# Patient Record
Sex: FEMALE | Race: WHITE
[De-identification: names, ages, dates, MRNs, and addresses within clinical notes are randomized per-mention and may not be internally consistent; named-entity substitution may affect disease eponyms.]

---

## 2018-03-06 ENCOUNTER — HOSPITAL ENCOUNTER (INPATIENT)
Dept: HOSPITAL 46 - ED | Age: 83
LOS: 2 days | Discharge: HOME | DRG: 291 | End: 2018-03-08
Attending: INTERNAL MEDICINE | Admitting: INTERNAL MEDICINE
Payer: MEDICARE

## 2018-03-06 VITALS — BODY MASS INDEX: 25.6 KG/M2 | HEIGHT: 63 IN | WEIGHT: 144.49 LBS

## 2018-03-06 DIAGNOSIS — E78.5: ICD-10-CM

## 2018-03-06 DIAGNOSIS — E03.9: ICD-10-CM

## 2018-03-06 DIAGNOSIS — Z79.84: ICD-10-CM

## 2018-03-06 DIAGNOSIS — J96.01: ICD-10-CM

## 2018-03-06 DIAGNOSIS — Z79.01: ICD-10-CM

## 2018-03-06 DIAGNOSIS — E11.9: ICD-10-CM

## 2018-03-06 DIAGNOSIS — I48.2: ICD-10-CM

## 2018-03-06 DIAGNOSIS — Z86.73: ICD-10-CM

## 2018-03-06 DIAGNOSIS — I11.0: Primary | ICD-10-CM

## 2018-03-06 DIAGNOSIS — Z66: ICD-10-CM

## 2018-03-06 DIAGNOSIS — I50.33: ICD-10-CM

## 2018-03-06 NOTE — NUR
AT 1828 PATIENT HAD WHAT APPEARED TO BE A VASOVAGAL EPISODE GETTING UP FROM
THE COMMODE. HR DROPPED INTO THE HIGH 40'S SHE SAID SHE FELT VERY HOT, BUT WAS
NOT DIAPHORETIC, SHE HAD NO CHEST PAIN, NO JAW OR LEFT ARM PAIN OR NUMBNESS,
BUT DID HAVE SOME NAUSEA. INCREASED HER FROM 4L/NC TO 6L/NC AND CALLED
. AT THE TIME OF PHONE CALL PATIENT WAS BACK TO 4L/NC AND FEELING BACK
TO NORMAL.  ALSO FELT THIS WAS A VASOVAGAL EVENT AND ASKED THAT WE
JUST MONITOR THE PATIENT. PATIENT IS RESTING QUIETLY NOW AT THIS TIME.

## 2018-03-06 NOTE — EKG
[facility]
                                    [address]
                                  [place], [place]  [address]
_________________________________________________________________________________________
                                                                 Signed   
 
 
Atrial fibrillation with premature ventricular or aberrantly conducted complexes
Low voltage QRS
Cannot rule out Anterior infarct , age undetermined
ST \T\ T wave abnormality, consider inferior ischemia Abnormal ECG No previous ECGs
available
Confirmed by NICK WARD MD ([id number]) on 3/6/2018 8:49:25 PM
 
 
 
 
 
 
 
 
 
 
 
 
 
 
 
 
 
 
 
 
 
 
 
 
 
 
 
 
 
 
 
 
 
 
 
 
 
 
 
    Electronically Signed By: NICK WARD MD  03/06/18 2049
_________________________________________________________________________________________
PATIENT NAME:     ALVIN HARP                 
MEDICAL RECORD #: [id number]                     Electrocardiogram             
          ACCT #: [id number]  
DATE OF BIRTH:   [birth year]                                       
PHYSICIAN:   NIKC WARD MD           REPORT #: [id number]
REPORT IS CONFIDENTIAL AND NOT TO BE RELEASED WITHOUT AUTHORIZATION

## 2018-03-06 NOTE — NUR
PT ASSISTED TO USE BEDPAN. PT STATES SHE TOLERATES THE BEDPAN MUCH BETTER THAN
GETTING UP TO THE CAMMODE. BEDDING CHANGED. NO OTHER ISSUES AT THIS TIME.

## 2018-03-06 NOTE — NUR
pt shift report recived from day shift rn. pt resting in bed with fmaily at
bedside. will continue to closely monitor pt. call light in hand.

## 2018-03-06 NOTE — NUR
Patient has had her evening medications and is now eating her dinner with
family at the bedside.  called and ordered another 40mg IV Lasix,
which has been given.

## 2018-03-06 NOTE — NUR
FAMILY HEADING HOME FOR THE NIGHT. PT USING BEDPAN TO DECREASE ACTIVITY D/T
SOB AND SPO2 DESATURATION. PT IS NOT TOLERATING GETTING UP TO THE CAMMODE WELL
WITH WORK OF BREATHING. PT TOLERATED BEDPAN. SPOKE WITH PT LUCA DIAL PT
DOES NOT WISH TO HAVE ONE AT THIS TIME, SHE FEELS THE BEDPAN IS TOLERABLE.
WILL CONTINUE TO CLSOELY MONITOR SOB WITH ACTIVITY AND ENCOURAGE AS MUCH REST
TONIGHT. PT ASSESSMENT COMPLTED. PT HAS SOME CRACKLES BILATERALLY. PT IS ON 4L
NC. VITALS COMPLTED. ACTIVE BT NOTED. PT PREFERES TO SLEEP WITH LIIGHT ON. PT
HAS CALL LIGHT. PT DENTURES CLEANED. WILL CONTINUE TO CLSOELY MONITOR.

## 2018-03-07 NOTE — NUR
PT AWAKE AND UP TO [facility] CHAIR FOR BREAKFAST WITH ONE PERSON ASSIST. PT ATE
APPROX 50% OF BREAKFAST, DAUGHTER IN ROOM. PT USED BSC AND RETURNED TO BED.
LULY IN TO DISCUSS WITH PT AND DAUGHTER ABOUT HER HEART MEDICATIONS AND DIET.
AND ANSWER ANY QUESTIONS THEY MAY HAVE. SHE WILL RETURN TOMORROW TO DISCUSS
INFORMATION AGAIN.

## 2018-03-07 NOTE — NUR
PATIENT ASSISTED TO THE BR. PATIENT VOIDED CLEAR YELLOW URINE. ASSISTED BACK
TO BED. TOLERATED WELL.

## 2018-03-07 NOTE — NUR
REPORT GIVEN FROM FRANK RODRÍGUEZ. BROUGHT PATIENT OVER IN CHAIR.PATIENT TITRATED
TO RA. TOLERATING SATING AT 95 PERCENT. VITALS TAKEN. ASSISTED PATIENT TO BED.
TOLERATING AMBULATING TO BED WITH A STBY ASSIST. PATIENT IS STEADY ON HIS
FEET. CURRENTLY PATIENT IS ORDERING DINNER. WILL BE BACK IN ROOM TO DO
ASSESSMENT.

## 2018-03-07 NOTE — NUR
PT RESTING WELL AT THIS TIME. PT ASKED STAFF NOT TO TURN LIGHTS OUT WHEN SHE
IS SLEEPING. PTPREFFERS LIGHTS TO BE LEFT ON. LIGHTS REMAIN ON. WILL CONTINUE
TO CLSOELY MONITOR.

## 2018-03-07 NOTE — NUR
PATIENT ASSSEMENT COMPLETED. PATIENTS VITALS TAKEN AND RECORDED. PATIENT
RECIEVED NO INSULIN AS BS WAS WNL. PATIENT DENIES ANY PAIN OR SOB. PATIENTS
EVENING MEDICATIONS GIVEN PER ORDER. PATIENT ASSISTED TO THE RESTROOM AS A
SBA. PATIENT IS STEADY ON HER FEET. PATIENT GIVEN SUPPLIES TO COMPLETE NIGHTLY
ORAL CARE. PATIENT IS NOW BACK IN BED RESTING. PATIENTS FAMILY IS AT THE
BEDISDE. NO FURTHER NEEDS NOTED. CALL LIGHT IN REACH.

## 2018-03-07 NOTE — NUR
PT CALLED TO USE BEDPAN. PT TOLERATED EVEN BETTER THAN EARLIER IN THE SHIFT.
PT STATES SHE DOES NOT FEEL SOB WITH MOVING IN BED. PT TURNED TO ALLOW NEW
CHUCKS PAD PLACED. PT ASSESSMENT UNCHANGED FROM PREVIOUS. WILL CONTINUE TO
CLOSELY MONITOR.

## 2018-03-07 NOTE — NUR
PINO WAS A CCU TRANSFER THIS AFTERNOON. STBY ASSIST TO BR. PATIENT IS ADA
DIET. 1500 FLUID RESTRICTION. DAILY WT. LULY FOR CARDIAC REHAB HAS BEEN IN TO
PROVIDE EDUCATION. SL. LASIX AND PO POTASSIUM GIVEN. PATIENT REPORTS THAT SHE
HAD A BM THIS MORNING.

## 2018-03-07 NOTE — NUR
Heart failure education and initial home management assessment.
Lives independently with daughter living in upper floor apartment. Manages own
meds-denies difficulty, uses 4 seperate weekly pill boxes. Owns scales, does
not currently weigh QD but states she can. Transportation-drives self to
coumadin clinic.Daughter, Lauryn,takes her to all other provider appointments.
Diet- Long time member of [employer], has been a  for 14 years, eats a balanced
diet that includes cranberries/oatmeal for breakfast, Salad with whole wheat
bread at [facility] for lunch QD, dinner often includes fruit whole wheat toast and
skim milk. She enjoys 1 caffeine free diet soda Q AM, daughter will check
sodium content.  When eats out does not use full amount of salad dressing.
Rarely eats at [employer] but makes a meal last 2 days.
Given heart failure packet with Living with HF book, zones form, daily
weight/symptom log, blank [employer] home medication schedule. Vonnie and
her daughter were receptive to new information and agrees to follow ups and to
receive more information.
Plan of Care: View HF video, discuss Zones/sx management, review new drugs
PRN, ensure follow up with PCP or cardiology within 7 days of DC.

## 2018-03-07 NOTE — NUR
PT CALLED TO GO TO THE BATHROOM. OTHER RN IN TO HELP PT AND SHE GOT UP TO
CAMMODE PT TOLERATED WELL. PT SPO2 STAYED ABOVE 92% ON 3L NC. WILL CONTINUE O
CLSOELY MONITOR. NO OTHER ISSUES AT THIS TIME. CALL LIGHT IN REACH.

## 2018-03-07 NOTE — NUR
PATIENT BLOOD SUGAR TAKEN. PATIENT DOING WELL. FAMILY AT BEDSIDE. EVENING
MEDICATON GIVEN. REQUESTING THAT LULY COME WITH CHF VIDEO. PATIENT HAS NO
OTHER COMPLAINT AT THIS TIME. PATIENT IS CONTINUE TO TOLEATE RA. LULY CALLED
TO GIVE CHF INFORMATION.

## 2018-03-07 NOTE — NUR
PT CALLED TO USE BEDPAN. PT TOLERATES BED PAN WELL. TURNED PT OXYGEN DOWN TO
3.5L NC WITH SPO2 92-95%. PT ASSESSMENT COMPLTED. PT BREATH SOUNDS ARE CLEAR
BILATERALLY. WILL CONTINUE TO CLOSELY MONITOR.

## 2018-03-07 NOTE — NUR
PATIENT RESTING IN BED EATING DINNER. PATIENTS FAMILY MEMBERS IN ROOM. CALL
LIGHT WITHIN REACH. PATIENT STATES SHE HAS NO PAIN. NO OTHER NEEDS AT THIS
TIME.

## 2018-03-07 NOTE — NUR
UP TO BS WITH ONE PERSON ASSIST. VOIDED 375 MLS YELLOW URINE, RETURNED TO BED
AND RESTING WITH HOB ELEVATED - NO C/O.

## 2018-03-08 NOTE — NUR
PATIENT IS RESTING IN BED WITH EYES CLOSED. PATIENTS BREATHING IS EVEN ADN
UNLABORED, RR 17. CALL LIGHT IN REACH.

## 2018-03-08 NOTE — NUR
PATIENT IS RESTING IN BED WITH EYES CLOSED. PATIENTS BREATHING IS EVEN AND
UNLABORED, RR 17. CALL LIGHT IN REACH.

## 2018-03-08 NOTE — NUR
PATIENT SITTING UP IN BED, DAUGHTER IN ROOM. TEETH BRUSHED, WILL DO AM CARE
AFTER BREAKFAST. WOULD LIKE TO SHOWER TODAY. IS CONCERNED ABOUT KNOWING HOW
TO MEASURE HER FLUIDS WHEN AT HOME. CALL LIGHT IN REACH.

## 2018-03-08 NOTE — NUR
Pt ambulated to the the bathoo and showered independently with SBA. Pt
stated she experienced slight SOB with exetrion but appeared to tolerate the
activity well as noted by an absence of audible wheezes or use of accessory
muscles. Pt continued to persfuse well notable by pinkness in the face and an
absence of cyanosis. Pt is currently resting in bed. Pt states no concerns at
this time. Call light within reach.

## 2018-03-08 NOTE — NUR
NURSING STUDENT ASSISTING PATIENT TO BATHROOM. SHE IS ALSO REMOVING HER IV
SITE. PATIENT TO BE D/C SHORTLY AFTER SATYA RETURNS.

## 2018-03-08 NOTE — NUR
PATIENTS DAILY WEIGHT TAKEN AND RECORDED. PATIENTS VITALS TAKE AND RECORDED BY
CNA. PATIENT ASSISTED TO THE RESTROOM AS A SBA AND IS STEADY ON HER FEET.
PATIENT IS BACK IN BED RESTING. NO FURTHER NEEDS NOTED. CALL LIGHT IN REACH.

## 2018-03-08 NOTE — NUR
RECIEVED REPORT FROM NIGHT SHIFT NURSE. PATIENT RESTING IN BED QUILTING.
DENIES NEEDS. CALL LIGHT IN REACH.

## 2018-03-08 NOTE — NUR
PATIENT ASSISTED TO THE RESTROOM AS SBA. PATIENT WAS ABLE TO VOID. PATIENT IS
NOW BACK IN BED RESTING. PATIENT DENIES ANY FURTHER NEEDS CALL LIGHT IN REACH.

## 2018-03-08 NOTE — NUR
ASSISTED PATIENT TO BATHROOM. VOIDED. BACK TO BED. REVIEWED D/C INSTRUCTIONS
WITH PATIENT AND DAUGHTER SATYA. QUESTIONS ANSWERED. NO FURTHER QUESTIONS.
DENY NEEDS.

## 2018-03-08 NOTE — NUR
ASSISTED PATIENT TO BATHROOM. VOIDED. BACK TO BED. SATYA (PT'S DAUGHTER) IN
ROOM. SATYA WOULD LIKE TO SPEAK WITH DR. MARSHALL BEFORE PATIENT IS D/C. COURSE
SOUNDS HEARD IN LLL, OTHERWISE CLEAR. HR IRREG., NO PEDAL EDEMA. PATIENT
DID BECOME MILDLY SOB WITH AMBULATING TO THE TOILET BUT SHE STATES IT HAS
IMPROVED. DENIES PAIN. CALL LIGHT IN REACH.

## 2018-03-08 NOTE — NUR
PATIENTS MORNING MEDICATIONS GIVEN PER ORDER. PATIENT IN BED RESTING. PATIENT
DENIES ANY FURTHER NEEDS AT THIS TIME. CALL LIGHT IN REACH.

## 2018-03-08 NOTE — NUR
PT RESTING IN BED, ALERT AND ORIENTED. SHE SMILED AND TOLD ME SHE WAS FEELING
SO MUCH BETTER. WELL ENOUGH SHE FELT TO BE DC'D TODAY. HAD A VERY PLEASANT
VISIT WITH PT, PRAYED FOR HER. WILL FOLLOW AS NEEDED

## 2018-03-08 NOTE — NUR
PATIENT RESTED WELL THROUGHOUT THE SHIFT. PATIENT IS ON AN ADA DIET AND
TOLERATING WELL, NO NAUSEA NOTED. PATIENT IS ON A 1500ML FLUID RESTRICTION.
PATIENT IS A SBA AND IS STEADY ON HER FEET. PATIENT IS ON RA, NO SOB NOTED.
PATIENT IS SL. PATIENT IS AAOX3 AND CALLS APPROPRIATELY.

## 2018-03-12 NOTE — NUR
Heart Failure Discharge Follow Up Call #1-
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 Able to states medication changes. Pt DC on 3/8. During f/u
call today patient revealed home wts have went up. Is adherent to new order
for Lasix 40 mg q AM. Denies diet change. Weighs at same time q AM. DC wt 144
lb. Home wt day after #, then 150#, 148#, today 149#.  States "legs are
skinny", SOB when "over does it", pants are not as tight as they were on
admit.
Prefers afternoon visits so her daughter can accompany her.  She could not get
in to see Dr Bermudez PCP until 4/4 for f/u visit. I have left him a high
priority message requesting she sees someone in PCP office as soon as
possible.

## 2018-08-24 ENCOUNTER — HOSPITAL ENCOUNTER (INPATIENT)
Dept: HOSPITAL 46 - ED | Age: 83
LOS: 4 days | Discharge: HOME | DRG: 872 | End: 2018-08-28
Attending: INTERNAL MEDICINE | Admitting: INTERNAL MEDICINE
Payer: MEDICARE

## 2018-08-24 VITALS — WEIGHT: 147.49 LBS | HEIGHT: 63 IN | BODY MASS INDEX: 26.13 KG/M2

## 2018-08-24 DIAGNOSIS — A41.51: Primary | ICD-10-CM

## 2018-08-24 DIAGNOSIS — E11.9: ICD-10-CM

## 2018-08-24 DIAGNOSIS — Z79.4: ICD-10-CM

## 2018-08-24 DIAGNOSIS — N39.0: ICD-10-CM

## 2018-08-24 DIAGNOSIS — I48.2: ICD-10-CM

## 2018-08-24 DIAGNOSIS — E87.1: ICD-10-CM

## 2018-08-24 DIAGNOSIS — I50.32: ICD-10-CM

## 2018-08-24 DIAGNOSIS — E03.9: ICD-10-CM

## 2018-08-24 DIAGNOSIS — I11.0: ICD-10-CM

## 2018-08-24 DIAGNOSIS — E78.5: ICD-10-CM

## 2018-08-24 DIAGNOSIS — I05.2: ICD-10-CM

## 2018-08-24 DIAGNOSIS — Z79.01: ICD-10-CM

## 2018-08-24 NOTE — XMS
Clinical Summary
  Created on: 2018
 
 LindayaminiVonnie
 External Reference #: [id number]
 : 32
 Sex: Female
 
 Demographics
 
 
+-----------------------+----------------------+
| Address               | [address]      |
|                       | [place]  [address] |
+-----------------------+----------------------+
| Home Phone            | [phone]      |
+-----------------------+----------------------+
| Preferred Language    | Unknown              |
+-----------------------+----------------------+
| Marital Status        | Unknown              |
+-----------------------+----------------------+
| Sabianism Affiliation | Unknown              |
+-----------------------+----------------------+
| Race                  | Unknown              |
+-----------------------+----------------------+
| Ethnic Group          | Unknown              |
+-----------------------+----------------------+
 
 
 Author
 
 
+--------------+---------------------+
| Author       | [facility] |
+--------------+---------------------+
| Organization | [facility] |
+--------------+---------------------+
| Address      | Unknown             |
+--------------+---------------------+
| Phone        | Unavailable         |
+--------------+---------------------+
 
 
 
 Support
 
 
+--------------+--------------+---------+-----------------+
| Name         | Relationship | Address | Phone           |
+--------------+--------------+---------+-----------------+
| Lauryn Leroy | ECON         | Unknown | [phone] |
+--------------+--------------+---------+-----------------+
 
 
 
 Care Team Providers
 
 
 
+-----------------------+------+-------------+
| Care Team Member Name | Role | Phone       |
+-----------------------+------+-------------+
 PP   | Unavailable |
+-----------------------+------+-------------+
 
 
 
 Source Comments
 [facility] is fully live on both [facility] and [facility]
 
 Allergies
 Not on File
 
 Current Medications
 Not on file
 
 Active Problems
 Not on file
 
 Encounters
 
 
+--------+-----------+-----------+----------------------+---------------------+
| Date   | Type      | Specialty | Care Team            | Description         |
+--------+-----------+-----------+----------------------+---------------------+
| / | Telephone |           |   Jame Wilkins  | Other ([facility] appt)    |
|    |           |           | MD [facility]                |                     |
+--------+-----------+-----------+----------------------+---------------------+
| 08/15/ | Abstract  |           |   Unknown            | Medical Records     |
|    |           |           |                      | Review ([facility] review) |
+--------+-----------+-----------+----------------------+---------------------+
 from Last 3 Months
 
 Social History
 
 
+----------------+-------+-----------+--------+------+
| Tobacco Use    | Types | Packs/Day | Years  | Date |
|                |       |           | Used   |      |
+----------------+-------+-----------+--------+------+
| Never Assessed |       |           |        |      |
+----------------+-------+-----------+--------+------+
 
 
 
+------------------+---------------+
| Sex Assigned at  | Date Recorded |
| Birth            |               |
+------------------+---------------+
| Not on file      |               |
+------------------+---------------+
 
 
 
 Plan of Treatment
 
 
+--------------------+-----------+-----------+----------+
 
| Health Maintenance | Due Date  | Last Done | Comments |
+--------------------+-----------+-----------+----------+
| INFLUENZA VACCINE  |  |           |          |
| (FLU SHOT)         | 8         |           |          |
+--------------------+-----------+-----------+----------+
 
 
 
 Results
 Not on filefrom Last 3 Months

## 2018-08-24 NOTE — XMS
Encounter Summary
  Created on: 2018
 
 Vonnie Celaya
 External Reference #: [id number]
 : 32
 Sex: Female
 
 Demographics
 
 
+-----------------------+---------------------------+
| Address               | [address]           |
|                       | [place]  [address] |
+-----------------------+---------------------------+
| Home Phone            | [phone]           |
+-----------------------+---------------------------+
| Preferred Language    | Unknown                   |
+-----------------------+---------------------------+
| Marital Status        |                    |
+-----------------------+---------------------------+
| Muslim Affiliation | Unknown                   |
+-----------------------+---------------------------+
| Race                  | Unknown                   |
+-----------------------+---------------------------+
| Ethnic Group          | Unknown                   |
+-----------------------+---------------------------+
 
 
 Author
 
 
+--------------+-----------------------+
| Author       | [facility] |
+--------------+-----------------------+
| Organization | [facility] |
+--------------+-----------------------+
| Address      | Unknown               |
+--------------+-----------------------+
| Phone        | Unavailable           |
+--------------+-----------------------+
 
 
 
 Support
 
 
+--------------+--------------+---------+-----------------+
| Name         | Relationship | Address | Phone           |
+--------------+--------------+---------+-----------------+
| Lauryn Leroy | ECON         | Unknown | [phone] |
+--------------+--------------+---------+-----------------+
 
 
 
 Care Team Providers
 
 
 
+-----------------------+------+-----------------+
| Care Team Member Name | Role | Phone           |
+-----------------------+------+-----------------+
| Jean Bermudez MD  | PCP  | [phone] |
+-----------------------+------+-----------------+
 
 
 
 Reason for Referral
 Consultation (Routine)
 
+------------+--------------+------------+--------------+--------------+---------------+
| Status     | Reason       | Specialty  | Diagnoses /  | Referred By  | Referred To   |
|            |              |            | Procedures   | Contact      | Contact       |
+------------+--------------+------------+--------------+--------------+---------------+
| Authorized |   Specialty  | Cardiology |   Diagnoses  |   Stephen,      |               |
|            | Services     |            |  Acute on    | Varun PARRA,   | [facility] |
|            | Required     |            | chronic      | MD  [address]     | [address]  [address]   |
|            |              |            | diastolic    | [address]  | [address]   |
|            |              |            | congestive   |  [address]       | [address],      |
|            |              |            | heart        | [place], [place] | [place] , [place] |
|            |              |            | failure      |  [address]       |  [address]        |
|            |              |            | ([facility])        | Phone:       | Phone:        |
|            |              |            | Persistent   | [phone] | [phone]  |
|            |              |            | atrial       |   Fax:       |  Fax:         |
|            |              |            | fibrillation | [phone] | [phone]  |
|            |              |            |  ([facility])       |              |               |
|            |              |            | Severe       |              |               |
|            |              |            | mitral       |              |               |
|            |              |            | regurgitatio |              |               |
|            |              |            | n  Severe    |              |               |
|            |              |            | tricuspid    |              |               |
|            |              |            | regurgitatio |              |               |
|            |              |            | n  Pulmonary |              |               |
|            |              |            |              |              |               |
|            |              |            | hypertension |              |               |
|            |              |            | , moderate   |              |               |
|            |              |            | to severe    |              |               |
|            |              |            | ([facility])        |              |               |
+------------+--------------+------------+--------------+--------------+---------------+
 
 
 
 
 Reason for Visit
 
 
+--------------+----------+
| Reason       | Comments |
+--------------+----------+
| Consultation |          |
+--------------+----------+
 Consultation (Routine)
 
+-------------+--------+------------+--------------+--------------+---------------+
| Status      | Reason | Specialty  | Diagnoses /  | Referred By  | Referred To   |
|             |        |            | Procedures   | Contact      | Contact       |
+-------------+--------+------------+--------------+--------------+---------------+
| New Request |        | Cardiology |   Diagnoses  |   Aidan,  |   Imer,   |
|             |        |            |  Heart       | MD Jean  | MD Geovany   |
 
|             |        |            | failure,     |  [address]     | [address] |
|             |        |            | unspecified  | [address]    |  [address]     |
|             |        |            | ([facility])        | [address]          | [place]  |
|             |        |            | Procedures   | [place],   | [address]  Phone: |
|             |        |            | Consult      | [place] [address]     |  [phone] |
|             |        |            |              | Phone:       |   Fax:        |
|             |        |            |              | [phone] | [phone]  |
|             |        |            |              |   Fax:       |               |
|             |        |            |              | [phone] |               |
+-------------+--------+------------+--------------+--------------+---------------+
 
 
 
 
 Encounter Details
 
 
+--------+----------+----------------------+----------------------+----------------------+
| Date   | Type     | Department           | Care Team            | Description          |
+--------+----------+----------------------+----------------------+----------------------+
| / | Initial  |   [facility]          |   Varun Mitchell,   | Persistent atrial    |
| 2018   | consult  | Cardiology [place] | MD [address] | fibrillation (HCC)   |
|        |          |   [address]    |   [place],   | (Primary Dx); Acute  |
|        |          | [address]        | [place] [address]             | on chronic diastolic |
|        |          | [place] [address]  | [phone]         |  congestive heart    |
|        |          |  [phone]        | [phone] (Fax)   | failure (HCC);       |
|        |          |                      |                      | Severe mitral        |
|        |          |                      |                      | regurgitation;       |
|        |          |                      |                      | Severe tricuspid     |
|        |          |                      |                      | regurgitation;       |
|        |          |                      |                      | Pulmonary            |
|        |          |                      |                      | hypertension,        |
|        |          |                      |                      | moderate to severe   |
|        |          |                      |                      | (HCC)                |
+--------+----------+----------------------+----------------------+----------------------+
 
 
 
 Social History
 
 
+--------------+-------+-----------+--------+------+
| Tobacco Use  | Types | Packs/Day | Years  | Date |
|              |       |           | Used   |      |
+--------------+-------+-----------+--------+------+
| Never Smoker |       |           |        |      |
+--------------+-------+-----------+--------+------+
 
 
 
+---------------------+---+---+---+
| Smokeless Tobacco:  |   |   |   |
| Never Used          |   |   |   |
+---------------------+---+---+---+
 
 
 
+-------------+-----------+---------+----------+
| Alcohol Use | Drinks/We | oz/Week | Comments |
|             | ek        |         |          |
 
+-------------+-----------+---------+----------+
| No          |           |         |          |
+-------------+-----------+---------+----------+
 
 
 
+------------------+---------------+
| Sex Assigned at  | Date Recorded |
| Birth            |               |
+------------------+---------------+
| Not on file      |               |
+------------------+---------------+
 as of this encounter
 
 Last Filed Vital Signs
 
 
+-------------------+--------------------+-------------------------+
| Vital Sign        | Reading            | Time Taken              |
+-------------------+--------------------+-------------------------+
| Blood Pressure    | 112/54             | 2018  9:50 AM PDT |
+-------------------+--------------------+-------------------------+
| Pulse             | 95                 | 2018  9:34 AM PDT |
+-------------------+--------------------+-------------------------+
| Temperature       | -                  | -                       |
+-------------------+--------------------+-------------------------+
| Respiratory Rate  | -                  | -                       |
+-------------------+--------------------+-------------------------+
| Oxygen Saturation | 97%                | 2018  9:34 AM PDT |
+-------------------+--------------------+-------------------------+
| Inhaled Oxygen    | -                  | -                       |
| Concentration     |                    |                         |
+-------------------+--------------------+-------------------------+
| Weight            | 66 kg (145 lb 6.4  | 2018  9:34 AM PDT |
|                   | oz)                |                         |
+-------------------+--------------------+-------------------------+
| Height            | 160 cm (5' 3")     | 2018  9:34 AM PDT |
+-------------------+--------------------+-------------------------+
| Body Mass Index   | 25.76              | 2018  9:34 AM PDT |
+-------------------+--------------------+-------------------------+
 in this encounter
 
 Progress Notes
 Varun Mitchell MD - 2018  9:30 AM PDTFormatting of this note may be different from 
the original.
   
 Subjective: 
 
 Patient ID: Vonnie Celaya is a 86 y.o. female.
 
 HPI
 The following portions of the patient's history were reviewed and updated as appropriate an
d is available elsewhere in the record: allergies, current medications, past family history,
 past medical history, past social history, past surgical history and problem list.
 
 Mrs. Celaya, accompanied by her daughter (who lives with her and a separate apartment), otto burden to the office today for a cardiology evaluation.  She was hospitalized at [facility] twice this year, the first time in February for acute diastolic heart failure, althou
gh she believes that her symptoms actually began as long ago as the end of , when she de
veloped dyspnea on exertion with a cold.  She had severe dyspnea, to the point where she cou
 
ld not walk 1/4 block to her mailbox.  She had no dyspnea at rest, denies orthopnea, PND or 
nocturia, but had significant pedal edema.  She was hospitalized again in May, for the same 
problem.  She has hyperdynamic LV systolic function on her echocardiogram, but marked valvul
ar disease, with severe mitral regurgitation, moderately severe tricuspid regurgitation, and
 mild aortic stenosis.  She denies any history of rheumatic fever, but had scarlet fever at 
age 13, which clearly could have been misdiagnosed.  There is no mitral stenosis.  I reviewe
d the images from her echocardiogram, which also showed moderately severe pulmonary hyperten
heaven and a small secundum ASD with minor left-to-right shunting.  She continues to have sign
ificant dyspnea on exertion, walking about one half block, and because of this, now leaves h
er home only about once per week, whereas previously, she was quite active, participating in
 multiple activities such as quilting.  It has clearly affected her quality of life.  She wo
uld be classified as New York Heart Association class III.  At age 86, I do not think she is
 a very good candidate for open heart surgery, as she already has some mild memory deficits 
which would likely become worse.  The MR jet is central, and she may be a candidate for a Mi
traClip procedure.  Experimental he, this is sometimes done on the tricuspid valve is well. 
 After a prolonged discussion, she agreed to have an evaluation for this, and I referred her
 to [facility] for this purpose.  She is on appropriate medications, and I made no changes.  I denita
l see her back in several months for follow-up. 
 
 Review of Systems
 CONSTITUTIONAL:  15 lb weight decrease due to diuretics in the last year, previously lost 6
0-70 lbs since the  with diet, denies recent fever, chills, night sweats, c/o significa
nt fatigue with even minor exertion
 NEUROLOGIC:  No history of CVA, had 2 TIAs (memory deficits 1-2 days in , resolved; the
 other was c., cannot recall the symptoms).  She has a prior h/o migraines, resolved.  S
he denies seizures, has had 2 Syncopal events due to UTI, and another at age 16, probably va
sovagal, getting BP checked in both arms simultaneously ("pumped up all the way").  No dizzi
ness, lightheadedness. No numbness, tingling, paresthesias.  She has mild short term memory 
problems ("repeats stories frequently").
 EYES:  No amaurosis, diplopia, recent visual changes, has early stage Cataracts, no h/o gla
ucoma
 ENT:  She has mild bilateral Hearing loss, denies tinnitus, epistaxis, has occasional dysph
agia with certain foods
 ENDOCRINE:  Type II Diabetes mellitus. Hypothyroidism, no other endocrine problems.  No exc
essive hunger, thirst.
 PULMONARY/SLEEP:  Severe Dyspnea on Exertion but not at rest, denies orthopnea, paroxysmal 
nocturnal dyspnea.  No history of asthma, emphysema. She had pneumonia at age 4. She has mil
d snoring, no significant daytime somnolence.  Sleep is refreshing.
 CARDIOVASCULAR:  Denies chest pain, pressure or discomfort.  No history of CAD.  She has a 
history of acute Diastolic Heart Failure.  She has persistent Atrial Fibrillation, on warfar
in. No recent palpitations.  She has had a heart Murmur since age 13, with marked Valvular H
eart Disease, severe MR, moderately severe TR, mild AS.  She denies a h/o rheumatic fever bu
t had scarlet fever in childhood.  She has moderately severe Pulmonary HTN.  She has Essenti
al Hypertension, Hyperlipidemia.  She had significant Edema prior to diuretic treatment, use
s compression stockings, no claudication symptoms.  No h/o an AAA.
 -- Echo (3/8/18): EF > 70%, normal RV size, function.  Marked bi-AE.  Mild AS (FOREST 1.7 cm
, peak/mean gradient 17/9 mmHg).  Severe MR.  Moderately severe TR.  Moderately severe pulmo
nary hypertension, RVSP 55.8-60.8 mmHg.  Small secundum ASD with mild left-to- right shuntin
g
 GASTROINTESTINAL:  No recent abdominal pain, nausea, vomiting or diarrhea. Denies PUD, kvng
na, hematochezia, hepatitis.
 RENAL/:  No history of kidney disease.  No dysuria, hematuria, urinary urgency, hesitancy
.  No active Ob/Gyn disorders.
 HEMATOLOGY/ONCOLOGY:  No h/o bleeding disorders, DVT, PE.   Denies easy bruisability or ble
eding.  She has a history of Iron-deficiency Anemia, denies prior blood transfusions.  No hi
story of cancer.
 MUSCULOSKELETAL:  Osteoporosis.  No myalgias, has Osteoarthritis with no significant arthra
lgias.  No history of rheumatologic or autoimmune diseases.
 CUTANEOUS:  Eczema. No rashes, pruritus, lesions.
 PSYCHIATRIC:  No history of significant depression, has Anxiety, denies any other psychiatr
 
ic problems.
 
 Past Medical History 
 Diagnosis Date 
   Acute diastolic heart failure (HCC)  
   ASD secundum  
   Atrial fibrillation (HCC)  
  persistent since then, never attempted cardioversion 
   Congestive heart failure (HCC) 2018 
  acute/chronic Diastolic Heart Failure, NYHA class III 
   Diabetes mellitus, type II (HCC)  
  controlled, no complications 
   Edema  
   Essential hypertension  
   Heart murmur  
  had scarlet fever 
   Hyperlipidemia  
   Hypothyroidism  
   Iron deficiency anemia  
   Migraines  
  resolved 
   Mild aortic stenosis  
   Osteoarthritis  
   Osteoporosis  
   Pulmonary hypertension, moderate to severe (HCC)  
   Severe mitral regurgitation  
   Severe tricuspid regurgitation  
  moderately severe 
 
 Past Surgical History 
 Procedure Laterality Date 
   APPENDECTOMY   
   BLADDER SUSPENSION   
   BLEPHAROPLASTY Bilateral  
   BREAST SURGERY Right  
  benign lesion removed 
   BUNIONECTOMY   
   HYSTERECTOMY   
  partial hysterectomy separate 
   salpingo-oophorectomy Bilateral  
   TOE FUSION   
   TONSILLECTOMY   
 
 Family History 
 Problem Relation Age of Onset 
   CVA Mother 71 
      recurrent CVA at 74,  a few days later 
   Heart failure Mother  
   Diabetes Mellitus II Mother  
   CVA Father  
      cerebral hemorrhage 
   Coronary Artery Disease Father 63 
   Leukemia Sister  
      basophilic leukemia 
   CVA Brother  
   Heart  Disease Sister  
      ? thrombus, not clear 
   Deep vein thrombosis Sister  
   CVA Daughter  
   Obesity Daughter  
 
   Arthritis Daughter  
   Thyroid cancer Daughter  
 
 Social History 
 Substance Use Topics 
   Smoking status: Never Smoker 
   Smokeless tobacco: Never Used 
   Alcohol use No 
 
 Allergies 
 Allergen Reactions 
   Morphine Other (See Comments) 
   Pt felt fidgety 
   Percocet [Oxycodone-Acetaminophen] Other (See Comments) 
   Pt felt fidgety 
 
 Current Outpatient Prescriptions: 
    acetaminophen (TYLENOL) 325 MG tablet, Take 650 mg by mouth every 6 (six) hours as nee
ded for Pain., Disp: , Rfl: 
    ascorbic acid (VITAMIN C) 1000 MG tablet, Take 1,000 mg by mouth daily., Disp: , Rfl: 
    Cholecalciferol 2000 units CAPS, Take 2,000 Units by mouth daily., Disp: , Rfl: 
    Cranberry 1000 MG CAPS, Take 1 capsule by mouth daily., Disp: , Rfl: 
    furosemide (LASIX) 40 MG tablet, Take 40 mg by mouth daily., Disp: , Rfl: 
    Gluc-Chonn-MSM-Boswellia-Vit D (GLUCOSAMINE CHOND TRIPLE/VIT D) TABS, Take 1 tablet by
 mouth daily., Disp: , Rfl: 
    levothyroxine (SYNTHROID) 100 MCG tablet, Take 100 mcg by mouth every morning before b
reakfast., Disp: , Rfl: 
    losartan (COZAAR) 100 MG tablet, Take 100 mg by mouth daily., Disp: , Rfl: 
    magnesium chloride (MAG64) 64 mg EC tablet, Take 1 tablet by mouth daily., Disp: , Rfl
: 
    metFORMIN (GLUMETZA) 500 MG (MOD) 24 hr tablet, Take 500 mg by mouth 2 (two) times annalise
ly with meals., Disp: , Rfl: 
    metoprolol (TOPROL-XL) 200 MG 24 hr tablet, Take 100 mg by mouth every morning., Disp:
 , Rfl: 
    potassium chloride SA (K-DUR,KLOR-CON) 20 MEQ tablet, Take 20 mEq by mouth daily., Dis
p: , Rfl: 
    simvastatin (ZOCOR) 20 MG tablet, Take 20 mg by mouth nightly., Disp: , Rfl: 
    warfarin (COUMADIN) 5 MG tablet, Take 5 mg by mouth daily. 5mg daily on Mond, Wed, Fri
d, Sat,  and 2.5mg daily on Tues, Thurs, Disp: , Rfl: 
 
     
 Objective: 
 Physical Exam
 /54 (BP Location: Left upper arm, Patient Position: Sitting)  | Pulse 95  | Ht 1.6 m 
(5' 3")  | Wt 66 kg (145 lb 6.4 oz)  | SpO2 97%  | BMI 25.76 kg/m 
 /48 right arm
 
 GENERAL:  Well developed, well nourished elderly  woman, in no distress.  Appears 
approximately stated age.
 HEENT:  Normocephalic, atraumatic.  
 EYES:     PERRL, sclerae anicteric, no xanthelsasmas
 MOUTH: Oral mucosae moist, dentition adequate, no lesions noted
 NECK:    No JVD, lymphadenopathy, thyromegaly, bruits.  Carotid pulses are 2+ bilaterally
 LUNGS:  Clear bilaterally, with no rales, rhonchi or wheezing noted, respirations unlabored
 HEART:  Nondisplaced PMI, regular rate, irregularly irregular rhythm, S1 varied in intensit
y, S2 normal.  100 2/6 systolic crescendo decrescendo murmur at the base, without radiation.
  1I-6 holosystolic murmur at the apex radiating to the lower left sternal border.  No rubs 
or gallops noted.
 ABDOMEN:  Soft, nontender, no organomegaly, masses or bruits.  Bowel sounds are normal in a
ll 4 quadrants.  The abdominal aortic pulsation is not palpable.
 
 EXTREMITIES:  No edema. Radial pulses 2+ bilaterally.  Femoral pulses are 2+ bilaterally wi
thout bruits.  DP and PT pulses are 1+ bilaterally.
 SKIN:  Warm and dry, capillary refill is normal, no lesions.
 NEUROLOGIC:  Awake, alert and oriented x 3. No focal motor deficits. 
 PSYCHIATRIC:  Appropriate, affect appears normal
 
 EKG: Atrial fibrillation, ventricular rate 68, poor R-wave progression V1-V4, cannot exclud
e an old anteroseptal infarct.  Nonspecific inferior ST-T abnormalities, possibly due to isc
hemia.
 
    
 Assessment and Plan: 
 Vonnie was seen today for consultation.
 
 Persistent atrial fibrillation (HCC)
 -     Electrocardiogram, 12-lead
 -     AMB ref to Interventional Cardiology
 
 Acute on chronic diastolic congestive heart failure (HCC)
 -     AMB ref to Interventional Cardiology
 
 Severe mitral regurgitation
 -     AMB ref to Interventional Cardiology
 
 Severe tricuspid regurgitation
 -     AMB ref to Interventional Cardiology
 
 Pulmonary hypertension, moderate to severe (HCC)
 -     AMB ref to Interventional Cardiology
 
  
 
 in this encounter
 
 Plan of Treatment
 
 
+--------+---------+------------+----------------------+-------------+
| Date   | Type    | Specialty  | Care Team            | Description |
+--------+---------+------------+----------------------+-------------+
| / | Office  | Cardiology |   Varun Mitchell,   |             |
| 2018   | Visit   |            | MD [address] |             |
|        |         |            |   [place],   |             |
|        |         |            | [place] [address]             |             |
|        |         |            | [phone]         |             |
|        |         |            | [phone] (Fax)   |             |
+--------+---------+------------+----------------------+-------------+
 
 
 
+----------------------------+--------+----------------------+---------------------+
| Name                       | Priori | Associated Diagnoses | Order Schedule      |
|                            | ty     |                      |                     |
+----------------------------+--------+----------------------+---------------------+
| AMB ref to Interventional  | Routin |   Acute on chronic   | Ordered: 2018 |
| Cardiology                 | e      | diastolic congestive |                     |
|                            |        |  heart failure (HCC) |                     |
|                            |        |   Persistent atrial  |                     |
|                            |        | fibrillation (HCC)   |                     |
|                            |        | Severe mitral        |                     |
 
|                            |        | regurgitation        |                     |
|                            |        | Severe tricuspid     |                     |
|                            |        | regurgitation        |                     |
|                            |        | Pulmonary            |                     |
|                            |        | hypertension,        |                     |
|                            |        | moderate to severe   |                     |
|                            |        | (HCC)                |                     |
+----------------------------+--------+----------------------+---------------------+
 as of this encounter
 
 Procedures
 
 
+----------------------+--------+-------------+----------------------+----------------------
+
| Procedure Name       | Priori | Date/Time   | Associated Diagnosis | Comments             
|
|                      | ty     |             |                      |                      
|
+----------------------+--------+-------------+----------------------+----------------------
+
| EKG STANDARD 12 LEAD | Routin | 2018  |   Persistent atrial  |   Results for this   
|
|                      | e      |  9:57 AM    | fibrillation (HCC)   | procedure are in the 
|
|                      |        | PDT         |                      |  results section.    
|
+----------------------+--------+-------------+----------------------+----------------------
+
 in this encounter
 
 Results
 EK STANDARD 12 LEAD (2018  9:57 AM)
 
+-------------------+--------------------------+-----------+--------------+
| Component         | Value                    | Ref Range | Performed At |
+-------------------+--------------------------+-----------+--------------+
| Ventricular Rate  | 68                       | BPM       | [facility]     |
+-------------------+--------------------------+-----------+--------------+
| Atrial Rate       | 357                      | BPM       | [facility]     |
+-------------------+--------------------------+-----------+--------------+
| QRS Duration      | 102                      | ms        | [facility]     |
+-------------------+--------------------------+-----------+--------------+
| Q-T Interval      | 396                      | ms        | [facility]     |
+-------------------+--------------------------+-----------+--------------+
| QTC Calculation   | 421                      | ms        | [facility]     |
| (Bezet)           |                          |           |              |
+-------------------+--------------------------+-----------+--------------+
| Calculated R Axis | 80                       | degrees   | [facility]     |
+-------------------+--------------------------+-----------+--------------+
| Calculated T Axis | 4                        | degrees   | [facility]     |
+-------------------+--------------------------+-----------+--------------+
| Diagnosis         | Atrial                   |           | [facility]     |
|                   | fibrillationAnterior     |           |              |
|                   | infarct , age            |           |              |
|                   | undeterminedAbnormal     |           |              |
|                   | ECGNo previous ECGs      |           |              |
|                   | availablePlease refer to |           |              |
|                   |  Providers office visit  |           |              |
|                   | note for Providers       |           |              |
 
|                   | Interpretation.Confirmed |           |              |
|                   |  by [facility] Read Only,  |           |              |
|                   | [facility] ([id number]),      |           |              |
|                   |  Sol[facility] [place]    |           |              |
|                   | (253) on 2018       |           |              |
|                   | 10:00:11 AM              |           |              |
+-------------------+--------------------------+-----------+--------------+
 
 
 
+--------------+-------------------+--------------------+--------------+
| Performing   | Address           | City/State/Zipcode | Phone Number |
| Organization |                   |                    |              |
+--------------+-------------------+--------------------+--------------+
|   [facility]   |   [address] | [place] [address] |              |
+--------------+-------------------+--------------------+--------------+
 in this encounter
 
 Visit Diagnoses
 
 
+-----------------------------------------------------------+
| Diagnosis                                                 |
+-----------------------------------------------------------+
| Persistent atrial fibrillation (HCC) - Primary            |
+-----------------------------------------------------------+
|   Atrial fibrillation                                     |
+-----------------------------------------------------------+
| Acute on chronic diastolic congestive heart failure (HCC) |
+-----------------------------------------------------------+
|   Acute on chronic diastolic heart failure                |
+-----------------------------------------------------------+
| Severe mitral regurgitation                               |
+-----------------------------------------------------------+
|   Mitral valve disorders                                  |
+-----------------------------------------------------------+
| Severe tricuspid regurgitation                            |
+-----------------------------------------------------------+
|   Diseases of tricuspid valve                             |
+-----------------------------------------------------------+
| Pulmonary hypertension, moderate to severe (HCC)          |
+-----------------------------------------------------------+
|   Other chronic pulmonary heart diseases                  |
+-----------------------------------------------------------+

## 2018-08-24 NOTE — XMS
Encounter Summary
  Created on: 2018
 
 Vonnie Celaya
 External Reference #: [id number]
 : 32
 Sex: Female
 
 Demographics
 
 
+-----------------------+----------------------+
| Address               | [address]      |
|                       | [place]  [address] |
+-----------------------+----------------------+
| Home Phone            | [phone]      |
+-----------------------+----------------------+
| Preferred Language    | Unknown              |
+-----------------------+----------------------+
| Marital Status        | Unknown              |
+-----------------------+----------------------+
| Holiness Affiliation | Unknown              |
+-----------------------+----------------------+
| Race                  | Unknown              |
+-----------------------+----------------------+
| Ethnic Group          | Unknown              |
+-----------------------+----------------------+
 
 
 Author
 
 
+--------------+------------------------------+
| Author       | [facility] |
+--------------+------------------------------+
| Organization | [facility] |
+--------------+------------------------------+
| Address      | Unknown                      |
+--------------+------------------------------+
| Phone        | Unavailable                  |
+--------------+------------------------------+
 
 
 
 Support
 
 
+--------------+--------------+---------+-----------------+
| Name         | Relationship | Address | Phone           |
+--------------+--------------+---------+-----------------+
| Lauryn Leroy | ECON         | Unknown | [phone] |
+--------------+--------------+---------+-----------------+
 
 
 
 Care Team Providers
 
 
 
+-----------------------+------+-------------+
| Care Team Member Name | Role | Phone       |
+-----------------------+------+-------------+
 PCP  | Unavailable |
+-----------------------+------+-------------+
 
 
 
 Reason for Visit
 
 
+--------+----------+
| Reason | Comments |
+--------+----------+
| Other  | [facility] appt |
+--------+----------+
 
 
 
 Encounter Details
 
 
+--------+-----------+----------------------+----------------------+------------------+
| Date   | Type      | Department           | Care Team            | Description      |
+--------+-----------+----------------------+----------------------+------------------+
| / | Telephone |   Cardiology at [facility]  |   Jame Wilkins  | Other ([facility] appt) |
|    |           |  [address]  |                  |
|        |           | Mailcode: [address]       | Ave  [place], [place]    |                  |
|        |           | [facility]    | [address]           |                  |
|        |           | and Healing, [address]     | [phone]         |                  |
|        |           | Floor  [place], [place]  | [phone] (Fax)   |                  |
|        |           | [address]           |                      |                  |
|        |           | [phone]         |                      |                  |
+--------+-----------+----------------------+----------------------+------------------+
 
 
 
 Social History
 
 
+----------------+-------+-----------+--------+------+
| Tobacco Use    | Types | Packs/Day | Years  | Date |
|                |       |           | Used   |      |
+----------------+-------+-----------+--------+------+
| Never Assessed |       |           |        |      |
+----------------+-------+-----------+--------+------+
 
 
 
+------------------+---------------+
| Sex Assigned at  | Date Recorded |
| Birth            |               |
+------------------+---------------+
| Not on file      |               |
+------------------+---------------+
 as of this encounter
 
 Plan of Treatment
 Not on fileas of this encounter
 
 
 Visit Diagnoses
 Not on filein this encounter

## 2018-08-24 NOTE — XMS
Encounter Summary
  Created on: 2018
 
 Vonnie Celaya
 External Reference #: [id number]
 : 32
 Sex: Female
 
 Demographics
 
 
+-----------------------+----------------------+
| Address               | [address]      |
|                       | [place]  [address] |
+-----------------------+----------------------+
| Home Phone            | [phone]      |
+-----------------------+----------------------+
| Preferred Language    | Unknown              |
+-----------------------+----------------------+
| Marital Status        | Unknown              |
+-----------------------+----------------------+
| Hoahaoism Affiliation | Unknown              |
+-----------------------+----------------------+
| Race                  | Unknown              |
+-----------------------+----------------------+
| Ethnic Group          | Unknown              |
+-----------------------+----------------------+
 
 
 Author
 
 
+--------------+------------------------------+
| Author       | [facility] |
+--------------+------------------------------+
| Organization | [facility] |
+--------------+------------------------------+
| Address      | Unknown                      |
+--------------+------------------------------+
| Phone        | Unavailable                  |
+--------------+------------------------------+
 
 
 
 Support
 
 
+--------------+--------------+---------+-----------------+
| Name         | Relationship | Address | Phone           |
+--------------+--------------+---------+-----------------+
| Lauryn Leroy | ECON         | Unknown | [phone] |
+--------------+--------------+---------+-----------------+
 
 
 
 Care Team Providers
 
 
 
+-----------------------+------+-------------+
| Care Team Member Name | Role | Phone       |
+-----------------------+------+-------------+
 PCP  | Unavailable |
+-----------------------+------+-------------+
 
 
 
 Reason for Visit
 
 
+--------+----------+
| Reason | Comments |
+--------+----------+
| Other  | [facility] appt |
+--------+----------+
 
 
 
 Encounter Details
 
 
+--------+-----------+----------------------+----------------------+------------------+
| Date   | Type      | Department           | Care Team            | Description      |
+--------+-----------+----------------------+----------------------+------------------+
| / | Telephone |   Cardiology at [facility]  |   Jame Wilkins  | Other ([facility] appt) |
|    |           |  [address]  |                  |
|        |           | Mailcode: [address]       | Ave  [place], [place]    |                  |
|        |           | [facility]    | [address]           |                  |
|        |           | and [address]aling, [address]     | [phone]         |                  |
|        |           | Floor  [place], [place]  | [phone] (Fax)   |                  |
|        |           | [address]           |                      |                  |
|        |           | [phone]         |                      |                  |
+--------+-----------+----------------------+----------------------+------------------+
 
 
 
 Social History
 
 
+----------------+-------+-----------+--------+------+
| Tobacco Use    | Types | Packs/Day | Years  | Date |
|                |       |           | Used   |      |
+----------------+-------+-----------+--------+------+
| Never Assessed |       |           |        |      |
+----------------+-------+-----------+--------+------+
 
 
 
+------------------+---------------+
| Sex Assigned at  | Date Recorded |
| Birth            |               |
+------------------+---------------+
| Not on file      |               |
+------------------+---------------+
 as of this encounter
 
 Plan of Treatment
 Not on fileas of this encounter
 
 
 Visit Diagnoses
 Not on filein this encounter

## 2018-08-24 NOTE — XMS
Clinical Summary
  Created on: 2018
 
 Alvin Harp
 External Reference #: [id number]
 : 32
 Sex: Female
 
 Demographics
 
 
+-----------------------+---------------------------+
| Address               | [address]           |
|                       | [place]  [address] |
+-----------------------+---------------------------+
| Home Phone            | [phone]           |
+-----------------------+---------------------------+
| Preferred Language    | Unknown                   |
+-----------------------+---------------------------+
| Marital Status        |                    |
+-----------------------+---------------------------+
| Rastafarian Affiliation | Unknown                   |
+-----------------------+---------------------------+
| Race                  | Unknown                   |
+-----------------------+---------------------------+
| Ethnic Group          | Unknown                   |
+-----------------------+---------------------------+
 
 
 Author
 
 
+--------------+-----------------------+
| Author       | [facility] |
+--------------+-----------------------+
| Organization | [facility] |
+--------------+-----------------------+
| Address      | Unknown               |
+--------------+-----------------------+
| Phone        | Unavailable           |
+--------------+-----------------------+
 
 
 
 Support
 
 
+--------------+--------------+---------+-----------------+
| Name         | Relationship | Address | Phone           |
+--------------+--------------+---------+-----------------+
| Lauryn Leroy | ECON         | Unknown | [phone] |
+--------------+--------------+---------+-----------------+
 
 
 
 Care Team Providers
 
 
 
+-----------------------+------+-----------------+
| Care Team Member Name | Role | Phone           |
+-----------------------+------+-----------------+
| Jean Bermudez MD  | PP   | [phone] |
+-----------------------+------+-----------------+
 
 
 
 Allergies
 
 
+----------------------+----------------------+----------+----------+-------------------+
| Active Allergy       | Reactions            | Severity | Noted    | Comments          |
|                      |                      |          | Date     |                   |
+----------------------+----------------------+----------+----------+-------------------+
| Morphine             | Other (See Comments) | Medium   | 20 |   Pt felt fidgety |
|                      |                      |          | 18       |                   |
+----------------------+----------------------+----------+----------+-------------------+
| Oxycodone-Acetaminop | Other (See Comments) | Medium   | 20 |   Pt felt fidgety |
| hen                  |                      |          | 18       |                   |
+----------------------+----------------------+----------+----------+-------------------+
 
 
 
 Current Medications
 
 
+----------------------+----------------------+-------+---------+------+------+-------+
| Prescription         | Sig.                 | Disp. | Refills | Star | End  | Statu |
|                      |                      |       |         | t    | Date | s     |
|                      |                      |       |         | Date |      |       |
+----------------------+----------------------+-------+---------+------+------+-------+
|   levothyroxine      | Take 100 mcg by      |       |         |      |      | Activ |
| (SYNTHROID) 100 MCG  | mouth every morning  |       |         |      |      | e     |
| tablet               | before breakfast.    |       |         |      |      |       |
+----------------------+----------------------+-------+---------+------+------+-------+
|   losartan (COZAAR)  | Take 100 mg by mouth |       |         |      |      | Activ |
| 100 MG tablet        |  daily.              |       |         |      |      | e     |
+----------------------+----------------------+-------+---------+------+------+-------+
|   simvastatin        | Take 20 mg by mouth  |       |         |      |      | Activ |
| (ZOCOR) 20 MG tablet | nightly.             |       |         |      |      | e     |
+----------------------+----------------------+-------+---------+------+------+-------+
|   warfarin           | Take 5 mg by mouth   |       |         |      |      | Activ |
| (COUMADIN) 5 MG      | daily. 5mg daily on  |       |         |      |      | e     |
| tablet               | , Wed, ,     |       |         |      |      |       |
|                      | Sat,  and 2.5mg  |       |         |      |      |       |
|                      | daily on Tues, Thurs |       |         |      |      |       |
+----------------------+----------------------+-------+---------+------+------+-------+
|   potassium chloride | Take 20 mEq by mouth |       |         |      |      | Activ |
|  SA (K-DUR,KLOR-CON) |  daily.              |       |         |      |      | e     |
|  20 MEQ tablet       |                      |       |         |      |      |       |
+----------------------+----------------------+-------+---------+------+------+-------+
|   metFORMIN          | Take 500 mg by mouth |       |         |      |      | Activ |
| (GLUMETZA) 500 MG    |  2 (two) times daily |       |         |      |      | e     |
| (MOD) 24 hr tablet   |  with meals.         |       |         |      |      |       |
+----------------------+----------------------+-------+---------+------+------+-------+
|   magnesium chloride | Take 1 tablet by     |       |         |      |      | Activ |
|  (MAG64) 64 mg EC    | mouth daily.         |       |         |      |      | e     |
| tablet               |                      |       |         |      |      |       |
+----------------------+----------------------+-------+---------+------+------+-------+
 
|                      | Take 1 tablet by     |       |         |      |      | Activ |
| Gluc-Chonn-MSM-Boswe | mouth daily.         |       |         |      |      | e     |
| llia-Vit D           |                      |       |         |      |      |       |
| (GLUCOSAMINE CHOND   |                      |       |         |      |      |       |
| TRIPLE/VIT D) TABS   |                      |       |         |      |      |       |
+----------------------+----------------------+-------+---------+------+------+-------+
|   Cranberry 1000 MG  | Take 1 capsule by    |       |         |      |      | Activ |
| CAPS                 | mouth daily.         |       |         |      |      | e     |
+----------------------+----------------------+-------+---------+------+------+-------+
|   ascorbic acid      | Take 1,000 mg by     |       |         |      |      | Activ |
| (VITAMIN C) 1000 MG  | mouth daily.         |       |         |      |      | e     |
| tablet               |                      |       |         |      |      |       |
+----------------------+----------------------+-------+---------+------+------+-------+
|   furosemide (LASIX) | Take 40 mg by mouth  |       |         |      |      | Activ |
|  40 MG tablet        | daily.               |       |         |      |      | e     |
+----------------------+----------------------+-------+---------+------+------+-------+
|   acetaminophen      | Take 650 mg by mouth |       |         |      |      | Activ |
| (TYLENOL) 325 MG     |  every 6 (six) hours |       |         |      |      | e     |
| tablet               |  as needed for Pain. |       |         |      |      |       |
+----------------------+----------------------+-------+---------+------+------+-------+
|   metoprolol         | Take 100 mg by mouth |       |         | 07/3 |      | Activ |
| (TOPROL-XL) 200 MG   |  every morning.      |       |         | 0/20 |      | e     |
| 24 hr tablet         |                      |       |         | 18   |      |       |
+----------------------+----------------------+-------+---------+------+------+-------+
|   Cholecalciferol    | Take 2,000 Units by  |       |         |      |      | Activ |
| 2000 units CAPS      | mouth daily.         |       |         |      |      | e     |
+----------------------+----------------------+-------+---------+------+------+-------+
|   Verapamil HCl CR   | Take 1 capsule by    |       |         |      |  | Disco |
| 300 MG CP24          | mouth daily.         |       |         |      |  | ntinu |
|                      |                      |       |         |      | 18   | ed    |
+----------------------+----------------------+-------+---------+------+------+-------+
 
 
 
 Active Problems
 
 
+--------------------------------+------------+
| Problem                        | Noted Date |
+--------------------------------+------------+
| Congestive heart failure (HCC) | 2018 |
+--------------------------------+------------+
 
 
 
+------------------------------------------------------------------+
|   Overview:   acute/chronic Diastolic Heart Failure, NYHA class  |
| III                                                              |
+------------------------------------------------------------------+
 
 
 
+---------------------------+------------+
| Atrial fibrillation (HCC) | 1996 |
+---------------------------+------------+
 
 
 
+-------------------------------------------------------+
|   Overview:   persistent since then, never attempted  |
 
| cardioversion                                         |
+-------------------------------------------------------+
 
 
 
+--------------------------------------------------+---+
| Pulmonary hypertension, moderate to severe (HCC) |   |
+--------------------------------------------------+---+
| Severe tricuspid regurgitation                   |   |
+--------------------------------------------------+---+
 
 
 
+---------------------------------+
|   Overview:   moderately severe |
+---------------------------------+
 
 
 
+-----------------------------+---+
| Severe mitral regurgitation |   |
+-----------------------------+---+
 
 
 
 Encounters
 
 
+--------+----------+-----------+---------------------+----------------------+
| Date   | Type     | Specialty | Care Team           | Description          |
+--------+----------+-----------+---------------------+----------------------+
| / | Initial  |           |   Varun Mitchell,  | Persistent atrial    |
| 2018   | consult  |           | MD                  | fibrillation (HCC)   |
|        |          |           |                     | (Primary Dx); Acute  |
|        |          |           |                     | on chronic diastolic |
|        |          |           |                     |  congestive heart    |
|        |          |           |                     | failure (HCC);       |
|        |          |           |                     | Severe mitral        |
|        |          |           |                     | regurgitation;       |
|        |          |           |                     | Severe tricuspid     |
|        |          |           |                     | regurgitation;       |
|        |          |           |                     | Pulmonary            |
|        |          |           |                     | hypertension,        |
|        |          |           |                     | moderate to severe   |
|        |          |           |                     | (HCC)                |
+--------+----------+-----------+---------------------+----------------------+
 from Last 3 Months
 
 Family History
 
 
+----------------------+----------+---------+--------------------------------------------+
| Medical History      | Relation | Name    | Comments                                   |
+----------------------+----------+---------+--------------------------------------------+
| CVA                  | Brother Virginie Chau    |                                            |
+----------------------+----------+---------+--------------------------------------------+
| Arthritis            | Daughter |         |                                            |
+----------------------+----------+---------+--------------------------------------------+
| CVA                  | Daughter |         |                                            |
+----------------------+----------+---------+--------------------------------------------+
 
| Obesity              | Daughter |         |                                            |
+----------------------+----------+---------+--------------------------------------------+
| Thyroid cancer       | Daughter | Devorah   |                                            |
+----------------------+----------+---------+--------------------------------------------+
| CVA                  | Father   |         | cerebral hemorrhage                        |
+----------------------+----------+---------+--------------------------------------------+
| Coronary Artery      | Father   |         |                                            |
| Disease              |          |         |                                            |
+----------------------+----------+---------+--------------------------------------------+
| CVA                  | Mother   |         | recurrent CVA at 74,  a few days later |
+----------------------+----------+---------+--------------------------------------------+
| Diabetes Mellitus II | Mother   |         |                                            |
+----------------------+----------+---------+--------------------------------------------+
| Heart failure        | Mother   |         |                                            |
+----------------------+----------+---------+--------------------------------------------+
| Leukemia             | Sister   | Khushbu | basophilic leukemia                        |
+----------------------+----------+---------+--------------------------------------------+
| Heart  Disease       | Sister   | Fabienne | ? thrombus, not clear                      |
+----------------------+----------+---------+--------------------------------------------+
| Deep vein thrombosis | Sister   | Rhea    |                                            |
+----------------------+----------+---------+--------------------------------------------+
 
 
 
+----------+---------+----------+--------------------------------------------+
| Relation | Name    | Status   | Comments                                   |
+----------+---------+----------+--------------------------------------------+
| Brother  | Isac    |  | CVA                                        |
|          |         |   (Age   |                                            |
|          |         | 75)      |                                            |
+----------+---------+----------+--------------------------------------------+
| Daughter |         |  | cerebral thrombosis post umbilical hernia  |
|          |         |   (Age   | repair                                     |
|          |         | 56)      |                                            |
+----------+---------+----------+--------------------------------------------+
| Daughter |         | Alive    |                                            |
+----------+---------+----------+--------------------------------------------+
| Daughter | Devorah   | Alive    |                                            |
+----------+---------+----------+--------------------------------------------+
| Father   |         |  | cerebral hemorrhage                        |
|          |         |   (Age   |                                            |
|          |         | 63)      |                                            |
+----------+---------+----------+--------------------------------------------+
| Mother   |         |  | CHF                                        |
|          |         |   (Age   |                                            |
|          |         | 74)      |                                            |
+----------+---------+----------+--------------------------------------------+
| Sister   | Khushbu |  |                                            |
|          |         |   (Age   |                                            |
|          |         | 56)      |                                            |
+----------+---------+----------+--------------------------------------------+
| Sister   | Fabienne | Alive    |                                            |
+----------+---------+----------+--------------------------------------------+
| Sister   | Rhea    | Alive    |                                            |
+----------+---------+----------+--------------------------------------------+
| Son      |         | Alive    |                                            |
+----------+---------+----------+--------------------------------------------+
 
 
 
 
 Social History
 
 
+--------------+-------+-----------+--------+------+
| Tobacco Use  | Types | Packs/Day | Years  | Date |
|              |       |           | Used   |      |
+--------------+-------+-----------+--------+------+
| Never Smoker |       |           |        |      |
+--------------+-------+-----------+--------+------+
 
 
 
+---------------------+---+---+---+
| Smokeless Tobacco:  |   |   |   |
| Never Used          |   |   |   |
+---------------------+---+---+---+
 
 
 
+-------------+-----------+---------+----------+
| Alcohol Use | Drinks/We | oz/Week | Comments |
|             | ek        |         |          |
+-------------+-----------+---------+----------+
| No          |           |         |          |
+-------------+-----------+---------+----------+
 
 
 
+------------------+---------------+
| Sex Assigned at  | Date Recorded |
| Birth            |               |
+------------------+---------------+
| Not on file      |               |
+------------------+---------------+
 
 
 
 Last Filed Vital Signs
 
 
+-------------------+--------------------+-------------------------+
| Vital Sign        | Reading            | Time Taken              |
+-------------------+--------------------+-------------------------+
| Blood Pressure    | 112/54             | 2018  9:50 AM PDT |
+-------------------+--------------------+-------------------------+
| Pulse             | 95                 | 2018  9:34 AM PDT |
+-------------------+--------------------+-------------------------+
| Temperature       | -                  | -                       |
+-------------------+--------------------+-------------------------+
| Respiratory Rate  | -                  | -                       |
+-------------------+--------------------+-------------------------+
| Oxygen Saturation | 97%                | 2018  9:34 AM PDT |
+-------------------+--------------------+-------------------------+
| Inhaled Oxygen    | -                  | -                       |
| Concentration     |                    |                         |
+-------------------+--------------------+-------------------------+
| Weight            | 66 kg (145 lb 6.4  | 2018  9:34 AM PDT |
|                   | oz)                |                         |
+-------------------+--------------------+-------------------------+
| Height            | 160 cm (5' 3")     | 2018  9:34 AM PDT |
 
+-------------------+--------------------+-------------------------+
| Body Mass Index   | 25.76              | 2018  9:34 AM PDT |
+-------------------+--------------------+-------------------------+
 
 
 
 Plan of Treatment
 
 
+--------+---------+-----------+----------------------+-------------+
| Date   | Type    | Specialty | Care Team            | Description |
+--------+---------+-----------+----------------------+-------------+
| / | Office  |           |   Varun Mitchell,   |             |
| 2018   | Visit   |           | MD [address] |             |
|        |         |           |   [place],   |             |
|        |         |           | [place] [address]             |             |
|        |         |           | [phone]         |             |
|        |         |           | [phone] (Fax)   |             |
+--------+---------+-----------+----------------------+-------------+
 
 
 
+---------------------+-----------+-----------+----------+
| Health Maintenance  | Due Date  | Last Done | Comments |
+---------------------+-----------+-----------+----------+
| Vaccine:            |  |           |          |
| Dtap/Tdap/Td (1 -   | 1         |           |          |
| Tdap)               |           |           |          |
+---------------------+-----------+-----------+----------+
| Vaccine: Zoster (1  |  |           |          |
| of 2)               | 2         |           |          |
+---------------------+-----------+-----------+----------+
| DEXA SCAN SCREENING |  |           |          |
|                     | 7         |           |          |
+---------------------+-----------+-----------+----------+
| Vaccine:            |  |           |          |
| Pneumococcal 65+    | 7         |           |          |
| Low/Medium Risk (1  |           |           |          |
| of 2 - PCV13)       |           |           |          |
+---------------------+-----------+-----------+----------+
| Vaccine: Influenza  |  |           |          |
| (#1)                | 8         |           |          |
+---------------------+-----------+-----------+----------+
 
 
 
 Procedures
 
 
+----------------------+--------+-------------+----------------------+----------------------
+
| Procedure Name       | Priori | Date/Time   | Associated Diagnosis | Comments             
|
|                      | ty     |             |                      |                      
|
+----------------------+--------+-------------+----------------------+----------------------
+
| EKG STANDARD 12 LEAD | Routin | 2018  |   Persistent atrial  |   Results for this   
|
|                      | e      |  9:57 AM    | fibrillation (HCC)   | procedure are in the 
 
|
|                      |        | PDT         |                      |  results section.    
|
+----------------------+--------+-------------+----------------------+----------------------
+
 from Last 3 Months
 
 Results
 EKG STANDARD 12 LEAD (2018  9:57 AM)
 
+-------------------+--------------------------+-----------+--------------+
| Component         | Value                    | Ref Range | Performed At |
+-------------------+--------------------------+-----------+--------------+
| Ventricular Rate  | 68                       | BPM       | [facility]     |
+-------------------+--------------------------+-----------+--------------+
| Atrial Rate       | 357                      | BPM       | [facility]     |
+-------------------+--------------------------+-----------+--------------+
| QRS Duration      | 102                      | ms        | [facility]     |
+-------------------+--------------------------+-----------+--------------+
| Q-T Interval      | 396                      | ms        | [facility]     |
+-------------------+--------------------------+-----------+--------------+
| QTC Calculation   | 421                      | ms        | [facility]     |
| (Bezet)           |                          |           |              |
+-------------------+--------------------------+-----------+--------------+
| Calculated R Axis | 80                       | degrees   | [facility]KG     |
+-------------------+--------------------------+-----------+--------------+
| Calculated T Axis | 4                        | degrees   | [facility]     |
+-------------------+--------------------------+-----------+--------------+
| Diagnosis         | Atrial                   |           | [facility]     |
|                   | fibrillationAnterior     |           |              |
|                   | infarct , age            |           |              |
|                   | undeterminedAbnormal     |           |              |
|                   | ECGNo previous ECGs      |           |              |
|                   | availablePlease refer to |           |              |
|                   |  Providers office visit  |           |              |
|                   | note for Providers       |           |              |
|                   | Interpretation.Confirmed |           |              |
|                   |  by [facility] Read Only,  |           |              |
|                   | [facility] ([id number]),      |           |              |
|                   |  Solomon Arenas    |           |              |
|                   | ([phone]) on 2018       |           |              |
|                   | 10:00:11 AM              |           |              |
+-------------------+--------------------------+-----------+--------------+
 
 
 
+--------------+-------------------+--------------------+--------------+
| Performing   | Address           | City/State/Zipcode | Phone Number |
| Organization |                   |                    |              |
+--------------+-------------------+--------------------+--------------+
|   [facility]   |   [address] | [place] [address] |              |
+--------------+-------------------+--------------------+--------------+
 from Last 3 Months
 
 Insurance
 
 
+------------------+--------+-------------+------+-------+----------------------+
| Payer            | Benefi | Subscriber  | Type | Phone | Address              |
|                  | t Plan | ID          |      |       |                      |
 
|                  |  /     |             |      |       |                      |
|                  | Group  |             |      |       |                      |
+------------------+--------+-------------+------+-------+----------------------+
| MEDICARE         | MEDICA | [id number]  |      |       |   [address]        |
|                  | [place]     |             |      |       | [place] [id number] |
|                  | IP-OP  |             |      |       |                      |
+------------------+--------+-------------+------+-------+----------------------+
| COMMERCIAL OTHER | BANKER | [id number]   |      |       |                      |
|                  | S LIFE |             |      |       |                      |
|                  |  AND   |             |      |       |                      |
|                  | CASUAL |             |      |       |                      |
|                  | [place]     |             |      |       |                      |
+------------------+--------+-------------+------+-------+----------------------+
 
 
 
+--------------------+--------+-------------+--------+-------------+---------------------+
| Guarantor Name     | [employer] | Relation to | Date   | Phone       | Billing Address     |
|                    | t Type |  Patient    | of     |             |                     |
|                    |        |             | Birth  |             |                     |
+--------------------+--------+-------------+--------+-------------+---------------------+
| [facility] [employer] | Person | Self        | / |   Home:     |   [address]   |
|                    | al/Fam |             | 1932   | [phone] | [place]       |
|                    | [place]    |             |        | [id number]        | [id number]          |
+--------------------+--------+-------------+--------+-------------+---------------------+

## 2018-08-24 NOTE — XMS
Clinical Summary
  Created on: 2018
 
 Alvin Harp
 External Reference #: [id number]
 : 32
 Sex: Female
 
 Demographics
 
 
+-----------------------+---------------------------+
| Address               | [address]           |
|                       | [place]  [address] |
+-----------------------+---------------------------+
| Home Phone            | [phone]           |
+-----------------------+---------------------------+
| Preferred Language    | Unknown                   |
+-----------------------+---------------------------+
| Marital Status        |                    |
+-----------------------+---------------------------+
| Restorationism Affiliation | Unknown                   |
+-----------------------+---------------------------+
| Race                  | Unknown                   |
+-----------------------+---------------------------+
| Ethnic Group          | Unknown                   |
+-----------------------+---------------------------+
 
 
 Author
 
 
+--------------+-----------------------+
| Author       | [facility] [email or web address] |
+--------------+-----------------------+
| Organization | [facility] |
+--------------+-----------------------+
| Address      | Unknown               |
+--------------+-----------------------+
| Phone        | Unavailable           |
+--------------+-----------------------+
 
 
 
 Support
 
 
+--------------+--------------+---------+-----------------+
| Name         | Relationship | Address | Phone           |
+--------------+--------------+---------+-----------------+
| Lauryn Leroy | ECON         | Unknown | [phone] |
+--------------+--------------+---------+-----------------+
 
 
 
 Care Team Providers
 
 
 
+-----------------------+------+-----------------+
| Care Team Member Name | Role | Phone           |
+-----------------------+------+-----------------+
| Jean Bermudez MD  | PP   | [phone] |
+-----------------------+------+-----------------+
 
 
 
 Allergies
 
 
+----------------------+----------------------+----------+----------+-------------------+
| Active Allergy       | Reactions            | Severity | Noted    | Comments          |
|                      |                      |          | Date     |                   |
+----------------------+----------------------+----------+----------+-------------------+
| Morphine             | Other (See Comments) | Medium   | 20 |   Pt felt fidgety |
|                      |                      |          | 18       |                   |
+----------------------+----------------------+----------+----------+-------------------+
| Oxycodone-Acetaminop | Other (See Comments) | Medium   | 20 |   Pt felt fidgety |
| hen                  |                      |          | 18       |                   |
+----------------------+----------------------+----------+----------+-------------------+
 
 
 
 Current Medications
 
 
+----------------------+----------------------+-------+---------+------+------+-------+
| Prescription         | Sig.                 | Disp. | Refills | Star | End  | Statu |
|                      |                      |       |         | t    | Date | s     |
|                      |                      |       |         | Date |      |       |
+----------------------+----------------------+-------+---------+------+------+-------+
|   levothyroxine      | Take 100 mcg by      |       |         |      |      | Activ |
| (SYNTHROID) 100 MCG  | mouth every morning  |       |         |      |      | e     |
| tablet               | before breakfast.    |       |         |      |      |       |
+----------------------+----------------------+-------+---------+------+------+-------+
|   losartan (COZAAR)  | Take 100 mg by mouth |       |         |      |      | Activ |
| 100 MG tablet        |  daily.              |       |         |      |      | e     |
+----------------------+----------------------+-------+---------+------+------+-------+
|   simvastatin        | Take 20 mg by mouth  |       |         |      |      | Activ |
| (ZOCOR) 20 MG tablet | nightly.             |       |         |      |      | e     |
+----------------------+----------------------+-------+---------+------+------+-------+
|   warfarin           | Take 5 mg by mouth   |       |         |      |      | Activ |
| (COUMADIN) 5 MG      | daily. 5mg daily on  |       |         |      |      | e     |
| tablet               | , Wed, ,     |       |         |      |      |       |
|                      | Sat,  and 2.5mg  |       |         |      |      |       |
|                      | daily on Tues, Thurs |       |         |      |      |       |
+----------------------+----------------------+-------+---------+------+------+-------+
|   potassium chloride | Take 20 mEq by mouth |       |         |      |      | Activ |
|  SA (K-DUR,KLOR-CON) |  daily.              |       |         |      |      | e     |
|  20 MEQ tablet       |                      |       |         |      |      |       |
+----------------------+----------------------+-------+---------+------+------+-------+
|   metFORMIN          | Take 500 mg by mouth |       |         |      |      | Activ |
| (GLUMETZA) 500 MG    |  2 (two) times daily |       |         |      |      | e     |
| (MOD) 24 hr tablet   |  with meals.         |       |         |      |      |       |
+----------------------+----------------------+-------+---------+------+------+-------+
|   magnesium chloride | Take 1 tablet by     |       |         |      |      | Activ |
|  (MAG64) 64 mg EC    | mouth daily.         |       |         |      |      | e     |
| tablet               |                      |       |         |      |      |       |
+----------------------+----------------------+-------+---------+------+------+-------+
 
|                      | Take 1 tablet by     |       |         |      |      | Activ |
| Gluc-Chonn-MSM-Boswe | mouth daily.         |       |         |      |      | e     |
| llia-Vit D           |                      |       |         |      |      |       |
| (GLUCOSAMINE CHOND   |                      |       |         |      |      |       |
| TRIPLE/VIT D) TABS   |                      |       |         |      |      |       |
+----------------------+----------------------+-------+---------+------+------+-------+
|   Cranberry 1000 MG  | Take 1 capsule by    |       |         |      |      | Activ |
| CAPS                 | mouth daily.         |       |         |      |      | e     |
+----------------------+----------------------+-------+---------+------+------+-------+
|   ascorbic acid      | Take 1,000 mg by     |       |         |      |      | Activ |
| (VITAMIN C) 1000 MG  | mouth daily.         |       |         |      |      | e     |
| tablet               |                      |       |         |      |      |       |
+----------------------+----------------------+-------+---------+------+------+-------+
|   furosemide (LASIX) | Take 40 mg by mouth  |       |         |      |      | Activ |
|  40 MG tablet        | daily.               |       |         |      |      | e     |
+----------------------+----------------------+-------+---------+------+------+-------+
|   acetaminophen      | Take 650 mg by mouth |       |         |      |      | Activ |
| (TYLENOL) 325 MG     |  every 6 (six) hours |       |         |      |      | e     |
| tablet               |  as needed for Pain. |       |         |      |      |       |
+----------------------+----------------------+-------+---------+------+------+-------+
|   metoprolol         | Take 100 mg by mouth |       |         | 07/3 |      | Activ |
| (TOPROL-XL) 200 MG   |  every morning.      |       |         | 0/20 |      | e     |
| 24 hr tablet         |                      |       |         | 18   |      |       |
+----------------------+----------------------+-------+---------+------+------+-------+
|   Cholecalciferol    | Take 2,000 Units by  |       |         |      |      | Activ |
| 2000 units CAPS      | mouth daily.         |       |         |      |      | e     |
+----------------------+----------------------+-------+---------+------+------+-------+
|   Verapamil HCl CR   | Take 1 capsule by    |       |         |      |  | Disco |
| 300 MG CP24          | mouth daily.         |       |         |      |  | ntinu |
|                      |                      |       |         |      | 18   | ed    |
+----------------------+----------------------+-------+---------+------+------+-------+
 
 
 
 Active Problems
 
 
+--------------------------------+------------+
| Problem                        | Noted Date |
+--------------------------------+------------+
| Congestive heart failure (HCC) | 2018 |
+--------------------------------+------------+
 
 
 
+------------------------------------------------------------------+
|   Overview:   acute/chronic Diastolic Heart Failure, NYHA class  |
| III                                                              |
+------------------------------------------------------------------+
 
 
 
+---------------------------+------------+
| Atrial fibrillation (HCC) | 1996 |
+---------------------------+------------+
 
 
 
+-------------------------------------------------------+
|   Overview:   persistent since then, never attempted  |
 
| cardioversion                                         |
+-------------------------------------------------------+
 
 
 
+--------------------------------------------------+---+
| Pulmonary hypertension, moderate to severe (HCC) |   |
+--------------------------------------------------+---+
| Severe tricuspid regurgitation                   |   |
+--------------------------------------------------+---+
 
 
 
+---------------------------------+
|   Overview:   moderately severe |
+---------------------------------+
 
 
 
+-----------------------------+---+
| Severe mitral regurgitation |   |
+-----------------------------+---+
 
 
 
 Encounters
 
 
+--------+----------+-----------+---------------------+----------------------+
| Date   | Type     | Specialty | Care Team           | Description          |
+--------+----------+-----------+---------------------+----------------------+
| / | Initial  |           |   Varun Mitchell,  | Persistent atrial    |
| 2018   | consult  |           | MD                  | fibrillation (HCC)   |
|        |          |           |                     | (Primary Dx); Acute  |
|        |          |           |                     | on chronic diastolic |
|        |          |           |                     |  congestive heart    |
|        |          |           |                     | failure (HCC);       |
|        |          |           |                     | Severe mitral        |
|        |          |           |                     | regurgitation;       |
|        |          |           |                     | Severe tricuspid     |
|        |          |           |                     | regurgitation;       |
|        |          |           |                     | Pulmonary            |
|        |          |           |                     | hypertension,        |
|        |          |           |                     | moderate to severe   |
|        |          |           |                     | (HCC)                |
+--------+----------+-----------+---------------------+----------------------+
 from Last 3 Months
 
 Family History
 
 
+----------------------+----------+---------+--------------------------------------------+
| Medical History      | Relation | Name    | Comments                                   |
+----------------------+----------+---------+--------------------------------------------+
| CVA                  | Brother Virginie Chau    |                                            |
+----------------------+----------+---------+--------------------------------------------+
| Arthritis            | Daughter |         |                                            |
+----------------------+----------+---------+--------------------------------------------+
| CVA                  | Daughter |         |                                            |
+----------------------+----------+---------+--------------------------------------------+
 
| Obesity              | Daughter |         |                                            |
+----------------------+----------+---------+--------------------------------------------+
| Thyroid cancer       | Daughter | Devorah   |                                            |
+----------------------+----------+---------+--------------------------------------------+
| CVA                  | Father   |         | cerebral hemorrhage                        |
+----------------------+----------+---------+--------------------------------------------+
| Coronary Artery      | Father   |         |                                            |
| Disease              |          |         |                                            |
+----------------------+----------+---------+--------------------------------------------+
| CVA                  | Mother   |         | recurrent CVA at 74,  a few days later |
+----------------------+----------+---------+--------------------------------------------+
| Diabetes Mellitus II | Mother   |         |                                            |
+----------------------+----------+---------+--------------------------------------------+
| Heart failure        | Mother   |         |                                            |
+----------------------+----------+---------+--------------------------------------------+
| Leukemia             | Sister   | Khushbu | basophilic leukemia                        |
+----------------------+----------+---------+--------------------------------------------+
| Heart  Disease       | Sister   | Fabienne | ? thrombus, not clear                      |
+----------------------+----------+---------+--------------------------------------------+
| Deep vein thrombosis | Sister   | Rhea    |                                            |
+----------------------+----------+---------+--------------------------------------------+
 
 
 
+----------+---------+----------+--------------------------------------------+
| Relation | Name    | Status   | Comments                                   |
+----------+---------+----------+--------------------------------------------+
| Brother  | Isac    |  | CVA                                        |
|          |         |   (Age   |                                            |
|          |         | 75)      |                                            |
+----------+---------+----------+--------------------------------------------+
| Daughter |         |  | cerebral thrombosis post umbilical hernia  |
|          |         |   (Age   | repair                                     |
|          |         | 56)      |                                            |
+----------+---------+----------+--------------------------------------------+
| Daughter |         | Alive    |                                            |
+----------+---------+----------+--------------------------------------------+
| Daughter | Devorah   | Alive    |                                            |
+----------+---------+----------+--------------------------------------------+
| Father   |         |  | cerebral hemorrhage                        |
|          |         |   (Age   |                                            |
|          |         | 63)      |                                            |
+----------+---------+----------+--------------------------------------------+
| Mother   |         |  | CHF                                        |
|          |         |   (Age   |                                            |
|          |         | 74)      |                                            |
+----------+---------+----------+--------------------------------------------+
| Sister   | Khushbu |  |                                            |
|          |         |   (Age   |                                            |
|          |         | 56)      |                                            |
+----------+---------+----------+--------------------------------------------+
| Sister   | Fabienne | Alive    |                                            |
+----------+---------+----------+--------------------------------------------+
| Sister   | Rhea    | Alive    |                                            |
+----------+---------+----------+--------------------------------------------+
| Son      |         | Alive    |                                            |
+----------+---------+----------+--------------------------------------------+
 
 
 
 
 Social History
 
 
+--------------+-------+-----------+--------+------+
| Tobacco Use  | Types | Packs/Day | Years  | Date |
|              |       |           | Used   |      |
+--------------+-------+-----------+--------+------+
| Never Smoker |       |           |        |      |
+--------------+-------+-----------+--------+------+
 
 
 
+---------------------+---+---+---+
| Smokeless Tobacco:  |   |   |   |
| Never Used          |   |   |   |
+---------------------+---+---+---+
 
 
 
+-------------+-----------+---------+----------+
| Alcohol Use | Drinks/We | oz/Week | Comments |
|             | ek        |         |          |
+-------------+-----------+---------+----------+
| No          |           |         |          |
+-------------+-----------+---------+----------+
 
 
 
+------------------+---------------+
| Sex Assigned at  | Date Recorded |
| Birth            |               |
+------------------+---------------+
| Not on file      |               |
+------------------+---------------+
 
 
 
 Last Filed Vital Signs
 
 
+-------------------+--------------------+-------------------------+
| Vital Sign        | Reading            | Time Taken              |
+-------------------+--------------------+-------------------------+
| Blood Pressure    | 112/54             | 2018  9:50 AM PDT |
+-------------------+--------------------+-------------------------+
| Pulse             | 95                 | 2018  9:34 AM PDT |
+-------------------+--------------------+-------------------------+
| Temperature       | -                  | -                       |
+-------------------+--------------------+-------------------------+
| Respiratory Rate  | -                  | -                       |
+-------------------+--------------------+-------------------------+
| Oxygen Saturation | 97%                | 2018  9:34 AM PDT |
+-------------------+--------------------+-------------------------+
| Inhaled Oxygen    | -                  | -                       |
| Concentration     |                    |                         |
+-------------------+--------------------+-------------------------+
| Weight            | 66 kg (145 lb 6.4  | 2018  9:34 AM PDT |
|                   | oz)                |                         |
+-------------------+--------------------+-------------------------+
| Height            | 160 cm (5' 3")     | 2018  9:34 AM PDT |
 
+-------------------+--------------------+-------------------------+
| Body Mass Index   | 25.76              | 2018  9:34 AM PDT |
+-------------------+--------------------+-------------------------+
 
 
 
 Plan of Treatment
 
 
+--------+---------+-----------+----------------------+-------------+
| Date   | Type    | Specialty | Care Team            | Description |
+--------+---------+-----------+----------------------+-------------+
| / | Office  |           |   Varun Mitchell,   |             |
| 2018   | Visit   |           | MD [address] |             |
|        |         |           |   [place],   |             |
|        |         |           | [place] [address]             |             |
|        |         |           | [phone]         |             |
|        |         |           | [phone] (Fax)   |             |
+--------+---------+-----------+----------------------+-------------+
 
 
 
+---------------------+-----------+-----------+----------+
| Health Maintenance  | Due Date  | Last Done | Comments |
+---------------------+-----------+-----------+----------+
| Vaccine:            |  |           |          |
| Dtap/Tdap/Td (1 -   | 1         |           |          |
| Tdap)               |           |           |          |
+---------------------+-----------+-----------+----------+
| Vaccine: Zoster (1  |  |           |          |
| of 2)               | 2         |           |          |
+---------------------+-----------+-----------+----------+
| DEXA SCAN SCREENING |  |           |          |
|                     | 7         |           |          |
+---------------------+-----------+-----------+----------+
| Vaccine:            |  |           |          |
| Pneumococcal 65+    | 7         |           |          |
| Low/Medium Risk (1  |           |           |          |
| of 2 - PCV13)       |           |           |          |
+---------------------+-----------+-----------+----------+
| Vaccine: Influenza  |  |           |          |
| (#1)                | 8         |           |          |
+---------------------+-----------+-----------+----------+
 
 
 
 Procedures
 
 
+----------------------+--------+-------------+----------------------+----------------------
+
| Procedure Name       | Priori | Date/Time   | Associated Diagnosis | Comments             
|
|                      | ty     |             |                      |                      
|
+----------------------+--------+-------------+----------------------+----------------------
+
| EKG STANDARD 12 LEAD | Routin | 2018  |   Persistent atrial  |   Results for this   
|
|                      | e      |  9:57 AM    | fibrillation (HCC)   | procedure are in the 
 
|
|                      |        | PDT         |                      |  results section.    
|
+----------------------+--------+-------------+----------------------+----------------------
+
 from Last 3 Months
 
 Results
 EKG STANDARD 12 LEAD (2018  9:57 AM)
 
+-------------------+--------------------------+-----------+--------------+
| Component         | Value                    | Ref Range | Performed At |
+-------------------+--------------------------+-----------+--------------+
| Ventricular Rate  | 68                       | BPM       | [facility]     |
+-------------------+--------------------------+-----------+--------------+
| Atrial Rate       | 357                      | BPM       | [facility]     |
+-------------------+--------------------------+-----------+--------------+
| QRS Duration      | 102                      | ms        | [facility]     |
+-------------------+--------------------------+-----------+--------------+
| Q-T Interval      | 396                      | ms        | [facility]     |
+-------------------+--------------------------+-----------+--------------+
| QTC Calculation   | 421                      | ms        | [facility]     |
| (Bezet)           |                          |           |              |
+-------------------+--------------------------+-----------+--------------+
| Calculated R Axis | 80                       | degrees   | [facility]KG     |
+-------------------+--------------------------+-----------+--------------+
| Calculated T Axis | 4                        | degrees   | [facility]     |
+-------------------+--------------------------+-----------+--------------+
| Diagnosis         | Atrial                   |           | [facility]     |
|                   | fibrillationAnterior     |           |              |
|                   | infarct , age            |           |              |
|                   | undeterminedAbnormal     |           |              |
|                   | ECGNo previous ECGs      |           |              |
|                   | availablePlease refer to |           |              |
|                   |  Providers office visit  |           |              |
|                   | note for Providers       |           |              |
|                   | Interpretation.Confirmed |           |              |
|                   |  by [facility] [place] Read Only,  |           |              |
|                   | [facility] ([id number]),      |           |              |
|                   |  Solomon Arenas    |           |              |
|                   | ([id number]) on 2018       |           |              |
|                   | 10:00:11 AM              |           |              |
+-------------------+--------------------------+-----------+--------------+
 
 
 
+--------------+-------------------+--------------------+--------------+
| Performing   | Address           | City/State/Zipcode | Phone Number |
| Organization |                   |                    |              |
+--------------+-------------------+--------------------+--------------+
|   [facility]   |   [address] | [place] [address] |              |
+--------------+-------------------+--------------------+--------------+
 from Last 3 Months
 
 Insurance
 
 
+------------------+--------+-------------+------+-------+----------------------+
| Payer            | Benefi | Subscriber  | Type | Phone | Address              |
|                  | t Plan | ID          |      |       |                      |
 
|                  |  /     |             |      |       |                      |
|                  | Group  |             |      |       |                      |
+------------------+--------+-------------+------+-------+----------------------+
| MEDICARE         | MEDICA | [id number]  |      |       |   [address]        |
|                  | [place]     |             |      |       | [place] [id number] |
|                  | IP-OP  |             |      |       |                      |
+------------------+--------+-------------+------+-------+----------------------+
| COMMERCIAL OTHER | BANKER | [id number]   |      |       |                      |
|                  | S LIFE |             |      |       |                      |
|                  |  AND   |             |      |       |                      |
|                  | CASUAL |             |      |       |                      |
|                  | [place]     |             |      |       |                      |
+------------------+--------+-------------+------+-------+----------------------+
 
 
 
+--------------------+--------+-------------+--------+-------------+---------------------+
| Guarantor Name     | [employer] | Relation to | Date   | Phone       | Billing Address     |
|                    | t Type |  Patient    | of     |             |                     |
|                    |        |             | Birth  |             |                     |
+--------------------+--------+-------------+--------+-------------+---------------------+
| [employer] | Person | Self        | / |   Home:     |   [address]   |
|                    | al/Fam |             | 1932   | [phone] | [employer] [address]       |
|                    | [place]    |             |        | [id number]        | [phone]          |
+--------------------+--------+-------------+--------+-------------+---------------------+

## 2018-08-24 NOTE — XMS
Encounter Summary
  Created on: 2018
 
 Vonnie Celaya
 External Reference #: [id number]
 : 32
 Sex: Female
 
 Demographics
 
 
+-----------------------+---------------------------+
| Address               | [address]           |
|                       | [place]  [address] |
+-----------------------+---------------------------+
| Home Phone            | [phone]           |
+-----------------------+---------------------------+
| Preferred Language    | Unknown                   |
+-----------------------+---------------------------+
| Marital Status        |                    |
+-----------------------+---------------------------+
| Voodoo Affiliation | Unknown                   |
+-----------------------+---------------------------+
| Race                  | Unknown                   |
+-----------------------+---------------------------+
| Ethnic Group          | Unknown                   |
+-----------------------+---------------------------+
 
 
 Author
 
 
+--------------+-----------------------+
| Author       | [facility] |
+--------------+-----------------------+
| Organization | [facility] |
+--------------+-----------------------+
| Address      | Unknown               |
+--------------+-----------------------+
| Phone        | Unavailable           |
+--------------+-----------------------+
 
 
 
 Support
 
 
+--------------+--------------+---------+-----------------+
| Name         | Relationship | Address | Phone           |
+--------------+--------------+---------+-----------------+
| Lauryn Leroy | ECON         | Unknown | [phone] |
+--------------+--------------+---------+-----------------+
 
 
 
 Care Team Providers
 
 
 
+-----------------------+------+-----------------+
| Care Team Member Name | Role | Phone           |
+-----------------------+------+-----------------+
| Jean Bermudez MD  | PCP  | [phone] |
+-----------------------+------+-----------------+
 
 
 
 Reason for Referral
 Consultation (Routine)
 
+------------+--------------+------------+--------------+--------------+---------------+
| Status     | Reason       | Specialty  | Diagnoses /  | Referred By  | Referred To   |
|            |              |            | Procedures   | Contact      | Contact       |
+------------+--------------+------------+--------------+--------------+---------------+
| Authorized |   Specialty  | Cardiology |   Diagnoses  |   Stephen,      |               |
|            | Services     |            |  Acute on    | Varun PARRA,   | [facility] |
|            | Required     |            | chronic      | MD  [address]     | [address]  [address]   |
|            |              |            | diastolic    | [address]  | [address]   |
|            |              |            | congestive   |  [address]       | [address],      |
|            |              |            | heart        | [place], [place] | [place] , [place] |
|            |              |            | failure      |  [address]       |  [address]        |
|            |              |            | ([facility])        | Phone:       | Phone:        |
|            |              |            | Persistent   | [phone] | [phone]  |
|            |              |            | atrial       |   Fax:       |  Fax:         |
|            |              |            | fibrillation | [phone] | [phone]  |
|            |              |            |  ([facility])       |              |               |
|            |              |            | Severe       |              |               |
|            |              |            | mitral       |              |               |
|            |              |            | regurgitatio |              |               |
|            |              |            | n  Severe    |              |               |
|            |              |            | tricuspid    |              |               |
|            |              |            | regurgitatio |              |               |
|            |              |            | n  Pulmonary |              |               |
|            |              |            |              |              |               |
|            |              |            | hypertension |              |               |
|            |              |            | , moderate   |              |               |
|            |              |            | to severe    |              |               |
|            |              |            | ([facility])        |              |               |
+------------+--------------+------------+--------------+--------------+---------------+
 
 
 
 
 Reason for Visit
 
 
+--------------+----------+
| Reason       | Comments |
+--------------+----------+
| Consultation |          |
+--------------+----------+
 Consultation (Routine)
 
+-------------+--------+------------+--------------+--------------+---------------+
| Status      | Reason | Specialty  | Diagnoses /  | Referred By  | Referred To   |
|             |        |            | Procedures   | Contact      | Contact       |
+-------------+--------+------------+--------------+--------------+---------------+
| New Request |        | Cardiology |   Diagnoses  |   Aidan,  |   Imer,   |
|             |        |            |  Heart       | MD Jean  | MD Geovany   |
 
|             |        |            | failure,     |  [address]     | [address] |
|             |        |            | unspecified  | [address]    |  [address]     |
|             |        |            | ([facility])        | [address]          | [place]  |
|             |        |            | Procedures   | [place],   | [address]  Phone: |
|             |        |            | Consult      | [place] [address]     |  [phone] |
|             |        |            |              | Phone:       |   Fax:        |
|             |        |            |              | [phone] | [phone]  |
|             |        |            |              |   Fax:       |               |
|             |        |            |              | [phone] |               |
+-------------+--------+------------+--------------+--------------+---------------+
 
 
 
 
 Encounter Details
 
 
+--------+----------+----------------------+----------------------+----------------------+
| Date   | Type     | Department           | Care Team            | Description          |
+--------+----------+----------------------+----------------------+----------------------+
| / | Initial  |   [facility]          |   Varun Mitchell,   | Persistent atrial    |
| 2018   | consult  | Cardiology [place] | MD [address] | fibrillation (HCC)   |
|        |          |   [address]    |   [place],   | (Primary Dx); Acute  |
|        |          | [address]        | [place] [address]             | on chronic diastolic |
|        |          | [place] [address]  | [phone]         |  congestive heart    |
|        |          |  [phone]        | [phone] (Fax)   | failure (HCC);       |
|        |          |                      |                      | Severe mitral        |
|        |          |                      |                      | regurgitation;       |
|        |          |                      |                      | Severe tricuspid     |
|        |          |                      |                      | regurgitation;       |
|        |          |                      |                      | Pulmonary            |
|        |          |                      |                      | hypertension,        |
|        |          |                      |                      | moderate to severe   |
|        |          |                      |                      | (HCC)                |
+--------+----------+----------------------+----------------------+----------------------+
 
 
 
 Social History
 
 
+--------------+-------+-----------+--------+------+
| Tobacco Use  | Types | Packs/Day | Years  | Date |
|              |       |           | Used   |      |
+--------------+-------+-----------+--------+------+
| Never Smoker |       |           |        |      |
+--------------+-------+-----------+--------+------+
 
 
 
+---------------------+---+---+---+
| Smokeless Tobacco:  |   |   |   |
| Never Used          |   |   |   |
+---------------------+---+---+---+
 
 
 
+-------------+-----------+---------+----------+
| Alcohol Use | Drinks/We | oz/Week | Comments |
|             | ek        |         |          |
 
+-------------+-----------+---------+----------+
| No          |           |         |          |
+-------------+-----------+---------+----------+
 
 
 
+------------------+---------------+
| Sex Assigned at  | Date Recorded |
| Birth            |               |
+------------------+---------------+
| Not on file      |               |
+------------------+---------------+
 as of this encounter
 
 Last Filed Vital Signs
 
 
+-------------------+--------------------+-------------------------+
| Vital Sign        | Reading            | Time Taken              |
+-------------------+--------------------+-------------------------+
| Blood Pressure    | 112/54             | 2018  9:50 AM PDT |
+-------------------+--------------------+-------------------------+
| Pulse             | 95                 | 2018  9:34 AM PDT |
+-------------------+--------------------+-------------------------+
| Temperature       | -                  | -                       |
+-------------------+--------------------+-------------------------+
| Respiratory Rate  | -                  | -                       |
+-------------------+--------------------+-------------------------+
| Oxygen Saturation | 97%                | 2018  9:34 AM PDT |
+-------------------+--------------------+-------------------------+
| Inhaled Oxygen    | -                  | -                       |
| Concentration     |                    |                         |
+-------------------+--------------------+-------------------------+
| Weight            | 66 kg (145 lb 6.4  | 2018  9:34 AM PDT |
|                   | oz)                |                         |
+-------------------+--------------------+-------------------------+
| Height            | 160 cm (5' 3")     | 2018  9:34 AM PDT |
+-------------------+--------------------+-------------------------+
| Body Mass Index   | 25.76              | 2018  9:34 AM PDT |
+-------------------+--------------------+-------------------------+
 in this encounter
 
 Progress Notes
 Varun Mitchell MD - 2018  9:30 AM PDTFormatting of this note may be different from 
the original.
   
 Subjective: 
 
 Patient ID: Vonnie Celaya is a 86 y.o. female.
 
 HPI
 The following portions of the patient's history were reviewed and updated as appropriate an
d is available elsewhere in the record: allergies, current medications, past family history,
 past medical history, past social history, past surgical history and problem list.
 
 Mrs. Celaya, accompanied by her daughter (who lives with her and a separate apartment), otto burden to the office today for a cardiology evaluation.  She was hospitalized at [facility] twice this year, the first time in February for acute diastolic heart failure, althou
gh she believes that her symptoms actually began as long ago as the end of , when she de
veloped dyspnea on exertion with a cold.  She had severe dyspnea, to the point where she cou
 
ld not walk 1/4 block to her mailbox.  She had no dyspnea at rest, denies orthopnea, PND or 
nocturia, but had significant pedal edema.  She was hospitalized again in May, for the same 
problem.  She has hyperdynamic LV systolic function on her echocardiogram, but marked valvul
ar disease, with severe mitral regurgitation, moderately severe tricuspid regurgitation, and
 mild aortic stenosis.  She denies any history of rheumatic fever, but had scarlet fever at 
age 13, which clearly could have been misdiagnosed.  There is no mitral stenosis.  I reviewe
d the images from her echocardiogram, which also showed moderately severe pulmonary hyperten
heaven and a small secundum ASD with minor left-to-right shunting.  She continues to have sign
ificant dyspnea on exertion, walking about one half block, and because of this, now leaves h
er home only about once per week, whereas previously, she was quite active, participating in
 multiple activities such as quilting.  It has clearly affected her quality of life.  She wo
uld be classified as New York Heart Association class III.  At age 86, I do not think she is
 a very good candidate for open heart surgery, as she already has some mild memory deficits 
which would likely become worse.  The MR jet is central, and she may be a candidate for a Mi
traClip procedure.  Experimental he, this is sometimes done on the tricuspid valve is well. 
 After a prolonged discussion, she agreed to have an evaluation for this, and I referred her
 to [facility] for this purpose.  She is on appropriate medications, and I made no changes.  I denita
l see her back in several months for follow-up. 
 
 Review of Systems
 CONSTITUTIONAL:  15 lb weight decrease due to diuretics in the last year, previously lost 6
0-70 lbs since the  with diet, denies recent fever, chills, night sweats, c/o significa
nt fatigue with even minor exertion
 NEUROLOGIC:  No history of CVA, had 2 TIAs (memory deficits 1-2 days in , resolved; the
 other was c., cannot recall the symptoms).  She has a prior h/o migraines, resolved.  S
he denies seizures, has had 2 Syncopal events due to UTI, and another at age 16, probably va
sovagal, getting BP checked in both arms simultaneously ("pumped up all the way").  No dizzi
ness, lightheadedness. No numbness, tingling, paresthesias.  She has mild short term memory 
problems ("repeats stories frequently").
 EYES:  No amaurosis, diplopia, recent visual changes, has early stage Cataracts, no h/o gla
ucoma
 ENT:  She has mild bilateral Hearing loss, denies tinnitus, epistaxis, has occasional dysph
agia with certain foods
 ENDOCRINE:  Type II Diabetes mellitus. Hypothyroidism, no other endocrine problems.  No exc
essive hunger, thirst.
 PULMONARY/SLEEP:  Severe Dyspnea on Exertion but not at rest, denies orthopnea, paroxysmal 
nocturnal dyspnea.  No history of asthma, emphysema. She had pneumonia at age 4. She has mil
d snoring, no significant daytime somnolence.  Sleep is refreshing.
 CARDIOVASCULAR:  Denies chest pain, pressure or discomfort.  No history of CAD.  She has a 
history of acute Diastolic Heart Failure.  She has persistent Atrial Fibrillation, on warfar
in. No recent palpitations.  She has had a heart Murmur since age 13, with marked Valvular H
eart Disease, severe MR, moderately severe TR, mild AS.  She denies a h/o rheumatic fever bu
t had scarlet fever in childhood.  She has moderately severe Pulmonary HTN.  She has Essenti
al Hypertension, Hyperlipidemia.  She had significant Edema prior to diuretic treatment, use
s compression stockings, no claudication symptoms.  No h/o an AAA.
 -- Echo (3/8/18): EF > 70%, normal RV size, function.  Marked bi-AE.  Mild AS (FOREST 1.7 cm
, peak/mean gradient 17/9 mmHg).  Severe MR.  Moderately severe TR.  Moderately severe pulmo
nary hypertension, RVSP 55.8-60.8 mmHg.  Small secundum ASD with mild left-to- right shuntin
g
 GASTROINTESTINAL:  No recent abdominal pain, nausea, vomiting or diarrhea. Denies PUD, kvng
na, hematochezia, hepatitis.
 RENAL/:  No history of kidney disease.  No dysuria, hematuria, urinary urgency, hesitancy
.  No active Ob/Gyn disorders.
 HEMATOLOGY/ONCOLOGY:  No h/o bleeding disorders, DVT, PE.   Denies easy bruisability or ble
eding.  She has a history of Iron-deficiency Anemia, denies prior blood transfusions.  No hi
story of cancer.
 MUSCULOSKELETAL:  Osteoporosis.  No myalgias, has Osteoarthritis with no significant arthra
lgias.  No history of rheumatologic or autoimmune diseases.
 CUTANEOUS:  Eczema. No rashes, pruritus, lesions.
 PSYCHIATRIC:  No history of significant depression, has Anxiety, denies any other psychiatr
 
ic problems.
 
 Past Medical History 
 Diagnosis Date 
   Acute diastolic heart failure (HCC)  
   ASD secundum  
   Atrial fibrillation (HCC)  
  persistent since then, never attempted cardioversion 
   Congestive heart failure (HCC) 2018 
  acute/chronic Diastolic Heart Failure, NYHA class III 
   Diabetes mellitus, type II (HCC)  
  controlled, no complications 
   Edema  
   Essential hypertension  
   Heart murmur  
  had scarlet fever 
   Hyperlipidemia  
   Hypothyroidism  
   Iron deficiency anemia  
   Migraines  
  resolved 
   Mild aortic stenosis  
   Osteoarthritis  
   Osteoporosis  
   Pulmonary hypertension, moderate to severe (HCC)  
   Severe mitral regurgitation  
   Severe tricuspid regurgitation  
  moderately severe 
 
 Past Surgical History 
 Procedure Laterality Date 
   APPENDECTOMY   
   BLADDER SUSPENSION   
   BLEPHAROPLASTY Bilateral  
   BREAST SURGERY Right  
  benign lesion removed 
   BUNIONECTOMY   
   HYSTERECTOMY   
  partial hysterectomy separate 
   salpingo-oophorectomy Bilateral  
   TOE FUSION   
   TONSILLECTOMY   
 
 Family History 
 Problem Relation Age of Onset 
   CVA Mother 71 
      recurrent CVA at 74,  a few days later 
   Heart failure Mother  
   Diabetes Mellitus II Mother  
   CVA Father  
      cerebral hemorrhage 
   Coronary Artery Disease Father 63 
   Leukemia Sister  
      basophilic leukemia 
   CVA Brother  
   Heart  Disease Sister  
      ? thrombus, not clear 
   Deep vein thrombosis Sister  
   CVA Daughter  
   Obesity Daughter  
 
   Arthritis Daughter  
   Thyroid cancer Daughter  
 
 Social History 
 Substance Use Topics 
   Smoking status: Never Smoker 
   Smokeless tobacco: Never Used 
   Alcohol use No 
 
 Allergies 
 Allergen Reactions 
   Morphine Other (See Comments) 
   Pt felt fidgety 
   Percocet [Oxycodone-Acetaminophen] Other (See Comments) 
   Pt felt fidgety 
 
 Current Outpatient Prescriptions: 
    acetaminophen (TYLENOL) 325 MG tablet, Take 650 mg by mouth every 6 (six) hours as nee
ded for Pain., Disp: , Rfl: 
    ascorbic acid (VITAMIN C) 1000 MG tablet, Take 1,000 mg by mouth daily., Disp: , Rfl: 
    Cholecalciferol 2000 units CAPS, Take 2,000 Units by mouth daily., Disp: , Rfl: 
    Cranberry 1000 MG CAPS, Take 1 capsule by mouth daily., Disp: , Rfl: 
    furosemide (LASIX) 40 MG tablet, Take 40 mg by mouth daily., Disp: , Rfl: 
    Gluc-Chonn-MSM-Boswellia-Vit D (GLUCOSAMINE CHOND TRIPLE/VIT D) TABS, Take 1 tablet by
 mouth daily., Disp: , Rfl: 
    levothyroxine (SYNTHROID) 100 MCG tablet, Take 100 mcg by mouth every morning before b
reakfast., Disp: , Rfl: 
    losartan (COZAAR) 100 MG tablet, Take 100 mg by mouth daily., Disp: , Rfl: 
    magnesium chloride (MAG64) 64 mg EC tablet, Take 1 tablet by mouth daily., Disp: , Rfl
: 
    metFORMIN (GLUMETZA) 500 MG (MOD) 24 hr tablet, Take 500 mg by mouth 2 (two) times annalise
ly with meals., Disp: , Rfl: 
    metoprolol (TOPROL-XL) 200 MG 24 hr tablet, Take 100 mg by mouth every morning., Disp:
 , Rfl: 
    potassium chloride SA (K-DUR,KLOR-CON) 20 MEQ tablet, Take 20 mEq by mouth daily., Dis
p: , Rfl: 
    simvastatin (ZOCOR) 20 MG tablet, Take 20 mg by mouth nightly., Disp: , Rfl: 
    warfarin (COUMADIN) 5 MG tablet, Take 5 mg by mouth daily. 5mg daily on Mond, Wed, Fri
d, Sat,  and 2.5mg daily on Tues, Thurs, Disp: , Rfl: 
 
     
 Objective: 
 Physical Exam
 /54 (BP Location: Left upper arm, Patient Position: Sitting)  | Pulse 95  | Ht 1.6 m 
(5' 3")  | Wt 66 kg (145 lb 6.4 oz)  | SpO2 97%  | BMI 25.76 kg/m 
 /48 right arm
 
 GENERAL:  Well developed, well nourished elderly  woman, in no distress.  Appears 
approximately stated age.
 HEENT:  Normocephalic, atraumatic.  
 EYES:     PERRL, sclerae anicteric, no xanthelsasmas
 MOUTH: Oral mucosae moist, dentition adequate, no lesions noted
 NECK:    No JVD, lymphadenopathy, thyromegaly, bruits.  Carotid pulses are 2+ bilaterally
 LUNGS:  Clear bilaterally, with no rales, rhonchi or wheezing noted, respirations unlabored
 HEART:  Nondisplaced PMI, regular rate, irregularly irregular rhythm, S1 varied in intensit
y, S2 normal.  100 2/6 systolic crescendo decrescendo murmur at the base, without radiation.
  1I-6 holosystolic murmur at the apex radiating to the lower left sternal border.  No rubs 
or gallops noted.
 ABDOMEN:  Soft, nontender, no organomegaly, masses or bruits.  Bowel sounds are normal in a
ll 4 quadrants.  The abdominal aortic pulsation is not palpable.
 
 EXTREMITIES:  No edema. Radial pulses 2+ bilaterally.  Femoral pulses are 2+ bilaterally wi
thout bruits.  DP and PT pulses are 1+ bilaterally.
 SKIN:  Warm and dry, capillary refill is normal, no lesions.
 NEUROLOGIC:  Awake, alert and oriented x 3. No focal motor deficits. 
 PSYCHIATRIC:  Appropriate, affect appears normal
 
 EKG: Atrial fibrillation, ventricular rate 68, poor R-wave progression V1-V4, cannot exclud
e an old anteroseptal infarct.  Nonspecific inferior ST-T abnormalities, possibly due to isc
hemia.
 
    
 Assessment and Plan: 
 Vonnie was seen today for consultation.
 
 Persistent atrial fibrillation (HCC)
 -     Electrocardiogram, 12-lead
 -     AMB ref to Interventional Cardiology
 
 Acute on chronic diastolic congestive heart failure (HCC)
 -     AMB ref to Interventional Cardiology
 
 Severe mitral regurgitation
 -     AMB ref to Interventional Cardiology
 
 Severe tricuspid regurgitation
 -     AMB ref to Interventional Cardiology
 
 Pulmonary hypertension, moderate to severe (HCC)
 -     AMB ref to Interventional Cardiology
 
  
 
 in this encounter
 
 Plan of Treatment
 
 
+--------+---------+------------+----------------------+-------------+
| Date   | Type    | Specialty  | Care Team            | Description |
+--------+---------+------------+----------------------+-------------+
| / | Office  | Cardiology |   Varun Mitchell,   |             |
| 2018   | Visit   |            | MD [address] |             |
|        |         |            |   [place],   |             |
|        |         |            | [place] [address]             |             |
|        |         |            | [phone]         |             |
|        |         |            | [phone] (Fax)   |             |
+--------+---------+------------+----------------------+-------------+
 
 
 
+----------------------------+--------+----------------------+---------------------+
| Name                       | Priori | Associated Diagnoses | Order Schedule      |
|                            | ty     |                      |                     |
+----------------------------+--------+----------------------+---------------------+
| AMB ref to Interventional  | Routin |   Acute on chronic   | Ordered: 2018 |
| Cardiology                 | e      | diastolic congestive |                     |
|                            |        |  heart failure (HCC) |                     |
|                            |        |   Persistent atrial  |                     |
|                            |        | fibrillation (HCC)   |                     |
|                            |        | Severe mitral        |                     |
 
|                            |        | regurgitation        |                     |
|                            |        | Severe tricuspid     |                     |
|                            |        | regurgitation        |                     |
|                            |        | Pulmonary            |                     |
|                            |        | hypertension,        |                     |
|                            |        | moderate to severe   |                     |
|                            |        | (HCC)                |                     |
+----------------------------+--------+----------------------+---------------------+
 as of this encounter
 
 Procedures
 
 
+----------------------+--------+-------------+----------------------+----------------------
+
| Procedure Name       | Priori | Date/Time   | Associated Diagnosis | Comments             
|
|                      | ty     |             |                      |                      
|
+----------------------+--------+-------------+----------------------+----------------------
+
| EKG STANDARD 12 LEAD | Routin | 2018  |   Persistent atrial  |   Results for this   
|
|                      | e      |  9:57 AM    | fibrillation (HCC)   | procedure are in the 
|
|                      |        | PDT         |                      |  results section.    
|
+----------------------+--------+-------------+----------------------+----------------------
+
 in this encounter
 
 Results
 EK STANDARD 12 LEAD (2018  9:57 AM)
 
+-------------------+--------------------------+-----------+--------------+
| Component         | Value                    | Ref Range | Performed At |
+-------------------+--------------------------+-----------+--------------+
| Ventricular Rate  | 68                       | BPM       | [facility]     |
+-------------------+--------------------------+-----------+--------------+
| Atrial Rate       | 357                      | BPM       | [facility]     |
+-------------------+--------------------------+-----------+--------------+
| QRS Duration      | 102                      | ms        | [facility]     |
+-------------------+--------------------------+-----------+--------------+
| Q-T Interval      | 396                      | ms        | [facility]     |
+-------------------+--------------------------+-----------+--------------+
| QTC Calculation   | 421                      | ms        | [facility]     |
| (Bezet)           |                          |           |              |
+-------------------+--------------------------+-----------+--------------+
| Calculated R Axis | 80                       | degrees   | [facility]     |
+-------------------+--------------------------+-----------+--------------+
| Calculated T Axis | 4                        | degrees   | [facility]     |
+-------------------+--------------------------+-----------+--------------+
| Diagnosis         | Atrial                   |           | [facility]     |
|                   | fibrillationAnterior     |           |              |
|                   | infarct , age            |           |              |
|                   | undeterminedAbnormal     |           |              |
|                   | ECGNo previous ECGs      |           |              |
|                   | availablePlease refer to |           |              |
|                   |  Providers office visit  |           |              |
|                   | note for Providers       |           |              |
 
|                   | Interpretation.Confirmed |           |              |
|                   |  by [facility],  |           |              |
|                   | [facility] ([id number]),      |           |              |
|                   |  [place]    |           |              |
|                   | ([id number]) on 2018       |           |              |
|                   | 10:00:11 AM              |           |              |
+-------------------+--------------------------+-----------+--------------+
 
 
 
+--------------+-------------------+--------------------+--------------+
| Performing   | Address           | City/State/Zipcode | Phone Number |
| Organization |                   |                    |              |
+--------------+-------------------+--------------------+--------------+
|   [facility]   |   [address] | [place] [id number] |              |
+--------------+-------------------+--------------------+--------------+
 in this encounter
 
 Visit Diagnoses
 
 
+-----------------------------------------------------------+
| Diagnosis                                                 |
+-----------------------------------------------------------+
| Persistent atrial fibrillation (HCC) - Primary            |
+-----------------------------------------------------------+
|   Atrial fibrillation                                     |
+-----------------------------------------------------------+
| Acute on chronic diastolic congestive heart failure (HCC) |
+-----------------------------------------------------------+
|   Acute on chronic diastolic heart failure                |
+-----------------------------------------------------------+
| Severe mitral regurgitation                               |
+-----------------------------------------------------------+
|   Mitral valve disorders                                  |
+-----------------------------------------------------------+
| Severe tricuspid regurgitation                            |
+-----------------------------------------------------------+
|   Diseases of tricuspid valve                             |
+-----------------------------------------------------------+
| Pulmonary hypertension, moderate to severe (HCC)          |
+-----------------------------------------------------------+
|   Other chronic pulmonary heart diseases                  |
+-----------------------------------------------------------+

## 2018-08-24 NOTE — XMS
Clinical Summary
  Created on: 2018
 
 Alvin Harp
 External Reference #: [id number]
 : 32
 Sex: Female
 
 Demographics
 
 
+-----------------------+---------------------------+
| Address               | [address]           |
|                       | [place]  [address] |
+-----------------------+---------------------------+
| Home Phone            | [phone]           |
+-----------------------+---------------------------+
| Preferred Language    | Unknown                   |
+-----------------------+---------------------------+
| Marital Status        |                    |
+-----------------------+---------------------------+
| Restoration Affiliation | Unknown                   |
+-----------------------+---------------------------+
| Race                  | Unknown                   |
+-----------------------+---------------------------+
| Ethnic Group          | Unknown                   |
+-----------------------+---------------------------+
 
 
 Author
 
 
+--------------+-----------------------+
| Author       | [facility] |
+--------------+-----------------------+
| Organization | [facility] |
+--------------+-----------------------+
| Address      | Unknown               |
+--------------+-----------------------+
| Phone        | Unavailable           |
+--------------+-----------------------+
 
 
 
 Support
 
 
+--------------+--------------+---------+-----------------+
| Name         | Relationship | Address | Phone           |
+--------------+--------------+---------+-----------------+
| Lauryn Leroy | ECON         | Unknown | [phone] |
+--------------+--------------+---------+-----------------+
 
 
 
 Care Team Providers
 
 
 
+-----------------------+------+-----------------+
| Care Team Member Name | Role | Phone           |
+-----------------------+------+-----------------+
| Jean Bermudez MD  | PP   | [phone] |
+-----------------------+------+-----------------+
 
 
 
 Allergies
 
 
+----------------------+----------------------+----------+----------+-------------------+
| Active Allergy       | Reactions            | Severity | Noted    | Comments          |
|                      |                      |          | Date     |                   |
+----------------------+----------------------+----------+----------+-------------------+
| Morphine             | Other (See Comments) | Medium   | 20 |   Pt felt fidgety |
|                      |                      |          | 18       |                   |
+----------------------+----------------------+----------+----------+-------------------+
| Oxycodone-Acetaminop | Other (See Comments) | Medium   | 20 |   Pt felt fidgety |
| hen                  |                      |          | 18       |                   |
+----------------------+----------------------+----------+----------+-------------------+
 
 
 
 Current Medications
 
 
+----------------------+----------------------+-------+---------+------+------+-------+
| Prescription         | Sig.                 | Disp. | Refills | Star | End  | Statu |
|                      |                      |       |         | t    | Date | s     |
|                      |                      |       |         | Date |      |       |
+----------------------+----------------------+-------+---------+------+------+-------+
|   levothyroxine      | Take 100 mcg by      |       |         |      |      | Activ |
| (SYNTHROID) 100 MCG  | mouth every morning  |       |         |      |      | e     |
| tablet               | before breakfast.    |       |         |      |      |       |
+----------------------+----------------------+-------+---------+------+------+-------+
|   losartan (COZAAR)  | Take 100 mg by mouth |       |         |      |      | Activ |
| 100 MG tablet        |  daily.              |       |         |      |      | e     |
+----------------------+----------------------+-------+---------+------+------+-------+
|   simvastatin        | Take 20 mg by mouth  |       |         |      |      | Activ |
| (ZOCOR) 20 MG tablet | nightly.             |       |         |      |      | e     |
+----------------------+----------------------+-------+---------+------+------+-------+
|   warfarin           | Take 5 mg by mouth   |       |         |      |      | Activ |
| (COUMADIN) 5 MG      | daily. 5mg daily on  |       |         |      |      | e     |
| tablet               | , Wed, ,     |       |         |      |      |       |
|                      | Sat,  and 2.5mg  |       |         |      |      |       |
|                      | daily on Tues, Thurs |       |         |      |      |       |
+----------------------+----------------------+-------+---------+------+------+-------+
|   potassium chloride | Take 20 mEq by mouth |       |         |      |      | Activ |
|  SA (K-DUR,KLOR-CON) |  daily.              |       |         |      |      | e     |
|  20 MEQ tablet       |                      |       |         |      |      |       |
+----------------------+----------------------+-------+---------+------+------+-------+
|   metFORMIN          | Take 500 mg by mouth |       |         |      |      | Activ |
| (GLUMETZA) 500 MG    |  2 (two) times daily |       |         |      |      | e     |
| (MOD) 24 hr tablet   |  with meals.         |       |         |      |      |       |
+----------------------+----------------------+-------+---------+------+------+-------+
|   magnesium chloride | Take 1 tablet by     |       |         |      |      | Activ |
|  (MAG64) 64 mg EC    | mouth daily.         |       |         |      |      | e     |
| tablet               |                      |       |         |      |      |       |
+----------------------+----------------------+-------+---------+------+------+-------+
 
|                      | Take 1 tablet by     |       |         |      |      | Activ |
| Gluc-Chonn-MSM-Boswe | mouth daily.         |       |         |      |      | e     |
| llia-Vit D           |                      |       |         |      |      |       |
| (GLUCOSAMINE CHOND   |                      |       |         |      |      |       |
| TRIPLE/VIT D) TABS   |                      |       |         |      |      |       |
+----------------------+----------------------+-------+---------+------+------+-------+
|   Cranberry 1000 MG  | Take 1 capsule by    |       |         |      |      | Activ |
| CAPS                 | mouth daily.         |       |         |      |      | e     |
+----------------------+----------------------+-------+---------+------+------+-------+
|   ascorbic acid      | Take 1,000 mg by     |       |         |      |      | Activ |
| (VITAMIN C) 1000 MG  | mouth daily.         |       |         |      |      | e     |
| tablet               |                      |       |         |      |      |       |
+----------------------+----------------------+-------+---------+------+------+-------+
|   furosemide (LASIX) | Take 40 mg by mouth  |       |         |      |      | Activ |
|  40 MG tablet        | daily.               |       |         |      |      | e     |
+----------------------+----------------------+-------+---------+------+------+-------+
|   acetaminophen      | Take 650 mg by mouth |       |         |      |      | Activ |
| (TYLENOL) 325 MG     |  every 6 (six) hours |       |         |      |      | e     |
| tablet               |  as needed for Pain. |       |         |      |      |       |
+----------------------+----------------------+-------+---------+------+------+-------+
|   metoprolol         | Take 100 mg by mouth |       |         | 07/3 |      | Activ |
| (TOPROL-XL) 200 MG   |  every morning.      |       |         | 0/20 |      | e     |
| 24 hr tablet         |                      |       |         | 18   |      |       |
+----------------------+----------------------+-------+---------+------+------+-------+
|   Cholecalciferol    | Take 2,000 Units by  |       |         |      |      | Activ |
| 2000 units CAPS      | mouth daily.         |       |         |      |      | e     |
+----------------------+----------------------+-------+---------+------+------+-------+
|   Verapamil HCl CR   | Take 1 capsule by    |       |         |      |  | Disco |
| 300 MG CP24          | mouth daily.         |       |         |      |  | ntinu |
|                      |                      |       |         |      | 18   | ed    |
+----------------------+----------------------+-------+---------+------+------+-------+
 
 
 
 Active Problems
 
 
+--------------------------------+------------+
| Problem                        | Noted Date |
+--------------------------------+------------+
| Congestive heart failure (HCC) | 2018 |
+--------------------------------+------------+
 
 
 
+------------------------------------------------------------------+
|   Overview:   acute/chronic Diastolic Heart Failure, NYHA class  |
| III                                                              |
+------------------------------------------------------------------+
 
 
 
+---------------------------+------------+
| Atrial fibrillation (HCC) | 1996 |
+---------------------------+------------+
 
 
 
+-------------------------------------------------------+
|   Overview:   persistent since then, never attempted  |
 
| cardioversion                                         |
+-------------------------------------------------------+
 
 
 
+--------------------------------------------------+---+
| Pulmonary hypertension, moderate to severe (HCC) |   |
+--------------------------------------------------+---+
| Severe tricuspid regurgitation                   |   |
+--------------------------------------------------+---+
 
 
 
+---------------------------------+
|   Overview:   moderately severe |
+---------------------------------+
 
 
 
+-----------------------------+---+
| Severe mitral regurgitation |   |
+-----------------------------+---+
 
 
 
 Encounters
 
 
+--------+----------+-----------+---------------------+----------------------+
| Date   | Type     | Specialty | Care Team           | Description          |
+--------+----------+-----------+---------------------+----------------------+
| / | Initial  |           |   Varun Mitchell,  | Persistent atrial    |
| 2018   | consult  |           | MD                  | fibrillation (HCC)   |
|        |          |           |                     | (Primary Dx); Acute  |
|        |          |           |                     | on chronic diastolic |
|        |          |           |                     |  congestive heart    |
|        |          |           |                     | failure (HCC);       |
|        |          |           |                     | Severe mitral        |
|        |          |           |                     | regurgitation;       |
|        |          |           |                     | Severe tricuspid     |
|        |          |           |                     | regurgitation;       |
|        |          |           |                     | Pulmonary            |
|        |          |           |                     | hypertension,        |
|        |          |           |                     | moderate to severe   |
|        |          |           |                     | (HCC)                |
+--------+----------+-----------+---------------------+----------------------+
 from Last 3 Months
 
 Family History
 
 
+----------------------+----------+---------+--------------------------------------------+
| Medical History      | Relation | Name    | Comments                                   |
+----------------------+----------+---------+--------------------------------------------+
| CVA                  | Brother Virginie Chau    |                                            |
+----------------------+----------+---------+--------------------------------------------+
| Arthritis            | Daughter |         |                                            |
+----------------------+----------+---------+--------------------------------------------+
| CVA                  | Daughter |         |                                            |
+----------------------+----------+---------+--------------------------------------------+
 
| Obesity              | Daughter |         |                                            |
+----------------------+----------+---------+--------------------------------------------+
| Thyroid cancer       | Daughter | Devorah   |                                            |
+----------------------+----------+---------+--------------------------------------------+
| CVA                  | Father   |         | cerebral hemorrhage                        |
+----------------------+----------+---------+--------------------------------------------+
| Coronary Artery      | Father   |         |                                            |
| Disease              |          |         |                                            |
+----------------------+----------+---------+--------------------------------------------+
| CVA                  | Mother   |         | recurrent CVA at 74,  a few days later |
+----------------------+----------+---------+--------------------------------------------+
| Diabetes Mellitus II | Mother   |         |                                            |
+----------------------+----------+---------+--------------------------------------------+
| Heart failure        | Mother   |         |                                            |
+----------------------+----------+---------+--------------------------------------------+
| Leukemia             | Sister   | Khushbu | basophilic leukemia                        |
+----------------------+----------+---------+--------------------------------------------+
| Heart  Disease       | Sister   | Fabienne | ? thrombus, not clear                      |
+----------------------+----------+---------+--------------------------------------------+
| Deep vein thrombosis | Sister   | Rhea    |                                            |
+----------------------+----------+---------+--------------------------------------------+
 
 
 
+----------+---------+----------+--------------------------------------------+
| Relation | Name    | Status   | Comments                                   |
+----------+---------+----------+--------------------------------------------+
| Brother  | Isac    |  | CVA                                        |
|          |         |   (Age   |                                            |
|          |         | 75)      |                                            |
+----------+---------+----------+--------------------------------------------+
| Daughter |         |  | cerebral thrombosis post umbilical hernia  |
|          |         |   (Age   | repair                                     |
|          |         | 56)      |                                            |
+----------+---------+----------+--------------------------------------------+
| Daughter |         | Alive    |                                            |
+----------+---------+----------+--------------------------------------------+
| Daughter | Devorah   | Alive    |                                            |
+----------+---------+----------+--------------------------------------------+
| Father   |         |  | cerebral hemorrhage                        |
|          |         |   (Age   |                                            |
|          |         | 63)      |                                            |
+----------+---------+----------+--------------------------------------------+
| Mother   |         |  | CHF                                        |
|          |         |   (Age   |                                            |
|          |         | 74)      |                                            |
+----------+---------+----------+--------------------------------------------+
| Sister   | Khushbu |  |                                            |
|          |         |   (Age   |                                            |
|          |         | 56)      |                                            |
+----------+---------+----------+--------------------------------------------+
| Sister   | Fabienne | Alive    |                                            |
+----------+---------+----------+--------------------------------------------+
| Sister   | Rhea    | Alive    |                                            |
+----------+---------+----------+--------------------------------------------+
| Son      |         | Alive    |                                            |
+----------+---------+----------+--------------------------------------------+
 
 
 
 
 Social History
 
 
+--------------+-------+-----------+--------+------+
| Tobacco Use  | Types | Packs/Day | Years  | Date |
|              |       |           | Used   |      |
+--------------+-------+-----------+--------+------+
| Never Smoker |       |           |        |      |
+--------------+-------+-----------+--------+------+
 
 
 
+---------------------+---+---+---+
| Smokeless Tobacco:  |   |   |   |
| Never Used          |   |   |   |
+---------------------+---+---+---+
 
 
 
+-------------+-----------+---------+----------+
| Alcohol Use | Drinks/We | oz/Week | Comments |
|             | ek        |         |          |
+-------------+-----------+---------+----------+
| No          |           |         |          |
+-------------+-----------+---------+----------+
 
 
 
+------------------+---------------+
| Sex Assigned at  | Date Recorded |
| Birth            |               |
+------------------+---------------+
| Not on file      |               |
+------------------+---------------+
 
 
 
 Last Filed Vital Signs
 
 
+-------------------+--------------------+-------------------------+
| Vital Sign        | Reading            | Time Taken              |
+-------------------+--------------------+-------------------------+
| Blood Pressure    | 112/54             | 2018  9:50 AM PDT |
+-------------------+--------------------+-------------------------+
| Pulse             | 95                 | 2018  9:34 AM PDT |
+-------------------+--------------------+-------------------------+
| Temperature       | -                  | -                       |
+-------------------+--------------------+-------------------------+
| Respiratory Rate  | -                  | -                       |
+-------------------+--------------------+-------------------------+
| Oxygen Saturation | 97%                | 2018  9:34 AM PDT |
+-------------------+--------------------+-------------------------+
| Inhaled Oxygen    | -                  | -                       |
| Concentration     |                    |                         |
+-------------------+--------------------+-------------------------+
| Weight            | 66 kg (145 lb 6.4  | 2018  9:34 AM PDT |
|                   | oz)                |                         |
+-------------------+--------------------+-------------------------+
| Height            | 160 cm (5' 3")     | 2018  9:34 AM PDT |
 
+-------------------+--------------------+-------------------------+
| Body Mass Index   | 25.76              | 2018  9:34 AM PDT |
+-------------------+--------------------+-------------------------+
 
 
 
 Plan of Treatment
 
 
+--------+---------+-----------+----------------------+-------------+
| Date   | Type    | Specialty | Care Team            | Description |
+--------+---------+-----------+----------------------+-------------+
| / | Office  |           |   Varun Mitchell,   |             |
| 2018   | Visit   |           | MD [address] |             |
|        |         |           |   [place],   |             |
|        |         |           | [place] [address]             |             |
|        |         |           | [phone]         |             |
|        |         |           | [phone] (Fax)   |             |
+--------+---------+-----------+----------------------+-------------+
 
 
 
+---------------------+-----------+-----------+----------+
| Health Maintenance  | Due Date  | Last Done | Comments |
+---------------------+-----------+-----------+----------+
| Vaccine:            |  |           |          |
| Dtap/Tdap/Td (1 -   | 1         |           |          |
| Tdap)               |           |           |          |
+---------------------+-----------+-----------+----------+
| Vaccine: Zoster (1  |  |           |          |
| of 2)               | 2         |           |          |
+---------------------+-----------+-----------+----------+
| DEXA SCAN SCREENING |  |           |          |
|                     | 7         |           |          |
+---------------------+-----------+-----------+----------+
| Vaccine:            |  |           |          |
| Pneumococcal 65+    | 7         |           |          |
| Low/Medium Risk (1  |           |           |          |
| of 2 - PCV13)       |           |           |          |
+---------------------+-----------+-----------+----------+
| Vaccine: Influenza  |  |           |          |
| (#1)                | 8         |           |          |
+---------------------+-----------+-----------+----------+
 
 
 
 Procedures
 
 
+----------------------+--------+-------------+----------------------+----------------------
+
| Procedure Name       | Priori | Date/Time   | Associated Diagnosis | Comments             
|
|                      | ty     |             |                      |                      
|
+----------------------+--------+-------------+----------------------+----------------------
+
| EKG STANDARD 12 LEAD | Routin | 2018  |   Persistent atrial  |   Results for this   
|
|                      | e      |  9:57 AM    | fibrillation (HCC)   | procedure are in the 
 
|
|                      |        | PDT         |                      |  results section.    
|
+----------------------+--------+-------------+----------------------+----------------------
+
 from Last 3 Months
 
 Results
 EKG STANDARD 12 LEAD (2018  9:57 AM)
 
+-------------------+--------------------------+-----------+--------------+
| Component         | Value                    | Ref Range | Performed At |
+-------------------+--------------------------+-----------+--------------+
| Ventricular Rate  | 68                       | BPM       | [facility]     |
+-------------------+--------------------------+-----------+--------------+
| Atrial Rate       | 357                      | BPM       | [facility]     |
+-------------------+--------------------------+-----------+--------------+
| QRS Duration      | 102                      | ms        | [facility]     |
+-------------------+--------------------------+-----------+--------------+
| Q-T Interval      | 396                      | ms        | [facility]     |
+-------------------+--------------------------+-----------+--------------+
| QTC Calculation   | 421                      | ms        | [facility]     |
| (Bezet)           |                          |           |              |
+-------------------+--------------------------+-----------+--------------+
| Calculated R Axis | 80                       | degrees   | [facility]     |
+-------------------+--------------------------+-----------+--------------+
| Calculated T Axis | 4                        | degrees   | [facility]     |
+-------------------+--------------------------+-----------+--------------+
| Diagnosis         | Atrial                   |           | [facility]     |
|                   | fibrillationAnterior     |           |              |
|                   | infarct , age            |           |              |
|                   | undeterminedAbnormal     |           |              |
|                   | ECGNo previous ECGs      |           |              |
|                   | availablePlease refer to |           |              |
|                   |  Providers office visit  |           |              |
|                   | note for Providers       |           |              |
|                   | Interpretation.Confirmed |           |              |
|                   |  by [facility] [place] Read Only,  |           |              |
|                   | [facility] ([id number]),      |           |              |
|                   |  Solomon Arenas    |           |              |
|                   | ([id number]) on 2018       |           |              |
|                   | 10:00:11 AM              |           |              |
+-------------------+--------------------------+-----------+--------------+
 
 
 
+--------------+-------------------+--------------------+--------------+
| Performing   | Address           | City/State/Zipcode | Phone Number |
| Organization |                   |                    |              |
+--------------+-------------------+--------------------+--------------+
|   [facility]   |   [address] | [place] [address] |              |
+--------------+-------------------+--------------------+--------------+
 from Last 3 Months
 
 Insurance
 
 
+------------------+--------+-------------+------+-------+----------------------+
| Payer            | Benefi | Subscriber  | Type | Phone | Address              |
|                  | t Plan | ID          |      |       |                      |
 
|                  |  /     |             |      |       |                      |
|                  | Group  |             |      |       |                      |
+------------------+--------+-------------+------+-------+----------------------+
| MEDICARE         | MEDICA | [id number]  |      |       |   [address]        |
|                  | [place]     |             |      |       | [place] [id number] |
|                  | IP-OP  |             |      |       |                      |
+------------------+--------+-------------+------+-------+----------------------+
| COMMERCIAL OTHER | BANKER | [id number]   |      |       |                      |
|                  | S LIFE |             |      |       |                      |
|                  |  AND   |             |      |       |                      |
|                  | CASUAL |             |      |       |                      |
|                  | [place]     |             |      |       |                      |
+------------------+--------+-------------+------+-------+----------------------+
 
 
 
+--------------------+--------+-------------+--------+-------------+---------------------+
| Guarantor Name     | Accoun | Relation to | Date   | Phone       | Billing Address     |
|                    | t Type |  Patient    | of     |             |                     |
|                    |        |             | Birth  |             |                     |
+--------------------+--------+-------------+--------+-------------+---------------------+
| ALVIN HARP | Person | Self        | / |   Home:     |   [address]   |
|                    | al/Fam |             | 1932   | [phone] | [place]       |
|                    | [place]    |             |        | [address]        | [phone]          |
+--------------------+--------+-------------+--------+-------------+---------------------+

## 2018-08-24 NOTE — NUR
ALL LOBES ARE CLEAR, BOWEL TONES ARE ACTIVE. PT HAS MINIMAL PAIN IN LOWER ABD.
NO FLANK PAIN. PT IS VOIDING WELL. NO EDEMA NOTED. NO NEW CONCERNS AT THIS
TIME.

## 2018-08-24 NOTE — NUR
PATIENT ARRIVED VIA STRETCHER AND WAS ABLE TO SCOOT ACROSS FROM THE STRETCHER
TO THE HOSPITAL BED W/NO ASSISTANCE. PATIENTS ADMISSION COMPLETED. PATIENT
GIVEN SOUP AND CRAKERS PER REQUEST. PATIENT PLACED ON TELE #4. PATIENT DENIES
ANY PAIN. PATIENT OREINTED TO FLOOR AND ROOM. NO FURTHER NEEDS NOTED. CALL
LIGHT IN REACH.

## 2018-08-24 NOTE — XMS
Encounter Summary
  Created on: 2018
 
 Vonnie Celaya
 External Reference #: [id number]
 : 32
 Sex: Female
 
 Demographics
 
 
+-----------------------+----------------------+
| Address               | [address]      |
|                       | [place]  [address] |
+-----------------------+----------------------+
| Home Phone            | [phone]      |
+-----------------------+----------------------+
| Preferred Language    | Unknown              |
+-----------------------+----------------------+
| Marital Status        | Unknown              |
+-----------------------+----------------------+
| Anabaptism Affiliation | Unknown              |
+-----------------------+----------------------+
| Race                  | Unknown              |
+-----------------------+----------------------+
| Ethnic Group          | Unknown              |
+-----------------------+----------------------+
 
 
 Author
 
 
+--------------+------------------------------+
| Author       | [facility] |
+--------------+------------------------------+
| Organization | [facility] |
+--------------+------------------------------+
| Address      | Unknown                      |
+--------------+------------------------------+
| Phone        | Unavailable                  |
+--------------+------------------------------+
 
 
 
 Support
 
 
+--------------+--------------+---------+-----------------+
| Name         | Relationship | Address | Phone           |
+--------------+--------------+---------+-----------------+
| Lauryn Leroy | ECON         | Unknown | [phone] |
+--------------+--------------+---------+-----------------+
 
 
 
 Care Team Providers
 
 
 
+-----------------------+------+-------------+
| Care Team Member Name | Role | Phone       |
+-----------------------+------+-------------+
 PCP  | Unavailable |
+-----------------------+------+-------------+
 
 
 
 Reason for Visit
 
 
+------------------+------------+
| Reason           | Comments   |
+------------------+------------+
| Medical Records  | [facility] review |
| Review           |            |
+------------------+------------+
 
 
 
 Encounter Details
 
 
+--------+----------+----------------------+--------------+---------------------+
| Date   | Type     | Department           | Care Team    | Description         |
+--------+----------+----------------------+--------------+---------------------+
| 08/15/ | Abstract |   Cardiology at [facility]  |   Unknown  . | Medical Records     |
|    |          |  [address]   |              | Review ([facility] review) |
|        |          | Mailcode: [id number]       |              |                     |
|        |          | [facility]    |              |                     |
|        |          | and Healing     |              |                     |
|        |          | Floor  [place], [place]  |              |                     |
|        |          | [address]           |              |                     |
|        |          | [phone]         |              |                     |
+--------+----------+----------------------+--------------+---------------------+
 
 
 
 Social History
 
 
+----------------+-------+-----------+--------+------+
| Tobacco Use    | Types | Packs/Day | Years  | Date |
|                |       |           | Used   |      |
+----------------+-------+-----------+--------+------+
| Never Assessed |       |           |        |      |
+----------------+-------+-----------+--------+------+
 
 
 
+------------------+---------------+
| Sex Assigned at  | Date Recorded |
| Birth            |               |
+------------------+---------------+
| Not on file      |               |
+------------------+---------------+
 as of this encounter
 
 Progress Notes
 Krista Haywood - 08/15/2018 10:26 AM PDTFormatting of this note may be different from the 
 
original.
 Heart Valve Tier 1 Review 
 Patient Name: Vonnie Celaya
 MRN: [id number]
 Referring Provider: Varun Mitchell
 Designated Family/Support Person Name:
 Contact Number:
 
 Data Elements / Results Needed Prior Medical Review Required for Medical Review  Required f
or Clinic Received
 (Y / N) Records / Notes
 (Care Everywhere / Abstract Enc) Imaging 
 (IMPAX / Disc / Not Available)  Comments 
 ECHO/TTE
 (if performed within the last two years)  Report Report / Images yes Care Everywhere  IMPAX
 18
 [facility] 
 LABS    CBC, BMP
 (from last 6 months)  Results  Results  no    
 CT Scan    Chest / Abd / Pelvis 
 (if performed within last two years)  Report Report / Images  no    
 Stress Tests (if applicable)  Report  no    
 Provider Notes - Cardiology / Cardiac Surgeon 
 (Last two years)  Notes Notes  yes Abstract Encounter   
 Provider Note    PCP 
 (Last note)  Note Note no    
 PFTs
 (if performed within the last two years)  Report  Report  no    
 CATH 
 (if performed within the last two years)   Report / Images no    
 JENIFFER
 (if performed within the last two years)   no    
 Operative Notes
 (Most recent thoracic, cardiac, vascular surgeries, breast implants)   no    
 
 Additional Comments:
 
 Heart Valve Tier 2 Review 
 
 Patient Name: Vonnie Celaya
 MRN: [id number]
 Referring Provider: 
 Designated Family/Support Person Name:
 Contact Number:
 Refer to Tier 3 No
 If Yes, rationale: 
 
 Tests to Schedule  Yes / No   Comments 
 ECHO - TTE Y Pre HVC 
 ECHO - JENIFFER Y Post HVC 
 LABS    CBC, BMP Y Pre HVC 
 CT Scan    Chest / Abd / Pelvis  Y  Post HVC 
 Dobutamine Stress Echo    
 PFTs   
 CATH  Y Post HVC 
 6 Min Walk Test Y Pre-HVC 
 
 For Tier 2 Review, place orders immediately
 For Tier 3 Review, place orders at time of HVC Attending response
 
 
 This data is from outside sources, and has not been individually verified.
 
  
 Heart Valve Tier 3 Response:
 
 Since coming from [facility], will need to consolidate trips if possible
 
 Would like to see on a Tuesday, have baseline testing, 6 min walk, TTE that AM.  Gated CT o
f chest that afternoon. 
 Would have her stay the night for JENIFFER on Wednesday. 
 
 Would then need to come back for interventional HVC on a Tuesday afternoon but be pre-booke
d for angiogram Tuesday morning prior to Int HVC that afternoon
 Would have to come over Monday night for that appointment likely 
 
 May need surgical MVR based on imaging of severe mitral regurgitation and moderate to sever
e mitral annular calcification vs TMVR if less MAC or Valve in MAC if severe MAC.
 
 Please edouard patient to see if they are up for all that testing.
 
 Will place orders once confirmed. 
 
 Jame Wilkins MD
 
 [facility] [place]
 
 in this encounter
 
 Plan of Treatment
 Not on fileas of this encounter
 
 Visit Diagnoses
 Not on filein this encounter

## 2018-08-24 NOTE — XMS
Clinical Summary
  Created on: 2018
 
 LindayaminiVonnie
 External Reference #: [id number]
 : 32
 Sex: Female
 
 Demographics
 
 
+-----------------------+----------------------+
| Address               | [address]      |
|                       | [place]  [address] |
+-----------------------+----------------------+
| Home Phone            | [phone]      |
+-----------------------+----------------------+
| Preferred Language    | Unknown              |
+-----------------------+----------------------+
| Marital Status        | Unknown              |
+-----------------------+----------------------+
| Amish Affiliation | Unknown              |
+-----------------------+----------------------+
| Race                  | Unknown              |
+-----------------------+----------------------+
| Ethnic Group          | Unknown              |
+-----------------------+----------------------+
 
 
 Author
 
 
+--------------+---------------------+
| Author       | [facility] |
+--------------+---------------------+
| Organization | [facility] |
+--------------+---------------------+
| Address      | Unknown             |
+--------------+---------------------+
| Phone        | Unavailable         |
+--------------+---------------------+
 
 
 
 Support
 
 
+--------------+--------------+---------+-----------------+
| Name         | Relationship | Address | Phone           |
+--------------+--------------+---------+-----------------+
| Lauryn Leroy | ECON         | Unknown | [phone] |
+--------------+--------------+---------+-----------------+
 
 
 
 Care Team Providers
 
 
 
+-----------------------+------+-------------+
| Care Team Member Name | Role | Phone       |
+-----------------------+------+-------------+
 PP   | Unavailable |
+-----------------------+------+-------------+
 
 
 
 Source Comments
 [facility] is fully live on both [facility] and [facility]
 
 Allergies
 Not on File
 
 Current Medications
 Not on file
 
 Active Problems
 Not on file
 
 Encounters
 
 
+--------+-----------+-----------+----------------------+---------------------+
| Date   | Type      | Specialty | Care Team            | Description         |
+--------+-----------+-----------+----------------------+---------------------+
| / | Telephone |           |   Jame Wilkins  | Other ([facility] appt)    |
|    |           |           | MD [facility]                |                     |
+--------+-----------+-----------+----------------------+---------------------+
| 08/15/ | Abstract  |           |   Unknown            | Medical Records     |
|    |           |           |                      | Review ([facility] review) |
+--------+-----------+-----------+----------------------+---------------------+
 from Last 3 Months
 
 Social History
 
 
+----------------+-------+-----------+--------+------+
| Tobacco Use    | Types | Packs/Day | Years  | Date |
|                |       |           | Used   |      |
+----------------+-------+-----------+--------+------+
| Never Assessed |       |           |        |      |
+----------------+-------+-----------+--------+------+
 
 
 
+------------------+---------------+
| Sex Assigned at  | Date Recorded |
| Birth            |               |
+------------------+---------------+
| Not on file      |               |
+------------------+---------------+
 
 
 
 Plan of Treatment
 
 
+--------------------+-----------+-----------+----------+
 
| Health Maintenance | Due Date  | Last Done | Comments |
+--------------------+-----------+-----------+----------+
| INFLUENZA VACCINE  |  |           |          |
| (FLU SHOT)         | 8         |           |          |
+--------------------+-----------+-----------+----------+
 
 
 
 Results
 Not on filefrom Last 3 Months

## 2018-08-24 NOTE — XMS
Clinical Summary
  Created on: 2018
 
 iLndayaminiVonnie
 External Reference #: [id number]
 : 32
 Sex: Female
 
 Demographics
 
 
+-----------------------+----------------------+
| Address               | [address]      |
|                       | [place]  [address] |
+-----------------------+----------------------+
| Home Phone            | [phone]      |
+-----------------------+----------------------+
| Preferred Language    | Unknown              |
+-----------------------+----------------------+
| Marital Status        | Unknown              |
+-----------------------+----------------------+
| Yarsani Affiliation | Unknown              |
+-----------------------+----------------------+
| Race                  | Unknown              |
+-----------------------+----------------------+
| Ethnic Group          | Unknown              |
+-----------------------+----------------------+
 
 
 Author
 
 
+--------------+---------------------+
| Author       | [facility] |
+--------------+---------------------+
| Organization | [facility] |
+--------------+---------------------+
| Address      | Unknown             |
+--------------+---------------------+
| Phone        | Unavailable         |
+--------------+---------------------+
 
 
 
 Support
 
 
+--------------+--------------+---------+-----------------+
| Name         | Relationship | Address | Phone           |
+--------------+--------------+---------+-----------------+
| Lauryn Leroy | ECON         | Unknown | [phone] |
+--------------+--------------+---------+-----------------+
 
 
 
 Care Team Providers
 
 
 
+-----------------------+------+-------------+
| Care Team Member Name | Role | Phone       |
+-----------------------+------+-------------+
 PP   | Unavailable |
+-----------------------+------+-------------+
 
 
 
 Source Comments
 [facility] is fully live on both [facility] and [facility]
 
 Allergies
 Not on File
 
 Current Medications
 Not on file
 
 Active Problems
 Not on file
 
 Encounters
 
 
+--------+-----------+-----------+----------------------+---------------------+
| Date   | Type      | Specialty | Care Team            | Description         |
+--------+-----------+-----------+----------------------+---------------------+
| / | Telephone |           |   Jame Wilkins  | Other ([facility] appt)    |
|    |           |           | MD [facility]                |                     |
+--------+-----------+-----------+----------------------+---------------------+
| 08/15/ | Abstract  |           |   Unknown            | Medical Records     |
|    |           |           |                      | Review ([facility] review) |
+--------+-----------+-----------+----------------------+---------------------+
 from Last 3 Months
 
 Social History
 
 
+----------------+-------+-----------+--------+------+
| Tobacco Use    | Types | Packs/Day | Years  | Date |
|                |       |           | Used   |      |
+----------------+-------+-----------+--------+------+
| Never Assessed |       |           |        |      |
+----------------+-------+-----------+--------+------+
 
 
 
+------------------+---------------+
| Sex Assigned at  | Date Recorded |
| Birth            |               |
+------------------+---------------+
| Not on file      |               |
+------------------+---------------+
 
 
 
 Plan of Treatment
 
 
+--------------------+-----------+-----------+----------+
 
| Health Maintenance | Due Date  | Last Done | Comments |
+--------------------+-----------+-----------+----------+
| INFLUENZA VACCINE  |  |           |          |
| (FLU SHOT)         | 8         |           |          |
+--------------------+-----------+-----------+----------+
 
 
 
 Results
 Not on filefrom Last 3 Months

## 2018-08-24 NOTE — XMS
Encounter Summary
  Created on: 2018
 
 Vonnie Celaya
 External Reference #: [id number]
 : 32
 Sex: Female
 
 Demographics
 
 
+-----------------------+---------------------------+
| Address               | [address]           |
|                       | [place]  [address] |
+-----------------------+---------------------------+
| Home Phone            | [phone]           |
+-----------------------+---------------------------+
| Preferred Language    | Unknown                   |
+-----------------------+---------------------------+
| Marital Status        |                    |
+-----------------------+---------------------------+
| Holiness Affiliation | Unknown                   |
+-----------------------+---------------------------+
| Race                  | Unknown                   |
+-----------------------+---------------------------+
| Ethnic Group          | Unknown                   |
+-----------------------+---------------------------+
 
 
 Author
 
 
+--------------+-----------------------+
| Author       | [facility] |
+--------------+-----------------------+
| Organization | [facility] |
+--------------+-----------------------+
| Address      | Unknown               |
+--------------+-----------------------+
| Phone        | Unavailable           |
+--------------+-----------------------+
 
 
 
 Support
 
 
+--------------+--------------+---------+-----------------+
| Name         | Relationship | Address | Phone           |
+--------------+--------------+---------+-----------------+
| Lauryn Leroy | ECON         | Unknown | [phone] |
+--------------+--------------+---------+-----------------+
 
 
 
 Care Team Providers
 
 
 
+-----------------------+------+-----------------+
| Care Team Member Name | Role | Phone           |
+-----------------------+------+-----------------+
| Jean Bermudez MD  | PCP  | [phone] |
+-----------------------+------+-----------------+
 
 
 
 Reason for Referral
 Consultation (Routine)
 
+------------+--------------+------------+--------------+--------------+---------------+
| Status     | Reason       | Specialty  | Diagnoses /  | Referred By  | Referred To   |
|            |              |            | Procedures   | Contact      | Contact       |
+------------+--------------+------------+--------------+--------------+---------------+
| Authorized |   Specialty  | Cardiology |   Diagnoses  |   Stephen,      |               |
|            | Services     |            |  Acute on    | Varun PARRA,   | [facility] |
|            | Required     |            | chronic      | MD  [address]     | [address]  [address]   |
|            |              |            | diastolic    | [address]  | [address]   |
|            |              |            | congestive   |  [address]       | [address],      |
|            |              |            | heart        | [place], [place] | [place] , [place] |
|            |              |            | failure      |  [address]       |  [address]        |
|            |              |            | (Formerly [facility])        | Phone:       | Phone:        |
|            |              |            | Persistent   | [phone] | [phone]  |
|            |              |            | atrial       |   Fax:       |  Fax:         |
|            |              |            | fibrillation | [phone] | [phone]  |
|            |              |            |  (Formerly [facility])       |              |               |
|            |              |            | Severe       |              |               |
|            |              |            | mitral       |              |               |
|            |              |            | regurgitatio |              |               |
|            |              |            | n  Severe    |              |               |
|            |              |            | tricuspid    |              |               |
|            |              |            | regurgitatio |              |               |
|            |              |            | n  Pulmonary |              |               |
|            |              |            |              |              |               |
|            |              |            | hypertension |              |               |
|            |              |            | , moderate   |              |               |
|            |              |            | to severe    |              |               |
|            |              |            | (Formerly [facility])        |              |               |
+------------+--------------+------------+--------------+--------------+---------------+
 
 
 
 
 Reason for Visit
 
 
+--------------+----------+
| Reason       | Comments |
+--------------+----------+
| Consultation |          |
+--------------+----------+
 Consultation (Routine)
 
+-------------+--------+------------+--------------+--------------+---------------+
| Status      | Reason | Specialty  | Diagnoses /  | Referred By  | Referred To   |
|             |        |            | Procedures   | Contact      | Contact       |
+-------------+--------+------------+--------------+--------------+---------------+
| New Request |        | Cardiology |   Diagnoses  |   Aidan,  |   Imer,   |
|             |        |            |  Heart       | MD Jean  | MD Geovany   |
 
|             |        |            | failure,     |  [address]     | [address] |
|             |        |            | unspecified  | [address]    |  [address]     |
|             |        |            | (Formerly [facility])        | [address]          | [place]  |
|             |        |            | Procedures   | [place],   | [address]  Phone: |
|             |        |            | Consult      | [place] [address]     |  [phone] |
|             |        |            |              | Phone:       |   Fax:        |
|             |        |            |              | [phone] | [phone]  |
|             |        |            |              |   Fax:       |               |
|             |        |            |              | [phone] |               |
+-------------+--------+------------+--------------+--------------+---------------+
 
 
 
 
 Encounter Details
 
 
+--------+----------+----------------------+----------------------+----------------------+
| Date   | Type     | Department           | Care Team            | Description          |
+--------+----------+----------------------+----------------------+----------------------+
| / | Initial  |   [facility]          |   Varun Mitchell,   | Persistent atrial    |
| 2018   | consult  | Cardiology [place] | MD [address] | fibrillation (HCC)   |
|        |          |   [address]    |   [place],   | (Primary Dx); Acute  |
|        |          | [address]        | [place] [address]             | on chronic diastolic |
|        |          | [place] [address]  | [phone]         |  congestive heart    |
|        |          |  [phone]        | [phone] (Fax)   | failure (HCC);       |
|        |          |                      |                      | Severe mitral        |
|        |          |                      |                      | regurgitation;       |
|        |          |                      |                      | Severe tricuspid     |
|        |          |                      |                      | regurgitation;       |
|        |          |                      |                      | Pulmonary            |
|        |          |                      |                      | hypertension,        |
|        |          |                      |                      | moderate to severe   |
|        |          |                      |                      | (HCC)                |
+--------+----------+----------------------+----------------------+----------------------+
 
 
 
 Social History
 
 
+--------------+-------+-----------+--------+------+
| Tobacco Use  | Types | Packs/Day | Years  | Date |
|              |       |           | Used   |      |
+--------------+-------+-----------+--------+------+
| Never Smoker |       |           |        |      |
+--------------+-------+-----------+--------+------+
 
 
 
+---------------------+---+---+---+
| Smokeless Tobacco:  |   |   |   |
| Never Used          |   |   |   |
+---------------------+---+---+---+
 
 
 
+-------------+-----------+---------+----------+
| Alcohol Use | Drinks/We | oz/Week | Comments |
|             | ek        |         |          |
 
+-------------+-----------+---------+----------+
| No          |           |         |          |
+-------------+-----------+---------+----------+
 
 
 
+------------------+---------------+
| Sex Assigned at  | Date Recorded |
| Birth            |               |
+------------------+---------------+
| Not on file      |               |
+------------------+---------------+
 as of this encounter
 
 Last Filed Vital Signs
 
 
+-------------------+--------------------+-------------------------+
| Vital Sign        | Reading            | Time Taken              |
+-------------------+--------------------+-------------------------+
| Blood Pressure    | 112/54             | 2018  9:50 AM PDT |
+-------------------+--------------------+-------------------------+
| Pulse             | 95                 | 2018  9:34 AM PDT |
+-------------------+--------------------+-------------------------+
| Temperature       | -                  | -                       |
+-------------------+--------------------+-------------------------+
| Respiratory Rate  | -                  | -                       |
+-------------------+--------------------+-------------------------+
| Oxygen Saturation | 97%                | 2018  9:34 AM PDT |
+-------------------+--------------------+-------------------------+
| Inhaled Oxygen    | -                  | -                       |
| Concentration     |                    |                         |
+-------------------+--------------------+-------------------------+
| Weight            | 66 kg (145 lb 6.4  | 2018  9:34 AM PDT |
|                   | oz)                |                         |
+-------------------+--------------------+-------------------------+
| Height            | 160 cm (5' 3")     | 2018  9:34 AM PDT |
+-------------------+--------------------+-------------------------+
| Body Mass Index   | 25.76              | 2018  9:34 AM PDT |
+-------------------+--------------------+-------------------------+
 in this encounter
 
 Progress Notes
 Varun Mitchell MD - 2018  9:30 AM PDTFormatting of this note may be different from 
the original.
   
 Subjective: 
 
 Patient ID: Vonnie Celaya is a 86 y.o. female.
 
 HPI
 The following portions of the patient's history were reviewed and updated as appropriate an
d is available elsewhere in the record: allergies, current medications, past family history,
 past medical history, past social history, past surgical history and problem list.
 
 Mrs. Celaya, accompanied by her daughter (who lives with her and a separate apartment), otto burden to the office today for a cardiology evaluation.  She was hospitalized at [facility] twice this year, the first time in February for acute diastolic heart failure, althou
gh she believes that her symptoms actually began as long ago as the end of , when she de
veloped dyspnea on exertion with a cold.  She had severe dyspnea, to the point where she cou
 
ld not walk 1/4 block to her mailbox.  She had no dyspnea at rest, denies orthopnea, PND or 
nocturia, but had significant pedal edema.  She was hospitalized again in May, for the same 
problem.  She has hyperdynamic LV systolic function on her echocardiogram, but marked valvul
ar disease, with severe mitral regurgitation, moderately severe tricuspid regurgitation, and
 mild aortic stenosis.  She denies any history of rheumatic fever, but had scarlet fever at 
age 13, which clearly could have been misdiagnosed.  There is no mitral stenosis.  I reviewe
d the images from her echocardiogram, which also showed moderately severe pulmonary hyperten
heaven and a small secundum ASD with minor left-to-right shunting.  She continues to have sign
ificant dyspnea on exertion, walking about one half block, and because of this, now leaves h
er home only about once per week, whereas previously, she was quite active, participating in
 multiple activities such as quilting.  It has clearly affected her quality of life.  She wo
uld be classified as New York Heart Association class III.  At age 86, I do not think she is
 a very good candidate for open heart surgery, as she already has some mild memory deficits 
which would likely become worse.  The MR jet is central, and she may be a candidate for a Mi
traClip procedure.  Experimental he, this is sometimes done on the tricuspid valve is well. 
 After a prolonged discussion, she agreed to have an evaluation for this, and I referred her
 to [facility] for this purpose.  She is on appropriate medications, and I made no changes.  I denita
l see her back in several months for follow-up. 
 
 Review of Systems
 CONSTITUTIONAL:  15 lb weight decrease due to diuretics in the last year, previously lost 6
0-70 lbs since the  with diet, denies recent fever, chills, night sweats, c/o significa
nt fatigue with even minor exertion
 NEUROLOGIC:  No history of CVA, had 2 TIAs (memory deficits 1-2 days in , resolved; the
 other was c., cannot recall the symptoms).  She has a prior h/o migraines, resolved.  S
he denies seizures, has had 2 Syncopal events due to UTI, and another at age 16, probably va
sovagal, getting BP checked in both arms simultaneously ("pumped up all the way").  No dizzi
ness, lightheadedness. No numbness, tingling, paresthesias.  She has mild short term memory 
problems ("repeats stories frequently").
 EYES:  No amaurosis, diplopia, recent visual changes, has early stage Cataracts, no h/o gla
ucoma
 ENT:  She has mild bilateral Hearing loss, denies tinnitus, epistaxis, has occasional dysph
agia with certain foods
 ENDOCRINE:  Type II Diabetes mellitus. Hypothyroidism, no other endocrine problems.  No exc
essive hunger, thirst.
 PULMONARY/SLEEP:  Severe Dyspnea on Exertion but not at rest, denies orthopnea, paroxysmal 
nocturnal dyspnea.  No history of asthma, emphysema. She had pneumonia at age 4. She has mil
d snoring, no significant daytime somnolence.  Sleep is refreshing.
 CARDIOVASCULAR:  Denies chest pain, pressure or discomfort.  No history of CAD.  She has a 
history of acute Diastolic Heart Failure.  She has persistent Atrial Fibrillation, on warfar
in. No recent palpitations.  She has had a heart Murmur since age 13, with marked Valvular H
eart Disease, severe MR, moderately severe TR, mild AS.  She denies a h/o rheumatic fever bu
t had scarlet fever in childhood.  She has moderately severe Pulmonary HTN.  She has Essenti
al Hypertension, Hyperlipidemia.  She had significant Edema prior to diuretic treatment, use
s compression stockings, no claudication symptoms.  No h/o an AAA.
 -- Echo (3/8/18): EF > 70%, normal RV size, function.  Marked bi-AE.  Mild AS (FOREST 1.7 cm
, peak/mean gradient 17/9 mmHg).  Severe MR.  Moderately severe TR.  Moderately severe pulmo
nary hypertension, RVSP 55.8-60.8 mmHg.  Small secundum ASD with mild left-to- right shuntin
g
 GASTROINTESTINAL:  No recent abdominal pain, nausea, vomiting or diarrhea. Denies PUD, kvng
na, hematochezia, hepatitis.
 RENAL/:  No history of kidney disease.  No dysuria, hematuria, urinary urgency, hesitancy
.  No active Ob/Gyn disorders.
 HEMATOLOGY/ONCOLOGY:  No h/o bleeding disorders, DVT, PE.   Denies easy bruisability or ble
eding.  She has a history of Iron-deficiency Anemia, denies prior blood transfusions.  No hi
story of cancer.
 MUSCULOSKELETAL:  Osteoporosis.  No myalgias, has Osteoarthritis with no significant arthra
lgias.  No history of rheumatologic or autoimmune diseases.
 CUTANEOUS:  Eczema. No rashes, pruritus, lesions.
 PSYCHIATRIC:  No history of significant depression, has Anxiety, denies any other psychiatr
 
ic problems.
 
 Past Medical History 
 Diagnosis Date 
   Acute diastolic heart failure (HCC)  
   ASD secundum  
   Atrial fibrillation (HCC)  
  persistent since then, never attempted cardioversion 
   Congestive heart failure (HCC) 2018 
  acute/chronic Diastolic Heart Failure, NYHA class III 
   Diabetes mellitus, type II (HCC)  
  controlled, no complications 
   Edema  
   Essential hypertension  
   Heart murmur  
  had scarlet fever 
   Hyperlipidemia  
   Hypothyroidism  
   Iron deficiency anemia  
   Migraines  
  resolved 
   Mild aortic stenosis  
   Osteoarthritis  
   Osteoporosis  
   Pulmonary hypertension, moderate to severe (HCC)  
   Severe mitral regurgitation  
   Severe tricuspid regurgitation  
  moderately severe 
 
 Past Surgical History 
 Procedure Laterality Date 
   APPENDECTOMY   
   BLADDER SUSPENSION   
   BLEPHAROPLASTY Bilateral  
   BREAST SURGERY Right  
  benign lesion removed 
   BUNIONECTOMY   
   HYSTERECTOMY   
  partial hysterectomy separate 
   salpingo-oophorectomy Bilateral  
   TOE FUSION   
   TONSILLECTOMY   
 
 Family History 
 Problem Relation Age of Onset 
   CVA Mother 71 
      recurrent CVA at 74,  a few days later 
   Heart failure Mother  
   Diabetes Mellitus II Mother  
   CVA Father  
      cerebral hemorrhage 
   Coronary Artery Disease Father 63 
   Leukemia Sister  
      basophilic leukemia 
   CVA Brother  
   Heart  Disease Sister  
      ? thrombus, not clear 
   Deep vein thrombosis Sister  
   CVA Daughter  
   Obesity Daughter  
 
   Arthritis Daughter  
   Thyroid cancer Daughter  
 
 Social History 
 Substance Use Topics 
   Smoking status: Never Smoker 
   Smokeless tobacco: Never Used 
   Alcohol use No 
 
 Allergies 
 Allergen Reactions 
   Morphine Other (See Comments) 
   Pt felt fidgety 
   Percocet [Oxycodone-Acetaminophen] Other (See Comments) 
   Pt felt fidgety 
 
 Current Outpatient Prescriptions: 
    acetaminophen (TYLENOL) 325 MG tablet, Take 650 mg by mouth every 6 (six) hours as nee
ded for Pain., Disp: , Rfl: 
    ascorbic acid (VITAMIN C) 1000 MG tablet, Take 1,000 mg by mouth daily., Disp: , Rfl: 
    Cholecalciferol 2000 units CAPS, Take 2,000 Units by mouth daily., Disp: , Rfl: 
    Cranberry 1000 MG CAPS, Take 1 capsule by mouth daily., Disp: , Rfl: 
    furosemide (LASIX) 40 MG tablet, Take 40 mg by mouth daily., Disp: , Rfl: 
    Gluc-Chonn-MSM-Boswellia-Vit D (GLUCOSAMINE CHOND TRIPLE/VIT D) TABS, Take 1 tablet by
 mouth daily., Disp: , Rfl: 
    levothyroxine (SYNTHROID) 100 MCG tablet, Take 100 mcg by mouth every morning before b
reakfast., Disp: , Rfl: 
    losartan (COZAAR) 100 MG tablet, Take 100 mg by mouth daily., Disp: , Rfl: 
    magnesium chloride (MAG64) 64 mg EC tablet, Take 1 tablet by mouth daily., Disp: , Rfl
: 
    metFORMIN (GLUMETZA) 500 MG (MOD) 24 hr tablet, Take 500 mg by mouth 2 (two) times annalise
ly with meals., Disp: , Rfl: 
    metoprolol (TOPROL-XL) 200 MG 24 hr tablet, Take 100 mg by mouth every morning., Disp:
 , Rfl: 
    potassium chloride SA (K-DUR,KLOR-CON) 20 MEQ tablet, Take 20 mEq by mouth daily., Dis
p: , Rfl: 
    simvastatin (ZOCOR) 20 MG tablet, Take 20 mg by mouth nightly., Disp: , Rfl: 
    warfarin (COUMADIN) 5 MG tablet, Take 5 mg by mouth daily. 5mg daily on Mond, Wed, Fri
d, Sat,  and 2.5mg daily on Tues, Thurs, Disp: , Rfl: 
 
     
 Objective: 
 Physical Exam
 /54 (BP Location: Left upper arm, Patient Position: Sitting)  | Pulse 95  | Ht 1.6 m 
(5' 3")  | Wt 66 kg (145 lb 6.4 oz)  | SpO2 97%  | BMI 25.76 kg/m 
 /48 right arm
 
 GENERAL:  Well developed, well nourished elderly  woman, in no distress.  Appears 
approximately stated age.
 HEENT:  Normocephalic, atraumatic.  
 EYES:     PERRL, sclerae anicteric, no xanthelsasmas
 MOUTH: Oral mucosae moist, dentition adequate, no lesions noted
 NECK:    No JVD, lymphadenopathy, thyromegaly, bruits.  Carotid pulses are 2+ bilaterally
 LUNGS:  Clear bilaterally, with no rales, rhonchi or wheezing noted, respirations unlabored
 HEART:  Nondisplaced PMI, regular rate, irregularly irregular rhythm, S1 varied in intensit
y, S2 normal.  100 2/6 systolic crescendo decrescendo murmur at the base, without radiation.
  1I-6 holosystolic murmur at the apex radiating to the lower left sternal border.  No rubs 
or gallops noted.
 ABDOMEN:  Soft, nontender, no organomegaly, masses or bruits.  Bowel sounds are normal in a
ll 4 quadrants.  The abdominal aortic pulsation is not palpable.
 
 EXTREMITIES:  No edema. Radial pulses 2+ bilaterally.  Femoral pulses are 2+ bilaterally wi
thout bruits.  DP and PT pulses are 1+ bilaterally.
 SKIN:  Warm and dry, capillary refill is normal, no lesions.
 NEUROLOGIC:  Awake, alert and oriented x 3. No focal motor deficits. 
 PSYCHIATRIC:  Appropriate, affect appears normal
 
 EKG: Atrial fibrillation, ventricular rate 68, poor R-wave progression V1-V4, cannot exclud
e an old anteroseptal infarct.  Nonspecific inferior ST-T abnormalities, possibly due to isc
hemia.
 
    
 Assessment and Plan: 
 Vonnie was seen today for consultation.
 
 Persistent atrial fibrillation (HCC)
 -     Electrocardiogram, 12-lead
 -     AMB ref to Interventional Cardiology
 
 Acute on chronic diastolic congestive heart failure (HCC)
 -     AMB ref to Interventional Cardiology
 
 Severe mitral regurgitation
 -     AMB ref to Interventional Cardiology
 
 Severe tricuspid regurgitation
 -     AMB ref to Interventional Cardiology
 
 Pulmonary hypertension, moderate to severe (HCC)
 -     AMB ref to Interventional Cardiology
 
  
 
 in this encounter
 
 Plan of Treatment
 
 
+--------+---------+------------+----------------------+-------------+
| Date   | Type    | Specialty  | Care Team            | Description |
+--------+---------+------------+----------------------+-------------+
| / | Office  | Cardiology |   Varun Mitchell,   |             |
| 2018   | Visit   |            | MD [address] |             |
|        |         |            |   [place],   |             |
|        |         |            | [place] [address]             |             |
|        |         |            | [phone]         |             |
|        |         |            | [phone] (Fax)   |             |
+--------+---------+------------+----------------------+-------------+
 
 
 
+----------------------------+--------+----------------------+---------------------+
| Name                       | Priori | Associated Diagnoses | Order Schedule      |
|                            | ty     |                      |                     |
+----------------------------+--------+----------------------+---------------------+
| AMB ref to Interventional  | Routin |   Acute on chronic   | Ordered: 2018 |
| Cardiology                 | e      | diastolic congestive |                     |
|                            |        |  heart failure (HCC) |                     |
|                            |        |   Persistent atrial  |                     |
|                            |        | fibrillation (HCC)   |                     |
|                            |        | Severe mitral        |                     |
 
|                            |        | regurgitation        |                     |
|                            |        | Severe tricuspid     |                     |
|                            |        | regurgitation        |                     |
|                            |        | Pulmonary            |                     |
|                            |        | hypertension,        |                     |
|                            |        | moderate to severe   |                     |
|                            |        | (HCC)                |                     |
+----------------------------+--------+----------------------+---------------------+
 as of this encounter
 
 Procedures
 
 
+----------------------+--------+-------------+----------------------+----------------------
+
| Procedure Name       | Priori | Date/Time   | Associated Diagnosis | Comments             
|
|                      | ty     |             |                      |                      
|
+----------------------+--------+-------------+----------------------+----------------------
+
| EKG STANDARD 12 LEAD | Routin | 2018  |   Persistent atrial  |   Results for this   
|
|                      | e      |  9:57 AM    | fibrillation (HCC)   | procedure are in the 
|
|                      |        | PDT         |                      |  results section.    
|
+----------------------+--------+-------------+----------------------+----------------------
+
 in this encounter
 
 Results
 EK STANDARD 12 LEAD (2018  9:57 AM)
 
+-------------------+--------------------------+-----------+--------------+
| Component         | Value                    | Ref Range | Performed At |
+-------------------+--------------------------+-----------+--------------+
| Ventricular Rate  | 68                       | BPM       | [facility]     |
+-------------------+--------------------------+-----------+--------------+
| Atrial Rate       | 357                      | BPM       | [facility]     |
+-------------------+--------------------------+-----------+--------------+
| QRS Duration      | 102                      | ms        | [facility]     |
+-------------------+--------------------------+-----------+--------------+
| Q-T Interval      | 396                      | ms        | [facility]     |
+-------------------+--------------------------+-----------+--------------+
| QTC Calculation   | 421                      | ms        | [facility]     |
| (Bezet)           |                          |           |              |
+-------------------+--------------------------+-----------+--------------+
| Calculated R Axis | 80                       | degrees   | [facility]     |
+-------------------+--------------------------+-----------+--------------+
| Calculated T Axis | 4                        | degrees   | [facility]     |
+-------------------+--------------------------+-----------+--------------+
| Diagnosis         | Atrial                   |           | [facility]     |
|                   | fibrillationAnterior     |           |              |
|                   | infarct , age            |           |              |
|                   | undeterminedAbnormal     |           |              |
|                   | ECGNo previous ECGs      |           |              |
|                   | availablePlease refer to |           |              |
|                   |  Providers office visit  |           |              |
|                   | note for Providers       |           |              |
 
|                   | Interpretation.Confirmed |           |              |
|                   |  by [facility],  |           |              |
|                   | [facility] ([id number]),      |           |              |
|                   |  [facility] [place]    |           |              |
|                   | (253) on 2018       |           |              |
|                   | 10:00:11 AM              |           |              |
+-------------------+--------------------------+-----------+--------------+
 
 
 
+--------------+-------------------+--------------------+--------------+
| Performing   | Address           | City/State/Zipcode | Phone Number |
| Organization |                   |                    |              |
+--------------+-------------------+--------------------+--------------+
|   [facility]   |   [address] | [place] [address] |              |
+--------------+-------------------+--------------------+--------------+
 in this encounter
 
 Visit Diagnoses
 
 
+-----------------------------------------------------------+
| Diagnosis                                                 |
+-----------------------------------------------------------+
| Persistent atrial fibrillation (HCC) - Primary            |
+-----------------------------------------------------------+
|   Atrial fibrillation                                     |
+-----------------------------------------------------------+
| Acute on chronic diastolic congestive heart failure (HCC) |
+-----------------------------------------------------------+
|   Acute on chronic diastolic heart failure                |
+-----------------------------------------------------------+
| Severe mitral regurgitation                               |
+-----------------------------------------------------------+
|   Mitral valve disorders                                  |
+-----------------------------------------------------------+
| Severe tricuspid regurgitation                            |
+-----------------------------------------------------------+
|   Diseases of tricuspid valve                             |
+-----------------------------------------------------------+
| Pulmonary hypertension, moderate to severe (HCC)          |
+-----------------------------------------------------------+
|   Other chronic pulmonary heart diseases                  |
+-----------------------------------------------------------+

## 2018-08-24 NOTE — XMS
Encounter Summary
  Created on: 2018
 
 Vonnie Celaya
 External Reference #: [id number]
 : 32
 Sex: Female
 
 Demographics
 
 
+-----------------------+----------------------+
| Address               | [address]      |
|                       | [place]  [address] |
+-----------------------+----------------------+
| Home Phone            | [phone]      |
+-----------------------+----------------------+
| Preferred Language    | Unknown              |
+-----------------------+----------------------+
| Marital Status        | Unknown              |
+-----------------------+----------------------+
| Congregational Affiliation | Unknown              |
+-----------------------+----------------------+
| Race                  | Unknown              |
+-----------------------+----------------------+
| Ethnic Group          | Unknown              |
+-----------------------+----------------------+
 
 
 Author
 
 
+--------------+------------------------------+
| Author       | [facility] |
+--------------+------------------------------+
| Organization | [facility] |
+--------------+------------------------------+
| Address      | Unknown                      |
+--------------+------------------------------+
| Phone        | Unavailable                  |
+--------------+------------------------------+
 
 
 
 Support
 
 
+--------------+--------------+---------+-----------------+
| Name         | Relationship | Address | Phone           |
+--------------+--------------+---------+-----------------+
| Lauryn Leroy | ECON         | Unknown | [phone] |
+--------------+--------------+---------+-----------------+
 
 
 
 Care Team Providers
 
 
 
+-----------------------+------+-------------+
| Care Team Member Name | Role | Phone       |
+-----------------------+------+-------------+
 PCP  | Unavailable |
+-----------------------+------+-------------+
 
 
 
 Reason for Visit
 
 
+--------+----------+
| Reason | Comments |
+--------+----------+
| Other  | [facility] appt |
+--------+----------+
 
 
 
 Encounter Details
 
 
+--------+-----------+----------------------+----------------------+------------------+
| Date   | Type      | Department           | Care Team            | Description      |
+--------+-----------+----------------------+----------------------+------------------+
| / | Telephone |   Cardiology at [facility]  |   Jame Wilkins  | Other ([facility] appt) |
|    |           |  [address]  |                  |
|        |           | Mailcode: [address]       | Ave  [place], [place]    |                  |
|        |           | [facility]    | [address]           |                  |
|        |           | and Healing, [address]     | [phone]         |                  |
|        |           | Floor  [place], [place]  | [phone] (Fax)   |                  |
|        |           | [address]           |                      |                  |
|        |           | [phone]         |                      |                  |
+--------+-----------+----------------------+----------------------+------------------+
 
 
 
 Social History
 
 
+----------------+-------+-----------+--------+------+
| Tobacco Use    | Types | Packs/Day | Years  | Date |
|                |       |           | Used   |      |
+----------------+-------+-----------+--------+------+
| Never Assessed |       |           |        |      |
+----------------+-------+-----------+--------+------+
 
 
 
+------------------+---------------+
| Sex Assigned at  | Date Recorded |
| Birth            |               |
+------------------+---------------+
| Not on file      |               |
+------------------+---------------+
 as of this encounter
 
 Plan of Treatment
 Not on fileas of this encounter
 
 
 Visit Diagnoses
 Not on filein this encounter

## 2018-08-24 NOTE — XMS
Encounter Summary
  Created on: 2018
 
 Vonnie Celaya
 External Reference #: [id number]
 : 32
 Sex: Female
 
 Demographics
 
 
+-----------------------+----------------------+
| Address               | [address]      |
|                       | [place]  [address] |
+-----------------------+----------------------+
| Home Phone            | [phone]      |
+-----------------------+----------------------+
| Preferred Language    | Unknown              |
+-----------------------+----------------------+
| Marital Status        | Unknown              |
+-----------------------+----------------------+
| Yazdanism Affiliation | Unknown              |
+-----------------------+----------------------+
| Race                  | Unknown              |
+-----------------------+----------------------+
| Ethnic Group          | Unknown              |
+-----------------------+----------------------+
 
 
 Author
 
 
+--------------+------------------------------+
| Author       | [facility] |
+--------------+------------------------------+
| Organization | [facility] |
+--------------+------------------------------+
| Address      | Unknown                      |
+--------------+------------------------------+
| Phone        | Unavailable                  |
+--------------+------------------------------+
 
 
 
 Support
 
 
+--------------+--------------+---------+-----------------+
| Name         | Relationship | Address | Phone           |
+--------------+--------------+---------+-----------------+
| Lauryn Leroy | ECON         | Unknown | [phone] |
+--------------+--------------+---------+-----------------+
 
 
 
 Care Team Providers
 
 
 
+-----------------------+------+-------------+
| Care Team Member Name | Role | Phone       |
+-----------------------+------+-------------+
 PCP  | Unavailable |
+-----------------------+------+-------------+
 
 
 
 Reason for Visit
 
 
+------------------+------------+
| Reason           | Comments   |
+------------------+------------+
| Medical Records  | [facility] review |
| Review           |            |
+------------------+------------+
 
 
 
 Encounter Details
 
 
+--------+----------+----------------------+--------------+---------------------+
| Date   | Type     | Department           | Care Team    | Description         |
+--------+----------+----------------------+--------------+---------------------+
| 08/15/ | Abstract |   Cardiology at [facility]  |   Unknown  . | Medical Records     |
|    |          |  [address]   |              | Review ([facility] review) |
|        |          | Mailcode: [address]       |              |                     |
|        |          | [facility]    |              |                     |
|        |          | and Healing     |              |                     |
|        |          | Floor  [place], [place]  |              |                     |
|        |          | [address]           |              |                     |
|        |          | [phone]         |              |                     |
+--------+----------+----------------------+--------------+---------------------+
 
 
 
 Social History
 
 
+----------------+-------+-----------+--------+------+
| Tobacco Use    | Types | Packs/Day | Years  | Date |
|                |       |           | Used   |      |
+----------------+-------+-----------+--------+------+
| Never Assessed |       |           |        |      |
+----------------+-------+-----------+--------+------+
 
 
 
+------------------+---------------+
| Sex Assigned at  | Date Recorded |
| Birth            |               |
+------------------+---------------+
| Not on file      |               |
+------------------+---------------+
 as of this encounter
 
 Progress Notes
 Krista Haywood - 08/15/2018 10:26 AM PDTFormatting of this note may be different from the 
 
original.
 Heart Valve Tier 1 Review 
 Patient Name: Vonnie Celaya
 MRN: [id number]
 Referring Provider: Varun Mitchell
 Designated Family/Support Person Name:
 Contact Number:
 
 Data Elements / Results Needed Prior Medical Review Required for Medical Review  Required f
or Clinic Received
 (Y / N) Records / Notes
 (Care Everywhere / Abstract Enc) Imaging 
 (IMPAX / Disc / Not Available)  Comments 
 ECHO/TTE
 (if performed within the last two years)  Report Report / Images yes Care Everywhere  IMPAX
 18
 [facility] 
 LABS    CBC, BMP
 (from last 6 months)  Results  Results  no    
 CT Scan    Chest / Abd / Pelvis 
 (if performed within last two years)  Report Report / Images  no    
 Stress Tests (if applicable)  Report  no    
 Provider Notes - Cardiology / Cardiac Surgeon 
 (Last two years)  Notes Notes  yes Abstract Encounter   
 Provider Note    PCP 
 (Last note)  Note Note no    
 PFTs
 (if performed within the last two years)  Report  Report  no    
 CATH 
 (if performed within the last two years)   Report / Images no    
 JENIFFER
 (if performed within the last two years)   no    
 Operative Notes
 (Most recent thoracic, cardiac, vascular surgeries, breast implants)   no    
 
 Additional Comments:
 
 Heart Valve Tier 2 Review 
 
 Patient Name: Vonnie Celaya
 MRN: [id number]
 Referring Provider: 
 Designated Family/Support Person Name:
 Contact Number:
 Refer to Tier 3 No
 If Yes, rationale: 
 
 Tests to Schedule  Yes / No   Comments 
 ECHO - TTE Y Pre HVC 
 ECHO - JENIFFER Y Post HVC 
 LABS    CBC, BMP Y Pre HVC 
 CT Scan    Chest / Abd / Pelvis  Y  Post HVC 
 Dobutamine Stress Echo    
 PFTs   
 CATH  Y Post HVC 
 6 Min Walk Test Y Pre-HVC 
 
 For Tier 2 Review, place orders immediately
 For Tier 3 Review, place orders at time of HVC Attending response
 
 
 This data is from outside sources, and has not been individually verified.
 
  
 Heart Valve Tier 3 Response:
 
 Since coming from [facility], will need to consolidate trips if possible
 
 Would like to see on a Tuesday, have baseline testing, 6 min walk, TTE that AM.  Gated CT o
f chest that afternoon. 
 Would have her stay the night for JENIFFER on Wednesday. 
 
 Would then need to come back for interventional HVC on a Tuesday afternoon but be pre-booke
d for angiogram Tuesday morning prior to Int HVC that afternoon
 Would have to come over Monday night for that appointment likely 
 
 May need surgical MVR based on imaging of severe mitral regurgitation and moderate to sever
e mitral annular calcification vs TMVR if less MAC or Valve in MAC if severe MAC.
 
 Please edouard patient to see if they are up for all that testing.
 
 Will place orders once confirmed. 
 
 Jame Wilkins MD
 
 [facility] [place]
 
 in this encounter
 
 Plan of Treatment
 Not on fileas of this encounter
 
 Visit Diagnoses
 Not on filein this encounter

## 2018-08-24 NOTE — XMS
Encounter Summary
  Created on: 2018
 
 Vonnie Celaya
 External Reference #: [id number]
 : 32
 Sex: Female
 
 Demographics
 
 
+-----------------------+----------------------+
| Address               | [address]      |
|                       | [place]  [address] |
+-----------------------+----------------------+
| Home Phone            | [phone]      |
+-----------------------+----------------------+
| Preferred Language    | Unknown              |
+-----------------------+----------------------+
| Marital Status        | Unknown              |
+-----------------------+----------------------+
| Tenriism Affiliation | Unknown              |
+-----------------------+----------------------+
| Race                  | Unknown              |
+-----------------------+----------------------+
| Ethnic Group          | Unknown              |
+-----------------------+----------------------+
 
 
 Author
 
 
+--------------+------------------------------+
| Author       | [facility] |
+--------------+------------------------------+
| Organization | [facility] |
+--------------+------------------------------+
| Address      | Unknown                      |
+--------------+------------------------------+
| Phone        | Unavailable                  |
+--------------+------------------------------+
 
 
 
 Support
 
 
+--------------+--------------+---------+-----------------+
| Name         | Relationship | Address | Phone           |
+--------------+--------------+---------+-----------------+
| Lauryn Leroy | ECON         | Unknown | [phone] |
+--------------+--------------+---------+-----------------+
 
 
 
 Care Team Providers
 
 
 
+-----------------------+------+-------------+
| Care Team Member Name | Role | Phone       |
+-----------------------+------+-------------+
 PCP  | Unavailable |
+-----------------------+------+-------------+
 
 
 
 Reason for Visit
 
 
+------------------+------------+
| Reason           | Comments   |
+------------------+------------+
| Medical Records  | [facility] review |
| Review           |            |
+------------------+------------+
 
 
 
 Encounter Details
 
 
+--------+----------+----------------------+--------------+---------------------+
| Date   | Type     | Department           | Care Team    | Description         |
+--------+----------+----------------------+--------------+---------------------+
| 08/15/ | Abstract |   Cardiology at [facility]  |   Unknown  . | Medical Records     |
|    |          |  [address]   |              | Review ([facility] review) |
|        |          | Mailcode: CH9A       |              |                     |
|        |          | [facility]    |              |                     |
|        |          | and Healing     |              |                     |
|        |          | Floor  [place], [place]  |              |                     |
|        |          | [address]           |              |                     |
|        |          | [phone]         |              |                     |
+--------+----------+----------------------+--------------+---------------------+
 
 
 
 Social History
 
 
+----------------+-------+-----------+--------+------+
| Tobacco Use    | Types | Packs/Day | Years  | Date |
|                |       |           | Used   |      |
+----------------+-------+-----------+--------+------+
| Never Assessed |       |           |        |      |
+----------------+-------+-----------+--------+------+
 
 
 
+------------------+---------------+
| Sex Assigned at  | Date Recorded |
| Birth            |               |
+------------------+---------------+
| Not on file      |               |
+------------------+---------------+
 as of this encounter
 
 Progress Notes
 Krista Haywood - 08/15/2018 10:26 AM PDTFormatting of this note may be different from the 
 
original.
 Heart Valve Tier 1 Review 
 Patient Name: Vonnie Celaya
 MRN: [id number]
 Referring Provider: Varun Mitchell
 Designated Family/Support Person Name:
 Contact Number:
 
 Data Elements / Results Needed Prior Medical Review Required for Medical Review  Required f
or Clinic Received
 (Y / N) Records / Notes
 (Care Everywhere / Abstract Enc) Imaging 
 (IMPAX / Disc / Not Available)  Comments 
 ECHO/TTE
 (if performed within the last two years)  Report Report / Images yes Care Everywhere  IMPAX
 18
 [facility] 
 LABS    CBC, BMP
 (from last 6 months)  Results  Results  no    
 CT Scan    Chest / Abd / Pelvis 
 (if performed within last two years)  Report Report / Images  no    
 Stress Tests (if applicable)  Report  no    
 Provider Notes - Cardiology / Cardiac Surgeon 
 (Last two years)  Notes Notes  yes Abstract Encounter   
 Provider Note    PCP 
 (Last note)  Note Note no    
 PFTs
 (if performed within the last two years)  Report  Report  no    
 CATH 
 (if performed within the last two years)   Report / Images no    
 JENIFFER
 (if performed within the last two years)   no    
 Operative Notes
 (Most recent thoracic, cardiac, vascular surgeries, breast implants)   no    
 
 Additional Comments:
 
 Heart Valve Tier 2 Review 
 
 Patient Name: Vonnie Celaya
 MRN: [id number]
 Referring Provider: 
 Designated Family/Support Person Name:
 Contact Number:
 Refer to Tier 3 No
 If Yes, rationale: 
 
 Tests to Schedule  Yes / No   Comments 
 ECHO - TTE Y Pre HVC 
 ECHO - JENIFFER Y Post HVC 
 LABS    CBC, BMP Y Pre HVC 
 CT Scan    Chest / Abd / Pelvis  Y  Post HVC 
 Dobutamine Stress Echo    
 PFTs   
 CATH  Y Post HVC 
 6 Min Walk Test Y Pre-HVC 
 
 For Tier 2 Review, place orders immediately
 For Tier 3 Review, place orders at time of HVC Attending response
 
 
 This data is from outside sources, and has not been individually verified.
 
  
 Heart Valve Tier 3 Response:
 
 Since coming from [facility], will need to consolidate trips if possible
 
 Would like to see on a Tuesday, have baseline testing, 6 min walk, TTE that AM.  Gated CT o
f chest that afternoon. 
 Would have her stay the night for JENIFFER on Wednesday. 
 
 Would then need to come back for interventional HVC on a Tuesday afternoon but be pre-booke
d for angiogram Tuesday morning prior to Int HVC that afternoon
 Would have to come over Monday night for that appointment likely 
 
 May need surgical MVR based on imaging of severe mitral regurgitation and moderate to sever
e mitral annular calcification vs TMVR if less MAC or Valve in MAC if severe MAC.
 
 Please edouard patient to see if they are up for all that testing.
 
 Will place orders once confirmed. 
 
 Jame Wilkins MD
 
 [facility] [place]
 
 in this encounter
 
 Plan of Treatment
 Not on fileas of this encounter
 
 Visit Diagnoses
 Not on filein this encounter

## 2018-08-25 NOTE — NUR
VITALS AND I&OS DONE AND CHARTED. FRESH WATER GIVEN. BEDSIDE TABLE AND CALL
LIGHT WITHIN REACH. SHE NEEDS NOTHING AT THIS TIME.

## 2018-08-25 NOTE — NUR
PT IN BED WITH FAMILY AT BEDSIDE. REPORTS NO N/V AND NO ABDOMINAL PAIN. CALL
LIGHT AT REACH/ DENIES FURTHER NEEDS.

## 2018-08-25 NOTE — NUR
pt had othrostatic vitals done, then pt walked to bathroom, voided, and walked
back to bed. pt is now restin in bed safely with call light in reach. pt did
not need anything else at the moment

## 2018-08-25 NOTE — NUR
HELPED PT TO THE BATHROOM AND BACK TO BED. BEDSIDE TABLE AND CALL LIGHT WITHIN
REACH. PT NEEDS NOTHING MORE AT THIS TIME.

## 2018-08-25 NOTE — NUR
pt resting in bed safely. pt family in room with her. pt has no needs at this
time. call light within reach.

## 2018-08-25 NOTE — NUR
VITALS AND I&OS DONE AND CHARTED. FRESH WATER GIVEN. BEDSIDE TABLE AND CALL
LIGHT WITHIN REACH. PT NEEDS NOTHING AT THIS TIME.

## 2018-08-25 NOTE — NUR
V/S AND MEDS WERE GIVEN BY JOHN RODRÍGUEZ. PT HAD A TEMP .5 AND SOME PAIN.
TYLENOL PRN WAS GIVEN. WILL CONTINUE TO MONITOR. PT STATED THAT OVERALL SHE IS
FEELING STRONGER. ALL LOBES ARE CLEAR, ABD SOUNDS ARE PRESENT. NO EDEMA NOTED.

## 2018-08-25 NOTE — NUR
PT ARRIVED ON FLOOR AT 1935. PT SINCE HAD NO NAUSEA, AND PAIN IN LOWER ABD IS
MINIMAL. PT AT AROUND 0130 DID DEVELOPE A TEMP OF 99.4. WILL CONTINUE TO
MONITOR. ABD SOUNDS ARE PRESENT. PT IS VOIDING WELL. NO EDEMA NOTED. PT STATED
THIS MORNING THAT SHE IS FEELING MUCH BETTER. AT THIS TIME I HAVE NO NEW
CONCERNS BESIDES HER TEMP.

## 2018-08-25 NOTE — NUR
PT IS AWAKE IN BED READING A BOOK. PT DENIES PAIN AND NAUSEA. ABD SOUNDS ARE
PRESENT, ALL LOBES ARE CLEAR, NO EDEMA NOTED. NO NEW CONCERNS AT THIS TIME.

## 2018-08-25 NOTE — EKG
[facility]
                                    [address]
                                  [place]  [address]
_________________________________________________________________________________________
                                                                 Signed   
 
 
Atrial fibrillation
Low voltage QRS
Septal infarct (cited on or before 06-MAR-2018)
Abnormal ECG
When compared with ECG of 06-APR-2018 18:32,
Vent. rate has increased BY  50 BPM
QRS axis shifted right
Nonspecific T wave abnormality no longer evident in Anterior leads
Confirmed by NICK WARD MD ([id number]) on 8/25/2018 4:56:56 PM
 
 
 
 
 
 
 
 
 
 
 
 
 
 
 
 
 
 
 
 
 
 
 
 
 
 
 
 
 
 
 
 
 
 
 
 
    Electronically Signed By: NICK WARD MD  08/25/18 1657
_________________________________________________________________________________________
PATIENT NAME:     ALVIN HARP                 
MEDICAL RECORD #: [id number]                     Electrocardiogram             
          ACCT #: [id number]  
DATE OF BIRTH:   [birth year]                                       
PHYSICIAN:   NICK WARD MD           REPORT #: [id number]
REPORT IS CONFIDENTIAL AND NOT TO BE RELEASED WITHOUT AUTHORIZATION

## 2018-08-26 NOTE — NUR
PT RELAXING IN BED WITH C/O PAIN AT THIS TIME. COREY AT BEDSIDE VISITING
WITH PT. PT HAS NO QUESTIONS OR CONCERNS AT THIS TIME. CALL LIGHT WITHIN
REACH. WILL CONTINUE TO MONITOR.

## 2018-08-26 NOTE — NUR
VITALS AND I&OS DONE AND CHARTED. FRESH WATER GIVEN. GARBAGES EMPTIED. BEDSIDE
TABLE AND CALL LIGHT WITHIN REACH.

## 2018-08-26 NOTE — NUR
VITALS AND I&OS DONE AND CHARTED, NOTIFIED RN OF TEMP. FRESH WATER GIVEN.
BEDSIDE TABLE AND CALL LIGHT WITHIN REACH.

## 2018-08-26 NOTE — NUR
pt resting in bed. pt has no needs at this time. pt refused shower said
possibly after lunch. call light within reach.

## 2018-08-26 NOTE — NUR
PT VS AND CONDITION STABLE TODAY ON 8/26/18 DAY SHIFT. PT PLEASANT, CALM AND
COOPERATIVE WITH ONLY ONE C/O PAIN TODAY (HEADACHE) WHICH WENT AWAY AFTER
GETTING PRN TYLENOL. LUNG SOUNDS GREATLY IMPROVED AFTER GETTING X1 DOSE OF PIV
LASIX THIS MORNING. PT STILL HAS NO APPETITE AND TAKING IN LITTLE CALORIES.
GLUCERNAS ADDED TO PT'S DIET WHICH PT HAS DONE WELL WITH DRINKING TODAY.
SALINE LOCKED. ROOM AIR. PT IN BED READING HER BOOK AT THIS TIME WITH NO C/O
OF PAIN, NO REQUESTIONS OR CONCERNS. CALL LIGHT WITHIN REACH, PT CALLS
APPROPRIATELY. WILL CONTINUE TO MONITOR.

## 2018-08-26 NOTE — NUR
PT HAD A TEMP OF 99.4. WILL CONTINUE TO MONITOR. OTHERWISE ALL V/S WERE WDL.
ALL LOBES ARE CLEARN NO CRACKLES NOTED.
PT DENIES PAIN AND NAUSEA. ABD SOUNDS ARE PRESENT, NO
EDEMA NOTED
PT STATED THAT SHE FEELS MUCH BETTER TODAY. PT DID NOT WANT A GLUCERNA AT THIS
TIME.

## 2018-08-26 NOTE — NUR
DR. WARD IN ROOM WITH PT AT THIS TIME; I REASSESSED PT'S HEADACHE PAIN AFTER
GETTING TYLENOL AND SHE STATED THAT HER HEAD IS BETTER AND HAS NO PAIN. CALL
LIGHT WITHIN REACH. WILL CONTINUE TO MONITOR.

## 2018-08-26 NOTE — NUR
PT ASSISTED WITH SHOWER. HAIR WASHED. CLEAN GOWN AND SOCKS GIVEN. LINEN
CHANGED. PT RESTING IN BED AND VISITING WITH FAMILY.

## 2018-08-26 NOTE — NUR
PT AT BEGINNING OF SHIFT HAD A HEADACHE AND A TEMP .5. PRN TYLENOL WAS
GIVEN. BOTH HAVE RESOLVED SINCE. PT OVERALL HAD NO ISSUES TO SPEAK OF
OTHERWISE THIS SHIFT. ALL LOBES ARE CLEAR, ABD SOUNDS ARE PRESENT. NO EDEMA
NOTED. PT HOWEVER WOULD BENEFIT FROM PHYSICAL THERAPY DUE TO ACTIVITY
INTOLERANCE SHE STATED STARTED AFTER HER DX OF CHF. THIS ALSO MAY BE HER NEW
BASELINE...
IV SITE HAD TO BE CHANGED DUE TO LEAKING. NO OTHER CONCERNS AT THIS TIME.

## 2018-08-26 NOTE — NUR
PT EDUCATED ON LASIX AND KCHLOR TABLETS DURING ADMINISTRATION AT THIS TIME. PT
STATES HER UNDERSTANDING OF THESE MEDICATIONS AND HAS NO FURTHER QUESTIONS.
CALL LIGHT WITHIN REACH. WILL CONTINUE TO MONITOR AND ENCOURAGE INCREASED
CALORIC INTAKE. GLUCERNA ADDED TO PT'S DIET ORDER.

## 2018-08-26 NOTE — NUR
PT RESTING IN BED AT THIS TIME. PT C/O HEADACHE OF 3/1O WITH A GOAL OF O TO 2.
PRN TYLENOL GIVEN. NO OTHER REQUESTS AT THIS TIME. CALL LIGHT AND BELONGINGS
WITHIN REACH. WILL CONTINUE TO MONITOR.

## 2018-08-26 NOTE — NUR
PT RESTING IN BED READING HER BOOK AT THIS TIME. PT HAS NO C/O PAIN. SALINE
LOCKED. BM THIS MORNING. A/0 X4 WITH NO QUESTIONS OR CONCERNS. PT REQUESTING
TO TAKE HER METFORMIN AFTER HAVING SOME BITES OF BREAKFAST SO I WILL RETURN IN
A WHILE WITH THAT MEDICATION. ASSESSMENT COMPLETED AT THIS TIME. CALL LIGHT
AND BELONGINGS WITHIN REACH. WILL CONTINUE TO MONITOR.

## 2018-08-27 NOTE — NUR
ASSESSMENT DUE. PATIENT IN BED. TALKED ABOUT AMBULATING PATIENT AGREED TO WALK
BEFORE LUNCH. PATIENT DENIES PAIN. ASSESSMENT DONE. LUNCH DELIVERED. PATIENT
AMBULATED SANDRA MADRID, WITH ANNE CAO. NO FURTHER REQUESTS AT THIS TIME.

## 2018-08-27 NOTE — NUR
PT CALL LIGHT ON. PT ASSISTED BACK TO BED AFTER VOIDING. PT FINISHED WITH
LUNCH. MODERATE INTAKE. PT STATES "i'M JUST GOING TO SIT HERE AND VALLADARES AND
PUFF FOR A WHILE." PT ENCOURAGED TO REST BEFORE PHYSICAL THERAPY BUT REMINDED
TO WALK ANOTHER 3 TIMES TODAY (1 LAP AROUND UNIT DONE BEFORE LUNCH). PT
VERBALIZES UNDERSTANDING. CALL LIGHT WITHIN REACH. BED RAILS UP.

## 2018-08-27 NOTE — NUR
HELPED PT TO THE RESTROOM AND BACK TO BED, SHE DENIES PAIN AND DIZZINES. CALL
LIGHT IS WITHIN REACH.

## 2018-08-27 NOTE — NUR
PT IS AWAKE IN BED READING HER BOOK. ALL LOBES ARE CLEAR. PT HAS MINIMAL PAIN
IN LOWER ABD. NO NEW CONCERN AT THIS TIME. WILL CONTINUE TO MONITOR HER
ELEVATED TEMP.

## 2018-08-27 NOTE — NUR
ASSESSMENT AND MEDICATIONS DUE. PATIENT EATING DINNER. PATIENT DENIED PAIN.
ASSESSMENT DONE. MEDICATIONS GIVEN (SEE MAR). PATIENT VERBALIZED PLAN TO WALK
HALLWAY AFTER DINNER AND A SHOWER. THREE LAPS DONE ALREADY TODAY. IS USE
ENCOURAGED. PATIENT TALKING WITH CASE MANAGEMENT. CALL LIGHT WITHIN REACH.

## 2018-08-27 NOTE — NUR
ASSESSMENT AND MEDICATIONS DUE. PATIENT IN BED. ASSISTED PATIENT UP TO
BATHROOM. ASSESSMENT DONE. PATIENT REPORTS PAIN IN LOWER ABDOMEN "ONCE IN A
WHILE". PATIENT REPORTS FEELING "85% BETTER" SINCE ADMISSION. PATIENT EATING
BREAKFAST. NO FURTHER REQUESTS AT THIS TIME. CALL LIGHT WITHIN REACH.

## 2018-08-27 NOTE — NUR
IN ROOM TO ASSESS PT AND ADMINISTER MEDICATIONS. VS AND I AND O COMPLETE. PT
DENIES PAIN, AND DIZZINES. SHE STATES SHE JUST FEELS TIRE AFTER GETTING UP TO
THE RESTROOM. VS ARE WNL. SHE HAS FRESH WATER AT BEDSIDE AND SHE DENIES
FURTHER NEEDS. CALL LIGHT IS WITHIN REACH.

## 2018-08-27 NOTE — NUR
MEDICATIONS DUE. THIS RN TO BEDSIDE. PT RESTING IN BED. DENIES PAIN AND
NAUSEA. MEDICATIONS GIVEN AS ORDERED. PT READING NEWSPAPER. NO REQUESTS OR
COMPALINTS. BED RAILSUP. CALL LIGHT WITHIN REACH.

## 2018-08-27 NOTE — NUR
PT AT START OF SHIFT HAD A TEMP OF 99.4. AT MIDNIGHT TEMP WAS 99.3. WILL
CONTINUE TO MONITOR. PT DENIES PAIN SO FAR THAT NEEDS INTERVENTION. ALL LOBES
HAVE BEEN CLEAR THIS SHIFT AND PT IS VOIDING WELL. NO EDEMA NOTED ABD SOUNDS
ARE PRESENT. NO OTHER CONCERNS NOTED SO FAR THIS SHIFT.

## 2018-08-27 NOTE — NUR
PATIENT UP TO BATHROOM AND BACK TO BED WITH ONE PERSON ASSIST. PATIENT STATES
THAT SHE ALREADY WASHED HER HANDS AND FACE ALONG WIHT ORAL CARE EARLIER THIS
AM. PATIENT REFUSED SHOWER AT THIS TIME. FRESH WATER GIVEN. CALL BUTTON IN
REACH. NO OTHER NEEDS AT THIS TIME.

## 2018-08-27 NOTE — NUR
VITALS AND I&OS DONE AND CHARTED. FRESH WATER GIVEN. BEDSIDE TABLE AND CALL
LIGHT IN REACH. HELPED PT TO THE BATHROOM AND BACK TO BED.

## 2018-08-27 NOTE — NUR
PATIENT AMBULATES SBA WITH FWW/CANE. PHYSICAL THERAPY WORKED WITH PATIENT.
PATIENT AMBULATED X3. PATIENT CONSUMED 60% OF MEALS ON THIS SHIFT. PATIENT
USES CALL LIGHT APPROPRIATELY. EXPECTED DISCHARGE TOMORROW. SHOWER TODAY. IV
ANTIBIOTICS.

## 2018-08-28 NOTE — NUR
ASSESMENT AND MEDICATIONS DUE. PATIENT IN CHAIR SEWING. PATIENT DENIES PAIN.
PATIENT REPORTS "GREEN RUNNY STOOL". PATIENT EDUCATED TO KEEP RN INFORMED IF
DIARRHEA CONTINUES OR GETS WORSE. PATIENT AMBULATED THIS AM. PATIENT REPORTS
FEELING STRONG ENOUGH TO AMBULATE ALONE. ASSESSMENT DONE. MEDICATIONS GIVEN.
PATIENT RETURNED TO BED AND RESTING. NO FURTHER REQUESTS AT THIS TIME. CALL
LIGHT WITHIN REACH. PATIENT ANTICIPATING DISCHARGE THIS AFTERNOON. STATES
DAUGHTER WILL PROVIDE TRANSPORTATION AND DAUGHTER GETS OFF WORK AROUND 1400.

## 2018-08-28 NOTE — NUR
PT REQUESTED TO AMBULATE IN HALLS. THIS RN STOOD BY AS PATIENT AMBULATED WITH
FWW 1 LAP OF MED SURG UNIT. UP IN RECLINER NOW WITH BELONGINGS IN REACH. CALL
LIGHT IN REACH. NO OTHER NEEDS AT THIS TIME.

## 2018-08-28 NOTE — NUR
PATIENT READY FOR DISCHARGE.
PATIENT DISCHARGE INSTRUCTIONS GIVEN. PATIENT VERBALIZES
UNDERSTANDING OF INSTRUCTIONS. PATIENT STATES ALL OF HER QUESTIONS HAVE BEEN
ANSWERED AND SHE HAS NO FURTHER QUESTIONS. DAUGHTER ACCOMPANIED PATIENT.
PATIENT WHEELED FROM UNIT BY ILYA. DAUGHTER STATES HER QUESTIONS HAVE BEEN
ANSWERED.

## 2018-08-28 NOTE — NUR
CHECKED IN ON PATIENT. PATIENT REPORTED THAT PHARMACY HAD SPOKEN WITH HER
ABOUT DISCHARGE AND PATIENT STATED SHE HAD NO UNANSWERED QUESTIONS. SBA WHILE
PATIENT AMBULATED TO TOILET. PATIENT SETTLED IN CHAIR. CALL LIGHT WITHIN
REACH. DAUGHTER EXPECTED TO ARRIVE AROUND 1445 FOR DISCHARGE.

## 2018-08-28 NOTE — NUR
PATIENT WAS READING A BOOK RIGHT BEFORE LUNCH WAS DELIVERED.  I CHECKED ON HER
TO SEE HOW SHE WAS EATING.  SHE SAID HER APPETITE WASN'T GOOD FOR SEVERAL
DAYS.  SHE ALWAYS FELT NAUSEOUS.  SHE IS FEELING BETTER YESTERDAY AND TODAY.
NOT WANTING TO EAT A LOT AT ONCE.  SHE DID DRINK A FEW ENSURES SINCE SHE HAS
BEEN HERE BECAUSE SHE DIDN'T WANT TO EAT SOLID FOOD. CARDIAC DIET IN PLACE.
PLANS TO GO HOME THIS AFTERNOON.  NO NUTRITION INTERVENTION NEEDED AT THIS
TIME.

## 2018-08-28 NOTE — NUR
ASSESSMENT AND MEDICATIONS DUE. PATIENT IN BED EATING LUNCH. PATIENT AMBULATED
TO TOILET, SBA. PATIENT REPORTED IMPROVEMENT IN APPETITE TODAY. PATIENT IS
WILLING TO GET UP AND AMBULATE AGAIN. ASSESSMENT DONE. MEDICATIONS GIVEN.
DISCHARGE INSTRUCTIONS GIVEN. PATIENT VERBALIZES UNDERSTANDING OF DISCHARGE
INSTRUCTIONS AND STATES ALL QUESTIONS HAVE BEEN ANSWERED.

## 2019-01-23 ENCOUNTER — HOSPITAL ENCOUNTER (INPATIENT)
Dept: HOSPITAL 46 - ED | Age: 84
LOS: 3 days | Discharge: HOME | DRG: 291 | End: 2019-01-26
Attending: INTERNAL MEDICINE | Admitting: INTERNAL MEDICINE
Payer: MEDICARE

## 2019-01-23 VITALS — BODY MASS INDEX: 24.52 KG/M2 | WEIGHT: 138.41 LBS | HEIGHT: 63 IN

## 2019-01-23 DIAGNOSIS — E03.9: ICD-10-CM

## 2019-01-23 DIAGNOSIS — B96.20: ICD-10-CM

## 2019-01-23 DIAGNOSIS — I10: ICD-10-CM

## 2019-01-23 DIAGNOSIS — N39.0: ICD-10-CM

## 2019-01-23 DIAGNOSIS — I05.2: ICD-10-CM

## 2019-01-23 DIAGNOSIS — E11.9: ICD-10-CM

## 2019-01-23 DIAGNOSIS — E78.5: ICD-10-CM

## 2019-01-23 DIAGNOSIS — Z79.01: ICD-10-CM

## 2019-01-23 DIAGNOSIS — Z79.899: ICD-10-CM

## 2019-01-23 DIAGNOSIS — D72.829: ICD-10-CM

## 2019-01-23 DIAGNOSIS — I48.2: ICD-10-CM

## 2019-01-23 DIAGNOSIS — Z88.5: ICD-10-CM

## 2019-01-23 DIAGNOSIS — Z98.61: ICD-10-CM

## 2019-01-23 DIAGNOSIS — Z79.84: ICD-10-CM

## 2019-01-23 DIAGNOSIS — Z79.02: ICD-10-CM

## 2019-01-23 DIAGNOSIS — J96.01: ICD-10-CM

## 2019-01-23 DIAGNOSIS — J22: ICD-10-CM

## 2019-01-23 DIAGNOSIS — Z79.82: ICD-10-CM

## 2019-01-23 DIAGNOSIS — I25.10: ICD-10-CM

## 2019-01-23 DIAGNOSIS — I50.33: Primary | ICD-10-CM

## 2019-01-23 NOTE — NUR
PT HAS ARRIVED TO THE UNIT VIA STRETCHER AT 1805. PT ARRIVES WITH DAUGHTER AND
GLASSES, PARTIAL UPPERS AND LOWERS AND GOLD WATCH ON HER WRIST. PT STATES
SHE'S IN NO PAIN, NO N/V, JUST SOB AND TACHYPNEA. ARRIVES ON 2 L O2/MIN VIA
NC. A/O X4. ARRIVES WITH AZITHRO RUNNING INTO RIGHT AC PIV. AFIB ON TELE.

## 2019-01-23 NOTE — NUR
REPORT GIVEN TO ME BY ANNE COOPER. PATIENT AWAKE QUILTING AND WATCHING GAME
SHOWS. PATIENT IS IN NO DISTRESS. INTRODUCED MY SELF. PATIENT IN NEED OF
NOTHING AT THIS TIME. WILL PERFORM FULL ASSESSMENT AT 12AM. VS STABLE.

## 2019-01-23 NOTE — EKG
[facility]
                                    [address] [place]  [address]
_________________________________________________________________________________________
                                                                 Signed   
 
 
Atrial fibrillation with premature ventricular or aberrantly conducted complexes
Low voltage QRS
Abnormal ECG
When compared with ECG of 24-AUG-2018 16:34,
No significant change was found
Confirmed by NICK WARD MD ([id number]) on 1/23/2019 11:00:55 PM
 
 
 
 
 
 
 
 
 
 
 
 
 
 
 
 
 
 
 
 
 
 
 
 
 
 
 
 
 
 
 
 
 
 
 
 
 
 
 
    Electronically Signed By: NICK WARD MD  01/23/19 2301
_________________________________________________________________________________________
PATIENT NAME:     KATIUSKAALVIN INGRAM                 
MEDICAL RECORD #: [id number]                     Electrocardiogram             
          ACCT #: [id number]  
DATE OF BIRTH:   [birth year]                                       
PHYSICIAN:   NICK WARD MD           REPORT #: [id number]
REPORT IS CONFIDENTIAL AND NOT TO BE RELEASED WITHOUT AUTHORIZATION

## 2019-01-23 NOTE — NUR
UP TO BSC TO VOID, EASIER FOR PT TO USE COMMODE THAN BEDPAN, BETH FAIR, HR DID
GO UP  TRANSIENTLY. BACK TO BED HR .

## 2019-01-23 NOTE — NUR
ON BEDPAN TO VOID. SOMEWHAT SOB WITH MOVING IN BED, MAINTAINED SATS. RECOVERED
QUICKLY AND ABLE TO SPEAK IN LONG SENTENCES. SON AT BEDSIDE.

## 2019-01-24 NOTE — NUR
PATIENT STILL READING AND JUST ASK FOR HER THYROID WHICH WAS GIVEN. PATIENT IN
NO DISTRESS AND CONTINUES TO READ HER BOOK. REALLY HAS NOT SLEPT TONIGHT.

## 2019-01-24 NOTE — NUR
PT UP TO BEDSIDE COMMODE TO VOID, ONE PERSON STAND BY ASSIST. PT IS ALERT AND
ORIENTED X4, COOPERATIVE. PT REMAINS ON ROOM AIR, EVEN WITH ACTIVITY O2 SATS
REMAIN ABOVE 90%.

## 2019-01-24 NOTE — NUR
pt up to bedside commode with standby assist. O2 SATS REMAIN ABOVE 90% WITH
ACTIVITY. PT DENIES PAIN, NAUSEA AND SOB. PT HAS OCCASIONAL PRODUCTIVE COUGH.
PT ALERT AND ORIENTED X4, COOPERATIVE. IV SITE INTACT, NO REDNESS OR SWELLING
NOTED, PT DENIES PAIN AT SITE WITH FLUSH. HR REMAINS 60'S-70'S.

## 2019-01-24 NOTE — NUR
pt sitting up in bed eating dinner and visiting with her daughter. pt denies
nausea, sob, and pain. pt alert and oiented x4. iv site intact, no redness or
swelling noted, flushes easily. pt vitals wnl at this time.

## 2019-01-24 NOTE — NUR
PT TRANSPORTED TO ROOM 109 FROM ROOM 130. ALL PERSONAL BELONGINGS WENT WITH
PT. PT ALERT AND ORIENTED X4. PT DAUGHTER IS AT THE BEDSIDE DURING MOVE.

## 2019-01-24 NOTE — NUR
AWAKE, VISITING WITH FAMILY, NO C/O PAIN OR SOB, TELE IN PLACE, FLUIDS AND
CALL LIGHT AT BEDSIDE, NO REQUESTS

## 2019-01-25 NOTE — NUR
IN ROOM TO ADMIN EVENING MEDS, SHIFT ASSESSMENT COMPLETE. PT TOLERATED PO MEDS
WELL, PT AOX4, LS CLEAR UPPER LOBES, BILATERAL LOWER LOBES HAVE CRACKLES, SOME
SLIGHT EXP. WHEEZES IN RIGHT MIDDLE LOBES. PT DENIES ANY SOB/CP, ON RA, PT
DENIES ANY NAUSEA, DENIES PAIN. CMS INTACT, NO REQUESTS AT THIS TIME, PT
AGREEABLE TO POC, CALL LIGHT WITHIN REACH, FALL PRECAUTIONS IN PLACE.

## 2019-01-25 NOTE — NUR
PATIENT IN BED TAKING DINNER. VITAL SIGNS AND I&O DONE. CALL LIGHT WITHIN
REACH. NO OTHER NEEDS AT THIS TIME

## 2019-01-25 NOTE — NUR
PATIENT UP TO THE BED SIDE COMMODE WITH 1PSBA, VS TAKEN, AND STANDING SCALE
WEIGHT TAKEN. PATIENT HAS HAD SOME BRADYCARDIA INTO THE 40'S BUT HAS NOT BEEN
SYMPTOMATIC.

## 2019-01-25 NOTE — NUR
PATIENT LAYING IN BED READING HER BOOK, HAS NOT BEEN SYMPTOMATIC FROM THE
BRADYCARDIA IN THE 40'S SHE HAS HAD.

## 2019-01-25 NOTE — NUR
PATIENT HAS RESTED WITH EYES CLOSED AND REGULAR EVEN RESPIRATIONS ABOUT HALF
THE SHIFT. THE REST OF THE TIME SHE HAS READ HER BOOK IN BED, AND WAS VISITING
WITH FAMILY WHEN I FIRST ARRIVED. PATIENT SANTO HAD NO C/O PAIN. PATIENT HAS
BEEN IN A FIB ON THE MONITOR AND HAS HAD SOME PAUSES AND HER HEART RATE HAS
DIPPED INTO THE 40'S BUT HAS NOT BEEN SYMPTOMATIC. PATIENT DOES WELL WITH
1PSBA TO THE BEDSIDE COMMODE. PATIENT REMAINS ON TELE AND IS CURRENTLY RESTING
EYES CLOSED, RESPIRATIONS REGULAR AND EVEN, HEART RATE CURRENTLY IN THE 50'S.

## 2019-01-25 NOTE — NUR
PATIENT IN BED. PATIENT WALKS TO BATHROOM TO TAKE A SHOWER. ONE PERSON
ASSISTING. PATIENT TAKES A SHOWER. PATIENT BACKS TO BED. VITAL SIGNS AND I&O
DONE. CALL LIGHT WITHIN REACH. NO OTHER NEEDS AT THIS TIME

## 2019-01-25 NOTE — NUR
PT RESTING IN BED, EYES CLOSED, BREATHS EVEN, UNLABORED, NO REQUESTS AT THIS
TIME, CALL LIGHT WITHIN REACH, FALL PRECAUTIONS IN PLACE. ON TELE #9, HR 60'S.

## 2019-01-25 NOTE — NUR
PT SINCE LASIX ADMIN HAS BEEN DIURIESING WELL. V/S ARE WDL, PT WILL WORK WITH
PT AT 1300 AND ALSO GET A SHOWER BEFOREHAND. NO NEW CONCERNS NOTED SO FAR.

## 2019-01-25 NOTE — NUR
PT DID WELL WITH PHYSICAL THERAPY AND WAS ABLE TO MAINTAIN HER O2 SATS >93% ON
RA. PT IS RESTING AT THIS TIME.

## 2019-01-25 NOTE — NUR
RESPORATORY RATE IS WDL IF IN BED. WHEN PT IS UP HOWEVER, RR IS >20. PT STATED
THOUGH THAT SHE FEELS BETTER WHILE WALKING THAN YESTERDAY. PT HOWEVER STILL
HAS SOB WITH AMBULATING JUST SHORT DISTANCES FROM BED TO BATHROOM.
ALL LOBES WERE CLEAR FOR THE MOST PART WITH MORNING
ASSESSMENTBUT PT HAD
SOME OCCASIONAL WHEEZES PRESENT.HR AT THIS TIME 50'S-LOWER 60'S.
NO PERIPHERAL EDEMA NOTED.

## 2019-01-25 NOTE — NUR
PATIENT STATED SHE NEEDED TO USE THE RESTROOM, I ASSISTED. PATIENT WOULD LIKE
TO LAY BACK DOWN IN BED.

## 2019-01-25 NOTE — NUR
up to bsc, voided dark yellow urine, back to bed, no c/o pain, slight sob
noted with exertion. Continues on fluid restriction. call lit at bedside

## 2019-01-25 NOTE — NUR
CALLED  TO LET HIM KNOW ABOUT THE ASYMPTOMATIC BRADICARDIA EPISODES
INTO THE 40'S THE PATIENT HAD WHILE SHE WAS SLEEPING.  VERBALIZED
UNDERSTANDING AND NO NEW ORDERS WERE GIVEN.

## 2019-01-25 NOTE — NUR
PATIENT CONTINUES RESTING ON HER RIGHT SIDE, EYES CLOSED, RESPIRATIONS EVEN
AND REGULAR. HR 58 AND IN A-FIB ON THE TELE.

## 2019-01-25 NOTE — NUR
PT OVERALL PROGRESSED WELL AS THE DAY WENT ON. PT WAS ABLE TO AMBULATE AND
MAINTAIN HER O2 SATS>90% ON RA. LOBES IN THE MORING HAD SOME OCCASIONAL
WHEEZING PRESENT. THIS AFTERNOON, LLL WAS COARSE AND OTHER LOBES WERE CLEAR.
NO PERIHERAL EDEMA NOTED. URINE OUTPUT IS ADEQUATE. NO NEW ISSUES NOTED THIS
SHIFT..

## 2019-01-25 NOTE — NUR
REPORT RECEIVED, PT RESTING IN BED, FAMILY AT BEDSIDE, PT ON RA, NO C/O SOB/CP
AT THIS TIME, PT AOX4, APPROPRIATE, PT GIVEN 200 MLS OF WATER PER FLUID
RESTRICTION, NO FURTHER REQUESTS AT THIS TIME, CALL  LIGHT WIHTIN REACH, FALL
PRECAUITONS IN PLACE.

## 2019-01-25 NOTE — NUR
ONLY COARSE LOBE IS HER LLL, ALL OTHER LOBES ARE CLEAR AND PT HAS NO SOB AT
THIS TIME. OVERALL THERE ARE NO NEW CONCERNS NOTED SO FAR THIS PT.

## 2019-01-25 NOTE — NUR
PATIENT IN BED. RN IN ROOM. PATIENT WALKS TO USE BATHROOM. TWO PERSON
ASSISTING. PATIENT BACKS TO BED. ONE PERSON ASSISTING. VITAL SIGNS AND I&O
DONE. CALL LIGHT WITHIN REACH. NO OTHER NEEDS AT THIS TIME

## 2019-01-25 NOTE — NUR
PATIENT CALLS TO USE BATHROOM. PATIENT IN BED. PATIENT WALKS TO USE BATHROOM.
ONE PERSON ASSISTING. PATIENT BACKS TO BED. PATIENT REFUSED TO TAKE A SHOWER
NOW BECAUSE SHE FEELS WITHOUT ENERGY. MAYBE SHE IS GOING A TAKE A  IN THE
AFTERNOON.

## 2019-01-26 NOTE — NUR
AOX4. CLAM AND PLEASTNET DEMEANIOR.  FACE SYMMETERICAL GLASSES ON. CRACKELS IN
THE BASES BI-LATERALLY, ENCOURAGED DEEP BREATHING AND MOVEMENT. TELLY #9. ABD
WNL. NORMOACTIVE BOWLE TONES. FLUID RESTIRCTION.

## 2019-01-26 NOTE — NUR
pT IN BED READING BOOK.  REPORTED pt WALK WENT WELL, AND SHE FEELS GOOD, NO
C/O sob OR PAIN OR NAUSEA.  PT HAS 150ML FREE WATER LEFT IN CUP AT THIS TIME.
CALL LIGHT IN REACH

## 2019-01-26 NOTE — NUR
PT AOX4 THIS SHIFT, APPROPRIATE, PT 1 PERSON SBA/FWW, TOLERATING AMBULATING
WELL, ACTIVITY INTOLERANCE HAS DECREASED PER PT. ON RA, NO C/O SOB/CP, NO C/O
PAIN. PT ON 1600 ML FLUID RESTRICTION, TOLERATING WELL, IV SL, SLEPT MOST OF
THE NIGHT, VSS.

## 2019-01-26 NOTE — NUR
PT HELPED TO BATHROOM WITH 1PERSON SBA/FWW, TOLERATED WELL, NO C/O LIGHT
HEADEDNESS OR DIZZINESS, PT ON RA, PT VOIDED AND HAD BM, PT BACK TO BED, LS
REMAIN TO HAVE CRACKLES IN BILATERAL LOWER LOBES, ASSSESSMENT COMPLETE, PT
ENCOURAGED TO USE IS AS WELL AS DEEP BREATH AND COUGH. PT AGREEABLE. NO
FURTHER REQUESTS AT THIS TIME. CALL LIGHT WITHIN REACH.

## 2019-01-26 NOTE — NUR
PT RESTING IN BED, BREATHS EVEN, UNLABORED, ON RA, NO REQUESTS AT THIS TIME,
CALL LIGHT WITHIN REACH. FALL PRECAUTIONS IN PLACE.

## 2019-01-26 NOTE — NUR
PATIENT RESTING IN BED. VITAL SIGNS AND I&O DONE. CALL LIGHT WITHIN REACH. NO
OTHER NEEDS AT THIS TIME

## 2019-01-26 NOTE — NUR
MORNING MEDS ADMINISTERED, PT UP TO BATHROOM, VOIDED 100 MLS OF CLEAR YELLOW
URINE, PT PERFORMED ORAL HYGIENE, AND MORNING CARE, PT BACK TO BED, DENIES ANY
SOB/CP, DENIES DIZZINESS OR LIGHT HEADEDNESS, PT AOX4, DENIES ANY NEEDS AT
THIS TIME, CALL LIGHT WITHIN REACH, FALL  PRECAUTIONS IN PLACE. LAB IN ROOM
NOW.

## 2019-01-26 NOTE — NUR
PT EATIGN LUNCH. TALKING WTIH pHARM ABOUT d/c MED CHANGES. SLIGHT COUGH,
EDUCATED ON DEEP BREATHING AND FORECEFUL COUGH WILL HELP TO CLEAR.  PT IN GOOD
SPIRITS AND WAITING FOR DAUGHTER TO ARRIVE SO SHE CAN GO HOME.
 
COUMADIN ADMINISTERED ONCE. EDUCATED ON NOT TAKING 1600 DOSE AT HOME.

## 2019-01-26 NOTE — NUR
IN TO ANSWER CALL LIGHT. PT STANDING AT BATHROOM DOOR AND AMBULATES WITH
STEADY GAIT TO BED. CALL LIGHT AND H20 IN REACH AND PT DENIES FURTHER NEEDS
AND APPEARS TO BE IN NO ACUTE DISTRESS.

## 2020-01-01 NOTE — NUR
ASSESSMENT COMPLETED. SCHEDULED MEDS PROVIDED. PT UP TO BR AND BACK TO BED. O2
@ 2L NC. NO OTHER NEEDS,CALL LIGHT IN REACH.

## 2020-01-01 NOTE — XMS
Encounter Summary
  Created on: 2020
 
 Vonnie Celaya
 External Reference #: [id number]
 : 32
 Sex: Female
 
 Demographics
 
 
+-----------------------+------------------------+
| Address               | [address]         |
|                       | [place]  [address]   |
+-----------------------+------------------------+
| Home Phone            | [phone]        |
+-----------------------+------------------------+
| Preferred Language    | Unknown                |
+-----------------------+------------------------+
| Marital Status        | Single                 |
+-----------------------+------------------------+
| Restorationist Affiliation | NON                    |
+-----------------------+------------------------+
| Race                  | White                  |
+-----------------------+------------------------+
| Ethnic Group          | Not  or  |
+-----------------------+------------------------+
 
 
 Author
 
 
+--------------+------------------------------+
| Author       | [facility] |
+--------------+------------------------------+
| Organization | [facility] |
+--------------+------------------------------+
| Address      | Unknown                      |
+--------------+------------------------------+
| Phone        | Unavailable                  |
+--------------+------------------------------+
 
 
 
 Support
 
 
+--------------+--------------+---------+-----------------+
| Name         | Relationship | Address | Phone           |
+--------------+--------------+---------+-----------------+
| Lauryn Leroy | ECON         | Unknown | [phone] |
+--------------+--------------+---------+-----------------+
 
 
 
 Care Team Providers
 
 
 
+------------------------+------+-----------------+
| Care Team Member Name  | Role | Phone           |
+------------------------+------+-----------------+
| Jean Bermudez MD | PCP  | [phone] |
+------------------------+------+-----------------+
 
 
 
 Reason for Visit
 
 
+--------------+----------+
| Reason       | Comments |
+--------------+----------+
| New patient  |          |
| consultation |          |
+--------------+----------+
 Consultation (Routine)
 
+--------+--------+-----------+--------------+--------------+---------------+
| Status | Reason | Specialty | Diagnoses /  | Referred By  | Referred To   |
|        |        |           | Procedures   | Contact      | Contact       |
+--------+--------+-----------+--------------+--------------+---------------+
| Closed |        | Cardiac   |   Diagnoses  |   Stephen,      |   Donnell,      |
|        |        | Surgery   |              | Varun PARRA,   | Shiloh SIMON,   |
|        |        |           | Nonrheumatic | MD  [address]     | MD  [address]   |
|        |        |           |  mitral      | [address]  | [address]   |
|        |        |           | (valve)      |  [address]       | [address]       |
|        |        |           | insufficienc | [place], [place] | [place], [place]  |
|        |        |           | y  Rheumatic |  [address]       | [address]    |
|        |        |           |  tricuspid   | Phone:       | Phone:        |
|        |        |           | insufficienc | [phone] | [phone]  |
|        |        |           | y  Pulmonary |   Fax:       |  Fax:         |
|        |        |           |              | [phone] | [phone]  |
|        |        |           | hypertension |              |               |
|        |        |           | ,            |              |               |
|        |        |           | unspecified  |              |               |
|        |        |           |  Procedures  |              |               |
|        |        |           |  VA NEW      |              |               |
|        |        |           | PATIENT      |              |               |
|        |        |           | LEVEL V      |              |               |
+--------+--------+-----------+--------------+--------------+---------------+
 
 
 
 
 Encounter Details
 
 
+--------+---------+----------------------+----------------------+----------------------+
| Date   | Type    | Department           | Care Team            | Description          |
+--------+---------+----------------------+----------------------+----------------------+
| / | Office  |   Cardiothoracic     |   Basil Dotson MD   | Non-rheumatic mitral |
| 2018   | Visit   | Surgery at [facility]  [address]  |  regurgitation       |
|        |         |  [address]    | [address]  [place],   | (Primary Dx)         |
|        |         | Mailcode: [id number]       | [place] [address]        |                      |
|        |         | [facility] | [phone]         |                      |
|        |         |   [place], [place]       | [phone] (Fax)   |                      |
|        |         | [address]           |                      |                      |
|        |         | [phone]         |                      |                      |
 
+--------+---------+----------------------+----------------------+----------------------+
 
 
 
 Social History
 
 
+--------------+-------+-----------+--------+------+
| Tobacco Use  | Types | Packs/Day | Years  | Date |
|              |       |           | Used   |      |
+--------------+-------+-----------+--------+------+
| Never Smoker |       |           |        |      |
+--------------+-------+-----------+--------+------+
 
 
 
+---------------------+---+---+---+
| Smokeless Tobacco:  |   |   |   |
| Never Used          |   |   |   |
+---------------------+---+---+---+
 
 
 
+-------------+-------------+---------+----------+
| Alcohol Use | Drinks/Week | oz/Week | Comments |
+-------------+-------------+---------+----------+
| No          |             |         |          |
+-------------+-------------+---------+----------+
 
 
 
+------------------+---------------+
| Sex Assigned at  | Date Recorded |
| Birth            |               |
+------------------+---------------+
| Not on file      |               |
+------------------+---------------+
 
 
 
+----------------+-------------+-------------+
| Job Start Date | Occupation  | Industry    |
+----------------+-------------+-------------+
| Not on file    | Not on file | Not on file |
+----------------+-------------+-------------+
 
 
 
+----------------+--------------+------------+
| Travel History | Travel Start | Travel End |
+----------------+--------------+------------+
 
 
 
+-------------------------------------+
| No recent travel history available. |
+-------------------------------------+
 documented as of this encounter
 
 Last Filed Vital Signs
 
 
 
+-------------------+--------------------+----------------------+----------+
| Vital Sign        | Reading            | Time Taken           | Comments |
+-------------------+--------------------+----------------------+----------+
| Blood Pressure    | 110/59             | 2018  2:07 PM  |          |
|                   |                    | PDT                  |          |
+-------------------+--------------------+----------------------+----------+
| Pulse             | 78                 | 2018  2:07 PM  |          |
|                   |                    | PDT                  |          |
+-------------------+--------------------+----------------------+----------+
| Temperature       | 36.7   C (98   F)  | 2018  2:07 PM  |          |
|                   |                    | PDT                  |          |
+-------------------+--------------------+----------------------+----------+
| Respiratory Rate  | 20                 | 2018  2:07 PM  |          |
|                   |                    | PDT                  |          |
+-------------------+--------------------+----------------------+----------+
| Oxygen Saturation | 99%                | 2018  2:07 PM  |          |
|                   |                    | PDT                  |          |
+-------------------+--------------------+----------------------+----------+
| Inhaled Oxygen    | -                  | -                    |          |
| Concentration     |                    |                      |          |
+-------------------+--------------------+----------------------+----------+
| Weight            | 66 kg (145 lb 9.6  | 2018  2:07 PM  |          |
|                   | oz)                | PDT                  |          |
+-------------------+--------------------+----------------------+----------+
| Height            | 160 cm (5' 3")     | 2018  2:07 PM  |          |
|                   |                    | PDT                  |          |
+-------------------+--------------------+----------------------+----------+
| Body Mass Index   | 25.79              | 2018  2:07 PM  |          |
|                   |                    | PDT                  |          |
+-------------------+--------------------+----------------------+----------+
 documented in this encounter
 
 Progress Notes
 Basil Dotson MD - [facility]  [facility]
 
 Date of Service: 2018 
 
 Referring Providers:  
 Layla Greer 
 Patient Care Team:
 Jean Bermudez MD as PCP - General (Internal Medicine)
 
 Chief Complaint:  New patient consultation
 
 History:  Ms. 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  She
 has developed severe progressive functional limitation. [facility].
 
 -------------------------------------------------------------------------------------------
---------------------------
 
 Allergies:
 
 The patient is allergic to morphine and percocet [oxycodone-acetaminophen]. 
 
 Medications: 
 
 acetaminophen 325 mg oral tablet, Take by mouth.
 
 ascorbic acid (vitamin C) 500 mg oral tablet, Take 500 mg by mouth once daily.
 cholecalciferol (Vitamin D3) 2,000 unit oral capsule, Take by mouth.
 furosemide 40 mg oral tablet, Take by mouth.
 gluc/chnd/om3/dha/epa/fish/str (GLUCOSAMINE CHONDROITIN PLUS ORAL), Take 2 tablets by mouth
 once daily.
 levothyroxine 100 mcg oral tablet, Take by mouth.
 losartan 100 mg oral tablet, Take 100 mg by mouth once daily.
 MAGNESIUM CHLORIDE ORAL, Take by mouth.
 metFORMIN  mg oral tablet,ER alexis.retention 24 hr, Take by mouth.
 metoprolol succinate 200 mg oral tablet extended release 24 hr, Take 100 mg by mouth once d
aily. 
 potassium chloride SR 20 mEq oral tablet,ER particles/crystals, Take by mouth.
 simvastatin 20 mg oral tablet, Take by mouth once daily in the evening. 
 warfarin 5 mg oral tablet, Take by mouth.
 
 Past Medical History:
  
 No date: Anemia
     Comment: resolved with iron supplements - no large GI 
              bleed, negative EGD.
 No date: Atrial fibrillation (HCC)
 No date: Hyperlipidemia
 No date: Hypertension
 No date: Hypothyroid
 No date: Pulmonary hypertension (HCC)
 No date: Type 2 diabetes mellitus (HCC)
 
 Past Surgical History:
 No past surgical history on file.
 
 Family History:
 Noncontributory
 
 Social History:
 The patient  reports that she has never smoked. She has never used smokeless tobacco. She r
eports that she does not drink alcohol or use drugs.
 
 Review of Systems:
 
 Constitutional:  positive for symptoms of  fatigue.
 Eyes:negative for symptoms
 ENT: negative for symptoms
 Respiratory: negative for symptoms
 Cardiovascular:  GARCIA
 Gastrointestinal: negative
 Genitourinary:  negative.
 Neurological:  negative for symptoms
 Integumentary: negative for symptoms
 Endocrine: negative for symptoms
 Musculoskeletal: Extremities normal, no deformities, edema, limited range of motion, or aayush
creased strength
 
 -------------------------------------------------------------------------------------------
---------------------------
 
 Physical Exam:
 Vital Signs: /59 | Pulse 78 | Temp (Src) 36.7 C (98 F) (Oral) | RR 20 | Ht 1.6 m 
(5' 3") | Wt 66 kg (145 lb 9.6 oz) | SpO2 99% | BMI 25.79 kg/(m^2)
 BMI: Body mass index is 25.79 kg/m.
 Constitutional: alert and cooperative.
 
 HEENT: Normal.
 Respiratory: negative.
 Cardiovascular: RRR, systolic murmur
 Gastrointestinal: no hepatomegaly, nontender to palpation, no masses, aorta not grossly enl
arged.
 Musculoskeletal: Extremities normal, no deformities, edema, limited range of motion, or aayush
creased strength
 Neurologic: negative.
 Skin: No discolorations, rashes, or ulcers.  Warm and dry
 
 Studies:
 I personally reviewed the patient's echo.  It shows severe MAC.  There is mild MS.  There i
s severe MR.  The angiogram shows a severe distal RCA lesion.
 
 Assessment:  
 In summary, Ms. Celaya is a 86 year old female who presents with Mitral Regurgitation and
 stenosis of the distal RCA.  I had a long conversation with Ms. Celaya and her dauthter r
egarding her condition and the natural history of this.  The patient is aware that she is at
 risk for Angina, Myocardial Infarction and progressive CHF in the future.
 
 Her MV is not ideal for intervention because of the severe MAC, mild MS, and potential for 
LVOT obstruction.  
 
 Plan:  
 Stenting of the RCA locally.
 
 Reconsider mitral clipping if she has continued intractable symptoms after stenting.
 
 BASIL DOTSON MD
 CARDIOTHORACIC SURGERY AT [address]
 [address]: [id number]
 [place] [address] [phone]
 
  Electronically signed by Basil Dotson MD at 2018  3:37 PM PDTdocumented in this enco
unter
 
 Plan of Treatment
 Not on filedocumented as of this encounter
 
 Visit Diagnoses
 
 
+------------------------------------------------+
| Diagnosis                                      |
+------------------------------------------------+
|   Non-rheumatic mitral regurgitation - Primary |
+------------------------------------------------+
 documented in this encounter

## 2020-01-01 NOTE — XMS
Encounter Summary
  Created on: 2020
 
 Vonnie Celaya
 External Reference #: [id number]
 : 32
 Sex: Female
 
 Demographics
 
 
+-----------------------+------------------------+
| Address               | [address]         |
|                       | [place]  [address]   |
+-----------------------+------------------------+
| Home Phone            | [phone]        |
+-----------------------+------------------------+
| Preferred Language    | Unknown                |
+-----------------------+------------------------+
| Marital Status        | Single                 |
+-----------------------+------------------------+
| Anglican Affiliation | NON                    |
+-----------------------+------------------------+
| Race                  | White                  |
+-----------------------+------------------------+
| Ethnic Group          | Not  or  |
+-----------------------+------------------------+
 
 
 Author
 
 
+--------------+------------------------------+
| Author       | [facility] |
+--------------+------------------------------+
| Organization | [facility] |
+--------------+------------------------------+
| Address      | Unknown                      |
+--------------+------------------------------+
| Phone        | Unavailable                  |
+--------------+------------------------------+
 
 
 
 Support
 
 
+--------------+--------------+---------+-----------------+
| Name         | Relationship | Address | Phone           |
+--------------+--------------+---------+-----------------+
| Lauryn Leroy | ECON         | Unknown | [phone] |
+--------------+--------------+---------+-----------------+
 
 
 
 Care Team Providers
 
 
 
+------------------------+------+-----------------+
| Care Team Member Name  | Role | Phone           |
+------------------------+------+-----------------+
| Jean Bermudez MD | PCP  | [phone] |
+------------------------+------+-----------------+
 
 
 
 Reason for Referral
 Diagnostic Testing (Routine)
 
+--------+--------+-----------+--------------+--------------+--------------+
| Status | Reason | Specialty | Diagnoses /  | Referred By  | Referred To  |
|        |        |           | Procedures   | Contact      | Contact      |
+--------+--------+-----------+--------------+--------------+--------------+
| Closed |        | Radiology |   Diagnoses  |              |              |
|        |        |           |  Mitral      | Claudette,  |              |
|        |        |           | valve        | Jame PARRA MD  |              |
|        |        |           | insufficienc |  [address]     |              |
|        |        |           | y,           | [address]     |              |
|        |        |           | unspecified  | [place], [place] |              |
|        |        |           | etiology     |  [address]  |              |
|        |        |           | Procedures   |  Phone:      |              |
|        |        |           | CTA CHEST -  | [phone] |              |
|        |        |           | GATED W      |   Fax:       |              |
|        |        |           | CONTRAST     | [phone] |              |
+--------+--------+-----------+--------------+--------------+--------------+
 
 
 
 
 Reason for Visit
 AUTH/CERT
 
+--------+--------+-----------+--------------+--------------+--------------+
| Status | Reason | Specialty | Diagnoses /  | Referred By  | Referred To  |
|        |        |           | Procedures   | Contact      | Contact      |
+--------+--------+-----------+--------------+--------------+--------------+
|        |        |           |              |              |              |
+--------+--------+-----------+--------------+--------------+--------------+
 
 
 
 
 Encounter Details
 
 
+--------+-----------+----------------------+----------------------+-------------+
| Date   | Type      | Department           | Care Team            | Description |
+--------+-----------+----------------------+----------------------+-------------+
| / | Hospital  |   [facility] [address]  |   Jame Wilkins  |             |
| 2018   | Encounter | Aiden PARRA MD  [address]  |             |
|        |           |  [address]  [facility]  | [address]  [place]    |             |
|        |           |  [place], [place]        | [address]           |             |
|        |           | [address]           | [phone]         |             |
|        |           | [phone]         | [phone] (Fax)   |             |
+--------+-----------+----------------------+----------------------+-------------+
 
 
 
 
 Social History
 
 
+----------------+-------+-----------+--------+------+
| Tobacco Use    | Types | Packs/Day | Years  | Date |
|                |       |           | Used   |      |
+----------------+-------+-----------+--------+------+
| Never Assessed |       |           |        |      |
+----------------+-------+-----------+--------+------+
 
 
 
+------------------+---------------+
| Sex Assigned at  | Date Recorded |
| Birth            |               |
+------------------+---------------+
| Not on file      |               |
+------------------+---------------+
 
 
 
+----------------+-------------+-------------+
| Job Start Date | Occupation  | Industry    |
+----------------+-------------+-------------+
| Not on file    | Not on file | Not on file |
+----------------+-------------+-------------+
 
 
 
+----------------+--------------+------------+
| Travel History | Travel Start | Travel End |
+----------------+--------------+------------+
 
 
 
+-------------------------------------+
| No recent travel history available. |
+-------------------------------------+
 documented as of this encounter
 
 Last Filed Vital Signs
 
 
+-------------------+---------+----------------------+----------+
| Vital Sign        | Reading | Time Taken           | Comments |
+-------------------+---------+----------------------+----------+
| Blood Pressure    | 136/98  | 2018 11:50 AM  |          |
|                   |         | PDT                  |          |
+-------------------+---------+----------------------+----------+
| Pulse             | 66      | 2018 11:50 AM  |          |
|                   |         | PDT                  |          |
+-------------------+---------+----------------------+----------+
| Temperature       | -       | -                    |          |
+-------------------+---------+----------------------+----------+
| Respiratory Rate  | 16      | 2018 11:50 AM  |          |
|                   |         | PDT                  |          |
+-------------------+---------+----------------------+----------+
| Oxygen Saturation | 100%    | 2018 11:50 AM  |          |
|                   |         | PDT                  |          |
+-------------------+---------+----------------------+----------+
 
| Inhaled Oxygen    | -       | -                    |          |
| Concentration     |         |                      |          |
+-------------------+---------+----------------------+----------+
| Weight            | -       | -                    |          |
+-------------------+---------+----------------------+----------+
| Height            | -       | -                    |          |
+-------------------+---------+----------------------+----------+
| Body Mass Index   | -       | -                    |          |
+-------------------+---------+----------------------+----------+
 documented in this encounter
 
 Discharge Instructions
 Instructions Chelo Auguste RN - 2018Post-transesophageal Echocardiogram Pat
ient Discharge Instructions
 
 If you have undergone a transesophageal echocardiogram (JENIFFER), please follow these instructi
ons:
 
 Activity Limits
 ? You must have a responsible adult drive you or take you home. Do not drive for the remain
kassandra of the day.
 ? Rest and relax today. Have someone stay with you through the day.
 
  For the next 24 hours:
 ? Do not use any equipment that could injure you or others.
 ? Do not make any legal decisions.
 ? Do not drink any alcohol.
 
 Medicines and Follow Up
 ? Take your medicine as your doctor ordered.
 ? Follow up with the doctor that ordered this test
 
 Call your doctor or go to the emergency room if you have:
 ? Severe abdominal or chest pain
 ? Shortness of breath or heavy breathing
 ? Bleeding that will not stop
 
 How to Reach Us
 Call the Hospital  at [phone] and ask to have your doctor paged.
 
 documented in this encounter
 
 Medications at Time of Discharge
 
 
+----------------------+----------------------+-----------+---------+----------+----------+
| Medication           | Sig                  | Dispensed | Refills | Start    | End Date |
|                      |                      |           |         | Date     |          |
+----------------------+----------------------+-----------+---------+----------+----------+
|   acetaminophen 325  | Take by mouth.       |           | 0       |          |          |
| mg oral tablet       |                      |           |         |          |          |
+----------------------+----------------------+-----------+---------+----------+----------+
|   ascorbic acid      | Take 500 mg by mouth |           | 0       |          |          |
| (vitamin C) 500 mg   |  once daily.         |           |         |          |          |
| oral tablet          |                      |           |         |          |          |
+----------------------+----------------------+-----------+---------+----------+----------+
|   furosemide 40 mg   | Take by mouth.       |           | 0       |          |          |
| oral tablet          |                      |           |         |          |          |
+----------------------+----------------------+-----------+---------+----------+----------+
|                      | Take 2 tablets by    |           | 0       |          |          |
 
| gluc/chnd/om3/dha/ep | mouth once daily.    |           |         |          |          |
| a/fish/str           |                      |           |         |          |          |
| (GLUCOSAMINE         |                      |           |         |          |          |
| CHONDROITIN PLUS     |                      |           |         |          |          |
| ORAL)                |                      |           |         |          |          |
+----------------------+----------------------+-----------+---------+----------+----------+
|   levothyroxine 100  | Take by mouth.       |           | 0       |          |          |
| mcg oral tablet      |                      |           |         |          |          |
+----------------------+----------------------+-----------+---------+----------+----------+
|   losartan 100 mg    | Take 100 mg by mouth |           | 0       |          |          |
| oral tablet          |  once daily.         |           |         |          |          |
+----------------------+----------------------+-----------+---------+----------+----------+
|   MAGNESIUM CHLORIDE | Take by mouth.       |           | 0       |          |          |
|  ORAL                |                      |           |         |          |          |
+----------------------+----------------------+-----------+---------+----------+----------+
|   metFORMIN    | Take by mouth.       |           | 0       |          |          |
| mg oral tablet,ER    |                      |           |         |          |          |
| alexis.retention 24 hr |                      |           |         |          |          |
+----------------------+----------------------+-----------+---------+----------+----------+
|   metoprolol         | Take 100 mg by mouth |           | 0       | 20 |          |
| succinate 200 mg     |  once daily.         |           |         | 18       |          |
| oral tablet extended |                      |           |         |          |          |
|  release 24 hr       |                      |           |         |          |          |
+----------------------+----------------------+-----------+---------+----------+----------+
|   potassium chloride | Take by mouth.       |           | 0       |          |          |
|  SR 20 mEq oral      |                      |           |         |          |          |
| tablet,ER            |                      |           |         |          |          |
| particles/crystals   |                      |           |         |          |          |
+----------------------+----------------------+-----------+---------+----------+----------+
|   simvastatin 20 mg  | Take by mouth once   |           | 0       |          |          |
| oral tablet          | daily in the         |           |         |          |          |
|                      | evening.             |           |         |          |          |
+----------------------+----------------------+-----------+---------+----------+----------+
|   warfarin 5 mg oral | Take by mouth.       |           | 0       |          |          |
|  tablet              |                      |           |         |          |          |
+----------------------+----------------------+-----------+---------+----------+----------+
 documented as of this encounter
 
 Progress Kevin Maguire - 2018 11:13 AM PDTTransesophageal echocardiogram was completed. F
inal report to follow. 
 Electronically signed by Kevin Bermudez at 2018 11:13 AM PDTdocumented in this enc
ounter
 
 Plan of Treatment
 Not on filedocumented as of this encounter
 
 Procedures
 
 
+----------------------+--------+-------------+----------------------+----------------------
+
| Procedure Name       | Priori | Date/Time   | Associated Diagnosis | Comments             
|
|                      | ty     |             |                      |                      
|
+----------------------+--------+-------------+----------------------+----------------------
+
| CTA CHEST - GATED W  | Routin | 2018  |   Mitral valve       |   Results for this   
|
 
| CONTRAST             | e      |  2:35 PM    | insufficiency,       | procedure are in the 
|
|                      |        | PDT         | unspecified etiology |  results section.    
|
+----------------------+--------+-------------+----------------------+----------------------
+
| CREATININE, POC      | Routin | 2018  |   Mitral valve       |   Results for this   
|
|                      | e      |  2:02 PM    | insufficiency,       | procedure are in the 
|
|                      |        | PDT         | unspecified etiology |  results section.    
|
+----------------------+--------+-------------+----------------------+----------------------
+
| TRANSESOPHAGEAL      | Routin | 2018  |                      |   Results for this   
|
| ECHOCARDIOGRAM       | e      | 11:19 AM    |                      | procedure are in the 
|
|                      |        | PDT         |                      |  results section.    
|
+----------------------+--------+-------------+----------------------+----------------------
+
| INR (PT), POC        | Urgent | 2018  |                      |   Results for this   
|
|                      |        | 10:02 AM    |                      | procedure are in the 
|
|                      |        | PDT         |                      |  results section.    
|
+----------------------+--------+-------------+----------------------+----------------------
+
 documented in this encounter
 
 Results
 CTA CHEST - GATED W CONTRAST (2018  2:35 PM PDT)
 
+----------+
| Specimen |
+----------+
|          |
+----------+
 
 
 
+------------------------------------------------------------------------+-----------------+
| Narrative                                                              | Performed At    |
+------------------------------------------------------------------------+-----------------+
|   EXAM: CTA CHEST OTHER     HISTORY: Mitral valve disease,             |   [facility]          |
| anticipation of TMVR.     COMPARISON: None.     TECHNIQUE;  Following  | RADIOLOGY VOICE |
| uneventful administration of intravenous contrast, cardiac gated       |  RECOGNITION 2  |
| helical scanning was obtained of the chest and reviewed in soft tissue |                 |
|  and lung algorithm. 3D reformatted images were generated at an        |                 |
| independent workstation and also reviewed.     CONTRAST: IOHEXOL 300   |                 |
| MG IODINE/ML INTRAVENOUS SOLUTION 90 mL     FINDINGS:     CARDIAC      |                 |
| MORPHOLOGY:  The aortic arch, cardiac apex and gastric lumen are on    |                 |
| the left.        Mitral valve area: 9.3 cm2  Perimeter: 10.7 mm  TT:   |                 |
| 27 mm  SL: 34mm     CHEST:  The heart is enlarged with preferential    |                 |
| enlargement of both atria. There is extensive mitral annular           |                 |
| calcifications. Minimal mitral aortic valvular and coronary arterial   |                 |
| calcifications are also noted. There is no pericardial or pleural      |                 |
| effusion. There is no pneumothorax.     There are no suspicious        |                 |
 
| mediastinal, or axillary lymph nodes.     The lungs are clear without  |                 |
| suspicious pulmonary nodules, masses or consolidative opacities.       |                 |
| Minimal bibasilar linear atelectasis/scarring is noted.     The spleen |                 |
|  is lobulated and diminutive in size. Rim calcified right superior     |                 |
| renal pole 5 cm cyst is not fully evaluated.     Remaining visualized  |                 |
| upper abdominal organs are unremarkable.     There is no suspicious    |                 |
| focal osseous abnormality.     IMPRESSION:  Extensive mitral annular   |                 |
| calcifications.     Mitral valvular measurements as above.        I    |                 |
| have personally reviewed the images and, if necessary, edited the      |                 |
| report. I agree with the report as now presented.      Final           |                 |
| signature: Henrietta Whitehead MD    2018 4:05 PM   Preliminary:       |                 |
| Henrietta Whitehead MD        Dictation initiated: Henrietta Whitehead MD        |                 |
| 2018 3:01 PM                                                      |                 |
+------------------------------------------------------------------------+-----------------+
 
 
 
+-------------------------------------------------------------------------------------------
--------------------------------------------------------------------------------------------
-------------------------------+
| Procedure Note                                                                            
                                                                                            
                               |
+-------------------------------------------------------------------------------------------
--------------------------------------------------------------------------------------------
-------------------------------+
|   Service Account, Radiant Res In Interface - 2018  4:06 PM PDT  EXAM: CTA CHEST    
                                                                                            
                               |
| OTHER HISTORY: Mitral valve disease, anticipation of TMVR. COMPARISON: None.              
                                                                                            
                               |
| TECHNIQUE;Following uneventful administration of intravenous contrast, cardiac gated      
                                                                                            
                               |
| helical scanning was obtained of the chest and reviewed in soft tissue and lung           
                                                                                            
                               |
| algorithm. 3D reformatted images were generated at an independent workstation and also    
                                                                                            
                               |
| reviewed. CONTRAST: IOHEXOL 300 MG IODINE/ML INTRAVENOUS SOLUTION 90 mL FINDINGS:         
                                                                                            
                               |
| CARDIAC MORPHOLOGY:The aortic arch, cardiac apex and gastric lumen are on the left.       
                                                                                            
                               |
| Mitral valve area: 9.3 [id number]: 10.7 mmTT: 27 mmSL: 34mm CHEST:The heart is          
                                                                                            
                               |
| enlarged with preferential enlargement of both atria. There is extensive mitral annular   
                                                                                            
                               |
| calcifications. Minimal mitral aortic valvular and coronary arterial calcifications are   
                                                                                            
                               |
| also noted. There is no pericardial or pleural effusion. There is no pneumothorax. There  
                                                                                            
                               |
|  are no suspicious mediastinal, or axillary lymph nodes. The lungs are clear without      
 
                                                                                            
                               |
| suspicious pulmonary nodules, masses or consolidative opacities. Minimal bibasilar        
                                                                                            
                               |
| linear atelectasis/scarring is noted. The spleen is lobulated and diminutive in size.     
                                                                                            
                               |
| Rim calcified right superior renal pole 5 cm cyst is not fully evaluated. Remaining       
                                                                                            
                               |
| visualized upper abdominal organs are unremarkable. There is no suspicious focal osseous  
                                                                                            
                               |
|  abnormality. IMPRESSION:Extensive mitral annular calcifications. Mitral valvular         
                                                                                            
                               |
| measurements as above.  I have personally reviewed the images and, if necessary, edited   
                                                                                            
                               |
| the report. I agree with the report as now presented.  Final signature: Virginie Ochoa MD   2018 4:05 PM Preliminary: Henrietta Whitehead MD    Dictation initiated: Henrietta Whitehead MD   2018 3:01 PM                                                              
                                                                                            
                               |
|CHEST:                                                                                     
                                                                                            
                              |
|The heart is enlarged with preferential enlargement of both atria. There is extensive rebecca
l annular calcifications. Minimal mitral aortic valvular and coronary arterial calcification
s are also noted. There is no  |
|pericardial or pleural effusion. There is no pneumothorax.                                 
                                                                                            
                               |
|                                                                                           
                                                                                            
                               |
|There are no suspicious mediastinal, or axillary lymph nodes.                              
                                                                                            
                               |
|                                                                                           
                                                                                            
                               |
|The lungs are clear without suspicious pulmonary nodules, masses or consolidative opacities
. Minimal bibasilar linear atelectasis/scarring is noted.                                   
                               |
|                                                                                           
                                                                                            
                               |
|The spleen is lobulated and diminutive in size. Rim calcified right superior renal pole 5 c
m cyst is not fully evaluated.                                                              
                               |
|                                                                                           
                                                                                            
                               |
|Remaining visualized upper abdominal organs are unremarkable.                              
 
                                                                                            
                               |
|                                                                                           
                                                                                            
                               |
|There is no suspicious focal osseous abnormality.                                          
                                                                                            
                               |
|                                                                                           
                                                                                            
                               |
|IMPRESSION:                                                                                
                                                                                            
                              |
|Extensive mitral annular calcifications.                                                   
                                                                                            
                               |
|                                                                                           
                                                                                            
                               |
|Mitral valvular measurements as above.                                                     
                                                                                            
                               |
|                                                                                           
                                                                                            
                               |
|                                                                                           
                                                                                            
                               |
|I have personally reviewed the images and, if necessary, edited the report. I agree with th
e report as now presented.                                                                  
                               |
|                                                                                           
                                                                                            
                               |
|Final signature: Henrietta Whitehead MD   2018 4:05 PM                                     
                                                                                            
                               |
|Preliminary: Henrietta Whitehead MD                                                             
                                                                                            
                               |
|Dictation initiated: Henrietta Whitehead MD   2018 3:01 PM                                 
                                                                                            
                               |
+-------------------------------------------------------------------------------------------
--------------------------------------------------------------------------------------------
-------------------------------+
 
 
 
+---------------------+---------+--------------------+--------------+
| Performing          | Address | City/State/Zipcode | Phone Number |
| Organization        |         |                    |              |
+---------------------+---------+--------------------+--------------+
|   [facility]    |         |                    |              |
| VOICE RECOGNITION 2 |         |                    |              |
+---------------------+---------+--------------------+--------------+
 CREATININE, POC (2018  2:02 PM PDT)
 
+-------------+-------+-----------------+-------------+--------------+
 
| Component   | Value | Ref Range       | Performed   | Pathologist  |
|             |       |                 | At          | Signature    |
+-------------+-------+-----------------+-------------+--------------+
| CREATININE, | 0.7   | 0.6 - 1.1 mg/dL | [facility]      |              |
|  POC        |       |                 | [facility]     |              |
|             |       |                 | [facility] |              |
|             |       |                 |  [facility]    |              |
|             |       |                 | TESTS       |              |
+-------------+-------+-----------------+-------------+--------------+
 
 
 
+----------------+
| Specimen       |
+----------------+
| Blood - Blood  |
| (substance)    |
+----------------+
 
 
 
+----------------------+-------------------------+--------------------+--------------+
| Performing           | Address                 | City/State/Zipcode | Phone Number |
| Organization         |                         |                    |              |
+----------------------+-------------------------+--------------------+--------------+
|   [facility]     |   [address]  | [place], [place]       |              |
| [facility]  | [address]               | [address]         |              |
| TESTS                |                         |                    |              |
+----------------------+-------------------------+--------------------+--------------+
 TRANSESOPHAGEAL ECHOCARDIOGRAM (2018 11:19 AM PDT)
 
+------------------------------------------------------------------------+--------------+
| Narrative                                                              | Performed At |
+------------------------------------------------------------------------+--------------+
|   Negrita Daniels MD,[facility]       2018 11:20 AM  Cardiology          |              |
| Preliminary Procedure Note (Full report to follow)     Primary Care    |              |
| Provider: Jean Bermudez MD  Referring Provider: Jame Wilkins  |              |
| MD     Echo [facility] Staff:   Jame Wilkins MD (attending)  NEGRITA PARRA     |              |
| MD JUSTIN,[facility] (fellow)     Procedure(s):  Transesophageal             |              |
| Echocardiogram     Indications:  mitral regurgitation     Consent:     |              |
| After full PARQ, signed consent was obtained.      Description:  Time  |              |
| out performed at the bedside. Oropharynx examined   (Mallampati II)    |              |
| and anesthetized with 5 ml of 2% viscous   lidocaine and 3 ml of 4%    |              |
| aerosolized lidocaine. Bite block   placed. Sedation per anesthesia    |              |
| with propofol. The probe was   passed without difficulty. No abnormal  |              |
| hemodynamic, arrythmic or   hypoxemic events noted during the          |              |
| procedure. Estimated blood loss   was 0 mL.  ?     Findings: Severe    |              |
| MR, moderate TR. Full report to follow.  ?      Complications: None.   |              |
|     Negrita Daniles MD, MPH  Fellow (PGY-6), Cardiovascular Medicine  |              |
|  Pager #[phone]                                                          |              |
+------------------------------------------------------------------------+--------------+
 INR (PT), POC (2018 10:02 AM PDT)
 
+-------------+---------+---------------+-------------+--------------+
| Component   | Value   | Ref Range     | Performed   | Pathologist  |
|             |         |               | At          | Signature    |
+-------------+---------+---------------+-------------+--------------+
| PROTHROMBIN | 2.6 (H) | 0.9 - 1.2 INR | [facility]      |              |
|  TIME       |         |               | [facility]     |              |
| (INR), POC  |         |               | [facility] |              |
 
|             |         |               |  [facility]    |              |
|             |         |               | TESTS       |              |
+-------------+---------+---------------+-------------+--------------+
 
 
 
+----------------+
| Specimen       |
+----------------+
| Blood - Blood  |
| (substance)    |
+----------------+
 
 
 
+----------------------+-------------------------+--------------------+--------------+
| Performing           | Address                 | City/State/Zipcode | Phone Number |
| Organization         |                         |                    |              |
+----------------------+-------------------------+--------------------+--------------+
|   [facility]     |   [address]  | [place], [place]       |              |
| [facility]  | [address]               | [id number]         |              |
| TESTS                |                         |                    |              |
+----------------------+-------------------------+--------------------+--------------+
 documented in this encounter
 
 Visit Diagnoses
 
 
+----------------------------------------------------+
| Diagnosis                                          |
+----------------------------------------------------+
|   Mitral valve insufficiency, unspecified etiology |
+----------------------------------------------------+
 documented in this encounter
 
 Administered Medications
 
 
+------------------+--------+---------+------+------+------+
| Medication Order | MAR    | Action  | Dose | Rate | Site |
|                  | Action | Date    |      |      |      |
+------------------+--------+---------+------+------+------+
 
 
 
+-----------------------------------+---+
|   fentaNYL (SUBLIMAZE) injection  |   |
| 25 mcg  25 mcg, intravenous,      |   |
| PROCEDURE PRN, 8 doses, Starting  |   |
| u 18 at 0911, Until Thu    |   |
| 18 at 1805, until RASS of -3 |   |
|  achieved                         |   |
+-----------------------------------+---+
|                                   |   |
+-----------------------------------+---+
|   flumazenil (ROMAZICON)          |   |
| injection 0.2 mg  0.2 mg,         |   |
| intravenous, PROCEDURE PRN,       |   |
| Starting Thu 18 at 0911,     |   |
| Until u 18 at 1805,        |   |
 
| reversal of conscious sedation    |   |
| and general anesthesia            |   |
+-----------------------------------+---+
|                                   |   |
+-----------------------------------+---+
 
 
 
+---------------------------------+---------+----------+-------+---+---+
|   iohexol (OMNIPAQUE) 300 mg    | IV Push | 20 | 90 mL |   |   |
| iodine/mL 100 mL  100 mL,       |         | 18  2:45 |       |   |   |
| intravenous, PROCEDURE ONCE, 1  |         |  PM PDT  |       |   |   |
| dose, Thu 18 at 1445       |         |          |       |   |   |
+---------------------------------+---------+----------+-------+---+---+
 
 
 
+-----------------------------------+---+
|                                   |   |
+-----------------------------------+---+
|   midazolam (PF) (VERSED)         |   |
| injection 1 mg  1 mg,             |   |
| intravenous, PROCEDURE PRN, 10    |   |
| doses, Starting Thu 18 at    |   |
| 0911, Until u 18 at 1805,  |   |
| until RASS of -3 achieved.        |   |
+-----------------------------------+---+
|                                   |   |
+-----------------------------------+---+
 documented in this encounter

## 2020-01-01 NOTE — XMS
Clinical Summary
  Created on: 2020
 
 Vonnie Harp
 External Reference #: [id number]
 : 32
 Sex: Female
 
 Demographics
 
 
+-----------------------+---------------------------+
| Address               | [address]           |
|                       | [place]  [address] |
+-----------------------+---------------------------+
| Home Phone            | [phone]           |
+-----------------------+---------------------------+
| Preferred Language    | Unknown                   |
+-----------------------+---------------------------+
| Marital Status        |                    |
+-----------------------+---------------------------+
| Christianity Affiliation | Unknown                   |
+-----------------------+---------------------------+
| Race                  | Unknown                   |
+-----------------------+---------------------------+
| Ethnic Group          | Unknown                   |
+-----------------------+---------------------------+
 
 
 Author
 
 
+--------------+------------------------------------------+
| Author       | [facility]  |
|              | [facility])                                 |
+--------------+------------------------------------------+
| Organization | [facility] (Historical as of  |
|              | 19)                                 |
+--------------+------------------------------------------+
| Address      | Unknown                                  |
+--------------+------------------------------------------+
| Phone        | Unavailable                              |
+--------------+------------------------------------------+
 
 
 
 Support
 
 
+--------------+--------------+---------+-----------------+
| Name         | Relationship | Address | Phone           |
+--------------+--------------+---------+-----------------+
| Lauryn Leroy | ECON         | Unknown | [phone] |
+--------------+--------------+---------+-----------------+
 
 
 
 Care Team Providers
 
 
 
+-----------------------+------+-----------------+
| Care Team Member Name | Role | Phone           |
+-----------------------+------+-----------------+
| Jean Bermudez MD  | PP   | [phone] |
+-----------------------+------+-----------------+
 
 
 
 Allergies
 
 
+----------------------+----------------------+----------+----------+-------------------+
| Active Allergy       | Reactions            | Severity | Noted    | Comments          |
|                      |                      |          | Date     |                   |
+----------------------+----------------------+----------+----------+-------------------+
| Morphine             | Other (See Comments) | Medium   | 20 |   Pt felt fidgety |
|                      |                      |          | 18       |                   |
+----------------------+----------------------+----------+----------+-------------------+
| Oxycodone-Acetaminop | Other (See Comments) | Medium   | 20 |   Pt felt fidgety |
| hen                  |                      |          | 18       |                   |
+----------------------+----------------------+----------+----------+-------------------+
 
 
 
 Current Medications
 
 
+----------------------+----------------------+-------+---------+------+------+-------+
| Prescription         | Sig.                 | Disp. | Refills | Star | End  | Statu |
|                      |                      |       |         | t    | Date | s     |
|                      |                      |       |         | Date |      |       |
+----------------------+----------------------+-------+---------+------+------+-------+
|   levothyroxine      | Take 100 mcg by      |       |         |      |      | Activ |
| (SYNTHROID) 100 MCG  | mouth every morning  |       |         |      |      | e     |
| tablet               | before breakfast.    |       |         |      |      |       |
+----------------------+----------------------+-------+---------+------+------+-------+
|   losartan (COZAAR)  | Take 100 mg by mouth |       |         |      |      | Activ |
| 100 MG tablet        |  daily.              |       |         |      |      | e     |
+----------------------+----------------------+-------+---------+------+------+-------+
|   simvastatin        | Take 10 mg by mouth  |       |         |      |      | Activ |
| (ZOCOR) 20 MG tablet | nightly.             |       |         |      |      | e     |
+----------------------+----------------------+-------+---------+------+------+-------+
|   potassium chloride | Take 20 mEq by mouth |       |         |      |      | Activ |
|  SA (K-DUR,KLOR-CON) |  daily.              |       |         |      |      | e     |
|  20 MEQ tablet       |                      |       |         |      |      |       |
+----------------------+----------------------+-------+---------+------+------+-------+
|   metFORMIN          | Take 500 mg by mouth |       |         |      |      | Activ |
| (GLUMETZA) 500 MG    |  2 (two) times daily |       |         |      |      | e     |
| (MOD) 24 hr tablet   |  with meals.         |       |         |      |      |       |
+----------------------+----------------------+-------+---------+------+------+-------+
|   magnesium chloride | Take 1 tablet by     |       |         |      |      | Activ |
|  (MAG64) 64 mg EC    | mouth daily.         |       |         |      |      | e     |
| tablet               |                      |       |         |      |      |       |
+----------------------+----------------------+-------+---------+------+------+-------+
|                      | Take 1 tablet by     |       |         |      |      | Activ |
| Gluc-Chonn-MSM-Boswe | mouth daily.         |       |         |      |      | e     |
| llia-Vit D           |                      |       |         |      |      |       |
| (GLUCOSAMINE CHOND   |                      |       |         |      |      |       |
 
| TRIPLE/VIT D) TABS   |                      |       |         |      |      |       |
+----------------------+----------------------+-------+---------+------+------+-------+
|   Cranberry 1000 MG  | Take 1 capsule by    |       |         |      |      | Activ |
| CAPS                 | mouth daily.         |       |         |      |      | e     |
+----------------------+----------------------+-------+---------+------+------+-------+
|   ascorbic acid      | Take 1,000 mg by     |       |         |      |      | Activ |
| (VITAMIN C) 1000 MG  | mouth daily.         |       |         |      |      | e     |
| tablet               |                      |       |         |      |      |       |
+----------------------+----------------------+-------+---------+------+------+-------+
|   furosemide (LASIX) | Take 40 mg by mouth  |       |         |      |      | Activ |
|  40 MG tablet        | daily.               |       |         |      |      | e     |
+----------------------+----------------------+-------+---------+------+------+-------+
|   acetaminophen      | Take 650 mg by mouth |       |         |      |      | Activ |
| (TYLENOL) 325 MG     |  every 6 (six) hours |       |         |      |      | e     |
| tablet               |  as needed for Pain. |       |         |      |      |       |
+----------------------+----------------------+-------+---------+------+------+-------+
|   metoprolol         | Take 100 mg by mouth |       |         | 07/3 |      | Activ |
| (TOPROL-XL) 200 MG   |  every morning.      |       |         | 0/20 |      | e     |
| 24 hr tablet         |                      |       |         | 18   |      |       |
+----------------------+----------------------+-------+---------+------+------+-------+
|   warfarin           | Take 5 mg by mouth   |       |         |      |      | Activ |
| (COUMADIN) 5 MG      | daily. 1 pill daily  |       |         |      |      | e     |
| tablet               | Sun-Mon-Wed-Fri-Sat, |       |         |      |      |       |
|                      |  1/2 pill (2.5 mg)   |       |         |      |      |       |
|                      | Raul             |       |         |      |      |       |
+----------------------+----------------------+-------+---------+------+------+-------+
 
 
 
 Active Problems
 
 
+------------------------------------------------------------------+------------+
| Problem                                                          | Noted Date |
+------------------------------------------------------------------+------------+
| SOB (shortness of breath) on exertion                            | 10/24/2018 |
+------------------------------------------------------------------+------------+
| Anginal equivalent (HCC)                                         | 10/24/2018 |
+------------------------------------------------------------------+------------+
| Essential hypertension                                           | 10/24/2018 |
+------------------------------------------------------------------+------------+
| Coronary artery disease of native artery of native heart with    | 10/24/2018 |
| stable angina pectoris (HCC)                                     |            |
+------------------------------------------------------------------+------------+
| S/P drug eluting coronary stent placement                        | 10/24/2018 |
+------------------------------------------------------------------+------------+
| Status post insertion of drug-eluting stent into right coronary  | 10/24/2018 |
| artery for coronary artery disease                               |            |
+------------------------------------------------------------------+------------+
 
 
 
+----------------------------------------------------------------+
|   Overview:   3.0 x 16 mm Synergy MARA in right Posterolateral  |
| artery                                                         |
+----------------------------------------------------------------+
 
 
 
+--------------------------------+------------+
 
| Congestive heart failure (HCC) | 2018 |
+--------------------------------+------------+
 
 
 
+------------------------------------------------------------------+
|   Overview:   acute/chronic Diastolic Heart Failure, NYHA class  |
| III                                                              |
+------------------------------------------------------------------+
 
 
 
+---------------------------+------------+
| Atrial fibrillation (HCC) | 1996 |
+---------------------------+------------+
 
 
 
+-------------------------------------------------------+
|   Overview:   persistent since then, never attempted  |
| cardioversion                                         |
+-------------------------------------------------------+
 
 
 
+--------------------------------------------------+---+
| Pulmonary hypertension, moderate to severe (HCC) |   |
+--------------------------------------------------+---+
| Severe tricuspid regurgitation                   |   |
+--------------------------------------------------+---+
 
 
 
+---------------------------------+
|   Overview:   moderately severe |
+---------------------------------+
 
 
 
+-----------------------------+---+
| Severe mitral regurgitation |   |
+-----------------------------+---+
 
 
 
 Family History
 
 
+----------------------+----------+---------+--------------------------------------------+
| Medical History      | Relation | Name    | Comments                                   |
+----------------------+----------+---------+--------------------------------------------+
| CVA                  | Brother  | Isac    |                                            |
+----------------------+----------+---------+--------------------------------------------+
| Arthritis            | Daughter |         |                                            |
+----------------------+----------+---------+--------------------------------------------+
| CVA                  | Daughter |         |                                            |
+----------------------+----------+---------+--------------------------------------------+
| Obesity              | Daughter |         |                                            |
+----------------------+----------+---------+--------------------------------------------+
| Thyroid cancer       | Daughter | Devorah   |                                            |
 
+----------------------+----------+---------+--------------------------------------------+
| CVA                  | Father   |         | cerebral hemorrhage                        |
+----------------------+----------+---------+--------------------------------------------+
| Coronary Artery      | Father   |         |                                            |
| Disease              |          |         |                                            |
+----------------------+----------+---------+--------------------------------------------+
| CVA                  | Mother   |         | recurrent CVA at 74,  a few days later |
+----------------------+----------+---------+--------------------------------------------+
| Diabetes Mellitus II | Mother   |         |                                            |
+----------------------+----------+---------+--------------------------------------------+
| Heart failure        | Mother   |         |                                            |
+----------------------+----------+---------+--------------------------------------------+
| Leukemia             | Sister   | Khushbu | basophilic leukemia                        |
+----------------------+----------+---------+--------------------------------------------+
| Heart  Disease       | Sister   | Fabienne | ? thrombus, not clear                      |
+----------------------+----------+---------+--------------------------------------------+
| Deep vein thrombosis | Sister   | Rhea    |                                            |
+----------------------+----------+---------+--------------------------------------------+
 
 
 
+----------+---------+----------+--------------------------------------------+
| Relation | Name    | Status   | Comments                                   |
+----------+---------+----------+--------------------------------------------+
| Brother  | Isac    |  | CVA                                        |
|          |         |   (Age   |                                            |
|          |         | 75)      |                                            |
+----------+---------+----------+--------------------------------------------+
| Daughter |         |  | cerebral thrombosis post umbilical hernia  |
|          |         |   (Age   | repair                                     |
|          |         | 56)      |                                            |
+----------+---------+----------+--------------------------------------------+
| Daughter |         | Alive    |                                            |
+----------+---------+----------+--------------------------------------------+
| Daughter | Devorah   | Alive    |                                            |
+----------+---------+----------+--------------------------------------------+
| Father   |         |  | cerebral hemorrhage                        |
|          |         |   (Age   |                                            |
|          |         | 63)      |                                            |
+----------+---------+----------+--------------------------------------------+
| Mother   |         |  | CHF                                        |
|          |         |   (Age   |                                            |
|          |         | 74)      |                                            |
+----------+---------+----------+--------------------------------------------+
| Sister   | Khushbu |  |                                            |
|          |         |   (Age   |                                            |
|          |         | 56)      |                                            |
+----------+---------+----------+--------------------------------------------+
|    | Fabienne | Alive    |                                            |
+----------+---------+----------+--------------------------------------------+
|    | Rhea    | Alive    |                                            |
+----------+---------+----------+--------------------------------------------+
| Artis      |         | Alive    |                                            |
+----------+---------+----------+--------------------------------------------+
 
 
 
 Social History
 
 
 
+--------------+-------+-----------+--------+------+
| Tobacco Use  | Types | Packs/Day | Years  | Date |
|              |       |           | Used   |      |
+--------------+-------+-----------+--------+------+
| Never Smoker |       |           |        |      |
+--------------+-------+-----------+--------+------+
 
 
 
+---------------------+---+---+---+
| Smokeless Tobacco:  |   |   |   |
| Never Used          |   |   |   |
+---------------------+---+---+---+
 
 
 
+-------------+-----------+---------+----------+
| Alcohol Use | Drinks/We | oz/Week | Comments |
|             | ek        |         |          |
+-------------+-----------+---------+----------+
| No          |           |         |          |
+-------------+-----------+---------+----------+
 
 
 
+------------------+---------------+
| Sex Assigned at  | Date Recorded |
| Birth            |               |
+------------------+---------------+
| Not on file      |               |
+------------------+---------------+
 
 
 
 Last Filed Vital Signs
 
 
+-------------------+----------------------+-------------------------+
| Vital Sign        | Reading              | Time Taken              |
+-------------------+----------------------+-------------------------+
| Blood Pressure    | 126/66               | 2019  9:27 AM PDT |
+-------------------+----------------------+-------------------------+
| Pulse             | 65                   | 2019  9:27 AM PDT |
+-------------------+----------------------+-------------------------+
| Temperature       | 36.6   C (97.9   F)  | 10/25/2018 10:54 AM PDT |
+-------------------+----------------------+-------------------------+
| Respiratory Rate  | 16                   | 10/25/2018 10:54 AM PDT |
+-------------------+----------------------+-------------------------+
| Oxygen Saturation | 96%                  | 2019  9:27 AM PDT |
+-------------------+----------------------+-------------------------+
| Inhaled Oxygen    | -                    | -                       |
| Concentration     |                      |                         |
+-------------------+----------------------+-------------------------+
| Weight            | 66.8 kg (147 lb 3.2  | 2019  9:27 AM PDT |
|                   | oz)                  |                         |
+-------------------+----------------------+-------------------------+
| Height            | 160 cm (5' 3")       | 2019  9:27 AM PDT |
+-------------------+----------------------+-------------------------+
| Body Mass Index   | 26.08                | 2019  9:27 AM PDT |
+-------------------+----------------------+-------------------------+
 
 
 
 
 Plan of Treatment
 
 
+---------------------+-----------+-----------+----------+
| Health Maintenance  | Due Date  | Last Done | Comments |
+---------------------+-----------+-----------+----------+
| Vaccine:            |  |           |          |
| Dtap/Tdap/Td (1 -   | 1         |           |          |
| Tdap)               |           |           |          |
+---------------------+-----------+-----------+----------+
| Vaccine: Zoster (1  |  |           |          |
| of 2)               | 2         |           |          |
+---------------------+-----------+-----------+----------+
| DEXA SCAN SCREENING |  |           |          |
|                     | 7         |           |          |
+---------------------+-----------+-----------+----------+
| Vaccine:            |  |           |          |
| Pneumococcal 65+    | 7         |           |          |
| Low/Medium Risk (1  |           |           |          |
| of 2 - PCV13)       |           |           |          |
+---------------------+-----------+-----------+----------+
| Vaccine: Influenza  |  |           |          |
| (#1)                | 9         |           |          |
+---------------------+-----------+-----------+----------+
 
 
 
 Implants
 
 
+-------------------------------+-------+-------+-------------+--------+--------+--------+
| Implanted                     | Type  | Area  | Manufacture | Device | Expira | Model  |
|                               |       |       | r           |        | tion   | /      |
|                               |       |       |             | Identi | Date   | Serial |
|                               |       |       |             | fier   |        |  / Lot |
+-------------------------------+-------+-------+-------------+--------+--------+--------+
| Synergy                       | Stent | Heart | BOSTON      |        | / | [id number] |
| Stent-10/24/2018Implanted:    |       |       | SCIENTIFIC  |        | 2020   | [id number] |
| 10/24/2018 by [employer],         |       |       |             |        |        | 00 /   |
| Daniel PENN MD (Quantity not |       |       |             |        |        | [id number] |
|  on file)                     |       |       |             |        |        | 221    |
+-------------------------------+-------+-------+-------------+--------+--------+--------+
 
 
 
 Results
 Not on filefrom Last 3 Months
 
 Insurance
 
 
+------------------+--------+-------------+------+-------+----------------------+
| Payer            | Benefi | Subscriber  | Type | Phone | Address              |
|                  | t Plan | ID          |      |       |                      |
|                  |  /     |             |      |       |                      |
|                  | Group  |             |      |       |                      |
+------------------+--------+-------------+------+-------+----------------------+
 
| MEDICARE         | MEDICA | [id number] |      |       |   [address]        |
|                  | RE     |             |      |       | [place] [address] |
|                  | IP-OP  |             |      |       |                      |
+------------------+--------+-------------+------+-------+----------------------+
| COMMERCIAL OTHER | BANKER | [id number]   |      |       |                      |
|                  | S LIFE |             |      |       |                      |
|                  |  AND   |             |      |       |                      |
|                  | CASUAL |             |      |       |                      |
|                  | [place]     |             |      |       |                      |
+------------------+--------+-------------+------+-------+----------------------+
 
 
 
+--------------------+--------+-------------+--------+-------------+---------------------+
| Guarantor Name     | [employer] | Relation to | Date   | Phone       | Billing Address     |
|                    | t Type |  Patient    | of     |             |                     |
|                    |        |             | Birth  |             |                     |
+--------------------+--------+-------------+--------+-------------+---------------------+
| VONNIE HARP | Person | Self        | / |   Home:     |   [address]   |
|                    | al/Fam |             | 1932   | [phone] | [place]       |
|                    | [place]    |             |        | [address]        | [phone]          |
+--------------------+--------+-------------+--------+-------------+---------------------+

## 2020-01-01 NOTE — XMS
Encounter Summary
  Created on: 2020
 
 Vonnie Celaya
 External Reference #: [id number]
 : 32
 Sex: Female
 
 Demographics
 
 
+-----------------------+---------------------------+
| Address               | [address]            |
|                       | [place]  [address] |
+-----------------------+---------------------------+
| Home Phone            | [phone]           |
+-----------------------+---------------------------+
| Preferred Language    | Unknown                   |
+-----------------------+---------------------------+
| Marital Status        |                    |
+-----------------------+---------------------------+
| Zoroastrian Affiliation | Unknown                   |
+-----------------------+---------------------------+
| Race                  | Unknown                   |
+-----------------------+---------------------------+
| Ethnic Group          | Unknown                   |
+-----------------------+---------------------------+
 
 
 Author
 
 
+--------------+--------------------------------------------+
| Author       | [facility]  |
|              | [facility] [place]                                |
+--------------+--------------------------------------------+
| Organization | [facility]  |
|              | [facility] [place]                                |
+--------------+--------------------------------------------+
| Address      | Unknown                                    |
+--------------+--------------------------------------------+
| Phone        | Unavailable                                |
+--------------+--------------------------------------------+
 
 
 
 Support
 
 
+--------------+--------------+---------+-----------------+
| Name         | Relationship | Address | Phone           |
+--------------+--------------+---------+-----------------+
| Lauryn Leroy | ECON         | Unknown | [phone] |
+--------------+--------------+---------+-----------------+
 
 
 
 Care Team Providers
 
 
 
+------------------------+------+-----------------+
| Care Team Member Name  | Role | Phone           |
+------------------------+------+-----------------+
| Jean Bermudez MD | PCP  | [phone] |
+------------------------+------+-----------------+
 
 
 
 Encounter Details
 
 
+--------+-------------+----------------------+----------------------+-------------+
| Date   | Type        | Department           | Care Team            | Description |
+--------+-------------+----------------------+----------------------+-------------+
| 10/19/ | Orders Only |   [facility]      |   Varun Mitchell,   |             |
| 2018   |             | CARDIOLOGY TY  | MD [address] |             |
|        |             |  [address]    |   [address]  [place],   |             |
|        |             | [place], [place]         | [place] [address]             |             |
|        |             | [address]           | [phone]         |             |
|        |             | [phone]         | [phone] (Fax)   |             |
+--------+-------------+----------------------+----------------------+-------------+
 
 
 
 Social History
 
 
+----------------+-------+-----------+--------+------+
| Tobacco Use    | Types | Packs/Day | Years  | Date |
|                |       |           | Used   |      |
+----------------+-------+-----------+--------+------+
| Never Assessed |       |           |        |      |
+----------------+-------+-----------+--------+------+
 
 
 
+------------------+---------------+
| Sex Assigned at  | Date Recorded |
| Birth            |               |
+------------------+---------------+
| Not on file      |               |
+------------------+---------------+
 
 
 
+----------------+-------------+-------------+
| Job Start Date | Occupation  | Industry    |
+----------------+-------------+-------------+
| Not on file    | Not on file | Not on file |
+----------------+-------------+-------------+
 
 
 
+----------------+--------------+------------+
| Travel History | Travel Start | Travel End |
+----------------+--------------+------------+
 
 
 
 
+-------------------------------------+
| No recent travel history available. |
+-------------------------------------+
 documented as of this encounter
 
 Plan of Treatment
 
 
+--------+---------+------------+----------------------+-------------+
| Date   | Type    | Specialty  | Care Team            | Description |
+--------+---------+------------+----------------------+-------------+
| / | Office  | Cardiology |   [facility]    |             |
| 2020   | Visit   |            | [address]      |             |
|        |         |            | [address]   |             |
|        |         |            | [place] [address]   |             |
|        |         |            | [phone]         |             |
|        |         |            | [phone] (Fax)   |             |
+--------+---------+------------+----------------------+-------------+
 documented as of this encounter
 
 Visit Diagnoses
 Not on filedocumented in this encounter

## 2020-01-01 NOTE — XMS
Encounter Summary
  Created on: 2020
 
 Vonnie Celaya
 External Reference #: [id number]
 : 32
 Sex: Female
 
 Demographics
 
 
+-----------------------+------------------------+
| Address               | [address]         |
|                       | [place]  [address]   |
+-----------------------+------------------------+
| Home Phone            | [phone]        |
+-----------------------+------------------------+
| Preferred Language    | Unknown                |
+-----------------------+------------------------+
| Marital Status        | Single                 |
+-----------------------+------------------------+
| Restorationist Affiliation | NON                    |
+-----------------------+------------------------+
| Race                  | White                  |
+-----------------------+------------------------+
| Ethnic Group          | Not  or  |
+-----------------------+------------------------+
 
 
 Author
 
 
+--------------+------------------------------+
| Author       | [facility] |
+--------------+------------------------------+
| Organization | [facility] |
+--------------+------------------------------+
| Address      | Unknown                      |
+--------------+------------------------------+
| Phone        | Unavailable                  |
+--------------+------------------------------+
 
 
 
 Support
 
 
+--------------+--------------+---------+-----------------+
| Name         | Relationship | Address | Phone           |
+--------------+--------------+---------+-----------------+
| Lauryn Leroy | ECON         | Unknown | [phone] |
+--------------+--------------+---------+-----------------+
 
 
 
 Care Team Providers
 
 
 
+-----------------------+------+-------------+
| Care Team Member Name | Role | Phone       |
+-----------------------+------+-------------+
| No Pcp Per Patient    | PCP  | Unavailable |
+-----------------------+------+-------------+
 
 
 
 Reason for Referral
 Diagnostic Testing (Routine)
 
+--------+--------+---------------+--------------+--------------+---------------+
| Status | Reason | Specialty     | Diagnoses /  | Referred By  | Referred To   |
|        |        |               | Procedures   | Contact      | Contact       |
+--------+--------+---------------+--------------+--------------+---------------+
| Closed |        | Cardiac       |   Diagnoses  |              |   Car Cardiac |
|        |        | Catheterizati |  Mitral      | [place],  |  Cath Lab     |
|        |        | on            | valve        | Jame PARRA MD  | [address]   |
|        |        |               | stenosis,    |  [address]     | [address]  |
|        |        |               | unspecified  | [address]     | Rd  [facility]      |
|        |        |               | etiology     | [place], [place] | Hospital      |
|        |        |               | Procedures   |  [address]  | [place]  |
|        |        |               | CATH LAB INT |  Phone:      | [phone]    |
|        |        |               |  CORONARY    | [phone] | Phone:        |
|        |        |               | ANGIOGRAM    |   Fax:       | [phone]  |
|        |        |               | [place] [facility]     | [phone] |  Fax:         |
|        |        |               | CORONARY     |              | [phone]  |
|        |        |               | ARTERY ANGIO |              |               |
+--------+--------+---------------+--------------+--------------+---------------+
 
 
 
 
 Reason for Visit
 
 
+-----------+----------+
| Reason    | Comments |
+-----------+----------+
| Procedure |          |
+-----------+----------+
 
 
 
 Encounter Details
 
 
+--------+-----------+----------------------+----------------------+-------------+
| Date   | Type      | Department           | Care Team            | Description |
+--------+-----------+----------------------+----------------------+-------------+
| / | Telephone |   Cardiac Cath Lab   |   Jame Wilkins  | Procedure   |
|    |           | at [facility]  [address]  [address]  |             |
|        |           | [address]      | Ave  [place]    |             |
|        |           | [facility]        | [address]           |             |
|        |           | [place]         | [phone]         |             |
|        |           | [address]           | [phone] (Fax)   |             |
|        |           | [phone]         |                      |             |
+--------+-----------+----------------------+----------------------+-------------+
 
 
 
 
 Social History
 
 
+----------------+-------+-----------+--------+------+
| Tobacco Use    | Types | Packs/Day | Years  | Date |
|                |       |           | Used   |      |
+----------------+-------+-----------+--------+------+
| Never Assessed |       |           |        |      |
+----------------+-------+-----------+--------+------+
 
 
 
+------------------+---------------+
| Sex Assigned at  | Date Recorded |
| Birth            |               |
+------------------+---------------+
| Not on file      |               |
+------------------+---------------+
 
 
 
+----------------+-------------+-------------+
| Job Start Date | Occupation  | Industry    |
+----------------+-------------+-------------+
| Not on file    | Not on file | Not on file |
+----------------+-------------+-------------+
 
 
 
+----------------+--------------+------------+
| Travel History | Travel Start | Travel End |
+----------------+--------------+------------+
 
 
 
+-------------------------------------+
| No recent travel history available. |
+-------------------------------------+
 documented as of this encounter
 
 Plan of Treatment
 Not on filedocumented as of this encounter
 
 Procedures
 
 
+----------------+--------+-------------+----------------------+----------------------+
| Procedure Name | Priori | Date/Time   | Associated Diagnosis | Comments             |
|                | ty     |             |                      |                      |
+----------------+--------+-------------+----------------------+----------------------+
| LAB REPORTS    |        | 2018  |                      |   Results for this   |
|                |        | 12:00 AM    |                      | procedure are in the |
|                |        | PDT         |                      |  results section.    |
+----------------+--------+-------------+----------------------+----------------------+
 documented in this encounter
 
 Results
 CATH LAB INT CORONARY ANGIOGRAM (2018  9:37 AM PDT)
 
 
+----------+
| Specimen |
+----------+
|          |
+----------+
 
 
 
+-----------------------------------------------------------------------+----------------+
| Narrative                                                             | Performed At   |
+-----------------------------------------------------------------------+----------------+
|   Procedure performed in the Cardiac Cath Lab.   See procedure notes  |   [facility] -       |
| for   details.                                                        | [facility]  |
|                                                                       | POINT OF CARE  |
|                                                                       | TESTS          |
+-----------------------------------------------------------------------+----------------+
 
 
 
+----------------------+-------------------------+--------------------+--------------+
| Performing           | Address                 | City/State/Zipcode | Phone Number |
| Organization         |                         |                    |              |
+----------------------+-------------------------+--------------------+--------------+
|   [facility]     |   [address]  | [place], [place]       |              |
| [facility]  | [address]               | [address]         |              |
| TESTS                |                         |                    |              |
+----------------------+-------------------------+--------------------+--------------+
 LAB REPORTS (2018 12:00 AM PDT)
 
+----------------------------------------------------------+--------------+
| Narrative                                                | Performed At |
+----------------------------------------------------------+--------------+
|   This result has an attachment that is not available.   |              |
+----------------------------------------------------------+--------------+
 documented in this encounter
 
 Visit Diagnoses
 
 
+---------------------------------------------------------+
| Diagnosis                                               |
+---------------------------------------------------------+
|   Mitral valve stenosis, unspecified etiology - Primary |
+---------------------------------------------------------+
 documented in this encounter

## 2020-01-01 NOTE — XMS
Clinical Summary
  Created on: 2020
 
 Vonnie Harp
 External Reference #: [id number]
 : 32
 Sex: Female
 
 Demographics
 
 
+-----------------------+---------------------------+
| Address               | [address]           |
|                       | [place]  [address] |
+-----------------------+---------------------------+
| Home Phone            | [phone]           |
+-----------------------+---------------------------+
| Preferred Language    | Unknown                   |
+-----------------------+---------------------------+
| Marital Status        |                    |
+-----------------------+---------------------------+
| Alevism Affiliation | Unknown                   |
+-----------------------+---------------------------+
| Race                  | Unknown                   |
+-----------------------+---------------------------+
| Ethnic Group          | Unknown                   |
+-----------------------+---------------------------+
 
 
 Author
 
 
+--------------+------------------------------------------+
| Author       | [facility]  |
|              | [facility])                                 |
+--------------+------------------------------------------+
| Organization | [facility] (Historical as of  |
|              | 19)                                 |
+--------------+------------------------------------------+
| Address      | Unknown                                  |
+--------------+------------------------------------------+
| Phone        | Unavailable                              |
+--------------+------------------------------------------+
 
 
 
 Support
 
 
+--------------+--------------+---------+-----------------+
| Name         | Relationship | Address | Phone           |
+--------------+--------------+---------+-----------------+
| Lauryn Leroy | ECON         | Unknown | [phone] |
+--------------+--------------+---------+-----------------+
 
 
 
 Care Team Providers
 
 
 
+-----------------------+------+-----------------+
| Care Team Member Name | Role | Phone           |
+-----------------------+------+-----------------+
| Jean Bermudez MD  | PP   | [phone] |
+-----------------------+------+-----------------+
 
 
 
 Allergies
 
 
+----------------------+----------------------+----------+----------+-------------------+
| Active Allergy       | Reactions            | Severity | Noted    | Comments          |
|                      |                      |          | Date     |                   |
+----------------------+----------------------+----------+----------+-------------------+
| Morphine             | Other (See Comments) | Medium   | 20 |   Pt felt fidgety |
|                      |                      |          | 18       |                   |
+----------------------+----------------------+----------+----------+-------------------+
| Oxycodone-Acetaminop | Other (See Comments) | Medium   | 20 |   Pt felt fidgety |
| hen                  |                      |          | 18       |                   |
+----------------------+----------------------+----------+----------+-------------------+
 
 
 
 Current Medications
 
 
+----------------------+----------------------+-------+---------+------+------+-------+
| Prescription         | Sig.                 | Disp. | Refills | Star | End  | Statu |
|                      |                      |       |         | t    | Date | s     |
|                      |                      |       |         | Date |      |       |
+----------------------+----------------------+-------+---------+------+------+-------+
|   levothyroxine      | Take 100 mcg by      |       |         |      |      | Activ |
| (SYNTHROID) 100 MCG  | mouth every morning  |       |         |      |      | e     |
| tablet               | before breakfast.    |       |         |      |      |       |
+----------------------+----------------------+-------+---------+------+------+-------+
|   losartan (COZAAR)  | Take 100 mg by mouth |       |         |      |      | Activ |
| 100 MG tablet        |  daily.              |       |         |      |      | e     |
+----------------------+----------------------+-------+---------+------+------+-------+
|   simvastatin        | Take 10 mg by mouth  |       |         |      |      | Activ |
| (ZOCOR) 20 MG tablet | nightly.             |       |         |      |      | e     |
+----------------------+----------------------+-------+---------+------+------+-------+
|   potassium chloride | Take 20 mEq by mouth |       |         |      |      | Activ |
|  SA (K-DUR,KLOR-CON) |  daily.              |       |         |      |      | e     |
|  20 MEQ tablet       |                      |       |         |      |      |       |
+----------------------+----------------------+-------+---------+------+------+-------+
|   metFORMIN          | Take 500 mg by mouth |       |         |      |      | Activ |
| (GLUMETZA) 500 MG    |  2 (two) times daily |       |         |      |      | e     |
| (MOD) 24 hr tablet   |  with meals.         |       |         |      |      |       |
+----------------------+----------------------+-------+---------+------+------+-------+
|   magnesium chloride | Take 1 tablet by     |       |         |      |      | Activ |
|  (MAG64) 64 mg EC    | mouth daily.         |       |         |      |      | e     |
| tablet               |                      |       |         |      |      |       |
+----------------------+----------------------+-------+---------+------+------+-------+
|                      | Take 1 tablet by     |       |         |      |      | Activ |
| Gluc-Chonn-MSM-Boswe | mouth daily.         |       |         |      |      | e     |
| llia-Vit D           |                      |       |         |      |      |       |
| (GLUCOSAMINE CHOND   |                      |       |         |      |      |       |
 
| TRIPLE/VIT D) TABS   |                      |       |         |      |      |       |
+----------------------+----------------------+-------+---------+------+------+-------+
|   Cranberry 1000 MG  | Take 1 capsule by    |       |         |      |      | Activ |
| CAPS                 | mouth daily.         |       |         |      |      | e     |
+----------------------+----------------------+-------+---------+------+------+-------+
|   ascorbic acid      | Take 1,000 mg by     |       |         |      |      | Activ |
| (VITAMIN C) 1000 MG  | mouth daily.         |       |         |      |      | e     |
| tablet               |                      |       |         |      |      |       |
+----------------------+----------------------+-------+---------+------+------+-------+
|   furosemide (LASIX) | Take 40 mg by mouth  |       |         |      |      | Activ |
|  40 MG tablet        | daily.               |       |         |      |      | e     |
+----------------------+----------------------+-------+---------+------+------+-------+
|   acetaminophen      | Take 650 mg by mouth |       |         |      |      | Activ |
| (TYLENOL) 325 MG     |  every 6 (six) hours |       |         |      |      | e     |
| tablet               |  as needed for Pain. |       |         |      |      |       |
+----------------------+----------------------+-------+---------+------+------+-------+
|   metoprolol         | Take 100 mg by mouth |       |         | 07/3 |      | Activ |
| (TOPROL-XL) 200 MG   |  every morning.      |       |         | 0/20 |      | e     |
| 24 hr tablet         |                      |       |         | 18   |      |       |
+----------------------+----------------------+-------+---------+------+------+-------+
|   warfarin           | Take 5 mg by mouth   |       |         |      |      | Activ |
| (COUMADIN) 5 MG      | daily. 1 pill daily  |       |         |      |      | e     |
| tablet               | Sun-Mon-Wed-Fri-Sat, |       |         |      |      |       |
|                      |  1/2 pill (2.5 mg)   |       |         |      |      |       |
|                      | Raul             |       |         |      |      |       |
+----------------------+----------------------+-------+---------+------+------+-------+
 
 
 
 Active Problems
 
 
+------------------------------------------------------------------+------------+
| Problem                                                          | Noted Date |
+------------------------------------------------------------------+------------+
| SOB (shortness of breath) on exertion                            | 10/24/2018 |
+------------------------------------------------------------------+------------+
| Anginal equivalent (HCC)                                         | 10/24/2018 |
+------------------------------------------------------------------+------------+
| Essential hypertension                                           | 10/24/2018 |
+------------------------------------------------------------------+------------+
| Coronary artery disease of native artery of native heart with    | 10/24/2018 |
| stable angina pectoris (HCC)                                     |            |
+------------------------------------------------------------------+------------+
| S/P drug eluting coronary stent placement                        | 10/24/2018 |
+------------------------------------------------------------------+------------+
| Status post insertion of drug-eluting stent into right coronary  | 10/24/2018 |
| artery for coronary artery disease                               |            |
+------------------------------------------------------------------+------------+
 
 
 
+----------------------------------------------------------------+
|   Overview:   3.0 x 16 mm Synergy MARA in right Posterolateral  |
| artery                                                         |
+----------------------------------------------------------------+
 
 
 
+--------------------------------+------------+
 
| Congestive heart failure (HCC) | 2018 |
+--------------------------------+------------+
 
 
 
+------------------------------------------------------------------+
|   Overview:   acute/chronic Diastolic Heart Failure, NYHA class  |
| III                                                              |
+------------------------------------------------------------------+
 
 
 
+---------------------------+------------+
| Atrial fibrillation (HCC) | 1996 |
+---------------------------+------------+
 
 
 
+-------------------------------------------------------+
|   Overview:   persistent since then, never attempted  |
| cardioversion                                         |
+-------------------------------------------------------+
 
 
 
+--------------------------------------------------+---+
| Pulmonary hypertension, moderate to severe (HCC) |   |
+--------------------------------------------------+---+
| Severe tricuspid regurgitation                   |   |
+--------------------------------------------------+---+
 
 
 
+---------------------------------+
|   Overview:   moderately severe |
+---------------------------------+
 
 
 
+-----------------------------+---+
| Severe mitral regurgitation |   |
+-----------------------------+---+
 
 
 
 Family History
 
 
+----------------------+----------+---------+--------------------------------------------+
| Medical History      | Relation | Name    | Comments                                   |
+----------------------+----------+---------+--------------------------------------------+
| CVA                  | Brother  | Isac    |                                            |
+----------------------+----------+---------+--------------------------------------------+
| Arthritis            | Daughter |         |                                            |
+----------------------+----------+---------+--------------------------------------------+
| CVA                  | Daughter |         |                                            |
+----------------------+----------+---------+--------------------------------------------+
| Obesity              | Daughter |         |                                            |
+----------------------+----------+---------+--------------------------------------------+
| Thyroid cancer       | Daughter | Devorah   |                                            |
 
+----------------------+----------+---------+--------------------------------------------+
| CVA                  | Father   |         | cerebral hemorrhage                        |
+----------------------+----------+---------+--------------------------------------------+
| Coronary Artery      | Father   |         |                                            |
| Disease              |          |         |                                            |
+----------------------+----------+---------+--------------------------------------------+
| CVA                  | Mother   |         | recurrent CVA at 74,  a few days later |
+----------------------+----------+---------+--------------------------------------------+
| Diabetes Mellitus II | Mother   |         |                                            |
+----------------------+----------+---------+--------------------------------------------+
| Heart failure        | Mother   |         |                                            |
+----------------------+----------+---------+--------------------------------------------+
| Leukemia             | Sister   | Khushbu | basophilic leukemia                        |
+----------------------+----------+---------+--------------------------------------------+
| Heart  Disease       | Sister   | Fabienne | ? thrombus, not clear                      |
+----------------------+----------+---------+--------------------------------------------+
| Deep vein thrombosis | Sister   | Rhea    |                                            |
+----------------------+----------+---------+--------------------------------------------+
 
 
 
+----------+---------+----------+--------------------------------------------+
| Relation | Name    | Status   | Comments                                   |
+----------+---------+----------+--------------------------------------------+
| Brother  | Isac    |  | CVA                                        |
|          |         |   (Age   |                                            |
|          |         | 75)      |                                            |
+----------+---------+----------+--------------------------------------------+
| Daughter |         |  | cerebral thrombosis post umbilical hernia  |
|          |         |   (Age   | repair                                     |
|          |         | 56)      |                                            |
+----------+---------+----------+--------------------------------------------+
| Daughter |         | Alive    |                                            |
+----------+---------+----------+--------------------------------------------+
| Daughter | Devorah   | Alive    |                                            |
+----------+---------+----------+--------------------------------------------+
| Father   |         |  | cerebral hemorrhage                        |
|          |         |   (Age   |                                            |
|          |         | 63)      |                                            |
+----------+---------+----------+--------------------------------------------+
| Mother   |         |  | CHF                                        |
|          |         |   (Age   |                                            |
|          |         | 74)      |                                            |
+----------+---------+----------+--------------------------------------------+
| Sister   | Khushbu |  |                                            |
|          |         |   (Age   |                                            |
|          |         | 56)      |                                            |
+----------+---------+----------+--------------------------------------------+
|    | Fabienne | Alive    |                                            |
+----------+---------+----------+--------------------------------------------+
|    | Rhea    | Alive    |                                            |
+----------+---------+----------+--------------------------------------------+
| Artis      |         | Alive    |                                            |
+----------+---------+----------+--------------------------------------------+
 
 
 
 Social History
 
 
 
+--------------+-------+-----------+--------+------+
| Tobacco Use  | Types | Packs/Day | Years  | Date |
|              |       |           | Used   |      |
+--------------+-------+-----------+--------+------+
| Never Smoker |       |           |        |      |
+--------------+-------+-----------+--------+------+
 
 
 
+---------------------+---+---+---+
| Smokeless Tobacco:  |   |   |   |
| Never Used          |   |   |   |
+---------------------+---+---+---+
 
 
 
+-------------+-----------+---------+----------+
| Alcohol Use | Drinks/We | oz/Week | Comments |
|             | ek        |         |          |
+-------------+-----------+---------+----------+
| No          |           |         |          |
+-------------+-----------+---------+----------+
 
 
 
+------------------+---------------+
| Sex Assigned at  | Date Recorded |
| Birth            |               |
+------------------+---------------+
| Not on file      |               |
+------------------+---------------+
 
 
 
 Last Filed Vital Signs
 
 
+-------------------+----------------------+-------------------------+
| Vital Sign        | Reading              | Time Taken              |
+-------------------+----------------------+-------------------------+
| Blood Pressure    | 126/66               | 2019  9:27 AM PDT |
+-------------------+----------------------+-------------------------+
| Pulse             | 65                   | 2019  9:27 AM PDT |
+-------------------+----------------------+-------------------------+
| Temperature       | 36.6   C (97.9   F)  | 10/25/2018 10:54 AM PDT |
+-------------------+----------------------+-------------------------+
| Respiratory Rate  | 16                   | 10/25/2018 10:54 AM PDT |
+-------------------+----------------------+-------------------------+
| Oxygen Saturation | 96%                  | 2019  9:27 AM PDT |
+-------------------+----------------------+-------------------------+
| Inhaled Oxygen    | -                    | -                       |
| Concentration     |                      |                         |
+-------------------+----------------------+-------------------------+
| Weight            | 66.8 kg (147 lb 3.2  | 2019  9:27 AM PDT |
|                   | oz)                  |                         |
+-------------------+----------------------+-------------------------+
| Height            | 160 cm (5' 3")       | 2019  9:27 AM PDT |
+-------------------+----------------------+-------------------------+
| Body Mass Index   | 26.08                | 2019  9:27 AM PDT |
+-------------------+----------------------+-------------------------+
 
 
 
 
 Plan of Treatment
 
 
+---------------------+-----------+-----------+----------+
| Health Maintenance  | Due Date  | Last Done | Comments |
+---------------------+-----------+-----------+----------+
| Vaccine:            |  |           |          |
| Dtap/Tdap/Td (1 -   | 1         |           |          |
| Tdap)               |           |           |          |
+---------------------+-----------+-----------+----------+
| Vaccine: Zoster (1  |  |           |          |
| of 2)               | 2         |           |          |
+---------------------+-----------+-----------+----------+
| DEXA SCAN SCREENING |  |           |          |
|                     | 7         |           |          |
+---------------------+-----------+-----------+----------+
| Vaccine:            |  |           |          |
| Pneumococcal 65+    | 7         |           |          |
| Low/Medium Risk (1  |           |           |          |
| of 2 - PCV13)       |           |           |          |
+---------------------+-----------+-----------+----------+
| Vaccine: Influenza  |  |           |          |
| (#1)                | 9         |           |          |
+---------------------+-----------+-----------+----------+
 
 
 
 Implants
 
 
+-------------------------------+-------+-------+-------------+--------+--------+--------+
| Implanted                     | Type  | Area  | Manufacture | Device | Expira | Model  |
|                               |       |       | r           |        | tion   | /      |
|                               |       |       |             | Identi | Date   | Serial |
|                               |       |       |             | fier   |        |  / Lot |
+-------------------------------+-------+-------+-------------+--------+--------+--------+
| Synergy                       | Stent | Heart | BOSTON      |        | / | [id number] |
| Stent-10/24/2018Implanted:    |       |       | SCIENTIFIC  |        | 2020   | [id number] |
| 10/24/2018 by [employer],         |       |       |             |        |        | 00 /   |
| Daniel PENN MD (Quantity not |       |       |             |        |        | /[id number] |
|  on file)                     |       |       |             |        |        | 221    |
+-------------------------------+-------+-------+-------------+--------+--------+--------+
 
 
 
 Results
 Not on filefrom Last 3 Months
 
 Insurance
 
 
+------------------+--------+-------------+------+-------+----------------------+
| Payer            | Benefi | Subscriber  | Type | Phone | Address              |
|                  | t Plan | ID          |      |       |                      |
|                  |  /     |             |      |       |                      |
|                  | Group  |             |      |       |                      |
+------------------+--------+-------------+------+-------+----------------------+
 
| MEDICARE         | MEDICA | [id number] |      |       |   [address]        |
|                  | RE     |             |      |       | [place] [address] |
|                  | IP-OP  |             |      |       |                      |
+------------------+--------+-------------+------+-------+----------------------+
| COMMERCIAL OTHER | BANKER | [id number]   |      |       |                      |
|                  | S LIFE |             |      |       |                      |
|                  |  AND   |             |      |       |                      |
|                  | CASUAL |             |      |       |                      |
|                  | [place]     |             |      |       |                      |
+------------------+--------+-------------+------+-------+----------------------+
 
 
 
+--------------------+--------+-------------+--------+-------------+---------------------+
| Guarantor Name     | [employer] | Relation to | Date   | Phone       | Billing Address     |
|                    | t Type |  Patient    | of     |             |                     |
|                    |        |             | Birth  |             |                     |
+--------------------+--------+-------------+--------+-------------+---------------------+
| VONNIE HARP | Person | Self        | / |   Home:     |   [address]   |
|                    | al/Fam |             | 1932   | [phone] | [place]       |
|                    | [place]    |             |        | [address]        | [phone]          |
+--------------------+--------+-------------+--------+-------------+---------------------+

## 2020-01-01 NOTE — XMS
Clinical Summary
  Created on: 2020
 
 Vonnie Celaya
 External Reference #: [id number]
 : 32
 Sex: Female
 
 Demographics
 
 
+-----------------------+------------------------+
| Address               | [address]         |
|                       | [place]  [address]   |
+-----------------------+------------------------+
| Home Phone            | [phone]        |
+-----------------------+------------------------+
| Preferred Language    | Unknown                |
+-----------------------+------------------------+
| Marital Status        | Single                 |
+-----------------------+------------------------+
| Mu-ism Affiliation | NON                    |
+-----------------------+------------------------+
| Race                  | White                  |
+-----------------------+------------------------+
| Ethnic Group          | Not  or  |
+-----------------------+------------------------+
 
 
 Author
 
 
+--------------+---------------------+
| Author       | [facility] |
+--------------+---------------------+
| Organization | [facility] |
+--------------+---------------------+
| Address      | Unknown             |
+--------------+---------------------+
| Phone        | Unavailable         |
+--------------+---------------------+
 
 
 
 Support
 
 
+--------------+--------------+---------+-----------------+
| Name         | Relationship | Address | Phone           |
+--------------+--------------+---------+-----------------+
| Lauryn Leroy | ECON         | Unknown | [phone] |
+--------------+--------------+---------+-----------------+
 
 
 
 Care Team Providers
 
 
 
+------------------------+------+-----------------+
| Care Team Member Name  | Role | Phone           |
+------------------------+------+-----------------+
| Jean Bermudez MD | PCP  | [phone] |
+------------------------+------+-----------------+
 
 
 
 Source Comments
 [facility]
 
 Allergies
 
 
+----------------------+-----------+----------+----------+------------------+
| Active Allergy       | Reactions | Severity | Noted    | Comments         |
|                      |           |          | Date     |                  |
+----------------------+-----------+----------+----------+------------------+
| Morphine             | Anxiety   |          | 20 |   "felt fidgety" |
|                      |           |          | 18       |                  |
+----------------------+-----------+----------+----------+------------------+
| Oxycodone-Acetaminop | Anxiety   |          | 20 |   "felt fidgety" |
| hen                  |           |          | 18       |                  |
+----------------------+-----------+----------+----------+------------------+
 
 
 
 Medications
 
 
+----------------------+----------------------+-----------+---------+------+------+-------+
| Medication           | Sig                  | Dispensed | Refills | Star | End  | Statu |
|                      |                      |           |         | t    | Date | s     |
|                      |                      |           |         | Date |      |       |
+----------------------+----------------------+-----------+---------+------+------+-------+
|   losartan 100 mg    | Take 100 mg by mouth |           | 0       |      |      | Activ |
| oral tablet          |  once daily.         |           |         |      |      | e     |
+----------------------+----------------------+-----------+---------+------+------+-------+
|   potassium chloride | Take by mouth.       |           | 0       |      |      | Activ |
|  SR 20 mEq oral      |                      |           |         |      |      | e     |
| tablet,ER            |                      |           |         |      |      |       |
| particles/crystals   |                      |           |         |      |      |       |
+----------------------+----------------------+-----------+---------+------+------+-------+
|   simvastatin 20 mg  | Take by mouth once   |           | 0       |      |      | Activ |
| oral tablet          | daily in the         |           |         |      |      | e     |
|                      | evening.             |           |         |      |      |       |
+----------------------+----------------------+-----------+---------+------+------+-------+
|   MAGNESIUM CHLORIDE | Take by mouth.       |           | 0       |      |      | Activ |
|  ORAL                |                      |           |         |      |      | e     |
+----------------------+----------------------+-----------+---------+------+------+-------+
|   warfarin 5 mg oral | Take by mouth.       |           | 0       |      |      | Activ |
|  tablet              |                      |           |         |      |      | e     |
+----------------------+----------------------+-----------+---------+------+------+-------+
|   levothyroxine 100  | Take by mouth.       |           | 0       |      |      | Activ |
| mcg oral tablet      |                      |           |         |      |      | e     |
+----------------------+----------------------+-----------+---------+------+------+-------+
|   metFORMIN    | Take by mouth.       |           | 0       |      |      | Activ |
| mg oral tablet,ER    |                      |           |         |      |      | e     |
| alexis.retention 24 hr |                      |           |         |      |      |       |
 
+----------------------+----------------------+-----------+---------+------+------+-------+
|   acetaminophen 325  | Take by mouth.       |           | 0       |      |      | Activ |
| mg oral tablet       |                      |           |         |      |      | e     |
+----------------------+----------------------+-----------+---------+------+------+-------+
|   furosemide 40 mg   | Take by mouth.       |           | 0       |      |      | Activ |
| oral tablet          |                      |           |         |      |      | e     |
+----------------------+----------------------+-----------+---------+------+------+-------+
|   metoprolol         | Take 100 mg by mouth |           | 0       | 07/3 |      | Activ |
| succinate 200 mg     |  once daily.         |           |         | 0/20 |      | e     |
| oral tablet extended |                      |           |         | 18   |      |       |
|  release 24 hr       |                      |           |         |      |      |       |
+----------------------+----------------------+-----------+---------+------+------+-------+
|   ascorbic acid      | Take 500 mg by mouth |           | 0       |      |      | Activ |
| (vitamin C) 500 mg   |  once daily.         |           |         |      |      | e     |
| oral tablet          |                      |           |         |      |      |       |
+----------------------+----------------------+-----------+---------+------+------+-------+
|                      | Take 2 tablets by    |           | 0       |      |      | Activ |
| gluc/chnd/om3/dha/ep | mouth once daily.    |           |         |      |      | e     |
| a/fish/str           |                      |           |         |      |      |       |
| (GLUCOSAMINE         |                      |           |         |      |      |       |
| CHONDROITIN PLUS     |                      |           |         |      |      |       |
| ORAL)                |                      |           |         |      |      |       |
+----------------------+----------------------+-----------+---------+------+------+-------+
 
 
 
 Active Problems
 
 
+-------------------------------------------------------------------------------------------
--------------------------------------------------------------------------------------------
------------------+
| Patient Care Coordination Note                                                            
                                                                                            
                  |
+-------------------------------------------------------------------------------------------
--------------------------------------------------------------------------------------------
------------------+
|   Formatting of this note might be different from the original.18: discussed at     
                                                                                            
                  |
| MDR.  Not appropriate for valve in MAC--mitral orifice rather large and anteriorly        
                                                                                            
                  |
| there                                                                                     
                                                                                            
                  |
|                                                                                           
                                                                                            
                  |
| s nothing holding the s3 29 in place.  this patient has pure MR and the anterior leaflet  
                                                                                            
                  |
|  has preserved motion.  Too high a risk of atrial embolization.  LUCAS notified pt that we   
                                                                                            
                  |
| cannot offer an intervention. [id number] Pt discussed in HV Multi-D review. Team       
                                                                                            
                  |
| decided to offer valve-in-MAC, pending 3D reconstruction.  LUCAS and SC did 3D               
 
                                                                                            
                  |
| reconstruction, ok to proceed w/procedure.  Nicko from [facility] will need to be present    
                                                                                            
                  |
| for case.  LUCAS talking w/Femi Arambula at [facility].  [facility] has the CT and is working on   
                                                                                            
                  |
| getting Nicko's availability.Date of Procedure: Valve Size & Type: 29mm [id number]:         
                                                                                            
                  |
| RFVAnticoag PRE: warfarin, hold x5 days before procedure, bridge w/325 mg ASA.  No hold   
                                                                                            
                  |
| ASA prior to procedureAnticoag POST: plavix load, then warfarin and baby ASA Dental       
                                                                                            
                  |
| status: Special Needs: confirm HS note from 18 will work for 1st CTS consult, or if  
                                                                                            
                  |
|  HS can amend, or ?F/U Plan: 2 wk and 30 day w/KR Date Complete: 1st Surgeon  HS      
                                                                                            
                  |
| 2nd Surgeon  DS  Consent  needs Frailty  18; repeat 6" WT on day of PMC PMC     
                                                                                            
                  |
| needs Results for orders placed or performed in visit on 18 12 LEAD ECG Result      
                                                                                            
                  |
| Value Ref Range  VENTRICULAR RATE 80 bpm  ATRIAL RATE 0 ms  P-R INTERVAL  ms  P AXIS      
                                                                                            
                  |
| deg  QRS DURATION 107 ms   ms  QTCB 451 ms  R AXIS -48 deg  T AXIS 6 deg  ECG       
                                                                                            
                  |
| IMPRESSION Atrial fibrillation   ECG IMPRESSION Minimal ST depression, inferior leads     
                                                                                            
                  |
| ECG IMPRESSION     Minimal ST elevation, anterolateral leads- ABNORMAL ECG -  ECG         
                                                                                            
                  |
| IMPRESSION     Electronically signed by: IZABELA MARTI 2018 17:39:25 18:        
                                                                                            
                  |
| discussed at [facility] and seen at [facility].  Severe MR and distal RCA disease.  Plan is to stent   
                                                                                            
                  |
| the RCA (via primary cardiologist), then re-eval in HVC approx 3 mos s/p stent.  MODESTO said  
                                                                                            
                  |
|  to schedule as a 30" visit during her lunch on an [facility] day so that way could be added    
                                                                                            
                  |
| on to [facility] that afternoon if needed.rkt                                                   
                                                                                            
                  |
|Result Value Ref Range                                                                     
                                                                                            
                  |
| VENTRICULAR RATE 80 bpm                                                                   
 
                                                                                            
                  |
| ATRIAL RATE 0 ms                                                                          
                                                                                            
                  |
| P-R INTERVAL  ms                                                                          
                                                                                            
                  |
| P AXIS  deg                                                                               
                                                                                            
                  |
| QRS DURATION 107 ms                                                                       
                                                                                            
                  |
|  ms                                                                                 
                                                                                            
                  |
| QTCB 451 ms                                                                               
                                                                                            
                  |
| R AXIS -48 deg                                                                            
                                                                                            
                  |
| T AXIS 6 deg                                                                              
                                                                                            
                  |
| ECG IMPRESSION Atrial fibrillation                                                        
                                                                                            
                  |
| ECG IMPRESSION Minimal ST depression, inferior leads                                      
                                                                                            
                  |
| ECG IMPRESSION                                                                            
                                                                                            
                  |
|  Minimal ST elevation, anterolateral leads- ABNORMAL ECG -                                
                                                                                            
                  |
| ECG IMPRESSION                                                                            
                                                                                            
                  |
|  Electronically signed by: IZABELA MARTI 2018 17:39:25                               
                                                                                            
                  |
|                                                                                           
                                                                                            
                  |
|18: discussed at [facility] and seen at [facility].  Severe MR and distal RCA disease.  Plan is to 
stent the RCA (via primary cardiologist), then re-eval in HVC approx 3 mos s/p stent.  MODESTO sa
id to schedule as |
| a 30" visit during her lunch on an [facility] day so that way could be added on to [facility] that aft
ernoon if needed.                                                                           
                  |
|rkt                                                                                        
                                                                                            
                  |
+-------------------------------------------------------------------------------------------
--------------------------------------------------------------------------------------------
------------------+
 
 
 
 
+------------------------------+------------+
| Problem                      | Noted Date |
+------------------------------+------------+
| Mitral annular calcification | 2018 |
+------------------------------+------------+
| Mitral regurgitation         | 2018 |
+------------------------------+------------+
| Atrial fibrillation          | 2018 |
+------------------------------+------------+
 
 
 
 Family History
 
 
+-----------------+----------+------+----------------+
| Medical History | Relation | Name | Comments       |
+-----------------+----------+------+----------------+
| Heart Attack    | Father   |      |                |
+-----------------+----------+------+----------------+
| Stroke          | Father   |      |                |
+-----------------+----------+------+----------------+
| Heart Failure   | Mother   |      |                |
+-----------------+----------+------+----------------+
| Sudden Death    | Other    |      | likely from PE |
+-----------------+----------+------+----------------+
 
 
 
+----------+------+--------+----------+
| Relation | Name | Status | Comments |
+----------+------+--------+----------+
| Father   |      |        |          |
+----------+------+--------+----------+
| Mother   |      |        |          |
+----------+------+--------+----------+
| Other    |      |        |          |
+----------+------+--------+----------+
 
 
 
 Social History
 
 
+--------------+-------+-----------+--------+------+
| Tobacco Use  | Types | Packs/Day | Years  | Date |
|              |       |           | Used   |      |
+--------------+-------+-----------+--------+------+
| Never Smoker |       |           |        |      |
+--------------+-------+-----------+--------+------+
 
 
 
+---------------------+---+---+---+
| Smokeless Tobacco:  |   |   |   |
| Never Used          |   |   |   |
+---------------------+---+---+---+
 
 
 
 
+-------------+-------------+---------+----------+
| Alcohol Use | Drinks/Week | oz/Week | Comments |
+-------------+-------------+---------+----------+
| No          |             |         |          |
+-------------+-------------+---------+----------+
 
 
 
+------------------+---------------+
| Sex Assigned at  | Date Recorded |
| Birth            |               |
+------------------+---------------+
| Not on file      |               |
+------------------+---------------+
 
 
 
+----------------+-------------+-------------+
| Job Start Date | Occupation  | Industry    |
+----------------+-------------+-------------+
| Not on file    | Not on file | Not on file |
+----------------+-------------+-------------+
 
 
 
+----------------+--------------+------------+
| Travel History | Travel Start | Travel End |
+----------------+--------------+------------+
 
 
 
+-------------------------------------+
| No recent travel history available. |
+-------------------------------------+
 
 
 
 Last Filed Vital Signs
 
 
+-------------------+---------------------+----------------------+----------+
| Vital Sign        | Reading             | Time Taken           | Comments |
+-------------------+---------------------+----------------------+----------+
| Blood Pressure    | 123/62              | 2018  2:18 PM  |          |
|                   |                     | PST                  |          |
+-------------------+---------------------+----------------------+----------+
| Pulse             | 73                  | 2018  2:18 PM  |          |
|                   |                     | PST                  |          |
+-------------------+---------------------+----------------------+----------+
| Temperature       | 36.6   C (97.9   F) | 2018  2:18 PM  |          |
|                   |                     | PST                  |          |
+-------------------+---------------------+----------------------+----------+
| Respiratory Rate  | 14                  | 2018  2:18 PM  |          |
|                   |                     | PST                  |          |
+-------------------+---------------------+----------------------+----------+
| Oxygen Saturation | 99%                 | 2018  2:18 PM  |          |
|                   |                     | PST                  |          |
+-------------------+---------------------+----------------------+----------+
 
| Inhaled Oxygen    | -                   | -                    |          |
| Concentration     |                     |                      |          |
+-------------------+---------------------+----------------------+----------+
| Weight            | 67 kg (147 lb 11.3  | 2018  2:18 PM  |          |
|                   | oz)                 | PST                  |          |
+-------------------+---------------------+----------------------+----------+
| Height            | 160 cm (5' 3")      | 2018  2:09 PM  |          |
|                   |                     | PDT                  |          |
+-------------------+---------------------+----------------------+----------+
| Body Mass Index   | 26.17               | 2018  2:09 PM  |          |
|                   |                     | PDT                  |          |
+-------------------+---------------------+----------------------+----------+
 
 
 
 Plan of Treatment
 
 
+----------------------+-----------+---------------------------------+----------+
| Health Maintenance   | Due Date  | Last Done                       | Comments |
+----------------------+-----------+---------------------------------+----------+
| Pneumococcal         |  |                                 |          |
| vaccination (1 of 2  | 7         |                                 |          |
| - PCV13)             |           |                                 |          |
+----------------------+-----------+---------------------------------+----------+
| Influenza (Flu)      | 10/01/201 | 10/04/2017, 10/07/2016,         |          |
| vaccination (#1)     | 9         | 10/09/2014, Additional history  |          |
|                      |           | exists                          |          |
+----------------------+-----------+---------------------------------+----------+
 
 
 
 Results
 Not on filefrom Last 3 Months
 
 Insurance
 
 
+-------------+--------+-------------+--------+-------------+------------+--------+
| Payer       | Benefi | Subscriber  | Effect | Phone       | Address    | Type   |
|             | t Plan | ID          | vickey    |             |            |        |
|             |  /     |             | Dates  |             |            |        |
|             | Group  |             |        |             |            |        |
+-------------+--------+-------------+--------+-------------+------------+--------+
| MEDICARE    | MEDICA | xxxxxxxxxxx | 19 | [phone] |   [address]   | [employer] |
|             | RE A & |             | 97-Pre | 1           | [phone]       | re     |
|             |  B     |             | sent   |             | [place]  |        |
|             |        |             |        |             | [id number]      |        |
+-------------+--------+-------------+--------+-------------+------------+--------+
| COMMERCIAL  | INDIVI | xxxxxxxxxxx | 20 |             |            | Indemn |
| INDIVIDUAL  | DUAL   |             | 18-Pre |             |            | ity    |
|             | COMMER |             | sent   |             |            |        |
|             | CIAL   |             |        |             |            |        |
+-------------+--------+-------------+--------+-------------+------------+--------+
 
 
 
+--------------------+--------+-------------+--------+-------------+---------------------+
| Guarantor Name     | Accoun | Relation to | Date   | Phone       | Billing Address     |
|                    | t Type |  Patient    | of     |             |                     |
 
|                    |        |             | Birth  |             |                     |
+--------------------+--------+-------------+--------+-------------+---------------------+
| Vonnie Celaya | Person | Self        | / |             |   [address]   |
|                    | al/Fam |             | 1932   | [phone] | [place] [address] |
|                    | [place]    |             |        | 0 (Home)    |                     |
|                    |        |             |        | [phone] |                     |
|                    |        |             |        | 0 (Work)    |                     |
+--------------------+--------+-------------+--------+-------------+---------------------+
 
 
 
 Advance Directives
 
 
+-------------+-------------+-------------+----------+
| Code Status | Date        | Date        | Comments |
|             | Activated   | Inactivated |          |
+-------------+-------------+-------------+----------+
| Full Code   | 2018   | 2018   |          |
|             | 9:53 AM     | 11:28 PM    |          |
+-------------+-------------+-------------+----------+
 
 
 
+---------+-------------+-------------+---+
|         |             |             |   |
+---------+-------------+-------------+---+
| JOHN/ESTEVAN | 2018   | 2018   |   |
|         | 9:11 AM     | 6:10 PM     |   |
+---------+-------------+-------------+---+

## 2020-01-01 NOTE — XMS
Encounter Summary
  Created on: 2020
 
 Vonnie Celaya
 External Reference #: [id number]
 : 32
 Sex: Female
 
 Demographics
 
 
+-----------------------+---------------------------+
| Address               | [address]            |
|                       | [place]  [address] |
+-----------------------+---------------------------+
| Home Phone            | [phone]           |
+-----------------------+---------------------------+
| Preferred Language    | Unknown                   |
+-----------------------+---------------------------+
| Marital Status        |                    |
+-----------------------+---------------------------+
| Islam Affiliation | Unknown                   |
+-----------------------+---------------------------+
| Race                  | Unknown                   |
+-----------------------+---------------------------+
| Ethnic Group          | Unknown                   |
+-----------------------+---------------------------+
 
 
 Author
 
 
+--------------+--------------------------------------------+
| Author       | [facility]  |
|              | [facility] [place]                                |
+--------------+--------------------------------------------+
| Organization | [facility]  |
|              | [facility] [place]                                |
+--------------+--------------------------------------------+
| Address      | Unknown                                    |
+--------------+--------------------------------------------+
| Phone        | Unavailable                                |
+--------------+--------------------------------------------+
 
 
 
 Support
 
 
+--------------+--------------+---------+-----------------+
| Name         | Relationship | Address | Phone           |
+--------------+--------------+---------+-----------------+
| Lauryn Leroy | ECON         | Unknown | [phone] |
+--------------+--------------+---------+-----------------+
 
 
 
 Care Team Providers
 
 
 
+------------------------+------+-----------------+
| Care Team Member Name  | Role | Phone           |
+------------------------+------+-----------------+
| Jean Bermudez MD | PCP  | [phone] |
+------------------------+------+-----------------+
 
 
 
 Encounter Details
 
 
+--------+-------------+----------------------+----------------------+-------------+
| Date   | Type        | Department           | Care Team            | Description |
+--------+-------------+----------------------+----------------------+-------------+
| 10/19/ | Orders Only |   [facility]      |   Varun Mitchell,   |             |
| 2018   |             | CARDIOLOGY TY  | MD [address] |             |
|        |             |  [address]    |   [address]  [place],   |             |
|        |             | [place], [place]         | [place] [address]             |             |
|        |             | [address]           | [phone]         |             |
|        |             | [phone]         | [phone] (Fax)   |             |
+--------+-------------+----------------------+----------------------+-------------+
 
 
 
 Social History
 
 
+----------------+-------+-----------+--------+------+
| Tobacco Use    | Types | Packs/Day | Years  | Date |
|                |       |           | Used   |      |
+----------------+-------+-----------+--------+------+
| Never Assessed |       |           |        |      |
+----------------+-------+-----------+--------+------+
 
 
 
+------------------+---------------+
| Sex Assigned at  | Date Recorded |
| Birth            |               |
+------------------+---------------+
| Not on file      |               |
+------------------+---------------+
 
 
 
+----------------+-------------+-------------+
| Job Start Date | Occupation  | Industry    |
+----------------+-------------+-------------+
| Not on file    | Not on file | Not on file |
+----------------+-------------+-------------+
 
 
 
+----------------+--------------+------------+
| Travel History | Travel Start | Travel End |
+----------------+--------------+------------+
 
 
 
 
+-------------------------------------+
| No recent travel history available. |
+-------------------------------------+
 documented as of this encounter
 
 Plan of Treatment
 
 
+--------+---------+------------+----------------------+-------------+
| Date   | Type    | Specialty  | Care Team            | Description |
+--------+---------+------------+----------------------+-------------+
| / | Office  | Cardiology |   [facility]    |             |
| 2020   | Visit   |            | [address]      |             |
|        |         |            | [address]   |             |
|        |         |            | [place] [address]   |             |
|        |         |            | [phone]         |             |
|        |         |            | [phone] (Fax)   |             |
+--------+---------+------------+----------------------+-------------+
 documented as of this encounter
 
 Visit Diagnoses
 Not on filedocumented in this encounter

## 2020-01-01 NOTE — XMS
Encounter Summary
  Created on: 2020
 
 Vonnie Celaya
 External Reference #: [id number]
 : 32
 Sex: Female
 
 Demographics
 
 
+-----------------------+------------------------+
| Address               | [address]         |
|                       | [place]  [address]   |
+-----------------------+------------------------+
| Home Phone            | [phone]        |
+-----------------------+------------------------+
| Preferred Language    | Unknown                |
+-----------------------+------------------------+
| Marital Status        | Single                 |
+-----------------------+------------------------+
| Jainism Affiliation | NON                    |
+-----------------------+------------------------+
| Race                  | White                  |
+-----------------------+------------------------+
| Ethnic Group          | Not  or  |
+-----------------------+------------------------+
 
 
 Author
 
 
+--------------+------------------------------+
| Author       | [facility] |
+--------------+------------------------------+
| Organization | [facility] |
+--------------+------------------------------+
| Address      | Unknown                      |
+--------------+------------------------------+
| Phone        | Unavailable                  |
+--------------+------------------------------+
 
 
 
 Support
 
 
+--------------+--------------+---------+-----------------+
| Name         | Relationship | Address | Phone           |
+--------------+--------------+---------+-----------------+
| Lauryn Leroy | ECON         | Unknown | [phone] |
+--------------+--------------+---------+-----------------+
 
 
 
 Care Team Providers
 
 
 
+-----------------------+------+-------------+
| Care Team Member Name | Role | Phone       |
+-----------------------+------+-------------+
| No Pcp Per Patient    | PCP  | Unavailable |
+-----------------------+------+-------------+
 
 
 
 Reason for Referral
 Diagnostic Testing (Routine)
 
+--------+--------+---------------+--------------+--------------+---------------+
| Status | Reason | Specialty     | Diagnoses /  | Referred By  | Referred To   |
|        |        |               | Procedures   | Contact      | Contact       |
+--------+--------+---------------+--------------+--------------+---------------+
| Closed |        | Cardiac       |   Diagnoses  |              |   Car Cardiac |
|        |        | Catheterizati |  Mitral      | [place],  |  Cath Lab     |
|        |        | on            | valve        | Jame PARRA MD  | [address]   |
|        |        |               | stenosis,    |  [address]     | [address]  |
|        |        |               | unspecified  | [address]     | Rd  [facility]      |
|        |        |               | etiology     | [place], [place] | Hospital      |
|        |        |               | Procedures   |  [address]  | [place], [place]  |
|        |        |               | CATH LAB INT |  Phone:      | [phone]    |
|        |        |               |  CORONARY    | [phone] | Phone:        |
|        |        |               | ANGIOGRAM    |   Fax:       | [phone]  |
|        |        |               | SC R HRT     | [phone] |  Fax:         |
|        |        |               | CORONARY     |              | [phone]  |
|        |        |               | ARTERY ANGIO |              |               |
+--------+--------+---------------+--------------+--------------+---------------+
 
 
 
 
 Reason for Visit
 
 
+-----------+----------+
| Reason    | Comments |
+-----------+----------+
| Procedure |          |
+-----------+----------+
 
 
 
 Encounter Details
 
 
+--------+-----------+----------------------+----------------------+-------------+
| Date   | Type      | Department           | Care Team            | Description |
+--------+-----------+----------------------+----------------------+-------------+
| / | Telephone |   Cardiac Cath Lab   |   Jame Wilkins  | Procedure   |
|    |           | at [facility]  [address]  [address]  |             |
|        |           | [address]      | Ave  [place], [place]    |             |
|        |           | [facility]        | [address]           |             |
|        |           | [place]         | [phone]         |             |
|        |           | [address]           | [phone] (Fax)   |             |
|        |           | [phone]         |                      |             |
+--------+-----------+----------------------+----------------------+-------------+
 
 
 
 
 Social History
 
 
+----------------+-------+-----------+--------+------+
| Tobacco Use    | Types | Packs/Day | Years  | Date |
|                |       |           | Used   |      |
+----------------+-------+-----------+--------+------+
| Never Assessed |       |           |        |      |
+----------------+-------+-----------+--------+------+
 
 
 
+------------------+---------------+
| Sex Assigned at  | Date Recorded |
| Birth            |               |
+------------------+---------------+
| Not on file      |               |
+------------------+---------------+
 
 
 
+----------------+-------------+-------------+
| Job Start Date | Occupation  | Industry    |
+----------------+-------------+-------------+
| Not on file    | Not on file | Not on file |
+----------------+-------------+-------------+
 
 
 
+----------------+--------------+------------+
| Travel History | Travel Start | Travel End |
+----------------+--------------+------------+
 
 
 
+-------------------------------------+
| No recent travel history available. |
+-------------------------------------+
 documented as of this encounter
 
 Plan of Treatment
 Not on filedocumented as of this encounter
 
 Procedures
 
 
+----------------+--------+-------------+----------------------+----------------------+
| Procedure Name | Priori | Date/Time   | Associated Diagnosis | Comments             |
|                | ty     |             |                      |                      |
+----------------+--------+-------------+----------------------+----------------------+
| LAB REPORTS    |        | 2018  |                      |   Results for this   |
|                |        | 12:00 AM    |                      | procedure are in the |
|                |        | PDT         |                      |  results section.    |
+----------------+--------+-------------+----------------------+----------------------+
 documented in this encounter
 
 Results
 CATH LAB INT CORONARY ANGIOGRAM (2018  9:37 AM PDT)
 
 
+----------+
| Specimen |
+----------+
|          |
+----------+
 
 
 
+-----------------------------------------------------------------------+----------------+
| Narrative                                                             | Performed At   |
+-----------------------------------------------------------------------+----------------+
|   Procedure performed in the Cardiac Cath Lab.   See procedure notes  |   [facility] -       |
| for   details.                                                        | [facility]  |
|                                                                       | POINT OF CARE  |
|                                                                       | TESTS          |
+-----------------------------------------------------------------------+----------------+
 
 
 
+----------------------+-------------------------+--------------------+--------------+
| Performing           | Address                 | City/State/Zipcode | Phone Number |
| Organization         |                         |                    |              |
+----------------------+-------------------------+--------------------+--------------+
|   [facility]     |   [address]  | [place], [place]       |              |
| [facility]  | [address]               | [address]         |              |
| TESTS                |                         |                    |              |
+----------------------+-------------------------+--------------------+--------------+
 LAB REPORTS (2018 12:00 AM PDT)
 
+----------------------------------------------------------+--------------+
| Narrative                                                | Performed At |
+----------------------------------------------------------+--------------+
|   This result has an attachment that is not available.   |              |
+----------------------------------------------------------+--------------+
 documented in this encounter
 
 Visit Diagnoses
 
 
+---------------------------------------------------------+
| Diagnosis                                               |
+---------------------------------------------------------+
|   Mitral valve stenosis, unspecified etiology - Primary |
+---------------------------------------------------------+
 documented in this encounter

## 2020-01-01 NOTE — XMS
Encounter Summary
  Created on: 2020
 
 Vonnie Celaya
 External Reference #: [id number]
 : 32
 Sex: Female
 
 Demographics
 
 
+-----------------------+------------------------+
| Address               | [address]         |
|                       | [place]  [address]   |
+-----------------------+------------------------+
| Home Phone            | [phone]        |
+-----------------------+------------------------+
| Preferred Language    | Unknown                |
+-----------------------+------------------------+
| Marital Status        | Single                 |
+-----------------------+------------------------+
| Muslim Affiliation | NON                    |
+-----------------------+------------------------+
| Race                  | White                  |
+-----------------------+------------------------+
| Ethnic Group          | Not  or  |
+-----------------------+------------------------+
 
 
 Author
 
 
+--------------+------------------------------+
| Author       | [facility] |
+--------------+------------------------------+
| Organization | [facility] |
+--------------+------------------------------+
| Address      | Unknown                      |
+--------------+------------------------------+
| Phone        | Unavailable                  |
+--------------+------------------------------+
 
 
 
 Support
 
 
+--------------+--------------+---------+-----------------+
| Name         | Relationship | Address | Phone           |
+--------------+--------------+---------+-----------------+
| Lauryn Leroy | ECON         | Unknown | [phone] |
+--------------+--------------+---------+-----------------+
 
 
 
 Care Team Providers
 
 
 
+------------------------+------+-----------------+
| Care Team Member Name  | Role | Phone           |
+------------------------+------+-----------------+
| Jean Bermudez MD | PCP  | [phone] |
+------------------------+------+-----------------+
 
 
 
 Reason for Visit
 
 
+---------------------+----------+
| Reason              | Comments |
+---------------------+----------+
| Education procedure | [facility]      |
+---------------------+----------+
 
 
 
 Encounter Details
 
 
+--------+-----------+----------------------+----------------------+----------------------+
| Date   | Type      | Department           | Care Team            | Description          |
+--------+-----------+----------------------+----------------------+----------------------+
| / | Telephone |   Cardiac Cath Lab   |   Sandra Joseph RN   | Education procedure  |
| 2018   |           | at [facility]  [address]  | [address]  | ([facility])                |
|        |           | [address]      | [place]  [place]   |                      |
|        |           | [facility]        | [place] [address]        |                      |
|        |           | [place], [place]         |                      |                      |
|        |           | [address]           |                      |                      |
|        |           | [phone]         |                      |                      |
+--------+-----------+----------------------+----------------------+----------------------+
 
 
 
 Social History
 
 
+----------------+-------+-----------+--------+------+
| Tobacco Use    | Types | Packs/Day | Years  | Date |
|                |       |           | Used   |      |
+----------------+-------+-----------+--------+------+
| Never Assessed |       |           |        |      |
+----------------+-------+-----------+--------+------+
 
 
 
+------------------+---------------+
| Sex Assigned at  | Date Recorded |
| Birth            |               |
+------------------+---------------+
| Not on file      |               |
+------------------+---------------+
 
 
 
+----------------+-------------+-------------+
| Job Start Date | Occupation  | Industry    |
+----------------+-------------+-------------+
 
| Not on file    | Not on file | Not on file |
+----------------+-------------+-------------+
 
 
 
+----------------+--------------+------------+
| Travel History | Travel Start | Travel End |
+----------------+--------------+------------+
 
 
 
+-------------------------------------+
| No recent travel history available. |
+-------------------------------------+
 documented as of this encounter
 
 Plan of Treatment
 Not on filedocumented as of this encounter
 
 Visit Diagnoses
 Not on filedocumented in this encounter

## 2020-01-01 NOTE — XMS
Encounter Summary
  Created on: 2020
 
 Vonnie Celaya
 External Reference #: [id number]
 : 32
 Sex: Female
 
 Demographics
 
 
+-----------------------+---------------------------+
| Address               | [address]            |
|                       | [place]  [address] |
+-----------------------+---------------------------+
| Home Phone            | [phone]           |
+-----------------------+---------------------------+
| Preferred Language    | Unknown                   |
+-----------------------+---------------------------+
| Marital Status        |                    |
+-----------------------+---------------------------+
| Sikhism Affiliation | Unknown                   |
+-----------------------+---------------------------+
| Race                  | Unknown                   |
+-----------------------+---------------------------+
| Ethnic Group          | Unknown                   |
+-----------------------+---------------------------+
 
 
 Author
 
 
+--------------+--------------------------------------------+
| Author       | [facility]  |
|              | [facility] [place]                                |
+--------------+--------------------------------------------+
| Organization | [facility]  |
|              | [facility] [place]                                |
+--------------+--------------------------------------------+
| Address      | Unknown                                    |
+--------------+--------------------------------------------+
| Phone        | Unavailable                                |
+--------------+--------------------------------------------+
 
 
 
 Support
 
 
+--------------+--------------+---------+-----------------+
| Name         | Relationship | Address | Phone           |
+--------------+--------------+---------+-----------------+
| Laruyn Leroy | ECON         | Unknown | [phone] |
+--------------+--------------+---------+-----------------+
 
 
 
 Care Team Providers
 
 
 
+------------------------+------+-----------------+
| Care Team Member Name  | Role | Phone           |
+------------------------+------+-----------------+
| Jean Bermudez MD | PCP  | [phone] |
+------------------------+------+-----------------+
 
 
 
 Encounter Details
 
 
+--------+-------------+----------------------+--------------------+-------------+
| Date   | Type        | Department           | Care Team          | Description |
+--------+-------------+----------------------+--------------------+-------------+
| 10/30/ | Orders Only |   [facility]     |   Conversion       |             |
| 2018   |             | CONVERSION DEP  [address]  | Transaction,       |             |
|        |             | [address]           | Provider Unknown   |             |
|        |             | [place]         | [phone]       |             |
|        |             | [address]           | [phone] (Fax) |             |
|        |             | [phone]         |                    |             |
+--------+-------------+----------------------+--------------------+-------------+
 
 
 
 Social History
 
 
+----------------+-------+-----------+--------+------+
| Tobacco Use    | Types | Packs/Day | Years  | Date |
|                |       |           | Used   |      |
+----------------+-------+-----------+--------+------+
| Never Assessed |       |           |        |      |
+----------------+-------+-----------+--------+------+
 
 
 
+------------------+---------------+
| Sex Assigned at  | Date Recorded |
| Birth            |               |
+------------------+---------------+
| Not on file      |               |
+------------------+---------------+
 
 
 
+----------------+-------------+-------------+
| Job Start Date | Occupation  | Industry    |
+----------------+-------------+-------------+
| Not on file    | Not on file | Not on file |
+----------------+-------------+-------------+
 
 
 
+----------------+--------------+------------+
| Travel History | Travel Start | Travel End |
+----------------+--------------+------------+
 
 
 
 
+-------------------------------------+
| No recent travel history available. |
+-------------------------------------+
 documented as of this encounter
 
 Plan of Treatment
 
 
+--------+---------+------------+----------------------+-------------+
| Date   | Type    | Specialty  | Care Team            | Description |
+--------+---------+------------+----------------------+-------------+
| / | Office  | Cardiology |   [facility]    |             |
| 2020   | Visit   |            | [address]      |             |
|        |         |            | [address]   |             |
|        |         |            | [place] [id number]   |             |
|        |         |            | [phone]         |             |
|        |         |            | [phone] (Fax)   |             |
+--------+---------+------------+----------------------+-------------+
 documented as of this encounter
 
 Visit Diagnoses
 Not on filedocumented in this encounter

## 2020-01-01 NOTE — XMS
Encounter Summary
  Created on: 2020
 
 Vonnie Celaya
 External Reference #: [id number]
 : 32
 Sex: Female
 
 Demographics
 
 
+-----------------------+------------------------+
| Address               | [address]         |
|                       | [place]  [address]   |
+-----------------------+------------------------+
| Home Phone            | [phone]        |
+-----------------------+------------------------+
| Preferred Language    | Unknown                |
+-----------------------+------------------------+
| Marital Status        | Single                 |
+-----------------------+------------------------+
| Restorationist Affiliation | NON                    |
+-----------------------+------------------------+
| Race                  | White                  |
+-----------------------+------------------------+
| Ethnic Group          | Not  or  |
+-----------------------+------------------------+
 
 
 Author
 
 
+--------------+------------------------------+
| Author       | [facility] |
+--------------+------------------------------+
| Organization | [facility] |
+--------------+------------------------------+
| Address      | Unknown                      |
+--------------+------------------------------+
| Phone        | Unavailable                  |
+--------------+------------------------------+
 
 
 
 Support
 
 
+--------------+--------------+---------+-----------------+
| Name         | Relationship | Address | Phone           |
+--------------+--------------+---------+-----------------+
| Lauryn Leroy | ECON         | Unknown | [phone] |
+--------------+--------------+---------+-----------------+
 
 
 
 Care Team Providers
 
 
 
+------------------------+------+-----------------+
| Care Team Member Name  | Role | Phone           |
+------------------------+------+-----------------+
| Jean Bermudez MD | PCP  | [phone] |
+------------------------+------+-----------------+
 
 
 
 Reason for Visit
 
 
+--------------+----------+
| Reason       | Comments |
+--------------+----------+
| New patient  |          |
| consultation |          |
+--------------+----------+
 Consultation (Routine)
 
+--------+--------+------------+--------------+--------------+---------------+
| Status | Reason | Specialty  | Diagnoses /  | Referred By  | Referred To   |
|        |        |            | Procedures   | Contact      | Contact       |
+--------+--------+------------+--------------+--------------+---------------+
| Closed |        | Cardiology |   Diagnoses  |   Stephen,      |   Markus Smart |
|        |        |            |              | Varun PARRA,   |  MD Craig     |
|        |        |            | Nonrheumatic | MD  [address]     | [address]   |
|        |        |            |  mitral      | [address]  | [address]  |
|        |        |            | (valve)      |  [address]       | [address]  [place], |
|        |        |            | insufficienc | [place], [place] |  [place]           |
|        |        |            | y  Rheumatic |  [address]       | [address]    |
|        |        |            |  tricuspid   | Phone:       | Phone:        |
|        |        |            | insufficienc | [phone] | [phone]  |
|        |        |            | y  Pulmonary |   Fax:       |  Fax:         |
|        |        |            |              | [phone] | [phone]  |
|        |        |            | hypertension |              |               |
|        |        |            | ,            |              |               |
|        |        |            | unspecified  |              |               |
|        |        |            |  Procedures  |              |               |
|        |        |            |  WA NEW      |              |               |
|        |        |            | PATIENT      |              |               |
|        |        |            | LEVEL V      |              |               |
+--------+--------+------------+--------------+--------------+---------------+
 
 
 
 
 Encounter Details
 
 
+--------+---------+----------------------+----------------------+----------------------+
| Date   | Type    | Department           | Care Team            | Description          |
+--------+---------+----------------------+----------------------+----------------------+
| / | Office  |   Cardiology Complex |   Markus Smart,  | Non-rheumatic mitral |
| 2018   | Visit   |  Valve at [facility]  [address]  | MD  [address]      |  regurgitation       |
|        |         | [address]     | [address]      | (Primary Dx); Mitral |
|        |         | Mailcode: [facility]      | [place], [place]         |  annular             |
|        |         | [facility] | [address]           | calcification        |
|        |         |  [address]  [place],  | [phone]         |                      |
|        |         | [place] [address]        | [phone] (Fax)   |                      |
|        |         | [phone]         |                      |                      |
 
+--------+---------+----------------------+----------------------+----------------------+
 
 
 
 Social History
 
 
+--------------+-------+-----------+--------+------+
| Tobacco Use  | Types | Packs/Day | Years  | Date |
|              |       |           | Used   |      |
+--------------+-------+-----------+--------+------+
| Never Smoker |       |           |        |      |
+--------------+-------+-----------+--------+------+
 
 
 
+---------------------+---+---+---+
| Smokeless Tobacco:  |   |   |   |
| Never Used          |   |   |   |
+---------------------+---+---+---+
 
 
 
+-------------+-------------+---------+----------+
| Alcohol Use | Drinks/Week | oz/Week | Comments |
+-------------+-------------+---------+----------+
| No          |             |         |          |
+-------------+-------------+---------+----------+
 
 
 
+------------------+---------------+
| Sex Assigned at  | Date Recorded |
| Birth            |               |
+------------------+---------------+
| Not on file      |               |
+------------------+---------------+
 
 
 
+----------------+-------------+-------------+
| Job Start Date | Occupation  | Industry    |
+----------------+-------------+-------------+
| Not on file    | Not on file | Not on file |
+----------------+-------------+-------------+
 
 
 
+----------------+--------------+------------+
| Travel History | Travel Start | Travel End |
+----------------+--------------+------------+
 
 
 
+-------------------------------------+
| No recent travel history available. |
+-------------------------------------+
 documented as of this encounter
 
 Last Filed Vital Signs
 
 
 
+-------------------+--------------------+----------------------+----------+
| Vital Sign        | Reading            | Time Taken           | Comments |
+-------------------+--------------------+----------------------+----------+
| Blood Pressure    | 110/59             | 2018  2:09 PM  |          |
|                   |                    | PDT                  |          |
+-------------------+--------------------+----------------------+----------+
| Pulse             | 78                 | 2018  2:09 PM  |          |
|                   |                    | PDT                  |          |
+-------------------+--------------------+----------------------+----------+
| Temperature       | 36.7   C (98   F)  | 2018  2:09 PM  |          |
|                   |                    | PDT                  |          |
+-------------------+--------------------+----------------------+----------+
| Respiratory Rate  | 20                 | 2018  2:09 PM  |          |
|                   |                    | PDT                  |          |
+-------------------+--------------------+----------------------+----------+
| Oxygen Saturation | 99%                | 2018  2:09 PM  |          |
|                   |                    | PDT                  |          |
+-------------------+--------------------+----------------------+----------+
| Inhaled Oxygen    | -                  | -                    |          |
| Concentration     |                    |                      |          |
+-------------------+--------------------+----------------------+----------+
| Weight            | 66 kg (145 lb 9.6  | 2018  2:09 PM  |          |
|                   | oz)                | PDT                  |          |
+-------------------+--------------------+----------------------+----------+
| Height            | 160 cm (5' 3")     | 2018  2:09 PM  |          |
|                   |                    | PDT                  |          |
+-------------------+--------------------+----------------------+----------+
| Body Mass Index   | 25.79              | 2018  2:09 PM  |          |
|                   |                    | PDT                  |          |
+-------------------+--------------------+----------------------+----------+
 documented in this encounter
 
 Progress Notes
 Markus Smart MD - 2018  2:00 PM PDTFormatting of this note might be different fr
om the original.
 
 Author:   MARKUS SMART MD
 Referring Provider: Layla Greer 
 
 HPI: Mrs Celaya is a very pleasant 85 y/o female with a hx of recurrent heart failure adm
ission for "diastolic heart failure" and valvular heart disease over the past 6 months who i
s referred for considerations of options to treat severe mitral annular calcification and se
mauricio mitral regurgitation. She has a past med Hx signifncat for persistent Afib, Tyep 2 DM, 
hypertension, hyperlipidemia, hypothyroidism, iron deficiency anemia, pulmonary hypertension
, moderate to severe TR, and severe MR.  
 
 Mrs Celaya, lives in [facility] and is brought in by her daughter today who lives w
ith her.  They report that she has been experiencing progressive GARCIA that has been slow and 
progressive for many years. She was first hospitalized at [facility] in [facility] i
n 2018 with s/s of heart failure. Over the course of the spring she has noticed new
 and progressive GARCIA as well as new LE edema. She was previously active but could no longer 
walk a 1/4 block. She was re-admitted to the hospital in [facility] in May 2018 again with he
art failure.
 Echo in may was notable for normal to hyperdynamic LV systolic function, severe MAC, severe
 MR, moderate to severe TR, and mild AS. Additionally it showed moderate to severe Pulm HTN 
as well as left to right shnt across tunneled PFO. She has followed up with Dr Stephen Mitchell 
at [facility] in the [facility] and referred to [facility] for consideration of MitraClip
.  
 
 
 Since May she has had no further decompensated heart failure episodes 
 
 To expidite her evaluation, she has already been referred and undergone TTE, JENIFFER, CT scan o
f chest a week ago a well as angiogrpahy yesterday on 2018. 
 
 Symptoms & Exercise/Activity Levels:
 Lives in [facility] with her Daughter in a 2 story house. She lives in the ground floor and 
her Daughter lives up stairs. She She has 7 steps to get up into the house and can still matthew
erate gettign in to the house but is markedly winded and would have to rest going up stirs (
19 steps) When walkinf up the stairs he endorses no chest pain but predominatly GARCIA.  Her ac
tivity is limited and does not describe chest discomfort in her brief activities. She is abl
e to make her bed but feels tired afterwards. 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  Any little activty such 
as mopping or vaccuming makes her short of breath and tired.  She is worn out after activiti
es and needs to rest. She walks outside of the home with a large walking stick and no walker
. 
 
 Denies any swellign in her legs since May 2018 but does wear compression stockings.  Has no
w been on Lasix 40 mg daily was previously on 20 daily prior to May and has at stable. She i
s ana tto lay flat in bed at night and at first there is orthopnea but after a couple deep 
breaths she can tolerate it. She has no PND.  
 Currently she is limited by lack of energy.  On some days she can walk up to a mile, but sh
e no longer feels comfortable shopping by herself 
 
 [place] summer Her energy level was great. She previously was working out at [employer]. She would g
o out ot her [employer] groups.  Was completely independent  In all activities and shopping co
oking and cleaning. She typically has lunch outside of the house, cooks her own simple dinne
rs, and has oatmeal for breakfast.  
 
 She has had an appointment for consultation with Dr. Mitchell who will continue to follow her u
p in [facility].  
 
 The patient denies rest or exertional chest pain or shortness of breath, palpitations, pres
yncope, syncope, PND, orthopnea, abdominal swelling, lower extremity edema, or claudication.
 
 
 Review of Systems:
 14 point review of systems as stated in the HPI, otherwise remainder is negative.  
 
    
 Past Medical History: 
 Diagnosis Date 
   Anemia  
  resolved with iron supplements - no large GI bleed, negative EGD. 
   Atrial fibrillation (HCC)  
   Hyperlipidemia  
   Hypertension  
   Hypothyroid  
   Pulmonary hypertension (HCC)  
   Type 2 diabetes mellitus (HCC)  
 
  
 No past surgical history on file.
 
 Current Medications Include:
 
   
 Current Medication List  
 Name Sig 
 
 ACETAMINOPHEN 325 MG TABLET Take by mouth. 
 ASCORBIC ACID (VITAMIN C) 500 MG TABLET Take 500 mg by mouth once daily. 
 CHOLECALCIFEROL (VITAMIN D3) 2,000 UNIT CAPSULE Take by mouth. 
 FUROSEMIDE 40 MG TABLET Take by mouth. 
 GLUCOSAMINE CHONDROITIN PLUS ORAL Take 2 tablets by mouth once daily. 
 LEVOTHYROXINE 100 MCG TABLET Take by mouth. 
 LOSARTAN 100 MG TABLET Take 100 mg by mouth once daily. 
 MAGNESIUM CHLORIDE ORAL Take by mouth. 
 METFORMIN  MG 24 HR TABLET,EXTENDED RELEASE Take by mouth. 
 METOPROLOL SUCCINATE  MG TABLET,EXTENDED RELEASE 24 HR Take 100 mg by mouth once arturo
y.  
 POTASSIUM CHLORIDE ER 20 MEQ TABLET,EXTENDED RELEASE(PART/CRYST) Take by mouth. 
 SIMVASTATIN 20 MG TABLET Take by mouth once daily in the evening.  
 WARFARIN 5 MG TABLET Take by mouth. 
 
 
     
 Allergies 
 Allergen Reactions 
   Morphine Anxiety 
   "felt fidgety" 
   Percocet [Oxycodone-Acetaminophen] Anxiety 
   "felt fidgety" 
 
 
 SocialHistory 
 Social History 
 
     
 Social History 
   Marital status: Single 
   Spouse name: N/A 
   Number of children: N/A 
   Years of education: N/A 
 
   
 Occupational History 
   Not on file. 
 
    
 Social History Main Topics 
   Smoking status: Never Smoker 
   Smokeless tobacco: Never Used 
   Alcohol use No 
   Drug use: No 
   Sexual activity: Not on file 
 
    
 Other Topics Concern 
   Not on file 
 
   
 Social History Narrative 
   No narrative on file 
  
 
 FAmily Hx: non contrinbutory.  
 
 Physical Exam: 
 
 
 Last Vitals: /59 | Pulse 78 | Temp (Src) 36.7 C (98 F) (Oral) | RR 20 | Ht 1.6 m 
(5' 3") | Wt 66 kg (145 lb 9.6 oz) | SpO2 99% | BMI 25.79 kg/(m^2)
 Body mass index is 25.79 kg/m.
 
 General Appearance:  Ederly appearing, well developed well nourished adult female appearing
 stated age in NAD.  
 HEENT:  PERRLA, EOMI, mouth without lesions.
 Neck:  Neck w/o LAD
 Respiratory: CTA bilaterally. No wheezes or rales
 Cardiovascular: JVP at 5 cm.  2+ carotid pulses without bruits.  PMI nondisplaced. RRR. Nor
mal S1 covered & soft S2.  3/6 holosystolic murmur best audible at LLSB radiating to axilla.
  No gallops, or rubs. 2+  distal pulses. 
 Gastrointestinal: +BS, soft, nondistended, nontender, no abdominal bruits
 Musculoskeletal: No gross deformities
 Extremities: No cyanosis, clubbing, or edema.
 Neurologic: Grossly nonfocal.  UE and LE proximal and distal strength 5/5 and equal bilater
ally.  
 Skin: No rash or ulcers.
 
 Data
     
 Lab Results 
 Component Value Date 
  WBC 7.45 2018 
  HB 12.6 2018 
  HCT 37.5 2018 
   2018 
  MCV 87.4 2018 
  RDW 49.3 2018 
 
     
 Lab Results 
 Component Value Date 
   2018 
  K 3.9 2018 
   2018 
  BICARB 26 2018 
  BUN 16 2018 
  CR 0.78 2018 
   2018 
  CA 9.1 2018 
  ANIONGAP 10 2018 
 
 No results found for: NTPROBNP
 No results found for: A1C
     
 Lab Results 
 Component Value Date 
  INRPT 1.27 (H) 2018 
 
 
 FRAILTY ASSESSMENT:
 Frailty Index Date Taken: 2018 
 1. Left  Strength (kg): 12
     Right  Strength (kg): 10 
 2. Five Meter Walk (sec):   NA
 3. Serum albumin:   
 4. Walden ADL (#/6) :5 
 Frailty Outcome: Based on Partner trial definition the patient does not qualify as frail. 
 
 
 Walden ADL Date Taken: 2018 
 
 Bathing: No
 Dressing: No
 Toileting: No
 Transferring: No
 Continence: Yes
 Feeding: No
 
 EFT FRAILTY ASSESSMENT
 
 EFT 1 to possibly 2 points
 1 for low sit to stand
 No known albumin level
 
 EKG:
 Atrial fibrillation 
 Poor anterior R wave progression 
 Inferior mild st depression 0.5 mm 2/3. 
 
 
 TTE 3/6/2018
 1. Left ventricular systolic function is hyperdynamic with an estimated EF of >70%.
 2. The right ventricle is normal in size and function.
 3. The left atrium is markedly enlarged.
 4. The right atrium is markedly enlarged.
 5. Mild aortic stenosis with peak/mean pressure gradient of 16.83mmHg / 9.02mmHg, the aorti
c valve area by continuity equation is 1.7cm.
 6. Severe mitral regurgitation is present.
 7. Moderate mitral stenosis, with peak/mean transvalvular gradients measured at 25.2 / 8.0 
mm Hg, and MVA (by VTI) 1.50 cm . There is mi
 8. Moderate to severe tricuspid regurgitation present.
 9. There is moderate to severe pulmonary hypertension.The peak right ventricular systol
ic pressure (pulmonary artery systolic pressure), as measured by Doppler, is 55.8 - 60.8 mm 
Hg.
 10. There is a small secundum atrial septal defect measuring <10 mm in diameter.There i
s left-to-right shunting noted on color Doppler.
 
 TTE: 2018 
 Final Impressions:                         
                              
                                
                  
 1. The left ventricular cavity size is normal.               
 
 2. The LV function is hyperdynamic.                   
   
 3. Visually estimated left ventricular ejection fraction is 70 - 75%.     
 4. The aortic valve is trileaflet and moderately calcified.          
 5. Severe mitral annular calcification as well as papillary and subchordal  
 bulky calcification.                        
     
 6. While there is calcification of the bases of the mitral valve leaflets   
 due to the severe mitral annular calcificaiton, the mid portions of the    
 leaflets themseleves are only midly calcified and thin, however there is the 
 severe central mitral valve regurgitation.                 
 
 7. The mean transmitral gradient is 5.5 mmHg at a heart rate of 77 bpm.    
  The mitral valve effective regurgitant orifice area is 7 mm2.         
                             
 
 8. Severe biatrial enlargement.                    
    
 9. Right ventricular size, thickness and function are normal. 
 
 
 JENIFFER: 2018
 Final Impressions:                         
                             
              
                              
         
 1. The LV function is hyperdynamic.                   
   
 2. The visually estimated ejection fraction is > 75%.             
 3. Right ventricular size, thickness and function are normal.         
 4. Severe circumferential mitral annular calcification. A large deposit of  
 calcium at the posterolateral atrial aspect of themitral annulus that     
 partially disrupts the normal mitral annular shape and size. Mitral annular  
 area is measured at 8.26 cm2, with an AP diameter of 3.76 cm, and CC     
 diameter of 2.52 cm. However, there is no significant mitral stenosis. Mean  
 mitral gradient is approximately 4 mmHg at a heart rate of 83 bpm.      
 5. Severe mitral valve regurgitation. The EROA is 0.47 cm2 (using a PISA   
 radius of 1.1 cm, an aliasing velocity of 0.32 m/s, and a mean MR       
 regurgitant velocity of 5.1 m/s), the calculated regurgitant volume is 67   
 mL, and the calculated regurgitant fraction is 52%.              
 6. The mitral valve anterior leaflet measures 2.0 cm.             
 7. There is subvalvar and papilary muscle head calcifidation noted as well.  
 8. The mechanism for the severe mitral regurgitation is leaflet and annular  
 calcification and degerneration with poor coaptation across all portions of  
 the valve.                           
       
 9. The aortic valve is moderately sclerotic and restricted. No aortic     
 stenosis.                            
       
 10. Severely enlarged left atrium.                   
   
 11. There is a small fenestrated secundum atrial septal defect, with     
 intermittent left-to-right flow via color-flow Doppler.            
 12. Moderate tricuspid regurgitation.                  
   
 13. Small plaque involving the ascending and transverse aorta.        
 14. The left atrial appendage is well visualized and there is no evidence of 
 thrombus present.     
 
 CARDIAC CATH: 2018
 Preliminary repoot
 Severe MAC
 There is non-obstructive disease in LAD andCirc
 There is 95% stenosis of the distal RCA prior to the PDA and PLV's
 
 
 RHC: Unremarkable filling pressures
 
 Coronary Angiography: Obstructive CAD of RCA
 PFTS:
 None 
 
 STS (Based Off Available Data):
 MV Replacement + CAB 
 Risk of Mortality: 7.374% 
 
 Morbidity or Mortality: 30.831% 
 Long Length of Stay: 19.775% 
 Short Length of Stay: 7.384% 
 Permanent Stroke: 2.652% 
 Prolonged Ventilation: 19.662% 
 DSW Infection: 0.263% 
 Renal Failure: 8.237% 
 Reoperation: 12.335% 
 
 Assessment and Recommendations:
 Ms. Vonnie Celaya is a 86 y.o. female referred [facility] for assessment
 of treatment options for mitral valve disease. 
 
 Ms. Celaya has developed progressive GARCIA and LE edema over the last 6 months and currentl
y reports symptoms that are NYHA functional class 3-4.  A review of the echocardiogram and t
he transesophageal echo show severe mitral annular calcification, severe mitral regurgitaito
n, with largest jet centrally but also extending to the commisures, with a mean trasmitral g
radient of 4-5 mmHg and an annular are of 830-930 mm2.   In addition the echocardiogram is n
otable for moderate to severe pulmonary hypertesnion with RVSP 55-60 mmHg. Additional diagno
stics that have been performed include coronary angiogrpahy that demonstrates severe distal 
RCA stenosis. 
 
  Based on her symptoms, physical exam, and diagnostic studies we agree that she has severe 
symptomatic  Mitral regurgitation. 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
ing, continuing with her quality of life the best she can, and not having a stroke. 
 
 The patient's STS based on available data is 7-8%  . I am concerned about the risk of LVOT 
obstruction with valve and Mac, and I explained to the patient that this procedure is off la
bel use of the Echeverria valve and it carries high risk of valve embolization, perivalvular le
ak, LVOT obstruction. I also explained to her that mitral clip might be challenging given se
mauricio mitral annulus calcification as well as already elevated transmitral gradient.
 The simplest option would be to either continue medical therapy plus minus PCI to the RCA a
nd if she continues to be symptomatic we can consider hybrid procedure with resection of ant
erior leaflet and Echeverria valve deployment in severe dense mitral annulus
 I spent 45 minutes with the patient explaining all treatment options
 MARKUS SMART MD
 
 Electronically signed by Markus Smart MD at 10/02/2018 11:51 AM PDTdocumented in this e
ncounter
 
 Plan of Treatment
 Not on filedocumented as of this encounter
 
 Visit Diagnoses
 
 
+--------------------------------------------------------+
| Diagnosis                                              |
+--------------------------------------------------------+
|   Non-rheumatic mitral regurgitation - Primary         |
+--------------------------------------------------------+
|   Mitral annular calcification  Mitral valve disorders |
+--------------------------------------------------------+
 documented in this encounter

## 2020-01-01 NOTE — XMS
Encounter Summary
  Created on: 2020
 
 Vonnie Celaya
 External Reference #: [id number]
 : 32
 Sex: Female
 
 Demographics
 
 
+-----------------------+---------------------------+
| Address               | [address]            |
|                       | [place]  [address] |
+-----------------------+---------------------------+
| Home Phone            | [phone]           |
+-----------------------+---------------------------+
| Preferred Language    | Unknown                   |
+-----------------------+---------------------------+
| Marital Status        |                    |
+-----------------------+---------------------------+
| Mandaen Affiliation | Unknown                   |
+-----------------------+---------------------------+
| Race                  | Unknown                   |
+-----------------------+---------------------------+
| Ethnic Group          | Unknown                   |
+-----------------------+---------------------------+
 
 
 Author
 
 
+--------------+--------------------------------------------+
| Author       | [facility]  |
|              | and [place]                                |
+--------------+--------------------------------------------+
| Organization | [facility]  |
|              | and [place]                                |
+--------------+--------------------------------------------+
| Address      | Unknown                                    |
+--------------+--------------------------------------------+
| Phone        | Unavailable                                |
+--------------+--------------------------------------------+
 
 
 
 Support
 
 
+--------------+--------------+---------+-----------------+
| Name         | Relationship | Address | Phone           |
+--------------+--------------+---------+-----------------+
| Lauryn Leroy | ECON         | Unknown | [phone] |
+--------------+--------------+---------+-----------------+
 
 
 
 Care Team Providers
 
 
 
+------------------------+------+-----------------+
| Care Team Member Name  | Role | Phone           |
+------------------------+------+-----------------+
| Jean Bermudez MD | PCP  | [phone] |
+------------------------+------+-----------------+
 
 
 
 Encounter Details
 
 
+--------+-------------+------------------+----------------------+-------------+
| Date   | Type        | Department       | Care Team            | Description |
+--------+-------------+------------------+----------------------+-------------+
| / | Orders Only |   [facility]     |   Valery Aranda,   |             |
| 2018   |             | CONVERSION  888  | MD  [address]  |             |
|        |             | [address]       | [address]  [place], [place]   |             |
|        |             | [place], [place]     | [address]  [phone]  |             |
|        |             | [address]       |  [phone] (Fax)  |             |
|        |             | [phone]     |                      |             |
+--------+-------------+------------------+----------------------+-------------+
 
 
 
 Social History
 
 
+----------------+-------+-----------+--------+------+
| Tobacco Use    | Types | Packs/Day | Years  | Date |
|                |       |           | Used   |      |
+----------------+-------+-----------+--------+------+
| Never Assessed |       |           |        |      |
+----------------+-------+-----------+--------+------+
 
 
 
+------------------+---------------+
| Sex Assigned at  | Date Recorded |
| Birth            |               |
+------------------+---------------+
| Not on file      |               |
+------------------+---------------+
 
 
 
+----------------+-------------+-------------+
| Job Start Date | Occupation  | Industry    |
+----------------+-------------+-------------+
| Not on file    | Not on file | Not on file |
+----------------+-------------+-------------+
 
 
 
+----------------+--------------+------------+
| Travel History | Travel Start | Travel End |
+----------------+--------------+------------+
 
 
 
 
+-------------------------------------+
| No recent travel history available. |
+-------------------------------------+
 documented as of this encounter
 
 Plan of Treatment
 
 
+--------+---------+------------+----------------------+-------------+
| Date   | Type    | Specialty  | Care Team            | Description |
+--------+---------+------------+----------------------+-------------+
| / | Office  | Cardiology |   [facility]    |             |
| 2020   | Visit   |            | [address]      |             |
|        |         |            | [address]   |             |
|        |         |            | [place] [address]   |             |
|        |         |            | [phone]         |             |
|        |         |            | [phone] (Fax)   |             |
+--------+---------+------------+----------------------+-------------+
 documented as of this encounter
 
 Procedures
 
 
+----------------------+--------+-------------+----------------------+----------------------
+
| Procedure Name       | Priori | Date/Time   | Associated Diagnosis | Comments             
|
|                      | ty     |             |                      |                      
|
+----------------------+--------+-------------+----------------------+----------------------
+
| ECHO INTERPRETATION  | Routin | 2018  |                      |   Results for this   
|
| OF OUTSIDE FILMS     | e      |  3:06 PM    |                      | procedure are in the 
|
|                      |        | PST         |                      |  results section.    
|
+----------------------+--------+-------------+----------------------+----------------------
+
 documented in this encounter
 
 Results
 ECHO Interpretation of Outside Films (2018  3:06 PM PST)
 
+----------+
| Specimen |
+----------+
|          |
+----------+
 
 
 
+------------------------------------------------------------------------+--------------+
| Impressions                                                            | Performed At |
+------------------------------------------------------------------------+--------------+
|   1. Left ventricular systolic function is hyperdynamic with an        |              |
| estimated EF of >70%.  2. The right ventricle is normal in size and    |              |
| function.  3. The left atrium is markedly enlarged.  4. The right      |              |
| atrium is markedly enlarged.  5. Mild aortic stenosis with peak/mean   |              |
 
| pressure gradient of 16.83mmHg / 9.02mmHg, the aortic valve area by    |              |
| continuity equation is 1.7cm      .  6. Severe mitral regurgitation is |              |
|  present.  7. Moderate mitral stenosis, with peak/mean transvalvular   |              |
| gradients measured at 25.2 / 8.0 mm Hg, and MVA (by VTI) 1.50 cm       |              |
|  . There is mi  8. Moderate to severe tricuspid regurgitation present. |              |
|   9. There is moderate to severe pulmonary hypertension.   The peak    |              |
| right ventricular systolic pressure (pulmonary artery systolic         |              |
| pressure), as measured by Doppler, is 55.8 - 60.8 mm Hg.  10. There is |              |
|  a small secundum atrial septal defect measuring <10 mm in diameter.   |              |
|   There is left-to-right shunting noted on color Doppler.              |              |
+------------------------------------------------------------------------+--------------+
 
 
 
+------------------------------------------------------------------------+--------------+
| Narrative                                                              | Performed At |
+------------------------------------------------------------------------+--------------+
|      Patient Name:   Vonnie Celaya  YOB: 1932    |              |
|   Accession:   [id number]     Performing Physician:   LYNDA MCCULLOUGH MD    |              |
| _______________________________________________________________        |              |
| INDICATIONS  -----------  CHF     CONCLUSIONS  -----------  1. Left    |              |
| ventricular systolic function is hyperdynamic with an estimated EF of  |              |
| >70%.  2. The right ventricle is normal in size and function.  3. The  |              |
| left atrium is markedly enlarged.  4. The right atrium is markedly     |              |
| enlarged.  5. Mild aortic stenosis with peak/mean pressure gradient of |              |
|  16.83mmHg / 9.02mmHg, the aortic valve area by continuity equation is |              |
|  1.7cm      .  6. Severe mitral regurgitation is present.  7. Moderate |              |
|  mitral stenosis, with peak/mean transvalvular gradients measured at   |              |
| 25.2 / 8.0 mm Hg, and MVA (by VTI) 1.50 cm       . There is mi  8.     |              |
| Moderate to severe tricuspid regurgitation present.  9. There is       |              |
| moderate to severe pulmonary hypertension.   The peak right            |              |
| ventricular systolic pressure (pulmonary artery systolic pressure), as |              |
|  measured by Doppler, is 55.8 - 60.8 mm Hg.  10. There is a small      |              |
| secundum atrial septal defect measuring <10 mm in diameter.   There is |              |
|  left-to-right shunting noted on color Doppler.     FINDINGS  -------- |              |
|   ECG rhythm: Atrial fibrillation.  Study: A 2-dimensional             |              |
| transthoracic echocardiogram with m-mode, spectral and color flow      |              |
| Doppler was perfomed.  Study: This was a technically adequate study.   |              |
| Left Ventricle: Left ventricular systolic function is hyperdynamic     |              |
| with an estimated EF of >70%.  Left Ventricle: The left ventricle      |              |
| cavity size is normal.  Left Ventricle: Left ventricular wall          |              |
| thickness is normal.  Left Ventricle: No regional wall motion          |              |
| abnormalities.  Left Ventricle: Atrial fibrillation prevents accurate  |              |
| assessment of diastolic function.  Right Ventricle: The right          |              |
| ventricle is normal in size and function.  Left Atrium: The left       |              |
| atrium is markedly enlarged.  Right Atrium: The right atrium is        |              |
| markedly enlarged.  Aortic Valve: Aortic valve is trileaflet and is    |              |
| mildly thickened.  Aortic Valve: The aortic valve is mildly calcified. |              |
|   Aortic Valve: Trace amount of aortic regurgitation.  Aortic Valve:   |              |
| Mild aortic stenosis with peak/mean pressure gradient of 16.83mmHg /   |              |
| 9.02mmHg, the aortic valve area by continuity equation is 1.7cm      . |              |
|   Mitral Valve: Mitral valve is thickened.  Mitral Valve: Severe       |              |
| mitral regurgitation is present.  Mitral Valve: No evidence of MVP     |              |
| Mitral Valve: Severe mitral annular calcification present.  Mitral     |              |
| Valve: Moderate mitral stenosis, with peak/mean transvalvular          |              |
| gradients measured at 25.2 / 8.0 mm Hg, and MVA (by VTI) 1.50 cm       |              |
|  . There is mi  Mitral Valve: ld thickening of the   mitral valve      |              |
| leaflets.  Tricuspid Valve: The tricuspid valve appears structurally   |              |
| normal.  Tricuspid Valve: Moderate to severe tricuspid regurgitation   |              |
| present.  Tricuspid Valve: There is moderate to severe pulmonary       |              |
 
| hypertension.  Tricuspid Valve: The right ventricular systolic         |              |
| pressure (pulmonary artery systolic pressure), as measured by Doppler, |              |
|  is 55.8 - 60.8 mm Hg.  Pulmonic Valve: The pulmonic valve is normal.  |              |
|  Pulmonic Valve: Trace   pulmonic regurgitation.  Pericardium: There   |              |
| is no pericardial effusion.  IVC/Hepatic Veins: The IVC is normal size |              |
|  (1.5-2.5cm) and collapses <50% with sniff, consistent with central    |              |
| venous pressures of 10-15mmHg.  Aorta: The aortic root, ascending      |              |
| aorta and aortic arch are normal.  Mass: No mass visualized  Thrombus: |              |
|  No clot visualized  Thrombus: No vegetation visualized.  Septum:      |              |
| There is a small secundum atrial septal defect measuring < 10 mm in    |              |
| diameter.   There is left-to-right shunting noted on color Doppler.    |              |
|   MEASUREMENTS  -------------  Ao asc:    3.46 cm  Ao Diam:    2.93 cm |              |
|   Ao sinus:    3.27 cm  Ao st junct:    2.99 cm  IVC:    2.34 cm  LA   |              |
| Diam:    4.73 cm  LA Major:    7.82 cm  EDV(Teich):    81.83 ml  IVSd: |              |
|     0.71 cm  LVIDd:    4.27 cm  LVPWd:    0.96 cm  LVOT Area:    3.30  |              |
| cm2  LVOT Diam:    2.05 cm  %FS:    36.87 %  EF(Teich):    67.07 %     |              |
| ESV(Teich):    26.94 ml  LVIDs:    2.69 cm  SV(Teich):    54.89 ml  RV |              |
|  Major:    7.48 cm  RVIDd:    2.51 cm  LVEF MOD A2C:    73.51 %  SV    |              |
| MOD A2C:    67.80 ml  LVEF MOD A4C:    67.34 %  SV MOD A4C:    46.92   |              |
| ml  EF Biplane:    70.64 %  LVEDV MOD BP:    79.68 ml  LVESV MOD BP:   |              |
|    23.39 ml  LVEDV MOD A2C:    92.23 ml  LVLd A2C:    6.84 cm  LVEDV   |              |
| MOD A4C:    69.67 ml  LVLd A4C:    6.84 cm  LVESV MOD A2C:    24.42 ml |              |
|   LVLs A2C:    5.79 cm  LVESV MOD A4C:    22.75 ml  LVLs A4C:    5.80  |              |
| cm  LAESV(A-L):    218.68 ml  LAESV Index (A-L):    125.68 ml/m2  LAAs |              |
|  A2C:    39.65 cm2  LAESV A-L A2C:    180.64 ml  LALs A2C:    7.38 cm  |              |
|  LAAs A4C:    48.00 cm2  LAESV A-L A4C:    234.29 ml  LALs A4C:        |              |
| 8.34 cm  RAAs:    32.48 cm2  RAESV A-L:    108.99 ml  RAESV MOD:       |              |
| 105.43 ml  RALs:    8.37 cm  TAPSE:    2.67 cm  AR Dec [place]:    1.97  |              |
| m/s2  AR Dec Time:    1769.87 ms  AR maxP.66 mmHg  AR PHT:     |              |
| 513.26 ms  AR Vmax:    3.48 m/s  AV maxP.83 mmHg  AV meanPG:   |              |
|    9.01 mmHg  AV Vmax:    2.05 m/s  AV Vmean:    1.43 m/s  AV VTI:     |              |
| 38.96 cm  FOREST Vmax:    1.63 cm2  FOREST (VTI):    1.73 cm2  AVAI Vmax:    |              |
|  0.00 cm2/m2  AVAI (VTI):    0.00 cm2/m2  LVOT maxP.14 mmHg     |              |
| LVOT meanP.17 mmHg  LVSI Dopp:    38.90 ml/m2  LVSV Dopp:       |              |
| 67.70 ml  LVOT Vmax:    1.01 m/s  LVOT Vmean:    0.69 m/s  LVOT VTI:   |              |
|    20.50 cm  MV A [place]:    0.04 m/s  MV DecT:    225.03 ms  MV E [place]:   |              |
|    1.54 m/s  MV E/A Ratio:    35.2  MV PHT:    65.25 ms  MVA By PHT:   |              |
|    3.37 cm2  MV maxP.70 mmHg  MV meanP.33 mmHg  MV      |              |
| Vmax:    2.53 m/s  MV Vmean:    1.28 m/s  MV VTI:    45.02 cm  MVA     |              |
| (VTI):    1.50 cm2  Septal e':    0.04 m/s  Septal E/e':    34.18      |              |
| Lateral e':    0.04 m/s  Lateral E/e':    37.69  RAP:    10 mmHg       |              |
| RVSP:    57.42 mmHg  TR maxP.42 mmHg  TR Vmax:    3.44 m/s     |              |
|  Sonographer:  Authenticated by:   LYNDA MCCULLOUGH MD  Report Date/Time: |              |
|  3-6-2018 18:58:12                                                     |              |
+------------------------------------------------------------------------+--------------+
 
 
 
+-------------------------------------------------------------------------------------------
--------------------------------------------------------------------------------------------
--+
| Procedure Note                                                                            
                                                                                            
  |
+-------------------------------------------------------------------------------------------
--------------------------------------------------------------------------------------------
--+
|   [facility] Conversion - 2019  5:25 PM PDT   Patient Name:  Yodit Celaya    
                                                                                            
  |
 
| of Birth:  1932 Accession:  [id number] Performing Physician:  LYNDA MCCULLOUGH,            
                                                                                            
  |
| MD_______________________________________________________________INDICATIONS-----------C  
                                                                                            
  |
| HF CONCLUSIONS-----------1. Left ventricular systolic function is hyperdynamic with an    
                                                                                            
  |
| estimated EF of >70%.2. The right ventricle is normal in size and function.3. The left    
                                                                                            
  |
| atrium is markedly enlarged.4. The right atrium is markedly enlarged.5. Mild aortic       
                                                                                            
  |
| stenosis with peak/mean pressure gradient of 16.83mmHg / 9.02mmHg, the aortic valve area  
                                                                                            
  |
|  by continuity equation is 1.7cm      .6. Severe mitral regurgitation is present.7.       
                                                                                            
  |
| Moderate mitral stenosis, with peak/mean transvalvular gradients measured at 25.2 / 8.0   
                                                                                            
  |
| mm Hg, and MVA (by VTI) 1.50 cm       . There is mi8. Moderate to severe tricuspid        
                                                                                            
  |
| regurgitation present.9. There is moderate to severe pulmonary hypertension.  The peak    
                                                                                            
  |
| right ventricular systolic pressure (pulmonary artery systolic pressure), as measured by  
                                                                                            
  |
|  Doppler, is 55.8 - 60.8 mm Hg.10. There is a small secundum atrial septal defect         
                                                                                            
  |
| measuring <10 mm in diameter.  There is left-to-right shunting noted on color Doppler.    
                                                                                            
  |
| FINDINGS--------ECG rhythm: Atrial fibrillation.Study: A 2-dimensional transthoracic      
                                                                                            
  |
| echocardiogram with m-mode, spectral and color flow Doppler was perfomed.Study: This was  
                                                                                            
  |
|  a technically adequate study.Left Ventricle: Left ventricular systolic function is       
                                                                                            
  |
| hyperdynamic with an estimated EF of >70%.Left Ventricle: The left ventricle cavity size  
                                                                                            
  |
|  is normal.Left Ventricle: Left ventricular wall thickness is normal.Left Ventricle: No   
                                                                                            
  |
| regional wall motion abnormalities.Left Ventricle: Atrial fibrillation prevents accurate  
                                                                                            
  |
|  assessment of diastolic function.Right Ventricle: The right ventricle is normal in size  
                                                                                            
  |
 
|  and function.Left Atrium: The left atrium is markedly enlarged.Right Atrium: The right   
                                                                                            
  |
| atrium is markedly enlarged.Aortic Valve: Aortic valve is trileaflet and is mildly        
                                                                                            
  |
| thickened.Aortic Valve: The aortic valve is mildly calcified.Aortic Valve: Trace amount   
                                                                                            
  |
| of aortic regurgitation.Aortic Valve: Mild aortic stenosis with peak/mean pressure        
                                                                                            
  |
| gradient of 16.83mmHg / 9.02mmHg, the aortic valve area by continuity equation is         
                                                                                            
  |
| 1.7cm      .Mitral Valve: Mitral valve is thickened.Mitral Valve: Severe mitral           
                                                                                            
  |
| regurgitation is present.Mitral Valve: No evidence of MVPMitral Valve: Severe mitral      
                                                                                            
  |
| annular calcification present.Mitral Valve: Moderate mitral stenosis, with peak/mean      
                                                                                            
  |
| transvalvular gradients measured at 25.2 / 8.0 mm Hg, and MVA (by VTI) 1.50 cm       .    
                                                                                            
  |
| There is miMitral Valve: ld thickening of the  mitral valve leaflets.Tricuspid Valve:     
                                                                                            
  |
| The tricuspid valve appears structurally normal.Tricuspid Valve: Moderate to severe       
                                                                                            
  |
| tricuspid regurgitation present.Tricuspid Valve: There is moderate to severe pulmonary    
                                                                                            
  |
| hypertension.Tricuspid Valve: The right ventricular systolic pressure (pulmonary artery   
                                                                                            
  |
| systolic pressure), as measured by Doppler, is 55.8 - 60.8 mm Hg.Pulmonic Valve: The      
                                                                                            
  |
| pulmonic valve is normal.Pulmonic Valve: Trace  pulmonic regurgitation.Pericardium:       
                                                                                            
  |
| There is no pericardial effusion.IVC/Hepatic Veins: The IVC is normal size (1.5-2.5cm)    
                                                                                            
  |
| and collapses <50% with sniff, consistent with central venous pressures of                
                                                                                            
  |
| 10-15mmHg.Aorta: The aortic root, ascending aorta and aortic arch are normal.Mass: No     
                                                                                            
  |
| mass visualizedThrombus: No clot visualizedThrombus: No vegetation visualized.Septum:     
                                                                                            
  |
| There is a small secundum atrial septal defect measuring < 10 mm in diameter.  There is   
                                                                                            
  |
 
| left-to-right shunting noted on color Doppler. MEASUREMENTS-------------Ao asc:   3.46    
                                                                                            
  |
| cmAo Diam:   2.93 cmAo sinus:   3.27 cmAo st junct:   2.99 cmIVC:   2.34 cmLA Diam:       
                                                                                            
  |
| 4.73 cmLA Major:   7.82 cmEDV(Teich):   81.83 mlIVSd:   0.71 cmLVIDd:   4.27 cmLVPWd:     
                                                                                            
  |
| 0.96 cmLVOT Area:   3.30 [id number] Diam:   2.05 cm%FS:   36.87 %EF(Teich):   67.07          
                                                                                            
  |
| %ESV(Teich):   26.94 mlLVIDs:   2.69 cmSV(Teich):   54.89 mlRV Major:   7.48 cmRVIDd:     
                                                                                            
  |
| 2.51 cmLVEF MOD A2C:   73.51 %SV MOD A2C:   67.80 mlLVEF MOD A4C:   67.34 %SV MOD A4C:    
                                                                                            
  |
|  46.92 mlEF Biplane:   70.64 %LVEDV MOD BP:   79.68 mlLVESV MOD BP:   23.39 mlLVEDV MOD   
                                                                                            
  |
| A2C:   92.23 mlLVLd A2C:   6.84 cmLVEDV MOD A4C:   69.67 mlLVLd A4C:   6.84 cmLVESV MOD   
                                                                                            
  |
| A2C:   24.42 mlLVLs A2C:   5.79 cmLVESV MOD A4C:   22.75 mlLVLs A4C:   5.80               
                                                                                            
  |
| cmLAESV(A-L):   218.68 mlLAESV Index (A-L):   125.68 ml/m2LAAs A2C:   39.65 [id number] A-L  
                                                                                            
  |
|  A2C:   180.64 mlLALs A2C:   7.38 cmLAAs A4C:   48.00 [id number] A-L A4C:   234.29 mlLALs   
                                                                                            
  |
| A4C:   8.34 cmRAAs:   32.48 [id number] A-L:   108.99 mlRAESV MOD:   105.43 mlRALs:   8.37   
                                                                                            
  |
| cmTAPSE:   2.67 cmAR Dec [place]:   1.97 m/s2AR Dec Time:   1769.87 msAR maxP.66     
                                                                                            
  |
| mmHgAR PHT:   513.26 msAR Vmax:   3.48 m/Juany maxP.83 mmHgAV meanP.01 mmHgAV  
                                                                                            
  |
|  Vmax:   2.05 m/Juany Vmean:   1.43 m/Juany VTI:   38.96 cmAVA Vmax:   1.63 cm2AVA (VTI):     
                                                                                            
  |
| 1.73 [id number] Vmax:   0.00 cm2/m2AVAI (VTI):   0.00 cm2/m2LVOT maxP.14 mmHgLVOT      
                                                                                            
  |
| meanP.17 mmHgLVSI Dopp:   38.90 ml/m2LVSV Dopp:   67.70 mlLVOT Vmax:   1.01         
                                                                                            
  |
| m/sLVOT Vmean:   0.69 m/sLVOT VTI:   20.50 cmMV A [place]:   0.04 m/sMV DecT:   225.03 msMV   
                                                                                            
  |
| E [place]:   1.54 m/sMV E/A Ratio:   35.2MV PHT:   65.25 msMVA By PHT:   3.37 cm2MV maxPG:    
                                                                                            
  |
|  25.70 mmHgMV meanP.33 mmHgMV Vmax:   2.53 m/sMV Vmean:   1.28 m/sMV VTI:   45.02   
                                                                                            
  |
 
| cmMVA (VTI):   1.50 lj0Krsvuw e':   0.04 m/sSeptal E/e':   34.18Lateral e':   0.04        
                                                                                            
  |
| m/sLateral E/e':   37.69RAP:   10 mmHgRVSP:   57.42 mmHgTR maxP.42 mmHgTR Vmax:    
                                                                                            
  |
|  3.44 m/s Sonographer:Authenticated by:  Rajinder RODRÍGUEZ Date/Time: 3-6-2018       
                                                                                            
  |
| 18:58:12 IMPRESSION: 1. Left ventricular systolic function is hyperdynamic with an        
                                                                                            
  |
| estimated EF of >70%.2. The right ventricle is normal in size and function.3. The left    
                                                                                            
  |
| atrium is markedly enlarged.4. The right atrium is markedly enlarged.5. Mild aortic       
                                                                                            
  |
| stenosis with peak/mean pressure gradient of 16.83mmHg / 9.02mmHg, the aortic valve area  
                                                                                            
  |
|  by continuity equation is 1.7cm      .6. Severe mitral regurgitation is present.7.       
                                                                                            
  |
| Moderate mitral stenosis, with peak/mean transvalvular gradients measured at 25.2 / 8.0   
                                                                                            
  |
| mm Hg, and MVA (by VTI) 1.50 cm       . There is mi8. Moderate to severe tricuspid        
                                                                                            
  |
| regurgitation present.9. There is moderate to severe pulmonary hypertension.  The peak    
                                                                                            
  |
| right ventricular systolic pressure (pulmonary artery systolic pressure), as measured by  
                                                                                            
  |
|  Doppler, is 55.8 - 60.8 mm Hg.10. There is a small secundum atrial septal defect         
                                                                                            
  |
| measuring <10 mm in diameter.  There is left-to-right shunting noted on color Doppler.    
                                                                                            
  |
|LVOT Area:   3.30 cm2                                                                      
                                                                                            
  |
|LVOT Diam:   2.05 cm                                                                       
                                                                                            
  |
|%FS:   36.87 %                                                                             
                                                                                            
  |
|EF(Teich):   67.07 %                                                                       
                                                                                            
  |
|ESV(Teich):   26.94 ml                                                                     
                                                                                            
  |
|LVIDs:   2.69 cm                                                                           
                                                                                            
  |
 
|SV(Teich):   54.89 ml                                                                      
                                                                                            
  |
|RV Major:   7.48 cm                                                                        
                                                                                            
  |
|RVIDd:   2.51 cm                                                                           
                                                                                            
  |
|LVEF MOD A2C:   73.51 %                                                                    
                                                                                            
  |
|SV MOD A2C:   67.80 ml                                                                     
                                                                                            
  |
|LVEF MOD A4C:   67.34 %                                                                    
                                                                                            
  |
|SV MOD A4C:   46.92 ml                                                                     
                                                                                            
  |
|EF Biplane:   70.64 %                                                                      
                                                                                            
  |
|LVEDV MOD BP:   79.68 ml                                                                   
                                                                                            
  |
|LVESV MOD BP:   23.39 ml                                                                   
                                                                                            
  |
|LVEDV MOD A2C:   92.23 ml                                                                  
                                                                                            
  |
|LVLd A2C:   6.84 cm                                                                        
                                                                                            
  |
|LVEDV MOD A4C:   69.67 ml                                                                  
                                                                                            
  |
|LVLd A4C:   6.84 cm                                                                        
                                                                                            
  |
|LVESV MOD A2C:   24.42 ml                                                                  
                                                                                            
  |
|LVLs A2C:   5.79 cm                                                                        
                                                                                            
  |
|LVESV MOD A4C:   22.75 ml                                                                  
                                                                                            
  |
|LVLs A4C:   5.80 cm                                                                        
                                                                                            
  |
|LAESV(A-L):   218.68 ml                                                                    
                                                                                            
  |
|LAESV Index (A-L):   125.68 ml/m2                                                          
                                                                                            
  |
 
|LAAs A2C:   39.65 cm2                                                                      
                                                                                            
  |
|LAESV A-L A2C:   180.64 ml                                                                 
                                                                                            
  |
|LALs A2C:   7.38 cm                                                                        
                                                                                            
  |
|LAAs A4C:   48.00 cm2                                                                      
                                                                                            
  |
|LAESV A-L A4C:   234.29 ml                                                                 
                                                                                            
  |
|LALs A4C:   8.34 cm                                                                        
                                                                                            
  |
|RAAs:   32.48 cm2                                                                          
                                                                                            
  |
|RAESV A-L:   108.99 ml                                                                     
                                                                                            
  |
|RAESV MOD:   105.43 ml                                                                     
                                                                                            
  |
|RALs:   8.37 cm                                                                            
                                                                                            
  |
|TAPSE:   2.67 cm                                                                           
                                                                                            
  |
|AR Dec [place]:   1.97 m/s2                                                                  
                                                                                            
  |
|AR Dec Time:   1769.87 ms                                                                  
                                                                                            
  |
|AR maxP.66 mmHg                                                                     
                                                                                            
  |
|AR PHT:   513.26 ms                                                                        
                                                                                            
  |
|AR Vmax:   3.48 m/s                                                                        
                                                                                            
  |
|AV maxP.83 mmHg                                                                     
                                                                                            
  |
|AV meanP.01 mmHg                                                                     
                                                                                            
  |
|AV Vmax:   2.05 m/s                                                                        
                                                                                            
  |
|AV Vmean:   1.43 m/s                                                                       
                                                                                            
  |
 
|AV VTI:   38.96 cm                                                                         
                                                                                            
  |
|FOREST Vmax:   1.63 cm2                                                                       
                                                                                            
  |
|FOREST (VTI):   1.73 cm2                                                                      
                                                                                            
  |
|AVAI Vmax:   0.00 cm2/m2                                                                   
                                                                                            
  |
|AVAI (VTI):   0.00 cm2/m2                                                                  
                                                                                            
  |
|LVOT maxP.14 mmHg                                                                    
                                                                                            
  |
|LVOT meanP.17 mmHg                                                                   
                                                                                            
  |
|LVSI Dopp:   38.90 ml/m2                                                                   
                                                                                            
  |
|LVSV Dopp:   67.70 ml                                                                      
                                                                                            
  |
|LVOT Vmax:   1.01 m/s                                                                      
                                                                                            
  |
|LVOT Vmean:   0.69 m/s                                                                     
                                                                                            
  |
|LVOT VTI:   20.50 cm                                                                       
                                                                                            
  |
|MV A [place]:   0.04 m/s                                                                       
                                                                                            
  |
|MV DecT:   225.03 ms                                                                       
                                                                                            
  |
|MV E [place]:   1.54 m/s                                                                       
                                                                                            
  |
|MV E/A Ratio:   35.2                                                                       
                                                                                            
  |
|MV PHT:   65.25 ms                                                                         
                                                                                            
  |
|MVA By PHT:   3.37 cm2                                                                     
                                                                                            
  |
|MV maxP.70 mmHg                                                                     
                                                                                            
  |
|MV meanP.33 mmHg                                                                     
                                                                                            
  |
 
|MV Vmax:   2.53 m/s                                                                        
                                                                                            
  |
|MV Vmean:   1.28 m/s                                                                       
                                                                                            
  |
|MV VTI:   45.02 cm                                                                         
                                                                                            
  |
|MVA (VTI):   1.50 cm2                                                                      
                                                                                            
  |
|Septal e':   0.04 m/s                                                                      
                                                                                            
  |
|Septal E/e':   34.18                                                                       
                                                                                            
  |
|Lateral e':   0.04 m/s                                                                     
                                                                                            
  |
|Lateral E/e':   37.69                                                                      
                                                                                            
  |
|RAP:   10 mmHg                                                                             
                                                                                            
  |
|RVSP:   57.42 mmHg                                                                         
                                                                                            
  |
|TR maxP.42 mmHg                                                                     
                                                                                            
  |
|TR Vmax:   3.44 m/s                                                                        
                                                                                            
  |
|                                                                                           
                                                                                            
  |
|Sonographer:                                                                               
                                                                                            
 |
|Authenticated by:  LYNDA MCCULLOUGH MD                                                        
                                                                                            
  |
|Report Date/Time: 3-6-2018 18:58:12                                                        
                                                                                            
  |
|                                                                                           
                                                                                            
  |
|IMPRESSION:                                                                                
                                                                                            
  |
|1. Left ventricular systolic function is hyperdynamic with an estimated EF of >70%.        
                                                                                            
  |
|2. The right ventricle is normal in size and function.                                     
                                                                                            
  |
 
|3. The left atrium is markedly enlarged.                                                   
                                                                                            
  |
|4. The right atrium is markedly enlarged.                                                  
                                                                                            
  |
|5. Mild aortic stenosis with peak/mean pressure gradient of 16.83mmHg / 9.02mmHg, the aorti
c valve area by continuity equation is 1.7cm      .                                         
  |
|6. Severe mitral regurgitation is present.                                                 
                                                                                            
  |
|7. Moderate mitral stenosis, with peak/mean transvalvular gradients measured at 25.2 / 8.0 
mm Hg, and MVA (by VTI) 1.50 cm       . There is mi                                         
  |
|8. Moderate to severe tricuspid regurgitation present.                                     
                                                                                            
  |
|9. There is moderate to severe pulmonary hypertension.  The peak right ventricular systolic
 pressure (pulmonary artery systolic pressure), as measured by Doppler, is 55.8 - 60.8 mm Hg
. |
|10. There is a small secundum atrial septal defect measuring <10 mm in diameter.  There is 
left-to-right shunting noted on color Doppler.                                              
  |
+-------------------------------------------------------------------------------------------
--------------------------------------------------------------------------------------------
--+
 documented in this encounter
 
 Visit Diagnoses
 Not on filedocumented in this encounter

## 2020-01-01 NOTE — XMS
Encounter Summary
  Created on: 2020
 
 Vonnie Celaya
 External Reference #: [id number]
 : 32
 Sex: Female
 
 Demographics
 
 
+-----------------------+------------------------+
| Address               | [address]         |
|                       | [place]  [address]   |
+-----------------------+------------------------+
| Home Phone            | [phone]        |
+-----------------------+------------------------+
| Preferred Language    | Unknown                |
+-----------------------+------------------------+
| Marital Status        | Single                 |
+-----------------------+------------------------+
| Alevism Affiliation | NON                    |
+-----------------------+------------------------+
| Race                  | White                  |
+-----------------------+------------------------+
| Ethnic Group          | Not  or  |
+-----------------------+------------------------+
 
 
 Author
 
 
+--------------+------------------------------+
| Author       | [facility] |
+--------------+------------------------------+
| Organization | [facility] |
+--------------+------------------------------+
| Address      | Unknown                      |
+--------------+------------------------------+
| Phone        | Unavailable                  |
+--------------+------------------------------+
 
 
 
 Support
 
 
+--------------+--------------+---------+-----------------+
| Name         | Relationship | Address | Phone           |
+--------------+--------------+---------+-----------------+
| Lauryn Leroy | ECON         | Unknown | [phone] |
+--------------+--------------+---------+-----------------+
 
 
 
 Care Team Providers
 
 
 
+------------------------+------+-----------------+
| Care Team Member Name  | Role | Phone           |
+------------------------+------+-----------------+
| Jean Bermudez MD | PCP  | [phone] |
+------------------------+------+-----------------+
 
 
 
 Encounter Details
 
 
+--------+----------+----------------------+----------------------+-------------+
| Date   | Type     | Department           | Care Team            | Description |
+--------+----------+----------------------+----------------------+-------------+
| / | Abstract |   Cardiology at [facility]  |   Jame Wilkins  |             |
| 2018   |          |  [address]  [address]  |             |
|        |          | Mailcode: [address]       | [address]  [place], [place]    |             |
|        |          | [facility]    | [address]           |             |
|        |          | and [place],         | [phone]         |             |
|        |          |       | [phone] (Fax)   |             |
|        |          | [place], [place]  |                      |             |
|        |          | [address]           |                      |             |
|        |          | [phone]         |                      |             |
+--------+----------+----------------------+----------------------+-------------+
 
 
 
 Social History
 
 
+--------------+-------+-----------+--------+------+
| Tobacco Use  | Types | Packs/Day | Years  | Date |
|              |       |           | Used   |      |
+--------------+-------+-----------+--------+------+
| Never Smoker |       |           |        |      |
+--------------+-------+-----------+--------+------+
 
 
 
+---------------------+---+---+---+
| Smokeless Tobacco:  |   |   |   |
| Never Used          |   |   |   |
+---------------------+---+---+---+
 
 
 
+-------------+-------------+---------+----------+
| Alcohol Use | Drinks/Week | oz/Week | Comments |
+-------------+-------------+---------+----------+
| No          |             |         |          |
+-------------+-------------+---------+----------+
 
 
 
+------------------+---------------+
| Sex Assigned at  | Date Recorded |
| Birth            |               |
+------------------+---------------+
| Not on file      |               |
+------------------+---------------+
 
 
 
 
+----------------+-------------+-------------+
| Job Start Date | Occupation  | Industry    |
+----------------+-------------+-------------+
| Not on file    | Not on file | Not on file |
+----------------+-------------+-------------+
 
 
 
+----------------+--------------+------------+
| Travel History | Travel Start | Travel End |
+----------------+--------------+------------+
 
 
 
+-------------------------------------+
| No recent travel history available. |
+-------------------------------------+
 documented as of this encounter
 
 Plan of Treatment
 Not on filedocumented as of this encounter
 
 Visit Diagnoses
 Not on filedocumented in this encounter

## 2020-01-01 NOTE — XMS
Encounter Summary
  Created on: 2020
 
 Vonnie Celaya
 External Reference #: [id number]
 : 32
 Sex: Female
 
 Demographics
 
 
+-----------------------+---------------------------+
| Address               | [address]            |
|                       | [place]  [address] |
+-----------------------+---------------------------+
| Home Phone            | [phone]           |
+-----------------------+---------------------------+
| Preferred Language    | Unknown                   |
+-----------------------+---------------------------+
| Marital Status        |                    |
+-----------------------+---------------------------+
| Jewish Affiliation | Unknown                   |
+-----------------------+---------------------------+
| Race                  | Unknown                   |
+-----------------------+---------------------------+
| Ethnic Group          | Unknown                   |
+-----------------------+---------------------------+
 
 
 Author
 
 
+--------------+--------------------------------------------+
| Author       | [facility]  |
|              | and [place]                                |
+--------------+--------------------------------------------+
| Organization | [facility]  |
|              | and [place]                                |
+--------------+--------------------------------------------+
| Address      | Unknown                                    |
+--------------+--------------------------------------------+
| Phone        | Unavailable                                |
+--------------+--------------------------------------------+
 
 
 
 Support
 
 
+--------------+--------------+---------+-----------------+
| Name         | Relationship | Address | Phone           |
+--------------+--------------+---------+-----------------+
| Lauryn Leroy | ECON         | Unknown | [phone] |
+--------------+--------------+---------+-----------------+
 
 
 
 Care Team Providers
 
 
 
+-----------------------+------+-------------+
| Care Team Member Name | Role | Phone       |
+-----------------------+------+-------------+
 PCP  | Unavailable |
+-----------------------+------+-------------+
 
 
 
 Encounter Details
 
 
+--------+-----------+----------------------+----------------------+----------------------+
| Date   | Type      | Department           | Care Team            | Description          |
+--------+-----------+----------------------+----------------------+----------------------+
| 10/24/ | Hospital  |   [facility]    |   Conversion         | Coronary artery      |
|  - | Encounter | [facility]ICAL [facility]ENTER       | Transaction,         | disease involving    |
|        |           | CLINICAL DECISION    | Provider Unknown     | native coronary      |
| 10/25/ |           | UNIT  [address] | [phone]         | artery of native     |
| 2018   |           |   [place], [place]       | [phone] (Fax)   | heart, angina        |
|        |           | [address]           | Varun Mitchell MD  | presence             |
|        |           | [phone]         |  [address]    | unspecified; Anginal |
|        |           |                      | [place]  [place]  |  equivalent ([facility]);   |
|        |           |                      | [id number]  [phone]  | Chronic atrial       |
|        |           |                      |  [phone] (Fax)  | fibrillation ([facility]);  |
|        |           |                      |                      | Essential            |
|        |           |                      |                      | hypertension;        |
|        |           |                      |                      | Pulmonary            |
|        |           |                      |                      | hypertension,        |
|        |           |                      |                      | moderate to severe   |
|        |           |                      |                      | (Former[facility]); S/P drug      |
|        |           |                      |                      | eluting coronary     |
|        |           |                      |                      | stent placement;     |
|        |           |                      |                      | Severe mitral        |
|        |           |                      |                      | regurgitation;       |
|        |           |                      |                      | Severe tricuspid     |
|        |           |                      |                      | regurgitation; SOB   |
|        |           |                      |                      | (shortness of        |
|        |           |                      |                      | breath) on exertion  |
+--------+-----------+----------------------+----------------------+----------------------+
 
 
 
 Social History
 
 
+----------------+-------+-----------+--------+------+
| Tobacco Use    | Types | Packs/Day | Years  | Date |
|                |       |           | Used   |      |
+----------------+-------+-----------+--------+------+
| Never Assessed |       |           |        |      |
+----------------+-------+-----------+--------+------+
 
 
 
+------------------+---------------+
| Sex Assigned at  | Date Recorded |
| Birth            |               |
+------------------+---------------+
 
| Not on file      |               |
+------------------+---------------+
 
 
 
+----------------+-------------+-------------+
| Job Start Date | Occupation  | Industry    |
+----------------+-------------+-------------+
| Not on file    | Not on file | Not on file |
+----------------+-------------+-------------+
 
 
 
+----------------+--------------+------------+
| Travel History | Travel Start | Travel End |
+----------------+--------------+------------+
 
 
 
+-------------------------------------+
| No recent travel history available. |
+-------------------------------------+
 documented as of this encounter
 
 Last Filed Vital Signs
 
 
+-------------------+----------------------+----------------------+----------+
| Vital Sign        | Reading              | Time Taken           | Comments |
+-------------------+----------------------+----------------------+----------+
| Blood Pressure    | 123/59               | 10/25/2018  2:40 PM  |          |
|                   |                      | PDT                  |          |
+-------------------+----------------------+----------------------+----------+
| Pulse             | 70                   | 10/25/2018  2:40 PM  |          |
|                   |                      | PDT                  |          |
+-------------------+----------------------+----------------------+----------+
| Temperature       | 36.6   C (97.9   F)  | 10/25/2018  2:40 PM  |          |
|                   |                      | PDT                  |          |
+-------------------+----------------------+----------------------+----------+
| Respiratory Rate  | 16                   | 10/25/2018  2:40 PM  |          |
|                   |                      | PDT                  |          |
+-------------------+----------------------+----------------------+----------+
| Oxygen Saturation | -                    | -                    |          |
+-------------------+----------------------+----------------------+----------+
| Inhaled Oxygen    | -                    | -                    |          |
| Concentration     |                      |                      |          |
+-------------------+----------------------+----------------------+----------+
| Weight            | 66.7 kg (147 lb 0.8  | 10/25/2018  2:40 PM  |          |
|                   | oz)                  | PDT                  |          |
+-------------------+----------------------+----------------------+----------+
| Height            | 160 cm (5' 3")       | 10/25/2018  2:40 PM  |          |
|                   |                      | PDT                  |          |
+-------------------+----------------------+----------------------+----------+
| Body Mass Index   | 26.05                | 10/25/2018  2:40 PM  |          |
|                   |                      | PDT                  |          |
+-------------------+----------------------+----------------------+----------+
 documented in this encounter
 
 Discharge Summaries
 Daniel Funez MD - 10/25/2018 12:42 PM PDTFormatting of this note might be diffe
 
rent from the original.
 Discharge Summaries by Daniel Funez MD at 10/25/18 5032  
  Author:  Daniel Funez MD Service:  Cardiology Author Type:  Physician 
  Filed:  10/28/18 1957 Date of Service:  10/25/18 1242 Status:  Signed 
  :  Daniel Funez MD (Physician)   
  Related Notes:  Original Note by Daniel Funez MD (Physician) filed at 10/26/18 2133
   
   
 [facility]  
 Service:  Cardiology
 Discharge Summary
 
 Date of Admission:  10/24/2018
 Date of Discharge:  10/25/2018
 
 Discharge Provider:  Daniel Funez MD
 Treatment Team: 
 Admitting Provider: Varun Mitchell MD
 
 Discharge Diagnoses:  
 
 Active Problems:
   Pulmonary hypertension, moderate to severe (HCC)
   Atrial fibrillation (HCC)
   Severe tricuspid regurgitation
   Severe mitral regurgitation
   SOB (shortness of breath) on exertion
   Anginal equivalent (HCC)
   Essential hypertension
   Coronary artery disease of native artery of native heart with stable angina pectoris (HCC
)
   S/P drug eluting coronary stent placement
 Resolved Problems:
   * No resolved hospital problems. *
 
 Final Diagnoses:  
 1.  Coronary artery disease with anginal equivalent status post successful PTCA and stentin
g of the right posterolateral artery using 3.0 x 16 mm Synergy drug-eluting stent on 10/21/2
018.
 2.  Severe mitral regurgitation.
 3.  Severe tricuspid regurgitation.
 4.  Coronary atrial fibrillation.
 5.  Moderate to severe primary hypertension.
 6.  Hypertension.
 7.  Hyperlipidemia.
 8.  Atrial septal defect.
 9.  Mild aortic stenosis.
  
 
 Procedures:  
 PCI 10/24/2018
 Results
 Critical 99% calcified rPLA,, 30% prox and mid RCA
 Successful PTCA/stent rPLA using 3.0X16 mm Synergy MARA postdilated 3.5X12 NC balloon
 Probable small branch wire induced perforation with small, limited extravasation of contras
t
 
 Significant Diagnostic Studies:  
 Limited Echo 10/25/2018
 Impression 
 
 1. Overall left ventricular systolic function is normal with, an EF between 65 - 70 %. 
 2. There is no pericardial effusion. 
 
 BRIEF HISTORY OF PRESENTATION AND HOSPITAL COURSE:    
      Vonnie Celaya is a 86 y.o. female With a history of hypertension, hyperlipidemia,
 diabetes mellitus type 2, chronic atrial fibrillation, severe mitral and tricuspid regurgit
ation, who was referred for elective outpatient PCI of right posterolateral artery.  Patient
 was evaluated at [facility] for mitral clip and possible tricuspid clip.  She had a coronary rogelio
ogram done there that showed the critical stenosis of a large right posterolateral artery.  
She was referred by her primary cardiologist, Dr. Mitchell, for PCI.  Patient had a successful P
CI of the right posterolateral artery with deployment of a 3.0 x 16 mm Synergy drug-eluting 
stent.  Patient tolerated the procedure well.  There was a questionable left small extravasa
tion of contrast and we suspected a small branch wire induced perforation.  Patient was kept
 overnight for observation.  She had remained hemodynamically stable without symptoms.  We d
id a limited echocardiogram the next day which showed no evidence of pericardial effusion.  
The patient ambulated well.  She had chronic exertional shortness of breath which she felt m
ight have improved; however, she has not exerted herself to the level that she can do a good
 comparison.  She denies chest pain, orthopnea, paroxysmal nocturnal dyspnea, palpitations, 
lightheadedness, presyncope, or syncope.  She has been taking aspirin and Plavix before the 
procedure, and that will continue.  She was restarted on Coumadin on discharge to be restart
ed the next day.  Overall, the patient tolerated the procedure well and was discharged home 
in stable condition.  Her renal function remained stable.
 
 PLAN:
 1.  Continue aspirin 81 mg for life.
 2.  Continue Plavix 75 mg for at least 6 months.
 3.  Home medications including losartan, Toprol XL, and simvastatin will continue.
 4.  Cardiac rehabilitation as an outpatient.
 5.  Patient will follow with her primary cardiologist, Dr. Mitchell, in 1 week.
  
 Past Medical History   
 Diagnosis  Date 
   Acute diastolic heart failure (HCC)  
   ASD secundum  
   Atrial fibrillation (HCC)  
  persistent since then, never attempted cardioversion  
   Cerebrovascular accident (CVA) (HCC)  
   Congestive heart failure (HCC) 2018 
  acute/chronic Diastolic Heart Failure, NYHA class III  
   Coronary artery disease of native artery of native heart with stable angina pectoris (H
CC) 10/24/2018 
   Diabetes mellitus, type II (HCC)  
  controlled, no complications  
   Edema  
   Essential hypertension  
   Heart murmur  
  had scarlet fever  
   Hyperlipidemia  
   Hypothyroidism  
   Iron deficiency anemia  
   Migraines  
  resolved  
   Mild aortic stenosis  
   Osteoarthritis  
   Osteoporosis  
   Pulmonary hypertension, moderate to severe (HCC)  
   Severe mitral regurgitation  
   Severe tricuspid regurgitation  
  moderately severe  
 
 
 Past Surgical History    
 Procedure  Laterality Date 
   APPENDECTOMY   
   BLADDER SUSPENSION   
   BLEPHAROPLASTY Bilateral  
   BREAST SURGERY Right  
  benign lesion removed   
   BUNIONECTOMY   
   HYSTERECTOMY   
  partial hysterectomy separate   
   salpingo-oophorectomy Bilateral  
   TOE FUSION   
   TONSILLECTOMY   
 
 Allergies    
 Allergen   Reactions 
   Morphine  Other (See Comments) 
   Pt felt fidgety  
   Percocet [Oxycodone-Acetaminophen]  Other (See Comments) 
   Pt felt fidgety  
 
 Prescriptions Prior to Admission      
 Medication  Sig Dispense Refill Last Dose 
   acetaminophen (TYLENOL) 325 MG tablet Take 650 mg by mouth every 6 (six) hours as neede
d for Pain.   Taking at Unknown time 
   ascorbic acid (VITAMIN C) 1000 MG tablet Take 1,000 mg by mouth daily.   10/23/2018 at 
0800 
   aspirin 81 MG tablet Take 1 tablet by mouth daily. Start 10/22/18 30 tablet 11 10/24/20
18 at 0715 
   Cholecalciferol 2000 units CAPS Take 2,000 Units by mouth daily.   10/24/2018 at 0715 
   clopidogrel (PLAVIX) 75 MG tablet Take 1 tablet by mouth daily. Start 10/22/18 (stop wa
rfarin 10/20/18) 30 tablet 11 10/24/2018 at 0715 
   Cranberry 1000 MG CAPS Take 1 capsule by mouth daily.   10/24/2018 at 0715 
   furosemide (LASIX) 40 MG tablet Take 40 mg by mouth daily.   10/24/2018 at 0715 
   Gluc-Chonn-MSM-Boswellia-Vit D (GLUCOSAMINE CHOND TRIPLE/VIT D) TABS Take 1 tablet by m
outh daily.   10/24/2018 at 0715 
   levothyroxine (SYNTHROID) 100 MCG tablet Take 100 mcg by mouth every morning before kavita
akfast.   10/24/2018 at 0715 
   losartan (COZAAR) 100 MG tablet Take 100 mg by mouth daily.   10/23/2018 at 1800 
   magnesium chloride (MAG64) 64 mg EC tablet Take 1 tablet by mouth daily.   10/24/2018 a
t 0715 
   metFORMIN (GLUMETZA) 500 MG (MOD) 24 hr tablet Take 500 mg by mouth 2 (two) times daily
 with meals.   10/22/2018 at Unknown time 
   metoprolol (TOPROL-XL) 200 MG 24 hr tablet Take 100 mg by mouth every morning.   10/24/
2018 at 0715 
   potassium chloride SA (K-DUR,KLOR-CON) 20 MEQ tablet Take 20 mEq by mouth daily.   10/2
2018 at 0715 
   simvastatin (ZOCOR) 20 MG tablet Take 10 mg by mouth nightly.   10/23/2018 at 1800 
 
 DISCHARGE EXAM
 Vital Signs:
 /59 (BP Location: Left upper arm)  | Pulse 70  | Temp 97.9 F (36.6 C) (Oral)  | R
krista 16  | Ht 1.6 m (5' 3")  | Wt 66.7 kg (147 lb 0.8 oz)  | SpO2 98%  | BMI 26.05 kg/m 
 Temp:  [97.5 F (36.4 C)-98.6 F (37 C)] 97.9 F (36.6 C) (10/25 1054)
 BP: ()/() 114/57 (10/25 1054)
 Heart Rate:  [51-78] 67 (10/25 1054)
 Resp:  [11-22] 16 (10/25 1054)
 SpO2:  [84 %-100 %] 98 % (10/25 1054)
 Height:  [160 cm (5' 3")] 160 cm (5' 3") (10/24 135)
 Weight:  [66.7 kg (147 lb 0.8 oz)] 66.7 kg (147 lb 0.8 oz) (10/24 135)
 
 BMI (Calculated):  [26.1] 26.1 (10/24 1352)
 
 Physical Exam 
 Constitutional: She is oriented to person, place, and time. She appears well-developed and 
well-nourished. No distress. 
 HENT: 
 Head: Normocephalic and atraumatic. 
 Eyes: No scleral icterus. 
 Neck: Neck supple. No JVD present. Carotid bruit is not present. 
 Cardiovascular: Normal rate, S1 normal and S2 normal.  An irregularly irregular rhythm pres
ent. Exam reveals no gallop and no friction rub.  
 Murmur (2/6 systolic) heard.
 Pulmonary/Chest: Effort normal. No stridor. No respiratory distress. She has no wheezes. Sh
e has no rales. She exhibits no tenderness. 
 Abdomina/Gl: Soft. Bowel sounds are normal. There is no tenderness. 
 Musculoskeletal: She exhibits no edema or tenderness. 
 Neurological: She is alert and oriented to person, place, and time. No cranial nerve defici
t. 
 Skin: Skin is warm and dry. No rash noted. She is not diaphoretic. No erythema. No pallor. 
 Psychiatric: She has a normal mood and affect. 
 
 DATA
 
 CBC:  
 Lab Results    
 Component  Value Date 
  WBC 9.88 10/25/2018 
  RBC 3.67 (L) 10/25/2018 
  HGB 10.9 (L) 10/25/2018 
  HCT 31.9 (L) 10/25/2018 
  MCV 87.0 10/25/2018 
  MCH 29.6 10/25/2018 
  MCHC 34.0 10/25/2018 
  RDW 46.4 10/25/2018 
   10/25/2018 
  MPV 9.1 10/25/2018 
  DIFFTYPE MANUAL 10/25/2018 
 
 BMP:  
 Lab Results    
 Component  Value Date 
   10/25/2018 
  K 4.6 10/25/2018 
   10/25/2018 
  CO2 19 (L) 10/25/2018 
  ANIONGAP 16 10/25/2018 
  GLUF 147 (H) 10/25/2018 
  BUN 21 10/25/2018 
  CREATININE 0.9 10/25/2018 
  BCR 23 10/25/2018 
  CA 8.6 10/25/2018 
  EGFR 59 (L) 10/25/2018 
 
 Disposition:  Home
 Condition:  Stable
 Code Status:  Full Code
 
 No discharge procedures on file.
 
 Follow up:
 
 Gio Perez MD
 [address], 
 [place] [address] [phone]
 
 Varun Mitchell MD
 [address] [place] [address] [phone]
 
 In 1 week
 
  
 Medication List 
  
 CONTINUE taking these medications  
 acetaminophen 325 MG tablet
 Refills:  0
 Commonly known as:  TYLENOL
  
 ascorbic acid 1000 MG tablet
 Refills:  0
 Commonly known as:  VITAMIN C
  
 aspirin 81 MG tablet
 QTY:  30 tablet
 Refills:  11
 Take 1 tablet by mouth daily. Start 10/22/18
  
 Cholecalciferol 2000 units Caps
 Refills:  0
  
 clopidogrel 75 MG tablet
 QTY:  30 tablet
 Refills:  11
 Commonly known as:  PLAVIX
 Take 1 tablet by mouth daily. Start 10/22/18 (stop warfarin 10/20/18)
  
 Cranberry 1000 MG Caps
 Refills:  0
  
 furosemide 40 MG tablet
 Refills:  0
 Commonly known as:  LASIX
  
 GLUCOSAMINE CHOND TRIPLE/VIT D Tabs
 Refills:  0
  
 levothyroxine 100 MCG tablet
 Refills:  0
 Commonly known as:  SYNTHROID
  
 losartan 100 MG tablet
 Refills:  0
 Commonly known as:  COZAAR
  
 magnesium chloride 64 mg EC tablet
 Refills:  0
 Commonly known as:  MAG64
 
  
 metFORMIN 500 MG (MOD) 24 hr tablet
 Refills:  0
 Commonly known as:  GLUMETZA
  
 metoprolol 200 MG 24 hr tablet
 Refills:  0
 Commonly known as:  TOPROL-XL
  
 potassium chloride SA 20 MEQ tablet
 Refills:  0
 Commonly known as:  K-DUR,KLOR-CON
  
 simvastatin 20 MG tablet
 Refills:  0
 Commonly known as:  ZOCOR
  
  
 You might also be taking other medications not listed above. If you have questions about an
y of your other medications, talk to the person who prescribed them or your Primary Care Pro
vider. 
  
  
  
 
 Discharge took 40 minutes, to include final examination, discussion of admission, and prepa
ration of prescriptions, instructions for on-going care, follow-up and documentation of disc
harge summary.
 
 Daniel Funez MD
 10/25/2018
  
  Electronically signed by Daniel Funez MD at 10/28/2018  7:57 PM PDTdocumented i
n this encounter
 
 Medications at Time of Discharge
 
 
+----------------------+----------------------+-----------+---------+----------+-----------+
| Medication           | Sig                  | Dispensed | Refills | Start    | End Date  |
|                      |                      |           |         | Date     |           |
+----------------------+----------------------+-----------+---------+----------+-----------+
|   acetaminophen      | Take 650 mg by mouth |           | 0       | 20 |           |
| (TYLENOL) 325 mg     |  every 6 (six) hours |           |         | 18       |           |
| tablet               |  as needed for Pain. |           |         |          |           |
+----------------------+----------------------+-----------+---------+----------+-----------+
|   ascorbic acid      | Take 1,000 mg by     |           | 0       | 20 |           |
| (VITAMIN C) 1000 MG  | mouth daily.         |           |         | 18       |           |
| tablet               |                      |           |         |          |           |
+----------------------+----------------------+-----------+---------+----------+-----------+
|   Cranberry Juice    | Take 1 capsule by    |           | 0       | 20 |           |
| Extract 1000 MG CAPS | mouth daily.         |           |         | 18       |           |
+----------------------+----------------------+-----------+---------+----------+-----------+
|   furosemide (LASIX) | Take 40 mg by mouth  |           | 0       | 20 |           |
|  40 mg tablet        | daily.               |           |         | 18       |           |
+----------------------+----------------------+-----------+---------+----------+-----------+
|   levothyroxine      | Take 100 mcg by      |           | 0       | 20 |           |
| (SYNTHROID) 100 mcg  | mouth every morning  |           |         | 18       |           |
| tablet               | before breakfast.    |           |         |          |           |
+----------------------+----------------------+-----------+---------+----------+-----------+
 
|   losartan (COZAAR)  | Take 100 mg by mouth |           | 0       | 20 |           |
| 100 MG tablet        |  daily.              |           |         | 18       |           |
+----------------------+----------------------+-----------+---------+----------+-----------+
|   magnesium chloride | Take 1 tablet by     |           | 0       | 20 |           |
|  (MAG64) 64 mg EC    | mouth daily.         |           |         | 18       |           |
| tablet               |                      |           |         |          |           |
+----------------------+----------------------+-----------+---------+----------+-----------+
|   metFORMIN          | Take 500 mg by mouth |           | 0       | 20 |           |
| (GLUMETZA) 500 MG 24 |  2 (two) times daily |           |         | 18       |           |
|  hr tablet           |  with meals.         |           |         |          |           |
+----------------------+----------------------+-----------+---------+----------+-----------+
|   potassium chloride | Take 20 mEq by mouth |           | 0       | 20 |           |
|  (KLOR-CON M20) 20   |  daily.              |           |         | 18       |           |
| mEq ER tablet        |                      |           |         |          |           |
+----------------------+----------------------+-----------+---------+----------+-----------+
|   simvastatin        | Take 10 mg by mouth  |           | 0       | 20 |           |
| (ZOCOR) 20 mg tablet | nightly.             |           |         | 18       |           |
+----------------------+----------------------+-----------+---------+----------+-----------+
|   aspirin 81 MG      | Take 1 tablet by     |   30      | 11      | 10/19/20 | 10/19/201 |
| tablet               | mouth daily. Start   | tablet    |         | 18       | 9         |
|                      | 10/22/18             |           |         |          |           |
+----------------------+----------------------+-----------+---------+----------+-----------+
|                      | Take 1 tablet by     |           | 0       | 20 |  |
| Gluc-Chonn-MSM-Boswe | mouth daily.         |           |         | 18       | 9         |
| llia-Vit D           |                      |           |         |          |           |
| (GLUCOSAMINE CHOND   |                      |           |         |          |           |
| TRIPLE/VIT D) TABS   |                      |           |         |          |           |
+----------------------+----------------------+-----------+---------+----------+-----------+
|   metoprolol         | Take 100 mg by mouth |           | 0       | 20 |  |
| succinate            |  every morning.      |           |         | 18       | 9         |
| (TOPROL-XL) 200 mg   |                      |           |         |          |           |
| ER tablet            |                      |           |         |          |           |
+----------------------+----------------------+-----------+---------+----------+-----------+
 documented as of this encounter
 
 Progress Notes
 Conversion Transaction, Provider Unknown - 10/25/2018  3:43 PM PDTFormatting of this note m
ight be different from the original.
 Nurse Progress Note by Deya Latham RN at 10/25/18 3743  
  Author:  Deya Latham RN Service:  (none) Author Type:  Registered Nurse 
  Filed:  10/25/18 5319 Date of Service:  10/25/18 1543 Status:  Signed 
  :  Deya Latham RN (Registered Nurse)   
   
 Discharge summary and paperwork reviewed with patient and patient's daughter. IVs removed. 
Belongings sent with patient. Tele notified of patient discharge. All questions adressed and
 there are no further concerns. Patient escorted via wheelchair to private vehicle. 
 
 Deya Latham RN. 10/25/18 3:44 PM 
 
  
  Electronically signed by Conversion Transaction, Provider at 2019 10:04 PM PDTConver
heaven Transaction, Provider Unknown - 10/25/2018  9:56 AM PDTFormatting of this note might be
 different from the original.
 Case Management by PRISCA Najera at 10/25/18 0956  
  Author:  PRISCA Najera Service:  (none) Author Type:   
  Filed:  10/25/18 0957 Date of Service:  10/25/18 0956 Status:  Signed 
  :  PRISCA Najera ()   
   
 CM met with pt for discharge planning. Pt is 86 years old and lives with her daughter and g
randchildren in a 2-level home. Pt has 7 stairs at the main entrance. Pt is independent with
 
 her mobility. Pt denied any difficulty obtaining her medications and had no resource concer
ns at this time. CM will continue to follow as needed.
 
  10/25/18 0954 
 Discharge Planning Evaluation  
 Admitting Diagnosis Coronary Artery Disease 
 Readmission No 
 Living Arrangements Children 
 Support Systems Children 
 Type of Residence Private residence 
 House type House 2 story 
 Steps to enter 7 
 Bathrooms on 1st Floor 1-Full 
 Independent with ADL's Yes 
 Independent with Mobility Yes 
 Home Care Services No 
 Caregiver after Discharge Yes 
 Caregiver Name Lauryn Leroy 
 Relationship to Patient Daughter 
 Phone number [phone] 
 Mental Status Oriented 
 Anticipated Discharge Plan  
 Post Acute Care Needs None at this time 
 Resources  
 Financial concerns No 
 Transportation issues No 
 Patient/Family concerns No 
 Prescription Plan Yes 
 Anticipated Disposition  
 Facility Type Home 
 Medicare Important Message (GELA) Not applicable 
 Met with: Patient
 and discussed discharge planning, Pt is a 86 y.o., female
 Patient's PCP is: GIO PEREZ
 Patient's insurance: Medicare
 Coverage concerns: None
 Medication coverage/concerns: None
 Community resources utilized / needed: None
 Assistance in transportation: Not needed.
 Identification of any specific education / training: None
 Barriers to Discharge / Alternative housing needed: None 
 Anticipated DCP: Home
 
  
  Electronically signed by Conversion Transaction, Provider at 2019 10:05 PM PDTConver
heaven Transaction, Provider Unknown - 10/25/2018  6:35 AM PDTFormatting of this note might be
 different from the original.
 Nurse Progress Note by Mayra Deal RN at 10/25/18 0635  
  Author:  Mayra Deal RN Service:  (none) Author Type:  Registered Nurse 
  Filed:  10/25/18 0636 Date of Service:  10/25/18 0635 Status:  Addendum 
  :  Mayra Deal RN (Registered Nurse)   
  Related Notes:  Original Note by Mayra Deal RN (Registered Nurse) filed at 10/25/18 0
636   
   
 Updated Dr Funez on BP, we will continue to observe and await echo, no need for stat.
  
  Electronically signed by Conversion Transaction, Provider at 2019 10:01 PM PDTConver
heaven Transaction, Provider Unknown - 10/25/2018  6:28 AM PDTFormatting of this note might be
 different from the original.
 Nurse Progress Note by Mayra Deal RN at 10/25/18 0628  
 
  Author:  Mayra Deal RN Service:  (none) Author Type:  Registered Nurse 
  Filed:  10/25/18 0629 Date of Service:  10/25/18 0628 Status:  Signed 
  :  Mayra Deal RN (Registered Nurse)   
   
 Phoned and left  for echo regarding the need for Stat echo according to Dr Funez's note 
to rule out pericardial effusion.
  
  Electronically signed by Conversion Transaction, Provider at 2019 10:02 PM PDTConver
heaven Transaction, Provider Unknown - 10/25/2018  2:52 AM PDTFormatting of this note might be
 different from the original.
 Nurse Progress Note by Mayra Deal RN at 10/25/18 0252  
  Author:  Mayra Deal RN Service:  (none) Author Type:  Registered Nurse 
  Filed:  10/25/18 0253 Date of Service:  10/25/18 0252 Status:  Signed 
  :  Mayra Deal RN (Registered Nurse)   
   
 Chart check complete
  
  Electronically signed by Conversion Transaction, Provider at 2019  9:59 PM PDTConver
heaven Transaction, Provider Unknown - 10/25/2018  1:20 AM PDTFormatting of this note might be
 different from the original.
 Nurse Progress Note by Mayra Deal RN at 10/25/18 0120  
  Author:  Mayra Deal RN Service:  (none) Author Type:  Registered Nurse 
  Filed:  10/25/18 0138 Date of Service:  10/25/18 0120 Status:  Signed 
  :  Mayra Deal RN (Registered Nurse)   
   
 Assumed care of pt from ANNE Burns, pt resting at this time, no c/o.
  
  Electronically signed by Conversion Transaction, Provider at 2019  9:57 PM PDTConver
heaven Transaction, Provider Unknown - 10/24/2018  8:31 PM PDTFormatting of this note might be
 different from the original.
 Pharmacy Note by Mariah Up RPH at 10/24/18 2031  
  Author:  Mraiah Up RPH Service:  Pharmacy Author Type:  Pharmacist 
  Filed:  10/24/18 2031 Date of Service:  10/24/18 2031 Status:  Signed 
  :  Mariah Up RPH (Pharmacist)   
   
 Renal Dosing Monitoring:
 Vonnie Celaya 86 y.o. female
 
 Pharmacy dosing for renal function per Dr. Funez
 
 Plan per protocol: 
 Medication / Dose: crcl ~ 40.7 ml/min bsed on scr of 0.91
 No renal dosage adjustments needed at this time
 
 Pharmacy will continue monitoring patient for appropriate dosing per renal function.
 10/24/2018 8:30 PM
 Pharmacist: MARIAH UP
 
  
  Electronically signed by Conversion Transaction, Provider at 2019 10:11 PM PDTConver
heaven Transaction, Provider Unknown - 10/24/2018  6:41 PM PDTFormatting of this note might be
 different from the original.
 Nurse Progress Note by Luci Coker RN at 10/24/18 1841  
  Author:  Luci Coker RN Service:  Interventional Cardiology Author Type:  Registered Nurs
e 
  Filed:  10/24/18 1842 Date of Service:  10/24/18 1841 Status:  Signed 
  :  Luci Coker RN (Registered Nurse)   
   
 Groin checked at BS with ANNE Pressley. Pt bleeding from groin site. Pressure being held by Ryan santoro RN. Site is soft to palpation per Huan.
 
 
 Luci Coker RN
 
  
  Electronically signed by Conversion Transaction, Provider at 2019 10:09 PM PDTConver
heaven Transaction, Provider Unknown - 10/23/2018  4:57 PM PDTFormatting of this note might be
 different from the original.
 Nurse Progress Note by Nadiya Tena RN at 10/23/18 1657  
  Author:  Nadiya Tena RN Service:  Cardiology Author Type:  Registered Nurse 
  Filed:  10/23/18 1703 Date of Service:  10/23/18 1657 Status:  Signed 
  :  Nadiya Tena RN (Registered Nurse)   
   
 [facility] 
 Pre-Procedure Telephone Call 
                                                       
 Type of Procedure   [facility]
 Spoke with:   Patient
  needed:    No
            Dutch    No
            Georgian No
            Other: 
  arranged: No                             
 Arrival Time: 1330
 Check In Location:  Registration
 Ride and Caregiver:  Lauryn
 Phone Number for Ride/Caregiver: 
 NPO from: 0700
 Diabetic:  Yes
  If Yes are they on Glucophage/Metformin   No 
                  Date of last Dose   {Blank single:11665::"NA"
 Is patient on Anticoagulants   Yes     Coumadin
                 Date of Last Dose  10/19/2018
 Iodine Allergy?  No
                 Action:  NA, Pre-medicated and Physician Notified
 Latex Allergy? No
 
 Patient informed to bring (medication list, etc):  Yes
 
 Any other comments:
 
 Nadiya Tena RN
 
  
  Electronically signed by Conversion Transaction, Provider at 2019 10:13 PM PDTdocume
nted in this encounter
 
 Plan of Treatment
 
 
+--------+---------+------------+----------------------+-------------+
| Date   | Type    | Specialty  | Care Team            | Description |
+--------+---------+------------+----------------------+-------------+
| / | Office  | Cardiology |   [facility]    |             |
|    | Visit   |            | [address]      |             |
|        |         |            | [address]   |             |
|        |         |            | [place] [address]   |             |
|        |         |            | [phone]         |             |
|        |         |            | [phone] (Fax)   |             |
+--------+---------+------------+----------------------+-------------+
 documented as of this encounter
 
 
 Procedures
 
 
+----------------------+--------+-------------+----------------------+----------------------
+
| Procedure Name       | Priori | Date/Time   | Associated Diagnosis | Comments             
|
|                      | ty     |             |                      |                      
|
+----------------------+--------+-------------+----------------------+----------------------
+
| POC GLUCOSE          | Routin | 10/25/2018  |                      |   Results for this   
|
|                      | e      | 11:18 AM    |                      | procedure are in the 
|
|                      |        | PDT         |                      |  results section.    
|
+----------------------+--------+-------------+----------------------+----------------------
+
| ECHO LIMITED         | Routin | 10/25/2018  |                      |   Results for this   
|
|                      | e      |  7:00 AM    |                      | procedure are in the 
|
|                      |        | PDT         |                      |  results section.    
|
+----------------------+--------+-------------+----------------------+----------------------
+
| POC GLUCOSE          | Routin | 10/25/2018  |                      |   Results for this   
|
|                      | e      |  5:32 AM    |                      | procedure are in the 
|
|                      |        | PDT         |                      |  results section.    
|
+----------------------+--------+-------------+----------------------+----------------------
+
| EXTERNAL LAB: CBC    | Routin | 10/25/2018  |                      |   Results for this   
|
|                      | e      |  4:37 AM    |                      | procedure are in the 
|
|                      |        | PDT         |                      |  results section.    
|
+----------------------+--------+-------------+----------------------+----------------------
+
| BASIC METABOLIC      | Routin | 10/25/2018  |                      |   Results for this   
|
| PANEL                | e      |  4:37 AM    |                      | procedure are in the 
|
|                      |        | PDT         |                      |  results section.    
|
+----------------------+--------+-------------+----------------------+----------------------
+
| POC GLUCOSE          | Routin | 10/24/2018  |                      |   Results for this   
|
|                      | e      |  9:37 PM    |                      | procedure are in the 
|
|                      |        | PDT         |                      |  results section.    
|
+----------------------+--------+-------------+----------------------+----------------------
+
 
| CV CARDIAC PROCEDURE | Routin | 10/24/2018  |                      |   Results for this   
|
|                      | e      |  6:26 PM    |                      | procedure are in the 
|
|                      |        | PDT         |                      |  results section.    
|
+----------------------+--------+-------------+----------------------+----------------------
+
| ACTIVATED CLOTTING   | Routin | 10/24/2018  |                      |   Results for this   
|
| TIME                 | e      |  5:47 PM    |                      | procedure are in the 
|
|                      |        | PDT         |                      |  results section.    
|
+----------------------+--------+-------------+----------------------+----------------------
+
| ACTIVATED CLOTTING   | Routin | 10/24/2018  |                      |   Results for this   
|
| TIME                 | e      |  5:07 PM    |                      | procedure are in the 
|
|                      |        | PDT         |                      |  results section.    
|
+----------------------+--------+-------------+----------------------+----------------------
+
| EXTERNAL LAB: CBC    | Routin | 10/24/2018  |                      |   Results for this   
|
|                      | e      |  1:52 PM    |                      | procedure are in the 
|
|                      |        | PDT         |                      |  results section.    
|
+----------------------+--------+-------------+----------------------+----------------------
+
| PROTIME INR          | Routin | 10/24/2018  |                      |   Results for this   
|
|                      | e      |  1:52 PM    |                      | procedure are in the 
|
|                      |        | PDT         |                      |  results section.    
|
+----------------------+--------+-------------+----------------------+----------------------
+
| BASIC METABOLIC      | Routin | 10/24/2018  |                      |   Results for this   
|
| PANEL                | e      |  1:52 PM    |                      | procedure are in the 
|
|                      |        | PDT         |                      |  results section.    
|
+----------------------+--------+-------------+----------------------+----------------------
+
 documented in this encounter
 
 Results
 POC Glucose (10/25/2018 11:18 AM PDT)
 
+-------------+--------------------------+---------------+------------+--------------+
| Component   | Value                    | Ref Range     | Performed  | Pathologist  |
|             |                          |               | At         | Signature    |
+-------------+--------------------------+---------------+------------+--------------+
| Glucose,    | 136 (H)Comment: Testing  | 65 - 99 mg/dL | EXTERNAL   |              |
| Fingerstick | performed at [facility];[address]     |               | LAB        |              |
|             | [address];[place], [place]   |               |            |              |
 
|             | [id number]                    |               |            |              |
+-------------+--------------------------+---------------+------------+--------------+
 
 
 
+----------+
| Specimen |
+----------+
|          |
+----------+
 
 
 
+----------------+---------+--------------------+--------------+
| Performing     | Address | City/State/Zipcode | Phone Number |
| Organization   |         |                    |              |
+----------------+---------+--------------------+--------------+
|   EXTERNAL LAB |         |                    |              |
+----------------+---------+--------------------+--------------+
 Echo Limited (10/25/2018  7:00 AM PDT)
 
+----------+
| Specimen |
+----------+
|          |
+----------+
 
 
 
+------------------------------------------------------------------------+--------------+
| Impressions                                                            | Performed At |
+------------------------------------------------------------------------+--------------+
|   1. Overall left ventricular systolic function is normal with, an EF  |              |
| between 65 - 70 %.  2. There is no pericardial effusion.               |              |
+------------------------------------------------------------------------+--------------+
 
 
 
+------------------------------------------------------------------------+--------------+
| Narrative                                                              | Performed At |
+------------------------------------------------------------------------+--------------+
|      Patient Name:   Vonnie Celaya  YOB: 1932    |              |
|   Accession:   [id number]     Performing Physician:   Daniel Funez MD |              |
|   _______________________________________________________________      |              |
| INDICATIONS  -----------  pericardial effusion     CONCLUSIONS         |              |
| -----------  1. Overall left ventricular systolic function is normal   |              |
| with, an EF between 65 - 70 %.  2. There is no pericardial effusion.   |              |
|    FINDINGS  --------  Study: A limited 2-dimensional transthoracic    |              |
| echocardiogram with limited spectral and color flow Doppler was        |              |
| performed.  Left Ventricle: Overall left ventricular systolic function |              |
|  is normal with, an EF between 65 - 70 %.  Pericardium: There is no    |              |
| pericardial effusion.     MEASUREMENTS  -------------     Sonographer: |              |
|  GD  Authenticated by:   Daniel Funez MD  Report Date/Time:        |              |
| 10- 12:14:37                                                    |              |
+------------------------------------------------------------------------+--------------+
 
 
 
+-------------------------------------------------------------------------------------------
-----------------------------+
 
| Procedure Note                                                                            
                             |
+-------------------------------------------------------------------------------------------
-----------------------------+
|   Casimiro Mar Conversion - 2019  3:45 PM PDT   Patient Name:  Yodit Celaya    
                             |
| of Birth:  1932 Accession:  [id number] Performing Physician:  Daniel Funez         
                             |
| MD_______________________________________________________________INDICATIONS-----------p  
                             |
| ericardial effusion CONCLUSIONS-----------1. Overall left ventricular systolic function   
                             |
| is normal with, an EF between 65 - 70 %.2. There is no pericardial effusion.              
                             |
| FINDINGS--------Study: A limited 2-dimensional transthoracic echocardiogram with limited  
                             |
|  spectral and color flow Doppler was performed.Left Ventricle: Overall left ventricular   
                             |
| systolic function is normal with, an EF between 65 - 70 %.Pericardium: There is no        
                             |
| pericardial effusion. MEASUREMENTS------------- Sonographer: Radhaticated by:          
                             |
| Daniel Funez MDReport Date/Time: 10- 12:14:37 IMPRESSION: 1. Overall left      
                             |
| ventricular systolic function is normal with, an EF between 65 - 70 %.2. There is no      
                             |
| pericardial effusion.                                                                     
                             |
|CONCLUSIONS                                                                                
                            |
|-----------                                                                                
                              |
|1. Overall left ventricular systolic function is normal with, an EF between 65 - 70 %.     
                             |
|2. There is no pericardial effusion.                                                       
                             |
|FINDINGS                                                                                   
                            |
|--------                                                                                   
                              |
|Study: A limited 2-dimensional transthoracic echocardiogram with limited spectral and color
 flow Doppler was performed. |
|Left Ventricle: Overall left ventricular systolic function is normal with, an EF between 65
 - 70 %.                     |
|Pericardium: There is no pericardial effusion.                                             
                             |
|MEASUREMENTS                                                                               
                            |
|-------------                                                                              
                              |
|Sonographer: VINH                                                                            
                             |
|Authenticated by:  Daniel Funez MD                                                     
                             |
 
|Report Date/Time: 10- 12:14:37                                                      
                             |
|IMPRESSION:                                                                                
                             |
|1. Overall left ventricular systolic function is normal with, an EF between 65 - 70 %.     
                             |
|2. There is no pericardial effusion.                                                       
                             |
+-------------------------------------------------------------------------------------------
-----------------------------+
 POC Glucose (10/25/2018  5:32 AM PDT)
 
+-------------+--------------------------+---------------+------------+--------------+
| Component   | Value                    | Ref Range     | Performed  | Pathologist  |
|             |                          |               | At         | Signature    |
+-------------+--------------------------+---------------+------------+--------------+
| Glucose,    | 132 (H)Comment: Testing  | 65 - 99 mg/dL | EXTERNAL   |              |
| Fingerstick | performed at [facility];[address]     |               | LAB        |              |
|             | [address];[place]   |               |            |              |
|             | [address]                    |               |            |              |
+-------------+--------------------------+---------------+------------+--------------+
 
 
 
+----------+
| Specimen |
+----------+
|          |
+----------+
 
 
 
+----------------+---------+--------------------+--------------+
| Performing     | Address | City/State/Zipcode | Phone Number |
| Organization   |         |                    |              |
+----------------+---------+--------------------+--------------+
|   EXTERNAL LAB |         |                    |              |
+----------------+---------+--------------------+--------------+
 External Lab: CBC (10/25/2018  4:37 AM PDT)
 
+-------------+-------------------------+-----------------+------------+--------------+
| Component   | Value                   | Ref Range       | Performed  | Pathologist  |
|             |                         |                 | At         | Signature    |
+-------------+-------------------------+-----------------+------------+--------------+
| WBC         | 9.88                    | 3.80 - 11.00    | EXTERNAL   |              |
|             |                         | K/uL            | LAB        |              |
+-------------+-------------------------+-----------------+------------+--------------+
| RED CELL    | 3.67 (L)                | 3.70 - 5.10     | EXTERNAL   |              |
| COUNT       |                         | M/uL            | LAB        |              |
+-------------+-------------------------+-----------------+------------+--------------+
| Hgb         | 10.9 (L)                | 11.3 - 15.5     | EXTERNAL   |              |
|             |                         | g/dL            | LAB        |              |
+-------------+-------------------------+-----------------+------------+--------------+
| Hematocrit, | 31.9 (L)                | 34.0 - 46.0 %   | EXTERNAL   |              |
|  POC        |                         |                 | LAB        |              |
+-------------+-------------------------+-----------------+------------+--------------+
| MCV         | 87.0                    | 80.0 - 100.0 fl | EXTERNAL   |              |
|             |                         |                 | LAB        |              |
 
+-------------+-------------------------+-----------------+------------+--------------+
| MCH         | 29.6                    | 27.0 - 34.0 pg  | EXTERNAL   |              |
|             |                         |                 | LAB        |              |
+-------------+-------------------------+-----------------+------------+--------------+
| MCHC        | 34.0                    | 32.0 - 35.5     | EXTERNAL   |              |
|             |                         | g/dL            | LAB        |              |
+-------------+-------------------------+-----------------+------------+--------------+
| RDW-CV      | 46.4                    | 37 - 53 fl      | EXTERNAL   |              |
|             |                         |                 | LAB        |              |
+-------------+-------------------------+-----------------+------------+--------------+
| Platelet    | 251                     | 150 - 400 K/uL  | EXTERNAL   |              |
| Count       |                         |                 | LAB        |              |
| Plasma      |                         |                 |            |              |
+-------------+-------------------------+-----------------+------------+--------------+
| MPV         | 9.1                     | fl              | EXTERNAL   |              |
|             |                         |                 | LAB        |              |
+-------------+-------------------------+-----------------+------------+--------------+
| Differentia | MANUAL                  |                 | EXTERNAL   |              |
| l Type      |                         |                 | LAB        |              |
+-------------+-------------------------+-----------------+------------+--------------+
| Segmented   | 84                      | %               | EXTERNAL   |              |
| Neutrophils |                         |                 | LAB        |              |
|  Manual     |                         |                 |            |              |
+-------------+-------------------------+-----------------+------------+--------------+
| Lymphocytes | 13                      | %               | EXTERNAL   |              |
|  Manual     |                         |                 | LAB        |              |
+-------------+-------------------------+-----------------+------------+--------------+
| Monocytes   | 3                       | %               | EXTERNAL   |              |
| Manual      |                         |                 | LAB        |              |
+-------------+-------------------------+-----------------+------------+--------------+
| Absolute    | 8.30 (H)                | 1.90 - 7.40     | EXTERNAL   |              |
| Neutrophils |                         | K/uL            | LAB        |              |
+-------------+-------------------------+-----------------+------------+--------------+
| Absolute    | 1.28                    | 1.00 - 3.90     | EXTERNAL   |              |
| Lymphocytes |                         | K/uL            | LAB        |              |
+-------------+-------------------------+-----------------+------------+--------------+
| Absolute    | 0.30                    | 0.00 - 0.80     | EXTERNAL   |              |
| Monocytes   |                         | K/uL            | LAB        |              |
+-------------+-------------------------+-----------------+------------+--------------+
| RBC         | RBC AND PLT MORPHOLOGY  |                 | EXTERNAL   |              |
| Morphology  | APPEAR NORMALComment:   |                 | LAB        |              |
|             | Testing performed at    |                 |            |              |
|             | [facility], [address] [place] [address]  |                 |            |              |
|             | [place], [place]     |                 |            |              |
|             | [id number]                   |                 |            |              |
+-------------+-------------------------+-----------------+------------+--------------+
 
 
 
+-----------------+
| Specimen        |
+-----------------+
| Blood specimen  |
| (specimen)      |
+-----------------+
 
 
 
+----------------+---------+--------------------+--------------+
| Performing     | Address | City/State/Zipcode | Phone Number |
 
| Organization   |         |                    |              |
+----------------+---------+--------------------+--------------+
|   EXTERNAL LAB |         |                    |              |
+----------------+---------+--------------------+--------------+
 Basic Metabolic Panel (10/25/2018  4:37 AM PDT)
 
+-------------+--------------------------+-----------------+------------+--------------+
| Component   | Value                    | Ref Range       | Performed  | Pathologist  |
|             |                          |                 | At         | Signature    |
+-------------+--------------------------+-----------------+------------+--------------+
| Na          | 136                      | 135 - 145       | EXTERNAL   |              |
|             |                          | mmol/L          | LAB        |              |
+-------------+--------------------------+-----------------+------------+--------------+
| K           | 4.6Comment: SPECIMEN     | 3.5 - 4.9       | EXTERNAL   |              |
|             | SLIGHTLY HEMOLYZED       | mmol/L          | LAB        |              |
+-------------+--------------------------+-----------------+------------+--------------+
| Cl          | 106                      | 99 - 109 mmol/L | EXTERNAL   |              |
|             |                          |                 | LAB        |              |
+-------------+--------------------------+-----------------+------------+--------------+
| CO2         | 19 (L)                   | 23 - 32 mmol/L  | EXTERNAL   |              |
|             |                          |                 | LAB        |              |
+-------------+--------------------------+-----------------+------------+--------------+
| Anion Gap   | 16                       | 5 - 20 mmol/L   | EXTERNAL   |              |
|             |                          |                 | LAB        |              |
+-------------+--------------------------+-----------------+------------+--------------+
| Glucose,    | 147 (H)Comment: SPECIMEN | 65 - 99 mg/dL   | EXTERNAL   |              |
| Fasting     |  SLIGHTLY HEMOLYZED      |                 | LAB        |              |
+-------------+--------------------------+-----------------+------------+--------------+
| BUN         | 21                       | 8 - 25 mg/dL    | EXTERNAL   |              |
|             |                          |                 | LAB        |              |
+-------------+--------------------------+-----------------+------------+--------------+
| Creatinine  | 0.9Comment: SPECIMEN     | 0.50 - 1.00     | EXTERNAL   |              |
|             | SLIGHTLY HEMOLYZED       | mg/dL           | LAB        |              |
+-------------+--------------------------+-----------------+------------+--------------+
| BUN/Creatin | 23                       |                 | EXTERNAL   |              |
| ine Ratio   |                          |                 | LAB        |              |
+-------------+--------------------------+-----------------+------------+--------------+
| Calcium     | 8.6                      | 8.5 - 10.5      | EXTERNAL   |              |
|             |                          | mg/dL           | LAB        |              |
+-------------+--------------------------+-----------------+------------+--------------+
| Estimated   | 59 (L)Comment: GFR <60:  | mL/min/1.73m2   | EXTERNAL   |              |
| GFR         | CHRONIC KIDNEY DISEASE,  |                 | LAB        |              |
|             | IF FOUND OVER A 3 MONTH  |                 |            |              |
|             | PERIOD.GFR <15: KIDNEY   |                 |            |              |
|             | FAILURE.FOR       |                 |            |              |
|             | [employer], MULTIPLY THE  |                 |            |              |
|             | CALCULATED GFR BY        |                 |            |              |
|             | 1.210.This eGFR is       |                 |            |              |
|             | calculated using the     |                 |            |              |
|             | MDRD IDMS traceable      |                 |            |              |
|             | equation.Testing         |                 |            |              |
|             | performed at [facility], [address] |                 |            |              |
|             |  [facility],        |                 |            |              |
|             | [place]   [address]    |                 |            |              |
+-------------+--------------------------+-----------------+------------+--------------+
 
 
 
+-----------------+
| Specimen        |
 
+-----------------+
| Blood specimen  |
| (specimen)      |
+-----------------+
 
 
 
+----------------+---------+--------------------+--------------+
| Performing     | Address | City/State/Zipcode | Phone Number |
| Organization   |         |                    |              |
+----------------+---------+--------------------+--------------+
|   EXTERNAL LAB |         |                    |              |
+----------------+---------+--------------------+--------------+
 POC Glucose (10/24/2018  9:37 PM PDT)
 
+-------------+--------------------------+---------------+------------+--------------+
| Component   | Value                    | Ref Range     | Performed  | Pathologist  |
|             |                          |               | At         | Signature    |
+-------------+--------------------------+---------------+------------+--------------+
| Glucose,    | 151 (H)Comment: Testing  | 65 - 99 mg/dL | EXTERNAL   |              |
| Fingerstick | performed at [facility];[address]     |               | LAB        |              |
|             | [address];[place], [place]   |               |            |              |
|             | [id number]                    |               |            |              |
+-------------+--------------------------+---------------+------------+--------------+
 
 
 
+----------+
| Specimen |
+----------+
|          |
+----------+
 
 
 
+----------------+---------+--------------------+--------------+
| Performing     | Address | City/State/Zipcode | Phone Number |
| Organization   |         |                    |              |
+----------------+---------+--------------------+--------------+
|   EXTERNAL LAB |         |                    |              |
+----------------+---------+--------------------+--------------+
 CV CARDIAC PROCEDURE (10/24/2018  6:26 PM PDT)
 
+----------+
| Specimen |
+----------+
|          |
+----------+
 
 
 
+------------------------------------------------------------------------+--------------+
| Impressions                                                            | Performed At |
+------------------------------------------------------------------------+--------------+
|   1.   Successful PTCA and stenting of the right posterolateral artery |              |
|  using a  3.0 x 16 mm Synergy drug-eluting stent post-dilated using    |              |
| 3.0 noncompliant  balloon.  2.   Probable limited wire-induced         |              |
| perforation of a small branch of right  posterolateral artery.         |              |
| PLAN:   The patient will be observed overnight.   She has been on      |              |
| Plavix 75  mg daily and received an additional 300 mg in the cath lab. |              |
 
|    We will  continue Plavix 75 mg daily for at least 6 months. We will |              |
|  do limited  echocardiogram tomorrow morning to assess for any         |              |
| pericardial effusion.   If  the patient develop any hypertension, then |              |
|  we might consider emergent  echocardiogram to rule out pericardial    |              |
| effusion.     Read by DANIEL FUNEZ MD 10/28/2018 04:56 P        |              |
| Electronically signed by Daniel Funez MD on 10/29/2018 12:40 PM   |              |
+------------------------------------------------------------------------+--------------+
 
 
 
+------------------------------------------------------------------------+--------------+
| Narrative                                                              | Performed At |
+------------------------------------------------------------------------+--------------+
|   Accession:   [id number]                                                 |              |
| ____________________________________________________________________   |              |
|    PROCEDURES PERFORMED:  1.   Selective right coronary artery         |              |
| angiogram.  2.   Successful PTCA and stenting of the right             |              |
| posterolateral artery using a  3.0 x 16 mm Synergy drug-eluting stent  |              |
| post-dilated using a 3.0  noncompliant balloon.  3.   Right common     |              |
| femoral artery angiogram.  4.   Successful Angio-seal closure of the   |              |
| right common femoral artery.     INDICATION:   Shortness of breath,    |              |
| angina equivalent, severe mitral and  tricuspid regurgitation,         |              |
| anticipated MitraClip procedure.     HISTORY OF PRESENT ILLNESS:       |              |
|   This is an 86-year-old female with history of  hypertension,         |              |
| hyperlipidemia, chronic atrial fibrillation, type 2 diabetes  mellitus |              |
|  and severe mitral and tricuspid regurgitation, who was evaluated  at  |              |
| [facility] for possible mitral and tricuspid valve percutaneous              |              |
| interventions.  Coronary angiogram was done there and reported as      |              |
| critical distal right  coronary artery disease.   The patient was      |              |
| referred for intervention in the  right coronary artery.   The patient |              |
|  had been experiencing significant  exertional shortness of breath     |              |
| that has been progressively worse.   The  procedure, risks, benefits,  |              |
| alternatives were discussed with the patient  and she agreed to        |              |
| proceed.     FRAILTY SCORE: 6     TECHNIQUE:   The patient was brought |              |
|  to the cardiac catheterization  laboratory in the fasting state.      |              |
|   After informed consent obtained the  patient was prepped and draped  |              |
| in the usual sterile fashion.   Time-out for  patient safety was       |              |
| performed.    One percent lidocaine was used for local  anesthesia of  |              |
| the right groin.   The right femoral artery was accessed using         |              |
| micropuncture needle.   A 6-French sheath was placed in the right      |              |
| femoral  artery without difficulty.   The patient was given 5000 units |              |
|  of heparin  intravenously.   ACT was monitored.   The patient         |              |
| received additional heparin  boluses to keep ACT more than 250.     A  |              |
| 6-French JR4 guiding catheter was used for selective engagement of the |              |
|   right coronary system and baseline angiogram was obtained.           |              |
|   Engagement was  suboptimal, and there is diffusely calcified and     |              |
| tortuous right coronary  artery and tortuous right posterolateral      |              |
| artery with critical calcified  lesion in the right posterolateral     |              |
| artery.   The patient was also having  some oozing from around the     |              |
| 6-French sheath at this time, and we decided to  proceed with the      |              |
| 7-French system to have more support.   The 6-French sheath  was       |              |
| upgraded to a 7-French for a 5-cm sheath.   A 7-French AL1 guiding     |              |
| catheter was used for selective engagement of the right coronary       |              |
| artery.   A  BMW wire was advanced.   I was not able to cross the      |              |
| heavily calcified  subtotally occluded right posterolateral artery.    |              |
|   A 2.25 x 12 mm balloon  was advanced to help to provide more         |              |
| support, and even with this support  the balloon was not able to       |              |
| cross.   At this time the balloon and the [employer]  wire were removed.      |              |
|   [employer] wire was advanced and 2.25 x 12 mm balloon  was advanced. |              |
|    With the help of the balloon I was able to cross the lesion  and    |              |
 
| advanced into the distal right posterolateral artery.   I was not able |              |
|   to advance the balloon across the lesion.   The balloon was removed. |              |
|    A  5.5-French GuideLiner catheter was advanced and at one point 5 x |              |
|  12 mm  balloon was advanced.   With the help of the GuideLiner I was  |              |
| able to cross  the lesion.   The lesion was predilated up to 10        |              |
| atmospheres.   The 1.5  balloon was removed and 2.25 x 12 mm balloon   |              |
| was advanced.   I was able to  advance it across the lesion only with  |              |
| the help of the GuideLiner, advanced  all the way to the distal right  |              |
| coronary artery.   The balloon was inflated  multiple times up to 10   |              |
| atmospheres.   The balloon was removed and a 3.0 x  16 mm Synergy      |              |
| drug-eluting stent was advanced.   The stent would not go and  the     |              |
| stent was then advanced gradually and the GuideLiner was advanced over |              |
|   the stent.   I was able to advance the stent to the right            |              |
| posterolateral  artery.   Given the combination of calcification and   |              |
| tortuosity in the right  posterolateral artery, it was difficult to    |              |
| advance the stent.   The  GuideLiner was advanced all the way to the   |              |
| right posterolateral artery and  then I was able to deliver the stent. |              |
|    Angiogram for positioning was  obtained after removing the          |              |
| GuideLiner and the stent was deployed at 12  atmospheres.   The stent  |              |
| was then postdilated using a 3.0 x 12 mm  noncompliant Quantum balloon |              |
|  which was again advanced with the help of the  GuideLiner.   The      |              |
| balloon was inflated multiple times up to 18 atmospheres.  Distant     |              |
| balloon and the GuideLiner were removed.   Post-intervention           |              |
| angiogram was obtained, which shows excellent results with reduction   |              |
| of  degree of stenosis from 99 percent down to 0 percent with JESSICA 3   |              |
| flow.   No  evidence of dissection or perforation.   The long 7-French |              |
|  sheath was  removed off of the guidewire and a short 7-French sheath  |              |
| was placed into  the right femoral artery.   Angiogram of the right    |              |
| common femoral artery was  obtained at the beginning by injection of   |              |
| contrast through the  micropuncture sheath which showed the puncture   |              |
| site above the bifurcation  at the end of the procedure.   Angio-seal  |              |
| closure of the right common  femoral artery was done successfully.     |              |
|   We have noted in the  post-intervention angiogram there is some      |              |
| possible dye extravasation in the  pericardium; however, there was no  |              |
| active bleeding or active extravasation  of blood flow from any of the |              |
|  branches; however, I suspected this might be  a wire-induced          |              |
| perforation of a small branch.   The patient had remained              |              |
| hemodynamically stable throughout the procedure and after the          |              |
| procedure.   I  decided to follow that clinically and with followup    |              |
| echocardiogram in the  morning.   Overall, the patient tolerated the   |              |
| procedure well.     TOTAL CONTRAST:   128 mL of Isovue.     TOTAL      |              |
| BLOOD LOSS:   Less than 20 mL.     RESULTS:  1.   Selective right      |              |
| coronary artery angiogram.  2.   Right coronary artery is the large    |              |
| dominant vessel that gives rise to a  large right posterolateral       |              |
| artery and gives rise to right posterior  descending artery.   Right   |              |
| coronary artery is diffusely calcified and  tortuous vessel.   The     |              |
| right posterolateral artery is tortuous proximally  with high-grade    |              |
| heavily calcified lesion in the range of 99 percent in the             |              |
| midportion.   The   proximal right coronary artery had moderate        |              |
| disease in  the range of 30 percent.   Mid right coronary artery had   |              |
| diffuse  mild-to-moderate disease in the range of 30 percent.          |              |
+------------------------------------------------------------------------+--------------+
 
 
 
+-----------------------------------------------------------------------------+
| Procedure Note                                                              |
+-----------------------------------------------------------------------------+
|   Casimiro Mar Conversion - 2019  3:57 PM PDT  Accession:  [id number]        |
| ____________________________________________________________________        |
 
|                                                                             |
| PROCEDURES PERFORMED:                                                       |
| 1.  Selective right coronary artery angiogram.                              |
| 2.  Successful PTCA and stenting of the right posterolateral artery using a |
| 3.0 x 16 mm Synergy drug-eluting stent post-dilated using a 3.0             |
| noncompliant balloon.                                                       |
| 3.  Right common femoral artery angiogram.                                  |
| 4.  Successful Angio-seal closure of the right common femoral artery.       |
|                                                                             |
| INDICATION:  Shortness of breath, angina equivalent, severe mitral and      |
| tricuspid regurgitation, anticipated MitraClip procedure.                   |
|                                                                             |
| HISTORY OF PRESENT ILLNESS:  This is an 86-year-old female with history of  |
| hypertension, hyperlipidemia, chronic atrial fibrillation, type 2 diabetes  |
| mellitus and severe mitral and tricuspid regurgitation, who was evaluated   |
| at [facility] for possible mitral and tricuspid valve percutaneous interventions. |
| Coronary angiogram was done there and reported as critical distal right     |
| coronary artery disease.  The patient was referred for intervention in the  |
| right coronary artery.  The patient had been experiencing significant       |
| exertional shortness of breath that has been progressively worse.  The      |
| procedure, risks, benefits, alternatives were discussed with the patient    |
| and she agreed to proceed.                                                  |
|                                                                             |
| FRAILTY SCORE: 6                                                            |
|                                                                             |
| TECHNIQUE:  The patient was brought to the cardiac catheterization          |
| laboratory in the fasting state.  After informed consent obtained the       |
| patient was prepped and draped in the usual sterile fashion.  Time-out for  |
| patient safety was performed.   One percent lidocaine was used for local    |
| anesthesia of the right groin.  The right femoral artery was accessed using |
| micropuncture needle.  A 6-French sheath was placed in the right femoral    |
| artery without difficulty.  The patient was given 5000 units of heparin     |
| intravenously.  ACT was monitored.  The patient received additional heparin |
| boluses to keep ACT more than 250.                                          |
|                                                                             |
| A 6-French JR4 guiding catheter was used for selective engagement of the    |
| right coronary system and baseline angiogram was obtained.  Engagement was  |
| suboptimal, and there is diffusely calcified and tortuous right coronary    |
| artery and tortuous right posterolateral artery with critical calcified     |
| lesion in the right posterolateral artery.  The patient was also having     |
| some oozing from around the 6-French sheath at this time, and we decided to |
| proceed with the 7-French system to have more support.  The 6-French sheath |
| was upgraded to a 7-French for a 5-cm sheath.  A 7-French AL1 guiding       |
| catheter was used for selective engagement of the right coronary artery.  A |
| BMW wire was advanced.  I was not able to cross the heavily calcified       |
| subtotally occluded right posterolateral artery.  A 2.25 x 12 mm balloon    |
| was advanced to help to provide more support, and even with this support    |
| the balloon was not able to cross.  At this time the balloon and the BMW    |
| wire were removed.  Fielder FC wire was advanced and 2.25 x 12 mm balloon   |
| was advanced.  With the help of the balloon I was able to cross the lesion  |
| and advanced into the distal right posterolateral artery.  I was not able   |
| to advance the balloon across the lesion.  The balloon was removed.  A      |
| 5.5-French GuideLiner catheter was advanced and at one point 5 x 12 mm      |
| balloon was advanced.  With the help of the GuideLiner I was able to cross  |
| the lesion.  The lesion was predilated up to 10 atmospheres.  The 1.5       |
| balloon was removed and 2.25 x 12 mm balloon was advanced.  I was able to   |
| advance it across the lesion only with the help of the GuideLiner, advanced |
| all the way to the distal right coronary artery.  The balloon was inflated  |
| multiple times up to 10 atmospheres.  The balloon was removed and a 3.0 x   |
| 16 mm Synergy drug-eluting stent was advanced.  The stent would not go and  |
 
| the stent was then advanced gradually and the GuideLiner was advanced over  |
| the stent.  I was able to advance the stent to the right posterolateral     |
| artery.  Given the combination of calcification and tortuosity in the right |
| posterolateral artery, it was difficult to advance the stent.  The          |
| GuideLiner was advanced all the way to the right posterolateral artery and  |
| then I was able to deliver the stent.  Angiogram for positioning was        |
| obtained after removing the GuideLiner and the stent was deployed at 12     |
| atmospheres.  The stent was then postdilated using a 3.0 x 12 mm            |
| noncompliant Quantum balloon which was again advanced with the help of the  |
| GuideLiner.  The balloon was inflated multiple times up to 18 atmospheres.  |
| Distant balloon and the GuideLiner were removed.  Post-intervention         |
| angiogram was obtained, which shows excellent results with reduction of     |
| degree of stenosis from 99 percent down to 0 percent with JESSICA 3 flow.  No  |
| evidence of dissection or perforation.  The long 7-French sheath was        |
| removed off of the guidewire and a short 7-French sheath was placed into    |
| the right femoral artery.  Angiogram of the right common femoral artery was |
| obtained at the beginning by injection of contrast through the              |
| micropuncture sheath which showed the puncture site above the bifurcation   |
| at the end of the procedure.  Angio-seal closure of the right common        |
| femoral artery was done successfully.  We have noted in the                 |
| post-intervention angiogram there is some possible dye extravasation in the |
| pericardium; however, there was no active bleeding or active extravasation  |
| of blood flow from any of the branches; however, I suspected this might be  |
| a wire-induced perforation of a small branch.  The patient had remained     |
| hemodynamically stable throughout the procedure and after the procedure.  I |
| decided to follow that clinically and with followup echocardiogram in the   |
| morning.  Overall, the patient tolerated the procedure well.                |
|                                                                             |
| TOTAL CONTRAST:  128 mL of Isovue.                                          |
|                                                                             |
| TOTAL BLOOD LOSS:  Less than 20 mL.                                         |
|                                                                             |
| RESULTS:                                                                    |
| 1.  Selective right coronary artery angiogram.                              |
| 2.  Right coronary artery is the large dominant vessel that gives rise to a |
| large right posterolateral artery and gives rise to right posterior         |
| descending artery.  Right coronary artery is diffusely calcified and        |
| tortuous vessel.  The right posterolateral artery is tortuous proximally    |
| with high-grade heavily calcified lesion in the range of 99 percent in the  |
| midportion.  The  proximal right coronary artery had moderate disease in    |
| the range of 30 percent.  Mid right coronary artery had diffuse             |
| mild-to-moderate disease in the range of 30 percent.                        |
|                                                                             |
| IMPRESSION:                                                                 |
| 1.  Successful PTCA and stenting of the right posterolateral artery using a |
| 3.0 x 16 mm Synergy drug-eluting stent post-dilated using 3.0 noncompliant  |
| balloon.                                                                    |
| 2.  Probable limited wire-induced perforation of a small branch of right    |
| posterolateral artery.                                                      |
|                                                                             |
| PLAN:  The patient will be observed overnight.  She has been on Plavix 75   |
| mg daily and received an additional 300 mg in the cath lab.  We will        |
| continue Plavix 75 mg daily for at least 6 months. We will do limited       |
| echocardiogram tomorrow morning to assess for any pericardial effusion.  If |
| the patient develop any hypertension, then we might consider emergent       |
| echocardiogram to rule out pericardial effusion.                            |
|                                                                             |
| Read by DANIEL FUNEZ MD 10/28/2018 04:56 P                           |
|                                                                             |
| Electronically signed by Daniel Funez MD on 10/29/2018 12:40 PM        |
 
+-----------------------------------------------------------------------------+
 Activated clotting time (10/24/2018  5:47 PM PDT)
 
+------------+--------------------------+-----------+------------+--------------+
| Component  | Value                    | Ref Range | Performed  | Pathologist  |
|            |                          |           | At         | Signature    |
+------------+--------------------------+-----------+------------+--------------+
| Activated  | 235 (H)Comment: Testing  | 74 - 137  | EXTERNAL   |              |
| Clotting   | performed at [facility];[address]     | seconds   | LAB        |              |
| time, POC  | [address];[place], [place]   |           |            |              |
|            | [id number]                    |           |            |              |
+------------+--------------------------+-----------+------------+--------------+
 
 
 
+----------+
| Specimen |
+----------+
|          |
+----------+
 
 
 
+----------------+---------+--------------------+--------------+
| Performing     | Address | City/State/Zipcode | Phone Number |
| Organization   |         |                    |              |
+----------------+---------+--------------------+--------------+
|   EXTERNAL LAB |         |                    |              |
+----------------+---------+--------------------+--------------+
 Activated clotting time (10/24/2018  5:07 PM PDT)
 
+------------+--------------------------+-----------+------------+--------------+
| Component  | Value                    | Ref Range | Performed  | Pathologist  |
|            |                          |           | At         | Signature    |
+------------+--------------------------+-----------+------------+--------------+
| Activated  | 169 (H)Comment: Testing  | 74 - 137  | EXTERNAL   |              |
| Clotting   | performed at [facility];[address]     | seconds   | LAB        |              |
| time, POC  | [address];[place]   |           |            |              |
|            | [id number]                    |           |            |              |
+------------+--------------------------+-----------+------------+--------------+
 
 
 
+----------+
| Specimen |
+----------+
|          |
+----------+
 
 
 
+----------------+---------+--------------------+--------------+
| Performing     | Address | City/State/Zipcode | Phone Number |
| Organization   |         |                    |              |
+----------------+---------+--------------------+--------------+
|   EXTERNAL LAB |         |                    |              |
+----------------+---------+--------------------+--------------+
 Protime INR (10/24/2018  1:52 PM PDT)
 
+-----------+--------------------------+-----------+------------+--------------+
 
| Component | Value                    | Ref Range | Performed  | Pathologist  |
|           |                          |           | At         | Signature    |
+-----------+--------------------------+-----------+------------+--------------+
| INR       | 1.2Comment: REFERENCE    |           | EXTERNAL   |              |
|           | RANGE:0.9 - 1.2          |           | LAB        |              |
|           |   NON-ANTICOAGULATED2.0  |           |            |              |
|           | - 3.0   ALL OTHER        |           |            |              |
|           | THERAPEUTIC              |           |            |              |
|           | INDICATIONS2.5 - 3.5     |           |            |              |
|           | MECHANICAL HEART VALVES, |           |            |              |
|           |  RECURRENT OR SYSTEMIC   |           |            |              |
|           | EMBOLISMTesting          |           |            |              |
|           | performed at [facility];[address]     |           |            |              |
|           | [address];[place], [place]   |           |            |              |
|           | [id number]                    |           |            |              |
+-----------+--------------------------+-----------+------------+--------------+
 
 
 
+-----------------+
| Specimen        |
+-----------------+
| Blood specimen  |
| (specimen)      |
+-----------------+
 
 
 
+----------------+---------+--------------------+--------------+
| Performing     | Address | City/State/Zipcode | Phone Number |
| Organization   |         |                    |              |
+----------------+---------+--------------------+--------------+
|   EXTERNAL LAB |         |                    |              |
+----------------+---------+--------------------+--------------+
 External Lab: [facility] (10/24/2018  1:52 PM PDT)
 
+-------------+-------------------------+-----------------+------------+--------------+
| Component   | Value                   | Ref Range       | Performed  | Pathologist  |
|             |                         |                 | At         | Signature    |
+-------------+-------------------------+-----------------+------------+--------------+
| WBC         | 7.58                    | 3.80 - 11.00    | EXTERNAL   |              |
|             |                         | K/uL            | LAB        |              |
+-------------+-------------------------+-----------------+------------+--------------+
| RED CELL    | 4.06                    | 3.70 - 5.10     | EXTERNAL   |              |
| COUNT       |                         | M/uL            | LAB        |              |
+-------------+-------------------------+-----------------+------------+--------------+
| Hgb         | 11.9                    | 11.3 - 15.5     | EXTERNAL   |              |
|             |                         | g/dL            | LAB        |              |
+-------------+-------------------------+-----------------+------------+--------------+
| Hematocrit, | 35.7                    | 34.0 - 46.0 %   | EXTERNAL   |              |
|  POC        |                         |                 | LAB        |              |
+-------------+-------------------------+-----------------+------------+--------------+
| MCV         | 87.9                    | 80.0 - 100.0 fl | EXTERNAL   |              |
|             |                         |                 | LAB        |              |
+-------------+-------------------------+-----------------+------------+--------------+
| MCH         | 29.3                    | 27.0 - 34.0 pg  | EXTERNAL   |              |
|             |                         |                 | LAB        |              |
+-------------+-------------------------+-----------------+------------+--------------+
| MCHC        | 33.4                    | 32.0 - 35.5     | EXTERNAL   |              |
|             |                         | g/dL            | LAB        |              |
 
+-------------+-------------------------+-----------------+------------+--------------+
| RDW-CV      | 46.4                    | 37 - 53 fl      | EXTERNAL   |              |
|             |                         |                 | LAB        |              |
+-------------+-------------------------+-----------------+------------+--------------+
| Platelet    | 285                     | 150 - 400 K/uL  | EXTERNAL   |              |
| Count       |                         |                 | LAB        |              |
| Plasma      |                         |                 |            |              |
+-------------+-------------------------+-----------------+------------+--------------+
| MPV         | 8.4                     | fl              | EXTERNAL   |              |
|             |                         |                 | LAB        |              |
+-------------+-------------------------+-----------------+------------+--------------+
| Differentia | AUTOMATED               |                 | EXTERNAL   |              |
| l Type      |                         |                 | LAB        |              |
+-------------+-------------------------+-----------------+------------+--------------+
| % Segmented | 61.46                   | %               | EXTERNAL   |              |
|             |                         |                 | LAB        |              |
| Neutrophils |                         |                 |            |              |
+-------------+-------------------------+-----------------+------------+--------------+
| %           | 25.43                   | %               | EXTERNAL   |              |
| Lymphocytes |                         |                 | LAB        |              |
+-------------+-------------------------+-----------------+------------+--------------+
| % Monocytes | 7.59                    | %               | EXTERNAL   |              |
|             |                         |                 | LAB        |              |
+-------------+-------------------------+-----------------+------------+--------------+
| %           | 4.36                    | %               | EXTERNAL   |              |
| Eosinophils |                         |                 | LAB        |              |
+-------------+-------------------------+-----------------+------------+--------------+
| % Basophils | 1.16                    | %               | EXTERNAL   |              |
|             |                         |                 | LAB        |              |
+-------------+-------------------------+-----------------+------------+--------------+
| Absolute    | 4.66                    | 1.90 - 7.40     | EXTERNAL   |              |
| Segmented   |                         | K/uL            | LAB        |              |
| Neutrophils |                         |                 |            |              |
+-------------+-------------------------+-----------------+------------+--------------+
| Absolute    | 1.93                    | 1.00 - 3.90     | EXTERNAL   |              |
| Lymphocytes |                         | K/uL            | LAB        |              |
+-------------+-------------------------+-----------------+------------+--------------+
| Absolute    | 0.58                    | 0.00 - 0.80     | EXTERNAL   |              |
| Monocytes   |                         | K/uL            | LAB        |              |
+-------------+-------------------------+-----------------+------------+--------------+
| Absolute    | 0.33                    | 0.00 - 0.50     | EXTERNAL   |              |
| Eosinophils |                         | K/uL            | LAB        |              |
+-------------+-------------------------+-----------------+------------+--------------+
| Absolute    | 0.09Comment: Testing    | 0.00 - 0.10     | EXTERNAL   |              |
| Basophils   | performed at [facility];[address]    | K/uL            | LAB        |              |
|             | [address];[place]  |                 |            |              |
|             | [id number]                   |                 |            |              |
+-------------+-------------------------+-----------------+------------+--------------+
 
 
 
+-----------------+
| Specimen        |
+-----------------+
| Blood specimen  |
| (specimen)      |
+-----------------+
 
 
 
 
+----------------+---------+--------------------+--------------+
| Performing     | Address | City/State/Zipcode | Phone Number |
| Organization   |         |                    |              |
+----------------+---------+--------------------+--------------+
|   EXTERNAL LAB |         |                    |              |
+----------------+---------+--------------------+--------------+
 Basic Metabolic Panel (10/24/2018  1:52 PM PDT)
 
+-------------+--------------------------+-----------------+------------+--------------+
| Component   | Value                    | Ref Range       | Performed  | Pathologist  |
|             |                          |                 | At         | Signature    |
+-------------+--------------------------+-----------------+------------+--------------+
| Na          | 136                      | 135 - 145       | EXTERNAL   |              |
|             |                          | mmol/L          | LAB        |              |
+-------------+--------------------------+-----------------+------------+--------------+
| K           | 4.2                      | 3.5 - 4.9       | EXTERNAL   |              |
|             |                          | mmol/L          | LAB        |              |
+-------------+--------------------------+-----------------+------------+--------------+
| Cl          | 102                      | 99 - 109 mmol/L | EXTERNAL   |              |
|             |                          |                 | LAB        |              |
+-------------+--------------------------+-----------------+------------+--------------+
| CO2         | 23                       | 23 - 32 mmol/L  | EXTERNAL   |              |
|             |                          |                 | LAB        |              |
+-------------+--------------------------+-----------------+------------+--------------+
| Anion Gap   | 15                       | 5 - 20 mmol/L   | EXTERNAL   |              |
|             |                          |                 | LAB        |              |
+-------------+--------------------------+-----------------+------------+--------------+
| Glucose,    | 119 (H)                  | 65 - 99 mg/dL   | EXTERNAL   |              |
| Fasting     |                          |                 | LAB        |              |
+-------------+--------------------------+-----------------+------------+--------------+
| BUN         | 20                       | 8 - 25 mg/dL    | EXTERNAL   |              |
|             |                          |                 | LAB        |              |
+-------------+--------------------------+-----------------+------------+--------------+
| Creatinine  | 0.91                     | 0.50 - 1.00     | EXTERNAL   |              |
|             |                          | mg/dL           | LAB        |              |
+-------------+--------------------------+-----------------+------------+--------------+
| BUN/Creatin | 22                       |                 | EXTERNAL   |              |
| ine Ratio   |                          |                 | LAB        |              |
+-------------+--------------------------+-----------------+------------+--------------+
| Calcium     | 9.0                      | 8.5 - 10.5      | EXTERNAL   |              |
|             |                          | mg/dL           | LAB        |              |
+-------------+--------------------------+-----------------+------------+--------------+
| Estimated   | 59 (L)Comment: GFR <60:  | mL/min/1.73m2   | EXTERNAL   |              |
| GFR         | CHRONIC KIDNEY DISEASE,  |                 | LAB        |              |
|             | IF FOUND OVER A 3 MONTH  |                 |            |              |
|             | PERIOD.GFR <15: KIDNEY   |                 |            |              |
|             | FAILURE.FOR       |                 |            |              |
|             | AMERICANS, MULTIPLY THE  |                 |            |              |
|             | CALCULATED GFR BY        |                 |            |              |
|             | 1.210.This eGFR is       |                 |            |              |
|             | calculated using the     |                 |            |              |
|             | MDRD IDMS traceable      |                 |            |              |
|             | equation.Testing         |                 |            |              |
|             | performed at [facility] [place];[address]     |                 |            |              |
|             | [facility];[place], [place]   |                 |            |              |
|             | [id number]                    |                 |            |              |
+-------------+--------------------------+-----------------+------------+--------------+
 
 
 
 
+-----------------+
| Specimen        |
+-----------------+
| Blood specimen  |
| (specimen)      |
+-----------------+
 
 
 
+----------------+---------+--------------------+--------------+
| Performing     | Address | City/State/Zipcode | Phone Number |
| Organization   |         |                    |              |
+----------------+---------+--------------------+--------------+
|   EXTERNAL LAB |         |                    |              |
+----------------+---------+--------------------+--------------+
 documented in this encounter
 
 Visit Diagnoses
 
 
+-------------------------------------------------------------------------------------+
| Diagnosis                                                                           |
+-------------------------------------------------------------------------------------+
|   Coronary artery disease involving native coronary artery of native heart, angina  |
| presence unspecified                                                                |
+-------------------------------------------------------------------------------------+
|   Anginal equivalent (HCC)                                                          |
+-------------------------------------------------------------------------------------+
|   Chronic atrial fibrillation  Atrial fibrillation                                  |
+-------------------------------------------------------------------------------------+
|   Essential hypertension  Unspecified essential hypertension                        |
+-------------------------------------------------------------------------------------+
|   Pulmonary hypertension, moderate to severe (HCC)  Other chronic pulmonary heart   |
| diseases                                                                            |
+-------------------------------------------------------------------------------------+
|   S/P drug eluting coronary stent placement                                         |
+-------------------------------------------------------------------------------------+
|   Severe mitral regurgitation  Mitral valve disorders                               |
+-------------------------------------------------------------------------------------+
|   Severe tricuspid regurgitation  Diseases of tricuspid valve                       |
+-------------------------------------------------------------------------------------+
|   SOB (shortness of breath) on exertion  Shortness of breath                        |
+-------------------------------------------------------------------------------------+
 documented in this encounter

## 2020-01-01 NOTE — XMS
Encounter Summary
  Created on: 2020
 
 Vonnie Celaya
 External Reference #: [id number]
 : 32
 Sex: Female
 
 Demographics
 
 
+-----------------------+------------------------+
| Address               | [address]         |
|                       | [place]  [address]   |
+-----------------------+------------------------+
| Home Phone            | [phone]        |
+-----------------------+------------------------+
| Preferred Language    | Unknown                |
+-----------------------+------------------------+
| Marital Status        | Single                 |
+-----------------------+------------------------+
| [employer] Affiliation | NON                    |
+-----------------------+------------------------+
| Race                  | White                  |
+-----------------------+------------------------+
| Ethnic Group          | Not  or  |
+-----------------------+------------------------+
 
 
 Author
 
 
+--------------+------------------------------+
| Author       | [facility] |
+--------------+------------------------------+
| Organization | [facility] |
+--------------+------------------------------+
| Address      | Unknown                      |
+--------------+------------------------------+
| Phone        | Unavailable                  |
+--------------+------------------------------+
 
 
 
 Support
 
 
+--------------+--------------+---------+-----------------+
| Name         | Relationship | Address | Phone           |
+--------------+--------------+---------+-----------------+
| Lauryn Leroy | ECON         | Unknown | [phone] |
+--------------+--------------+---------+-----------------+
 
 
 
 Care Team Providers
 
 
 
+------------------------+------+-----------------+
| Care Team Member Name  | Role | Phone           |
+------------------------+------+-----------------+
| Jean Bermudez MD | PCP  | [phone] |
+------------------------+------+-----------------+
 
 
 
 Reason for Visit
 
 
+--------+----------+
| Reason | Comments |
+--------+----------+
| Other  | surgery  |
+--------+----------+
 
 
 
 Encounter Details
 
 
+--------+-----------+----------------------+----------------------+------------------+
| Date   | Type      | Department           | Care Team            | Description      |
+--------+-----------+----------------------+----------------------+------------------+
| / | Telephone |   Cardiology at [facility]  |   Markus Smart,  | Other (surgery ) |
| 2019   |           |  [address]    | MD  [address] [facility]      |                  |
|        |           | Mailcode: [address]       | [address]      |                  |
|        |           | [facility]    | [place], [place]         |                  |
|        |           | and Healing,         | [address]           |                  |
|        |           | [facility]       | [phone]         |                  |
|        |           | Floor  [place], [place]  | [phone] (Fax)   |                  |
|        |           | [address]           |                      |                  |
|        |           | [phone]         |                      |                  |
+--------+-----------+----------------------+----------------------+------------------+
 
 
 
 Social History
 
 
+--------------+-------+-----------+--------+------+
| Tobacco Use  | Types | Packs/Day | Years  | Date |
|              |       |           | Used   |      |
+--------------+-------+-----------+--------+------+
| Never Smoker |       |           |        |      |
+--------------+-------+-----------+--------+------+
 
 
 
+---------------------+---+---+---+
| Smokeless Tobacco:  |   |   |   |
| Never Used          |   |   |   |
+---------------------+---+---+---+
 
 
 
+-------------+-------------+---------+----------+
| Alcohol Use | Drinks/Week | oz/Week | Comments |
+-------------+-------------+---------+----------+
 
| No          |             |         |          |
+-------------+-------------+---------+----------+
 
 
 
+------------------+---------------+
| Sex Assigned at  | Date Recorded |
| Birth            |               |
+------------------+---------------+
| Not on file      |               |
+------------------+---------------+
 
 
 
+----------------+-------------+-------------+
| Job Start Date | Occupation  | Industry    |
+----------------+-------------+-------------+
| Not on file    | Not on file | Not on file |
+----------------+-------------+-------------+
 
 
 
+----------------+--------------+------------+
| Travel History | Travel Start | Travel End |
+----------------+--------------+------------+
 
 
 
+-------------------------------------+
| No recent travel history available. |
+-------------------------------------+
 documented as of this encounter
 
 Plan of Treatment
 Not on filedocumented as of this encounter
 
 Visit Diagnoses
 Not on filedocumented in this encounter

## 2020-01-01 NOTE — XMS
Encounter Summary
  Created on: 2020
 
 Vonnie Celaya
 External Reference #: [id number]
 : 32
 Sex: Female
 
 Demographics
 
 
+-----------------------+------------------------+
| Address               | [address]         |
|                       | [place]  [address]   |
+-----------------------+------------------------+
| Home Phone            | [phone]        |
+-----------------------+------------------------+
| Preferred Language    | Unknown                |
+-----------------------+------------------------+
| Marital Status        | Single                 |
+-----------------------+------------------------+
| Christian Affiliation | NON                    |
+-----------------------+------------------------+
| Race                  | White                  |
+-----------------------+------------------------+
| Ethnic Group          | Not  or  |
+-----------------------+------------------------+
 
 
 Author
 
 
+--------------+------------------------------+
| Author       | [facility] |
+--------------+------------------------------+
| Organization | [facility] |
+--------------+------------------------------+
| Address      | Unknown                      |
+--------------+------------------------------+
| Phone        | Unavailable                  |
+--------------+------------------------------+
 
 
 
 Support
 
 
+--------------+--------------+---------+-----------------+
| Name         | Relationship | Address | Phone           |
+--------------+--------------+---------+-----------------+
| Lauryn Leroy | ECON         | Unknown | [phone] |
+--------------+--------------+---------+-----------------+
 
 
 
 Care Team Providers
 
 
 
+------------------------+------+-----------------+
| Care Team Member Name  | Role | Phone           |
+------------------------+------+-----------------+
| Jean Bermudez MD | PCP  | [phone] |
+------------------------+------+-----------------+
 
 
 
 Reason for Visit
 
 
+--------------+----------+
| Reason       | Comments |
+--------------+----------+
| New patient  |          |
| consultation |          |
+--------------+----------+
 Consultation (Routine)
 
+--------+--------+------------+--------------+--------------+---------------+
| Status | Reason | Specialty  | Diagnoses /  | Referred By  | Referred To   |
|        |        |            | Procedures   | Contact      | Contact       |
+--------+--------+------------+--------------+--------------+---------------+
| Closed |        | Cardiology |   Diagnoses  |   Stephen,      |   Markus Smart |
|        |        |            |              | Varun PARRA,   |  MD Craig     |
|        |        |            | Nonrheumatic | MD  [address]     | [address]   |
|        |        |            |  mitral      | [address]  | [address]  |
|        |        |            | (valve)      |  [address]       | [address]  [place], |
|        |        |            | insufficienc | [place], [place] |  [place]           |
|        |        |            | y  Rheumatic |  [address]       | [address]    |
|        |        |            |  tricuspid   | Phone:       | Phone:        |
|        |        |            | insufficienc | [phone] | [phone]  |
|        |        |            | y  Pulmonary |   Fax:       |  Fax:         |
|        |        |            |              | [phone] | [phone]  |
|        |        |            | hypertension |              |               |
|        |        |            | ,            |              |               |
|        |        |            | unspecified  |              |               |
|        |        |            |  Procedures  |              |               |
|        |        |            |  NV NEW      |              |               |
|        |        |            | PATIENT      |              |               |
|        |        |            | LEVEL V      |              |               |
+--------+--------+------------+--------------+--------------+---------------+
 
 
 
 
 Encounter Details
 
 
+--------+---------+----------------------+----------------------+----------------------+
| Date   | Type    | Department           | Care Team            | Description          |
+--------+---------+----------------------+----------------------+----------------------+
| / | Office  |   Cardiology Complex |   Markus Smart,  | Non-rheumatic mitral |
| 2018   | Visit   |  Valve at [facility]  [address]  | MD  [address]      |  regurgitation       |
|        |         | [address]     | [address]      | (Primary Dx); Mitral |
|        |         | Mailcode: [address]      | [place], [place]         |  annular             |
|        |         | [facility] | [address]           | calcification        |
|        |         |  [address]  [place],  | [phone]         |                      |
|        |         | [place] [address]        | [phone] (Fax)   |                      |
|        |         | [phone]         |                      |                      |
 
+--------+---------+----------------------+----------------------+----------------------+
 
 
 
 Social History
 
 
+--------------+-------+-----------+--------+------+
| Tobacco Use  | Types | Packs/Day | Years  | Date |
|              |       |           | Used   |      |
+--------------+-------+-----------+--------+------+
| Never Smoker |       |           |        |      |
+--------------+-------+-----------+--------+------+
 
 
 
+---------------------+---+---+---+
| Smokeless Tobacco:  |   |   |   |
| Never Used          |   |   |   |
+---------------------+---+---+---+
 
 
 
+-------------+-------------+---------+----------+
| Alcohol Use | Drinks/Week | oz/Week | Comments |
+-------------+-------------+---------+----------+
| No          |             |         |          |
+-------------+-------------+---------+----------+
 
 
 
+------------------+---------------+
| Sex Assigned at  | Date Recorded |
| Birth            |               |
+------------------+---------------+
| Not on file      |               |
+------------------+---------------+
 
 
 
+----------------+-------------+-------------+
| Job Start Date | Occupation  | Industry    |
+----------------+-------------+-------------+
| Not on file    | Not on file | Not on file |
+----------------+-------------+-------------+
 
 
 
+----------------+--------------+------------+
| Travel History | Travel Start | Travel End |
+----------------+--------------+------------+
 
 
 
+-------------------------------------+
| No recent travel history available. |
+-------------------------------------+
 documented as of this encounter
 
 Last Filed Vital Signs
 
 
 
+-------------------+--------------------+----------------------+----------+
| Vital Sign        | Reading            | Time Taken           | Comments |
+-------------------+--------------------+----------------------+----------+
| Blood Pressure    | 110/59             | 2018  2:09 PM  |          |
|                   |                    | PDT                  |          |
+-------------------+--------------------+----------------------+----------+
| Pulse             | 78                 | 2018  2:09 PM  |          |
|                   |                    | PDT                  |          |
+-------------------+--------------------+----------------------+----------+
| Temperature       | 36.7   C (98   F)  | 2018  2:09 PM  |          |
|                   |                    | PDT                  |          |
+-------------------+--------------------+----------------------+----------+
| Respiratory Rate  | 20                 | 2018  2:09 PM  |          |
|                   |                    | PDT                  |          |
+-------------------+--------------------+----------------------+----------+
| Oxygen Saturation | 99%                | 2018  2:09 PM  |          |
|                   |                    | PDT                  |          |
+-------------------+--------------------+----------------------+----------+
| Inhaled Oxygen    | -                  | -                    |          |
| Concentration     |                    |                      |          |
+-------------------+--------------------+----------------------+----------+
| Weight            | 66 kg (145 lb 9.6  | 2018  2:09 PM  |          |
|                   | oz)                | PDT                  |          |
+-------------------+--------------------+----------------------+----------+
| Height            | 160 cm (5' 3")     | 2018  2:09 PM  |          |
|                   |                    | PDT                  |          |
+-------------------+--------------------+----------------------+----------+
| Body Mass Index   | 25.79              | 2018  2:09 PM  |          |
|                   |                    | PDT                  |          |
+-------------------+--------------------+----------------------+----------+
 documented in this encounter
 
 Progress Notes
 Markus Smart MD - 2018  2:00 PM PDTFormatting of this note might be different fr
om the original.
 
 Author:   MARKUS SMART MD
 Referring Provider: Layla Greer 
 
 HPI: Mrs Celaya is a very pleasant 85 y/o female with a hx of recurrent heart failure adm
ission for "diastolic heart failure" and valvular heart disease over the past 6 months who i
s referred for considerations of options to treat severe mitral annular calcification and se
mauricio mitral regurgitation. She has a past med Hx signifncat for persistent Afib, Tyep 2 DM, 
hypertension, hyperlipidemia, hypothyroidism, iron deficiency anemia, pulmonary hypertension
, moderate to severe TR, and severe MR.  
 
 Mrs Celaya, lives in [facility] and is brought in by her daughter today who lives w
ith her.  They report that she has been experiencing progressive GARCIA that has been slow and 
progressive for many years. She was first hospitalized at [facility] in [facility] i
n 2018 with s/s of heart failure. Over the course of the spring she has noticed new
 and progressive GARCIA as well as new LE edema. She was previously active but could no longer 
walk a 1/4 block. She was re-admitted to the hospital in [facility] in May 2018 again with he
art failure.
 Echo in may was notable for normal to hyperdynamic LV systolic function, severe MAC, severe
 MR, moderate to severe TR, and mild AS. Additionally it showed moderate to severe Pulm HTN 
as well as left to right shnt across tunneled PFO. She has followed up with Dr Stephen Mitchell 
at [facility] in the [facility] and referred to [facility] for consideration of MitraClip
.  
 
 
 Since May she has had no further decompensated heart failure episodes 
 
 To expidite her evaluation, she has already been referred and undergone TTE, JENIFFER, CT scan o
f chest a week ago a well as angiogrpahy yesterday on 2018. 
 
 Symptoms & Exercise/Activity Levels:
 Lives in [facility] with her Daughter in a 2 story house. She lives in the ground floor and 
her Daughter lives up stairs. She She has 7 steps to get up into the house and can still matthew
erate gettign in to the house but is markedly winded and would have to rest going up stirs (
19 steps) When walkinf up the stairs he endorses no chest pain but predominatly GARCIA.  Her ac
tivity is limited and does not describe chest discomfort in her brief activities. She is abl
e to make her bed but feels tired afterwards. 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  Any little activty such 
as mopping or vaccuming makes her short of breath and tired.  She is worn out after activiti
es and needs to rest. She walks outside of the home with a large walking stick and no walker
. 
 
 Denies any swellign in her legs since May 2018 but does wear compression stockings.  Has no
w been on Lasix 40 mg daily was previously on 20 daily prior to May and has at stable. She i
s ana tto lay flat in bed at night and at first there is orthopnea but after a couple deep 
breaths she can tolerate it. She has no PND.  
 Currently she is limited by lack of energy.  On some days she can walk up to a mile, but sh
e no longer feels comfortable shopping by herself 
 
 [place] summer Her energy level was great. She previously was working out at [employer]. She would g
o out ot her [employer] groups.  Was completely independent  In all activities and shopping co
oking and cleaning. She typically has lunch outside of the house, cooks her own simple dinne
rs, and has oatmeal for breakfast.  
 
 She has had an appointment for consultation with Dr. Mitchell who will continue to follow her u
p in [facility].  
 
 The patient denies rest or exertional chest pain or shortness of breath, palpitations, pres
yncope, syncope, PND, orthopnea, abdominal swelling, lower extremity edema, or claudication.
 
 
 Review of Systems:
 14 point review of systems as stated in the HPI, otherwise remainder is negative.  
 
    
 Past Medical History: 
 Diagnosis Date 
   Anemia  
  resolved with iron supplements - no large GI bleed, negative EGD. 
   Atrial fibrillation (HCC)  
   Hyperlipidemia  
   Hypertension  
   Hypothyroid  
   Pulmonary hypertension (HCC)  
   Type 2 diabetes mellitus (HCC)  
 
  
 No past surgical history on file.
 
 Current Medications Include:
 
   
 Current Medication List  
 Name Sig 
 
 ACETAMINOPHEN 325 MG TABLET Take by mouth. 
 ASCORBIC ACID (VITAMIN C) 500 MG TABLET Take 500 mg by mouth once daily. 
 CHOLECALCIFEROL (VITAMIN D3) 2,000 UNIT CAPSULE Take by mouth. 
 FUROSEMIDE 40 MG TABLET Take by mouth. 
 GLUCOSAMINE CHONDROITIN PLUS ORAL Take 2 tablets by mouth once daily. 
 LEVOTHYROXINE 100 MCG TABLET Take by mouth. 
 LOSARTAN 100 MG TABLET Take 100 mg by mouth once daily. 
 MAGNESIUM CHLORIDE ORAL Take by mouth. 
 METFORMIN  MG 24 HR TABLET,EXTENDED RELEASE Take by mouth. 
 METOPROLOL SUCCINATE  MG TABLET,EXTENDED RELEASE 24 HR Take 100 mg by mouth once arturo
y.  
 POTASSIUM CHLORIDE ER 20 MEQ TABLET,EXTENDED RELEASE(PART/CRYST) Take by mouth. 
 SIMVASTATIN 20 MG TABLET Take by mouth once daily in the evening.  
 WARFARIN 5 MG TABLET Take by mouth. 
 
 
     
 Allergies 
 Allergen Reactions 
   Morphine Anxiety 
   "felt fidgety" 
   Percocet [Oxycodone-Acetaminophen] Anxiety 
   "felt fidgety" 
 
 
 SocialHistory 
 Social History 
 
     
 Social History 
   Marital status: Single 
   Spouse name: N/A 
   Number of children: N/A 
   Years of education: N/A 
 
   
 Occupational History 
   Not on file. 
 
    
 Social History Main Topics 
   Smoking status: Never Smoker 
   Smokeless tobacco: Never Used 
   Alcohol use No 
   Drug use: No 
   Sexual activity: Not on file 
 
    
 Other Topics Concern 
   Not on file 
 
   
 Social History Narrative 
   No narrative on file 
  
 
 FAmily Hx: non contrinbutory.  
 
 Physical Exam: 
 
 
 Last Vitals: /59 | Pulse 78 | Temp (Src) 36.7 C (98 F) (Oral) | RR 20 | Ht 1.6 m 
(5' 3") | Wt 66 kg (145 lb 9.6 oz) | SpO2 99% | BMI 25.79 kg/(m^2)
 Body mass index is 25.79 kg/m.
 
 General Appearance:  Ederly appearing, well developed well nourished adult female appearing
 stated age in NAD.  
 HEENT:  PERRLA, EOMI, mouth without lesions.
 Neck:  Neck w/o LAD
 Respiratory: CTA bilaterally. No wheezes or rales
 Cardiovascular: JVP at 5 cm.  2+ carotid pulses without bruits.  PMI nondisplaced. RRR. Nor
mal S1 covered & soft S2.  3/6 holosystolic murmur best audible at LLSB radiating to axilla.
  No gallops, or rubs. 2+  distal pulses. 
 Gastrointestinal: +BS, soft, nondistended, nontender, no abdominal bruits
 Musculoskeletal: No gross deformities
 Extremities: No cyanosis, clubbing, or edema.
 Neurologic: Grossly nonfocal.  UE and LE proximal and distal strength 5/5 and equal bilater
ally.  
 Skin: No rash or ulcers.
 
 Data
     
 Lab Results 
 Component Value Date 
  WBC 7.45 2018 
  HB 12.6 2018 
  HCT 37.5 2018 
   2018 
  MCV 87.4 2018 
  RDW 49.3 2018 
 
     
 Lab Results 
 Component Value Date 
   2018 
  K 3.9 2018 
   2018 
  BICARB 26 2018 
  BUN 16 2018 
  CR 0.78 2018 
   2018 
  CA 9.1 2018 
  ANIONGAP 10 2018 
 
 No results found for: NTPROBNP
 No results found for: A1C
     
 Lab Results 
 Component Value Date 
  INRPT 1.27 (H) 2018 
 
 
 FRAILTY ASSESSMENT:
 Frailty Index Date Taken: 2018 
 1. Left  Strength (kg): 12
     Right  Strength (kg): 10 
 2. Five Meter Walk (sec):   NA
 3. Serum albumin:   
 4. Walden ADL (#/6) :5 
 Frailty Outcome: Based on Partner trial definition the patient does not qualify as frail. 
 
 
 Walden ADL Date Taken: 2018 
 
 Bathing: No
 Dressing: No
 Toileting: No
 Transferring: No
 Continence: Yes
 Feeding: No
 
 EFT FRAILTY ASSESSMENT
 
 EFT 1 to possibly 2 points
 1 for low sit to stand
 No known albumin level
 
 EKG:
 Atrial fibrillation 
 Poor anterior R wave progression 
 Inferior mild st depression 0.5 mm 2/3. 
 
 
 TTE 3/6/2018
 1. Left ventricular systolic function is hyperdynamic with an estimated EF of >70%.
 2. The right ventricle is normal in size and function.
 3. The left atrium is markedly enlarged.
 4. The right atrium is markedly enlarged.
 5. Mild aortic stenosis with peak/mean pressure gradient of 16.83mmHg / 9.02mmHg, the aorti
c valve area by continuity equation is 1.7cm.
 6. Severe mitral regurgitation is present.
 7. Moderate mitral stenosis, with peak/mean transvalvular gradients measured at 25.2 / 8.0 
mm Hg, and MVA (by VTI) 1.50 cm . There is mi
 8. Moderate to severe tricuspid regurgitation present.
 9. There is moderate to severe pulmonary hypertension.The peak right ventricular systol
ic pressure (pulmonary artery systolic pressure), as measured by Doppler, is 55.8 - 60.8 mm 
Hg.
 10. There is a small secundum atrial septal defect measuring <10 mm in diameter.There i
s left-to-right shunting noted on color Doppler.
 
 TTE: 2018 
 Final Impressions:                         
                              
                                
                  
 1. The left ventricular cavity size is normal.               
 
 2. The LV function is hyperdynamic.                   
   
 3. Visually estimated left ventricular ejection fraction is 70 - 75%.     
 4. The aortic valve is trileaflet and moderately calcified.          
 5. Severe mitral annular calcification as well as papillary and subchordal  
 bulky calcification.                        
     
 6. While there is calcification of the bases of the mitral valve leaflets   
 due to the severe mitral annular calcificaiton, the mid portions of the    
 leaflets themseleves are only midly calcified and thin, however there is the 
 severe central mitral valve regurgitation.                 
 
 7. The mean transmitral gradient is 5.5 mmHg at a heart rate of 77 bpm.    
  The mitral valve effective regurgitant orifice area is 7 mm2.         
                             
 
 8. Severe biatrial enlargement.                    
    
 9. Right ventricular size, thickness and function are normal. 
 
 
 JENIFFER: 2018
 Final Impressions:                         
                             
              
                              
         
 1. The LV function is hyperdynamic.                   
   
 2. The visually estimated ejection fraction is > 75%.             
 3. Right ventricular size, thickness and function are normal.         
 4. Severe circumferential mitral annular calcification. A large deposit of  
 calcium at the posterolateral atrial aspect of themitral annulus that     
 partially disrupts the normal mitral annular shape and size. Mitral annular  
 area is measured at 8.26 cm2, with an AP diameter of 3.76 cm, and CC     
 diameter of 2.52 cm. However, there is no significant mitral stenosis. Mean  
 mitral gradient is approximately 4 mmHg at a heart rate of 83 bpm.      
 5. Severe mitral valve regurgitation. The EROA is 0.47 cm2 (using a PISA   
 radius of 1.1 cm, an aliasing velocity of 0.32 m/s, and a mean MR       
 regurgitant velocity of 5.1 m/s), the calculated regurgitant volume is 67   
 mL, and the calculated regurgitant fraction is 52%.              
 6. The mitral valve anterior leaflet measures 2.0 cm.             
 7. There is subvalvar and papilary muscle head calcifidation noted as well.  
 8. The mechanism for the severe mitral regurgitation is leaflet and annular  
 calcification and degerneration with poor coaptation across all portions of  
 the valve.                           
       
 9. The aortic valve is moderately sclerotic and restricted. No aortic     
 stenosis.                            
       
 10. Severely enlarged left atrium.                   
   
 11. There is a small fenestrated secundum atrial septal defect, with     
 intermittent left-to-right flow via color-flow Doppler.            
 12. Moderate tricuspid regurgitation.                  
   
 13. Small plaque involving the ascending and transverse aorta.        
 14. The left atrial appendage is well visualized and there is no evidence of 
 thrombus present.     
 
 CARDIAC CATH: 2018
 Preliminary repoot
 Severe MAC
 There is non-obstructive disease in LAD andCirc
 There is 95% stenosis of the distal RCA prior to the PDA and PLV's
 
 
 RHC: Unremarkable filling pressures
 
 Coronary Angiography: Obstructive CAD of RCA
 PFTS:
 None 
 
 STS (Based Off Available Data):
 MV Replacement + CAB 
 Risk of Mortality: 7.374% 
 
 Morbidity or Mortality: 30.831% 
 Long Length of Stay: 19.775% 
 Short Length of Stay: 7.384% 
 Permanent Stroke: 2.652% 
 Prolonged Ventilation: 19.662% 
 DSW Infection: 0.263% 
 Renal Failure: 8.237% 
 Reoperation: 12.335% 
 
 Assessment and Recommendations:
 Ms. Vonnie Celaya is a 86 y.o. female referred [facility] for assessment
 of treatment options for mitral valve disease. 
 
 Ms. Celaya has developed progressive GARCIA and LE edema over the last 6 months and currentl
y reports symptoms that are NYHA functional class 3-4.  A review of the echocardiogram and t
he transesophageal echo show severe mitral annular calcification, severe mitral regurgitaito
n, with largest jet centrally but also extending to the commisures, with a mean trasmitral g
radient of 4-5 mmHg and an annular are of 830-930 mm2.   In addition the echocardiogram is n
otable for moderate to severe pulmonary hypertesnion with RVSP 55-60 mmHg. Additional diagno
stics that have been performed include coronary angiogrpahy that demonstrates severe distal 
RCA stenosis. 
 
  Based on her symptoms, physical exam, and diagnostic studies we agree that she has severe 
symptomatic  Mitral regurgitation. 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
ing, continuing with her quality of life the best she can, and not having a stroke. 
 
 The patient's STS based on available data is 7-8%  . I am concerned about the risk of LVOT 
obstruction with valve and Mac, and I explained to the patient that this procedure is off la
bel use of the Echeverria valve and it carries high risk of valve embolization, perivalvular le
ak, LVOT obstruction. I also explained to her that mitral clip might be challenging given se
mauricio mitral annulus calcification as well as already elevated transmitral gradient.
 The simplest option would be to either continue medical therapy plus minus PCI to the RCA a
nd if she continues to be symptomatic we can consider hybrid procedure with resection of ant
erior leaflet and Echeverria valve deployment in severe dense mitral annulus
 I spent 45 minutes with the patient explaining all treatment options
 MARKUS SMART MD
 
 Electronically signed by Markus Smart MD at 10/02/2018 11:51 AM PDTdocumented in this e
ncounter
 
 Plan of Treatment
 Not on filedocumented as of this encounter
 
 Visit Diagnoses
 
 
+--------------------------------------------------------+
| Diagnosis                                              |
+--------------------------------------------------------+
|   Non-rheumatic mitral regurgitation - Primary         |
+--------------------------------------------------------+
|   Mitral annular calcification  Mitral valve disorders |
+--------------------------------------------------------+
 documented in this encounter

## 2020-01-01 NOTE — XMS
Encounter Summary
  Created on: 2020
 
 Vonnie Celaya
 External Reference #: [id number]
 : 32
 Sex: Female
 
 Demographics
 
 
+-----------------------+------------------------+
| Address               | [address]         |
|                       | [place]  [address]   |
+-----------------------+------------------------+
| Home Phone            | [phone]        |
+-----------------------+------------------------+
| Preferred Language    | Unknown                |
+-----------------------+------------------------+
| Marital Status        | Single                 |
+-----------------------+------------------------+
| Mor[employer] Affiliation | NON                    |
+-----------------------+------------------------+
| Race                  | White                  |
+-----------------------+------------------------+
| Ethnic Group          | Not  or  |
+-----------------------+------------------------+
 
 
 Author
 
 
+--------------+------------------------------+
| Author       | [facility] |
+--------------+------------------------------+
| Organization | [facility] |
+--------------+------------------------------+
| Address      | Unknown                      |
+--------------+------------------------------+
| Phone        | Unavailable                  |
+--------------+------------------------------+
 
 
 
 Support
 
 
+--------------+--------------+---------+-----------------+
| Name         | Relationship | Address | Phone           |
+--------------+--------------+---------+-----------------+
| Lauryn Leroy | ECON         | Unknown | [phone] |
+--------------+--------------+---------+-----------------+
 
 
 
 Care Team Providers
 
 
 
+------------------------+------+-----------------+
| Care Team Member Name  | Role | Phone           |
+------------------------+------+-----------------+
| Jean Bermudez MD | PCP  | [phone] |
+------------------------+------+-----------------+
 
 
 
 Encounter Details
 
 
+--------+-------------+----------------------+----------------------+----------------------
+
| Date   | Type        | Department           | Care Team            | Description          
|
+--------+-------------+----------------------+----------------------+----------------------
+
| / |  |   Cardiology at [facility]  |   Jame Wilkins  | Mitral valve         
|
| 2018   |             |  [address]    | MD [place]  [address]  | insufficiency,       
|
|        |             | Mailcode: CH[address]A       | Ave  [place], [place]    | unspecified etiology 
|
|        |             | [place] [facility]    | [address]           |  (Primary Dx)        
|
|        |             | and Healing,         | [phone]         |                      
|
|        |             |       | [phone] (Fax)   |                      
|
|        |             | Floor  [place], [place]  |                      |                      
|
|        |             | [address]           |                      |                      
|
|        |             | [phone]         |                      |                      
|
+--------+-------------+----------------------+----------------------+----------------------
+
 
 
 
 Social History
 
 
+----------------+-------+-----------+--------+------+
| Tobacco Use    | Types | Packs/Day | Years  | Date |
|                |       |           | Used   |      |
+----------------+-------+-----------+--------+------+
| Never Assessed |       |           |        |      |
+----------------+-------+-----------+--------+------+
 
 
 
+------------------+---------------+
| Sex Assigned at  | Date Recorded |
| Birth            |               |
+------------------+---------------+
| Not on file      |               |
+------------------+---------------+
 
 
 
 
+----------------+-------------+-------------+
| Job Start Date | Occupation  | Industry    |
+----------------+-------------+-------------+
| Not on file    | Not on file | Not on file |
+----------------+-------------+-------------+
 
 
 
+----------------+--------------+------------+
| Travel History | Travel Start | Travel End |
+----------------+--------------+------------+
 
 
 
+-------------------------------------+
| No recent travel history available. |
+-------------------------------------+
 documented as of this encounter
 
 Plan of Treatment
 
 
+----------------------+------+--------+----------------------+------------+
| Name                 | Type | Priori | Associated Diagnoses | Date/Time  |
|                      |      | ty     |                      |            |
+----------------------+------+--------+----------------------+------------+
| 6-MINUTE WALK TEST - | ECG  | Routin |   Mitral valve       | 2018 |
|  ECG                 |      | e      | insufficiency,       |            |
|                      |      |        | unspecified etiology |            |
+----------------------+------+--------+----------------------+------------+
 documented as of this encounter
 
 Procedures
 
 
+----------------+--------+-------------+----------------------+----------------------+
| Procedure Name | Priori | Date/Time   | Associated Diagnosis | Comments             |
|                | ty     |             |                      |                      |
+----------------+--------+-------------+----------------------+----------------------+
| 12 LEAD ECG    | Routin | 2018  |   Mitral valve       |   Results for this   |
|                | e      | 12:04 PM    | insufficiency,       | procedure are in the |
|                |        | PDT         | unspecified etiology |  results section.    |
+----------------+--------+-------------+----------------------+----------------------+
 documented in this encounter
 
 Results
 12 LEAD ECG (2018 12:04 PM PDT)
 
+-------------+-------------------------+-----------+------------+--------------+
| Component   | Value                   | Ref Range | Performed  | Pathologist  |
|             |                         |           | At         | Signature    |
+-------------+-------------------------+-----------+------------+--------------+
| VENTRICULAR | 80                      | bpm       | [facility]  |              |
|  RATE       |                         |           | OF         |              |
|             |                         |           | CARDIOLOGY |              |
+-------------+-------------------------+-----------+------------+--------------+
| ATRIAL RATE | 0                       | ms        | [facility]  |              |
|             |                         |           | OF         |              |
|             |                         |           | CARDIOLOGY |              |
 
+-------------+-------------------------+-----------+------------+--------------+
| P-R         |                         | ms        | [facility]  |              |
| INTERVAL    |                         |           | OF         |              |
|             |                         |           | CARDIOLOGY |              |
+-------------+-------------------------+-----------+------------+--------------+
| P AXIS      |                         | deg       | [facility]  |              |
|             |                         |           | OF         |              |
|             |                         |           | CARDIOLOGY |              |
+-------------+-------------------------+-----------+------------+--------------+
| QRS         | 107                     | ms        | [facility]  |              |
| DURATION    |                         |           | OF         |              |
|             |                         |           | CARDIOLOGY |              |
+-------------+-------------------------+-----------+------------+--------------+
| QT          | 393                     | ms        | [facility]  |              |
|             |                         |           | OF         |              |
|             |                         |           | CARDIOLOGY |              |
+-------------+-------------------------+-----------+------------+--------------+
| QTC-BAGUNNERTT  | 451                     | ms        | [facility]  |              |
|             |                         |           | OF         |              |
|             |                         |           | CARDIOLOGY |              |
+-------------+-------------------------+-----------+------------+--------------+
| R AXIS      | -48                     | deg       | [facility]  |              |
|             |                         |           | OF         |              |
|             |                         |           | CARDIOLOGY |              |
+-------------+-------------------------+-----------+------------+--------------+
| T AXIS      | 6                       | deg       | [facility]  |              |
|             |                         |           | OF         |              |
|             |                         |           | CARDIOLOGY |              |
+-------------+-------------------------+-----------+------------+--------------+
| ECG         | Atrial fibrillation     |           | [facility]  |              |
| IMPRESSION  |                         |           | OF         |              |
|             |                         |           | CARDIOLOGY |              |
+-------------+-------------------------+-----------+------------+--------------+
| ECG         | Minimal ST depression,  |           | [facility]  |              |
| IMPRESSION  | inferior leads          |           | OF         |              |
|             |                         |           | CARDIOLOGY |              |
+-------------+-------------------------+-----------+------------+--------------+
| ECG         | Minimal ST elevation,   |           | [facility]  |              |
| IMPRESSION  | anterolateral leads-    |           | OF         |              |
|             | ABNORMAL ECG -          |           | CARDIOLOGY |              |
+-------------+-------------------------+-----------+------------+--------------+
| ECG         | Electronically signed   |           | [facility]  |              |
| IMPRESSION  | by: IZABELA MARTI        |           | OF         |              |
|             | 2018 17:39:25     |           | CARDIOLOGY |              |
+-------------+-------------------------+-----------+------------+--------------+
 
 
 
+----------+
| Specimen |
+----------+
|          |
+----------+
 
 
 
+----------------------------------------------------------+--------------+
| Narrative                                                | Performed At |
+----------------------------------------------------------+--------------+
|   This result has an attachment that is not available.   |              |
 
+----------------------------------------------------------+--------------+
 
 
 
+-----------------+------------------------+--------------------+--------------+
| Performing      | Address                | City/State/Zipcode | Phone Number |
| Organization    |                        |                    |              |
+-----------------+------------------------+--------------------+--------------+
|   [facility]  |   [address]  | [place], [place]       |              |
| CARDIOLOGY      | [address]              | [address]         |              |
+-----------------+------------------------+--------------------+--------------+
 documented in this encounter
 
 Visit Diagnoses
 
 
+--------------------------------------------------------------+
| Diagnosis                                                    |
+--------------------------------------------------------------+
|   Mitral valve insufficiency, unspecified etiology - Primary |
+--------------------------------------------------------------+
 documented in this encounter

## 2020-01-01 NOTE — XMS
Clinical Summary
  Created on: 2020
 
 Vonnie Harp
 External Reference #: [id number]
 : 32
 Sex: Female
 
 Demographics
 
 
+-----------------------+---------------------------+
| Address               | [address]           |
|                       | [place]  [address] |
+-----------------------+---------------------------+
| Home Phone            | [phone]           |
+-----------------------+---------------------------+
| Preferred Language    | Unknown                   |
+-----------------------+---------------------------+
| Marital Status        |                    |
+-----------------------+---------------------------+
| Scientologist Affiliation | Unknown                   |
+-----------------------+---------------------------+
| Race                  | Unknown                   |
+-----------------------+---------------------------+
| Ethnic Group          | Unknown                   |
+-----------------------+---------------------------+
 
 
 Author
 
 
+--------------+------------------------------------------+
| Author       | [facility]  |
|              | 19)                                 |
+--------------+------------------------------------------+
| Organization | [facility] (Historical as of  |
|              | 19)                                 |
+--------------+------------------------------------------+
| Address      | Unknown                                  |
+--------------+------------------------------------------+
| Phone        | Unavailable                              |
+--------------+------------------------------------------+
 
 
 
 Support
 
 
+--------------+--------------+---------+-----------------+
| Name         | Relationship | Address | Phone           |
+--------------+--------------+---------+-----------------+
| Lauryn Leroy | ECON         | Unknown | [phone] |
+--------------+--------------+---------+-----------------+
 
 
 
 Care Team Providers
 
 
 
+-----------------------+------+-----------------+
| Care Team Member Name | Role | Phone           |
+-----------------------+------+-----------------+
| Jean Bermudez MD  | PP   | [phone] |
+-----------------------+------+-----------------+
 
 
 
 Allergies
 
 
+----------------------+----------------------+----------+----------+-------------------+
| Active Allergy       | Reactions            | Severity | Noted    | Comments          |
|                      |                      |          | Date     |                   |
+----------------------+----------------------+----------+----------+-------------------+
| Morphine             | Other (See Comments) | Medium   | 20 |   Pt felt fidgety |
|                      |                      |          | 18       |                   |
+----------------------+----------------------+----------+----------+-------------------+
| Oxycodone-Acetaminop | Other (See Comments) | Medium   | 20 |   Pt felt fidgety |
| hen                  |                      |          | 18       |                   |
+----------------------+----------------------+----------+----------+-------------------+
 
 
 
 Current Medications
 
 
+----------------------+----------------------+-------+---------+------+------+-------+
| Prescription         | Sig.                 | Disp. | Refills | Star | End  | Statu |
|                      |                      |       |         | t    | Date | s     |
|                      |                      |       |         | Date |      |       |
+----------------------+----------------------+-------+---------+------+------+-------+
|   levothyroxine      | Take 100 mcg by      |       |         |      |      | Activ |
| (SYNTHROID) 100 MCG  | mouth every morning  |       |         |      |      | e     |
| tablet               | before breakfast.    |       |         |      |      |       |
+----------------------+----------------------+-------+---------+------+------+-------+
|   losartan (COZAAR)  | Take 100 mg by mouth |       |         |      |      | Activ |
| 100 MG tablet        |  daily.              |       |         |      |      | e     |
+----------------------+----------------------+-------+---------+------+------+-------+
|   simvastatin        | Take 10 mg by mouth  |       |         |      |      | Activ |
| (ZOCOR) 20 MG tablet | nightly.             |       |         |      |      | e     |
+----------------------+----------------------+-------+---------+------+------+-------+
|   potassium chloride | Take 20 mEq by mouth |       |         |      |      | Activ |
|  SA (K-DUR,KLOR-CON) |  daily.              |       |         |      |      | e     |
|  20 MEQ tablet       |                      |       |         |      |      |       |
+----------------------+----------------------+-------+---------+------+------+-------+
|   metFORMIN          | Take 500 mg by mouth |       |         |      |      | Activ |
| (GLUMETZA) 500 MG    |  2 (two) times daily |       |         |      |      | e     |
| (MOD) 24 hr tablet   |  with meals.         |       |         |      |      |       |
+----------------------+----------------------+-------+---------+------+------+-------+
|   magnesium chloride | Take 1 tablet by     |       |         |      |      | Activ |
|  (MAG64) 64 mg EC    | mouth daily.         |       |         |      |      | e     |
| tablet               |                      |       |         |      |      |       |
+----------------------+----------------------+-------+---------+------+------+-------+
|                      | Take 1 tablet by     |       |         |      |      | Activ |
| Gluc-Chonn-MSM-Boswe | mouth daily.         |       |         |      |      | e     |
| llia-Vit D           |                      |       |         |      |      |       |
| (GLUCOSAMINE CHOND   |                      |       |         |      |      |       |
 
| TRIPLE/VIT D) TABS   |                      |       |         |      |      |       |
+----------------------+----------------------+-------+---------+------+------+-------+
|   Cranberry 1000 MG  | Take 1 capsule by    |       |         |      |      | Activ |
| CAPS                 | mouth daily.         |       |         |      |      | e     |
+----------------------+----------------------+-------+---------+------+------+-------+
|   ascorbic acid      | Take 1,000 mg by     |       |         |      |      | Activ |
| (VITAMIN C) 1000 MG  | mouth daily.         |       |         |      |      | e     |
| tablet               |                      |       |         |      |      |       |
+----------------------+----------------------+-------+---------+------+------+-------+
|   furosemide (LASIX) | Take 40 mg by mouth  |       |         |      |      | Activ |
|  40 MG tablet        | daily.               |       |         |      |      | e     |
+----------------------+----------------------+-------+---------+------+------+-------+
|   acetaminophen      | Take 650 mg by mouth |       |         |      |      | Activ |
| (TYLENOL) 325 MG     |  every 6 (six) hours |       |         |      |      | e     |
| tablet               |  as needed for Pain. |       |         |      |      |       |
+----------------------+----------------------+-------+---------+------+------+-------+
|   metoprolol         | Take 100 mg by mouth |       |         | 07/3 |      | Activ |
| (TOPROL-XL) 200 MG   |  every morning.      |       |         | 0/20 |      | e     |
| 24 hr tablet         |                      |       |         | 18   |      |       |
+----------------------+----------------------+-------+---------+------+------+-------+
|   warfarin           | Take 5 mg by mouth   |       |         |      |      | Activ |
| (COUMADIN) 5 MG      | daily. 1 pill daily  |       |         |      |      | e     |
| tablet               | Sun-Mon-Wed-Fri-Sat, |       |         |      |      |       |
|                      |  1/2 pill (2.5 mg)   |       |         |      |      |       |
|                      | Raul             |       |         |      |      |       |
+----------------------+----------------------+-------+---------+------+------+-------+
 
 
 
 Active Problems
 
 
+------------------------------------------------------------------+------------+
| Problem                                                          | Noted Date |
+------------------------------------------------------------------+------------+
| SOB (shortness of breath) on exertion                            | 10/24/2018 |
+------------------------------------------------------------------+------------+
| Anginal equivalent (HCC)                                         | 10/24/2018 |
+------------------------------------------------------------------+------------+
| Essential hypertension                                           | 10/24/2018 |
+------------------------------------------------------------------+------------+
| Coronary artery disease of native artery of native heart with    | 10/24/2018 |
| stable angina pectoris (HCC)                                     |            |
+------------------------------------------------------------------+------------+
| S/P drug eluting coronary stent placement                        | 10/24/2018 |
+------------------------------------------------------------------+------------+
| Status post insertion of drug-eluting stent into right coronary  | 10/24/2018 |
| artery for coronary artery disease                               |            |
+------------------------------------------------------------------+------------+
 
 
 
+----------------------------------------------------------------+
|   Overview:   3.0 x 16 mm Synergy MARA in right Posterolateral  |
| artery                                                         |
+----------------------------------------------------------------+
 
 
 
+--------------------------------+------------+
 
| Congestive heart failure (HCC) | 2018 |
+--------------------------------+------------+
 
 
 
+------------------------------------------------------------------+
|   Overview:   acute/chronic Diastolic Heart Failure, NYHA class  |
| III                                                              |
+------------------------------------------------------------------+
 
 
 
+---------------------------+------------+
| Atrial fibrillation (HCC) | 1996 |
+---------------------------+------------+
 
 
 
+-------------------------------------------------------+
|   Overview:   persistent since then, never attempted  |
| cardioversion                                         |
+-------------------------------------------------------+
 
 
 
+--------------------------------------------------+---+
| Pulmonary hypertension, moderate to severe (HCC) |   |
+--------------------------------------------------+---+
| Severe tricuspid regurgitation                   |   |
+--------------------------------------------------+---+
 
 
 
+---------------------------------+
|   Overview:   moderately severe |
+---------------------------------+
 
 
 
+-----------------------------+---+
| Severe mitral regurgitation |   |
+-----------------------------+---+
 
 
 
 Family History
 
 
+----------------------+----------+---------+--------------------------------------------+
| Medical History      | Relation | Name    | Comments                                   |
+----------------------+----------+---------+--------------------------------------------+
| CVA                  | Brother  | Isac    |                                            |
+----------------------+----------+---------+--------------------------------------------+
| Arthritis            | Daughter |         |                                            |
+----------------------+----------+---------+--------------------------------------------+
| CVA                  | Daughter |         |                                            |
+----------------------+----------+---------+--------------------------------------------+
| Obesity              | Daughter |         |                                            |
+----------------------+----------+---------+--------------------------------------------+
| Thyroid cancer       | Daughter | Devorah   |                                            |
 
+----------------------+----------+---------+--------------------------------------------+
| CVA                  | Father   |         | cerebral hemorrhage                        |
+----------------------+----------+---------+--------------------------------------------+
| Coronary Artery      | Father   |         |                                            |
| Disease              |          |         |                                            |
+----------------------+----------+---------+--------------------------------------------+
| CVA                  | Mother   |         | recurrent CVA at 74,  a few days later |
+----------------------+----------+---------+--------------------------------------------+
| Diabetes Mellitus II | Mother   |         |                                            |
+----------------------+----------+---------+--------------------------------------------+
| Heart failure        | Mother   |         |                                            |
+----------------------+----------+---------+--------------------------------------------+
| Leukemia             | Sister   | Khusbhu | basophilic leukemia                        |
+----------------------+----------+---------+--------------------------------------------+
| Heart  Disease       | Sister   | Fabienne | ? thrombus, not clear                      |
+----------------------+----------+---------+--------------------------------------------+
| Deep vein thrombosis | Sister   | Rhea    |                                            |
+----------------------+----------+---------+--------------------------------------------+
 
 
 
+----------+---------+----------+--------------------------------------------+
| Relation | Name    | Status   | Comments                                   |
+----------+---------+----------+--------------------------------------------+
| Brother  | Isac    |  | CVA                                        |
|          |         |   (Age   |                                            |
|          |         | 75)      |                                            |
+----------+---------+----------+--------------------------------------------+
| Daughter |         |  | cerebral thrombosis post umbilical hernia  |
|          |         |   (Age   | repair                                     |
|          |         | 56)      |                                            |
+----------+---------+----------+--------------------------------------------+
| Daughter |         | Alive    |                                            |
+----------+---------+----------+--------------------------------------------+
| Daughter | Devorah   | Alive    |                                            |
+----------+---------+----------+--------------------------------------------+
| Father   |         |  | cerebral hemorrhage                        |
|          |         |   (Age   |                                            |
|          |         | 63)      |                                            |
+----------+---------+----------+--------------------------------------------+
| Mother   |         |  | CHF                                        |
|          |         |   (Age   |                                            |
|          |         | 74)      |                                            |
+----------+---------+----------+--------------------------------------------+
| Sister   | Khushbu |  |                                            |
|          |         |   (Age   |                                            |
|          |         | 56)      |                                            |
+----------+---------+----------+--------------------------------------------+
|    | Fabienne | Alive    |                                            |
+----------+---------+----------+--------------------------------------------+
|    | Rhea    | Alive    |                                            |
+----------+---------+----------+--------------------------------------------+
| Artis      |         | Alive    |                                            |
+----------+---------+----------+--------------------------------------------+
 
 
 
 Social History
 
 
 
+--------------+-------+-----------+--------+------+
| Tobacco Use  | Types | Packs/Day | Years  | Date |
|              |       |           | Used   |      |
+--------------+-------+-----------+--------+------+
| Never Smoker |       |           |        |      |
+--------------+-------+-----------+--------+------+
 
 
 
+---------------------+---+---+---+
| Smokeless Tobacco:  |   |   |   |
| Never Used          |   |   |   |
+---------------------+---+---+---+
 
 
 
+-------------+-----------+---------+----------+
| Alcohol Use | Drinks/We | oz/Week | Comments |
|             | ek        |         |          |
+-------------+-----------+---------+----------+
| No          |           |         |          |
+-------------+-----------+---------+----------+
 
 
 
+------------------+---------------+
| Sex Assigned at  | Date Recorded |
| Birth            |               |
+------------------+---------------+
| Not on file      |               |
+------------------+---------------+
 
 
 
 Last Filed Vital Signs
 
 
+-------------------+----------------------+-------------------------+
| Vital Sign        | Reading              | Time Taken              |
+-------------------+----------------------+-------------------------+
| Blood Pressure    | 126/66               | 2019  9:27 AM PDT |
+-------------------+----------------------+-------------------------+
| Pulse             | 65                   | 2019  9:27 AM PDT |
+-------------------+----------------------+-------------------------+
| Temperature       | 36.6   C (97.9   F)  | 10/25/2018 10:54 AM PDT |
+-------------------+----------------------+-------------------------+
| Respiratory Rate  | 16                   | 10/25/2018 10:54 AM PDT |
+-------------------+----------------------+-------------------------+
| Oxygen Saturation | 96%                  | 2019  9:27 AM PDT |
+-------------------+----------------------+-------------------------+
| Inhaled Oxygen    | -                    | -                       |
| Concentration     |                      |                         |
+-------------------+----------------------+-------------------------+
| Weight            | 66.8 kg (147 lb 3.2  | 2019  9:27 AM PDT |
|                   | oz)                  |                         |
+-------------------+----------------------+-------------------------+
| Height            | 160 cm (5' 3")       | 2019  9:27 AM PDT |
+-------------------+----------------------+-------------------------+
| Body Mass Index   | 26.08                | 2019  9:27 AM PDT |
+-------------------+----------------------+-------------------------+
 
 
 
 
 Plan of Treatment
 
 
+---------------------+-----------+-----------+----------+
| Health Maintenance  | Due Date  | Last Done | Comments |
+---------------------+-----------+-----------+----------+
| Vaccine:            |  |           |          |
| Dtap/Tdap/Td (1 -   | 1         |           |          |
| Tdap)               |           |           |          |
+---------------------+-----------+-----------+----------+
| Vaccine: Zoster (1  |  |           |          |
| of 2)               | 2         |           |          |
+---------------------+-----------+-----------+----------+
| DEXA SCAN SCREENING |  |           |          |
|                     | 7         |           |          |
+---------------------+-----------+-----------+----------+
| Vaccine:            |  |           |          |
| Pneumococcal 65+    | 7         |           |          |
| Low/Medium Risk (1  |           |           |          |
| of 2 - PCV13)       |           |           |          |
+---------------------+-----------+-----------+----------+
| Vaccine: Influenza  |  |           |          |
| (#1)                | 9         |           |          |
+---------------------+-----------+-----------+----------+
 
 
 
 Implants
 
 
+-------------------------------+-------+-------+-------------+--------+--------+--------+
| Implanted                     | Type  | Area  | Manufacture | Device | Expira | Model  |
|                               |       |       | r           |        | tion   | /      |
|                               |       |       |             | Identi | Date   | Serial |
|                               |       |       |             | fier   |        |  / Lot |
+-------------------------------+-------+-------+-------------+--------+--------+--------+
| Synergy                       | Stent | Heart | BOSTON      |        | / | [id number] |
| Stent-10/24/2018Implanted:    |       |       | [employer]ENT[employer]  |        | 2020   | [id number] |
| 10/24/2018 by [employer],         |       |       |             |        |        | 00 /   |
| Daniel PENN MD (Quantity not |       |       |             |        |        | [id number] |
|  on file)                     |       |       |             |        |        | 221    |
+-------------------------------+-------+-------+-------------+--------+--------+--------+
 
 
 
 Results
 Not on filefrom Last 3 Months
 
 Insurance
 
 
+------------------+--------+-------------+------+-------+----------------------+
| Payer            | Benefi | Subscriber  | Type | Phone | Address              |
|                  | t Plan | ID          |      |       |                      |
|                  |  /     |             |      |       |                      |
|                  | Group  |             |      |       |                      |
+------------------+--------+-------------+------+-------+----------------------+
 
| MEDICARE         | MEDICA | [id number] |      |       |   [address]        |
|                  | RE     |             |      |       | [place] [address] |
|                  | IP-OP  |             |      |       |                      |
+------------------+--------+-------------+------+-------+----------------------+
| COMMERCIAL OTHER | BANKER | [id number]   |      |       |                      |
|                  | S LIFE |             |      |       |                      |
|                  |  AND   |             |      |       |                      |
|                  | CASUAL |             |      |       |                      |
|                  | [place]     |             |      |       |                      |
+------------------+--------+-------------+------+-------+----------------------+
 
 
 
+--------------------+--------+-------------+--------+-------------+---------------------+
| Guarantor Name     | [employer] | Relation to | Date   | Phone       | Billing Address     |
|                    | t Type |  Patient    | of     |             |                     |
|                    |        |             | Birth  |             |                     |
+--------------------+--------+-------------+--------+-------------+---------------------+
| VONNIE HARP | Person | Self        | / |   Home:     |   [address]   |
|                    | al/Fam |             | 1932   | [phone] | [place]       |
|                    | [place]    |             |        | [address]        | [phone]          |
+--------------------+--------+-------------+--------+-------------+---------------------+

## 2020-01-01 NOTE — XMS
Encounter Summary
  Created on: 2020
 
 Vonnie Celaya
 External Reference #: [id number]
 : 32
 Sex: Female
 
 Demographics
 
 
+-----------------------+---------------------------+
| Address               | [address]            |
|                       | [place]  [address] |
+-----------------------+---------------------------+
| Home Phone            | [phone]           |
+-----------------------+---------------------------+
| Preferred Language    | Unknown                   |
+-----------------------+---------------------------+
| Marital Status        |                    |
+-----------------------+---------------------------+
| Anabaptism Affiliation | Unknown                   |
+-----------------------+---------------------------+
| Race                  | Unknown                   |
+-----------------------+---------------------------+
| Ethnic Group          | Unknown                   |
+-----------------------+---------------------------+
 
 
 Author
 
 
+--------------+--------------------------------------------+
| Author       | [facility]  |
|              | [facility] [place]                                |
+--------------+--------------------------------------------+
| Organization | [facility]  |
|              | [facility] [place]                                |
+--------------+--------------------------------------------+
| Address      | Unknown                                    |
+--------------+--------------------------------------------+
| Phone        | Unavailable                                |
+--------------+--------------------------------------------+
 
 
 
 Support
 
 
+--------------+--------------+---------+-----------------+
| Name         | Relationship | Address | Phone           |
+--------------+--------------+---------+-----------------+
| Lauryn Leroy | ECON         | Unknown | [phone] |
+--------------+--------------+---------+-----------------+
 
 
 
 Care Team Providers
 
 
 
+------------------------+------+-----------------+
| Care Team Member Name  | Role | Phone           |
+------------------------+------+-----------------+
| Jean Bermudez MD | PCP  | [phone] |
+------------------------+------+-----------------+
 
 
 
 Encounter Details
 
 
+--------+-------------+----------------------+--------------------+-------------+
| Date   | Type        | Department           | Care Team          | Description |
+--------+-------------+----------------------+--------------------+-------------+
| 10/30/ | Orders Only |   [facility]     |   Conversion       |             |
| 2018   |             | CONVERSION DEP  888  | Transaction,       |             |
|        |             | [address]           | Provider Unknown   |             |
|        |             | [place]         | [phone]       |             |
|        |             | [address]           | [phone] (Fax) |             |
|        |             | [phone]         |                    |             |
+--------+-------------+----------------------+--------------------+-------------+
 
 
 
 Social History
 
 
+----------------+-------+-----------+--------+------+
| Tobacco Use    | Types | Packs/Day | Years  | Date |
|                |       |           | Used   |      |
+----------------+-------+-----------+--------+------+
| Never Assessed |       |           |        |      |
+----------------+-------+-----------+--------+------+
 
 
 
+------------------+---------------+
| Sex Assigned at  | Date Recorded |
| Birth            |               |
+------------------+---------------+
| Not on file      |               |
+------------------+---------------+
 
 
 
+----------------+-------------+-------------+
| Job Start Date | Occupation  | Industry    |
+----------------+-------------+-------------+
| Not on file    | Not on file | Not on file |
+----------------+-------------+-------------+
 
 
 
+----------------+--------------+------------+
| Travel History | Travel Start | Travel End |
+----------------+--------------+------------+
 
 
 
 
+-------------------------------------+
| No recent travel history available. |
+-------------------------------------+
 documented as of this encounter
 
 Plan of Treatment
 
 
+--------+---------+------------+----------------------+-------------+
| Date   | Type    | Specialty  | Care Team            | Description |
+--------+---------+------------+----------------------+-------------+
| / | Office  | Cardiology |   [facility]    |             |
| 2020   | Visit   |            | [address]      |             |
|        |         |            | [address]   |             |
|        |         |            | [place] [id number]   |             |
|        |         |            | [phone]         |             |
|        |         |            | [phone] (Fax)   |             |
+--------+---------+------------+----------------------+-------------+
 documented as of this encounter
 
 Visit Diagnoses
 Not on filedocumented in this encounter

## 2020-01-01 NOTE — XMS
Encounter Summary
  Created on: 2020
 
 Vonnie Celaya
 External Reference #: [id number]
 : 32
 Sex: Female
 
 Demographics
 
 
+-----------------------+------------------------+
| Address               | [address]         |
|                       | [place]  [address]   |
+-----------------------+------------------------+
| Home Phone            | [phone]        |
+-----------------------+------------------------+
| Preferred Language    | Unknown                |
+-----------------------+------------------------+
| Marital Status        | Single                 |
+-----------------------+------------------------+
| Religion Affiliation | NON                    |
+-----------------------+------------------------+
| Race                  | White                  |
+-----------------------+------------------------+
| Ethnic Group          | Not  or  |
+-----------------------+------------------------+
 
 
 Author
 
 
+--------------+------------------------------+
| Author       | [facility] |
+--------------+------------------------------+
| Organization | [facility] |
+--------------+------------------------------+
| Address      | Unknown                      |
+--------------+------------------------------+
| Phone        | Unavailable                  |
+--------------+------------------------------+
 
 
 
 Support
 
 
+--------------+--------------+---------+-----------------+
| Name         | Relationship | Address | Phone           |
+--------------+--------------+---------+-----------------+
| Lauryn Leroy | ECON         | Unknown | [phone] |
+--------------+--------------+---------+-----------------+
 
 
 
 Care Team Providers
 
 
 
+------------------------+------+-----------------+
| Care Team Member Name  | Role | Phone           |
+------------------------+------+-----------------+
| Jean Bermudez MD | PCP  | [phone] |
+------------------------+------+-----------------+
 
 
 
 Reason for Visit
 
 
+--------------------+------------------------------+
| Reason             | Comments                     |
+--------------------+------------------------------+
| Surgery Scheduling | cancel MitraClip on 18 |
+--------------------+------------------------------+
 
 
 
 Encounter Details
 
 
+--------+-----------+----------------------+----------------------+----------------------+
| Date   | Type      | Department           | Care Team            | Description          |
+--------+-----------+----------------------+----------------------+----------------------+
| / | Telephone |   Cardiology at [facility]  |   Estefania Arambulabaljeet GTZ,  | Surgery Scheduling   |
| 2018   |           |  [address]    | RN  [address]      | (cancel MitraClip on |
|        |           | Mailcode: [id number]       | [address]      |  18)           |
|        |           | [facility]    | [place]         |                      |
|        |           | and Healing,         | [address]           |                      |
|        |           | [address],       |                      |                      |
|        |           | [place], [place]  |                      |                      |
|        |           | [address]           |                      |                      |
|        |           | [phone]         |                      |                      |
+--------+-----------+----------------------+----------------------+----------------------+
 
 
 
 Social History
 
 
+--------------+-------+-----------+--------+------+
| Tobacco Use  | Types | Packs/Day | Years  | Date |
|              |       |           | Used   |      |
+--------------+-------+-----------+--------+------+
| Never Smoker |       |           |        |      |
+--------------+-------+-----------+--------+------+
 
 
 
+---------------------+---+---+---+
| Smokeless Tobacco:  |   |   |   |
| Never Used          |   |   |   |
+---------------------+---+---+---+
 
 
 
+-------------+-------------+---------+----------+
| Alcohol Use | Drinks/Week | oz/Week | Comments |
+-------------+-------------+---------+----------+
 
| No          |             |         |          |
+-------------+-------------+---------+----------+
 
 
 
+------------------+---------------+
| Sex Assigned at  | Date Recorded |
| Birth            |               |
+------------------+---------------+
| Not on file      |               |
+------------------+---------------+
 
 
 
+----------------+-------------+-------------+
| Job Start Date | Occupation  | Industry    |
+----------------+-------------+-------------+
| Not on file    | Not on file | Not on file |
+----------------+-------------+-------------+
 
 
 
+----------------+--------------+------------+
| Travel History | Travel Start | Travel End |
+----------------+--------------+------------+
 
 
 
+-------------------------------------+
| No recent travel history available. |
+-------------------------------------+
 documented as of this encounter
 
 Plan of Treatment
 Not on filedocumented as of this encounter
 
 Visit Diagnoses
 Not on filedocumented in this encounter

## 2020-01-01 NOTE — XMS
Encounter Summary
  Created on: 2020
 
 Vonnie Celaya
 External Reference #: [id number]
 : 32
 Sex: Female
 
 Demographics
 
 
+-----------------------+------------------------+
| Address               | [address]         |
|                       | [place]  [address]   |
+-----------------------+------------------------+
| Home Phone            | [phone]        |
+-----------------------+------------------------+
| Preferred Language    | Unknown                |
+-----------------------+------------------------+
| Marital Status        | Single                 |
+-----------------------+------------------------+
| Confucianist Affiliation | NON                    |
+-----------------------+------------------------+
| Race                  | White                  |
+-----------------------+------------------------+
| Ethnic Group          | Not  or  |
+-----------------------+------------------------+
 
 
 Author
 
 
+--------------+------------------------------+
| Author       | [facility] |
+--------------+------------------------------+
| Organization | [facility] |
+--------------+------------------------------+
| Address      | Unknown                      |
+--------------+------------------------------+
| Phone        | Unavailable                  |
+--------------+------------------------------+
 
 
 
 Support
 
 
+--------------+--------------+---------+-----------------+
| Name         | Relationship | Address | Phone           |
+--------------+--------------+---------+-----------------+
| Lauryn Leroy | ECON         | Unknown | [phone] |
+--------------+--------------+---------+-----------------+
 
 
 
 Care Team Providers
 
 
 
+------------------------+------+-----------------+
| Care Team Member Name  | Role | Phone           |
+------------------------+------+-----------------+
| Jean Bermudez MD | PCP  | [phone] |
+------------------------+------+-----------------+
 
 
 
 Encounter Details
 
 
+--------+-----------+----------------------+----------------------+-------------+
| Date   | Type      | Department           | Care Team            | Description |
+--------+-----------+----------------------+----------------------+-------------+
| / | Hospital  |   Cardiac            |   Sj, Car Ecg Tech  |             |
| 2018   | Encounter | Non-Invasive Testing |  [address]        |             |
|        |           |  at [facility] | [facility]    |             |
|        |           |   [address]   | [place] [address]   |             |
|        |           | Loop  Mailcode:      |                      |             |
|        |           | [id number]  [address]   |                      |             |
|        |           | [facility] hospital        |                      |             |
|        |           | [place], [place]         |                      |             |
|        |           | [address]           |                      |             |
|        |           | [phone]         |                      |             |
+--------+-----------+----------------------+----------------------+-------------+
 
 
 
 Social History
 
 
+--------------+-------+-----------+--------+------+
| Tobacco Use  | Types | Packs/Day | Years  | Date |
|              |       |           | Used   |      |
+--------------+-------+-----------+--------+------+
| Never Smoker |       |           |        |      |
+--------------+-------+-----------+--------+------+
 
 
 
+---------------------+---+---+---+
| Smokeless Tobacco:  |   |   |   |
| Never Used          |   |   |   |
+---------------------+---+---+---+
 
 
 
+-------------+-------------+---------+----------+
| Alcohol Use | Drinks/Week | oz/Week | Comments |
+-------------+-------------+---------+----------+
| No          |             |         |          |
+-------------+-------------+---------+----------+
 
 
 
+------------------+---------------+
| Sex Assigned at  | Date Recorded |
| Birth            |               |
+------------------+---------------+
| Not on file      |               |
 
+------------------+---------------+
 
 
 
+----------------+-------------+-------------+
| Job Start Date | Occupation  | Industry    |
+----------------+-------------+-------------+
| Not on file    | Not on file | Not on file |
+----------------+-------------+-------------+
 
 
 
+----------------+--------------+------------+
| Travel History | Travel Start | Travel End |
+----------------+--------------+------------+
 
 
 
+-------------------------------------+
| No recent travel history available. |
+-------------------------------------+
 documented as of this encounter
 
 Medications at Time of Discharge
 
 
+----------------------+----------------------+-----------+---------+----------+----------+
| Medication           | Sig                  | Dispensed | Refills | Start    | End Date |
|                      |                      |           |         | Date     |          |
+----------------------+----------------------+-----------+---------+----------+----------+
|   acetaminophen 325  | Take by mouth.       |           | 0       |          |          |
| mg oral tablet       |                      |           |         |          |          |
+----------------------+----------------------+-----------+---------+----------+----------+
|   ascorbic acid      | Take 500 mg by mouth |           | 0       |          |          |
| (vitamin C) 500 mg   |  once daily.         |           |         |          |          |
| oral tablet          |                      |           |         |          |          |
+----------------------+----------------------+-----------+---------+----------+----------+
|   furosemide 40 mg   | Take by mouth.       |           | 0       |          |          |
| oral tablet          |                      |           |         |          |          |
+----------------------+----------------------+-----------+---------+----------+----------+
|                      | Take 2 tablets by    |           | 0       |          |          |
| gluc/chnd/om3/dha/ep | mouth once daily.    |           |         |          |          |
| a/fish/str           |                      |           |         |          |          |
| (GLUCOSAMINE         |                      |           |         |          |          |
| CHONDROITIN PLUS     |                      |           |         |          |          |
| ORAL)                |                      |           |         |          |          |
+----------------------+----------------------+-----------+---------+----------+----------+
|   levothyroxine 100  | Take by mouth.       |           | 0       |          |          |
| mcg oral tablet      |                      |           |         |          |          |
+----------------------+----------------------+-----------+---------+----------+----------+
|   losartan 100 mg    | Take 100 mg by mouth |           | 0       |          |          |
| oral tablet          |  once daily.         |           |         |          |          |
+----------------------+----------------------+-----------+---------+----------+----------+
|   MAGNESIUM CHLORIDE | Take by mouth.       |           | 0       |          |          |
|  ORAL                |                      |           |         |          |          |
+----------------------+----------------------+-----------+---------+----------+----------+
|   metFORMIN    | Take by mouth.       |           | 0       |          |          |
| mg oral tablet,ER    |                      |           |         |          |          |
| alexis.retention 24 hr |                      |           |         |          |          |
+----------------------+----------------------+-----------+---------+----------+----------+
 
|   metoprolol         | Take 100 mg by mouth |           | 0       | / |          |
| succinate 200 mg     |  once daily.         |           |         | 18       |          |
| oral tablet extended |                      |           |         |          |          |
|  release 24 hr       |                      |           |         |          |          |
+----------------------+----------------------+-----------+---------+----------+----------+
|   potassium chloride | Take by mouth.       |           | 0       |          |          |
|  SR 20 mEq oral      |                      |           |         |          |          |
| tablet,ER            |                      |           |         |          |          |
| particles/crystals   |                      |           |         |          |          |
+----------------------+----------------------+-----------+---------+----------+----------+
|   simvastatin 20 mg  | Take by mouth once   |           | 0       |          |          |
| oral tablet          | daily in the         |           |         |          |          |
|                      | evening.             |           |         |          |          |
+----------------------+----------------------+-----------+---------+----------+----------+
|   warfarin 5 mg oral | Take by mouth.       |           | 0       |          |          |
|  tablet              |                      |           |         |          |          |
+----------------------+----------------------+-----------+---------+----------+----------+
 documented as of this encounter
 
 Plan of Treatment
 
 
+----------------------+------+--------+----------------------+------------+
| Name                 | Type | Priori | Associated Diagnoses | Date/Time  |
|                      |      | ty     |                      |            |
+----------------------+------+--------+----------------------+------------+
| 6-MINUTE WALK TEST - | ECG  | Routin |   Mitral valve       | 2018 |
|  ECG                 |      | e      | insufficiency,       |            |
|                      |      |        | unspecified etiology |            |
+----------------------+------+--------+----------------------+------------+
 documented as of this encounter
 
 Visit Diagnoses
 
 
+----------------------------------------------------+
| Diagnosis                                          |
+----------------------------------------------------+
|   Mitral valve insufficiency, unspecified etiology |
+----------------------------------------------------+
 documented in this encounter

## 2020-01-01 NOTE — XMS
Encounter Summary
  Created on: 2020
 
 Vonnie Celaya
 External Reference #: [id number]
 : 32
 Sex: Female
 
 Demographics
 
 
+-----------------------+---------------------------+
| Address               | [address]            |
|                       | [place]  [address] |
+-----------------------+---------------------------+
| Home Phone            | [phone]           |
+-----------------------+---------------------------+
| Preferred Language    | Unknown                   |
+-----------------------+---------------------------+
| Marital Status        |                    |
+-----------------------+---------------------------+
| Mandaeism Affiliation | Unknown                   |
+-----------------------+---------------------------+
| Race                  | Unknown                   |
+-----------------------+---------------------------+
| Ethnic Group          | Unknown                   |
+-----------------------+---------------------------+
 
 
 Author
 
 
+--------------+--------------------------------------------+
| Author       | [facility]  |
|              | and [place]                                |
+--------------+--------------------------------------------+
| Organization | [facility]  |
|              | and [place]                                |
+--------------+--------------------------------------------+
| Address      | Unknown                                    |
+--------------+--------------------------------------------+
| Phone        | Unavailable                                |
+--------------+--------------------------------------------+
 
 
 
 Support
 
 
+--------------+--------------+---------+-----------------+
| Name         | Relationship | Address | Phone           |
+--------------+--------------+---------+-----------------+
| Lauryn Leroy | ECON         | Unknown | [phone] |
+--------------+--------------+---------+-----------------+
 
 
 
 Care Team Providers
 
 
 
+------------------------+------+-----------------+
| Care Team Member Name  | Role | Phone           |
+------------------------+------+-----------------+
| Jean Bermudez MD | PCP  | [phone] |
+------------------------+------+-----------------+
 
 
 
 Encounter Details
 
 
+--------+-------------+----------------------+----------------------+-------------+
| Date   | Type        | Department           | Care Team            | Description |
+--------+-------------+----------------------+----------------------+-------------+
| 10/19/ | Orders Only |   [facility]      |   Varun Mitchell,   |             |
| 2018   |             | CARDIOLOGY TY  | MD [address] |             |
|        |             |  [address]    |   [address],   |             |
|        |             | [place], [place]         | [place] [address]             |             |
|        |             | [address]           | [phone]         |             |
|        |             | [phone]         | [phone] (Fax)   |             |
+--------+-------------+----------------------+----------------------+-------------+
 
 
 
 Social History
 
 
+----------------+-------+-----------+--------+------+
| Tobacco Use    | Types | Packs/Day | Years  | Date |
|                |       |           | Used   |      |
+----------------+-------+-----------+--------+------+
| Never Assessed |       |           |        |      |
+----------------+-------+-----------+--------+------+
 
 
 
+------------------+---------------+
| Sex Assigned at  | Date Recorded |
| Birth            |               |
+------------------+---------------+
| Not on file      |               |
+------------------+---------------+
 
 
 
+----------------+-------------+-------------+
| Job Start Date | Occupation  | Industry    |
+----------------+-------------+-------------+
| Not on file    | Not on file | Not on file |
+----------------+-------------+-------------+
 
 
 
+----------------+--------------+------------+
| Travel History | Travel Start | Travel End |
+----------------+--------------+------------+
 
 
 
 
+-------------------------------------+
| No recent travel history available. |
+-------------------------------------+
 documented as of this encounter
 
 Plan of Treatment
 
 
+--------+---------+------------+----------------------+-------------+
| Date   | Type    | Specialty  | Care Team            | Description |
+--------+---------+------------+----------------------+-------------+
| / | Office  | Cardiology |   [facility]    |             |
| 2020   | Visit   |            | [address]      |             |
|        |         |            | [address]   |             |
|        |         |            | [place] [address]   |             |
|        |         |            | [phone]         |             |
|        |         |            | [phone] (Fax)   |             |
+--------+---------+------------+----------------------+-------------+
 documented as of this encounter
 
 Visit Diagnoses
 Not on filedocumented in this encounter

## 2020-01-01 NOTE — XMS
Encounter Summary
  Created on: 2020
 
 Vonnie Celaya
 External Reference #: [id number]
 : 32
 Sex: Female
 
 Demographics
 
 
+-----------------------+---------------------------+
| Address               | [address]            |
|                       | [place]  [address] |
+-----------------------+---------------------------+
| Home Phone            | [phone]           |
+-----------------------+---------------------------+
| Preferred Language    | Unknown                   |
+-----------------------+---------------------------+
| Marital Status        |                    |
+-----------------------+---------------------------+
| [employer] Affiliation | Unknown                   |
+-----------------------+---------------------------+
| Race                  | Unknown                   |
+-----------------------+---------------------------+
| Ethnic Group          | Unknown                   |
+-----------------------+---------------------------+
 
 
 Author
 
 
+--------------+--------------------------------------------+
| Author       | [facility]  |
|              | and [place]                                |
+--------------+--------------------------------------------+
| Organization | [facility]  |
|              | and [place]                                |
+--------------+--------------------------------------------+
| Address      | Unknown                                    |
+--------------+--------------------------------------------+
| Phone        | Unavailable                                |
+--------------+--------------------------------------------+
 
 
 
 Support
 
 
+--------------+--------------+---------+-----------------+
| Name         | Relationship | Address | Phone           |
+--------------+--------------+---------+-----------------+
| Lauryn Leroy | ECON         | Unknown | [phone] |
+--------------+--------------+---------+-----------------+
 
 
 
 Care Team Providers
 
 
 
+------------------------+------+-----------------+
| Care Team Member Name  | Role | Phone           |
+------------------------+------+-----------------+
| Jean Bermudez MD | PCP  | [phone] |
+------------------------+------+-----------------+
 
 
 
 Encounter Details
 
 
+--------+-------------+----------------------+--------------------+-------------+
| Date   | Type        | Department           | Care Team          | Description |
+--------+-------------+----------------------+--------------------+-------------+
| / | Orders Only |   [facility]     |   Conversion       |             |
| 2018   |             | CONVERSION DEP  [address]  | Transaction,       |             |
|        |             | [address]           | Provider Unknown   |             |
|        |             | [address]         | [phone]       |             |
|        |             | [address]           | [phone] (Fax) |             |
|        |             | [phone]         |                    |             |
+--------+-------------+----------------------+--------------------+-------------+
 
 
 
 Social History
 
 
+----------------+-------+-----------+--------+------+
| Tobacco Use    | Types | Packs/Day | Years  | Date |
|                |       |           | Used   |      |
+----------------+-------+-----------+--------+------+
| Never Assessed |       |           |        |      |
+----------------+-------+-----------+--------+------+
 
 
 
+------------------+---------------+
| Sex Assigned at  | Date Recorded |
| Birth            |               |
+------------------+---------------+
| Not on file      |               |
+------------------+---------------+
 
 
 
+----------------+-------------+-------------+
| Job Start Date | Occupation  | Industry    |
+----------------+-------------+-------------+
| Not on file    | Not on file | Not on file |
+----------------+-------------+-------------+
 
 
 
+----------------+--------------+------------+
| Travel History | Travel Start | Travel End |
+----------------+--------------+------------+
 
 
 
 
+-------------------------------------+
| No recent travel history available. |
+-------------------------------------+
 documented as of this encounter
 
 Plan of Treatment
 
 
+--------+---------+------------+----------------------+-------------+
| Date   | Type    | Specialty  | Care Team            | Description |
+--------+---------+------------+----------------------+-------------+
| / | Office  | Cardiology |   [facility]    |             |
| 2020   | Visit   |            | [address]      |             |
|        |         |            | [address]   |             |
|        |         |            | [place] [address]   |             |
|        |         |            | [phone]         |             |
|        |         |            | [phone] (Fax)   |             |
+--------+---------+------------+----------------------+-------------+
 documented as of this encounter
 
 Visit Diagnoses
 Not on filedocumented in this encounter

## 2020-01-01 NOTE — XMS
Encounter Summary
  Created on: 2020
 
 Vonnie Celaya
 External Reference #: [id number]
 : 32
 Sex: Female
 
 Demographics
 
 
+-----------------------+------------------------+
| Address               | [address]         |
|                       | [place]  [address]   |
+-----------------------+------------------------+
| Home Phone            | [phone]        |
+-----------------------+------------------------+
| Preferred Language    | Unknown                |
+-----------------------+------------------------+
| Marital Status        | Single                 |
+-----------------------+------------------------+
| Mormonism Affiliation | NON                    |
+-----------------------+------------------------+
| Race                  | White                  |
+-----------------------+------------------------+
| Ethnic Group          | Not  or  |
+-----------------------+------------------------+
 
 
 Author
 
 
+--------------+------------------------------+
| Author       | [facility] |
+--------------+------------------------------+
| Organization | [facility] |
+--------------+------------------------------+
| Address      | Unknown                      |
+--------------+------------------------------+
| Phone        | Unavailable                  |
+--------------+------------------------------+
 
 
 
 Support
 
 
+--------------+--------------+---------+-----------------+
| Name         | Relationship | Address | Phone           |
+--------------+--------------+---------+-----------------+
| Lauryn Leroy | ECON         | Unknown | [phone] |
+--------------+--------------+---------+-----------------+
 
 
 
 Care Team Providers
 
 
 
+------------------------+------+-----------------+
| Care Team Member Name  | Role | Phone           |
+------------------------+------+-----------------+
| Jean Bermudez MD | PCP  | [phone] |
+------------------------+------+-----------------+
 
 
 
 Encounter Details
 
 
+--------+-------------+----------------------+----------------------+-------------+
| Date   | Type        | Department           | Care Team            | Description |
+--------+-------------+----------------------+----------------------+-------------+
| / | Documentati |   Cardiology at [facility]  |   Jame Wilkins  |             |
| 2018   | on          |  [address]  [address]  |             |
|        |             | Mailcode: [address]       | Ave  [place], [place]    |             |
|        |             | [facility]    | [address]           |             |
|        |             | and [place],         | [phone]         |             |
|        |             |       | [phone] (Fax)   |             |
|        |             | [place], [place]  |                      |             |
|        |             | [address]           |                      |             |
|        |             | [phone]         |                      |             |
+--------+-------------+----------------------+----------------------+-------------+
 
 
 
 Social History
 
 
+--------------+-------+-----------+--------+------+
| Tobacco Use  | Types | Packs/Day | Years  | Date |
|              |       |           | Used   |      |
+--------------+-------+-----------+--------+------+
| Never Smoker |       |           |        |      |
+--------------+-------+-----------+--------+------+
 
 
 
+---------------------+---+---+---+
| Smokeless Tobacco:  |   |   |   |
| Never Used          |   |   |   |
+---------------------+---+---+---+
 
 
 
+-------------+-------------+---------+----------+
| Alcohol Use | Drinks/Week | oz/Week | Comments |
+-------------+-------------+---------+----------+
| No          |             |         |          |
+-------------+-------------+---------+----------+
 
 
 
+------------------+---------------+
| Sex Assigned at  | Date Recorded |
| Birth            |               |
+------------------+---------------+
| Not on file      |               |
+------------------+---------------+
 
 
 
 
+----------------+-------------+-------------+
| Job Start Date | Occupation  | Industry    |
+----------------+-------------+-------------+
| Not on file    | Not on file | Not on file |
+----------------+-------------+-------------+
 
 
 
+----------------+--------------+------------+
| Travel History | Travel Start | Travel End |
+----------------+--------------+------------+
 
 
 
+-------------------------------------+
| No recent travel history available. |
+-------------------------------------+
 documented as of this encounter
 
 Plan of Treatment
 Not on filedocumented as of this encounter
 
 Visit Diagnoses
 Not on filedocumented in this encounter

## 2020-01-01 NOTE — XMS
Encounter Summary
  Created on: 2020
 
 Vonnie Celaya
 External Reference #: [id number]
 : 32
 Sex: Female
 
 Demographics
 
 
+-----------------------+------------------------+
| Address               | [address]         |
|                       | [place]  [address]   |
+-----------------------+------------------------+
| Home Phone            | [phone]        |
+-----------------------+------------------------+
| Preferred Language    | Unknown                |
+-----------------------+------------------------+
| Marital Status        | Single                 |
+-----------------------+------------------------+
| Lutheran Affiliation | NON                    |
+-----------------------+------------------------+
| Race                  | White                  |
+-----------------------+------------------------+
| Ethnic Group          | Not  or  |
+-----------------------+------------------------+
 
 
 Author
 
 
+--------------+------------------------------+
| Author       | [facility] |
+--------------+------------------------------+
| Organization | [facility] |
+--------------+------------------------------+
| Address      | Unknown                      |
+--------------+------------------------------+
| Phone        | Unavailable                  |
+--------------+------------------------------+
 
 
 
 Support
 
 
+--------------+--------------+---------+-----------------+
| Name         | Relationship | Address | Phone           |
+--------------+--------------+---------+-----------------+
| Lauryn Leroy | ECON         | Unknown | [phone] |
+--------------+--------------+---------+-----------------+
 
 
 
 Care Team Providers
 
 
 
+-----------------------+------+-------------+
| Care Team Member Name | Role | Phone       |
+-----------------------+------+-------------+
| No Pcp Per Patient    | PCP  | Unavailable |
+-----------------------+------+-------------+
 
 
 
 Reason for Visit
 
 
+------------------+------------+
| Reason           | Comments   |
+------------------+------------+
| Medical Records  | [facility] review |
| Review           |            |
+------------------+------------+
 
 
 
 Encounter Details
 
 
+--------+----------+----------------------+--------------+---------------------+
| Date   | Type     | Department           | Care Team    | Description         |
+--------+----------+----------------------+--------------+---------------------+
| 08/15/ | Abstract |   Cardiology at [facility]  |   Unknown  . | Medical Records     |
|    |          |  [address]    |              | Review ([facility] review) |
|        |          | Mailcode: [address]       |              |                     |
|        |          | [facility]    |              |                     |
|        |          | and [facility],         |              |                     |
|        |          | Building       |              |                     |
|        |          | Floor  [place], [place]  |              |                     |
|        |          | [address]           |              |                     |
|        |          | [phone]         |              |                     |
+--------+----------+----------------------+--------------+---------------------+
 
 
 
 Social History
 
 
+----------------+-------+-----------+--------+------+
| Tobacco Use    | Types | Packs/Day | Years  | Date |
|                |       |           | Used   |      |
+----------------+-------+-----------+--------+------+
| Never Assessed |       |           |        |      |
+----------------+-------+-----------+--------+------+
 
 
 
+------------------+---------------+
| Sex Assigned at  | Date Recorded |
| Birth            |               |
+------------------+---------------+
| Not on file      |               |
+------------------+---------------+
 
 
 
 
+----------------+-------------+-------------+
| Job Start Date | Occupation  | Industry    |
+----------------+-------------+-------------+
| Not on file    | Not on file | Not on file |
+----------------+-------------+-------------+
 
 
 
+----------------+--------------+------------+
| Travel History | Travel Start | Travel End |
+----------------+--------------+------------+
 
 
 
+-------------------------------------+
| No recent travel history available. |
+-------------------------------------+
 documented as of this encounter
 
 Progress Krista Clark - 08/15/2018 10:26 AM PDTAppt info sent in mail
 Electronically signed by Krista Haywood at 2018 10:46 AM Krista Henry - 08/15/2
018 10:26 AM PDTFormatting of this note might be different from the original.
 Heart Valve Tier 1 Review 
 Patient Name: Vonnie Celaya
 MRN: [id number]
 Referring Provider: Varun Mitchell
 Designated Family/Support Person Name:
 Contact Number:
 
 Data Elements / Results Needed Prior Medical Review Required for Medical Review  Required f
or Clinic Received
 (Y / N) Records / Notes
 (Care Everywhere / Abstract Enc) Imaging 
 (IMPAX / Disc / Not Available)  Comments 
 ECHO/TTE
 (if performed within the last two years)  Report Report / Images yes Care Everywhere  IMPAX
 18
 [facility] 
 LABS    CBC, BMP
 (from last 6 months)  Results  Results  no    
 CT Scan    Chest / Abd / Pelvis 
 (if performed within last two years)  Report Report / Images  no    
 Stress Tests (if applicable)  Report  no    
 Provider Notes - Cardiology / Cardiac Surgeon 
 (Last two years)  Notes Notes  yes Abstract Encounter   
 Provider Note    PCP 
 (Last note)  Note Note no    
 PFTs
 (if performed within the last two years)  Report  Report  no    
 CATH 
 (if performed within the last two years)   Report / Images no    
 JENIFFER
 (if performed within the last two years)   no    
 Operative Notes
 (Most recent thoracic, cardiac, vascular surgeries, breast implants)   no    
 
 Additional Comments:
 
 Heart Valve Tier 2 Review 
 
 
 Patient Name: Vonnie Celaya
 MRN: [id number]
 Referring Provider: 
 Designated Family/Support Person Name:
 Contact Number:
 Refer to Tier 3 No
 If Yes, rationale: 
 
 Tests to Schedule  Yes / No   Comments 
 ECHO - TTE Y Pre HVC 
 ECHO - JENIFFER Y Post HVC 
 LABS    CBC, BMP Y Pre HVC 
 CT Scan    Chest / Abd / Pelvis  Y  Post HVC 
 Dobutamine Stress Echo    
 PFTs   
 CATH  Y Post HVC 
 6 Min Walk Test Y Pre-HVC 
 
 For Tier 2 Review, place orders immediately
 For Tier 3 Review, place orders at time of HVC Attending response
 
 This data is from outside sources, and has not been individually verified.
 
  
 Heart Valve Tier 3 Response:
 
 Since coming from [facility], will need to consolidate trips if possible
 
 Would like to see on a Tuesday, have baseline testing, 6 min walk, TTE that AM.  Gated CT o
f chest that afternoon. 
 Would have her stay the night for JENIFFER on Wednesday. 
 
 Would then need to come back for interventional HVC on a Tuesday afternoon but be pre-booke
d for angiogram Tuesday morning prior to Int HVC that afternoon
 Would have to come over Monday night for that appointment likely 
 
 May need surgical MVR based on imaging of severe mitral regurgitation and moderate to sever
e mitral annular calcification vs TMVR if less MAC or Valve in MAC if severe MAC.
 
 Please edouard patient to see if they are up for all that testing.
 
 Will place orders once confirmed. 
 
 Jame Wilkins MD
 
 [facility] [place]
 
 Electronically signed by Jame Wilkins MD at 2018 12:38 PM PDTdocumented in thi
s encounter
 
 Plan of Treatment
 Not on filedocumented as of this encounter
 
 Visit Diagnoses
 Not on filedocumented in this encounter

## 2020-01-01 NOTE — XMS
Encounter Summary
  Created on: 2020
 
 Vonnie Celaya
 External Reference #: [id number]
 : 32
 Sex: Female
 
 Demographics
 
 
+-----------------------+---------------------------+
| Address               | [address]            |
|                       | [place]  [address] |
+-----------------------+---------------------------+
| Home Phone            | [phone]           |
+-----------------------+---------------------------+
| Preferred Language    | Unknown                   |
+-----------------------+---------------------------+
| Marital Status        |                    |
+-----------------------+---------------------------+
| Synagogue Affiliation | Unknown                   |
+-----------------------+---------------------------+
| Race                  | Unknown                   |
+-----------------------+---------------------------+
| Ethnic Group          | Unknown                   |
+-----------------------+---------------------------+
 
 
 Author
 
 
+--------------+--------------------------------------------+
| Author       | [facility]  |
|              | [facility] [place]                                |
+--------------+--------------------------------------------+
| Organization | [facility]  |
|              | [facility] [place]                                |
+--------------+--------------------------------------------+
| Address      | Unknown                                    |
+--------------+--------------------------------------------+
| Phone        | Unavailable                                |
+--------------+--------------------------------------------+
 
 
 
 Support
 
 
+--------------+--------------+---------+-----------------+
| Name         | Relationship | Address | Phone           |
+--------------+--------------+---------+-----------------+
| Lauryn Leroy | ECON         | Unknown | [phone] |
+--------------+--------------+---------+-----------------+
 
 
 
 Care Team Providers
 
 
 
+------------------------+------+-----------------+
| Care Team Member Name  | Role | Phone           |
+------------------------+------+-----------------+
| Jean Bermudez MD | PCP  | [phone] |
+------------------------+------+-----------------+
 
 
 
 Encounter Details
 
 
+--------+-------------+----------------------+--------------------+-------------+
| Date   | Type        | Department           | Care Team          | Description |
+--------+-------------+----------------------+--------------------+-------------+
| / | Orders Only |   [facility]     |   Conversion       |             |
| 2018   |             | CONVERSION DEP  888  | Transaction,       |             |
|        |             | [address]           | Provider Unknown   |             |
|        |             | [place]         | [phone]       |             |
|        |             | [address]           | [phone] (Fax) |             |
|        |             | [phone]         |                    |             |
+--------+-------------+----------------------+--------------------+-------------+
 
 
 
 Social History
 
 
+----------------+-------+-----------+--------+------+
| Tobacco Use    | Types | Packs/Day | Years  | Date |
|                |       |           | Used   |      |
+----------------+-------+-----------+--------+------+
| Never Assessed |       |           |        |      |
+----------------+-------+-----------+--------+------+
 
 
 
+------------------+---------------+
| Sex Assigned at  | Date Recorded |
| Birth            |               |
+------------------+---------------+
| Not on file      |               |
+------------------+---------------+
 
 
 
+----------------+-------------+-------------+
| Job Start Date | Occupation  | Industry    |
+----------------+-------------+-------------+
| Not on file    | Not on file | Not on file |
+----------------+-------------+-------------+
 
 
 
+----------------+--------------+------------+
| Travel History | Travel Start | Travel End |
+----------------+--------------+------------+
 
 
 
 
+-------------------------------------+
| No recent travel history available. |
+-------------------------------------+
 documented as of this encounter
 
 Plan of Treatment
 
 
+--------+---------+------------+----------------------+-------------+
| Date   | Type    | Specialty  | Care Team            | Description |
+--------+---------+------------+----------------------+-------------+
| / | Office  | Cardiology |   [facility]    |             |
| 2020   | Visit   |            | [address]      |             |
|        |         |            | [address]   |             |
|        |         |            | [place] [address]   |             |
|        |         |            | [phone]         |             |
|        |         |            | [phone] (Fax)   |             |
+--------+---------+------------+----------------------+-------------+
 documented as of this encounter
 
 Visit Diagnoses
 Not on filedocumented in this encounter

## 2020-01-01 NOTE — XMS
Encounter Summary
  Created on: 2020
 
 Vonnie Celaya
 External Reference #: [id number]
 : 32
 Sex: Female
 
 Demographics
 
 
+-----------------------+------------------------+
| Address               | [address]         |
|                       | [place]  [address]   |
+-----------------------+------------------------+
| Home Phone            | [phone]        |
+-----------------------+------------------------+
| Preferred Language    | Unknown                |
+-----------------------+------------------------+
| Marital Status        | Single                 |
+-----------------------+------------------------+
| Nondenominational Affiliation | NON                    |
+-----------------------+------------------------+
| Race                  | White                  |
+-----------------------+------------------------+
| Ethnic Group          | Not  or  |
+-----------------------+------------------------+
 
 
 Author
 
 
+--------------+------------------------------+
| Author       | [facility] |
+--------------+------------------------------+
| Organization | [facility] |
+--------------+------------------------------+
| Address      | Unknown                      |
+--------------+------------------------------+
| Phone        | Unavailable                  |
+--------------+------------------------------+
 
 
 
 Support
 
 
+--------------+--------------+---------+-----------------+
| Name         | Relationship | Address | Phone           |
+--------------+--------------+---------+-----------------+
| Lauryn Leroy | ECON         | Unknown | [phone] |
+--------------+--------------+---------+-----------------+
 
 
 
 Care Team Providers
 
 
 
+------------------------+------+-----------------+
| Care Team Member Name  | Role | Phone           |
+------------------------+------+-----------------+
| Jean Bermudez MD | PCP  | [phone] |
+------------------------+------+-----------------+
 
 
 
 Encounter Details
 
 
+--------+------------+----------------------+-----------+-------------+
| Date   | Type       | Department           | Care Team | Description |
+--------+------------+----------------------+-----------+-------------+
| / | Procedure  |   Diagnostic Imaging |           |             |
|    | Pass       |  Services at [facility]     |           |             |
|        |            | [address]  |           |             |
|        |            | [place]  Mailcode:   |           |             |
|        |            | [address]  [facility]  |           |             |
|        |            |  [place], [place]        |           |             |
|        |            | [address]           |           |             |
|        |            | [phone]         |           |             |
+--------+------------+----------------------+-----------+-------------+
 
 
 
 Social History
 
 
+----------------+-------+-----------+--------+------+
| Tobacco Use    | Types | Packs/Day | Years  | Date |
|                |       |           | Used   |      |
+----------------+-------+-----------+--------+------+
| Never Assessed |       |           |        |      |
+----------------+-------+-----------+--------+------+
 
 
 
+------------------+---------------+
| Sex Assigned at  | Date Recorded |
| Birth            |               |
+------------------+---------------+
| Not on file      |               |
+------------------+---------------+
 
 
 
+----------------+-------------+-------------+
| Job Start Date | Occupation  | Industry    |
+----------------+-------------+-------------+
| Not on file    | Not on file | Not on file |
+----------------+-------------+-------------+
 
 
 
+----------------+--------------+------------+
| Travel History | Travel Start | Travel End |
+----------------+--------------+------------+
 
 
 
 
+-------------------------------------+
| No recent travel history available. |
+-------------------------------------+
 documented as of this encounter
 
 Plan of Treatment
 Not on filedocumented as of this encounter
 
 Visit Diagnoses
 Not on filedocumented in this encounter

## 2020-01-01 NOTE — XMS
Encounter Summary
  Created on: 2020
 
 Vonnie Celaya
 External Reference #: [id number]
 : 32
 Sex: Female
 
 Demographics
 
 
+-----------------------+------------------------+
| Address               | [address]         |
|                       | [place]  [address]   |
+-----------------------+------------------------+
| Home Phone            | [phone]        |
+-----------------------+------------------------+
| Preferred Language    | Unknown                |
+-----------------------+------------------------+
| Marital Status        | Single                 |
+-----------------------+------------------------+
| Cheondoism Affiliation | NON                    |
+-----------------------+------------------------+
| Race                  | White                  |
+-----------------------+------------------------+
| Ethnic Group          | Not  or  |
+-----------------------+------------------------+
 
 
 Author
 
 
+--------------+------------------------------+
| Author       | [facility] |
+--------------+------------------------------+
| Organization | [facility] |
+--------------+------------------------------+
| Address      | Unknown                      |
+--------------+------------------------------+
| Phone        | Unavailable                  |
+--------------+------------------------------+
 
 
 
 Support
 
 
+--------------+--------------+---------+-----------------+
| Name         | Relationship | Address | Phone           |
+--------------+--------------+---------+-----------------+
| Lauryn Leroy | ECON         | Unknown | [phone] |
+--------------+--------------+---------+-----------------+
 
 
 
 Care Team Providers
 
 
 
+------------------------+------+-----------------+
| Care Team Member Name  | Role | Phone           |
+------------------------+------+-----------------+
| Jean Bermudez MD | PCP  | [phone] |
+------------------------+------+-----------------+
 
 
 
 Encounter Details
 
 
+--------+-------------+----------------------+----------------------+-------------+
| Date   | Type        | Department           | Care Team            | Description |
+--------+-------------+----------------------+----------------------+-------------+
| 10/09/ | Documentati |   Cardiology at [facility]  |   Katelin Riddle |             |
| 2018   | on          |  [address]    |  [place]  [address]    |             |
|        |             | Mailcode: [id number]       | [address]  [place],  |             |
|        |             | [facility]    | [place] [address]        |             |
|        |             | [facility],         | [phone]         |             |
|        |             |       | [phone] (Fax)   |             |
|        |             | [place], [place]  |                      |             |
|        |             | [address]           |                      |             |
|        |             | [phone]         |                      |             |
+--------+-------------+----------------------+----------------------+-------------+
 
 
 
 Social History
 
 
+--------------+-------+-----------+--------+------+
| Tobacco Use  | Types | Packs/Day | Years  | Date |
|              |       |           | Used   |      |
+--------------+-------+-----------+--------+------+
| Never Smoker |       |           |        |      |
+--------------+-------+-----------+--------+------+
 
 
 
+---------------------+---+---+---+
| Smokeless Tobacco:  |   |   |   |
| Never Used          |   |   |   |
+---------------------+---+---+---+
 
 
 
+-------------+-------------+---------+----------+
| Alcohol Use | Drinks/Week | oz/Week | Comments |
+-------------+-------------+---------+----------+
| No          |             |         |          |
+-------------+-------------+---------+----------+
 
 
 
+------------------+---------------+
| Sex Assigned at  | Date Recorded |
| Birth            |               |
+------------------+---------------+
| Not on file      |               |
+------------------+---------------+
 
 
 
 
+----------------+-------------+-------------+
| Job Start Date | Occupation  | Industry    |
+----------------+-------------+-------------+
| Not on file    | Not on file | Not on file |
+----------------+-------------+-------------+
 
 
 
+----------------+--------------+------------+
| Travel History | Travel Start | Travel End |
+----------------+--------------+------------+
 
 
 
+-------------------------------------+
| No recent travel history available. |
+-------------------------------------+
 documented as of this encounter
 
 Plan of Treatment
 Not on filedocumented as of this encounter
 
 Visit Diagnoses
 Not on filedocumented in this encounter

## 2020-01-01 NOTE — XMS
Encounter Summary
  Created on: 2020
 
 Vonnie Celaya
 External Reference #: [id number]
 : 32
 Sex: Female
 
 Demographics
 
 
+-----------------------+------------------------+
| Address               | [address]         |
|                       | [place]  [address]   |
+-----------------------+------------------------+
| Home Phone            | [phone]        |
+-----------------------+------------------------+
| Preferred Language    | Unknown                |
+-----------------------+------------------------+
| Marital Status        | Single                 |
+-----------------------+------------------------+
| [employer] Affiliation | NON                    |
+-----------------------+------------------------+
| Race                  | White                  |
+-----------------------+------------------------+
| Ethnic Group          | Not  or  |
+-----------------------+------------------------+
 
 
 Author
 
 
+--------------+------------------------------+
| Author       | [facility] |
+--------------+------------------------------+
| Organization | [facility] |
+--------------+------------------------------+
| Address      | Unknown                      |
+--------------+------------------------------+
| Phone        | Unavailable                  |
+--------------+------------------------------+
 
 
 
 Support
 
 
+--------------+--------------+---------+-----------------+
| Name         | Relationship | Address | Phone           |
+--------------+--------------+---------+-----------------+
| Lauryn Leroy | ECON         | Unknown | [phone] |
+--------------+--------------+---------+-----------------+
 
 
 
 Care Team Providers
 
 
 
+------------------------+------+-----------------+
| Care Team Member Name  | Role | Phone           |
+------------------------+------+-----------------+
| Jean Bermudez MD | PCP  | [phone] |
+------------------------+------+-----------------+
 
 
 
 Reason for Referral
 Diagnostic Testing (Routine)
 
+-------------+--------+------------+--------------+--------------+--------------+
| Status      | Reason | Specialty  | Diagnoses /  | Referred By  | Referred To  |
|             |        |            | Procedures   | Contact      | Contact      |
+-------------+--------+------------+--------------+--------------+--------------+
| New Request |        | Cardiology |   Diagnoses  |   Car Valve  |              |
|             |        |            |              | [address]   |              |
|             |        |            | Non-rheumati | [address]  |              |
|             |        |            | c mitral     |  Mailcode:   |              |
|             |        |            | regurgitatio | [facility] |              |
|             |        |            | n            |  for Health  |              |
|             |        |            | Procedures   | and Healing, |              |
|             |        |            | TRANSTHORACI |  [address], |              |
|             |        |            | C            |  9th Floor   |              |
|             |        |            | ECHOCARDIOGR | [place], [place] |              |
|             |        |            | AM, ADULT    |  [address]  |              |
|             |        |            |              |  Phone:      |              |
|             |        |            |              | [phone] |              |
|             |        |            |              |   Fax:       |              |
|             |        |            |              | [phone] |              |
+-------------+--------+------------+--------------+--------------+--------------+
 
 
 Diagnostic Testing (Routine)
 
+--------+--------+------------+--------------+--------------+---------------+
| Status | Reason | Specialty  | Diagnoses /  | Referred By  | Referred To   |
|        |        |            | Procedures   | Contact      | Contact       |
+--------+--------+------------+--------------+--------------+---------------+
| Closed |        | Cardiology |   Diagnoses  |   Car Valve  |   Car Echo    |
|        |        |            |              | [address]   | [address]  |
|        |        |            | Non-rheumati | [address]  | [address] |
|        |        |            | c mitral     |  Mailcode:   |   Mailcode:   |
|        |        |            | regurgitatio | [facility] | [id number]  [facility]    |
|        |        |            | n            |  for Health  | [facility]  |
|        |        |            | Procedures   | and Healing, | Building      |
|        |        |            | TRANSESOPHAG |  [address], | [place], [place]  |
|        |        |            | EAL          |  [address]th Floor   | [address]    |
|        |        |            | ECHOCARDIOGR | [place], [place] | Phone:        |
|        |        |            | AM, ADULT    |  [address]  | [phone]  |
|        |        |            | AR ECHO      |  Phone:      |               |
|        |        |            | HEART,TRANSE | [phone] |               |
|        |        |            | [place]OD[place] |   Fax:       |               |
|        |        |            | MPLETE  AR   | [phone] |               |
|        |        |            | DOPPLER ECHO |              |               |
|        |        |            |              |              |               |
|        |        |            | HEART,LIMITE |              |               |
|        |        |            | D,F/U  AR    |              |               |
|        |        |            | DOPPLER      |              |               |
|        |        |            | COLOR FLOW   |              |               |
 
|        |        |            | VELOCITY MAP |              |               |
|        |        |            |   AR         |              |               |
|        |        |            | ECHOCARDIOGR |              |               |
|        |        |            | APHY,        |              |               |
|        |        |            | TRANSESOPHAG |              |               |
|        |        |            | EAL (JENIFFER)    |              |               |
|        |        |            | FOR GUIDANCE |              |               |
+--------+--------+------------+--------------+--------------+---------------+
 
 
 
 
 Reason for Visit
 
 
+--------------------+--------------------+
| Reason             | Comments           |
+--------------------+--------------------+
| Surgery Scheduling | MitraClip 18 |
+--------------------+--------------------+
 
 
 
 Encounter Details
 
 
+--------+-----------+----------------------+----------------------+----------------------+
| Date   | Type      | Department           | Care Team            | Description          |
+--------+-----------+----------------------+----------------------+----------------------+
| / | Telephone |   Cardiology at [facility]  |   Dominga Arambula,  | Surgery Scheduling   |
| 2018   |           |  [address]    | RN  [address]      | ([facility] |
|        |           | Mailcode: [facility]       | [address]      |                      |
|        |           | [facility]    | [place], [place]         |                      |
|        |           | and [place],         | [address]           |                      |
|        |           | [facility],       |                      |                      |
|        |           | [place], [place]  |                      |                      |
|        |           | [address]           |                      |                      |
|        |           | [phone]         |                      |                      |
+--------+-----------+----------------------+----------------------+----------------------+
 
 
 
 Social History
 
 
+--------------+-------+-----------+--------+------+
| Tobacco Use  | Types | Packs/Day | Years  | Date |
|              |       |           | Used   |      |
+--------------+-------+-----------+--------+------+
| Never Smoker |       |           |        |      |
+--------------+-------+-----------+--------+------+
 
 
 
+---------------------+---+---+---+
| Smokeless Tobacco:  |   |   |   |
| Never Used          |   |   |   |
+---------------------+---+---+---+
 
 
 
 
+-------------+-------------+---------+----------+
| Alcohol Use | Drinks/Week | oz/Week | Comments |
+-------------+-------------+---------+----------+
| No          |             |         |          |
+-------------+-------------+---------+----------+
 
 
 
+------------------+---------------+
| Sex Assigned at  | Date Recorded |
| Birth            |               |
+------------------+---------------+
| Not on file      |               |
+------------------+---------------+
 
 
 
+----------------+-------------+-------------+
| Job Start Date | Occupation  | Industry    |
+----------------+-------------+-------------+
| Not on file    | Not on file | Not on file |
+----------------+-------------+-------------+
 
 
 
+----------------+--------------+------------+
| Travel History | Travel Start | Travel End |
+----------------+--------------+------------+
 
 
 
+-------------------------------------+
| No recent travel history available. |
+-------------------------------------+
 documented as of this encounter
 
 Plan of Treatment
 
 
+------------------+------+--------+----------------------+---------------------+
| Name             | Type | Priori | Associated Diagnoses | Order Schedule      |
|                  |      | ty     |                      |                     |
+------------------+------+--------+----------------------+---------------------+
| TRANSESOPHAGEAL  | ECG  | Routin |   Non-rheumatic      | Ordered: 2018 |
| ECHOCARDIOGRAM,  |      | e      | mitral regurgitation |                     |
| ADULT            |      |        |                      |                     |
+------------------+------+--------+----------------------+---------------------+
| TRANSTHORACIC    | ECG  | Routin |   Non-rheumatic      | Ordered: 2018 |
| ECHOCARDIOGRAM,  |      | e      | mitral regurgitation |                     |
| ADULT            |      |        |                      |                     |
+------------------+------+--------+----------------------+---------------------+
 documented as of this encounter
 
 Procedures
 
 
+----------------------+--------+------------+----------------------+----------------------+
| Procedure Name       | Priori | Date/Time  | Associated Diagnosis | Comments             |
|                      | ty     |            |                      |                      |
 
+----------------------+--------+------------+----------------------+----------------------+
| 6-MINUTE WALK TEST - | Routin | 2018 |   Non-rheumatic      |   Results for this   |
|  ECG                 | e      |            | mitral regurgitation | procedure are in the |
|                      |        |            |                      |  results section.    |
+----------------------+--------+------------+----------------------+----------------------+
 documented in this encounter
 
 Results
 6-MINUTE WALK TEST - ECG (2018)
 
+------------------------------------------------------------------------+--------------+
| Narrative                                                              | Performed At |
+------------------------------------------------------------------------+--------------+
|   [facility]      Test Date:   2018      |              |
| Clinic Site: [facility]  Supervising Provider: Katelin Riddle  Technician: |              |
|    VS  Referring Provider: Katelin Riddle  Diagnosis: Non-rheumatic |              |
|  mitral regurgitation  Reason for Test: dyspnea     Patient Sypmtoms : |              |
|   Are you short of breath at rest? No            Six-Minute Walk Test  |              |
| Results:  Gait speed (time in seconds first 15 feet walked): 4         |              |
| Patient walked: 720 feet in 6 minutes  Beginning heart rate: 78        |              |
| Beginning blood pressure: 144/70  Beginning Sp02: 99%  Beginning       |              |
| dyspnea index (Franchesca scale) : 0     Ending heart rate: 90  Ending blood |              |
|  pressure: 157/60  Ending Sp02: 98%  Ending dyspnea index (Franchesca        |              |
| scale): 4  Oxygen level:      Observations: No level of distress       |              |
| noted, patient tolerated test fairly well        Physician             |              |
| interpretation:   High risk 6 minute walk test with moderate reported  |              |
| dyspnea (franchesca scale 4) , no desaturations, and inability to walk > 900 |              |
|  ft.        Jame Wilkins MD    [facility]       |              |
| [facility] [place]                                               |              |
+------------------------------------------------------------------------+--------------+
 documented in this encounter
 
 Visit Diagnoses
 
 
+------------------------------------------------+
| Diagnosis                                      |
+------------------------------------------------+
|   Non-rheumatic mitral regurgitation - Primary |
+------------------------------------------------+
 documented in this encounter

## 2020-01-01 NOTE — XMS
Encounter Summary
  Created on: 2020
 
 Vonnie Celaya
 External Reference #: [id number]
 : 32
 Sex: Female
 
 Demographics
 
 
+-----------------------+------------------------+
| Address               | [address]         |
|                       | [place]  [address]   |
+-----------------------+------------------------+
| Home Phone            | [phone]        |
+-----------------------+------------------------+
| Preferred Language    | Unknown                |
+-----------------------+------------------------+
| Marital Status        | Single                 |
+-----------------------+------------------------+
| Evangelical Affiliation | NON                    |
+-----------------------+------------------------+
| Race                  | White                  |
+-----------------------+------------------------+
| Ethnic Group          | Not  or  |
+-----------------------+------------------------+
 
 
 Author
 
 
+--------------+------------------------------+
| Author       | [facility] |
+--------------+------------------------------+
| Organization | [facility] |
+--------------+------------------------------+
| Address      | Unknown                      |
+--------------+------------------------------+
| Phone        | Unavailable                  |
+--------------+------------------------------+
 
 
 
 Support
 
 
+--------------+--------------+---------+-----------------+
| Name         | Relationship | Address | Phone           |
+--------------+--------------+---------+-----------------+
| Lauryn Leroy | ECON         | Unknown | [phone] |
+--------------+--------------+---------+-----------------+
 
 
 
 Care Team Providers
 
 
 
+------------------------+------+-----------------+
| Care Team Member Name  | Role | Phone           |
+------------------------+------+-----------------+
| Jean Bermudez MD | PCP  | [phone] |
+------------------------+------+-----------------+
 
 
 
 Encounter Details
 
 
+--------+-----------+----------------------+----------------------+-------------+
| Date   | Type      | Department           | Care Team            | Description |
+--------+-----------+----------------------+----------------------+-------------+
| / | Hospital  |   Cardiac            |   Sj, Car Ecg Tech  |             |
| 2018   | Encounter | Non-Invasive Testing |  [address]        |             |
|        |           |  at [facility] | [facility]    |             |
|        |           |   [address]   | [place] [address]   |             |
|        |           | Loop  Mailcode:      |                      |             |
|        |           | [id number]  [address]   |                      |             |
|        |           | [facility]        |                      |             |
|        |           | [place], [place]         |                      |             |
|        |           | [address]           |                      |             |
|        |           | [phone]         |                      |             |
+--------+-----------+----------------------+----------------------+-------------+
 
 
 
 Social History
 
 
+--------------+-------+-----------+--------+------+
| Tobacco Use  | Types | Packs/Day | Years  | Date |
|              |       |           | Used   |      |
+--------------+-------+-----------+--------+------+
| Never Smoker |       |           |        |      |
+--------------+-------+-----------+--------+------+
 
 
 
+---------------------+---+---+---+
| Smokeless Tobacco:  |   |   |   |
| Never Used          |   |   |   |
+---------------------+---+---+---+
 
 
 
+-------------+-------------+---------+----------+
| Alcohol Use | Drinks/Week | oz/Week | Comments |
+-------------+-------------+---------+----------+
| No          |             |         |          |
+-------------+-------------+---------+----------+
 
 
 
+------------------+---------------+
| Sex Assigned at  | Date Recorded |
| Birth            |               |
+------------------+---------------+
| Not on file      |               |
 
+------------------+---------------+
 
 
 
+----------------+-------------+-------------+
| Job Start Date | Occupation  | Industry    |
+----------------+-------------+-------------+
| Not on file    | Not on file | Not on file |
+----------------+-------------+-------------+
 
 
 
+----------------+--------------+------------+
| Travel History | Travel Start | Travel End |
+----------------+--------------+------------+
 
 
 
+-------------------------------------+
| No recent travel history available. |
+-------------------------------------+
 documented as of this encounter
 
 Medications at Time of Discharge
 
 
+----------------------+----------------------+-----------+---------+----------+----------+
| Medication           | Sig                  | Dispensed | Refills | Start    | End Date |
|                      |                      |           |         | Date     |          |
+----------------------+----------------------+-----------+---------+----------+----------+
|   acetaminophen 325  | Take by mouth.       |           | 0       |          |          |
| mg oral tablet       |                      |           |         |          |          |
+----------------------+----------------------+-----------+---------+----------+----------+
|   ascorbic acid      | Take 500 mg by mouth |           | 0       |          |          |
| (vitamin C) 500 mg   |  once daily.         |           |         |          |          |
| oral tablet          |                      |           |         |          |          |
+----------------------+----------------------+-----------+---------+----------+----------+
|   furosemide 40 mg   | Take by mouth.       |           | 0       |          |          |
| oral tablet          |                      |           |         |          |          |
+----------------------+----------------------+-----------+---------+----------+----------+
|                      | Take 2 tablets by    |           | 0       |          |          |
| gluc/chnd/om3/dha/ep | mouth once daily.    |           |         |          |          |
| a/fish/str           |                      |           |         |          |          |
| (GLUCOSAMINE         |                      |           |         |          |          |
| CHONDROITIN PLUS     |                      |           |         |          |          |
| ORAL)                |                      |           |         |          |          |
+----------------------+----------------------+-----------+---------+----------+----------+
|   levothyroxine 100  | Take by mouth.       |           | 0       |          |          |
| mcg oral tablet      |                      |           |         |          |          |
+----------------------+----------------------+-----------+---------+----------+----------+
|   losartan 100 mg    | Take 100 mg by mouth |           | 0       |          |          |
| oral tablet          |  once daily.         |           |         |          |          |
+----------------------+----------------------+-----------+---------+----------+----------+
|   MAGNESIUM CHLORIDE | Take by mouth.       |           | 0       |          |          |
|  ORAL                |                      |           |         |          |          |
+----------------------+----------------------+-----------+---------+----------+----------+
|   metFORMIN    | Take by mouth.       |           | 0       |          |          |
| mg oral tablet,ER    |                      |           |         |          |          |
| alexis.retention 24 hr |                      |           |         |          |          |
+----------------------+----------------------+-----------+---------+----------+----------+
 
|   metoprolol         | Take 100 mg by mouth |           | 0       | / |          |
| succinate 200 mg     |  once daily.         |           |         | 18       |          |
| oral tablet extended |                      |           |         |          |          |
|  release 24 hr       |                      |           |         |          |          |
+----------------------+----------------------+-----------+---------+----------+----------+
|   potassium chloride | Take by mouth.       |           | 0       |          |          |
|  SR 20 mEq oral      |                      |           |         |          |          |
| tablet,ER            |                      |           |         |          |          |
| particles/crystals   |                      |           |         |          |          |
+----------------------+----------------------+-----------+---------+----------+----------+
|   simvastatin 20 mg  | Take by mouth once   |           | 0       |          |          |
| oral tablet          | daily in the         |           |         |          |          |
|                      | evening.             |           |         |          |          |
+----------------------+----------------------+-----------+---------+----------+----------+
|   warfarin 5 mg oral | Take by mouth.       |           | 0       |          |          |
|  tablet              |                      |           |         |          |          |
+----------------------+----------------------+-----------+---------+----------+----------+
 documented as of this encounter
 
 Plan of Treatment
 
 
+----------------------+------+--------+----------------------+---------------------+
| Name                 | Type | Priori | Associated Diagnoses | Order Schedule      |
|                      |      | ty     |                      |                     |
+----------------------+------+--------+----------------------+---------------------+
| 6-MINUTE WALK TEST - | ECG  | Routin |   Mitral valve       | Ordered: 2018 |
|  ECG                 |      | e      | insufficiency,       |                     |
|                      |      |        | unspecified etiology |                     |
+----------------------+------+--------+----------------------+---------------------+
 documented as of this encounter
 
 Visit Diagnoses
 
 
+----------------------------------------------------+
| Diagnosis                                          |
+----------------------------------------------------+
|   Mitral valve insufficiency, unspecified etiology |
+----------------------------------------------------+
 documented in this encounter

## 2020-01-01 NOTE — XMS
Encounter Summary
  Created on: 2020
 
 Vonnie Celaya
 External Reference #: [id number]
 : 32
 Sex: Female
 
 Demographics
 
 
+-----------------------+------------------------+
| Address               | [address]         |
|                       | [place]  [address]   |
+-----------------------+------------------------+
| Home Phone            | [phone]        |
+-----------------------+------------------------+
| Preferred Language    | Unknown                |
+-----------------------+------------------------+
| Marital Status        | Single                 |
+-----------------------+------------------------+
| Orthodox Affiliation | NON                    |
+-----------------------+------------------------+
| Race                  | White                  |
+-----------------------+------------------------+
| Ethnic Group          | Not  or  |
+-----------------------+------------------------+
 
 
 Author
 
 
+--------------+------------------------------+
| Author       | [facility] |
+--------------+------------------------------+
| Organization | [facility] |
+--------------+------------------------------+
| Address      | Unknown                      |
+--------------+------------------------------+
| Phone        | Unavailable                  |
+--------------+------------------------------+
 
 
 
 Support
 
 
+--------------+--------------+---------+-----------------+
| Name         | Relationship | Address | Phone           |
+--------------+--------------+---------+-----------------+
| Lauryn Leroy | ECON         | Unknown | [phone] |
+--------------+--------------+---------+-----------------+
 
 
 
 Care Team Providers
 
 
 
+-----------------------+------+-------------+
| Care Team Member Name | Role | Phone       |
+-----------------------+------+-------------+
| No Pcp Per Patient    | PCP  | Unavailable |
+-----------------------+------+-------------+
 
 
 
 Reason for Visit
 
 
+------------------+------------+
| Reason           | Comments   |
+------------------+------------+
| Medical Records  | [facility] review |
| Review           |            |
+------------------+------------+
 
 
 
 Encounter Details
 
 
+--------+----------+----------------------+--------------+---------------------+
| Date   | Type     | Department           | Care Team    | Description         |
+--------+----------+----------------------+--------------+---------------------+
| 08/15/ | Abstract |   Cardiology at [facility]  |   Unknown  . | Medical Records     |
|    |          |  [address]    |              | Review ([facility] review) |
|        |          | Mailcode: [address]       |              |                     |
|        |          | [facility]    |              |                     |
|        |          | and [facility]aling,         |              |                     |
|        |          | Building       |              |                     |
|        |          | Floor  [place], [place]  |              |                     |
|        |          | [address]           |              |                     |
|        |          | [phone]         |              |                     |
+--------+----------+----------------------+--------------+---------------------+
 
 
 
 Social History
 
 
+----------------+-------+-----------+--------+------+
| Tobacco Use    | Types | Packs/Day | Years  | Date |
|                |       |           | Used   |      |
+----------------+-------+-----------+--------+------+
| Never Assessed |       |           |        |      |
+----------------+-------+-----------+--------+------+
 
 
 
+------------------+---------------+
| Sex Assigned at  | Date Recorded |
| Birth            |               |
+------------------+---------------+
| Not on file      |               |
+------------------+---------------+
 
 
 
 
+----------------+-------------+-------------+
| Job Start Date | Occupation  | Industry    |
+----------------+-------------+-------------+
| Not on file    | Not on file | Not on file |
+----------------+-------------+-------------+
 
 
 
+----------------+--------------+------------+
| Travel History | Travel Start | Travel End |
+----------------+--------------+------------+
 
 
 
+-------------------------------------+
| No recent travel history available. |
+-------------------------------------+
 documented as of this encounter
 
 Progress Krista Clark - 08/15/2018 10:26 AM PDTAppt info sent in mail
 Electronically signed by Krista Haywood at 2018 10:46 AM Krista Henry - 08/15/2
018 10:26 AM PDTFormatting of this note might be different from the original.
 Heart Valve Tier 1 Review 
 Patient Name: Vonnie Celaya
 MRN: [id number]
 Referring Provider: Varun Mitchell
 Designated Family/Support Person Name:
 Contact Number:
 
 Data Elements / Results Needed Prior Medical Review Required for Medical Review  Required f
or Clinic Received
 (Y / N) Records / Notes
 (Care Everywhere / Abstract Enc) Imaging 
 (IMPAX / Disc / Not Available)  Comments 
 ECHO/TTE
 (if performed within the last two years)  Report Report / Images yes Care Everywhere  IMPAX
 18
 [facility] 
 LABS    CBC, BMP
 (from last 6 months)  Results  Results  no    
 CT Scan    Chest / Abd / Pelvis 
 (if performed within last two years)  Report Report / Images  no    
 Stress Tests (if applicable)  Report  no    
 Provider Notes - Cardiology / Cardiac Surgeon 
 (Last two years)  Notes Notes  yes Abstract Encounter   
 Provider Note    PCP 
 (Last note)  Note Note no    
 PFTs
 (if performed within the last two years)  Report  Report  no    
 CATH 
 (if performed within the last two years)   Report / Images no    
 JENIFFER
 (if performed within the last two years)   no    
 Operative Notes
 (Most recent thoracic, cardiac, vascular surgeries, breast implants)   no    
 
 Additional Comments:
 
 Heart Valve Tier 2 Review 
 
 
 Patient Name: Vonnie Celaya
 MRN: [id number]
 Referring Provider: 
 Designated Family/Support Person Name:
 Contact Number:
 Refer to Tier 3 No
 If Yes, rationale: 
 
 Tests to Schedule  Yes / No   Comments 
 ECHO - TTE Y Pre HVC 
 ECHO - JENIFFER Y Post HVC 
 LABS    CBC, BMP Y Pre HVC 
 CT Scan    Chest / Abd / Pelvis  Y  Post HVC 
 Dobutamine Stress Echo    
 PFTs   
 CATH  Y Post HVC 
 6 Min Walk Test Y Pre-HVC 
 
 For Tier 2 Review, place orders immediately
 For Tier 3 Review, place orders at time of HVC Attending response
 
 This data is from outside sources, and has not been individually verified.
 
  
 Heart Valve Tier 3 Response:
 
 Since coming from [facility], will need to consolidate trips if possible
 
 Would like to see on a Tuesday, have baseline testing, 6 min walk, TTE that AM.  Gated CT o
f chest that afternoon. 
 Would have her stay the night for JENIFFER on Wednesday. 
 
 Would then need to come back for interventional HVC on a Tuesday afternoon but be pre-booke
d for angiogram Tuesday morning prior to Int HVC that afternoon
 Would have to come over Monday night for that appointment likely 
 
 May need surgical MVR based on imaging of severe mitral regurgitation and moderate to sever
e mitral annular calcification vs TMVR if less MAC or Valve in MAC if severe MAC.
 
 Please edouard patient to see if they are up for all that testing.
 
 Will place orders once confirmed. 
 
 Jame Wilkins MD
 
 [facility]
 
 Electronically signed by Jame Wilkins MD at 2018 12:38 PM PDTdocumented in thi
s encounter
 
 Plan of Treatment
 Not on filedocumented as of this encounter
 
 Visit Diagnoses
 Not on filedocumented in this encounter

## 2020-01-01 NOTE — XMS
Encounter Summary
  Created on: 2020
 
 Vonnie Celaya
 External Reference #: [id number]
 : 32
 Sex: Female
 
 Demographics
 
 
+-----------------------+------------------------+
| Address               | [address]         |
|                       | [place]  [address]   |
+-----------------------+------------------------+
| Home Phone            | [phone]        |
+-----------------------+------------------------+
| Preferred Language    | Unknown                |
+-----------------------+------------------------+
| Marital Status        | Single                 |
+-----------------------+------------------------+
| Yazidism Affiliation | NON                    |
+-----------------------+------------------------+
| Race                  | White                  |
+-----------------------+------------------------+
| Ethnic Group          | Not  or  |
+-----------------------+------------------------+
 
 
 Author
 
 
+--------------+------------------------------+
| Author       | [facility] |
+--------------+------------------------------+
| Organization | [facility] |
+--------------+------------------------------+
| Address      | Unknown                      |
+--------------+------------------------------+
| Phone        | Unavailable                  |
+--------------+------------------------------+
 
 
 
 Support
 
 
+--------------+--------------+---------+-----------------+
| Name         | Relationship | Address | Phone           |
+--------------+--------------+---------+-----------------+
| Lauryn Leroy | ECON         | Unknown | [phone] |
+--------------+--------------+---------+-----------------+
 
 
 
 Care Team Providers
 
 
 
+------------------------+------+-----------------+
| Care Team Member Name  | Role | Phone           |
+------------------------+------+-----------------+
| Jean Bermudez MD | PCP  | [phone] |
+------------------------+------+-----------------+
 
 
 
 Encounter Details
 
 
+--------+-----------+----------------------+----------------------+----------------------+
| Date   | Type      | Department           | Care Team            | Description          |
+--------+-----------+----------------------+----------------------+----------------------+
| / | Hospital  |   Cardiology -       |   [facility]  | Canceled (Scheduling |
| 2018   | Encounter | Non-Invasive Testing |  [address]   |  error)              |
|        |           |   [address]   | [place] [address]   |                      |
|        |           | Mailcode: [id number]       |                      |                      |
|        |           | [facility]    |                      |                      |
|        |           | and Healing,         |                      |                      |
|        |           | [address]           |                      |                      |
|        |           | [place], [place]         |                      |                      |
|        |           | [address]           |                      |                      |
|        |           | [phone]         |                      |                      |
+--------+-----------+----------------------+----------------------+----------------------+
 
 
 
 Social History
 
 
+--------------+-------+-----------+--------+------+
| Tobacco Use  | Types | Packs/Day | Years  | Date |
|              |       |           | Used   |      |
+--------------+-------+-----------+--------+------+
| Never Smoker |       |           |        |      |
+--------------+-------+-----------+--------+------+
 
 
 
+---------------------+---+---+---+
| Smokeless Tobacco:  |   |   |   |
| Never Used          |   |   |   |
+---------------------+---+---+---+
 
 
 
+-------------+-------------+---------+----------+
| Alcohol Use | Drinks/Week | oz/Week | Comments |
+-------------+-------------+---------+----------+
| No          |             |         |          |
+-------------+-------------+---------+----------+
 
 
 
+------------------+---------------+
| Sex Assigned at  | Date Recorded |
| Birth            |               |
+------------------+---------------+
| Not on file      |               |
 
+------------------+---------------+
 
 
 
+----------------+-------------+-------------+
| Job Start Date | Occupation  | Industry    |
+----------------+-------------+-------------+
| Not on file    | Not on file | Not on file |
+----------------+-------------+-------------+
 
 
 
+----------------+--------------+------------+
| Travel History | Travel Start | Travel End |
+----------------+--------------+------------+
 
 
 
+-------------------------------------+
| No recent travel history available. |
+-------------------------------------+
 documented as of this encounter
 
 Medications at Time of Discharge
 
 
+----------------------+----------------------+-----------+---------+----------+----------+
| Medication           | Sig                  | Dispensed | Refills | Start    | End Date |
|                      |                      |           |         | Date     |          |
+----------------------+----------------------+-----------+---------+----------+----------+
|   acetaminophen 325  | Take by mouth.       |           | 0       |          |          |
| mg oral tablet       |                      |           |         |          |          |
+----------------------+----------------------+-----------+---------+----------+----------+
|   ascorbic acid      | Take 500 mg by mouth |           | 0       |          |          |
| (vitamin C) 500 mg   |  once daily.         |           |         |          |          |
| oral tablet          |                      |           |         |          |          |
+----------------------+----------------------+-----------+---------+----------+----------+
|   furosemide 40 mg   | Take by mouth.       |           | 0       |          |          |
| oral tablet          |                      |           |         |          |          |
+----------------------+----------------------+-----------+---------+----------+----------+
|                      | Take 2 tablets by    |           | 0       |          |          |
| gluc/chnd/om3/dha/ep | mouth once daily.    |           |         |          |          |
| a/fish/str           |                      |           |         |          |          |
| (GLUCOSAMINE         |                      |           |         |          |          |
| CHONDROITIN PLUS     |                      |           |         |          |          |
| ORAL)                |                      |           |         |          |          |
+----------------------+----------------------+-----------+---------+----------+----------+
|   levothyroxine 100  | Take by mouth.       |           | 0       |          |          |
| mcg oral tablet      |                      |           |         |          |          |
+----------------------+----------------------+-----------+---------+----------+----------+
|   losartan 100 mg    | Take 100 mg by mouth |           | 0       |          |          |
| oral tablet          |  once daily.         |           |         |          |          |
+----------------------+----------------------+-----------+---------+----------+----------+
|   MAGNESIUM CHLORIDE | Take by mouth.       |           | 0       |          |          |
|  ORAL                |                      |           |         |          |          |
+----------------------+----------------------+-----------+---------+----------+----------+
|   metFORMIN    | Take by mouth.       |           | 0       |          |          |
| mg oral tablet,ER    |                      |           |         |          |          |
| alexis.retention 24 hr |                      |           |         |          |          |
+----------------------+----------------------+-----------+---------+----------+----------+
 
|   metoprolol         | Take 100 mg by mouth |           | 0       | 07/30/20 |          |
| succinate 200 mg     |  once daily.         |           |         | 18       |          |
| oral tablet extended |                      |           |         |          |          |
|  release 24 hr       |                      |           |         |          |          |
+----------------------+----------------------+-----------+---------+----------+----------+
|   potassium chloride | Take by mouth.       |           | 0       |          |          |
|  SR 20 mEq oral      |                      |           |         |          |          |
| tablet,ER            |                      |           |         |          |          |
| particles/crystals   |                      |           |         |          |          |
+----------------------+----------------------+-----------+---------+----------+----------+
|   simvastatin 20 mg  | Take by mouth once   |           | 0       |          |          |
| oral tablet          | daily in the         |           |         |          |          |
|                      | evening.             |           |         |          |          |
+----------------------+----------------------+-----------+---------+----------+----------+
|   warfarin 5 mg oral | Take by mouth.       |           | 0       |          |          |
|  tablet              |                      |           |         |          |          |
+----------------------+----------------------+-----------+---------+----------+----------+
 documented as of this encounter
 
 Progress Amanda Man - 2018  1:05 PM [facility] Non Invasive Cardiac Services 
 
 Test Date:  2018
 Clinic Site: [facility]
 Supervising Provider: Katelin Riddle
 Technician:  TATIANA
 Referring Provider: Katelin Riddle
 Diagnosis: Non-rheumatic mitral regurgitation
 Reason for Test: dyspnea
 
 Patient Sypmtoms :
 Are you short of breath at rest? No 
 
 Six-Minute Walk Test Results:
 Gait speed (time in seconds first 15 feet walked): 4
 Patient walked: 720 feet in 6 minutes
 Beginning heart rate: 78
 Beginning blood pressure: 144/70
 Beginning Sp02: 99%
 Beginning dyspnea index (Franchesca scale) : 0
 
 Ending heart rate: 90
 Ending blood pressure: 157/60
 Ending Sp02: 98%
 Ending dyspnea index (Franchesca scale): 4
 Oxygen level: 
 
 Observations: No level of distress noted, patient tolerated test fairly well
 
 Physician interpretation:
 
 Electronically signed by Amanda Hendrix at 2018  1:10 PM PSTdocumented in this enco
unter
 
 Plan of Treatment
 Not on filedocumented as of this encounter
 
 Visit Diagnoses
 Not on filedocumented in this encounter

## 2020-01-01 NOTE — XMS
Encounter Summary
  Created on: 2020
 
 Vonnie Celaya
 External Reference #: [id number]
 : 32
 Sex: Female
 
 Demographics
 
 
+-----------------------+------------------------+
| Address               | [address]         |
|                       | [place]  [address]   |
+-----------------------+------------------------+
| Home Phone            | [phone]        |
+-----------------------+------------------------+
| Preferred Language    | Unknown                |
+-----------------------+------------------------+
| Marital Status        | Single                 |
+-----------------------+------------------------+
| Spiritism Affiliation | NON                    |
+-----------------------+------------------------+
| Race                  | White                  |
+-----------------------+------------------------+
| Ethnic Group          | Not  or  |
+-----------------------+------------------------+
 
 
 Author
 
 
+--------------+------------------------------+
| Author       | [facility] |
+--------------+------------------------------+
| Organization | [facility] |
+--------------+------------------------------+
| Address      | Unknown                      |
+--------------+------------------------------+
| Phone        | Unavailable                  |
+--------------+------------------------------+
 
 
 
 Support
 
 
+--------------+--------------+---------+-----------------+
| Name         | Relationship | Address | Phone           |
+--------------+--------------+---------+-----------------+
| Lauryn Leroy | ECON         | Unknown | [phone] |
+--------------+--------------+---------+-----------------+
 
 
 
 Care Team Providers
 
 
 
+------------------------+------+-----------------+
| Care Team Member Name  | Role | Phone           |
+------------------------+------+-----------------+
| Jean Bermudez MD | PCP  | [phone] |
+------------------------+------+-----------------+
 
 
 
 Reason for Visit
 AUTH/CERT
 
+--------+--------+-----------+--------------+--------------+--------------+
| Status | Reason | Specialty | Diagnoses /  | Referred By  | Referred To  |
|        |        |           | Procedures   | Contact      | Contact      |
+--------+--------+-----------+--------------+--------------+--------------+
|        |        |           |              |              |              |
+--------+--------+-----------+--------------+--------------+--------------+
 
 
 
 
 Encounter Details
 
 
+--------+-------------+----------------------+----------------------+-------------+
| Date   | Type        | Department           | Care Team            | Description |
+--------+-------------+----------------------+----------------------+-------------+
| / | Anesthesia  |   Cardiac            |   Radha Whitt MD |             |
| 2018   | Event       | Non-Invasive Testing |   [address] [facility]        |             |
|        |             |  at [facility] | [address]      |             |
|        |             |   [address]   | [place], [place]         |             |
|        |             | [place]  Mailcode:      | [address]           |             |
|        |             | [id number]  Aiden Hernandez   | [phone]         |             |
|        |             | [facility]        | [phone] (Fax)   |             |
|        |             | [place], [place]         | [place],   |             |
|        |             | [address]           | CRNA  [address] [facility]    |             |
|        |             | [phone]         | [address]      |             |
|        |             |                      | [place], [place]         |             |
|        |             |                      | [address]           |             |
|        |             |                      | [phone]         |             |
|        |             |                      | [phone] (Fax)   |             |
+--------+-------------+----------------------+----------------------+-------------+
 
 
 
 Anesthesia Record
 
 
+------------------+------------------+-------------------+----------------------+
| Procedure Name   | Responsible      | Anesthesia Start  | Anesthesia Stop Time |
|                  | Anesthesiologist | Time              |                      |
+------------------+------------------+-------------------+----------------------+
| TRANSESOPHAGEAL  |                  |                   |                      |
| ECHOCARDIOGRAM,  |                  |                   |                      |
| ADULT            |                  |                   |                      |
+------------------+------------------+-------------------+----------------------+
 
 
 
+----+---+-----------+-------------------------------------------------------------------+
 
| Da | T | Event     | Comment                                                           |
| te | i |           |                                                                   |
|    | m |           |                                                                   |
|    | e |           |                                                                   |
+----+---+-----------+-------------------------------------------------------------------+
| 09 | 0 |           |                                                                   |
| /1 | 9 |           |                                                                   |
| 3/ | 4 |           |                                                                   |
| 20 | 4 |           |                                                                   |
| 18 |   |           |                                                                   |
+----+---+-----------+-------------------------------------------------------------------+
|    | 0 | Pt. Check | Prior to anesthesia start, pt. Identified, examined, chart        |
|    | 9 |           | reviewed, PARQ held, anesthetic plan made or approved by          |
|    | 4 |           | attending anesthesiologist.  NPO status confirmed as appropriate  |
|    | 4 |           | for procedure  Preoperative evaluation: unchanged                 |
+----+---+-----------+-------------------------------------------------------------------+
 
 
 
+------+
| Meds |
+------+
 
 
 
+-----------------+----------+
 No medications  | on file. |
+-----------------+----------+
 
 
 
+--------------------+
| No agents on file. |
+--------------------+
 
 
 
+-----------------------------------+
| No blood administrations on file. |
+-----------------------------------+
 
 
 
+------------------+
| No LDAs on file. |
+------------------+
 documented in this encounter
 
 Social History
 
 
+----------------+-------+-----------+--------+------+
| Tobacco Use    | Types | Packs/Day | Years  | Date |
|                |       |           | Used   |      |
+----------------+-------+-----------+--------+------+
| Never Assessed |       |           |        |      |
+----------------+-------+-----------+--------+------+
 
 
 
 
+------------------+---------------+
| Sex Assigned at  | Date Recorded |
| Birth            |               |
+------------------+---------------+
| Not on file      |               |
+------------------+---------------+
 
 
 
+----------------+-------------+-------------+
| Job Start Date | Occupation  | Industry    |
+----------------+-------------+-------------+
| Not on file    | Not on file | Not on file |
+----------------+-------------+-------------+
 
 
 
+----------------+--------------+------------+
| Travel History | Travel Start | Travel End |
+----------------+--------------+------------+
 
 
 
+-------------------------------------+
| No recent travel history available. |
+-------------------------------------+
 documented as of this encounter
 
 Plan of Treatment
 Not on filedocumented as of this encounter
 
 Visit Diagnoses
 Not on filedocumented in this encounter

## 2020-01-01 NOTE — XMS
Encounter Summary
  Created on: 2020
 
 Vonnie Celaya
 External Reference #: [id number]
 : 32
 Sex: Female
 
 Demographics
 
 
+-----------------------+---------------------------+
| Address               | [address]            |
|                       | [place]  [address] |
+-----------------------+---------------------------+
| Home Phone            | [phone]           |
+-----------------------+---------------------------+
| Preferred Language    | Unknown                   |
+-----------------------+---------------------------+
| Marital Status        |                    |
+-----------------------+---------------------------+
| Evangelical Affiliation | Unknown                   |
+-----------------------+---------------------------+
| Race                  | Unknown                   |
+-----------------------+---------------------------+
| Ethnic Group          | Unknown                   |
+-----------------------+---------------------------+
 
 
 Author
 
 
+--------------+--------------------------------------------+
| Author       | [facility]  |
|              | and [place]                                |
+--------------+--------------------------------------------+
| Organization | [facility]  |
|              | and [place]                                |
+--------------+--------------------------------------------+
| Address      | Unknown                                    |
+--------------+--------------------------------------------+
| Phone        | Unavailable                                |
+--------------+--------------------------------------------+
 
 
 
 Support
 
 
+--------------+--------------+---------+-----------------+
| Name         | Relationship | Address | Phone           |
+--------------+--------------+---------+-----------------+
| Lauryn Leroy | ECON         | Unknown | [phone] |
+--------------+--------------+---------+-----------------+
 
 
 
 Care Team Providers
 
 
 
+------------------------+------+-----------------+
| Care Team Member Name  | Role | Phone           |
+------------------------+------+-----------------+
| Jean Bermudez MD | PCP  | [phone] |
+------------------------+------+-----------------+
 
 
 
 Reason for Visit
 
 
+--------------------+----------+
| Reason             | Comments |
+--------------------+----------+
| Follow-up, Office  | 4 month  |
| Visit              |          |
+--------------------+----------+
 
 
 
 Encounter Details
 
 
+--------+---------+----------------------+----------------------+----------------------+
| Date   | Type    | Department           | Care Team            | Description          |
+--------+---------+----------------------+----------------------+----------------------+
| / | Office  |   [facility]      |   Bre Justice    | Paroxysmal atrial    |
| 2019   | Visit   | CARDIOLOGY [facility] | [address]      | fibrillation (HCC)   |
|        |         |   [address]    | [address]   | (Primary Dx);        |
|        |         | [address]          | [place] [address]   | Coronary artery      |
|        |         | [place]        | [phone]         | disease of native    |
|        |         | [address]           | [phone] (Fax)   | artery of native     |
|        |         | [phone]         |                      | heart with stable    |
|        |         |                      |                      | angina pectoris      |
|        |         |                      |                      | (HCC); Status post   |
|        |         |                      |                      | insertion of         |
|        |         |                      |                      | drug-eluting stent   |
|        |         |                      |                      | into right coronary  |
|        |         |                      |                      | artery for coronary  |
|        |         |                      |                      | artery disease;      |
|        |         |                      |                      | Essential            |
|        |         |                      |                      | hypertension; Severe |
|        |         |                      |                      |  mitral              |
|        |         |                      |                      | regurgitation;       |
|        |         |                      |                      | Severe tricuspid     |
|        |         |                      |                      | regurgitation;       |
|        |         |                      |                      | Chronic diastolic    |
|        |         |                      |                      | congestive heart     |
|        |         |                      |                      | failure (HCC);       |
|        |         |                      |                      | Pulmonary            |
|        |         |                      |                      | hypertension,        |
|        |         |                      |                      | moderate to severe   |
|        |         |                      |                      | (HCC); Mixed         |
|        |         |                      |                      | hyperlipidemia;      |
|        |         |                      |                      | Stage 3 chronic      |
|        |         |                      |                      | kidney disease (HCC) |
+--------+---------+----------------------+----------------------+----------------------+
 
 
 
 
 Social History
 
 
+--------------+-------+-----------+--------+------+
| Tobacco Use  | Types | Packs/Day | Years  | Date |
|              |       |           | Used   |      |
+--------------+-------+-----------+--------+------+
| Never Smoker |       |           |        |      |
+--------------+-------+-----------+--------+------+
 
 
 
+---------------------+---+---+---+
| Smokeless Tobacco:  |   |   |   |
| Never Used          |   |   |   |
+---------------------+---+---+---+
 
 
 
+---------------+-------------+---------+----------+
| Alcohol Use   | Drinks/Week | oz/Week | Comments |
+---------------+-------------+---------+----------+
| Not Currently |             |         |          |
+---------------+-------------+---------+----------+
 
 
 
+------------------+---------------+
| Sex Assigned at  | Date Recorded |
| Birth            |               |
+------------------+---------------+
| Not on file      |               |
+------------------+---------------+
 
 
 
+----------------+-------------+-------------+
| Job Start Date | Occupation  | Industry    |
+----------------+-------------+-------------+
| Not on file    | Not on file | Not on file |
+----------------+-------------+-------------+
 
 
 
+----------------+--------------+------------+
| Travel History | Travel Start | Travel End |
+----------------+--------------+------------+
 
 
 
+-------------------------------------+
| No recent travel history available. |
+-------------------------------------+
 documented as of this encounter
 
 Last Filed Vital Signs
 
 
 
+-------------------+----------------------+----------------------+----------+
| Vital Sign        | Reading              | Time Taken           | Comments |
+-------------------+----------------------+----------------------+----------+
| Blood Pressure    | 114/52               | 2019 12:25 PM  |          |
|                   |                      | PDT                  |          |
+-------------------+----------------------+----------------------+----------+
| Pulse             | 75                   | 2019 12:25 PM  |          |
|                   |                      | PDT                  |          |
+-------------------+----------------------+----------------------+----------+
| Temperature       | -                    | -                    |          |
+-------------------+----------------------+----------------------+----------+
| Respiratory Rate  | -                    | -                    |          |
+-------------------+----------------------+----------------------+----------+
| Oxygen Saturation | 94%                  | 2019 12:25 PM  |          |
|                   |                      | PDT                  |          |
+-------------------+----------------------+----------------------+----------+
| Inhaled Oxygen    | -                    | -                    |          |
| Concentration     |                      |                      |          |
+-------------------+----------------------+----------------------+----------+
| Weight            | 65.3 kg (143 lb 14.4 | 2019 12:25 PM  |          |
|                   |  oz)                 | PDT                  |          |
+-------------------+----------------------+----------------------+----------+
| Height            | 160 cm (5' 3")       | 2019 12:25 PM  |          |
|                   |                      | PDT                  |          |
+-------------------+----------------------+----------------------+----------+
| Body Mass Index   | 25.49                | 2019 12:25 PM  |          |
|                   |                      | PDT                  |          |
+-------------------+----------------------+----------------------+----------+
 documented in this encounter
 
 Patient Instructions
 Patient Instructions Bre Justice BethBRIAN - 2019 12:30 PM PDT
 I made no changes to medications today, but if blood pressure gets too low, or if you becom
e dizzy, may need to decrease losartan .
 
 You seem very stable today, and can let me know if you want to do Echo next time 
 
 See me back in 6 months , see Dr. Mitchell in 1 year
 
 Electronically signed by BRIAN Taylor at 2019  1:23 PM PDT
 documented in this encounter
 
 Progress Notes
 AparnatorstenBreCherriBRIAN - 2019 12:30 PM PDTFormatting of this note might be differe
nt from the original.
 Date of visit: 2019
 Primary Care Physician: Jean Bermudez MD
 
 CHIEF COMPLAINT:  
 Chief Complaint 
 Patient presents with 
   Follow-up, Office Visit 
   4 month 
 
 HISTORY OF PRESENT ILLNESS:
    Ms. Vonnie Celaya   is a 87 year old woman who is here today for 4 month follow up. 
 She is accompanied today by her daughter Lauryn, who contributed to history, and who lives n
ext door to her in shared duplex.
  She  is a  patient of    and last seen by him 2019 .
  Today,  I  reviewed  all previous documentation available to me in electronic medical sally
 
rd and from  external sources.
    She has a history of severe valvular heart disease with  severe MR moderate to severe TR
, and mild aortic stenosis, which has contributed to chronic diastolic heart failure, chroni
c atrial fibrillation, coronary artery disease with a stent placed in right PAMELA 10/2018, sev
ere pulmonary hypertension, hypertension, hyperlipidemia, small secundum ASD with minor left
-to-right shunting, type 2 diabetes,and two   TIAs  and 
    Dr. Mitchell had noted at age 86 she was not a good candidate for open heart surgery, as she
 also had some mild memory deficits which were progressing.  He reported she had an extensiv
e evaluation at [facility] in the latter part of , and was ultimately felt not to be a good ca
ndidate for a mitral clip due to severe calcification of the mitral annulus, and not a good 
candidate for an off label TAVR valve in the mitral position.  
   He noted she should be on aspirin for life,  Plavix could be stopped at the end of her cu
rrent prescription and she should be back on warfarin for stroke prevention for atrial fibri
llation.
   He reported she previously been hospitalized from  until 2019 for d
ecompensated diastolic heart failure, that  she had ongoing dyspnea with exertion, occasiona
l PND but no orthopnea and no pedal edema and her weight had been stable and her blood press
ure well controlled and he made no changes to her medication.
  Her current and  previous testing and procedures are  detailed below .
   She reports today that she continues to have dyspnea with more extreme exertion, but over
all feels that it is stable since last seen, she is occasionally lightheaded, but it is very
 transient, her lower extremity swelling has been controlled with compression socks, and she
 has no edema today, and she has some mild ongoing fatigue.
 She denies any chest pain, palpitations, or syncope.  She also denies any signs or symptoms
 of stroke or TIA, or any emergency room admissions or hospitalizations since last seen.
    She recently saw her PCP, Dr. Bermudez , and he has started her on iron for 
anemia, and she had recent labs with him as well.
 She brought her medications to the clinic today, and personally reviewed by me.
 Ports she continues to be monitored by the [facility], and that her INRs
 have been therapeutic, and denies any bleeding.
   She is a lifelong non-smoker, does not drink alcohol and denies any use of recreational o
r illicit drug.
 Reports she is mostly independent in her activities of daily living, but does have some sit
uational depression related to her loss of ability to mobilize without a cane, and needing m
ore help from her daughter.
 
 REVIEW OF SYSTEMS:
 Negative except for pertinent items noted in HPI.
 
 Constitutional: Reports mild ongoing fatigue.  Denies unexplained weight loss.  Appetite is
 good.  Weight is stable.    Denies night sweats fevers or chills
 HENT: Denies nosebleeds.  Bilateral hearing loss.  Denies dysphagia
 Eyes: Early cataracts denies visual disturbance or double vision.  
 Respiratory/Sleep::  GARCIA more extreme exertion, not very active.  Occasional PND. Denies co
ugh. Denies hemoptysis or excessive sputum production.  Reports mild snoring, denies orthopn
ea.
 Cardiovascular: Lower extremity edema controlled with compression sock. Denies  chest pain,
 palpitations. Denies history of rheumatic fever. Denies claudication . 
 Gastrointestinal: Denies GERD. Denies PUD .  Denies  nausea, vomiting, abdominal pain and b
lood in stool. 
 Genitourinary:Chronic  Kidney disease stage III denies  hematuria.  
 Musculoskeletal: Osteoporosis, osteoarthritis.Denies myalgias.  Ambulates  with cane. 
 Skin: Eczema denies  color change.  Denies rash .
 Neurological: Short-term memory deficits.  two  TIA's:  , and  Denies history of st
roke.Denies history of seizures. Denies dizziness, syncope and numbness. 
 Hematological/Oncology .  History of iron deficiency anemia.   Denies previous blood transf
usions. bruises easily. Denies bleeding   Denies history of cancer
 Endocrine: Type II  diabetes.  Hypothyroidism.  Denies excessive thirst or hunger.  
 Psychiatric/Behavioral: Anxiety, denies previous history  of depression or  other psychiatr
ic illness.
 
 Vaccines: Current on 2018 flu vaccine.  Current on post 65  pneumonia vaccine.
 Habits/Social : Denies history of smoking.  Denies EtOH use. Drinks 2 servings of caffeine 
daily .  Denies recreational or  illicit drug use.  Exercises seldom due to joint and back p
aditya . Lives in [place], shares duplex with daughter Lauryn, independent in ADL.  .
 
 Outpatient Medications Prior to Visit 
 Medication Sig Dispense Refill 
   acetaminophen (TYLENOL) 325 mg tablet Take 650 mg by mouth every 6 (six) hours as neede
d for Pain. (Patient taking differently: Take 500 mg by mouth every 6 hours as needed (Pain)
.)   
   ascorbic acid (VITAMIN C) 1000 MG tablet Take 1,000 mg by mouth daily.   
   aspirin 81 MG tablet Take 1 tablet by mouth daily. Start 10/22/18 30 tablet 11 
   Cranberry Juice Extract 1000 MG CAPS Take 1 capsule by mouth daily.   
   furosemide (LASIX) 40 mg tablet Take 40 mg by mouth daily. (Patient taking differently:
 Take 40 mg by mouth 2 times daily.)   
   Gluc-Chonn-MSM-Boswellia-Vit D (GLUCOSAMINE CHOND TRIPLE/VIT D) TABS Take 1 tablet by m
outh daily. (Patient not taking: Reported on 2019)   
   IRON PO Take 50 mg by mouth 2 times daily. Every other day ,   
   levothyroxine (SYNTHROID) 100 mcg tablet Take 100 mcg by mouth every morning before kavita
akfast.   
   losartan (COZAAR) 100 MG tablet Take 100 mg by mouth daily.   
   magnesium chloride (MAG64) 64 mg EC tablet Take 1 tablet by mouth daily.   
   metFORMIN (GLUMETZA) 500 MG 24 hr tablet Take 500 mg by mouth 2 (two) times daily with 
meals.   
   metoprolol succinate (TOPROL-XL) 200 mg ER tablet Take 100 mg by mouth every morning. (
Patient not taking: Reported on 2019)   
   metoprolol succinate (TOPROL-XL) 50 mg 24 hr tablet Take 150 mg by mouth Daily. Take th
ree tablets by mouth daily at bedtime    
   potassium chloride (KLOR-CON M20) 20 mEq ER tablet Take 20 mEq by mouth daily.   
   simvastatin (ZOCOR) 20 mg tablet Take 10 mg by mouth nightly.   
   Turmeric Curcumin 500 MG CAPS Take  by mouth.   
   warfarin (COUMADIN) 5 mg tablet Take 5 mg by mouth daily. 1 pill daily Sun-Mon-Wed-Fri-
Sat, 1/2 pill (2.5 mg) Tue-Thur   
 
 No facility-administered medications prior to visit.  
 
 PHYSICAL EXAM:
 Wt Readings from Last 3 Encounters: 
 19 65.3 kg (143 lb 14.4 oz) 
 10/25/18 66.7 kg (147 lb 0.8 oz) 
 18 66 kg (145 lb 6.4 oz) 
 
 Temp Readings from Last 3 Encounters: 
 10/25/18 36.6 C (97.9 F) 
 
 BP Readings from Last 3 Encounters: 
 19 114/52 
 10/25/18 123/59 
 18 112/54 
 
 Pulse Readings from Last 3 Encounters: 
 19 75 
 10/25/18 70 
 18 95 
 
 Vital signs:2019:    Wt: 147  Lbs.  BP:126/66    . HR. 65
 
 GENERAL:  Well developed, well nourished, in no distress.  Appears approximately stated age
.
 HEENT:  Normocephalic, atraumatic.  
 
 EYES:     PERRL, EOM normal.
 MOUTH: Oral mucosae moist, dentition adequate, no lesions noted
 NECK:    No JVD, lymphadenopathy, thyromegaly, bruits.  Carotid pulses are 2+ bilaterally
 LUNGS/CHEST:  Clear bilaterally, with no rales, rhonchi or wheezing noted, respirations unl
abored
 HEART:  Nondisplaced PMI, irregularly irregular rhythm with controlled rate, S1, S2 normal.
Murmur RUSB 3/6 .2/6 diastolic murmur LLB towards apex.  No  rubs or gallops noted.
 ABDOMEN:  Soft, nontender, no organomegaly, masses or bruits.  Bowel sounds are normal in a
ll 4 quadrants.  The abdominal aortic pulsation is not palpable.
 EXTREMITIES:  No edema. Radial pulses 2+ bilaterally.  Femoral pulses are 2+ bilaterally wi
thout bruits.  DP and PT pulses are 2+ bilaterally.  No clubbing.
 SKIN:  Warm and dry, capillary refill is normal, no lesions.
 NEUROLOGIC:  Awake, alert and oriented x 3. No focal motor or sensory deficits. 
 PSYCHIATRIC:  Appropriate, affect appears normal
 
 DATA:
 Blood tests:
 Lab Results 
 Component Value Date 
  WBC 9.88 10/25/2018 
  RBC 3.67 (L) 10/25/2018 
  HGB 10.9 (L) 10/25/2018 
 
 Lab Results 
 Component Value Date 
   10/25/2018 
  K 4.6 10/25/2018 
   10/25/2018 
  CO2 19 (L) 10/25/2018 
  ANIONGAP 16 10/25/2018 
  GLUF 147 (H) 10/25/2018 
  BUN 21 10/25/2018 
  BCR 23 10/25/2018 
  EGFR 59 (L) 10/25/2018 
 
 Lab Results 
 Component Value Date 
  GLUF 147 (H) 10/25/2018 
 
 No results found for: BNP, TSH, CRP
 No results found for: TOTEPI
 
 CARDIAC PROCEDURES/IMAGING
  PCI (10/24/18): 3.0 x 16 mm Synergy MARA in right Posterolateral artery, complicated by a s
mall branch perforation.
 
  Right/Left Cardiac Cath (18 - [facility]): RA-5, PA-mean 25, 41/13, PCWP-16, v wave-33, CO-
3.4, CI-1.93, PA sat-66%, PVI-2.64 wood units.  LM-distal 20%, LAD-mid 20-30%, LCx-distal ta
ndem 30-40% stenoses, dominant RCA-prox 30-40%, distal calcified 99%
 
 ECHO
 Limited Echo (10/25/18): EF 65-70%, no pericardial effusion
 
  
 Echo: 2018: [facility]: EF 70-75%.  LV normal in size.  Normal wall motion.  Severe biatrial
 enlargement.  RV normal in size thickness and function.  Aortic valve trileaflet, moderatel
y calcified, no AI, left coronary cusp immobile.  Serum MAC.  Mean transmitral gradient 5.5 
mmHg at heart rate of 77 bpm.  Severe MR.  Mitral valve affected regurgitant orifice area is
 7 mm.  Tricuspid valve normal, mild TR.  RVSP upper limits of normal at 39.9 mmHg, mild p
ulmonary hypertension.  Trace PI.  IVC WNL.  No pericardial effusion
 
  
 JENIFFER 2018: [facility]: LV hyperdynamic.  EF greater than 75%.  RV normal in size thickness an
d function.  Severe MAC.  Large positive calcium at the posterolateral atrial aspect of the 
mitral annulus that partially disrupts the normal mitral annular shape and size.  Mitral emely
ular area 8.26 cm with an AP diameter of 3.76 cm, and a cc diameter of 2.52 cm.  No signif
icant mitral stenosis.  Mean mitral gradient is approximately 4 mmHg at a heart rate of 83 b
pm.  Severe MR.  EROA 0.47 cm squares regurgitant volume 67 mm, calculated regurgitant fract
ion is 52%.  Mitral valve anterior leaflet measures 2 cm.  Subvalvular and papillary muscles
 had calcification noted as well.  It is some of severe MR is leaflet and annular calcificat
ion and degeneration with poor coaptation cross all portions of the valve.  Aortic valve cur
rently sclerotic and restricted no stenosis.  Severely enlarged left atrium.  Small fenestra
raymundo secundum ASD with intermittent left-to-right flow.  Moderate TR.  Small plaque involving
 the ascending and transverse aorta.  Left atrial appendage is well visualized and no thromb
us.
 
 6-minute walk test: 2018: [facility]:Gait speed (time in seconds first 15 feet walked): 4 P
atient walked: 720 feet in 6 minutes
 Beginning heart rate: 78  Beginning blood pressure: 144/70 Beginning Sp02: 99% Beginning dy
spnea index (Franchesca scale) : 0
 Ending heart rate: 90 Ending blood pressure: 157/60 Ending Sp02: 98% Ending dyspnea index (
Franchesca scale): 4
 Oxygen level: 
 Observations: No level of distress noted, patient tolerated test fairly well
 Physician interpretation:High risk 6 minute walk test with moderate reported dyspnea (b
org scale 4) , no desaturations, and inability to walk > 900 ft.
 
 EKG/EVENT MONITOR 
 EK2018: Atrial fibrillation, old anterior infarct, low voltage QRS to limb leads, a
nd anterior leads.  Rate 60 bpm,  ms,  ms, tracing personally reviewed by me.
 
 EK2019: Atrial fibrillation, old septal infarct, improved QRS voltage to leads II, 
III and aVF.  Rate 81 bpm,  ms,  ms, tracing personally reviewed by me
 
 LABS
 Labs: 10/24/2018: BMP: Sodium 136, potassium 4.2, chloride 102, glucose 119, BUN 20, creati
nine 0.91, GFR 59.  CBC: WBC 7.58, RBC 4.06, hemoglobin 11.9, hematocrit 35.7, platelets 285
 
 labs: 2019: CBC: WBC 10.6, RBC 4.72, hemoglobin 13, hematocrit 40, platelets 377
 
 Labs: : 5/15/2019: Lipids: ( simvastatin 10 mg) cholesterol 150, triglycerides 135, HDL 55,
 LDL 68 CMP: Sodium 133, potassium 4.1, chloride 100, glucose 129, BUN 32, creatinine 0.96, 
GFR 55, AST 22, ALT 18, alk phos 54, total bili 0.5, albumin 4.3.  Hgb A1c: 6.4 (137).  Thyr
oid: TSH 1.81 CBC: WBC 6.6, RBC 3.99, hemoglobin 11, hematocrit 33.8, platelets 279
 
 ASSESSMENT & PLAN:
   She  was is here today with her daughter Lauryn for 4-month follow-up.
   She  has problems as detailed below.
 Overall she seems quite stable today EKG performed in the clinic showing atrial fibrillatio
n with controlled ventricular response, and she seems stable since last seen by Dr. Mitcehll.
    Labs performed in May by her PCP are detailed above, and show that her lipids are well c
ontrolled on low-dose simvastatin 10 mg, and her CMP is normal with stable GFR in the mid 50
's, and her thyroid function is normal, but some mild anemia and her CBC, and her hemoglobin
 A1c was 6.4
    Her blood pressure is a little lower than when previously seen, and I discussed with her
 and her daughter that if it continued to trend down, or if she became lightheaded or dizzy,
 that her losartan dose may need to be decreased.  Today though she is asymptomatic.
    I made no changes to her cardiac medications today, and she should continue aspirin 81 m
g daily, furosemide 40 mg twice daily, losartan 100 mg daily, metoprolol succinate 150 mg ni
ghtly, simvastatin 10 mg nightly, and warfarin for target INR of 2-3, which is regulated by 
the [facility] .
 
  I discussed with her and her daughter today whether or not she wishes to have any further 
echoes or evaluation, as she was not felt to be a candidate for any intervention for her sev
ere valvular disease.
    They are going to think about it and let me know, and I will follow-up with her 6 months
, but sooner if needed, and she can see Dr. Mitchell in 1 year.
     
 
 1. Paroxysmal atrial fibrillation (HCC)  
 2. 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
C)  
 3. Status post insertion of drug-eluting stent into right coronary artery for coronary amaya
ry disease  
 4. Essential hypertension  
 5. Severe mitral regurgitation  
 6. Severe tricuspid regurgitation  
 7. Chronic diastolic congestive heart failure (HCC)  
 8. Pulmonary hypertension, moderate to severe (HCC)  
 9. Mixed hyperlipidemia  
 10. Stage 3 chronic kidney disease (HCC)  
 
 Orders Placed This Encounter 
 Procedures 
   ECG 12 lead 
 
 The following portions of the patient's history were personally reviewed by me  and updated
 as appropriate:
 EKG tracings, other  specialty provider and PCP notes,any Hospital admission and discharge 
summaries, any ER records , current and previous cardiac testing and procedure reports and d
viet,  medication bottles brought to visit today personally reviewed by me. 
 Allergies, current medications.labs
 Family history, past medical history, past social history, past surgical history.
 Problem list.
 
 Rosio CLARKE    [facility] Cardiology 
 2019Electronically signed by BRIAN Taylor at 2019  5:11 PM Mary Kay mcgee in this encounter
 
 Plan of Treatment
 
 
+--------+---------+------------+----------------------+-------------+
| Date   | Type    | Specialty  | Care Team            | Description |
+--------+---------+------------+----------------------+-------------+
| / | Office  | Cardiology |   [facility]    |             |
| 2020   | Visit   |            | BRIAN Neal  [address]      |             |
|        |         |            | [address]   |             |
|        |         |            | [place] [address]   |             |
|        |         |            | [phone]         |             |
|        |         |            | [phone] (Fax)   |             |
+--------+---------+------------+----------------------+-------------+
 documented as of this encounter
 
 Procedures
 
 
+----------------+--------+-------------+----------------------+----------------------+
| Procedure Name | Priori | Date/Time   | Associated Diagnosis | Comments             |
|                | ty     |             |                      |                      |
+----------------+--------+-------------+----------------------+----------------------+
| ECG 12 LEAD    | Routin | 2019  |   Paroxysmal atrial  |   Results for this   |
 
|                | e      | 12:41 PM    | fibrillation (HCC)   | procedure are in the |
|                |        | PDT         | Coronary artery      |  results section.    |
|                |        |             | disease of native    |                      |
|                |        |             | artery of native     |                      |
|                |        |             | heart with stable    |                      |
|                |        |             | angina pectoris      |                      |
|                |        |             | (HCC)  Status post   |                      |
|                |        |             | insertion of         |                      |
|                |        |             | drug-eluting stent   |                      |
|                |        |             | into right coronary  |                      |
|                |        |             | artery for coronary  |                      |
|                |        |             | artery disease       |                      |
|                |        |             | Essential            |                      |
|                |        |             | hypertension  Severe |                      |
|                |        |             |  mitral              |                      |
|                |        |             | regurgitation        |                      |
|                |        |             | Severe tricuspid     |                      |
|                |        |             | regurgitation        |                      |
|                |        |             | Chronic diastolic    |                      |
|                |        |             | congestive heart     |                      |
|                |        |             | failure (HCC)        |                      |
|                |        |             | Pulmonary            |                      |
|                |        |             | hypertension,        |                      |
|                |        |             | moderate to severe   |                      |
|                |        |             | (HCC)  Mixed         |                      |
|                |        |             | hyperlipidemia       |                      |
+----------------+--------+-------------+----------------------+----------------------+
 documented in this encounter
 
 Results
 ECG 12 lead (2019 12:41 PM PDT)
 
+-------------+--------------------------+-----------+------------+--------------+
| Component   | Value                    | Ref Range | Performed  | Pathologist  |
|             |                          |           | At         | Signature    |
+-------------+--------------------------+-----------+------------+--------------+
| VENTRICULAR | 81                       | BPM       | [facility]  |              |
|  RATE EKG   |                          |           |            |              |
+-------------+--------------------------+-----------+------------+--------------+
| ATRIAL RATE | 85                       | BPM       | [facility]  |              |
+-------------+--------------------------+-----------+------------+--------------+
| QRS         | 100                      | ms        | [facility]  |              |
| DURATION    |                          |           |            |              |
+-------------+--------------------------+-----------+------------+--------------+
| Q-T         | 386                      | ms        | [facility]  |              |
| INTERVAL    |                          |           |            |              |
+-------------+--------------------------+-----------+------------+--------------+
| Q-T         | 448                      | ms        | [facility]  |              |
| INTERVAL    |                          |           |            |              |
| (CORRECTED) |                          |           |            |              |
+-------------+--------------------------+-----------+------------+--------------+
| QRS AXIS    | 83                       | degrees   | [facility]  |              |
+-------------+--------------------------+-----------+------------+--------------+
| T AXIS      | 75                       | degrees   | [facility]  |              |
+-------------+--------------------------+-----------+------------+--------------+
| INTERPRETAT | Please refer to          |           | [facility]  |              |
| ION TEXT    | Providers office visit   |           |            |              |
|             | note for Providers       |           |            |              |
|             | Interpretation.Confirmed |           |            |              |
|             |  by [facility] Read Only,  |           |            |              |
 
|             | [facility] ([id number]),      |           |            |              |
|             |  ALFONZO SAPP |           |            |              |
|             |  ([id number]) on 2019      |           |            |              |
|             | 12:48:59 PM              |           |            |              |
+-------------+--------------------------+-----------+------------+--------------+
 
 
 
+----------+
| Specimen |
+----------+
|          |
+----------+
 
 
 
+----------------------------------------------------------+--------------+
| Narrative                                                | Performed At |
+----------------------------------------------------------+--------------+
|   This result has an attachment that is not available.   |              |
+----------------------------------------------------------+--------------+
 
 
 
+--------------+---------+--------------------+--------------+
| Performing   | Address | City/State/Zipcode | Phone Number |
| Organization |         |                    |              |
+--------------+---------+--------------------+--------------+
|   [facility]  |         |                    |              |
+--------------+---------+--------------------+--------------+
 documented in this encounter
 
 Visit Diagnoses
 
 
+-----------------------------------------------------------------------------------------+
| Diagnosis                                                                               |
+-----------------------------------------------------------------------------------------+
|   Paroxysmal atrial fibrillation (HCC) - Primary  Atrial fibrillation                   |
+-----------------------------------------------------------------------------------------+
|   Coronary artery disease of native artery of native heart with stable angina pectoris  |
| (HCC)                                                                                   |
+-----------------------------------------------------------------------------------------+
|   Status post insertion of drug-eluting stent into right coronary artery for coronary   |
| artery disease                                                                          |
+-----------------------------------------------------------------------------------------+
|   Essential hypertension  Unspecified essential hypertension                            |
+-----------------------------------------------------------------------------------------+
|   Severe mitral regurgitation  Mitral valve disorders                                   |
+-----------------------------------------------------------------------------------------+
|   Severe tricuspid regurgitation  Diseases of tricuspid valve                           |
+-----------------------------------------------------------------------------------------+
|   Chronic diastolic congestive heart failure (HCC)  Chronic diastolic heart failure     |
+-----------------------------------------------------------------------------------------+
|   Pulmonary hypertension, moderate to severe (HCC)  Other chronic pulmonary heart       |
| diseases                                                                                |
+-----------------------------------------------------------------------------------------+
|   Mixed hyperlipidemia                                                                  |
+-----------------------------------------------------------------------------------------+
|   Stage 3 chronic kidney disease (HCC)                                                  |
 
+-----------------------------------------------------------------------------------------+
 documented in this encounter

## 2020-01-01 NOTE — XMS
Encounter Summary
  Created on: 2020
 
 Vonnie Celaya
 External Reference #: [id number]
 : 32
 Sex: Female
 
 Demographics
 
 
+-----------------------+---------------------------+
| Address               | [address]            |
|                       | [place]  [address] |
+-----------------------+---------------------------+
| Home Phone            | [phone]           |
+-----------------------+---------------------------+
| Preferred Language    | Unknown                   |
+-----------------------+---------------------------+
| Marital Status        |                    |
+-----------------------+---------------------------+
| Adventist Affiliation | Unknown                   |
+-----------------------+---------------------------+
| Race                  | Unknown                   |
+-----------------------+---------------------------+
| Ethnic Group          | Unknown                   |
+-----------------------+---------------------------+
 
 
 Author
 
 
+--------------+--------------------------------------------+
| Author       | Located within [facility]  |
|              | and [place]                                |
+--------------+--------------------------------------------+
| Organization | Located within [facility]  |
|              | and [place]                                |
+--------------+--------------------------------------------+
| Address      | Unknown                                    |
+--------------+--------------------------------------------+
| Phone        | Unavailable                                |
+--------------+--------------------------------------------+
 
 
 
 Support
 
 
+--------------+--------------+---------+-----------------+
| Name         | Relationship | Address | Phone           |
+--------------+--------------+---------+-----------------+
| Lauryn Leroy | ECON         | Unknown | [phone] |
+--------------+--------------+---------+-----------------+
 
 
 
 Care Team Providers
 
 
 
+------------------------+------+-----------------+
| Care Team Member Name  | Role | Phone           |
+------------------------+------+-----------------+
| Jean Bermudez MD | PCP  | [phone] |
+------------------------+------+-----------------+
 
 
 
 Reason for Visit
 
 
+--------------------+----------+
| Reason             | Comments |
+--------------------+----------+
| Follow-up, Office  | 4 month  |
| Visit              |          |
+--------------------+----------+
 
 
 
 Encounter Details
 
 
+--------+---------+----------------------+----------------------+----------------------+
| Date   | Type    | Department           | Care Team            | Description          |
+--------+---------+----------------------+----------------------+----------------------+
| / | Office  |   [facility]      |   Bre Justice    | Paroxysmal atrial    |
| 2019   | Visit   | CARDIOLOGY [facility] | [address]      | fibrillation (HCC)   |
|        |         |   [address]    | [address]   | (Primary Dx);        |
|        |         | [address]          | [place] [address]   | Coronary artery      |
|        |         | [place]        | [phone]         | disease of native    |
|        |         | [address]           | [phone] (Fax)   | artery of native     |
|        |         | [phone]         |                      | heart with stable    |
|        |         |                      |                      | angina pectoris      |
|        |         |                      |                      | (HCC); Status post   |
|        |         |                      |                      | insertion of         |
|        |         |                      |                      | drug-eluting stent   |
|        |         |                      |                      | into right coronary  |
|        |         |                      |                      | artery for coronary  |
|        |         |                      |                      | artery disease;      |
|        |         |                      |                      | Essential            |
|        |         |                      |                      | hypertension; Severe |
|        |         |                      |                      |  mitral              |
|        |         |                      |                      | regurgitation;       |
|        |         |                      |                      | Severe tricuspid     |
|        |         |                      |                      | regurgitation;       |
|        |         |                      |                      | Chronic diastolic    |
|        |         |                      |                      | congestive heart     |
|        |         |                      |                      | failure (HCC);       |
|        |         |                      |                      | Pulmonary            |
|        |         |                      |                      | hypertension,        |
|        |         |                      |                      | moderate to severe   |
|        |         |                      |                      | (HCC); Mixed         |
|        |         |                      |                      | hyperlipidemia;      |
|        |         |                      |                      | Stage 3 chronic      |
|        |         |                      |                      | kidney disease (HCC) |
+--------+---------+----------------------+----------------------+----------------------+
 
 
 
 
 Social History
 
 
+--------------+-------+-----------+--------+------+
| Tobacco Use  | Types | Packs/Day | Years  | Date |
|              |       |           | Used   |      |
+--------------+-------+-----------+--------+------+
| Never Smoker |       |           |        |      |
+--------------+-------+-----------+--------+------+
 
 
 
+---------------------+---+---+---+
| Smokeless Tobacco:  |   |   |   |
| Never Used          |   |   |   |
+---------------------+---+---+---+
 
 
 
+---------------+-------------+---------+----------+
| Alcohol Use   | Drinks/Week | oz/Week | Comments |
+---------------+-------------+---------+----------+
| Not Currently |             |         |          |
+---------------+-------------+---------+----------+
 
 
 
+------------------+---------------+
| Sex Assigned at  | Date Recorded |
| Birth            |               |
+------------------+---------------+
| Not on file      |               |
+------------------+---------------+
 
 
 
+----------------+-------------+-------------+
| Job Start Date | Occupation  | Industry    |
+----------------+-------------+-------------+
| Not on file    | Not on file | Not on file |
+----------------+-------------+-------------+
 
 
 
+----------------+--------------+------------+
| Travel History | Travel Start | Travel End |
+----------------+--------------+------------+
 
 
 
+-------------------------------------+
| No recent travel history available. |
+-------------------------------------+
 documented as of this encounter
 
 Last Filed Vital Signs
 
 
 
+-------------------+----------------------+----------------------+----------+
| Vital Sign        | Reading              | Time Taken           | Comments |
+-------------------+----------------------+----------------------+----------+
| Blood Pressure    | 114/52               | 2019 12:25 PM  |          |
|                   |                      | PDT                  |          |
+-------------------+----------------------+----------------------+----------+
| Pulse             | 75                   | 2019 12:25 PM  |          |
|                   |                      | PDT                  |          |
+-------------------+----------------------+----------------------+----------+
| Temperature       | -                    | -                    |          |
+-------------------+----------------------+----------------------+----------+
| Respiratory Rate  | -                    | -                    |          |
+-------------------+----------------------+----------------------+----------+
| Oxygen Saturation | 94%                  | 2019 12:25 PM  |          |
|                   |                      | PDT                  |          |
+-------------------+----------------------+----------------------+----------+
| Inhaled Oxygen    | -                    | -                    |          |
| Concentration     |                      |                      |          |
+-------------------+----------------------+----------------------+----------+
| Weight            | 65.3 kg (143 lb 14.4 | 2019 12:25 PM  |          |
|                   |  oz)                 | PDT                  |          |
+-------------------+----------------------+----------------------+----------+
| Height            | 160 cm (5' 3")       | 2019 12:25 PM  |          |
|                   |                      | PDT                  |          |
+-------------------+----------------------+----------------------+----------+
| Body Mass Index   | 25.49                | 2019 12:25 PM  |          |
|                   |                      | PDT                  |          |
+-------------------+----------------------+----------------------+----------+
 documented in this encounter
 
 Patient Instructions
 Patient Instructions Bre Justice BethBRIAN - 2019 12:30 PM PDT
 I made no changes to medications today, but if blood pressure gets too low, or if you becom
e dizzy, may need to decrease losartan .
 
 You seem very stable today, and can let me know if you want to do Echo next time 
 
 See me back in 6 months , see Dr. Mitchell in 1 year
 
 Electronically signed by BRIAN Taylor at 2019  1:23 PM PDT
 documented in this encounter
 
 Progress Notes
 AparnatorstenBreCherriBRIAN - 2019 12:30 PM PDTFormatting of this note might be differe
nt from the original.
 Date of visit: 2019
 Primary Care Physician: Jean Bermudez MD
 
 CHIEF COMPLAINT:  
 Chief Complaint 
 Patient presents with 
   Follow-up, Office Visit 
   4 month 
 
 HISTORY OF PRESENT ILLNESS:
    Ms. Vonnie Celaya   is a 87 year old woman who is here today for 4 month follow up. 
 She is accompanied today by her daughter Lauryn, who contributed to history, and who lives n
ext door to her in shared duplex.
  She  is a  patient of    and last seen by him 2019 .
  Today,  I  reviewed  all previous documentation available to me in electronic medical sally
 
rd and from  external sources.
    She has a history of severe valvular heart disease with  severe MR moderate to severe TR
, and mild aortic stenosis, which has contributed to chronic diastolic heart failure, chroni
c atrial fibrillation, coronary artery disease with a stent placed in right PAMELA 10/2018, sev
ere pulmonary hypertension, hypertension, hyperlipidemia, small secundum ASD with minor left
-to-right shunting, type 2 diabetes,and two   TIAs  and 
    Dr. Mitchell had noted at age 86 she was not a good candidate for open heart surgery, as she
 also had some mild memory deficits which were progressing.  He reported she had an extensiv
e evaluation at [facility] in the latter part of , and was ultimately felt not to be a good ca
ndidate for a mitral clip due to severe calcification of the mitral annulus, and not a good 
candidate for an off label TAVR valve in the mitral position.  
   He noted she should be on aspirin for life,  Plavix could be stopped at the end of her cu
rrent prescription and she should be back on warfarin for stroke prevention for atrial fibri
llation.
   He reported she previously been hospitalized from  until 2019 for d
ecompensated diastolic heart failure, that  she had ongoing dyspnea with exertion, occasiona
l PND but no orthopnea and no pedal edema and her weight had been stable and her blood press
ure well controlled and he made no changes to her medication.
  Her current and  previous testing and procedures are  detailed below .
   She reports today that she continues to have dyspnea with more extreme exertion, but over
all feels that it is stable since last seen, she is occasionally lightheaded, but it is very
 transient, her lower extremity swelling has been controlled with compression socks, and she
 has no edema today, and she has some mild ongoing fatigue.
 She denies any chest pain, palpitations, or syncope.  She also denies any signs or symptoms
 of stroke or TIA, or any emergency room admissions or hospitalizations since last seen.
    She recently saw her PCP, Dr. Bermudez , and he has started her on iron for 
anemia, and she had recent labs with him as well.
 She brought her medications to the clinic today, and personally reviewed by me.
 Ports she continues to be monitored by the [facility], and that her INRs
 have been therapeutic, and denies any bleeding.
   She is a lifelong non-smoker, does not drink alcohol and denies any use of recreational o
r illicit drug.
 Reports she is mostly independent in her activities of daily living, but does have some sit
uational depression related to her loss of ability to mobilize without a cane, and needing m
ore help from her daughter.
 
 REVIEW OF SYSTEMS:
 Negative except for pertinent items noted in HPI.
 
 Constitutional: Reports mild ongoing fatigue.  Denies unexplained weight loss.  Appetite is
 good.  Weight is stable.    Denies night sweats fevers or chills
 HENT: Denies nosebleeds.  Bilateral hearing loss.  Denies dysphagia
 Eyes: Early cataracts denies visual disturbance or double vision.  
 Respiratory/Sleep::  GARCIA more extreme exertion, not very active.  Occasional PND. Denies co
ugh. Denies hemoptysis or excessive sputum production.  Reports mild snoring, denies orthopn
ea.
 Cardiovascular: Lower extremity edema controlled with compression sock. Denies  chest pain,
 palpitations. Denies history of rheumatic fever. Denies claudication . 
 Gastrointestinal: Denies GERD. Denies PUD .  Denies  nausea, vomiting, abdominal pain and b
lood in stool. 
 Genitourinary:Chronic  Kidney disease stage III denies  hematuria.  
 Musculoskeletal: Osteoporosis, osteoarthritis.Denies myalgias.  Ambulates  with cane. 
 Skin: Eczema denies  color change.  Denies rash .
 Neurological: Short-term memory deficits.  two  TIA's:  , and  Denies history of st
roke.Denies history of seizures. Denies dizziness, syncope and numbness. 
 Hematological/Oncology .  History of iron deficiency anemia.   Denies previous blood transf
usions. bruises easily. Denies bleeding   Denies history of cancer
 Endocrine: Type II  diabetes.  Hypothyroidism.  Denies excessive thirst or hunger.  
 Psychiatric/Behavioral: Anxiety, denies previous history  of depression or  other psychiatr
ic illness.
 
 Vaccines: Current on 2018 flu vaccine.  Current on post 65  pneumonia vaccine.
 Habits/Social : Denies history of smoking.  Denies EtOH use. Drinks 2 servings of caffeine 
daily .  Denies recreational or  illicit drug use.  Exercises seldom due to joint and back p
aditya . Lives in [place], shares duplex with daughter Lauryn, independent in ADL.  .
 
 Outpatient Medications Prior to Visit 
 Medication Sig Dispense Refill 
   acetaminophen (TYLENOL) 325 mg tablet Take 650 mg by mouth every 6 (six) hours as neede
d for Pain. (Patient taking differently: Take 500 mg by mouth every 6 hours as needed (Pain)
.)   
   ascorbic acid (VITAMIN C) 1000 MG tablet Take 1,000 mg by mouth daily.   
   aspirin 81 MG tablet Take 1 tablet by mouth daily. Start 10/22/18 30 tablet 11 
   Cranberry Juice Extract 1000 MG CAPS Take 1 capsule by mouth daily.   
   furosemide (LASIX) 40 mg tablet Take 40 mg by mouth daily. (Patient taking differently:
 Take 40 mg by mouth 2 times daily.)   
   Gluc-Chonn-MSM-Boswellia-Vit D (GLUCOSAMINE CHOND TRIPLE/VIT D) TABS Take 1 tablet by m
outh daily. (Patient not taking: Reported on 2019)   
   IRON PO Take 50 mg by mouth 2 times daily. Every other day ,   
   levothyroxine (SYNTHROID) 100 mcg tablet Take 100 mcg by mouth every morning before kavita
akfast.   
   losartan (COZAAR) 100 MG tablet Take 100 mg by mouth daily.   
   magnesium chloride (MAG64) 64 mg EC tablet Take 1 tablet by mouth daily.   
   metFORMIN (GLUMETZA) 500 MG 24 hr tablet Take 500 mg by mouth 2 (two) times daily with 
meals.   
   metoprolol succinate (TOPROL-XL) 200 mg ER tablet Take 100 mg by mouth every morning. (
Patient not taking: Reported on 2019)   
   metoprolol succinate (TOPROL-XL) 50 mg 24 hr tablet Take 150 mg by mouth Daily. Take th
ree tablets by mouth daily at bedtime    
   potassium chloride (KLOR-CON M20) 20 mEq ER tablet Take 20 mEq by mouth daily.   
   simvastatin (ZOCOR) 20 mg tablet Take 10 mg by mouth nightly.   
   Turmeric Curcumin 500 MG CAPS Take  by mouth.   
   warfarin (COUMADIN) 5 mg tablet Take 5 mg by mouth daily. 1 pill daily Sun-Mon-Wed-Fri-
Sat, 1/2 pill (2.5 mg) Tue-Thur   
 
 No facility-administered medications prior to visit.  
 
 PHYSICAL EXAM:
 Wt Readings from Last 3 Encounters: 
 19 65.3 kg (143 lb 14.4 oz) 
 10/25/18 66.7 kg (147 lb 0.8 oz) 
 18 66 kg (145 lb 6.4 oz) 
 
 Temp Readings from Last 3 Encounters: 
 10/25/18 36.6 C (97.9 F) 
 
 BP Readings from Last 3 Encounters: 
 19 114/52 
 10/25/18 123/59 
 18 112/54 
 
 Pulse Readings from Last 3 Encounters: 
 19 75 
 10/25/18 70 
 18 95 
 
 Vital signs:2019:    Wt: 147  Lbs.  BP:126/66    . HR. 65
 
 GENERAL:  Well developed, well nourished, in no distress.  Appears approximately stated age
.
 HEENT:  Normocephalic, atraumatic.  
 
 EYES:     PERRL, EOM normal.
 MOUTH: Oral mucosae moist, dentition adequate, no lesions noted
 NECK:    No JVD, lymphadenopathy, thyromegaly, bruits.  Carotid pulses are 2+ bilaterally
 LUNGS/CHEST:  Clear bilaterally, with no rales, rhonchi or wheezing noted, respirations unl
abored
 HEART:  Nondisplaced PMI, irregularly irregular rhythm with controlled rate, S1, S2 normal.
Murmur RUSB 3/6 .2/6 diastolic murmur LLB towards apex.  No  rubs or gallops noted.
 ABDOMEN:  Soft, nontender, no organomegaly, masses or bruits.  Bowel sounds are normal in a
ll 4 quadrants.  The abdominal aortic pulsation is not palpable.
 EXTREMITIES:  No edema. Radial pulses 2+ bilaterally.  Femoral pulses are 2+ bilaterally wi
thout bruits.  DP and PT pulses are 2+ bilaterally.  No clubbing.
 SKIN:  Warm and dry, capillary refill is normal, no lesions.
 NEUROLOGIC:  Awake, alert and oriented x 3. No focal motor or sensory deficits. 
 PSYCHIATRIC:  Appropriate, affect appears normal
 
 DATA:
 Blood tests:
 Lab Results 
 Component Value Date 
  WBC 9.88 10/25/2018 
  RBC 3.67 (L) 10/25/2018 
  HGB 10.9 (L) 10/25/2018 
 
 Lab Results 
 Component Value Date 
   10/25/2018 
  K 4.6 10/25/2018 
   10/25/2018 
  CO2 19 (L) 10/25/2018 
  ANIONGAP 16 10/25/2018 
  GLUF 147 (H) 10/25/2018 
  BUN 21 10/25/2018 
  BCR 23 10/25/2018 
  EGFR 59 (L) 10/25/2018 
 
 Lab Results 
 Component Value Date 
  GLUF 147 (H) 10/25/2018 
 
 No results found for: BNP, TSH, CRP
 No results found for: TOTEPI
 
 CARDIAC PROCEDURES/IMAGING
  PCI (10/24/18): 3.0 x 16 mm Synergy MARA in right Posterolateral artery, complicated by a s
mall branch perforation.
 
  Right/Left Cardiac Cath (18 - [facility]): RA-5, PA-mean 25, 41/13, PCWP-16, v wave-33, CO-
3.4, CI-1.93, PA sat-66%, PVI-2.64 wood units.  LM-distal 20%, LAD-mid 20-30%, LCx-distal ta
ndem 30-40% stenoses, dominant RCA-prox 30-40%, distal calcified 99%
 
 ECHO
 Limited Echo (10/25/18): EF 65-70%, no pericardial effusion
 
  
 Echo: 2018: [facility]: EF 70-75%.  LV normal in size.  Normal wall motion.  Severe biatrial
 enlargement.  RV normal in size thickness and function.  Aortic valve trileaflet, moderatel
y calcified, no AI, left coronary cusp immobile.  Serum MAC.  Mean transmitral gradient 5.5 
mmHg at heart rate of 77 bpm.  Severe MR.  Mitral valve affected regurgitant orifice area is
 7 mm.  Tricuspid valve normal, mild TR.  RVSP upper limits of normal at 39.9 mmHg, mild p
ulmonary hypertension.  Trace PI.  IVC WNL.  No pericardial effusion
 
  
 JENIFFER 2018: [facility]: LV hyperdynamic.  EF greater than 75%.  RV normal in size thickness an
d function.  Severe MAC.  Large positive calcium at the posterolateral atrial aspect of the 
mitral annulus that partially disrupts the normal mitral annular shape and size.  Mitral emely
ular area 8.26 cm with an AP diameter of 3.76 cm, and a cc diameter of 2.52 cm.  No signif
icant mitral stenosis.  Mean mitral gradient is approximately 4 mmHg at a heart rate of 83 b
pm.  Severe MR.  EROA 0.47 cm squares regurgitant volume 67 mm, calculated regurgitant fract
ion is 52%.  Mitral valve anterior leaflet measures 2 cm.  Subvalvular and papillary muscles
 had calcification noted as well.  It is some of severe MR is leaflet and annular calcificat
ion and degeneration with poor coaptation cross all portions of the valve.  Aortic valve cur
rently sclerotic and restricted no stenosis.  Severely enlarged left atrium.  Small fenestra
raymundo secundum ASD with intermittent left-to-right flow.  Moderate TR.  Small plaque involving
 the ascending and transverse aorta.  Left atrial appendage is well visualized and no thromb
us.
 
 6-minute walk test: 2018: [facility]:Gait speed (time in seconds first 15 feet walked): 4 P
atient walked: 720 feet in 6 minutes
 Beginning heart rate: 78  Beginning blood pressure: 144/70 Beginning Sp02: 99% Beginning dy
spnea index (Franchesca scale) : 0
 Ending heart rate: 90 Ending blood pressure: 157/60 Ending Sp02: 98% Ending dyspnea index (
Franchesca scale): 4
 Oxygen level: 
 Observations: No level of distress noted, patient tolerated test fairly well
 Physician interpretation:High risk 6 minute walk test with moderate reported dyspnea (b
org scale 4) , no desaturations, and inability to walk > 900 ft.
 
 EKG/EVENT MONITOR 
 EK2018: Atrial fibrillation, old anterior infarct, low voltage QRS to limb leads, a
nd anterior leads.  Rate 60 bpm,  ms,  ms, tracing personally reviewed by me.
 
 EK2019: Atrial fibrillation, old septal infarct, improved QRS voltage to leads II, 
III and aVF.  Rate 81 bpm,  ms,  ms, tracing personally reviewed by me
 
 LABS
 Labs: 10/24/2018: BMP: Sodium 136, potassium 4.2, chloride 102, glucose 119, BUN 20, creati
nine 0.91, GFR 59.  CBC: WBC 7.58, RBC 4.06, hemoglobin 11.9, hematocrit 35.7, platelets 285
 
 labs: 2019: CBC: WBC 10.6, RBC 4.72, hemoglobin 13, hematocrit 40, platelets 377
 
 Labs: : 5/15/2019: Lipids: ( simvastatin 10 mg) cholesterol 150, triglycerides 135, HDL 55,
 LDL 68 CMP: Sodium 133, potassium 4.1, chloride 100, glucose 129, BUN 32, creatinine 0.96, 
GFR 55, AST 22, ALT 18, alk phos 54, total bili 0.5, albumin 4.3.  Hgb A1c: 6.4 (137).  Thyr
oid: TSH 1.81 CBC: WBC 6.6, RBC 3.99, hemoglobin 11, hematocrit 33.8, platelets 279
 
 ASSESSMENT & PLAN:
   She  was is here today with her daughter Lauryn for 4-month follow-up.
   She  has problems as detailed below.
 Overall she seems quite stable today EKG performed in the clinic showing atrial fibrillatio
n with controlled ventricular response, and she seems stable since last seen by Dr. Mitchell.
    Labs performed in May by her PCP are detailed above, and show that her lipids are well c
ontrolled on low-dose simvastatin 10 mg, and her CMP is normal with stable GFR in the mid 50
's, and her thyroid function is normal, but some mild anemia and her CBC, and her hemoglobin
 A1c was 6.4
    Her blood pressure is a little lower than when previously seen, and I discussed with her
 and her daughter that if it continued to trend down, or if she became lightheaded or dizzy,
 that her losartan dose may need to be decreased.  Today though she is asymptomatic.
    I made no changes to her cardiac medications today, and she should continue aspirin 81 m
g daily, furosemide 40 mg twice daily, losartan 100 mg daily, metoprolol succinate 150 mg ni
ghtly, simvastatin 10 mg nightly, and warfarin for target INR of 2-3, which is regulated by 
the [facility] .
 
  I discussed with her and her daughter today whether or not she wishes to have any further 
echoes or evaluation, as she was not felt to be a candidate for any intervention for her sev
ere valvular disease.
    They are going to think about it and let me know, and I will follow-up with her 6 months
, but sooner if needed, and she can see Dr. Mitchell in 1 year.
     
 
 1. Paroxysmal atrial fibrillation (HCC)  
 2. 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
C)  
 3. Status post insertion of drug-eluting stent into right coronary artery for coronary amaya
ry disease  
 4. Essential hypertension  
 5. Severe mitral regurgitation  
 6. Severe tricuspid regurgitation  
 7. Chronic diastolic congestive heart failure (HCC)  
 8. Pulmonary hypertension, moderate to severe (HCC)  
 9. Mixed hyperlipidemia  
 10. Stage 3 chronic kidney disease (HCC)  
 
 Orders Placed This Encounter 
 Procedures 
   ECG 12 lead 
 
 The following portions of the patient's history were personally reviewed by me  and updated
 as appropriate:
 EKG tracings, other  specialty provider and PCP notes,any Hospital admission and discharge 
summaries, any ER records , current and previous cardiac testing and procedure reports and d
viet,  medication bottles brought to visit today personally reviewed by me. 
 Allergies, current medications.labs
 Family history, past medical history, past social history, past surgical history.
 Problem list.
 
 Rosio CLARKE    [facility] 
 2019Electronically signed by BRIAN Taylor at 2019  5:11 PM Mary Kay mcgee in this encounter
 
 Plan of Treatment
 
 
+--------+---------+------------+----------------------+-------------+
| Date   | Type    | Specialty  | Care Team            | Description |
+--------+---------+------------+----------------------+-------------+
| / | Office  | Cardiology |   [facility]    |             |
| 2020   | Visit   |            | BRIAN Neal  [address]      |             |
|        |         |            | [address]   |             |
|        |         |            | [place] [address]   |             |
|        |         |            | [phone]         |             |
|        |         |            | [phone] (Fax)   |             |
+--------+---------+------------+----------------------+-------------+
 documented as of this encounter
 
 Procedures
 
 
+----------------+--------+-------------+----------------------+----------------------+
| Procedure Name | Priori | Date/Time   | Associated Diagnosis | Comments             |
|                | ty     |             |                      |                      |
+----------------+--------+-------------+----------------------+----------------------+
| ECG 12 LEAD    | Routin | 2019  |   Paroxysmal atrial  |   Results for this   |
 
|                | e      | 12:41 PM    | fibrillation (HCC)   | procedure are in the |
|                |        | PDT         | Coronary artery      |  results section.    |
|                |        |             | disease of native    |                      |
|                |        |             | artery of native     |                      |
|                |        |             | heart with stable    |                      |
|                |        |             | angina pectoris      |                      |
|                |        |             | (HCC)  Status post   |                      |
|                |        |             | insertion of         |                      |
|                |        |             | drug-eluting stent   |                      |
|                |        |             | into right coronary  |                      |
|                |        |             | artery for coronary  |                      |
|                |        |             | artery disease       |                      |
|                |        |             | Essential            |                      |
|                |        |             | hypertension  Severe |                      |
|                |        |             |  mitral              |                      |
|                |        |             | regurgitation        |                      |
|                |        |             | Severe tricuspid     |                      |
|                |        |             | regurgitation        |                      |
|                |        |             | Chronic diastolic    |                      |
|                |        |             | congestive heart     |                      |
|                |        |             | failure (HCC)        |                      |
|                |        |             | Pulmonary            |                      |
|                |        |             | hypertension,        |                      |
|                |        |             | moderate to severe   |                      |
|                |        |             | (HCC)  Mixed         |                      |
|                |        |             | hyperlipidemia       |                      |
+----------------+--------+-------------+----------------------+----------------------+
 documented in this encounter
 
 Results
 ECG 12 lead (2019 12:41 PM PDT)
 
+-------------+--------------------------+-----------+------------+--------------+
| Component   | Value                    | Ref Range | Performed  | Pathologist  |
|             |                          |           | At         | Signature    |
+-------------+--------------------------+-----------+------------+--------------+
| VENTRICULAR | 81                       | BPM       | [facility]  |              |
|  RATE EKG   |                          |           |            |              |
+-------------+--------------------------+-----------+------------+--------------+
| ATRIAL RATE | 85                       | BPM       | [facility]  |              |
+-------------+--------------------------+-----------+------------+--------------+
| QRS         | 100                      | ms        | [facility]  |              |
| DURATION    |                          |           |            |              |
+-------------+--------------------------+-----------+------------+--------------+
| Q-T         | 386                      | ms        | [facility]  |              |
| INTERVAL    |                          |           |            |              |
+-------------+--------------------------+-----------+------------+--------------+
| Q-T         | 448                      | ms        | [facility]  |              |
| INTERVAL    |                          |           |            |              |
| (CORRECTED) |                          |           |            |              |
+-------------+--------------------------+-----------+------------+--------------+
| QRS AXIS    | 83                       | degrees   | [facility]  |              |
+-------------+--------------------------+-----------+------------+--------------+
| T AXIS      | 75                       | degrees   | [facility]  |              |
+-------------+--------------------------+-----------+------------+--------------+
| INTERPRETAT | Please refer to          |           | [facility]  |              |
| ION TEXT    | Providers office visit   |           |            |              |
|             | note for Providers       |           |            |              |
|             | Interpretation.Confirmed |           |            |              |
|             |  by [facility] Lena Read Only,  |           |            |              |
 
|             | [facility] Anibal ([id number]),      |           |            |              |
|             |  ALFONZO SAPP |           |            |              |
|             |  ([id number]) on 2019      |           |            |              |
|             | 12:48:59 PM              |           |            |              |
+-------------+--------------------------+-----------+------------+--------------+
 
 
 
+----------+
| Specimen |
+----------+
|          |
+----------+
 
 
 
+----------------------------------------------------------+--------------+
| Narrative                                                | Performed At |
+----------------------------------------------------------+--------------+
|   This result has an attachment that is not available.   |              |
+----------------------------------------------------------+--------------+
 
 
 
+--------------+---------+--------------------+--------------+
| Performing   | Address | City/State/Zipcode | Phone Number |
| Organization |         |                    |              |
+--------------+---------+--------------------+--------------+
|   [facility]  |         |                    |              |
+--------------+---------+--------------------+--------------+
 documented in this encounter
 
 Visit Diagnoses
 
 
+-----------------------------------------------------------------------------------------+
| Diagnosis                                                                               |
+-----------------------------------------------------------------------------------------+
|   Paroxysmal atrial fibrillation (HCC) - Primary  Atrial fibrillation                   |
+-----------------------------------------------------------------------------------------+
|   Coronary artery disease of native artery of native heart with stable angina pectoris  |
| (HCC)                                                                                   |
+-----------------------------------------------------------------------------------------+
|   Status post insertion of drug-eluting stent into right coronary artery for coronary   |
| artery disease                                                                          |
+-----------------------------------------------------------------------------------------+
|   Essential hypertension  Unspecified essential hypertension                            |
+-----------------------------------------------------------------------------------------+
|   Severe mitral regurgitation  Mitral valve disorders                                   |
+-----------------------------------------------------------------------------------------+
|   Severe tricuspid regurgitation  Diseases of tricuspid valve                           |
+-----------------------------------------------------------------------------------------+
|   Chronic diastolic congestive heart failure (HCC)  Chronic diastolic heart failure     |
+-----------------------------------------------------------------------------------------+
|   Pulmonary hypertension, moderate to severe (HCC)  Other chronic pulmonary heart       |
| diseases                                                                                |
+-----------------------------------------------------------------------------------------+
|   Mixed hyperlipidemia                                                                  |
+-----------------------------------------------------------------------------------------+
|   Stage 3 chronic kidney disease (HCC)                                                  |
 
+-----------------------------------------------------------------------------------------+
 documented in this encounter

## 2020-01-01 NOTE — XMS
Encounter Summary
  Created on: 2020
 
 Vonnie Celaya
 External Reference #: [id number]
 : 32
 Sex: Female
 
 Demographics
 
 
+-----------------------+------------------------+
| Address               | [address]         |
|                       | [place]  [address]   |
+-----------------------+------------------------+
| Home Phone            | [phone]        |
+-----------------------+------------------------+
| Preferred Language    | Unknown                |
+-----------------------+------------------------+
| Marital Status        | Single                 |
+-----------------------+------------------------+
| [employer] Affiliation | NON                    |
+-----------------------+------------------------+
| Race                  | White                  |
+-----------------------+------------------------+
| Ethnic Group          | Not  or  |
+-----------------------+------------------------+
 
 
 Author
 
 
+--------------+------------------------------+
| Author       | [facility] |
+--------------+------------------------------+
| Organization | [facility] |
+--------------+------------------------------+
| Address      | Unknown                      |
+--------------+------------------------------+
| Phone        | Unavailable                  |
+--------------+------------------------------+
 
 
 
 Support
 
 
+--------------+--------------+---------+-----------------+
| Name         | Relationship | Address | Phone           |
+--------------+--------------+---------+-----------------+
| Lauryn Leroy | ECON         | Unknown | [phone] |
+--------------+--------------+---------+-----------------+
 
 
 
 Care Team Providers
 
 
 
+------------------------+------+-----------------+
| Care Team Member Name  | Role | Phone           |
+------------------------+------+-----------------+
| Jean Bermudez MD | PCP  | [phone] |
+------------------------+------+-----------------+
 
 
 
 Encounter Details
 
 
+--------+-------------+----------------------+----------------------+----------------------
+
| Date   | Type        | Department           | Care Team            | Description          
|
+--------+-------------+----------------------+----------------------+----------------------
+
| / |  |   Cardiology at [facility]  |   Jame Wilkins  | Mitral valve         
|
| 2018   |             |  [address]    | MD AIDA  [address]  | insufficiency,       
|
|        |             | Mailcode: [address]       | Ave  [place], [place]    | unspecified etiology 
|
|        |             | [place] [facility]    | [address]           |  (Primary Dx)        
|
|        |             | and Healing,         | [phone]         |                      
|
|        |             |       | [phone] (Fax)   |                      
|
|        |             | Floor  [place], [place]  |                      |                      
|
|        |             | [address]           |                      |                      
|
|        |             | [phone]         |                      |                      
|
+--------+-------------+----------------------+----------------------+----------------------
+
 
 
 
 Social History
 
 
+----------------+-------+-----------+--------+------+
| Tobacco Use    | Types | Packs/Day | Years  | Date |
|                |       |           | Used   |      |
+----------------+-------+-----------+--------+------+
| Never Assessed |       |           |        |      |
+----------------+-------+-----------+--------+------+
 
 
 
+------------------+---------------+
| Sex Assigned at  | Date Recorded |
| Birth            |               |
+------------------+---------------+
| Not on file      |               |
+------------------+---------------+
 
 
 
 
+----------------+-------------+-------------+
| Job Start Date | Occupation  | Industry    |
+----------------+-------------+-------------+
| Not on file    | Not on file | Not on file |
+----------------+-------------+-------------+
 
 
 
+----------------+--------------+------------+
| Travel History | Travel Start | Travel End |
+----------------+--------------+------------+
 
 
 
+-------------------------------------+
| No recent travel history available. |
+-------------------------------------+
 documented as of this encounter
 
 Plan of Treatment
 
 
+----------------------+------+--------+----------------------+------------+
| Name                 | Type | Priori | Associated Diagnoses | Date/Time  |
|                      |      | ty     |                      |            |
+----------------------+------+--------+----------------------+------------+
| 6-MINUTE WALK TEST - | ECG  | Routin |   Mitral valve       | 2018 |
|  ECG                 |      | e      | insufficiency,       |            |
|                      |      |        | unspecified etiology |            |
+----------------------+------+--------+----------------------+------------+
 documented as of this encounter
 
 Procedures
 
 
+----------------+--------+-------------+----------------------+----------------------+
| Procedure Name | Priori | Date/Time   | Associated Diagnosis | Comments             |
|                | ty     |             |                      |                      |
+----------------+--------+-------------+----------------------+----------------------+
| 12 LEAD ECG    | Routin | 2018  |   Mitral valve       |   Results for this   |
|                | e      | 12:04 PM    | insufficiency,       | procedure are in the |
|                |        | PDT         | unspecified etiology |  results section.    |
+----------------+--------+-------------+----------------------+----------------------+
 documented in this encounter
 
 Results
 12 LEAD ECG (2018 12:04 PM PDT)
 
+-------------+-------------------------+-----------+------------+--------------+
| Component   | Value                   | Ref Range | Performed  | Pathologist  |
|             |                         |           | At         | Signature    |
+-------------+-------------------------+-----------+------------+--------------+
| VENTRICULAR | 80                      | bpm       | [facility]  |              |
|  RATE       |                         |           | OF         |              |
|             |                         |           | CARDIOLOGY |              |
+-------------+-------------------------+-----------+------------+--------------+
| ATRIAL RATE | 0                       | ms        | [facility]  |              |
|             |                         |           | OF         |              |
|             |                         |           | CARDIOLOGY |              |
 
+-------------+-------------------------+-----------+------------+--------------+
| P-R         |                         | ms        | [facility]  |              |
| INTERVAL    |                         |           | OF         |              |
|             |                         |           | CARDIOLOGY |              |
+-------------+-------------------------+-----------+------------+--------------+
| P AXIS      |                         | deg       | [facility]  |              |
|             |                         |           | OF         |              |
|             |                         |           | CARDIOLOGY |              |
+-------------+-------------------------+-----------+------------+--------------+
| QRS         | 107                     | ms        | [facility]  |              |
| DURATION    |                         |           | OF         |              |
|             |                         |           | CARDIOLOGY |              |
+-------------+-------------------------+-----------+------------+--------------+
| QT          | 393                     | ms        | [facility]  |              |
|             |                         |           | OF         |              |
|             |                         |           | CARDIOLOGY |              |
+-------------+-------------------------+-----------+------------+--------------+
| QTC-BAGUNNERTT  | 451                     | ms        | [facility]  |              |
|             |                         |           | OF         |              |
|             |                         |           | CARDIOLOGY |              |
+-------------+-------------------------+-----------+------------+--------------+
| R AXIS      | -48                     | deg       | [facility]  |              |
|             |                         |           | OF         |              |
|             |                         |           | CARDIOLOGY |              |
+-------------+-------------------------+-----------+------------+--------------+
| T AXIS      | 6                       | deg       | [facility]  |              |
|             |                         |           | OF         |              |
|             |                         |           | CARDIOLOGY |              |
+-------------+-------------------------+-----------+------------+--------------+
| ECG         | Atrial fibrillation     |           | [facility]  |              |
| IMPRESSION  |                         |           | OF         |              |
|             |                         |           | CARDIOLOGY |              |
+-------------+-------------------------+-----------+------------+--------------+
| ECG         | Minimal ST depression,  |           | [facility]  |              |
| IMPRESSION  | inferior leads          |           | OF         |              |
|             |                         |           | CARDIOLOGY |              |
+-------------+-------------------------+-----------+------------+--------------+
| ECG         | Minimal ST elevation,   |           | [facility]  |              |
| IMPRESSION  | anterolateral leads-    |           | OF         |              |
|             | ABNORMAL ECG -          |           | CARDIOLOGY |              |
+-------------+-------------------------+-----------+------------+--------------+
| ECG         | Electronically signed   |           | [facility]  |              |
| IMPRESSION  | by: IZABELA MARTI        |           | OF         |              |
|             | 2018 17:39:25     |           | CARDIOLOGY |              |
+-------------+-------------------------+-----------+------------+--------------+
 
 
 
+----------+
| Specimen |
+----------+
|          |
+----------+
 
 
 
+----------------------------------------------------------+--------------+
| Narrative                                                | Performed At |
+----------------------------------------------------------+--------------+
|   This result has an attachment that is not available.   |              |
 
+----------------------------------------------------------+--------------+
 
 
 
+-----------------+------------------------+--------------------+--------------+
| Performing      | Address                | City/State/Zipcode | Phone Number |
| Organization    |                        |                    |              |
+-----------------+------------------------+--------------------+--------------+
|   [facility]  |   [address]  | [place], [place]       |              |
| CARDIOLOGY      | [address]              | [address]         |              |
+-----------------+------------------------+--------------------+--------------+
 documented in this encounter
 
 Visit Diagnoses
 
 
+--------------------------------------------------------------+
| Diagnosis                                                    |
+--------------------------------------------------------------+
|   Mitral valve insufficiency, unspecified etiology - Primary |
+--------------------------------------------------------------+
 documented in this encounter

## 2020-01-01 NOTE — XMS
Encounter Summary
  Created on: 2020
 
 Vonnie Celaya
 External Reference #: [id number]
 : 32
 Sex: Female
 
 Demographics
 
 
+-----------------------+------------------------+
| Address               | [address]         |
|                       | [place]  [address]   |
+-----------------------+------------------------+
| Home Phone            | [phone]        |
+-----------------------+------------------------+
| Preferred Language    | Unknown                |
+-----------------------+------------------------+
| Marital Status        | Single                 |
+-----------------------+------------------------+
| [employer] Affiliation | NON                    |
+-----------------------+------------------------+
| Race                  | White                  |
+-----------------------+------------------------+
| Ethnic Group          | Not  or  |
+-----------------------+------------------------+
 
 
 Author
 
 
+--------------+------------------------------+
| Author       | [facility] |
+--------------+------------------------------+
| Organization | [facility] |
+--------------+------------------------------+
| Address      | Unknown                      |
+--------------+------------------------------+
| Phone        | Unavailable                  |
+--------------+------------------------------+
 
 
 
 Support
 
 
+--------------+--------------+---------+-----------------+
| Name         | Relationship | Address | Phone           |
+--------------+--------------+---------+-----------------+
| Lauryn Leroy | ECON         | Unknown | [phone] |
+--------------+--------------+---------+-----------------+
 
 
 
 Care Team Providers
 
 
 
+------------------------+------+-----------------+
| Care Team Member Name  | Role | Phone           |
+------------------------+------+-----------------+
| Jean Bermudez MD | PCP  | [phone] |
+------------------------+------+-----------------+
 
 
 
 Reason for Visit
 
 
+---------------------+----------+
| Reason              | Comments |
+---------------------+----------+
| Education procedure | JENIFFER      |
+---------------------+----------+
 
 
 
 Encounter Details
 
 
+--------+-----------+----------------------+----------------------+----------------------+
| Date   | Type      | Department           | Care Team            | Description          |
+--------+-----------+----------------------+----------------------+----------------------+
| / | Telephone |   Cardiac Cath Lab   |   Sandra Joseph RN   | Education procedure  |
| 2018   |           | at [facility]  [address]  | [address]  | (JENIFFER)                |
|        |           | [address]      | [place]  [place],   |                      |
|        |           | [facility]        | [place] [address]        |                      |
|        |           | [place], [place]         |                      |                      |
|        |           | [address]           |                      |                      |
|        |           | [phone]         |                      |                      |
+--------+-----------+----------------------+----------------------+----------------------+
 
 
 
 Social History
 
 
+----------------+-------+-----------+--------+------+
| Tobacco Use    | Types | Packs/Day | Years  | Date |
|                |       |           | Used   |      |
+----------------+-------+-----------+--------+------+
| Never Assessed |       |           |        |      |
+----------------+-------+-----------+--------+------+
 
 
 
+------------------+---------------+
| Sex Assigned at  | Date Recorded |
| Birth            |               |
+------------------+---------------+
| Not on file      |               |
+------------------+---------------+
 
 
 
+----------------+-------------+-------------+
| Job Start Date | Occupation  | Industry    |
+----------------+-------------+-------------+
 
| Not on file    | Not on file | Not on file |
+----------------+-------------+-------------+
 
 
 
+----------------+--------------+------------+
| Travel History | Travel Start | Travel End |
+----------------+--------------+------------+
 
 
 
+-------------------------------------+
| No recent travel history available. |
+-------------------------------------+
 documented as of this encounter
 
 Plan of Treatment
 Not on filedocumented as of this encounter
 
 Visit Diagnoses
 Not on filedocumented in this encounter

## 2020-01-01 NOTE — XMS
Encounter Summary
  Created on: 2020
 
 Vonnie Celaya
 External Reference #: [id number]
 : 32
 Sex: Female
 
 Demographics
 
 
+-----------------------+------------------------+
| Address               | [address]         |
|                       | [place]  [address]   |
+-----------------------+------------------------+
| Home Phone            | [phone]        |
+-----------------------+------------------------+
| Preferred Language    | Unknown                |
+-----------------------+------------------------+
| Marital Status        | Single                 |
+-----------------------+------------------------+
| Quaker Affiliation | NON                    |
+-----------------------+------------------------+
| Race                  | White                  |
+-----------------------+------------------------+
| Ethnic Group          | Not  or  |
+-----------------------+------------------------+
 
 
 Author
 
 
+--------------+------------------------------+
| Author       | [facility] |
+--------------+------------------------------+
| Organization | [facility] |
+--------------+------------------------------+
| Address      | Unknown                      |
+--------------+------------------------------+
| Phone        | Unavailable                  |
+--------------+------------------------------+
 
 
 
 Support
 
 
+--------------+--------------+---------+-----------------+
| Name         | Relationship | Address | Phone           |
+--------------+--------------+---------+-----------------+
| Lauryn Leroy | ECON         | Unknown | [phone] |
+--------------+--------------+---------+-----------------+
 
 
 
 Care Team Providers
 
 
 
+------------------------+------+-----------------+
| Care Team Member Name  | Role | Phone           |
+------------------------+------+-----------------+
| Jean Bermudez MD | PCP  | [phone] |
+------------------------+------+-----------------+
 
 
 
 Encounter Details
 
 
+--------+-----------+----------------------+----------------------+-------------+
| Date   | Type      | Department           | Care Team            | Description |
+--------+-----------+----------------------+----------------------+-------------+
| / | Hospital  |   Cardiac            |   Sj, Car Ecg Tech  |             |
| 2018   | Encounter | Non-Invasive Testing |  [address]        |             |
|        |           |  at [facility] | [facility]    |             |
|        |           |   [address]   | [place] [address]   |             |
|        |           | Loop  Mailcode:      |                      |             |
|        |           | [id number]  [address]   |                      |             |
|        |           | [facility]        |                      |             |
|        |           | [place], [place]         |                      |             |
|        |           | [address]           |                      |             |
|        |           | [phone]         |                      |             |
+--------+-----------+----------------------+----------------------+-------------+
 
 
 
 Social History
 
 
+--------------+-------+-----------+--------+------+
| Tobacco Use  | Types | Packs/Day | Years  | Date |
|              |       |           | Used   |      |
+--------------+-------+-----------+--------+------+
| Never Smoker |       |           |        |      |
+--------------+-------+-----------+--------+------+
 
 
 
+---------------------+---+---+---+
| Smokeless Tobacco:  |   |   |   |
| Never Used          |   |   |   |
+---------------------+---+---+---+
 
 
 
+-------------+-------------+---------+----------+
| Alcohol Use | Drinks/Week | oz/Week | Comments |
+-------------+-------------+---------+----------+
| No          |             |         |          |
+-------------+-------------+---------+----------+
 
 
 
+------------------+---------------+
| Sex Assigned at  | Date Recorded |
| Birth            |               |
+------------------+---------------+
| Not on file      |               |
 
+------------------+---------------+
 
 
 
+----------------+-------------+-------------+
| Job Start Date | Occupation  | Industry    |
+----------------+-------------+-------------+
| Not on file    | Not on file | Not on file |
+----------------+-------------+-------------+
 
 
 
+----------------+--------------+------------+
| Travel History | Travel Start | Travel End |
+----------------+--------------+------------+
 
 
 
+-------------------------------------+
| No recent travel history available. |
+-------------------------------------+
 documented as of this encounter
 
 Medications at Time of Discharge
 
 
+----------------------+----------------------+-----------+---------+----------+----------+
| Medication           | Sig                  | Dispensed | Refills | Start    | End Date |
|                      |                      |           |         | Date     |          |
+----------------------+----------------------+-----------+---------+----------+----------+
|   acetaminophen 325  | Take by mouth.       |           | 0       |          |          |
| mg oral tablet       |                      |           |         |          |          |
+----------------------+----------------------+-----------+---------+----------+----------+
|   ascorbic acid      | Take 500 mg by mouth |           | 0       |          |          |
| (vitamin C) 500 mg   |  once daily.         |           |         |          |          |
| oral tablet          |                      |           |         |          |          |
+----------------------+----------------------+-----------+---------+----------+----------+
|   furosemide 40 mg   | Take by mouth.       |           | 0       |          |          |
| oral tablet          |                      |           |         |          |          |
+----------------------+----------------------+-----------+---------+----------+----------+
|                      | Take 2 tablets by    |           | 0       |          |          |
| gluc/chnd/om3/dha/ep | mouth once daily.    |           |         |          |          |
| a/fish/str           |                      |           |         |          |          |
| (GLUCOSAMINE         |                      |           |         |          |          |
| CHONDROITIN PLUS     |                      |           |         |          |          |
| ORAL)                |                      |           |         |          |          |
+----------------------+----------------------+-----------+---------+----------+----------+
|   levothyroxine 100  | Take by mouth.       |           | 0       |          |          |
| mcg oral tablet      |                      |           |         |          |          |
+----------------------+----------------------+-----------+---------+----------+----------+
|   losartan 100 mg    | Take 100 mg by mouth |           | 0       |          |          |
| oral tablet          |  once daily.         |           |         |          |          |
+----------------------+----------------------+-----------+---------+----------+----------+
|   MAGNESIUM CHLORIDE | Take by mouth.       |           | 0       |          |          |
|  ORAL                |                      |           |         |          |          |
+----------------------+----------------------+-----------+---------+----------+----------+
|   metFORMIN    | Take by mouth.       |           | 0       |          |          |
| mg oral tablet,ER    |                      |           |         |          |          |
| alexis.retention 24 hr |                      |           |         |          |          |
+----------------------+----------------------+-----------+---------+----------+----------+
 
|   metoprolol         | Take 100 mg by mouth |           | 0       | / |          |
| succinate 200 mg     |  once daily.         |           |         | 18       |          |
| oral tablet extended |                      |           |         |          |          |
|  release 24 hr       |                      |           |         |          |          |
+----------------------+----------------------+-----------+---------+----------+----------+
|   potassium chloride | Take by mouth.       |           | 0       |          |          |
|  SR 20 mEq oral      |                      |           |         |          |          |
| tablet,ER            |                      |           |         |          |          |
| particles/crystals   |                      |           |         |          |          |
+----------------------+----------------------+-----------+---------+----------+----------+
|   simvastatin 20 mg  | Take by mouth once   |           | 0       |          |          |
| oral tablet          | daily in the         |           |         |          |          |
|                      | evening.             |           |         |          |          |
+----------------------+----------------------+-----------+---------+----------+----------+
|   warfarin 5 mg oral | Take by mouth.       |           | 0       |          |          |
|  tablet              |                      |           |         |          |          |
+----------------------+----------------------+-----------+---------+----------+----------+
 documented as of this encounter
 
 Plan of Treatment
 
 
+----------------------+------+--------+----------------------+------------+
| Name                 | Type | Priori | Associated Diagnoses | Date/Time  |
|                      |      | ty     |                      |            |
+----------------------+------+--------+----------------------+------------+
| 6-MINUTE WALK TEST - | ECG  | Routin |   Mitral valve       | 2018 |
|  ECG                 |      | e      | insufficiency,       |            |
|                      |      |        | unspecified etiology |            |
+----------------------+------+--------+----------------------+------------+
 documented as of this encounter
 
 Visit Diagnoses
 
 
+----------------------------------------------------+
| Diagnosis                                          |
+----------------------------------------------------+
|   Mitral valve insufficiency, unspecified etiology |
+----------------------------------------------------+
 documented in this encounter

## 2020-01-01 NOTE — XMS
Encounter Summary
  Created on: 2020
 
 Vonnie Celaya
 External Reference #: [id number]
 : 32
 Sex: Female
 
 Demographics
 
 
+-----------------------+------------------------+
| Address               | [address]         |
|                       | [place]  [address]   |
+-----------------------+------------------------+
| Home Phone            | [phone]        |
+-----------------------+------------------------+
| Preferred Language    | Unknown                |
+-----------------------+------------------------+
| Marital Status        | Single                 |
+-----------------------+------------------------+
| Hindu Affiliation | NON                    |
+-----------------------+------------------------+
| Race                  | White                  |
+-----------------------+------------------------+
| Ethnic Group          | Not  or  |
+-----------------------+------------------------+
 
 
 Author
 
 
+--------------+------------------------------+
| Author       | [facility] |
+--------------+------------------------------+
| Organization | [facility] |
+--------------+------------------------------+
| Address      | Unknown                      |
+--------------+------------------------------+
| Phone        | Unavailable                  |
+--------------+------------------------------+
 
 
 
 Support
 
 
+--------------+--------------+---------+-----------------+
| Name         | Relationship | Address | Phone           |
+--------------+--------------+---------+-----------------+
| Lauryn Leroy | ECON         | Unknown | [phone] |
+--------------+--------------+---------+-----------------+
 
 
 
 Care Team Providers
 
 
 
+------------------------+------+-----------------+
| Care Team Member Name  | Role | Phone           |
+------------------------+------+-----------------+
| Jean Bermudez MD | PCP  | [phone] |
+------------------------+------+-----------------+
 
 
 
 Encounter Details
 
 
+--------+-------------+----------------------+----------------------+-------------+
| Date   | Type        | Department           | Care Team            | Description |
+--------+-------------+----------------------+----------------------+-------------+
| 10/09/ | Documentati |   Cardiology at [facility]  |   Katelin Riddle |             |
| 2018   | on          |  [address]    |  [address] [address]  [address]    |             |
|        |             | Mailcode: [id number]       | [address]  [place],  |             |
|        |             | [facility]    | [place] [address]        |             |
|        |             | and [facility],         | [phone]         |             |
|        |             |       | [phone] (Fax)   |             |
|        |             | [place], [place]  |                      |             |
|        |             | [address]           |                      |             |
|        |             | [phone]         |                      |             |
+--------+-------------+----------------------+----------------------+-------------+
 
 
 
 Social History
 
 
+--------------+-------+-----------+--------+------+
| Tobacco Use  | Types | Packs/Day | Years  | Date |
|              |       |           | Used   |      |
+--------------+-------+-----------+--------+------+
| Never Smoker |       |           |        |      |
+--------------+-------+-----------+--------+------+
 
 
 
+---------------------+---+---+---+
| Smokeless Tobacco:  |   |   |   |
| Never Used          |   |   |   |
+---------------------+---+---+---+
 
 
 
+-------------+-------------+---------+----------+
| Alcohol Use | Drinks/Week | oz/Week | Comments |
+-------------+-------------+---------+----------+
| No          |             |         |          |
+-------------+-------------+---------+----------+
 
 
 
+------------------+---------------+
| Sex Assigned at  | Date Recorded |
| Birth            |               |
+------------------+---------------+
| Not on file      |               |
+------------------+---------------+
 
 
 
 
+----------------+-------------+-------------+
| Job Start Date | Occupation  | Industry    |
+----------------+-------------+-------------+
| Not on file    | Not on file | Not on file |
+----------------+-------------+-------------+
 
 
 
+----------------+--------------+------------+
| Travel History | Travel Start | Travel End |
+----------------+--------------+------------+
 
 
 
+-------------------------------------+
| No recent travel history available. |
+-------------------------------------+
 documented as of this encounter
 
 Plan of Treatment
 Not on filedocumented as of this encounter
 
 Visit Diagnoses
 Not on filedocumented in this encounter

## 2020-01-01 NOTE — XMS
Encounter Summary
  Created on: 2020
 
 Vonnie Celaya
 External Reference #: [id number]
 : 32
 Sex: Female
 
 Demographics
 
 
+-----------------------+---------------------------+
| Address               | [address]            |
|                       | [place]  [address] |
+-----------------------+---------------------------+
| Home Phone            | [phone]           |
+-----------------------+---------------------------+
| Preferred Language    | Unknown                   |
+-----------------------+---------------------------+
| Marital Status        |                    |
+-----------------------+---------------------------+
| Mandaen Affiliation | Unknown                   |
+-----------------------+---------------------------+
| Race                  | Unknown                   |
+-----------------------+---------------------------+
| Ethnic Group          | Unknown                   |
+-----------------------+---------------------------+
 
 
 Author
 
 
+--------------+--------------------------------------------+
| Author       | [facility]  |
|              | [facility] [place]                                |
+--------------+--------------------------------------------+
| Organization | [facility]  |
|              | [facility] [place]                                |
+--------------+--------------------------------------------+
| Address      | Unknown                                    |
+--------------+--------------------------------------------+
| Phone        | Unavailable                                |
+--------------+--------------------------------------------+
 
 
 
 Support
 
 
+--------------+--------------+---------+-----------------+
| Name         | Relationship | Address | Phone           |
+--------------+--------------+---------+-----------------+
| Lauryn Leroy | ECON         | Unknown | [phone] |
+--------------+--------------+---------+-----------------+
 
 
 
 Care Team Providers
 
 
 
+------------------------+------+-----------------+
| Care Team Member Name  | Role | Phone           |
+------------------------+------+-----------------+
| Jean Bermudez MD | PCP  | [phone] |
+------------------------+------+-----------------+
 
 
 
 Encounter Details
 
 
+--------+-------------+----------------------+--------------------+-------------+
| Date   | Type        | Department           | Care Team          | Description |
+--------+-------------+----------------------+--------------------+-------------+
| / | Orders Only |   [facility]     |   Conversion       |             |
| 2018   |             | CONVERSION DEP  [address]  | Transaction,       |             |
|        |             | [address]           | Provider Unknown   |             |
|        |             | [place]         | [phone]       |             |
|        |             | [address]           | [phone] (Fax) |             |
|        |             | [phone]         |                    |             |
+--------+-------------+----------------------+--------------------+-------------+
 
 
 
 Social History
 
 
+----------------+-------+-----------+--------+------+
| Tobacco Use    | Types | Packs/Day | Years  | Date |
|                |       |           | Used   |      |
+----------------+-------+-----------+--------+------+
| Never Assessed |       |           |        |      |
+----------------+-------+-----------+--------+------+
 
 
 
+------------------+---------------+
| Sex Assigned at  | Date Recorded |
| Birth            |               |
+------------------+---------------+
| Not on file      |               |
+------------------+---------------+
 
 
 
+----------------+-------------+-------------+
| Job Start Date | Occupation  | Industry    |
+----------------+-------------+-------------+
| Not on file    | Not on file | Not on file |
+----------------+-------------+-------------+
 
 
 
+----------------+--------------+------------+
| Travel History | Travel Start | Travel End |
+----------------+--------------+------------+
 
 
 
 
+-------------------------------------+
| No recent travel history available. |
+-------------------------------------+
 documented as of this encounter
 
 Plan of Treatment
 
 
+--------+---------+------------+----------------------+-------------+
| Date   | Type    | Specialty  | Care Team            | Description |
+--------+---------+------------+----------------------+-------------+
| / | Office  | Cardiology |   [facility]    |             |
| 2020   | Visit   |            | [address]      |             |
|        |         |            | [address]   |             |
|        |         |            | [place] [id number]   |             |
|        |         |            | [phone]         |             |
|        |         |            | [phone] (Fax)   |             |
+--------+---------+------------+----------------------+-------------+
 documented as of this encounter
 
 Visit Diagnoses
 Not on filedocumented in this encounter

## 2020-01-01 NOTE — XMS
Encounter Summary
  Created on: 2020
 
 Vonnie Celaya
 External Reference #: [id number]
 : 32
 Sex: Female
 
 Demographics
 
 
+-----------------------+------------------------+
| Address               | [address]         |
|                       | [place]  [address]   |
+-----------------------+------------------------+
| Home Phone            | [phone]        |
+-----------------------+------------------------+
| Preferred Language    | Unknown                |
+-----------------------+------------------------+
| Marital Status        | Single                 |
+-----------------------+------------------------+
| Baptism Affiliation | NON                    |
+-----------------------+------------------------+
| Race                  | White                  |
+-----------------------+------------------------+
| Ethnic Group          | Not  or  |
+-----------------------+------------------------+
 
 
 Author
 
 
+--------------+------------------------------+
| Author       | [facility] |
+--------------+------------------------------+
| Organization | [facility] |
+--------------+------------------------------+
| Address      | Unknown                      |
+--------------+------------------------------+
| Phone        | Unavailable                  |
+--------------+------------------------------+
 
 
 
 Support
 
 
+--------------+--------------+---------+-----------------+
| Name         | Relationship | Address | Phone           |
+--------------+--------------+---------+-----------------+
| Lauryn Leroy | ECON         | Unknown | [phone] |
+--------------+--------------+---------+-----------------+
 
 
 
 Care Team Providers
 
 
 
+------------------------+------+-----------------+
| Care Team Member Name  | Role | Phone           |
+------------------------+------+-----------------+
| Jean Bermudez MD | PCP  | [phone] |
+------------------------+------+-----------------+
 
 
 
 Reason for Referral
 Diagnostic Testing (Routine)
 
+--------+--------+-----------+--------------+--------------+--------------+
| Status | Reason | Specialty | Diagnoses /  | Referred By  | Referred To  |
|        |        |           | Procedures   | Contact      | Contact      |
+--------+--------+-----------+--------------+--------------+--------------+
| Closed |        | Radiology |   Diagnoses  |              |              |
|        |        |           |  Mitral      | Claudette,  |              |
|        |        |           | valve        | Jame PARRA MD  |              |
|        |        |           | insufficienc |  [address]     |              |
|        |        |           | y,           | [address]     |              |
|        |        |           | unspecified  | [place], [place] |              |
|        |        |           | etiology     |  [address]  |              |
|        |        |           | Procedures   |  Phone:      |              |
|        |        |           | CTA CHEST -  | [phone] |              |
|        |        |           | GATED W      |   Fax:       |              |
|        |        |           | CONTRAST     | [phone] |              |
+--------+--------+-----------+--------------+--------------+--------------+
 
 
 
 
 Reason for Visit
 AUTH/CERT
 
+--------+--------+-----------+--------------+--------------+--------------+
| Status | Reason | Specialty | Diagnoses /  | Referred By  | Referred To  |
|        |        |           | Procedures   | Contact      | Contact      |
+--------+--------+-----------+--------------+--------------+--------------+
|        |        |           |              |              |              |
+--------+--------+-----------+--------------+--------------+--------------+
 
 
 
 
 Encounter Details
 
 
+--------+-----------+----------------------+----------------------+-------------+
| Date   | Type      | Department           | Care Team            | Description |
+--------+-----------+----------------------+----------------------+-------------+
| / | Hospital  |   [facility] [address]  |   Jame Wilkins  |             |
| 2018   | Encounter | Aiden PARRA MD  [address]  |             |
|        |           |  [address]  [facility]  | [address]  [place], [place]    |             |
|        |           |  [place], [place]        | [address]           |             |
|        |           | [address]           | [phone]         |             |
|        |           | [phone]         | [phone] (Fax)   |             |
+--------+-----------+----------------------+----------------------+-------------+
 
 
 
 
 Social History
 
 
+----------------+-------+-----------+--------+------+
| Tobacco Use    | Types | Packs/Day | Years  | Date |
|                |       |           | Used   |      |
+----------------+-------+-----------+--------+------+
| Never Assessed |       |           |        |      |
+----------------+-------+-----------+--------+------+
 
 
 
+------------------+---------------+
| Sex Assigned at  | Date Recorded |
| Birth            |               |
+------------------+---------------+
| Not on file      |               |
+------------------+---------------+
 
 
 
+----------------+-------------+-------------+
| Job Start Date | Occupation  | Industry    |
+----------------+-------------+-------------+
| Not on file    | Not on file | Not on file |
+----------------+-------------+-------------+
 
 
 
+----------------+--------------+------------+
| Travel History | Travel Start | Travel End |
+----------------+--------------+------------+
 
 
 
+-------------------------------------+
| No recent travel history available. |
+-------------------------------------+
 documented as of this encounter
 
 Last Filed Vital Signs
 
 
+-------------------+---------+----------------------+----------+
| Vital Sign        | Reading | Time Taken           | Comments |
+-------------------+---------+----------------------+----------+
| Blood Pressure    | 136/98  | 2018 11:50 AM  |          |
|                   |         | PDT                  |          |
+-------------------+---------+----------------------+----------+
| Pulse             | 66      | 2018 11:50 AM  |          |
|                   |         | PDT                  |          |
+-------------------+---------+----------------------+----------+
| Temperature       | -       | -                    |          |
+-------------------+---------+----------------------+----------+
| Respiratory Rate  | 16      | 2018 11:50 AM  |          |
|                   |         | PDT                  |          |
+-------------------+---------+----------------------+----------+
| Oxygen Saturation | 100%    | 2018 11:50 AM  |          |
|                   |         | PDT                  |          |
+-------------------+---------+----------------------+----------+
 
| Inhaled Oxygen    | -       | -                    |          |
| Concentration     |         |                      |          |
+-------------------+---------+----------------------+----------+
| Weight            | -       | -                    |          |
+-------------------+---------+----------------------+----------+
| Height            | -       | -                    |          |
+-------------------+---------+----------------------+----------+
| Body Mass Index   | -       | -                    |          |
+-------------------+---------+----------------------+----------+
 documented in this encounter
 
 Discharge Instructions
 Instructions Chelo Auguste RN - 2018Post-transesophageal Echocardiogram Pat
ient Discharge Instructions
 
 If you have undergone a transesophageal echocardiogram (JENIFFER), please follow these instructi
ons:
 
 Activity Limits
 ? You must have a responsible adult drive you or take you home. Do not drive for the remain
kassandra of the day.
 ? Rest and relax today. Have someone stay with you through the day.
 
  For the next 24 hours:
 ? Do not use any equipment that could injure you or others.
 ? Do not make any legal decisions.
 ? Do not drink any alcohol.
 
 Medicines and Follow Up
 ? Take your medicine as your doctor ordered.
 ? Follow up with the doctor that ordered this test
 
 Call your doctor or go to the emergency room if you have:
 ? Severe abdominal or chest pain
 ? Shortness of breath or heavy breathing
 ? Bleeding that will not stop
 
 How to Reach Us
 Call the Hospital  at [phone] and ask to have your doctor paged.
 
 documented in this encounter
 
 Medications at Time of Discharge
 
 
+----------------------+----------------------+-----------+---------+----------+----------+
| Medication           | Sig                  | Dispensed | Refills | Start    | End Date |
|                      |                      |           |         | Date     |          |
+----------------------+----------------------+-----------+---------+----------+----------+
|   acetaminophen 325  | Take by mouth.       |           | 0       |          |          |
| mg oral tablet       |                      |           |         |          |          |
+----------------------+----------------------+-----------+---------+----------+----------+
|   ascorbic acid      | Take 500 mg by mouth |           | 0       |          |          |
| (vitamin C) 500 mg   |  once daily.         |           |         |          |          |
| oral tablet          |                      |           |         |          |          |
+----------------------+----------------------+-----------+---------+----------+----------+
|   furosemide 40 mg   | Take by mouth.       |           | 0       |          |          |
| oral tablet          |                      |           |         |          |          |
+----------------------+----------------------+-----------+---------+----------+----------+
|                      | Take 2 tablets by    |           | 0       |          |          |
 
| gluc/chnd/om3/dha/ep | mouth once daily.    |           |         |          |          |
| a/fish/str           |                      |           |         |          |          |
| (GLUCOSAMINE         |                      |           |         |          |          |
| CHONDROITIN PLUS     |                      |           |         |          |          |
| ORAL)                |                      |           |         |          |          |
+----------------------+----------------------+-----------+---------+----------+----------+
|   levothyroxine 100  | Take by mouth.       |           | 0       |          |          |
| mcg oral tablet      |                      |           |         |          |          |
+----------------------+----------------------+-----------+---------+----------+----------+
|   losartan 100 mg    | Take 100 mg by mouth |           | 0       |          |          |
| oral tablet          |  once daily.         |           |         |          |          |
+----------------------+----------------------+-----------+---------+----------+----------+
|   MAGNESIUM CHLORIDE | Take by mouth.       |           | 0       |          |          |
|  ORAL                |                      |           |         |          |          |
+----------------------+----------------------+-----------+---------+----------+----------+
|   metFORMIN    | Take by mouth.       |           | 0       |          |          |
| mg oral tablet,ER    |                      |           |         |          |          |
| alexis.retention 24 hr |                      |           |         |          |          |
+----------------------+----------------------+-----------+---------+----------+----------+
|   metoprolol         | Take 100 mg by mouth |           | 0       | 20 |          |
| succinate 200 mg     |  once daily.         |           |         | 18       |          |
| oral tablet extended |                      |           |         |          |          |
|  release 24 hr       |                      |           |         |          |          |
+----------------------+----------------------+-----------+---------+----------+----------+
|   potassium chloride | Take by mouth.       |           | 0       |          |          |
|  SR 20 mEq oral      |                      |           |         |          |          |
| tablet,ER            |                      |           |         |          |          |
| particles/crystals   |                      |           |         |          |          |
+----------------------+----------------------+-----------+---------+----------+----------+
|   simvastatin 20 mg  | Take by mouth once   |           | 0       |          |          |
| oral tablet          | daily in the         |           |         |          |          |
|                      | evening.             |           |         |          |          |
+----------------------+----------------------+-----------+---------+----------+----------+
|   warfarin 5 mg oral | Take by mouth.       |           | 0       |          |          |
|  tablet              |                      |           |         |          |          |
+----------------------+----------------------+-----------+---------+----------+----------+
 documented as of this encounter
 
 Progress Kevin Maguire - 2018 11:13 AM PDTTransesophageal echocardiogram was completed. F
inal report to follow. 
 Electronically signed by Kevin Bermudez at 2018 11:13 AM PDTdocumented in this enc
ounter
 
 Plan of Treatment
 Not on filedocumented as of this encounter
 
 Procedures
 
 
+----------------------+--------+-------------+----------------------+----------------------
+
| Procedure Name       | Priori | Date/Time   | Associated Diagnosis | Comments             
|
|                      | ty     |             |                      |                      
|
+----------------------+--------+-------------+----------------------+----------------------
+
| CTA CHEST - GATED W  | Routin | 2018  |   Mitral valve       |   Results for this   
|
 
| CONTRAST             | e      |  2:35 PM    | insufficiency,       | procedure are in the 
|
|                      |        | PDT         | unspecified etiology |  results section.    
|
+----------------------+--------+-------------+----------------------+----------------------
+
| CREATININE, POC      | Routin | 2018  |   Mitral valve       |   Results for this   
|
|                      | e      |  2:02 PM    | insufficiency,       | procedure are in the 
|
|                      |        | PDT         | unspecified etiology |  results section.    
|
+----------------------+--------+-------------+----------------------+----------------------
+
| TRANSESOPHAGEAL      | Routin | 2018  |                      |   Results for this   
|
| ECHOCARDIOGRAM       | e      | 11:19 AM    |                      | procedure are in the 
|
|                      |        | PDT         |                      |  results section.    
|
+----------------------+--------+-------------+----------------------+----------------------
+
| INR (PT), POC        | Urgent | 2018  |                      |   Results for this   
|
|                      |        | 10:02 AM    |                      | procedure are in the 
|
|                      |        | PDT         |                      |  results section.    
|
+----------------------+--------+-------------+----------------------+----------------------
+
 documented in this encounter
 
 Results
 CTA CHEST - GATED W CONTRAST (2018  2:35 PM PDT)
 
+----------+
| Specimen |
+----------+
|          |
+----------+
 
 
 
+------------------------------------------------------------------------+-----------------+
| Narrative                                                              | Performed At    |
+------------------------------------------------------------------------+-----------------+
|   EXAM: CTA CHEST OTHER     HISTORY: Mitral valve disease,             |   [facility]          |
| anticipation of TMVR.     COMPARISON: None.     TECHNIQUE;  Following  | RADIOLOGY VOICE |
| uneventful administration of intravenous contrast, cardiac gated       |  RECOGNITION 2  |
| helical scanning was obtained of the chest and reviewed in soft tissue |                 |
|  and lung algorithm. 3D reformatted images were generated at an        |                 |
| independent workstation and also reviewed.     CONTRAST: IOHEXOL 300   |                 |
| MG IODINE/ML INTRAVENOUS SOLUTION 90 mL     FINDINGS:     CARDIAC      |                 |
| MORPHOLOGY:  The aortic arch, cardiac apex and gastric lumen are on    |                 |
| the left.        Mitral valve area: 9.3 cm2  Perimeter: 10.7 mm  TT:   |                 |
| 27 mm  SL: 34mm     CHEST:  The heart is enlarged with preferential    |                 |
| enlargement of both atria. There is extensive mitral annular           |                 |
| calcifications. Minimal mitral aortic valvular and coronary arterial   |                 |
| calcifications are also noted. There is no pericardial or pleural      |                 |
| effusion. There is no pneumothorax.     There are no suspicious        |                 |
 
| mediastinal, or axillary lymph nodes.     The lungs are clear without  |                 |
| suspicious pulmonary nodules, masses or consolidative opacities.       |                 |
| Minimal bibasilar linear atelectasis/scarring is noted.     The spleen |                 |
|  is lobulated and diminutive in size. Rim calcified right superior     |                 |
| renal pole 5 cm cyst is not fully evaluated.     Remaining visualized  |                 |
| upper abdominal organs are unremarkable.     There is no suspicious    |                 |
| focal osseous abnormality.     IMPRESSION:  Extensive mitral annular   |                 |
| calcifications.     Mitral valvular measurements as above.        I    |                 |
| have personally reviewed the images and, if necessary, edited the      |                 |
| report. I agree with the report as now presented.      Final           |                 |
| signature: Henrietta Whitehead MD    2018 4:05 PM   Preliminary:       |                 |
| Henrietta Whitehead MD        Dictation initiated: Henrietta Whitehead MD        |                 |
| 2018 3:01 PM                                                      |                 |
+------------------------------------------------------------------------+-----------------+
 
 
 
+-------------------------------------------------------------------------------------------
--------------------------------------------------------------------------------------------
-------------------------------+
| Procedure Note                                                                            
                                                                                            
                               |
+-------------------------------------------------------------------------------------------
--------------------------------------------------------------------------------------------
-------------------------------+
|   Service Account, Radiant Res In Interface - 2018  4:06 PM PDT  EXAM: CTA CHEST    
                                                                                            
                               |
| OTHER HISTORY: Mitral valve disease, anticipation of TMVR. COMPARISON: None.              
                                                                                            
                               |
| TECHNIQUE;Following uneventful administration of intravenous contrast, cardiac gated      
                                                                                            
                               |
| helical scanning was obtained of the chest and reviewed in soft tissue and lung           
                                                                                            
                               |
| algorithm. 3D reformatted images were generated at an independent workstation and also    
                                                                                            
                               |
| reviewed. CONTRAST: IOHEXOL 300 MG IODINE/ML INTRAVENOUS SOLUTION 90 mL FINDINGS:         
                                                                                            
                               |
| CARDIAC MORPHOLOGY:The aortic arch, cardiac apex and gastric lumen are on the left.       
                                                                                            
                               |
| Mitral valve area: 9.3 zf2Pxohtgzll: 10.7 mmTT: 27 mmSL: 34mm CHEST:The heart is          
                                                                                            
                               |
| enlarged with preferential enlargement of both atria. There is extensive mitral annular   
                                                                                            
                               |
| calcifications. Minimal mitral aortic valvular and coronary arterial calcifications are   
                                                                                            
                               |
| also noted. There is no pericardial or pleural effusion. There is no pneumothorax. There  
                                                                                            
                               |
|  are no suspicious mediastinal, or axillary lymph nodes. The lungs are clear without      
 
                                                                                            
                               |
| suspicious pulmonary nodules, masses or consolidative opacities. Minimal bibasilar        
                                                                                            
                               |
| linear atelectasis/scarring is noted. The spleen is lobulated and diminutive in size.     
                                                                                            
                               |
| Rim calcified right superior renal pole 5 cm cyst is not fully evaluated. Remaining       
                                                                                            
                               |
| visualized upper abdominal organs are unremarkable. There is no suspicious focal osseous  
                                                                                            
                               |
|  abnormality. IMPRESSION:Extensive mitral annular calcifications. Mitral valvular         
                                                                                            
                               |
| measurements as above.  I have personally reviewed the images and, if necessary, edited   
                                                                                            
                               |
| the report. I agree with the report as now presented.  Final signature: Virginie Ochoa MD   2018 4:05 PM Preliminary: Henrietta Whitehead MD    Dictation initiated: Henrietta Whitehead MD   2018 3:01 PM                                                              
                                                                                            
                               |
|CHEST:                                                                                     
                                                                                            
                              |
|The heart is enlarged with preferential enlargement of both atria. There is extensive rebecca
l annular calcifications. Minimal mitral aortic valvular and coronary arterial calcification
s are also noted. There is no  |
|pericardial or pleural effusion. There is no pneumothorax.                                 
                                                                                            
                               |
|                                                                                           
                                                                                            
                               |
|There are no suspicious mediastinal, or axillary lymph nodes.                              
                                                                                            
                               |
|                                                                                           
                                                                                            
                               |
|The lungs are clear without suspicious pulmonary nodules, masses or consolidative opacities
. Minimal bibasilar linear atelectasis/scarring is noted.                                   
                               |
|                                                                                           
                                                                                            
                               |
|The spleen is lobulated and diminutive in size. Rim calcified right superior renal pole 5 c
m cyst is not fully evaluated.                                                              
                               |
|                                                                                           
                                                                                            
                               |
|Remaining visualized upper abdominal organs are unremarkable.                              
 
                                                                                            
                               |
|                                                                                           
                                                                                            
                               |
|There is no suspicious focal osseous abnormality.                                          
                                                                                            
                               |
|                                                                                           
                                                                                            
                               |
|IMPRESSION:                                                                                
                                                                                            
                              |
|Extensive mitral annular calcifications.                                                   
                                                                                            
                               |
|                                                                                           
                                                                                            
                               |
|Mitral valvular measurements as above.                                                     
                                                                                            
                               |
|                                                                                           
                                                                                            
                               |
|                                                                                           
                                                                                            
                               |
|I have personally reviewed the images and, if necessary, edited the report. I agree with th
e report as now presented.                                                                  
                               |
|                                                                                           
                                                                                            
                               |
|Final signature: eHnrietta Whitehead MD   2018 4:05 PM                                     
                                                                                            
                               |
|Preliminary: Henrietta Whitehead MD                                                             
                                                                                            
                               |
|Dictation initiated: Henrietta Whitehead MD   2018 3:01 PM                                 
                                                                                            
                               |
+-------------------------------------------------------------------------------------------
--------------------------------------------------------------------------------------------
-------------------------------+
 
 
 
+---------------------+---------+--------------------+--------------+
| Performing          | Address | City/State/Zipcode | Phone Number |
| Organization        |         |                    |              |
+---------------------+---------+--------------------+--------------+
|   [facility]    |         |                    |              |
| VOICE RECOGNITION 2 |         |                    |              |
+---------------------+---------+--------------------+--------------+
 CREATININE, POC (2018  2:02 PM PDT)
 
+-------------+-------+-----------------+-------------+--------------+
 
| Component   | Value | Ref Range       | Performed   | Pathologist  |
|             |       |                 | At          | Signature    |
+-------------+-------+-----------------+-------------+--------------+
| CREATININE, | 0.7   | 0.6 - 1.1 mg/dL | [facility]      |              |
|  POC        |       |                 | [facility]     |              |
|             |       |                 | [facility] |              |
|             |       |                 |  [facility]    |              |
|             |       |                 | TESTS       |              |
+-------------+-------+-----------------+-------------+--------------+
 
 
 
+----------------+
| Specimen       |
+----------------+
| Blood - Blood  |
| (substance)    |
+----------------+
 
 
 
+----------------------+-------------------------+--------------------+--------------+
| Performing           | Address                 | City/State/Zipcode | Phone Number |
| Organization         |                         |                    |              |
+----------------------+-------------------------+--------------------+--------------+
|   [facility]     |   [address]  | [place], [place]       |              |
| [facility]  | [address]               | [address]         |              |
| TESTS                |                         |                    |              |
+----------------------+-------------------------+--------------------+--------------+
 TRANSESOPHAGEAL ECHOCARDIOGRAM (2018 11:19 AM PDT)
 
+------------------------------------------------------------------------+--------------+
| Narrative                                                              | Performed At |
+------------------------------------------------------------------------+--------------+
|   Negrita Daniels MD,MPH       2018 11:20 AM  Cardiology          |              |
| Preliminary Procedure Note (Full report to follow)     Primary Care    |              |
| Provider: Jean Bermudez MD  Referring Provider: Jame Wilkins  |              |
| MD     Echo Lab Staff:   Jame Wilkins MD (attending)  NEGRITA PARRA     |              |
| MD JUSTIN,MP[facility] (fellow)     Procedure(s):  Transesophageal             |              |
| Echocardiogram     Indications:  mitral regurgitation     Consent:     |              |
| After full PARQ, signed consent was obtained.      Description:  Time  |              |
| out performed at the bedside. Oropharynx examined   (Mallampati II)    |              |
| and anesthetized with 5 ml of 2% viscous   lidocaine and 3 ml of 4%    |              |
| aerosolized lidocaine. Bite block   placed. Sedation per anesthesia    |              |
| with propofol. The probe was   passed without difficulty. No abnormal  |              |
| hemodynamic, arrythmic or   hypoxemic events noted during the          |              |
| procedure. Estimated blood loss   was 0 mL.  ?     Findings: Severe    |              |
| MR, moderate TR. Full report to follow.  ?      Complications: None.   |              |
|     Negrita Daniels MD, MPH  Fellow (PGY-6), Cardiovascular Medicine  |              |
|  Pager #[phone]                                                          |              |
+------------------------------------------------------------------------+--------------+
 INR (PT), POC (2018 10:02 AM PDT)
 
+-------------+---------+---------------+-------------+--------------+
| Component   | Value   | Ref Range     | Performed   | Pathologist  |
|             |         |               | At          | Signature    |
+-------------+---------+---------------+-------------+--------------+
| PROTHROMBIN | 2.6 (H) | 0.9 - 1.2 INR | [facility]      |              |
|  TIME       |         |               | [facility]     |              |
| (INR), POC  |         |               | [facility] |              |
 
|             |         |               |  [facility]    |              |
|             |         |               | TESTS       |              |
+-------------+---------+---------------+-------------+--------------+
 
 
 
+----------------+
| Specimen       |
+----------------+
| Blood - Blood  |
| (substance)    |
+----------------+
 
 
 
+----------------------+-------------------------+--------------------+--------------+
| Performing           | Address                 | City/State/Zipcode | Phone Number |
| Organization         |                         |                    |              |
+----------------------+-------------------------+--------------------+--------------+
|   [facility]     |   [address]  | [place], [place]       |              |
| [facility]  | [address]               | [address]         |              |
| TESTS                |                         |                    |              |
+----------------------+-------------------------+--------------------+--------------+
 documented in this encounter
 
 Visit Diagnoses
 
 
+----------------------------------------------------+
| Diagnosis                                          |
+----------------------------------------------------+
|   Mitral valve insufficiency, unspecified etiology |
+----------------------------------------------------+
 documented in this encounter
 
 Administered Medications
 
 
+------------------+--------+---------+------+------+------+
| Medication Order | MAR    | Action  | Dose | Rate | Site |
|                  | Action | Date    |      |      |      |
+------------------+--------+---------+------+------+------+
 
 
 
+-----------------------------------+---+
|   fentaNYL (SUBLIMAZE) injection  |   |
| 25 mcg  25 mcg, intravenous,      |   |
| PROCEDURE PRN, 8 doses, Starting  |   |
| u 18 at 0911, Until Thu    |   |
| 18 at 1805, until RASS of -3 |   |
|  achieved                         |   |
+-----------------------------------+---+
|                                   |   |
+-----------------------------------+---+
|   flumazenil (ROMAZICON)          |   |
| injection 0.2 mg  0.2 mg,         |   |
| intravenous, PROCEDURE PRN,       |   |
| Starting Thu 18 at 0911,     |   |
| Until u 18 at 1805,        |   |
 
| reversal of conscious sedation    |   |
| and general anesthesia            |   |
+-----------------------------------+---+
|                                   |   |
+-----------------------------------+---+
 
 
 
+---------------------------------+---------+----------+-------+---+---+
|   iohexol (OMNIPAQUE) 300 mg    | IV Push | 20 | 90 mL |   |   |
| iodine/mL 100 mL  100 mL,       |         | 18  2:45 |       |   |   |
| intravenous, PROCEDURE ONCE, 1  |         |  PM PDT  |       |   |   |
| dose, Thu 18 at 1445       |         |          |       |   |   |
+---------------------------------+---------+----------+-------+---+---+
 
 
 
+-----------------------------------+---+
|                                   |   |
+-----------------------------------+---+
|   midazolam (PF) (VERSED)         |   |
| injection 1 mg  1 mg,             |   |
| intravenous, PROCEDURE PRN, 10    |   |
| doses, Starting Thu 18 at    |   |
| 0911, Until u 18 at 1805,  |   |
| until RASS of -3 achieved.        |   |
+-----------------------------------+---+
|                                   |   |
+-----------------------------------+---+
 documented in this encounter

## 2020-01-01 NOTE — XMS
Encounter Summary
  Created on: 2020
 
 Vonnie Celaya
 External Reference #: [id number]
 : 32
 Sex: Female
 
 Demographics
 
 
+-----------------------+---------------------------+
| Address               | [address]            |
|                       | [place]  [address] |
+-----------------------+---------------------------+
| Home Phone            | [phone]           |
+-----------------------+---------------------------+
| Preferred Language    | Unknown                   |
+-----------------------+---------------------------+
| Marital Status        |                    |
+-----------------------+---------------------------+
| Anglican Affiliation | Unknown                   |
+-----------------------+---------------------------+
| Race                  | Unknown                   |
+-----------------------+---------------------------+
| Ethnic Group          | Unknown                   |
+-----------------------+---------------------------+
 
 
 Author
 
 
+--------------+--------------------------------------------+
| Author       | [facility]  |
|              | [facility] [place]                                |
+--------------+--------------------------------------------+
| Organization | [facility]  |
|              | [facility] [place]                                |
+--------------+--------------------------------------------+
| Address      | Unknown                                    |
+--------------+--------------------------------------------+
| Phone        | Unavailable                                |
+--------------+--------------------------------------------+
 
 
 
 Support
 
 
+--------------+--------------+---------+-----------------+
| Name         | Relationship | Address | Phone           |
+--------------+--------------+---------+-----------------+
| Lauryn Leroy | ECON         | Unknown | [phone] |
+--------------+--------------+---------+-----------------+
 
 
 
 Care Team Providers
 
 
 
+------------------------+------+-----------------+
| Care Team Member Name  | Role | Phone           |
+------------------------+------+-----------------+
| Jean Bermudez MD | PCP  | [phone] |
+------------------------+------+-----------------+
 
 
 
 Encounter Details
 
 
+--------+-------------+----------------------+--------------------+-------------+
| Date   | Type        | Department           | Care Team          | Description |
+--------+-------------+----------------------+--------------------+-------------+
| / | Orders Only |   [facility]     |   Conversion       |             |
| 2018   |             | CONVERSION DEP  [address]  | Transaction,       |             |
|        |             | [address]           | Provider Unknown   |             |
|        |             | [place]         | [phone]       |             |
|        |             | [address]           | [phone] (Fax) |             |
|        |             | [phone]         |                    |             |
+--------+-------------+----------------------+--------------------+-------------+
 
 
 
 Social History
 
 
+----------------+-------+-----------+--------+------+
| Tobacco Use    | Types | Packs/Day | Years  | Date |
|                |       |           | Used   |      |
+----------------+-------+-----------+--------+------+
| Never Assessed |       |           |        |      |
+----------------+-------+-----------+--------+------+
 
 
 
+------------------+---------------+
| Sex Assigned at  | Date Recorded |
| Birth            |               |
+------------------+---------------+
| Not on file      |               |
+------------------+---------------+
 
 
 
+----------------+-------------+-------------+
| Job Start Date | Occupation  | Industry    |
+----------------+-------------+-------------+
| Not on file    | Not on file | Not on file |
+----------------+-------------+-------------+
 
 
 
+----------------+--------------+------------+
| Travel History | Travel Start | Travel End |
+----------------+--------------+------------+
 
 
 
 
+-------------------------------------+
| No recent travel history available. |
+-------------------------------------+
 documented as of this encounter
 
 Plan of Treatment
 
 
+--------+---------+------------+----------------------+-------------+
| Date   | Type    | Specialty  | Care Team            | Description |
+--------+---------+------------+----------------------+-------------+
| / | Office  | Cardiology |   [facility]    |             |
| 2020   | Visit   |            | [address]      |             |
|        |         |            | [address]   |             |
|        |         |            | [place] [address]   |             |
|        |         |            | [phone]         |             |
|        |         |            | [phone] (Fax)   |             |
+--------+---------+------------+----------------------+-------------+
 documented as of this encounter
 
 Visit Diagnoses
 Not on filedocumented in this encounter

## 2020-01-01 NOTE — XMS
Encounter Summary
  Created on: 2020
 
 Vonnie Celaya
 External Reference #: [id number]
 : 32
 Sex: Female
 
 Demographics
 
 
+-----------------------+------------------------+
| Address               | [address]         |
|                       | [place]  [address]   |
+-----------------------+------------------------+
| Home Phone            | [phone]        |
+-----------------------+------------------------+
| Preferred Language    | Unknown                |
+-----------------------+------------------------+
| Marital Status        | Single                 |
+-----------------------+------------------------+
| Oriental orthodox Affiliation | NON                    |
+-----------------------+------------------------+
| Race                  | White                  |
+-----------------------+------------------------+
| Ethnic Group          | Not  or  |
+-----------------------+------------------------+
 
 
 Author
 
 
+--------------+------------------------------+
| Author       | [facility] |
+--------------+------------------------------+
| Organization | [facility] |
+--------------+------------------------------+
| Address      | Unknown                      |
+--------------+------------------------------+
| Phone        | Unavailable                  |
+--------------+------------------------------+
 
 
 
 Support
 
 
+--------------+--------------+---------+-----------------+
| Name         | Relationship | Address | Phone           |
+--------------+--------------+---------+-----------------+
| Lauryn Leroy | ECON         | Unknown | [phone] |
+--------------+--------------+---------+-----------------+
 
 
 
 Care Team Providers
 
 
 
+------------------------+------+-----------------+
| Care Team Member Name  | Role | Phone           |
+------------------------+------+-----------------+
| Jean Bermudez MD | PCP  | [phone] |
+------------------------+------+-----------------+
 
 
 
 Reason for Referral
 Diagnostic Testing (Routine)
 
+-------------+--------+------------+--------------+--------------+--------------+
| Status      | Reason | Specialty  | Diagnoses /  | Referred By  | Referred To  |
|             |        |            | Procedures   | Contact      | Contact      |
+-------------+--------+------------+--------------+--------------+--------------+
| New Request |        | Cardiology |   Diagnoses  |   Car Valve  |              |
|             |        |            |              | [address]   |              |
|             |        |            | Non-rheumati | [address]  |              |
|             |        |            | c mitral     |  Mailcode:   |              |
|             |        |            | regurgitatio | [facility] |              |
|             |        |            | n            |  for Health  |              |
|             |        |            | Procedures   | and Healing, |              |
|             |        |            | TRANSTHORACI |  [address], |              |
|             |        |            | C            |  [address]th Floor   |              |
|             |        |            | ECHOCARDIOGR | [place], [place] |              |
|             |        |            | AM, ADULT    |  [address]  |              |
|             |        |            |              |  Phone:      |              |
|             |        |            |              | [phone] |              |
|             |        |            |              |   Fax:       |              |
|             |        |            |              | [phone] |              |
+-------------+--------+------------+--------------+--------------+--------------+
 
 
 Diagnostic Testing (Routine)
 
+--------+--------+------------+--------------+--------------+---------------+
| Status | Reason | Specialty  | Diagnoses /  | Referred By  | Referred To   |
|        |        |            | Procedures   | Contact      | Contact       |
+--------+--------+------------+--------------+--------------+---------------+
| Closed |        | Cardiology |   Diagnoses  |   Car Valve  |   Car Echo    |
|        |        |            |              | [address]   | [address]  |
|        |        |            | Non-rheumati | [address]  | [address] |
|        |        |            | c mitral     |  Mailcode:   |   Mailcode:   |
|        |        |            | regurgitatio | [facility] | [id number]  [facility]    |
|        |        |            | n            |  for Health  | [facility]  |
|        |        |            | Procedures   | and Healing, | Building      |
|        |        |            | TRANSESOPHAG |  [address], | [place], [place]  |
|        |        |            | EAL          |  [address]th Floor   | [address]    |
|        |        |            | ECHOCARDIOGR | [place], [place] | Phone:        |
|        |        |            | AM, ADULT    |  [address]  | [phone]  |
|        |        |            | [place] ECHO      |  Phone:      |               |
|        |        |            | HEART,TRANSE | [phone] |               |
|        |        |            | [place] |   Fax:       |               |
|        |        |            | MPLETE  [place]   | [phone] |               |
|        |        |            | DOPPLER ECHO |              |               |
|        |        |            |              |              |               |
|        |        |            | HEART,LIMITE |              |               |
|        |        |            | D,F/U  [place]    |              |               |
|        |        |            | DOPPLER      |              |               |
|        |        |            | COLOR FLOW   |              |               |
 
|        |        |            | VELOCITY MAP |              |               |
|        |        |            |   [place]         |              |               |
|        |        |            | ECHOCARDIOGR |              |               |
|        |        |            | APHY,        |              |               |
|        |        |            | TRANSESOPHAG |              |               |
|        |        |            | EAL (JENIFFER)    |              |               |
|        |        |            | FOR GUIDANCE |              |               |
+--------+--------+------------+--------------+--------------+---------------+
 
 
 
 
 Reason for Visit
 
 
+--------------------+--------------------+
| Reason             | Comments           |
+--------------------+--------------------+
| Surgery Scheduling | MitraClip 18 |
+--------------------+--------------------+
 
 
 
 Encounter Details
 
 
+--------+-----------+----------------------+----------------------+----------------------+
| Date   | Type      | Department           | Care Team            | Description          |
+--------+-----------+----------------------+----------------------+----------------------+
| / | Telephone |   Cardiology at [facility]  |   Dominga Arambula,  | Surgery Scheduling   |
| 2018   |           |  [address]    | RN  [address]      | ([facility] |
|        |           | Mailcode: [facility]       | [address]      |                      |
|        |           | [facility]    | [place], [place]         |                      |
|        |           | and [place],         | [address]           |                      |
|        |           | [facility],       |                      |                      |
|        |           | [place], [place]  |                      |                      |
|        |           | [address]           |                      |                      |
|        |           | [phone]         |                      |                      |
+--------+-----------+----------------------+----------------------+----------------------+
 
 
 
 Social History
 
 
+--------------+-------+-----------+--------+------+
| Tobacco Use  | Types | Packs/Day | Years  | Date |
|              |       |           | Used   |      |
+--------------+-------+-----------+--------+------+
| Never Smoker |       |           |        |      |
+--------------+-------+-----------+--------+------+
 
 
 
+---------------------+---+---+---+
| Smokeless Tobacco:  |   |   |   |
| Never Used          |   |   |   |
+---------------------+---+---+---+
 
 
 
 
+-------------+-------------+---------+----------+
| Alcohol Use | Drinks/Week | oz/Week | Comments |
+-------------+-------------+---------+----------+
| No          |             |         |          |
+-------------+-------------+---------+----------+
 
 
 
+------------------+---------------+
| Sex Assigned at  | Date Recorded |
| Birth            |               |
+------------------+---------------+
| Not on file      |               |
+------------------+---------------+
 
 
 
+----------------+-------------+-------------+
| Job Start Date | Occupation  | Industry    |
+----------------+-------------+-------------+
| Not on file    | Not on file | Not on file |
+----------------+-------------+-------------+
 
 
 
+----------------+--------------+------------+
| Travel History | Travel Start | Travel End |
+----------------+--------------+------------+
 
 
 
+-------------------------------------+
| No recent travel history available. |
+-------------------------------------+
 documented as of this encounter
 
 Plan of Treatment
 
 
+------------------+------+--------+----------------------+---------------------+
| Name             | Type | Priori | Associated Diagnoses | Order Schedule      |
|                  |      | ty     |                      |                     |
+------------------+------+--------+----------------------+---------------------+
| TRANSESOPHAGEAL  | ECG  | Routin |   Non-rheumatic      | Ordered: 2018 |
| ECHOCARDIOGRAM,  |      | e      | mitral regurgitation |                     |
| ADULT            |      |        |                      |                     |
+------------------+------+--------+----------------------+---------------------+
| TRANSTHORACIC    | ECG  | Routin |   Non-rheumatic      | Ordered: 2018 |
| ECHOCARDIOGRAM,  |      | e      | mitral regurgitation |                     |
| ADULT            |      |        |                      |                     |
+------------------+------+--------+----------------------+---------------------+
 documented as of this encounter
 
 Procedures
 
 
+----------------------+--------+------------+----------------------+----------------------+
| Procedure Name       | Priori | Date/Time  | Associated Diagnosis | Comments             |
|                      | ty     |            |                      |                      |
 
+----------------------+--------+------------+----------------------+----------------------+
| 6-MINUTE WALK TEST - | Routin | 2018 |   Non-rheumatic      |   Results for this   |
|  ECG                 | e      |            | mitral regurgitation | procedure are in the |
|                      |        |            |                      |  results section.    |
+----------------------+--------+------------+----------------------+----------------------+
 documented in this encounter
 
 Results
 6-MINUTE WALK TEST - ECG (2018)
 
+------------------------------------------------------------------------+--------------+
| Narrative                                                              | Performed At |
+------------------------------------------------------------------------+--------------+
|   [facility]      Test Date:   2018      |              |
| Clinic Site: [facility]  Supervising Provider: Katelin Riddle  Technician: |              |
|    VS  Referring Provider: Katelin Riddle  Diagnosis: Non-rheumatic |              |
|  mitral regurgitation  Reason for Test: dyspnea     Patient Sypmtoms : |              |
|   Are you short of breath at rest? No            Six-Minute Walk Test  |              |
| Results:  Gait speed (time in seconds first 15 feet walked): 4         |              |
| Patient walked: 720 feet in 6 minutes  Beginning heart rate: 78        |              |
| Beginning blood pressure: 144/70  Beginning Sp02: 99%  Beginning       |              |
| dyspnea index (Franchesca scale) : 0     Ending heart rate: 90  Ending blood |              |
|  pressure: 157/60  Ending Sp02: 98%  Ending dyspnea index (Franchesca        |              |
| scale): 4  Oxygen level:      Observations: No level of distress       |              |
| noted, patient tolerated test fairly well        Physician             |              |
| interpretation:   High risk 6 minute walk test with moderate reported  |              |
| dyspnea (franchesca scale 4) , no desaturations, and inability to walk > 900 |              |
|  ft.        Jame Wilkins MD    [facility]       |              |
| [facility]                                               |              |
+------------------------------------------------------------------------+--------------+
 documented in this encounter
 
 Visit Diagnoses
 
 
+------------------------------------------------+
| Diagnosis                                      |
+------------------------------------------------+
|   Non-rheumatic mitral regurgitation - Primary |
+------------------------------------------------+
 documented in this encounter

## 2020-01-01 NOTE — XMS
Encounter Summary
  Created on: 2020
 
 Vonnie Celaya
 External Reference #: [id number]
 : 32
 Sex: Female
 
 Demographics
 
 
+-----------------------+------------------------+
| Address               | [address]         |
|                       | [place]  [address]   |
+-----------------------+------------------------+
| Home Phone            | [phone]        |
+-----------------------+------------------------+
| Preferred Language    | Unknown                |
+-----------------------+------------------------+
| Marital Status        | Single                 |
+-----------------------+------------------------+
| Zoroastrianism Affiliation | NON                    |
+-----------------------+------------------------+
| Race                  | White                  |
+-----------------------+------------------------+
| Ethnic Group          | Not  or  |
+-----------------------+------------------------+
 
 
 Author
 
 
+--------------+------------------------------+
| Author       | [facility] |
+--------------+------------------------------+
| Organization | [facility] |
+--------------+------------------------------+
| Address      | Unknown                      |
+--------------+------------------------------+
| Phone        | Unavailable                  |
+--------------+------------------------------+
 
 
 
 Support
 
 
+--------------+--------------+---------+-----------------+
| Name         | Relationship | Address | Phone           |
+--------------+--------------+---------+-----------------+
| Lauryn Leroy | ECON         | Unknown | [phone] |
+--------------+--------------+---------+-----------------+
 
 
 
 Care Team Providers
 
 
 
+------------------------+------+-----------------+
| Care Team Member Name  | Role | Phone           |
+------------------------+------+-----------------+
| Jean Bermudez MD | PCP  | [phone] |
+------------------------+------+-----------------+
 
 
 
 Reason for Referral
 Diagnostic Testing (Routine)
 
+--------+--------+---------------+--------------+--------------+---------------+
| Status | Reason | Specialty     | Diagnoses /  | Referred By  | Referred To   |
|        |        |               | Procedures   | Contact      | Contact       |
+--------+--------+---------------+--------------+--------------+---------------+
| Closed |        | Cardiac       |   Diagnoses  |              |   Car Cardiac |
|        |        | Catheterizati |  Mitral      | Claudette,  |  Cath Lab     |
|        |        | on            | valve        | Jame PARRA MD  | [address]   |
|        |        |               | stenosis,    |  [address]     | [address]  |
|        |        |               | unspecified  | [address]     | Rd  [facility]      |
|        |        |               | etiology     | [place], [place] | [facility]      |
|        |        |               | Procedures   |  [address]  | [place]  |
|        |        |               | CATH LAB INT |  Phone:      | [phone]    |
|        |        |               |  CORONARY    | [phone] | Phone:        |
|        |        |               | ANGIOGRAM    |   Fax:       | [phone]  |
|        |        |               | PA R HRT     | [phone] |  Fax:         |
|        |        |               | CORONARY     |              | [phone]  |
|        |        |               | ARTERY ANGIO |              |               |
+--------+--------+---------------+--------------+--------------+---------------+
 
 
 
 
 Reason for Visit
 AUTH/CERT
 
+--------+--------+-----------+--------------+--------------+--------------+
| Status | Reason | Specialty | Diagnoses /  | Referred By  | Referred To  |
|        |        |           | Procedures   | Contact      | Contact      |
+--------+--------+-----------+--------------+--------------+--------------+
|        |        |           |              |              |              |
+--------+--------+-----------+--------------+--------------+--------------+
 
 
 
 
 Encounter Details
 
 
+--------+-----------+----------------------+----------------------+-------------+
| Date   | Type      | Department           | Care Team            | Description |
+--------+-----------+----------------------+----------------------+-------------+
| / | Hospital  |   [facility] [address]  |   Neal He,  |             |
| 2018   | Encounter | [address]  | MD  [address] |             |
|        |           |  [address]  [facility]  |   [address]  [place], |             |
|        |           |  [place]        |  [place] [address]       |             |
|        |           | [address]           | [phone]         |             |
|        |           | [phone]         | [phone] (Fax)   |             |
+--------+-----------+----------------------+----------------------+-------------+
 
 
 
 
 Social History
 
 
+----------------+-------+-----------+--------+------+
| Tobacco Use    | Types | Packs/Day | Years  | Date |
|                |       |           | Used   |      |
+----------------+-------+-----------+--------+------+
| Never Assessed |       |           |        |      |
+----------------+-------+-----------+--------+------+
 
 
 
+------------------+---------------+
| Sex Assigned at  | Date Recorded |
| Birth            |               |
+------------------+---------------+
| Not on file      |               |
+------------------+---------------+
 
 
 
+----------------+-------------+-------------+
| Job Start Date | Occupation  | Industry    |
+----------------+-------------+-------------+
| Not on file    | Not on file | Not on file |
+----------------+-------------+-------------+
 
 
 
+----------------+--------------+------------+
| Travel History | Travel Start | Travel End |
+----------------+--------------+------------+
 
 
 
+-------------------------------------+
| No recent travel history available. |
+-------------------------------------+
 documented as of this encounter
 
 Last Filed Vital Signs
 
 
+-------------------+---------------------+----------------------+----------+
| Vital Sign        | Reading             | Time Taken           | Comments |
+-------------------+---------------------+----------------------+----------+
| Blood Pressure    | 108/50              | 2018  4:00 PM  |          |
|                   |                     | PDT                  |          |
+-------------------+---------------------+----------------------+----------+
| Pulse             | 56                  | 2018  4:00 PM  |          |
|                   |                     | PDT                  |          |
+-------------------+---------------------+----------------------+----------+
| Temperature       | 36.8   C (98.2   F) | 2018 10:13 AM  |          |
|                   |                     | PDT                  |          |
+-------------------+---------------------+----------------------+----------+
| Respiratory Rate  | 13                  | 2018  4:00 PM  |          |
|                   |                     | PDT                  |          |
+-------------------+---------------------+----------------------+----------+
 
| Oxygen Saturation | 96%                 | 2018  4:00 PM  |          |
|                   |                     | PDT                  |          |
+-------------------+---------------------+----------------------+----------+
| Inhaled Oxygen    | -                   | -                    |          |
| Concentration     |                     |                      |          |
+-------------------+---------------------+----------------------+----------+
| Weight            | 63.9 kg (140 lb 14  | 2018  9:50 AM  |          |
|                   | oz)                 | PDT                  |          |
+-------------------+---------------------+----------------------+----------+
| Height            | 160 cm (5' 3")      | 2018  9:50 AM  |          |
|                   |                     | PDT                  |          |
+-------------------+---------------------+----------------------+----------+
| Body Mass Index   | 24.95               | 2018  9:50 AM  |          |
|                   |                     | PDT                  |          |
+-------------------+---------------------+----------------------+----------+
 documented in this encounter
 
 Discharge Instructions
 Instructions Good Mendoza RN - 2018.Home Care for
 Cardiac Catheterization
 
 Call your doctor if you notice any unusual symptoms. Remember: you are under the influence 
of medicines. You must have someone else take you home, either by car or taxi. Don  t drive
, operate machinery or power tools. Don  t drink any alcoholic beverages. Don  t make any 
important decisions or sign legal papers. 
 
 Wound Care
 ? Change dressing as needed. Dressing can be removed in the morning. 
 ? You may shower, but do not take a bath, hot tub, or swim for 5 days.
 ? If you have any bleeding from the puncture site:
 1. Sit down and apply firm pressure to site with your fingers x 10 minutes.
 2. If the bleeding stops, continue to sit quietly, keeping your wrist straight for 2 hours.
  Notify your physician as soon as possible.
 3. If bleeding does not stop after 10 minutes, or if there is a large amount of bleeding or
 spurting, call bleeding or spurting, call 911 immediately. Do not drive yourself to the [facility].
   
 Diet
 ? Resume your regular diet.
 ? Drink an extra 3 to 4 glasses of fluid tonight. Avoid drinks with caffeine (coffee, tea, 
cola) or alcohol (wine, beer, liquor). 
 
 Rest and Activity
 ? For 24 hours, no blood pressures on affected arm, no excessive wrist movement and do not 
drive a car.
 ? Take it easy for the rest of the day.
 ? Do not lift anything over 1 pound for the next 48 hours.
 ? For 1 week, no activity with excessive pushing or pulling of the affected arm.
 
 Call your doctor if:
 Call your Doctor right away or go to the Emergency Room if your arm looks or feels differen
t. Call if your arm is: Pale, cold, numb, tingling (pins & needles) or turns purple or red.
 
 How to Reach your Doctor
  8:00    4:00 call Cardiac Catheterization Lab at [phone].
 
 For Cardiac Catheterization related emergencies after hours, weekends, and holidays, call [facility]  at [phone] and ask to have the   Cardiology Fellow on-call   p
aged.
 
 
 documented in this encounter
 
 Medications at Time of Discharge
 
 
+----------------------+----------------------+-----------+---------+----------+----------+
| Medication           | Sig                  | Dispensed | Refills | Start    | End Date |
|                      |                      |           |         | Date     |          |
+----------------------+----------------------+-----------+---------+----------+----------+
|   acetaminophen 325  | Take by mouth.       |           | 0       |          |          |
| mg oral tablet       |                      |           |         |          |          |
+----------------------+----------------------+-----------+---------+----------+----------+
|   ascorbic acid      | Take 500 mg by mouth |           | 0       |          |          |
| (vitamin C) 500 mg   |  once daily.         |           |         |          |          |
| oral tablet          |                      |           |         |          |          |
+----------------------+----------------------+-----------+---------+----------+----------+
|   furosemide 40 mg   | Take by mouth.       |           | 0       |          |          |
| oral tablet          |                      |           |         |          |          |
+----------------------+----------------------+-----------+---------+----------+----------+
|                      | Take 2 tablets by    |           | 0       |          |          |
| gluc/chnd/om3/dha/ep | mouth once daily.    |           |         |          |          |
| a/fish/str           |                      |           |         |          |          |
| (GLUCOSAMINE         |                      |           |         |          |          |
| CHONDROITIN PLUS     |                      |           |         |          |          |
| ORAL)                |                      |           |         |          |          |
+----------------------+----------------------+-----------+---------+----------+----------+
|   levothyroxine 100  | Take by mouth.       |           | 0       |          |          |
| mcg oral tablet      |                      |           |         |          |          |
+----------------------+----------------------+-----------+---------+----------+----------+
|   losartan 100 mg    | Take 100 mg by mouth |           | 0       |          |          |
| oral tablet          |  once daily.         |           |         |          |          |
+----------------------+----------------------+-----------+---------+----------+----------+
|   MAGNESIUM CHLORIDE | Take by mouth.       |           | 0       |          |          |
|  ORAL                |                      |           |         |          |          |
+----------------------+----------------------+-----------+---------+----------+----------+
|   metFORMIN    | Take by mouth.       |           | 0       |          |          |
| mg oral tablet,ER    |                      |           |         |          |          |
| alexis.retention 24 hr |                      |           |         |          |          |
+----------------------+----------------------+-----------+---------+----------+----------+
|   metoprolol         | Take 100 mg by mouth |           | 0       | 20 |          |
| succinate 200 mg     |  once daily.         |           |         | 18       |          |
| oral tablet extended |                      |           |         |          |          |
|  release 24 hr       |                      |           |         |          |          |
+----------------------+----------------------+-----------+---------+----------+----------+
|   potassium chloride | Take by mouth.       |           | 0       |          |          |
|  SR 20 mEq oral      |                      |           |         |          |          |
| tablet,ER            |                      |           |         |          |          |
| particles/crystals   |                      |           |         |          |          |
+----------------------+----------------------+-----------+---------+----------+----------+
|   simvastatin 20 mg  | Take by mouth once   |           | 0       |          |          |
| oral tablet          | daily in the         |           |         |          |          |
|                      | evening.             |           |         |          |          |
+----------------------+----------------------+-----------+---------+----------+----------+
|   warfarin 5 mg oral | Take by mouth.       |           | 0       |          |          |
|  tablet              |                      |           |         |          |          |
+----------------------+----------------------+-----------+---------+----------+----------+
 documented as of this encounter
 
 Progress Notes
 Salomón Paniagua MD - 2018  3:20 PM PDTFormatting of this note might be different from
 
 the original.
 Post-Procedure Access Site Check
 
 S: Feels well following the procedure, no complaints. Daughter at bedside, staying in [facility]. 
 
 Last Vitals: /56 | Pulse 62 | Temp 36.8 C (98.2 F) | RR 22 | Ht 1.6 m (5' 3") | W
t 63.9 kg (140 lb 14 oz) | SpO2 96% | BMI 24.95 kg/(m^2)
 24 Hour Vital Min/Max: 
 Systolic (24hrs), Av , Min:117 , Max:158 
 Diastolic (24hrs), Av, Min:56, Max:75
 Pulse  Min: 54  Max: 78
 Temp  Min: 36.8 C (98.2 F)  Max: 36.8 C (98.2 F)
 Resp  Min: 12  Max: 31
 SpO2  Min: 95 %  Max: 98 %
 
 Intake/Output Summary (Last 24 hours) at 18 1520
 Last data filed at 18 1500
  Gross per 24 hour 
 Intake                8 ml 
 Output               13 ml 
 Net               -5 ml 
 
 Gen: comfortable appearing.
 Access Site: No hematoma or oozing around right radial artery or right IJ 
 Barbeau: Type A
 Pulse:right radial: 2+
 Skin: No embolic phenomena in hands/feet.
 Neuro: Gait normal. Reflexes normal and symmetric. Sensation grossly intact
 
 A/P: No evidence of acute complications following procedure. Continue current post-cath car
e. 
 
 Salomón Paniagua MD
 Cardiology Fellow
 
 Electronically signed by Salomón Paniagua MD at 2018  3:21 PM Salomón Marc MD - 0
2018 12:35 PM PDTCardiology Preliminary Procedure Note (Full report to follow)
 
 Primary Care Provider: Jean Bermudez MD
 Referring Provider: Jame Wilkins MD
 
 Cath Lab Staff:
 Neal He MD
 
 Procedure(s):
 Coronary Angiography
 Right Heart Catheterization
 
 Indications:
 Pre-mitral valve intervention workup
 
 Access:
 5F RRA
 7F RIJ
 
 Post Procedure Access:
 No evidence of bleeding or hematoma  right Radial access site bleeding, oozing, hematoma. a
nd No evidence of distal limb ischemia or distal embolization  Or any immediate complication
s noted in right IJ.
 
 
 Estimated Blood Loss:
 35 mL
 
 Medications:
 Heparin 3000 units    Route: IV
 Midazolam 1 mg    Route: IV
 Nitroglycerin 200 mcg    Route:  IA
 Verapamil 2 mg    Route:  IA
 
 Contrast: 55 mL Omnipaque
 
 Findings:
 PRESSURES
 
 RHC: Unremarkable filling pressures
 
 Coronary Angiography: Obstructive CAD of RCA
 
 Intervention:
 None
 
 Complications:
 None
 
 Hemostasis:
 Manual compression in cath lab.    Site: [facility].
 
 Recommendations: 
 Patient Status: Day patient
 
 Usual post cath care.
 
 Electronically signed by Salomón Paniagua MD at 2018 12:40 PM PDTdocumented in this enc
ounter
 
 Plan of Treatment
 Not on filedocumented as of this encounter
 
 Procedures
 
 
+----------------------+--------+-------------+----------------------+----------------------
+
| Procedure Name       | Priori | Date/Time   | Associated Diagnosis | Comments             
|
|                      | ty     |             |                      |                      
|
+----------------------+--------+-------------+----------------------+----------------------
+
| CARDIAC CATH         |        | 2018  |                      |   Results for this   
|
|                      |        |  1:58 PM    |                      | procedure are in the 
|
|                      |        | PDT         |                      |  results section.    
|
+----------------------+--------+-------------+----------------------+----------------------
+
| INR                  | Urgent | 2018  |                      |   Results for this   
 
|
|                      |        | 10:15 AM    |                      | procedure are in the 
|
|                      |        | PDT         |                      |  results section.    
|
+----------------------+--------+-------------+----------------------+----------------------
+
| BASIC METABOLIC SET  | Routin | 2018  |                      |   Results for this   
|
| (NA, K, CL, TCO2,    | e      | 10:15 AM    |                      | procedure are in the 
|
| BUN, CR, GLU, CA)    |        | PDT         |                      |  results section.    
|
+----------------------+--------+-------------+----------------------+----------------------
+
| CBC (HEMOGRAM) ONLY  | Routin | 2018  |                      |   Results for this   
|
|                      | e      | 10:14 AM    |                      | procedure are in the 
|
|                      |        | PDT         |                      |  results section.    
|
+----------------------+--------+-------------+----------------------+----------------------
+
| CBC ONLY             | Routin | 2018  |                      |   Results for this   
|
|                      | e      | 10:14 AM    |                      | procedure are in the 
|
|                      |        | PDT         |                      |  results section.    
|
+----------------------+--------+-------------+----------------------+----------------------
+
| CAPILLARY BLOOD      | Routin | 2018  |   Mitral valve       |   Results for this   
|
| GLUCOSE (NO CHG),    | e      | 10:08 AM    | stenosis,            | procedure are in the 
|
| POC                  |        | PDT         | unspecified etiology |  results section.    
|
+----------------------+--------+-------------+----------------------+----------------------
+
| INTRAPROCEDURE       | Routin | 2018  |                      |   Results for this   
|
| IMAGING              | e      |  9:53 AM    |                      | procedure are in the 
|
|                      |        | PDT         |                      |  results section.    
|
+----------------------+--------+-------------+----------------------+----------------------
+
| CATH LAB INT         | Routin | 2018  |   Mitral valve       |   Results for this   
|
| CORONARY ANGIOGRAM   | e      |  9:37 AM    | stenosis,            | procedure are in the 
|
|                      |        | PDT         | unspecified etiology |  results section.    
|
+----------------------+--------+-------------+----------------------+----------------------
+
| CARDIOLOGY           |        | 2018  |                      |   Results for this   
|
|                      |        | 12:00 AM    |                      | procedure are in the 
|
|                      |        | PDT         |                      |  results section.    
 
|
+----------------------+--------+-------------+----------------------+----------------------
+
| CARDIOLOGY           |        | 2018  |                      |   Results for this   
|
|                      |        | 12:00 AM    |                      | procedure are in the 
|
|                      |        | PDT         |                      |  results section.    
|
+----------------------+--------+-------------+----------------------+----------------------
+
 documented in this encounter
 
 Results
 CARDIAC CATH (2018  1:58 PM PDT)
 
+-------------------------------------------------------------------------------------------
--------------------------------------------------------------------------------------------
-------------------------------------------------+
| Procedure Note                                                                            
                                                                                            
                                                 |
+-------------------------------------------------------------------------------------------
--------------------------------------------------------------------------------------------
-------------------------------------------------+
|   Neal He MD - 2018  1:58 PM PDT  DATE OF PROCEDURE:         
                                                                                            
                                                 |
| PATIENT DATA:Height 160 cmWeight 76.6 kgBSA 1.76 [id number] PHYSICIAN:Neal         
                                                                                            
                                                 |
| Neri TAVAREZ, MedicineDepartment of CardiologyFELLOW:Adalberto Parker,      
                                                                                            
                                                 |
| MDFellowGeneral CardiologyINDICATIONS:Severe MRREFERRING PHYSICIAN:Jame Wilkins,      
                                                                                            
                                                 |
| M.D., CardiologyPROCEDURES PERFORMED:1. Right heart                    
                                                                                            
                                                 |
| catheterization.2. Selective coronary angiography.MEDICATIONS:1. Heparin IV 3000          
                                                                                            
                                                 |
| units.2. Midazolam IV 1 mg.3. Nitroglycerin 200 mcg intra-arterial.4. Verapamil 2 mg      
                                                                                            
                                                 |
| intra-arterial.CONTRAST:Omnipaque 55 mL.FLUOROSCOPY TIME:6.4 minutes.FLUOROSCOPY          
                                                                                            
                                                 |
| DAP:4151.0 [id number] OF PROCEDURE:The procedure, its risks, benefits, and         
                                                                                            
                                                 |
| alternatives, were discussed with the patient and the patient was brought to the cath     
                                                                                            
                                                 |
| lab in the non-sedated state.  A full PARQ was performed.  A team pause was performed     
                                                                                            
                                                 |
| before the start of the procedure.  A 7-French sheath was placed in the right internal    
                                                                                            
 
                                                 |
| jugular vein using ultrasound guidance and micropuncture needle.  Through the 7-French    
                                                                                            
                                                 |
| sheath, we introduced a 7-French balloon wedge catheter all the way up to the pulmonary   
                                                                                            
                                                 |
| capillary wedge position, and pressures were obtained in the various cardiac chambers.    
                                                                                            
                                                 |
| After the right catheterization was completed, we proceeded toward selective coronary     
                                                                                            
                                                 |
| angiography.  Initially, we went with the Kuldeep catheter.  However, it was difficult to   
                                                                                            
                                                 |
| engage the Kuldeep catheter into the left or the right coronary artery, and henceforth we   
                                                                                            
                                                 |
| decided to switch to JL3.5 and JR4 catheters.  We were very gentle with using the Kuldeep   
                                                                                            
                                                 |
| catheter as well because the patient had a lot of aortic calcium.  We were able to        
                                                                                            
                                                 |
| easily engage the left and the right coronary artery using JL3.5 and JR4 catheters.       
                                                                                            
                                                 |
| After multiple cineangiographic views were obtained, after we had sufficient              
                                                                                            
                                                 |
| information, we discussed the case with Dr. Jame Wilkins, the General Cardiology      
                                                                                            
                                                 |
| attending for this patient, and we decided to complete the case.  The TR band was used    
                                                                                            
                                                 |
| for achieving local hemostasis at the right radial artery access site and the right IJ    
                                                                                            
                                                 |
| was removed and manual compression was applied for achievement of local                   
                                                                                            
                                                 |
| hemostasis.FINDINGS:1. Hemodynamic findings right heart catheterization:  a. Right        
                                                                                            
                                                 |
| arterial pressure mean of 5 mmHg.  b. Right ventricular pressure of 36/0 and a right      
                                                                                            
                                                 |
| ventricular end-diastolic pressure of 40 mmHg.c. Pulmonary artery pressure of 41/13 with  
                                                                                            
                                                 |
|  a mean pulmonary artery pressure of 25 mmHg.d. Pulmonary capillary wedge pressure of 16  
                                                                                            
                                                 |
|  mmHg with V wave up to 33 mmHg.e. Heart rate of 60 beats per minute.f. Cardiac output    
                                                                                            
                                                 |
| of 3.4 L/min per Hellen method and cardiac index of 1.93 L/min/sq m. g. Opening aortic      
                                                                                            
 
                                                 |
| pressure of 128/68 mmHg with a mean aortic pressure of 72 mmHg.h. Pulmonary artery        
                                                                                            
                                                 |
| saturation of 66% and aortic saturation of 99%.i. Pulmonary vascular resistance of 2.64   
                                                                                            
                                                 |
| Wood units. j. Systemic vascular resistance of 19.98 Wood units.2. Left heart             
                                                                                            
                                                 |
| catheterization:a. Left main coronary artery.  The left main coronary artery is a         
                                                                                            
                                                 |
| large-sized vessel which has about 20% calcific stenosis in its distal portion at its     
                                                                                            
                                                 |
| bifurcation with the left anterior descending and the left circumflex artery.b. Left      
                                                                                            
                                                 |
| anterior descending artery.  The left anterior descending artery is a moderate-sized      
                                                                                            
                                                 |
| vessel which courses all the way up to the apex. It is tortuous in nature.  It has mild   
                                                                                            
                                                 |
| 20% to 30% stenosis in its midportion at its branch point with the diagonal artery.       
                                                                                            
                                                 |
| Otherwise, the left anterior descending artery has no other major angiographic            
                                                                                            
                                                 |
| stenosis.c. Left circumflex artery.  The left circumflex artery has 2 stenoses in tandem  
                                                                                            
                                                 |
|  which are about 30% to 40% each as it courses around the posterior intraventricular      
                                                                                            
                                                 |
| groove.d. Right coronary artery the right coronary artery is a dominant vessel.  The      
                                                                                            
                                                 |
| proximal portion of the right coronary artery has about 30% to 40% calcific stenosis      
                                                                                            
                                                 |
| which may be an under-estimate given the eccentricity of the plaque.e. The distal RCA     
                                                                                            
                                                 |
| has 99% calcific stenosis prior to its bifurcation with the PDA and PLV branches.  It is  
                                                                                            
                                                 |
|  an extremely tortuous right coronary artery.ASSESSMENT:1. One-vessel coronary artery     
                                                                                            
                                                 |
| disease with significant stenosis of the distal right coronary artery at 99% prior to     
                                                                                            
                                                 |
| the posterior descending artery posterior left ventricular branches.2. Non-obstructive    
                                                                                            
                                                 |
| coronary artery disease in the left anterior descending and left circumflex branches.3.   
                                                                                            
 
                                                 |
| Mildly elevated left heart filling pressures.PLAN:The patient will be seen in the clinic  
                                                                                            
                                                 |
|  tomorrow by Dr. Jame Wilkins, and after the clinic the the PCI option will be      
                                                                                            
                                                 |
| discussed.MACHELLE Simmons/BOBD:  2018 13:06:40"A resident/fellow assisted     
                                                                                            
                                                 |
| with documenting this service.   I saw the patient and reviewed and verified all          
                                                                                            
                                                 |
| information documented by the resident/fellow and made modifications to such              
                                                                                            
                                                 |
| information, when appropriate. The risks and benefits of the procedure were explained to  
                                                                                            
                                                 |
|  the patient in its entirety and all the questions were answered to patient               
                                                                                            
                                                 |
| satisfaction. I was present and supervised all the aspects of this procedure. "Neal      
                                                                                            
                                                 |
| Heather He Interventional/Structural Heart CardiologistK[facility]    
                                                                                            
                                                 |
| [place], [facility]  2018 13:58:20Job #:               
                                                                                            
                                                 |
| [id number]                                                                          
                                                                                            
                                                 |
|h. Pulmonary artery saturation of 66% and aortic saturation of 99%.                        
                                                                                            
                                                |
|i. Pulmonary vascular resistance of 2.64 Wood units.                                       
                                                                                            
                                                |
|j. Systemic vascular resistance of 19.98 Wood units.                                       
                                                                                            
                                                |
|2. Left heart catheterization:                                                             
                                                                                            
                                                 |
|a. Left main coronary artery.  The left main coronary artery is a large-sized vessel which 
has about 20% calcific stenosis in its distal portion at its bifurcation with the left anter
ior descending and the left circumflex artery.  |
|b. Left anterior descending artery.  The left anterior descending artery is a moderate-size
d vessel which courses all the way up to the apex. It is tortuous in nature.  It has mild 20
% to 30% stenosis in its midportion at          |
|its branch point with the diagonal artery.  Otherwise, the left anterior descending artery 
has no other major angiographic stenosis.                                                   
                                                 |
|c. Left circumflex artery.  The left circumflex artery has 2 stenoses in tandem which are a
bout 30% to 40% each as it courses around the posterior intraventricular groove.            
                                                |
|d. Right coronary artery the right coronary artery is a dominant vessel.  The proximal port
ion of the right coronary artery has about 30% to 40% calcific stenosis which may be an unde
 
r-estimate given the eccentricity of the plaque. |
|e. The distal RCA has 99% calcific stenosis prior to its bifurcation with the PDA and PLV b
ranches.  It is an extremely tortuous right coronary artery.                                
                                                |
|                                                                                           
                                                                                            
                                                 |
|ASSESSMENT:                                                                                
                                                                                            
                                                |
|1. One-vessel coronary artery disease with significant stenosis of the distal right coronar
y artery at 99% prior to the posterior descending artery posterior left ventricular branches
.                                                |
|2. Non-obstructive coronary artery disease in the left anterior descending and left circumf
daniel branches.                                                                               
                                                 |
|3. Mildly elevated left heart filling pressures.                                           
                                                                                            
                                                 |
|                                                                                           
                                                                                            
                                                 |
|PLAN:                                                                                      
                                                                                            
                                                |
|The patient will be seen in the clinic tomorrow by Dr. Jame Wilkins, and after the cl
inic the the PCI option will be discussed.                                                  
                                                 |
|                                                                                           
                                                                                            
                                                 |
|Neal He MD                                                                          
                                                                                            
                                                 |
|HG/MODL                                                                                    
                                                                                            
                                                 |
|DD:  2018 13:06:40                                                                   
                                                                                            
                                                 |
|                                                                                           
                                                                                            
                                                 |
|"A resident/fellow assisted with documenting this service.   I saw the patient and reviewed
 and verified all information documented by the resident/fellow and made modifications to hernandez
ch information, when                             |
|appropriate. The risks and benefits of the procedure were explained to the patient in its e
ntirety and all the questions were answered to patient satisfaction. I was present and super
vised all the aspects of this procedure. "      |
|                                                                                           
                                                                                            
                                                 |
|Neal He MD                                                                          
                                                                                            
                                                 |
|Attending Interventional/Structural Heart Cardiologist                                     
                                                                                            
                                                 |
|[facility] [place], [facility] [place]                        
                                                                                            
 
                                                 |
|DT:  2018 13:58:20                                                                   
                                                                                            
                                                 |
|Job #:  [id number]                                                                   
                                                                                            
                                                 |
+-------------------------------------------------------------------------------------------
--------------------------------------------------------------------------------------------
-------------------------------------------------+
 INR (2018 10:15 AM PDT)
 
+-----------+----------+-----------------+-------------+--------------+
| Component | Value    | Ref Range       | Performed   | Pathologist  |
|           |          |                 | At          | Signature    |
+-----------+----------+-----------------+-------------+--------------+
| INR       | 1.27 (H) | 0.90 - 1.20 INR | [facility]        |              |
|           |          |                 | LABORATORY  |              |
|           |          |                 | SERVICES,   |              |
|           |          |                 | [facility]        |              |
+-----------+----------+-----------------+-------------+--------------+
 
 
 
+----------------+
| Specimen       |
+----------------+
| Blood - Blood  |
| (substance)    |
+----------------+
 
 
 
+---------------------------------------------------------------------+----------------+
| Narrative                                                           | Performed At   |
+---------------------------------------------------------------------+----------------+
|         INR Therapeutic ranges for full anticoagulation:   INR for  |   [facility]         |
| Venous Thromboembolism                   (2.0 - 3.0) INR   INR for  | LABORATORY     |
| most patients with mech. valves      (2.5 - 3.5) INR                | SERVICES, [facility] |
+---------------------------------------------------------------------+----------------+
 
 
 
+--------------------+------------------------+--------------------+--------------+
| Performing         | Address                | City/State/Zipcode | Phone Number |
| Organization       |                        |                    |              |
+--------------------+------------------------+--------------------+--------------+
|   [facility]  |   [address]  | [place] [address] |              |
| SERVICES, [facility]     | [address]                |                    |              |
+--------------------+------------------------+--------------------+--------------+
 BASIC METABOLIC SET (NA, K, CL, TCO2, BUN, CR, GLU, CA) (2018 10:15 AM PDT)
 
+-------------+---------+-----------------+-------------+--------------+
| Component   | Value   | Ref Range       | Performed   | Pathologist  |
|             |         |                 | At          | Signature    |
+-------------+---------+-----------------+-------------+--------------+
| GLUCOSE,    | 122 (H) | 70 - 99 mg/dL   | [facility]        |              |
| PLASMA      |         |                 | LABORATORY  |              |
| (LAB)       |         |                 | SERVICES,   |              |
|             |         |                 | CORE        |              |
 
+-------------+---------+-----------------+-------------+--------------+
| BUN, PLASMA | 16      | 6 - 20 mg/dL    | [facility]        |              |
|  (LAB)      |         |                 | LABORATORY  |              |
|             |         |                 | SERVICES,   |              |
|             |         |                 | CORE        |              |
+-------------+---------+-----------------+-------------+--------------+
| CREATININE  | 0.78    | 0.60 - 1.10     | [facility]        |              |
| PLASMA      |         | mg/dL           | LABORATORY  |              |
| (LAB)       |         |                 | SERVICES,   |              |
|             |         |                 | CORE        |              |
+-------------+---------+-----------------+-------------+--------------+
| EGFR        | >60     | >60 mL/min      | [facility]        |              |
| -    |         |                 | LABORATORY  |              |
| [facility]    |         |                 | SERVICES,   |              |
|             |         |                 | CORE        |              |
+-------------+---------+-----------------+-------------+--------------+
| EGFR NON    | >60     | >60 mL/min      | [facility]        |              |
| -[facility] |         |                 | LABORATORY  |              |
| [facility]       |         |                 | SERVICES,   |              |
|             |         |                 | CORE        |              |
+-------------+---------+-----------------+-------------+--------------+
| SODIUM,     | 136     | 136 - 145       | [facility]        |              |
| PLASMA      |         | mmol/L          | LABORATORY  |              |
| (LAB)       |         |                 | SERVICES,   |              |
|             |         |                 | CORE        |              |
+-------------+---------+-----------------+-------------+--------------+
| POTASSIUM,  | 3.9     | 3.4 - 5.0       | [facility]        |              |
| PLASMA      |         | mmol/L          | LABORATORY  |              |
| (LAB)       |         |                 | SERVICES,   |              |
|             |         |                 | CORE        |              |
+-------------+---------+-----------------+-------------+--------------+
| CHLORIDE,   | 100     | 97 - 108 mmol/L | [facility]        |              |
| PLASMA      |         |                 | LABORATORY  |              |
| (LAB)       |         |                 | SERVICES,   |              |
|             |         |                 | CORE        |              |
+-------------+---------+-----------------+-------------+--------------+
| TOTAL CO2,  | 26      | 21 - 32 mmol/L  | [facility]        |              |
| PLASMA      |         |                 | LABORATORY  |              |
| (LAB)       |         |                 | SERVICES,   |              |
|             |         |                 | CORE        |              |
+-------------+---------+-----------------+-------------+--------------+
| CALCIUM,    | 9.1     | 8.6 - 10.2      | [facility]        |              |
| PLASMA      |         | mg/dL           | LABORATORY  |              |
| (LAB)       |         |                 | SERVICES,   |              |
|             |         |                 | CORE        |              |
+-------------+---------+-----------------+-------------+--------------+
| ANION GAP   | 10      | 4 - 11 mmol/L   | [facility]        |              |
|             |         |                 | LABORATORY  |              |
|             |         |                 | SERVICES,   |              |
|             |         |                 | CORE        |              |
+-------------+---------+-----------------+-------------+--------------+
| POTASSIUM   | No Hemo |                 | [facility]        |              |
| CMNT        |         |                 | LABORATORY  |              |
|             |         |                 | SERVICES,   |              |
|             |         |                 | CORE        |              |
+-------------+---------+-----------------+-------------+--------------+
 
 
 
+----------------+
 
| Specimen       |
+----------------+
| Blood - Blood  |
| (substance)    |
+----------------+
 
 
 
+------------------------------------------------------------------------+----------------+
| Narrative                                                              | Performed At   |
+------------------------------------------------------------------------+----------------+
|      GFR is estimated using the MDRD equation recommended by the       |   [facility]         |
| National Kidney Disease Education Program.     Estimated GFR           | LABORATORY     |
| Interpretive Information:  <60 mL/min/1.73 sq m                        | SERVICES, CORE |
| Chronic Kidney Disease  <15 mL/min/1.73 sq m                           |                |
| Kidney Failure  Estimated GFR greater that 60 mL/min/1.73 sq m is of   |                |
| limited clinical value.     The MDRD equation is not valid in the      |                |
| following situations:  - Patients under 18 years of age  - Severe      |                |
| malnutrition or obesity  - Vegetarian diet  - Rapidly changing kidney  |                |
| function  - Amputees, paraplegics, or other muscle-wasting diseses     |                |
+------------------------------------------------------------------------+----------------+
 
 
 
+--------------------+------------------------+--------------------+--------------+
| Performing         | Address                | City/State/Zipcode | Phone Number |
| Organization       |                        |                    |              |
+--------------------+------------------------+--------------------+--------------+
|   [facility]  |   [address]  | [place] [address] |              |
| SERVICES, CORE     | [address]                |                    |              |
+--------------------+------------------------+--------------------+--------------+
 CBC (HEMOGRAM) ONLY (2018 10:14 AM PDT)
 
+-------------+----------+-----------------+-------------+--------------+
| Component   | Value    | Ref Range       | Performed   | Pathologist  |
|             |          |                 | At          | Signature    |
+-------------+----------+-----------------+-------------+--------------+
| WHITE CELL  | 7.45     | 3.50 - 10.80    | [facility]        |              |
| COUNT       |          | K/cu mm         | LABORATORY  |              |
|             |          |                 | SERVICES,   |              |
|             |          |                 | CORE        |              |
+-------------+----------+-----------------+-------------+--------------+
| RED CELL    | 4.29     | 4.00 - 5.20     | [facility]        |              |
| COUNT       |          | M/cu mm         | LABORATORY  |              |
|             |          |                 | SERVICES,   |              |
|             |          |                 | CORE        |              |
+-------------+----------+-----------------+-------------+--------------+
| HEMOGLOBIN  | 12.6     | 12.0 - 16.0     | [facility]        |              |
|             |          | g/dL            | LABORATORY  |              |
|             |          |                 | SERVICES,   |              |
|             |          |                 | CORE        |              |
+-------------+----------+-----------------+-------------+--------------+
| HEMATOCRIT  | 37.5     | 36.0 - 46.0 %   | [facility]        |              |
|             |          |                 | LABORATORY  |              |
|             |          |                 | SERVICES,   |              |
|             |          |                 | CORE        |              |
+-------------+----------+-----------------+-------------+--------------+
| MCV         | 87.4     | 80.0 - 100.0 fL | [facility]        |              |
|             |          |                 | LABORATORY  |              |
|             |          |                 | SERVICES,   |              |
 
|             |          |                 | CORE        |              |
+-------------+----------+-----------------+-------------+--------------+
| MCHC        | 33.6     | 32.0 - 36.0     | [facility]        |              |
|             |          | g/dL            | LABORATORY  |              |
|             |          |                 | SERVICES,   |              |
|             |          |                 | CORE        |              |
+-------------+----------+-----------------+-------------+--------------+
| RDW SD      | 49.3 (H) | 35.1 - 46.3 fL  | [facility]        |              |
|             |          |                 | LABORATORY  |              |
|             |          |                 | SERVICES,   |              |
|             |          |                 | CORE        |              |
+-------------+----------+-----------------+-------------+--------------+
| PLATELET    | 315      | 150 - 400 K/cu  | [facility]        |              |
| COUNT       |          | mm              | LABORATORY  |              |
|             |          |                 | SERVICES,   |              |
|             |          |                 | CORE        |              |
+-------------+----------+-----------------+-------------+--------------+
| MPV         | 10.0     | 9.7 - 12.3 fL   | [facility]        |              |
|             |          |                 | LABORATORY  |              |
|             |          |                 | SERVICES,   |              |
|             |          |                 | CORE        |              |
+-------------+----------+-----------------+-------------+--------------+
| NRBC%       | 0.0      | 0.0 - 0.3 %     | [facility]        |              |
|             |          |                 | LABORATORY  |              |
|             |          |                 | SERVICES,   |              |
|             |          |                 | CORE        |              |
+-------------+----------+-----------------+-------------+--------------+
| NRBC#       | 0.00     | 0.00 - 0.02     | [facility]        |              |
|             |          | K/cu mm         | LABORATORY  |              |
|             |          |                 | SERVICES,   |              |
|             |          |                 | CORE        |              |
+-------------+----------+-----------------+-------------+--------------+
 
 
 
+----------------+
| Specimen       |
+----------------+
| Blood - Blood  |
| (substance)    |
+----------------+
 
 
 
+----------------------------------------------------------------------+----------------+
| Narrative                                                            | Performed At   |
+----------------------------------------------------------------------+----------------+
|      New reference ranges for MCV, MCHC, PLT, IG% and IG# effective  |   [facility]         |
| 5/10/2018                                                            | LABORATORY     |
|                                                                      | SERVICES, CORE |
+----------------------------------------------------------------------+----------------+
 
 
 
+--------------------+------------------------+--------------------+--------------+
| Performing         | Address                | City/State/Zipcode | Phone Number |
| Organization       |                        |                    |              |
+--------------------+------------------------+--------------------+--------------+
|   [facility]  |   [address]  | [place] [address] |              |
| SERVICES, CORE     | [address]                |                    |              |
 
+--------------------+------------------------+--------------------+--------------+
 CAPILLARY BLOOD GLUCOSE (NO CHG), POC (2018 10:08 AM PDT)
 
+-----------+---------+---------------+-------------+--------------+
| Component | Value   | Ref Range     | Performed   | Pathologist  |
|           |         |               | At          | Signature    |
+-----------+---------+---------------+-------------+--------------+
| BLOOD     | 142 (H) | 60 - 99 mg/dL | [facility]      |              |
| GLUCOSE,  |         |               | [facility]     |              |
| POC       |         |               | [facility] |              |
|           |         |               |  OF CARE    |              |
|           |         |               | TESTS       |              |
+-----------+---------+---------------+-------------+--------------+
 
 
 
+----------+
| Specimen |
+----------+
|          |
+----------+
 
 
 
+----------------------+-------------------------+--------------------+--------------+
| Performing           | Address                 | City/State/Zipcode | Phone Number |
| Organization         |                         |                    |              |
+----------------------+-------------------------+--------------------+--------------+
|   [facility]     |   [address]  | [place], [place]       |              |
| [facility]  | [address]               | [phone]         |              |
| TESTS                |                         |                    |              |
+----------------------+-------------------------+--------------------+--------------+
 INTRAPROCEDURE IMAGING (2018  9:53 AM PDT)
 
+----------+
| Specimen |
+----------+
|          |
+----------+
 
 
 
+-----------------------------------------------------------------------+--------------+
| Narrative                                                             | Performed At |
+-----------------------------------------------------------------------+--------------+
|   See admission or procedure notes for details of any intraprocedure  |              |
| images   obtained.                                                    |              |
+-----------------------------------------------------------------------+--------------+
 CATH LAB INT CORONARY ANGIOGRAM (2018  9:37 AM PDT)
 
+----------+
| Specimen |
+----------+
|          |
+----------+
 
 
 
+-----------------------------------------------------------------------+----------------+
| Narrative                                                             | Performed At   |
 
+-----------------------------------------------------------------------+----------------+
|   Procedure performed in the Cardiac Cath Lab.   See procedure notes  |   [facility] -       |
| for   details.                                                        | [facility],  |
|                                                                       | POINT OF CARE  |
|                                                                       | TESTS          |
+-----------------------------------------------------------------------+----------------+
 
 
 
+----------------------+-------------------------+--------------------+--------------+
| Performing           | Address                 | City/State/Zipcode | Phone Number |
| Organization         |                         |                    |              |
+----------------------+-------------------------+--------------------+--------------+
|   [facility]     |   [address]  | [place], [place]       |              |
| [facility]  | [address]               | [id number]         |              |
| TESTS                |                         |                    |              |
+----------------------+-------------------------+--------------------+--------------+
 CARDIOLOGY (2018 12:00 AM PDT)
 
+----------------------------------------------------------+--------------+
| Narrative                                                | Performed At |
+----------------------------------------------------------+--------------+
|   This result has an attachment that is not available.   |              |
+----------------------------------------------------------+--------------+
 CARDIOLOGY (2018 12:00 AM PDT)
 
+----------------------------------------------------------+--------------+
| Narrative                                                | Performed At |
+----------------------------------------------------------+--------------+
|   This result has an attachment that is not available.   |              |
+----------------------------------------------------------+--------------+
 documented in this encounter
 
 Visit Diagnoses
 
 
+-----------------------------------------------+
| Diagnosis                                     |
+-----------------------------------------------+
|   Mitral valve stenosis, unspecified etiology |
+-----------------------------------------------+
 documented in this encounter
 
 Administered Medications
 
 
+-----------------------------------+--------+----------+--------+------+------+
| Medication Order                  | MAR    | Action   | Dose   | Rate | Site |
|                                   | Action | Date     |        |      |      |
+-----------------------------------+--------+----------+--------+------+------+
|   heparin injection  intravenous, | Given  | 20 | 3,000  |      |      |
|  INTRAPROCEDURE PRN, Starting Mon |        | 18 12:01 | Units  |      |      |
|  18 at 1201, Until Mon       |        |  PM PDT  |        |      |      |
| 18 at 1201                   |        |          |        |      |      |
+-----------------------------------+--------+----------+--------+------+------+
 
 
 
+---+---+
|   |   |
 
+---+---+
 
 
 
+-----------------------------------+-------+----------+-------+---+---+
|   iohexol (OMNIPAQUE) 350 mg      | Given | 20 | 55 mL |   |   |
| iodine/mL injection               |       | 18 12:32 |       |   |   |
| INTRAPROCEDURE PRN, Starting Mon  |       |  PM PDT  |       |   |   |
| 18 at 1232, Until Mon        |       |          |       |   |   |
| 18 at 1232                   |       |          |       |   |   |
+-----------------------------------+-------+----------+-------+---+---+
 
 
 
+---+---+
|   |   |
+---+---+
 
 
 
+-----------------------------------+-------+----------+------+---+---+
|   lidocaine (XYLOCAINE) 10 mg/mL  | Given | 20 | 1 mL |   |   |
| (1 %) injection  infiltration,    |       | 18 11:43 |      |   |   |
| INTRAPROCEDURE PRN, Starting Mon  |       |  AM PDT  |      |   |   |
| 18 at 1127, Until Mon        |       |          |      |   |   |
| 18 at 1143                   |       |          |      |   |   |
+-----------------------------------+-------+----------+------+---+---+
 
 
 
+-------+----------+------+---+---+
| Given | 20 | 5 mL |   |   |
|       | 18 11:27 |      |   |   |
|       |  AM PDT  |      |   |   |
+-------+----------+------+---+---+
 
 
 
+---+---+
|   |   |
+---+---+
 
 
 
+---------------------------------+-------+----------+--------+---+---+
|   midazolam (PF) (VERSED)       | Given | 20 | 0.5 mg |   |   |
| injection  INTRAPROCEDURE PRN,  |       | 18 11:56 |        |   |   |
| Starting 18 at 1116,   |       |  AM PDT  |        |   |   |
| Until 18 at 1156       |       |          |        |   |   |
+---------------------------------+-------+----------+--------+---+---+
 
 
 
+-------+----------+--------+---+---+
| Given | 20 | 0.5 mg |   |   |
|       | 18 11:16 |        |   |   |
|       |  AM PDT  |        |   |   |
+-------+----------+--------+---+---+
 
 
 
 
+---+---+
|   |   |
+---+---+
 
 
 
+-----------------------------------+-------+----------+---------+---+---+
|   nitroGLYCERIN 2 mg/10 mL (200   | Given | 20 | 200 mcg |   |   |
| mcg/mL) in D5W IV  INTRAPROCEDURE |       | 18 11:54 |         |   |   |
|  PRN, Starting 18 at     |       |  AM PDT  |         |   |   |
| 1154, Until 18 at 1154   |       |          |         |   |   |
+-----------------------------------+-------+----------+---------+---+---+
 
 
 
+---+---+
|   |   |
+---+---+
 
 
 
+----------------------------------+---------+----------+--------+---+---+
|   sodium chloride 0.9 % (NS) IV  | New Bag | 20 | 150 mL |   |   |
| infusion  INTRAPROCEDURE         |         | 18 12:32 |        |   |   |
| CONTINUOUS PRN, Starting Mon     |         |  PM PDT  |        |   |   |
| 18 at 1232, Until Mon       |         |          |        |   |   |
| 18 at 1232                  |         |          |        |   |   |
+----------------------------------+---------+----------+--------+---+---+
 
 
 
+---+---+
|   |   |
+---+---+
 
 
 
+-----------------------------------+-------+----------+------+---+---+
|   verapamil (ISOPTIN) injection   | Given | 20 | 2 mg |   |   |
| INTRAPROCEDURE PRN, Starting Mon  |       | 18 11:54 |      |   |   |
| 18 at 1154, Until Mon        |       |  AM PDT  |      |   |   |
| 9/24/18 at 1154                   |       |          |      |   |   |
+-----------------------------------+-------+----------+------+---+---+
 
 
 
+---+---+
|   |   |
+---+---+
 documented in this encounter

## 2020-01-01 NOTE — XMS
Encounter Summary
  Created on: 2020
 
 Vonnie Celaya
 External Reference #: [id number]
 : 32
 Sex: Female
 
 Demographics
 
 
+-----------------------+------------------------+
| Address               | [address]         |
|                       | [place]  [address]   |
+-----------------------+------------------------+
| Home Phone            | [phone]        |
+-----------------------+------------------------+
| Preferred Language    | Unknown                |
+-----------------------+------------------------+
| Marital Status        | Single                 |
+-----------------------+------------------------+
| Taoism Affiliation | NON                    |
+-----------------------+------------------------+
| Race                  | White                  |
+-----------------------+------------------------+
| Ethnic Group          | Not  or  |
+-----------------------+------------------------+
 
 
 Author
 
 
+--------------+------------------------------+
| Author       | [facility] |
+--------------+------------------------------+
| Organization | [facility] |
+--------------+------------------------------+
| Address      | Unknown                      |
+--------------+------------------------------+
| Phone        | Unavailable                  |
+--------------+------------------------------+
 
 
 
 Support
 
 
+--------------+--------------+---------+-----------------+
| Name         | Relationship | Address | Phone           |
+--------------+--------------+---------+-----------------+
| Lauryn Leroy | ECON         | Unknown | [phone] |
+--------------+--------------+---------+-----------------+
 
 
 
 Care Team Providers
 
 
 
+------------------------+------+-----------------+
| Care Team Member Name  | Role | Phone           |
+------------------------+------+-----------------+
| Jean Bermudez MD | PCP  | [phone] |
+------------------------+------+-----------------+
 
 
 
 Reason for Visit
 
 
+--------------+----------+
| Reason       | Comments |
+--------------+----------+
| New patient  |          |
| consultation |          |
+--------------+----------+
 Consultation (Routine)
 
+--------+--------+-----------+--------------+--------------+---------------+
| Status | Reason | Specialty | Diagnoses /  | Referred By  | Referred To   |
|        |        |           | Procedures   | Contact      | Contact       |
+--------+--------+-----------+--------------+--------------+---------------+
| Closed |        | Cardiac   |   Diagnoses  |   Stephen,      |   Donnell,      |
|        |        | Surgery   |              | Varun PARRA,   | Shiloh SIMON,   |
|        |        |           | Nonrheumatic | MD  [address]     | MD  [address]   |
|        |        |           |  mitral      | [address]  | [address]   |
|        |        |           | (valve)      |  [address]       | [address]       |
|        |        |           | insufficienc | [place], [place] | [place], [place]  |
|        |        |           | y  Rheumatic |  [address]       | [address]    |
|        |        |           |  tricuspid   | Phone:       | Phone:        |
|        |        |           | insufficienc | [phone] | [phone]  |
|        |        |           | y  Pulmonary |   Fax:       |  Fax:         |
|        |        |           |              | [phone] | [phone]  |
|        |        |           | hypertension |              |               |
|        |        |           | ,            |              |               |
|        |        |           | unspecified  |              |               |
|        |        |           |  Procedures  |              |               |
|        |        |           |  WI NEW      |              |               |
|        |        |           | PATIENT      |              |               |
|        |        |           | LEVEL V      |              |               |
+--------+--------+-----------+--------------+--------------+---------------+
 
 
 
 
 Encounter Details
 
 
+--------+---------+----------------------+----------------------+----------------------+
| Date   | Type    | Department           | Care Team            | Description          |
+--------+---------+----------------------+----------------------+----------------------+
| / | Office  |   Cardiothoracic     |   Basil Dotson MD   | Non-rheumatic mitral |
| 2018   | Visit   | Surgery at [facility]  [address]  |  regurgitation       |
|        |         |  [address]    | [address]  [place],   | (Primary Dx)         |
|        |         | Mailcode: [address]       | [place] [address]        |                      |
|        |         | Physician[facility] | [phone]         |                      |
|        |         |   [place], [place]       | [phone] (Fax)   |                      |
|        |         | [address]           |                      |                      |
|        |         | [phone]         |                      |                      |
 
+--------+---------+----------------------+----------------------+----------------------+
 
 
 
 Social History
 
 
+--------------+-------+-----------+--------+------+
| Tobacco Use  | Types | Packs/Day | Years  | Date |
|              |       |           | Used   |      |
+--------------+-------+-----------+--------+------+
| Never Smoker |       |           |        |      |
+--------------+-------+-----------+--------+------+
 
 
 
+---------------------+---+---+---+
| Smokeless Tobacco:  |   |   |   |
| Never Used          |   |   |   |
+---------------------+---+---+---+
 
 
 
+-------------+-------------+---------+----------+
| Alcohol Use | Drinks/Week | oz/Week | Comments |
+-------------+-------------+---------+----------+
| No          |             |         |          |
+-------------+-------------+---------+----------+
 
 
 
+------------------+---------------+
| Sex Assigned at  | Date Recorded |
| Birth            |               |
+------------------+---------------+
| Not on file      |               |
+------------------+---------------+
 
 
 
+----------------+-------------+-------------+
| Job Start Date | Occupation  | Industry    |
+----------------+-------------+-------------+
| Not on file    | Not on file | Not on file |
+----------------+-------------+-------------+
 
 
 
+----------------+--------------+------------+
| Travel History | Travel Start | Travel End |
+----------------+--------------+------------+
 
 
 
+-------------------------------------+
| No recent travel history available. |
+-------------------------------------+
 documented as of this encounter
 
 Last Filed Vital Signs
 
 
 
+-------------------+--------------------+----------------------+----------+
| Vital Sign        | Reading            | Time Taken           | Comments |
+-------------------+--------------------+----------------------+----------+
| Blood Pressure    | 110/59             | 2018  2:07 PM  |          |
|                   |                    | PDT                  |          |
+-------------------+--------------------+----------------------+----------+
| Pulse             | 78                 | 2018  2:07 PM  |          |
|                   |                    | PDT                  |          |
+-------------------+--------------------+----------------------+----------+
| Temperature       | 36.7   C (98   F)  | 2018  2:07 PM  |          |
|                   |                    | PDT                  |          |
+-------------------+--------------------+----------------------+----------+
| Respiratory Rate  | 20                 | 2018  2:07 PM  |          |
|                   |                    | PDT                  |          |
+-------------------+--------------------+----------------------+----------+
| Oxygen Saturation | 99%                | 2018  2:07 PM  |          |
|                   |                    | PDT                  |          |
+-------------------+--------------------+----------------------+----------+
| Inhaled Oxygen    | -                  | -                    |          |
| Concentration     |                    |                      |          |
+-------------------+--------------------+----------------------+----------+
| Weight            | 66 kg (145 lb 9.6  | 2018  2:07 PM  |          |
|                   | oz)                | PDT                  |          |
+-------------------+--------------------+----------------------+----------+
| Height            | 160 cm (5' 3")     | 2018  2:07 PM  |          |
|                   |                    | PDT                  |          |
+-------------------+--------------------+----------------------+----------+
| Body Mass Index   | 25.79              | 2018  2:07 PM  |          |
|                   |                    | PDT                  |          |
+-------------------+--------------------+----------------------+----------+
 documented in this encounter
 
 Progress Notes
 Basil Dotson MD - [facility]  [facility]
 
 Date of Service: 2018 
 
 Referring Providers:  
 Layla Greer 
 Patient Care Team:
 Jean Bermudez MD as PCP - General (Internal Medicine)
 
 Chief Complaint:  New patient consultation
 
 History:  Ms. 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  She
 has developed severe progressive functional limitation. The [facility] evaluation.
 
 -------------------------------------------------------------------------------------------
---------------------------
 
 Allergies:
 
 The patient is allergic to morphine and percocet [oxycodone-acetaminophen]. 
 
 Medications: 
 
 acetaminophen 325 mg oral tablet, Take by mouth.
 
 ascorbic acid (vitamin C) 500 mg oral tablet, Take 500 mg by mouth once daily.
 cholecalciferol (Vitamin D3) 2,000 unit oral capsule, Take by mouth.
 furosemide 40 mg oral tablet, Take by mouth.
 gluc/chnd/om3/dha/epa/fish/str (GLUCOSAMINE CHONDROITIN PLUS ORAL), Take 2 tablets by mouth
 once daily.
 levothyroxine 100 mcg oral tablet, Take by mouth.
 losartan 100 mg oral tablet, Take 100 mg by mouth once daily.
 MAGNESIUM CHLORIDE ORAL, Take by mouth.
 metFORMIN  mg oral tablet,ER alexis.retention 24 hr, Take by mouth.
 metoprolol succinate 200 mg oral tablet extended release 24 hr, Take 100 mg by mouth once d
aily. 
 potassium chloride SR 20 mEq oral tablet,ER particles/crystals, Take by mouth.
 simvastatin 20 mg oral tablet, Take by mouth once daily in the evening. 
 warfarin 5 mg oral tablet, Take by mouth.
 
 Past Medical History:
  
 No date: Anemia
     Comment: resolved with iron supplements - no large GI 
              bleed, negative EGD.
 No date: Atrial fibrillation (HCC)
 No date: Hyperlipidemia
 No date: Hypertension
 No date: Hypothyroid
 No date: Pulmonary hypertension (HCC)
 No date: Type 2 diabetes mellitus (HCC)
 
 Past Surgical History:
 No past surgical history on file.
 
 Family History:
 Noncontributory
 
 Social History:
 The patient  reports that she has never smoked. She has never used smokeless tobacco. She r
eports that she does not drink alcohol or use drugs.
 
 Review of Systems:
 
 Constitutional:  positive for symptoms of  fatigue.
 Eyes:negative for symptoms
 ENT: negative for symptoms
 Respiratory: negative for symptoms
 Cardiovascular:  GARCIA
 Gastrointestinal: negative
 Genitourinary:  negative.
 Neurological:  negative for symptoms
 Integumentary: negative for symptoms
 Endocrine: negative for symptoms
 Musculoskeletal: Extremities normal, no deformities, edema, limited range of motion, or aayush
creased strength
 
 -------------------------------------------------------------------------------------------
---------------------------
 
 Physical Exam:
 Vital Signs: /59 | Pulse 78 | Temp (Src) 36.7 C (98 F) (Oral) | RR 20 | Ht 1.6 m 
(5' 3") | Wt 66 kg (145 lb 9.6 oz) | SpO2 99% | BMI 25.79 kg/(m^2)
 BMI: Body mass index is 25.79 kg/m.
 Constitutional: alert and cooperative.
 
 HEENT: Normal.
 Respiratory: negative.
 Cardiovascular: RRR, systolic murmur
 Gastrointestinal: no hepatomegaly, nontender to palpation, no masses, aorta not grossly enl
arged.
 Musculoskeletal: Extremities normal, no deformities, edema, limited range of motion, or aayush
creased strength
 Neurologic: negative.
 Skin: No discolorations, rashes, or ulcers.  Warm and dry
 
 Studies:
 I personally reviewed the patient's echo.  It shows severe MAC.  There is mild MS.  There i
s severe MR.  The angiogram shows a severe distal RCA lesion.
 
 Assessment:  
 In summary, Ms. Celaya is a 86 year old female who presents with Mitral Regurgitation and
 stenosis of the distal RCA.  I had a long conversation with Ms. Celaya and her dauthter r
egarding her condition and the natural history of this.  The patient is aware that she is at
 risk for Angina, Myocardial Infarction and progressive CHF in the future.
 
 Her MV is not ideal for intervention because of the severe MAC, mild MS, and potential for 
LVOT obstruction.  
 
 Plan:  
 Stenting of the RCA locally.
 
 Reconsider mitral clipping if she has continued intractable symptoms after stenting.
 
 BASIL DOTSON MD
 CARDIOTHORACIC SURGERY AT [address]
 Mailcode: [address]
 [place] [address] [phone]
 
  Electronically signed by Basil Dotson MD at 2018  3:37 PM PDTdocumented in this enco
unter
 
 Plan of Treatment
 Not on filedocumented as of this encounter
 
 Visit Diagnoses
 
 
+------------------------------------------------+
| Diagnosis                                      |
+------------------------------------------------+
|   Non-rheumatic mitral regurgitation - Primary |
+------------------------------------------------+
 documented in this encounter

## 2020-01-01 NOTE — XMS
Encounter Summary
  Created on: 2020
 
 Vonnie Celaya
 External Reference #: [id number]
 : 32
 Sex: Female
 
 Demographics
 
 
+-----------------------+------------------------+
| Address               | [address]         |
|                       | [place]  [address]   |
+-----------------------+------------------------+
| Home Phone            | [phone]        |
+-----------------------+------------------------+
| Preferred Language    | Unknown                |
+-----------------------+------------------------+
| Marital Status        | Single                 |
+-----------------------+------------------------+
| Sabianism Affiliation | NON                    |
+-----------------------+------------------------+
| Race                  | White                  |
+-----------------------+------------------------+
| Ethnic Group          | Not  or  |
+-----------------------+------------------------+
 
 
 Author
 
 
+--------------+------------------------------+
| Author       | [facility] |
+--------------+------------------------------+
| Organization | [facility] |
+--------------+------------------------------+
| Address      | Unknown                      |
+--------------+------------------------------+
| Phone        | Unavailable                  |
+--------------+------------------------------+
 
 
 
 Support
 
 
+--------------+--------------+---------+-----------------+
| Name         | Relationship | Address | Phone           |
+--------------+--------------+---------+-----------------+
| Lauryn Leroy | ECON         | Unknown | [phone] |
+--------------+--------------+---------+-----------------+
 
 
 
 Care Team Providers
 
 
 
+------------------------+------+-----------------+
| Care Team Member Name  | Role | Phone           |
+------------------------+------+-----------------+
| Jean Bermudez MD | PCP  | [phone] |
+------------------------+------+-----------------+
 
 
 
 Encounter Details
 
 
+--------+-------------+----------------------+----------------------+-------------+
| Date   | Type        | Department           | Care Team            | Description |
+--------+-------------+----------------------+----------------------+-------------+
| / | Documentati |   Cardiology at [facility]  |   Dominga Arambula,  |             |
| 2018   | on          |  [address]    | RN  [address]      |             |
|        |             | Mailcode: [address]       | [address]      |             |
|        |             | [facility]    | [place], [place]         |             |
|        |             | and [place],         | [address]           |             |
|        |             |       |                      |             |
|        |             | [place], [place]  |                      |             |
|        |             | [address]           |                      |             |
|        |             | [phone]         |                      |             |
+--------+-------------+----------------------+----------------------+-------------+
 
 
 
 Social History
 
 
+--------------+-------+-----------+--------+------+
| Tobacco Use  | Types | Packs/Day | Years  | Date |
|              |       |           | Used   |      |
+--------------+-------+-----------+--------+------+
| Never Smoker |       |           |        |      |
+--------------+-------+-----------+--------+------+
 
 
 
+---------------------+---+---+---+
| Smokeless Tobacco:  |   |   |   |
| Never Used          |   |   |   |
+---------------------+---+---+---+
 
 
 
+-------------+-------------+---------+----------+
| Alcohol Use | Drinks/Week | oz/Week | Comments |
+-------------+-------------+---------+----------+
| No          |             |         |          |
+-------------+-------------+---------+----------+
 
 
 
+------------------+---------------+
| Sex Assigned at  | Date Recorded |
| Birth            |               |
+------------------+---------------+
| Not on file      |               |
+------------------+---------------+
 
 
 
 
+----------------+-------------+-------------+
| Job Start Date | Occupation  | Industry    |
+----------------+-------------+-------------+
| Not on file    | Not on file | Not on file |
+----------------+-------------+-------------+
 
 
 
+----------------+--------------+------------+
| Travel History | Travel Start | Travel End |
+----------------+--------------+------------+
 
 
 
+-------------------------------------+
| No recent travel history available. |
+-------------------------------------+
 documented as of this encounter
 
 Plan of Treatment
 Not on filedocumented as of this encounter
 
 Visit Diagnoses
 Not on filedocumented in this encounter

## 2020-01-01 NOTE — XMS
Encounter Summary
  Created on: 2020
 
 Vonnie Celaya
 External Reference #: [id number]
 : 32
 Sex: Female
 
 Demographics
 
 
+-----------------------+------------------------+
| Address               | [address]         |
|                       | [place]  [address]   |
+-----------------------+------------------------+
| Home Phone            | [phone]        |
+-----------------------+------------------------+
| Preferred Language    | Unknown                |
+-----------------------+------------------------+
| Marital Status        | Single                 |
+-----------------------+------------------------+
| Jewish Affiliation | NON                    |
+-----------------------+------------------------+
| Race                  | White                  |
+-----------------------+------------------------+
| Ethnic Group          | Not  or  |
+-----------------------+------------------------+
 
 
 Author
 
 
+--------------+------------------------------+
| Author       | [facility] |
+--------------+------------------------------+
| Organization | [facility] |
+--------------+------------------------------+
| Address      | Unknown                      |
+--------------+------------------------------+
| Phone        | Unavailable                  |
+--------------+------------------------------+
 
 
 
 Support
 
 
+--------------+--------------+---------+-----------------+
| Name         | Relationship | Address | Phone           |
+--------------+--------------+---------+-----------------+
| Lauryn Leroy | ECON         | Unknown | [phone] |
+--------------+--------------+---------+-----------------+
 
 
 
 Care Team Providers
 
 
 
+------------------------+------+-----------------+
| Care Team Member Name  | Role | Phone           |
+------------------------+------+-----------------+
| Jean Bermudez MD | PCP  | [phone] |
+------------------------+------+-----------------+
 
 
 
 Encounter Details
 
 
+--------+-------------+----------------------+----------------------+-------------+
| Date   | Type        | Department           | Care Team            | Description |
+--------+-------------+----------------------+----------------------+-------------+
| / | Documentati |   Cardiology at [facility]  |   Jame Wilkins  |             |
| 2018   | on          |  [address]  [address]  |             |
|        |             | Mailcode: [address]       | Ave  [place], [place]    |             |
|        |             | [facility]    | [address]           |             |
|        |             | and [place],         | [phone]         |             |
|        |             |       | [phone] (Fax)   |             |
|        |             | [place], [place]  |                      |             |
|        |             | [address]           |                      |             |
|        |             | [phone]         |                      |             |
+--------+-------------+----------------------+----------------------+-------------+
 
 
 
 Social History
 
 
+--------------+-------+-----------+--------+------+
| Tobacco Use  | Types | Packs/Day | Years  | Date |
|              |       |           | Used   |      |
+--------------+-------+-----------+--------+------+
| Never Smoker |       |           |        |      |
+--------------+-------+-----------+--------+------+
 
 
 
+---------------------+---+---+---+
| Smokeless Tobacco:  |   |   |   |
| Never Used          |   |   |   |
+---------------------+---+---+---+
 
 
 
+-------------+-------------+---------+----------+
| Alcohol Use | Drinks/Week | oz/Week | Comments |
+-------------+-------------+---------+----------+
| No          |             |         |          |
+-------------+-------------+---------+----------+
 
 
 
+------------------+---------------+
| Sex Assigned at  | Date Recorded |
| Birth            |               |
+------------------+---------------+
| Not on file      |               |
+------------------+---------------+
 
 
 
 
+----------------+-------------+-------------+
| Job Start Date | Occupation  | Industry    |
+----------------+-------------+-------------+
| Not on file    | Not on file | Not on file |
+----------------+-------------+-------------+
 
 
 
+----------------+--------------+------------+
| Travel History | Travel Start | Travel End |
+----------------+--------------+------------+
 
 
 
+-------------------------------------+
| No recent travel history available. |
+-------------------------------------+
 documented as of this encounter
 
 Plan of Treatment
 Not on filedocumented as of this encounter
 
 Visit Diagnoses
 Not on filedocumented in this encounter

## 2020-01-01 NOTE — XMS
Encounter Summary
  Created on: 2020
 
 Vonnie Celaya
 External Reference #: [id number]
 : 32
 Sex: Female
 
 Demographics
 
 
+-----------------------+------------------------+
| Address               | [address]         |
|                       | [place]  [address]   |
+-----------------------+------------------------+
| Home Phone            | [phone]        |
+-----------------------+------------------------+
| Preferred Language    | Unknown                |
+-----------------------+------------------------+
| Marital Status        | Single                 |
+-----------------------+------------------------+
| Islam Affiliation | NON                    |
+-----------------------+------------------------+
| Race                  | White                  |
+-----------------------+------------------------+
| Ethnic Group          | Not  or  |
+-----------------------+------------------------+
 
 
 Author
 
 
+--------------+------------------------------+
| Author       | [facility] |
+--------------+------------------------------+
| Organization | [facility] |
+--------------+------------------------------+
| Address      | Unknown                      |
+--------------+------------------------------+
| Phone        | Unavailable                  |
+--------------+------------------------------+
 
 
 
 Support
 
 
+--------------+--------------+---------+-----------------+
| Name         | Relationship | Address | Phone           |
+--------------+--------------+---------+-----------------+
| Lauryn Leroy | ECON         | Unknown | [phone] |
+--------------+--------------+---------+-----------------+
 
 
 
 Care Team Providers
 
 
 
+------------------------+------+-----------------+
| Care Team Member Name  | Role | Phone           |
+------------------------+------+-----------------+
| Jean Bermudez MD | PCP  | [phone] |
+------------------------+------+-----------------+
 
 
 
 Reason for Visit
 
 
+--------------------+------------------------------+
| Reason             | Comments                     |
+--------------------+------------------------------+
| Surgery Scheduling | cancel MitraClip on 18 |
+--------------------+------------------------------+
 
 
 
 Encounter Details
 
 
+--------+-----------+----------------------+----------------------+----------------------+
| Date   | Type      | Department           | Care Team            | Description          |
+--------+-----------+----------------------+----------------------+----------------------+
| / | Telephone |   Cardiology at [facility]  |   Estefania Arambulabaljeet GTZ,  | Surgery Scheduling   |
| 2018   |           |  [address]    | RN  [address]      | (cancel MitraClip on |
|        |           | Mailcode: [address]       | [address]      |  18)           |
|        |           | [facility]    | [place], [place]         |                      |
|        |           | and [facility],         | [address]           |                      |
|        |           | [address],       |                      |                      |
|        |           | [place], [place]  |                      |                      |
|        |           | [address]           |                      |                      |
|        |           | [phone]         |                      |                      |
+--------+-----------+----------------------+----------------------+----------------------+
 
 
 
 Social History
 
 
+--------------+-------+-----------+--------+------+
| Tobacco Use  | Types | Packs/Day | Years  | Date |
|              |       |           | Used   |      |
+--------------+-------+-----------+--------+------+
| Never Smoker |       |           |        |      |
+--------------+-------+-----------+--------+------+
 
 
 
+---------------------+---+---+---+
| Smokeless Tobacco:  |   |   |   |
| Never Used          |   |   |   |
+---------------------+---+---+---+
 
 
 
+-------------+-------------+---------+----------+
| Alcohol Use | Drinks/Week | oz/Week | Comments |
+-------------+-------------+---------+----------+
 
| No          |             |         |          |
+-------------+-------------+---------+----------+
 
 
 
+------------------+---------------+
| Sex Assigned at  | Date Recorded |
| Birth            |               |
+------------------+---------------+
| Not on file      |               |
+------------------+---------------+
 
 
 
+----------------+-------------+-------------+
| Job Start Date | Occupation  | Industry    |
+----------------+-------------+-------------+
| Not on file    | Not on file | Not on file |
+----------------+-------------+-------------+
 
 
 
+----------------+--------------+------------+
| Travel History | Travel Start | Travel End |
+----------------+--------------+------------+
 
 
 
+-------------------------------------+
| No recent travel history available. |
+-------------------------------------+
 documented as of this encounter
 
 Plan of Treatment
 Not on filedocumented as of this encounter
 
 Visit Diagnoses
 Not on filedocumented in this encounter

## 2020-01-01 NOTE — XMS
Encounter Summary
  Created on: 2020
 
 Vonnie Celaya
 External Reference #: [id number]
 : 32
 Sex: Female
 
 Demographics
 
 
+-----------------------+---------------------------+
| Address               | [address]            |
|                       | [place]  [address] |
+-----------------------+---------------------------+
| Home Phone            | [phone]           |
+-----------------------+---------------------------+
| Preferred Language    | Unknown                   |
+-----------------------+---------------------------+
| Marital Status        |                    |
+-----------------------+---------------------------+
| Latter day Affiliation | Unknown                   |
+-----------------------+---------------------------+
| Race                  | Unknown                   |
+-----------------------+---------------------------+
| Ethnic Group          | Unknown                   |
+-----------------------+---------------------------+
 
 
 Author
 
 
+--------------+--------------------------------------------+
| Author       | [facility]  |
|              | [facility] [place]                                |
+--------------+--------------------------------------------+
| Organization | [facility]  |
|              | [facility] [place]                                |
+--------------+--------------------------------------------+
| Address      | Unknown                                    |
+--------------+--------------------------------------------+
| Phone        | Unavailable                                |
+--------------+--------------------------------------------+
 
 
 
 Support
 
 
+--------------+--------------+---------+-----------------+
| Name         | Relationship | Address | Phone           |
+--------------+--------------+---------+-----------------+
| Lauryn Leroy | ECON         | Unknown | [phone] |
+--------------+--------------+---------+-----------------+
 
 
 
 Care Team Providers
 
 
 
+------------------------+------+-----------------+
| Care Team Member Name  | Role | Phone           |
+------------------------+------+-----------------+
| Jean Bermudez MD | PCP  | [phone] |
+------------------------+------+-----------------+
 
 
 
 Encounter Details
 
 
+--------+-------------+----------------------+--------------------+-------------+
| Date   | Type        | Department           | Care Team          | Description |
+--------+-------------+----------------------+--------------------+-------------+
| / | Orders Only |   [facility]     |   Conversion       |             |
| 2018   |             | CONVERSION DEP  888  | Transaction,       |             |
|        |             | [address]           | Provider Unknown   |             |
|        |             | [place]         | [phone]       |             |
|        |             | [address]           | [phone] (Fax) |             |
|        |             | [phone]         |                    |             |
+--------+-------------+----------------------+--------------------+-------------+
 
 
 
 Social History
 
 
+----------------+-------+-----------+--------+------+
| Tobacco Use    | Types | Packs/Day | Years  | Date |
|                |       |           | Used   |      |
+----------------+-------+-----------+--------+------+
| Never Assessed |       |           |        |      |
+----------------+-------+-----------+--------+------+
 
 
 
+------------------+---------------+
| Sex Assigned at  | Date Recorded |
| Birth            |               |
+------------------+---------------+
| Not on file      |               |
+------------------+---------------+
 
 
 
+----------------+-------------+-------------+
| Job Start Date | Occupation  | Industry    |
+----------------+-------------+-------------+
| Not on file    | Not on file | Not on file |
+----------------+-------------+-------------+
 
 
 
+----------------+--------------+------------+
| Travel History | Travel Start | Travel End |
+----------------+--------------+------------+
 
 
 
 
+-------------------------------------+
| No recent travel history available. |
+-------------------------------------+
 documented as of this encounter
 
 Plan of Treatment
 
 
+--------+---------+------------+----------------------+-------------+
| Date   | Type    | Specialty  | Care Team            | Description |
+--------+---------+------------+----------------------+-------------+
| / | Office  | Cardiology |   [facility]    |             |
| 2020   | Visit   |            | [address]      |             |
|        |         |            | [address]   |             |
|        |         |            | [place] [address]   |             |
|        |         |            | [phone]         |             |
|        |         |            | [phone] (Fax)   |             |
+--------+---------+------------+----------------------+-------------+
 documented as of this encounter
 
 Visit Diagnoses
 Not on filedocumented in this encounter

## 2020-01-01 NOTE — XMS
Encounter Summary
  Created on: 2020
 
 Vonnie Celaya
 External Reference #: [id number]
 : 32
 Sex: Female
 
 Demographics
 
 
+-----------------------+------------------------+
| Address               | [address]         |
|                       | [place]  [address]   |
+-----------------------+------------------------+
| Home Phone            | [phone]        |
+-----------------------+------------------------+
| Preferred Language    | Unknown                |
+-----------------------+------------------------+
| Marital Status        | Single                 |
+-----------------------+------------------------+
| Sabianism Affiliation | NON                    |
+-----------------------+------------------------+
| Race                  | White                  |
+-----------------------+------------------------+
| Ethnic Group          | Not  or  |
+-----------------------+------------------------+
 
 
 Author
 
 
+--------------+------------------------------+
| Author       | [facility] |
+--------------+------------------------------+
| Organization | [facility] |
+--------------+------------------------------+
| Address      | Unknown                      |
+--------------+------------------------------+
| Phone        | Unavailable                  |
+--------------+------------------------------+
 
 
 
 Support
 
 
+--------------+--------------+---------+-----------------+
| Name         | Relationship | Address | Phone           |
+--------------+--------------+---------+-----------------+
| Lauryn Leroy | ECON         | Unknown | [phone] |
+--------------+--------------+---------+-----------------+
 
 
 
 Care Team Providers
 
 
 
+------------------------+------+-----------------+
| Care Team Member Name  | Role | Phone           |
+------------------------+------+-----------------+
| Jean Bermudez MD | PCP  | [phone] |
+------------------------+------+-----------------+
 
 
 
 Encounter Details
 
 
+--------+-------------+----------------------+----------------------+-------------+
| Date   | Type        | Department           | Care Team            | Description |
+--------+-------------+----------------------+----------------------+-------------+
| / | Documentati |   Cardiology at [facility]  |   Jame Wilkins  |             |
| 2018   | on          |  [address]  [address]  |             |
|        |             | Mailcode: [address]       | Ave  [place], [place]    |             |
|        |             | [facility]    | [address]           |             |
|        |             | and [place],         | [phone]         |             |
|        |             |       | [phone] (Fax)   |             |
|        |             | [place], [place]  |                      |             |
|        |             | [address]           |                      |             |
|        |             | [phone]         |                      |             |
+--------+-------------+----------------------+----------------------+-------------+
 
 
 
 Social History
 
 
+--------------+-------+-----------+--------+------+
| Tobacco Use  | Types | Packs/Day | Years  | Date |
|              |       |           | Used   |      |
+--------------+-------+-----------+--------+------+
| Never Smoker |       |           |        |      |
+--------------+-------+-----------+--------+------+
 
 
 
+---------------------+---+---+---+
| Smokeless Tobacco:  |   |   |   |
| Never Used          |   |   |   |
+---------------------+---+---+---+
 
 
 
+-------------+-------------+---------+----------+
| Alcohol Use | Drinks/Week | oz/Week | Comments |
+-------------+-------------+---------+----------+
| No          |             |         |          |
+-------------+-------------+---------+----------+
 
 
 
+------------------+---------------+
| Sex Assigned at  | Date Recorded |
| Birth            |               |
+------------------+---------------+
| Not on file      |               |
+------------------+---------------+
 
 
 
 
+----------------+-------------+-------------+
| Job Start Date | Occupation  | Industry    |
+----------------+-------------+-------------+
| Not on file    | Not on file | Not on file |
+----------------+-------------+-------------+
 
 
 
+----------------+--------------+------------+
| Travel History | Travel Start | Travel End |
+----------------+--------------+------------+
 
 
 
+-------------------------------------+
| No recent travel history available. |
+-------------------------------------+
 documented as of this encounter
 
 Plan of Treatment
 Not on filedocumented as of this encounter
 
 Visit Diagnoses
 Not on filedocumented in this encounter

## 2020-01-01 NOTE — XMS
Encounter Summary
  Created on: 2020
 
 Vonnie Celaya
 External Reference #: [id number]
 : 32
 Sex: Female
 
 Demographics
 
 
+-----------------------+------------------------+
| Address               | [address]         |
|                       | [place]  [address]   |
+-----------------------+------------------------+
| Home Phone            | [phone]        |
+-----------------------+------------------------+
| Preferred Language    | Unknown                |
+-----------------------+------------------------+
| Marital Status        | Single                 |
+-----------------------+------------------------+
| Rastafarian Affiliation | NON                    |
+-----------------------+------------------------+
| Race                  | White                  |
+-----------------------+------------------------+
| Ethnic Group          | Not  or  |
+-----------------------+------------------------+
 
 
 Author
 
 
+--------------+------------------------------+
| Author       | [facility] |
+--------------+------------------------------+
| Organization | [facility] |
+--------------+------------------------------+
| Address      | Unknown                      |
+--------------+------------------------------+
| Phone        | Unavailable                  |
+--------------+------------------------------+
 
 
 
 Support
 
 
+--------------+--------------+---------+-----------------+
| Name         | Relationship | Address | Phone           |
+--------------+--------------+---------+-----------------+
| Lauryn Leroy | ECON         | Unknown | [phone] |
+--------------+--------------+---------+-----------------+
 
 
 
 Care Team Providers
 
 
 
+------------------------+------+-----------------+
| Care Team Member Name  | Role | Phone           |
+------------------------+------+-----------------+
| Jean Bermudez MD | PCP  | [phone] |
+------------------------+------+-----------------+
 
 
 
 Reason for Visit
 
 
+--------------+----------+
| Reason       | Comments |
+--------------+----------+
| New patient  |          |
| consultation |          |
+--------------+----------+
 Consultation (Routine)
 
+--------+--------+------------+--------------+--------------+---------------+
| Status | Reason | Specialty  | Diagnoses /  | Referred By  | Referred To   |
|        |        |            | Procedures   | Contact      | Contact       |
+--------+--------+------------+--------------+--------------+---------------+
| Closed |        | Cardiology |   Diagnoses  |   Stephen,      |   Markus Smart |
|        |        |            |              | Varun PARRA,   |  MD Craig     |
|        |        |            | Nonrheumatic | MD  [address]     | [address]   |
|        |        |            |  mitral      | [address]  | [address]  |
|        |        |            | (valve)      |  [address]       | [address]  [place], |
|        |        |            | insufficienc | [place], [place] |  [place]           |
|        |        |            | y  Rheumatic |  [id number]       | [address]    |
|        |        |            |  tricuspid   | Phone:       | Phone:        |
|        |        |            | insufficienc | [phone] | [phone]  |
|        |        |            | y  Pulmonary |   Fax:       |  Fax:         |
|        |        |            |              | [phone] | [phone]  |
|        |        |            | hypertension |              |               |
|        |        |            | ,            |              |               |
|        |        |            | unspecified  |              |               |
|        |        |            |  Procedures  |              |               |
|        |        |            |  IL NEW      |              |               |
|        |        |            | PATIENT      |              |               |
|        |        |            | LEVEL V      |              |               |
+--------+--------+------------+--------------+--------------+---------------+
 
 
 
 
 Encounter Details
 
 
+--------+---------+----------------------+----------------------+-----------------+
| Date   | Type    | Department           | Care Team            | Description     |
+--------+---------+----------------------+----------------------+-----------------+
| / | Office  |   Cardiology Complex |   Markus Smart,  | Mitral annular  |
| 2018   | Visit   |  Valve at [facility]  [address]  | MD  [address]      | calcification   |
|        |         | [address]     | [address]      | (Primary Dx)    |
|        |         | Mailcode: [address]      | [place], [place]         |                 |
|        |         | Phys[facility] | [address]           |                 |
|        |         |  [address]  [place],  | [phone]         |                 |
|        |         | [place] [address]        | [phone] (Fax)   |                 |
|        |         | [phone]         |                      |                 |
 
+--------+---------+----------------------+----------------------+-----------------+
 
 
 
 Social History
 
 
+--------------+-------+-----------+--------+------+
| Tobacco Use  | Types | Packs/Day | Years  | Date |
|              |       |           | Used   |      |
+--------------+-------+-----------+--------+------+
| Never Smoker |       |           |        |      |
+--------------+-------+-----------+--------+------+
 
 
 
+---------------------+---+---+---+
| Smokeless Tobacco:  |   |   |   |
| Never Used          |   |   |   |
+---------------------+---+---+---+
 
 
 
+-------------+-------------+---------+----------+
| Alcohol Use | Drinks/Week | oz/Week | Comments |
+-------------+-------------+---------+----------+
| No          |             |         |          |
+-------------+-------------+---------+----------+
 
 
 
+------------------+---------------+
| Sex Assigned at  | Date Recorded |
| Birth            |               |
+------------------+---------------+
| Not on file      |               |
+------------------+---------------+
 
 
 
+----------------+-------------+-------------+
| Job Start Date | Occupation  | Industry    |
+----------------+-------------+-------------+
| Not on file    | Not on file | Not on file |
+----------------+-------------+-------------+
 
 
 
+----------------+--------------+------------+
| Travel History | Travel Start | Travel End |
+----------------+--------------+------------+
 
 
 
+-------------------------------------+
| No recent travel history available. |
+-------------------------------------+
 documented as of this encounter
 
 Last Filed Vital Signs
 
 
 
+-------------------+---------------------+----------------------+----------+
| Vital Sign        | Reading             | Time Taken           | Comments |
+-------------------+---------------------+----------------------+----------+
| Blood Pressure    | 123/62              | 2018  2:18 PM  |          |
|                   |                     | PST                  |          |
+-------------------+---------------------+----------------------+----------+
| Pulse             | 73                  | 2018  2:18 PM  |          |
|                   |                     | PST                  |          |
+-------------------+---------------------+----------------------+----------+
| Temperature       | 36.6   C (97.9   F) | 2018  2:18 PM  |          |
|                   |                     | PST                  |          |
+-------------------+---------------------+----------------------+----------+
| Respiratory Rate  | 14                  | 2018  2:18 PM  |          |
|                   |                     | PST                  |          |
+-------------------+---------------------+----------------------+----------+
| Oxygen Saturation | 99%                 | 2018  2:18 PM  |          |
|                   |                     | PST                  |          |
+-------------------+---------------------+----------------------+----------+
| Inhaled Oxygen    | -                   | -                    |          |
| Concentration     |                     |                      |          |
+-------------------+---------------------+----------------------+----------+
| Weight            | 67 kg (147 lb 11.3  | 2018  2:18 PM  |          |
|                   | oz)                 | PST                  |          |
+-------------------+---------------------+----------------------+----------+
| Height            | -                   | -                    |          |
+-------------------+---------------------+----------------------+----------+
| Body Mass Index   | 26.17               | 2018  2:09 PM  |          |
|                   |                     | PDT                  |          |
+-------------------+---------------------+----------------------+----------+
 documented in this encounter
 
 Progress Notes
 Markus Smart MD - 2018  2:00 PM PSTFormatting of this note might be different fr
om the original.
 
 Author:   MARKUS SMART MD
 Referring Provider: Layla Greer 
 
 HPI: Mrs Celaya is a very pleasant 85 y/o female with a hx of recurrent heart failure adm
ission for "diastolic heart failure" and valvular heart disease over the past 6 months who i
s referred for considerations of options to treat severe mitral annular calcification and se
mauricio mitral regurgitation. She has a past med Hx signifncat for persistent Afib, Tyep 2 DM, 
hypertension, hyperlipidemia, hypothyroidism, iron deficiency anemia, pulmonary hypertension
, moderate to severe TR, and severe MR.  
 
 Mrs Celaya, lives in [facility] and is brought in by her daughter today who lives w
ith her.  They report that she has been experiencing progressive GARCIA that has been slow and 
progressive for many years. She was first hospitalized at [facility] in [facility] i
n 2018 with s/s of heart failure. Over the course of the spring she has noticed new
 and progressive GARCIA as well as new LE edema. She was previously active but could no longer 
walk a 1/4 block. She was re-admitted to the hospital in [facility] in May 2018 again with he
art failure.
 Echo in may was notable for normal to hyperdynamic LV systolic function, severe MAC, severe
 MR, moderate to severe TR, and mild AS. Additionally it showed moderate to severe Pulm HTN 
as well as left to right shnt across tunneled PFO. She has followed up with Dr Stephen Mitchell 
at [facility] in the [facility] and referred to [facility] for consideration of MitraClip
.  
 
 
 Since May she has had no further decompensated heart failure episodes 
 
 To expidite her evaluation, she has already been referred and undergone TTE, JENIFFER, CT scan o
f chest a week ago a well as angiogrpahy yesterday on 2018. 
 
 Symptoms & Exercise/Activity Levels:
 Lives in [facility] with her Daughter in a 2 story house. She lives in the ground floor and 
her Daughter lives up stairs. She She has 7 steps to get up into the house and can still matthew
erate gettign in to the house but is markedly winded and would have to rest going up stirs (
19 steps) When walkinf up the stairs he endorses no chest pain but predominatly GARCIA.  Her ac
tivity is limited and does not describe chest discomfort in her brief activities. She is abl
e to make her bed but feels tired afterwards. 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  Any little activty such 
as mopping or vaccuming makes her short of breath and tired.  She is worn out after activiti
es and needs to rest. She walks outside of the home with a large walking stick and no walker
. 
 
 
 Review of Systems:
 14 point review of systems as stated in the HPI, otherwise remainder is negative.  
 
    
 Past Medical History: 
 Diagnosis Date 
   Anemia  
  resolved with iron supplements - no large GI bleed, negative EGD. 
   Atrial fibrillation (HCC)  
   Hyperlipidemia  
   Hypertension  
   Hypothyroid  
   Pulmonary hypertension (HCC)  
   Type 2 diabetes mellitus (HCC)  
 
  
 No past surgical history on file.
 
 Current Medications Include:
 
   
 Current Medication List  
 Name Sig 
 ACETAMINOPHEN 325 MG TABLET Take by mouth. 
 ASCORBIC ACID (VITAMIN C) 500 MG TABLET Take 500 mg by mouth once daily. 
 CHOLECALCIFEROL (VITAMIN D3) 2,000 UNIT CAPSULE Take by mouth. 
 FUROSEMIDE 40 MG TABLET Take by mouth. 
 GLUCOSAMINE CHONDROITIN PLUS ORAL Take 2 tablets by mouth once daily. 
 LEVOTHYROXINE 100 MCG TABLET Take by mouth. 
 LOSARTAN 100 MG TABLET Take 100 mg by mouth once daily. 
 MAGNESIUM CHLORIDE ORAL Take by mouth. 
 METFORMIN  MG 24 HR TABLET,EXTENDED RELEASE Take by mouth. 
 METOPROLOL SUCCINATE  MG TABLET,EXTENDED RELEASE 24 HR Take 100 mg by mouth once arturo
y.  
 POTASSIUM CHLORIDE ER 20 MEQ TABLET,EXTENDED RELEASE(PART/CRYST) Take by mouth. 
 SIMVASTATIN 20 MG TABLET Take by mouth once daily in the evening.  
 WARFARIN 5 MG TABLET Take by mouth. 
 
 
     
 Allergies 
 Allergen Reactions 
 
   Morphine Anxiety 
   "felt fidgety" 
   Percocet [Oxycodone-Acetaminophen] Anxiety 
   "felt fidgety" 
 
 
 SocialHistory 
 Social History 
 
     
 Social History 
   Marital status: Single 
   Spouse name: N/A 
   Number of children: N/A 
   Years of education: N/A 
 
   
 Occupational History 
   Not on file. 
 
    
 Social History Main Topics 
   Smoking status: Never Smoker 
   Smokeless tobacco: Never Used 
   Alcohol use No 
   Drug use: No 
   Sexual activity: Not on file 
 
    
 Other Topics Concern 
   Not on file 
 
   
 Social History Narrative 
   No narrative on file 
  
 
 FAmily Hx: non contrinbutory.  
 
 Physical Exam: 
 
 Last Vitals: /59 | Pulse 78 | Temp (Src) 36.7 C (98 F) (Oral) | RR 20 | Ht 1.6 m 
(5' 3") | Wt 66 kg (145 lb 9.6 oz) | SpO2 99% | BMI 25.79 kg/(m^2)
 Body mass index is 25.79 kg/m.
 
 General Appearance:  Ederly appearing, well developed well nourished adult female appearing
 stated age in NAD.  
 HEENT:  PERRLA, EOMI, mouth without lesions.
 Neck:  Neck w/o LAD
 Respiratory: CTA bilaterally. No wheezes or rales
 Cardiovascular: JVP at 5 cm.  2+ carotid pulses without bruits.  PMI nondisplaced. RRR. Nor
mal S1 covered & soft S2.  3/6 holosystolic murmur best audible at LLSB radiating to axilla.
  No gallops, or rubs. 2+  distal pulses. 
 Gastrointestinal: +BS, soft, nondistended, nontender, no abdominal bruits
 Musculoskeletal: No gross deformities
 Extremities: No cyanosis, clubbing, or edema.
 Neurologic: Grossly nonfocal.  UE and LE proximal and distal strength 5/5 and equal bilater
ally.  
 Skin: No rash or ulcers.
 
 
 Data
     
 Lab Results 
 Component Value Date 
  WBC 7.45 2018 
  HB 12.6 2018 
  HCT 37.5 2018 
   2018 
  MCV 87.4 2018 
  RDW 49.3 2018 
 
     
 Lab Results 
 Component Value Date 
   2018 
  K 3.9 2018 
   2018 
  BICARB 26 2018 
  BUN 16 2018 
  CR 0.78 2018 
   2018 
  CA 9.1 2018 
  ANIONGAP 10 2018 
 
 No results found for: NTPROBNP
 No results found for: A1C
     
 Lab Results 
 Component Value Date 
  INRPT 1.27 (H) 2018 
 
 
 FRAILTY ASSESSMENT:
 Frailty Index Date Taken: 2018 
 1. Left  Strength (kg): 12
     Right  Strength (kg): 10 
 2. Five Meter Walk (sec):   NA
 3. Serum albumin:   
 4. Walden ADL (#/6) :5 
 Frailty Outcome: Based on Partner trial definition the patient does not qualify as frail. 
 
 Walden ADL Date Taken: 2018 
 
 Bathing: No
 Dressing: No
 Toileting: No
 Transferring: No
 Continence: Yes
 Feeding: No
 
 EFT FRAILTY ASSESSMENT
 
 EFT 1 to possibly 2 points
 1 for low sit to stand
 No known albumin level
 
 EKG:
 Atrial fibrillation 
 Poor anterior R wave progression 
 Inferior mild st depression 0.5 mm 2/3. 
 
 
 
 TTE 3/6/2018
 1. Left ventricular systolic function is hyperdynamic with an estimated EF of >70%.
 2. The right ventricle is normal in size and function.
 3. The left atrium is markedly enlarged.
 4. The right atrium is markedly enlarged.
 5. Mild aortic stenosis with peak/mean pressure gradient of 16.83mmHg / 9.02mmHg, the aorti
c valve area by continuity equation is 1.7cm.
 6. Severe mitral regurgitation is present.
 7. Moderate mitral stenosis, with peak/mean transvalvular gradients measured at 25.2 / 8.0 
mm Hg, and MVA (by VTI) 1.50 cm . There is mi
 8. Moderate to severe tricuspid regurgitation present.
 9. There is moderate to severe pulmonary hypertension.The peak right ventricular systol
ic pressure (pulmonary artery systolic pressure), as measured by Doppler, is 55.8 - 60.8 mm 
Hg.
 10. There is a small secundum atrial septal defect measuring <10 mm in diameter.There i
s left-to-right shunting noted on color Doppler.
 
 TTE: 2018 
 Final Impressions:                         
                              
                                
                  
 1. The left ventricular cavity size is normal.               
 
 2. The LV function is hyperdynamic.                   
   
 3. Visually estimated left ventricular ejection fraction is 70 - 75%.     
 4. The aortic valve is trileaflet and moderately calcified.          
 5. Severe mitral annular calcification as well as papillary and subchordal  
 bulky calcification.                        
     
 6. While there is calcification of the bases of the mitral valve leaflets   
 due to the severe mitral annular calcificaiton, the mid portions of the    
 leaflets themseleves are only midly calcified and thin, however there is the 
 severe central mitral valve regurgitation.                 
 
 7. The mean transmitral gradient is 5.5 mmHg at a heart rate of 77 bpm.    
  The mitral valve effective regurgitant orifice area is 7 mm2.         
                             
 8. Severe biatrial enlargement.                    
    
 9. Right ventricular size, thickness and function are normal. 
 
 
 JENIFFER: 2018
 Final Impressions:                         
                             
              
                              
         
 1. The LV function is hyperdynamic.                   
   
 2. The visually estimated ejection fraction is > 75%.             
 3. Right ventricular size, thickness and function are normal.         
 4. Severe circumferential mitral annular calcification. A large deposit of  
 calcium at the posterolateral atrial aspect of themitral annulus that     
 partially disrupts the normal mitral annular shape and size. Mitral annular  
 area is measured at 8.26 cm2, with an AP diameter of 3.76 cm, and CC     
 
 diameter of 2.52 cm. However, there is no significant mitral stenosis. Mean  
 mitral gradient is approximately 4 mmHg at a heart rate of 83 bpm.      
 5. Severe mitral valve regurgitation. The EROA is 0.47 cm2 (using a PISA   
 radius of 1.1 cm, an aliasing velocity of 0.32 m/s, and a mean MR       
 regurgitant velocity of 5.1 m/s), the calculated regurgitant volume is 67   
 mL, and the calculated regurgitant fraction is 52%.              
 6. The mitral valve anterior leaflet measures 2.0 cm.             
 7. There is subvalvar and papilary muscle head calcifidation noted as well.  
 8. The mechanism for the severe mitral regurgitation is leaflet and annular  
 calcification and degerneration with poor coaptation across all portions of  
 the valve.                           
       
 9. The aortic valve is moderately sclerotic and restricted. No aortic     
 stenosis.                            
       
 10. Severely enlarged left atrium.                   
   
 11. There is a small fenestrated secundum atrial septal defect, with     
 intermittent left-to-right flow via color-flow Doppler.            
 12. Moderate tricuspid regurgitation.                  
   
 13. Small plaque involving the ascending and transverse aorta.        
 14. The left atrial appendage is well visualized and there is no evidence of 
 thrombus present.     
 
 CARDIAC CATH: 2018
 Preliminary repoot
 Severe MAC
 There is non-obstructive disease in LAD andCirc
 There is 95% stenosis of the distal RCA prior to the PDA and PLV's
 
 
 RHC: Unremarkable filling pressures
 
 Coronary Angiography: Obstructive CAD of RCA
 PFTS:
 None 
 
 STS (Based Off Available Data):
 MV Replacement + CAB 
 Risk of Mortality: 7.374% 
 Morbidity or Mortality: 30.831% 
 Long Length of Stay: 19.775% 
 Short Length of Stay: 7.384% 
 Permanent Stroke: 2.652% 
 Prolonged Ventilation: 19.662% 
 DSW Infection: 0.263% 
 Renal Failure: 8.237% 
 Reoperation: 12.335% 
 
 Assessment and Recommendations:
 Ms. Vonnie Celaya is a 86 y.o. female referred [facility] for assessment
 of treatment options for mitral valve disease. 
 
 Ms. Celaya has developed progressive GARCIA and LE edema over the last 6 months and currentl
y reports symptoms that are NYHA functional class 3-4.  A review of the echocardiogram and t
he transesophageal echo show severe mitral annular calcification, severe mitral regurgitaito
n, with largest jet centrally but also extending to the commisures, with a mean trasmitral g
radient of 4-5 mmHg and an annular are of 830-930 mm2.   In addition the echocardiogram is n
otable for moderate to severe pulmonary hypertesnion with RVSP 55-60 mmHg. Additional diagno
 
stics that have been performed include coronary angiogrpahy that demonstrates severe distal 
RCA stenosis. 
 
  Based on her symptoms, physical exam, and diagnostic studies we agree that she has severe 
symptomatic  Mitral regurgitation. 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
ing, continuing with her quality of life the best she can, and not having a stroke. 
 
 The patient's STS based on available data is 7-8%  . I am concerned about the risk of LVOT 
obstruction with valve and Mac, and I explained to the patient that this procedure is off la
bel use of the Echeverria valve and it carries high risk of valve embolization, perivalvular le
ak, LVOT obstruction. I also explained to her that mitral clip might be challenging given se
mauricio mitral annulus calcification as well as already elevated transmitral gradient.
 Explained off label use for valve in MAC, limited experience, increase risk of complication
s. Will plan to review her CT in details for LVOT obstruction and valve sizing. I think clip
 too high risk given MAC and elevated gradients and broad jet
 MARKUS SMART MD
 
 Electronically signed by Markus Smart MD at 2018  3:44 PM PSTdocumented in this e
ncounter
 
 Plan of Treatment
 Not on filedocumented as of this encounter
 
 Visit Diagnoses
 
 
+------------------------------------------------------------------+
| Diagnosis                                                        |
+------------------------------------------------------------------+
|   Mitral annular calcification - Primary  Mitral valve disorders |
+------------------------------------------------------------------+
 documented in this encounter

## 2020-01-01 NOTE — XMS
Encounter Summary
  Created on: 2020
 
 Vonnie Celaya
 External Reference #: [id number]
 : 32
 Sex: Female
 
 Demographics
 
 
+-----------------------+------------------------+
| Address               | [address]         |
|                       | [place]  [address]   |
+-----------------------+------------------------+
| Home Phone            | [phone]        |
+-----------------------+------------------------+
| Preferred Language    | Unknown                |
+-----------------------+------------------------+
| Marital Status        | Single                 |
+-----------------------+------------------------+
| Gnosticist Affiliation | NON                    |
+-----------------------+------------------------+
| Race                  | White                  |
+-----------------------+------------------------+
| Ethnic Group          | Not  or  |
+-----------------------+------------------------+
 
 
 Author
 
 
+--------------+------------------------------+
| Author       | [facility] |
+--------------+------------------------------+
| Organization | [facility] |
+--------------+------------------------------+
| Address      | Unknown                      |
+--------------+------------------------------+
| Phone        | Unavailable                  |
+--------------+------------------------------+
 
 
 
 Support
 
 
+--------------+--------------+---------+-----------------+
| Name         | Relationship | Address | Phone           |
+--------------+--------------+---------+-----------------+
| Lauryn Leroy | ECON         | Unknown | [phone] |
+--------------+--------------+---------+-----------------+
 
 
 
 Care Team Providers
 
 
 
+------------------------+------+-----------------+
| Care Team Member Name  | Role | Phone           |
+------------------------+------+-----------------+
| Jean Bermudez MD | PCP  | [phone] |
+------------------------+------+-----------------+
 
 
 
 Reason for Visit
 
 
+--------------+----------+
| Reason       | Comments |
+--------------+----------+
| New patient  |          |
| consultation |          |
+--------------+----------+
 Consultation (Routine)
 
+----------+--------+------------+--------------+--------------+---------------+
| Status   | Reason | Specialty  | Diagnoses /  | Referred By  | Referred To   |
|          |        |            | Procedures   | Contact      | Contact       |
+----------+--------+------------+--------------+--------------+---------------+
| Medical  |        | Cardiology |   Diagnoses  |   Stephen,      |   Claudette, |
| Review   |        |            |              | Varun PARRA,   |  Theodore PARRA MD  |
|          |        |            | Nonrheumatic | MD  [address]     |  [address] |
|          |        |            |  mitral      | [address]  |  Ave          |
|          |        |            | (valve)      |  [address]       | [place], [place]  |
|          |        |            | insufficienc | [place], [place] | [address]    |
|          |        |            | y  Rheumatic |  [address]       | Phone:        |
|          |        |            |  tricuspid   | Phone:       | [phone]  |
|          |        |            | insufficienc | [phone] |  Fax:         |
|          |        |            | y  Pulmonary |   Fax:       | [phone]  |
|          |        |            |              | [phone] |               |
|          |        |            | hypertension |              |               |
|          |        |            | ,            |              |               |
|          |        |            | unspecified  |              |               |
|          |        |            |  Procedures  |              |               |
|          |        |            |  MA NEW      |              |               |
|          |        |            | PATIENT      |              |               |
|          |        |            | LEVEL V      |              |               |
+----------+--------+------------+--------------+--------------+---------------+
 
 
 
 
 Encounter Details
 
 
+--------+---------+----------------------+----------------------+----------------------+
| Date   | Type    | Department           | Care Team            | Description          |
+--------+---------+----------------------+----------------------+----------------------+
| / | Office  |   Cardiology at [facility]  |   Theodore Wilkins  | Mitral valve         |
|    | Visit   |  [address]    | MD [address]IDA  [address]  | insufficiency,       |
|        |         | Mailcode: [address]       | Ave  [place]    | unspecified etiology |
|        |         | [place] [facility]    | [address]           |  (Primary Dx);       |
|        |         | and Healing,         | [phone]         | Mitral annular       |
|        |         | Building       | [phone] (Fax)   | calcification;       |
|        |         | Floor  [place], [place]  |                      | Persistent atrial    |
|        |         | [address]           |                      | fibrillation (HCC)   |
 
|        |         | [phone]         |                      |                      |
+--------+---------+----------------------+----------------------+----------------------+
 
 
 
 Social History
 
 
+--------------+-------+-----------+--------+------+
| Tobacco Use  | Types | Packs/Day | Years  | Date |
|              |       |           | Used   |      |
+--------------+-------+-----------+--------+------+
| Never Smoker |       |           |        |      |
+--------------+-------+-----------+--------+------+
 
 
 
+---------------------+---+---+---+
| Smokeless Tobacco:  |   |   |   |
| Never Used          |   |   |   |
+---------------------+---+---+---+
 
 
 
+-------------+-------------+---------+----------+
| Alcohol Use | Drinks/Week | oz/Week | Comments |
+-------------+-------------+---------+----------+
| No          |             |         |          |
+-------------+-------------+---------+----------+
 
 
 
+------------------+---------------+
| Sex Assigned at  | Date Recorded |
| Birth            |               |
+------------------+---------------+
| Not on file      |               |
+------------------+---------------+
 
 
 
+----------------+-------------+-------------+
| Job Start Date | Occupation  | Industry    |
+----------------+-------------+-------------+
| Not on file    | Not on file | Not on file |
+----------------+-------------+-------------+
 
 
 
+----------------+--------------+------------+
| Travel History | Travel Start | Travel End |
+----------------+--------------+------------+
 
 
 
+-------------------------------------+
| No recent travel history available. |
+-------------------------------------+
 documented as of this encounter
 
 
 Last Filed Vital Signs
 
 
+-------------------+--------------------+----------------------+----------+
| Vital Sign        | Reading            | Time Taken           | Comments |
+-------------------+--------------------+----------------------+----------+
| Blood Pressure    | 110/59             | 2018 12:11 PM  |          |
|                   |                    | PDT                  |          |
+-------------------+--------------------+----------------------+----------+
| Pulse             | 78                 | 2018 12:11 PM  |          |
|                   |                    | PDT                  |          |
+-------------------+--------------------+----------------------+----------+
| Temperature       | 36.7   C (98   F)  | 2018 12:11 PM  |          |
|                   |                    | PDT                  |          |
+-------------------+--------------------+----------------------+----------+
| Respiratory Rate  | 20                 | 2018 12:11 PM  |          |
|                   |                    | PDT                  |          |
+-------------------+--------------------+----------------------+----------+
| Oxygen Saturation | 99%                | 2018 12:11 PM  |          |
|                   |                    | PDT                  |          |
+-------------------+--------------------+----------------------+----------+
| Inhaled Oxygen    | -                  | -                    |          |
| Concentration     |                    |                      |          |
+-------------------+--------------------+----------------------+----------+
| Weight            | 66 kg (145 lb 9.6  | 2018 12:11 PM  |          |
|                   | oz)                | PDT                  |          |
+-------------------+--------------------+----------------------+----------+
| Height            | 160 cm (5' 3")     | 2018 12:11 PM  |          |
|                   |                    | PDT                  |          |
+-------------------+--------------------+----------------------+----------+
| Body Mass Index   | 25.79              | 2018 12:11 PM  |          |
|                   |                    | PDT                  |          |
+-------------------+--------------------+----------------------+----------+
 documented in this encounter
 
 Patient Instructions
 Patient Instructions Theodore Wilkins MD - 2018 12:00 PM PDT1. We would recommend
 that you continue to follow up with Dr. Mitchell in [facility] for your heart care. Discuss the 
options of stenting of the right coronary artery with him in follow up as needed if chest pa
in develops 
 
 2. Go up to the [facility] [place] to meet with Dr. Smart, Dr. Abrams, and Dr. Brennen Larios ed on your preferences options are to continue with medical therapy or to consider a possibl
e clip procedure, though the likelyhood of a successful placement of a clip we think is low 
and risk is higher for possible complication. Given your wishes, we will not pursue a hybrid
 surgical procedure. 
 
 3. We will discuss your the group decision (yourself and our doctors) with Dr. Mitchell to let 
him know the final plans. 
 
 4. IT was a pleasure meeting you today. It was our pleasure being involved in your care.  C
all Dominga Arambula RN or Ciara Riddle PA-C in the valve clinic at [phone] if you have a
ny questions.
   
 
 Electronically signed by Theodore Wilkins MD at 2018  1:16 PM PDT
 documented in this encounter
 
 Progress Notes
 Jordyn Davis MA - 2018 12:00 PM PDTLeopold Cardiomyopathy Questionnaire
 
 
 Over the past two weeks:
 1. How much are you limited by heart failure to do the following: 
 a. Showering/bathing:SLIGHTLY LIMITED 4
 b. Walking 1 block on level ground: MODERATELY LIMITED 3
 c. Hurrying or jogging:MODERATELY LIMITED 3
 2. How many times did you have swelling in your feet, ankles or legs when you woke up in th
 morning: NEVER OVER THE PAST 2 WEEKS 5
 3. How many times has fatigue limited your ability to do what you wanted: SEVERAL TIMES PER
 DAY 2
 4.  How many times has shortness of breath limited your ability to do what you wanted:SEVER
AL TIMES PER DAY 2
 5. How many times did you sleep sitting up in a chair or with at least 3 pillows to prop yo
u up because of shortness of breath: NEVER OVER THE PAST 2 WEEKS 5
 6. How much has your heart failure limited your enjoyment of life:EXTREMELY LIMITED 1
 7. If you had to spend the rest of your life with your heart failure the way it is right no
w, how would you feel about this?: NOT AT ALL SATISFIED 1
 8. How much does your heart failure affect your lifestyle in the following: 
 a. Hobbies, recreational activities: EXTREMELY LIMITED 1
 b. Working/household chores: EXTREMELY LIMITED 1
 c. Visiting outside the home: EXTREMELY LIMITED 1
 
 RESULTS: 29
 
 Electronically signed by Jordyn Davis MA at 2018  2:45 PM Theodore Sargent M
D - 2018 12:00 PM PDTFormatting of this note might be different from the original.
 Heart Valve Clinic -  Initial Consultation
 Date:  2018
 Author:   THEODORE WILKINS MD
 Referring Provider: Layla Greer 
 
 HPI: Mrs Celyaa is a very pleasant 87 y/o female with a hx of recurrent heart failure adm
ission for "diastolic heart failure" and valvular heart disease over the past 6 months who i
s referred for considerations of options to treat severe mitral annular calcification and se
mauricio mitral regurgitation. She has a past med Hx signifncat for persistent Afib, Tyep 2 DM, 
hypertension, hyperlipidemia, hypothyroidism, iron deficiency anemia, pulmonary hypertension
, moderate to severe TR, and severe MR.  
 
 Mrs Celaya, lives in [facility] and is brought in by her daughter today who lives w
ith her.  They report that she has been experiencing progressive GARCIA that has been slow and 
progressive for many years. She was first hospitalized at [facility] in [facility] i
n 2018 with s/s of heart failure. Over the course of the spring she has noticed new
 and progressive GARCIA as well as new LE edema. She was previously active but could no longer 
walk a 1/4 block. She was re-admitted to the hospital in [facility] in May 2018 again with he
art failure.
 Echo in may was notable for normal to hyperdynamic LV systolic function, severe MAC, severe
 MR, moderate to severe TR, and mild AS. Additionally it showed moderate to severe Pulm HTN 
as well as left to right shnt across tunneled PFO. She has followed up with Dr Stephen Mitchell 
at [facility] in the [facility] and referred to [facility] for consideration of MitraClip
.  
 
 Since May she has had no further decompensated heart failure episodes 
 
 To expidite her evaluation, she has already been referred and undergone TTE, JENIFFER, CT scan o
f chest a week ago a well as angiogrpahy yesterday on 2018. 
 
 Symptoms & Exercise/Activity Levels:
 Lives in [facility] with her Daughter in a 2 story house. She lives in the ground floor and 
her Daughter lives up stairs. She She has 7 steps to get up into the house and can still matthew
erate gettign in to the house but is markedly winded and would have to rest going up stirs (
 
19 steps) When walkinf up the stairs he endorses no chest pain but predominatly GARCIA.  Her ac
tivity is limited and does not describe chest discomfort in her brief activities. She is abl
e to make her bed but feels tired afterwards. 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  Any little activty such 
as mopping or vaccuming makes her short of breath and tired.  She is worn out after activiti
es and needs to rest. She walks outside of the home with a large walking stick and no walker
. 
 
 Denies any swellign in her legs since May 2018 but does wear compression stockings.  Has no
w been on Lasix 40 mg daily was previously on 20 daily prior to May and has at stable. She i
s ana tto lay flat in bed at night and at first there is orthopnea but after a couple deep 
breaths she can tolerate it. She has no PND.  
 Currently she is limited by lack of energy.  On some days she can walk up to a mile, but elisabeth kc no longer feels comfortable shopping by herself 
 
 [place] summer Her energy level was great. She previously was working out at [employer]. She would g
o out ot her [employer] groups.  Was completely independent  In all activities and shopping co
oking and cleaning. She typically has lunch outside of the house, cooks her own simple dinne
rs, and has oatmeal for breakfast.  
 
 She has had an appointment for consultation with Dr. Mitchell who will continue to follow her u
p in [facility].  
 
 The patient denies rest or exertional chest pain or shortness of breath, palpitations, pres
yncope, syncope, PND, orthopnea, abdominal swelling, lower extremity edema, or claudication.
 
 
 Review of Systems:
 14 point review of systems as stated in the HPI, otherwise remainder is negative.  
 
 Past Medical History: 
 Diagnosis Date 
   Anemia  
  resolved with iron supplements - no large GI bleed, negative EGD. 
   Atrial fibrillation (HCC)  
   Hyperlipidemia  
   Hypertension  
   Hypothyroid  
   Pulmonary hypertension (HCC)  
   Type 2 diabetes mellitus (HCC)  
 
  
 No past surgical history on file.
 
 Current Medications Include:
 
 Current Medication List  
 Name Sig 
 ACETAMINOPHEN 325 MG TABLET Take by mouth. 
 ASCORBIC ACID (VITAMIN C) 500 MG TABLET Take 500 mg by mouth once daily. 
 CHOLECALCIFEROL (VITAMIN D3) 2,000 UNIT CAPSULE Take by mouth. 
 FUROSEMIDE 40 MG TABLET Take by mouth. 
 GLUCOSAMINE CHONDROITIN PLUS ORAL Take 2 tablets by mouth once daily. 
 LEVOTHYROXINE 100 MCG TABLET Take by mouth. 
 LOSARTAN 100 MG TABLET Take 100 mg by mouth once daily. 
 MAGNESIUM CHLORIDE ORAL Take by mouth. 
 METFORMIN  MG 24 HR TABLET,EXTENDED RELEASE Take by mouth. 
 METOPROLOL SUCCINATE  MG TABLET,EXTENDED RELEASE 24 HR Take 100 mg by mouth once arturo
y.  
 POTASSIUM CHLORIDE ER 20 MEQ TABLET,EXTENDED RELEASE(PART/CRYST) Take by mouth. 
 
 SIMVASTATIN 20 MG TABLET Take by mouth once daily in the evening.  
 WARFARIN 5 MG TABLET Take by mouth. 
 
 Allergies 
 Allergen Reactions 
   Morphine Anxiety 
   "felt fidgety" 
   Percocet [Oxycodone-Acetaminophen] Anxiety 
   "felt fidgety" 
 
 Social History 
 
 Social History 
   Marital status: Single 
   Spouse name: N/A 
   Number of children: N/A 
   Years of education: N/A 
 
 Occupational History 
   Not on file. 
 
 Social History Main Topics 
   Smoking status: Never Smoker 
   Smokeless tobacco: Never Used 
   Alcohol use No 
   Drug use: No 
   Sexual activity: Not on file 
 
 Other Topics Concern 
   Not on file 
 
 Social History Narrative 
   No narrative on file 
 
 FAmily Hx: non contrinbutory.  
 
 Physical Exam: 
 
 Last Vitals: /59 | Pulse 78 | Temp (Src) 36.7 C (98 F) (Oral) | RR 20 | Ht 1.6 m 
(5' 3") | Wt 66 kg (145 lb 9.6 oz) | SpO2 99% | BMI 25.79 kg/(m^2)
 Body mass index is 25.79 kg/m.
 
 General Appearance:  Ederly appearing, well developed well nourished adult female appearing
 stated age in NAD.  
 HEENT:  PERRLA, EOMI, mouth without lesions.
 Neck:  Neck w/o LAD
 Respiratory: CTA bilaterally. No wheezes or rales
 Cardiovascular: JVP at 5 cm.  2+ carotid pulses without bruits.  PMI nondisplaced. RRR. Nor
mal S1 covered & soft S2.  3/6 holosystolic murmur best audible at LLSB radiating to axilla.
  No gallops, or rubs. 2+  distal pulses. 
 Gastrointestinal: +BS, soft, nondistended, nontender, no abdominal bruits
 Musculoskeletal: No gross deformities
 Extremities: No cyanosis, clubbing, or edema.
 Neurologic: Grossly nonfocal.  UE and LE proximal and distal strength 5/5 and equal bilater
ally.  
 Skin: No rash or ulcers.
 
 Data
 Lab Results 
 Component Value Date 
 
  WBC 7.45 2018 
  HB 12.6 2018 
  HCT 37.5 2018 
   2018 
  MCV 87.4 2018 
  RDW 49.3 2018 
 
 Lab Results 
 Component Value Date 
   2018 
  K 3.9 2018 
   2018 
  BICARB 26 2018 
  BUN 16 2018 
  CR 0.78 2018 
   2018 
  CA 9.1 2018 
  ANIONGAP 10 2018 
 
 No results found for: NTPROBNP
 No results found for: A1C
 Lab Results 
 Component Value Date 
  INRPT 1.27 (H) 2018 
 
 FRAILTY ASSESSMENT:
 Frailty Index Date Taken: 2018 
 1. Left  Strength (kg): 12
     Right  Strength (kg): 10 
 2. Five Meter Walk (sec):   NA
 3. Serum albumin:   
 4. Walden ADL (#/6) :5 
 Frailty Outcome: Based on Partner trial definition the patient does not qualify as frail. 
 
 Walden ADL Date Taken: 2018 
 
 Bathing: No
 Dressing: No
 Toileting: No
 Transferring: No
 Continence: Yes
 Feeding: No
 
 EFT FRAILTY ASSESSMENT
 
 EFT 1 to possibly 2 points
 1 for low sit to stand
 No known albumin level
 
 EKG:
 Atrial fibrillation 
 Poor anterior R wave progression 
 Inferior mild st depression 0.5 mm 2/3. 
 
 TTE 3/6/2018
 1. Left ventricular systolic function is hyperdynamic with an estimated EF of >70%.
 2. The right ventricle is normal in size and function.
 3. The left atrium is markedly enlarged.
 4. The right atrium is markedly enlarged.
 5. Mild aortic stenosis with peak/mean pressure gradient of 16.83mmHg / 9.02mmHg, the aorti
 
c valve area by continuity equation is 1.7cm.
 6. Severe mitral regurgitation is present.
 7. Moderate mitral stenosis, with peak/mean transvalvular gradients measured at 25.2 / 8.0 
mm Hg, and MVA (by VTI) 1.50 cm . There is mi
 8. Moderate to severe tricuspid regurgitation present.
 9. There is moderate to severe pulmonary hypertension.The peak right ventricular systol
ic pressure (pulmonary artery systolic pressure), as measured by Doppler, is 55.8 - 60.8 mm 
Hg.
 10. There is a small secundum atrial septal defect measuring <10 mm in diameter.There i
s left-to-right shunting noted on color Doppler.
 
 TTE: 2018 
 Final Impressions:                         
                              
                                
                  
 1. The left ventricular cavity size is normal.               
 
 2. The LV function is hyperdynamic.                   
   
 3. Visually estimated left ventricular ejection fraction is 70 - 75%.     
 4. The aortic valve is trileaflet and moderately calcified.          
 5. Severe mitral annular calcification as well as papillary and subchordal  
 bulky calcification.                        
     
 6. While there is calcification of the bases of the mitral valve leaflets   
 due to the severe mitral annular calcificaiton, the mid portions of the    
 leaflets themseleves are only midly calcified and thin, however there is the 
 severe central mitral valve regurgitation.                 
 
 7. The mean transmitral gradient is 5.5 mmHg at a heart rate of 77 bpm.    
  The mitral valve effective regurgitant orifice area is 7 mm2.         
                             
 8. Severe biatrial enlargement.                    
    
 9. Right ventricular size, thickness and function are normal. 
 
 JENIFFER: 2018
 Final Impressions:                         
                             
              
                              
         
 1. The LV function is hyperdynamic.                   
   
 2. The visually estimated ejection fraction is > 75%.             
 3. Right ventricular size, thickness and function are normal.         
 4. Severe circumferential mitral annular calcification. A large deposit of  
 calcium at the posterolateral atrial aspect of themitral annulus that     
 partially disrupts the normal mitral annular shape and size. Mitral annular  
 area is measured at 8.26 cm2, with an AP diameter of 3.76 cm, and CC     
 diameter of 2.52 cm. However, there is no significant mitral stenosis. Mean  
 mitral gradient is approximately 4 mmHg at a heart rate of 83 bpm.      
 5. Severe mitral valve regurgitation. The EROA is 0.47 cm2 (using a PISA   
 radius of 1.1 cm, an aliasing velocity of 0.32 m/s, and a mean MR       
 regurgitant velocity of 5.1 m/s), the calculated regurgitant volume is 67   
 mL, and the calculated regurgitant fraction is 52%.              
 6. The mitral valve anterior leaflet measures 2.0 cm.             
 7. There is subvalvar and papilary muscle head calcifidation noted as well.  
 8. The mechanism for the severe mitral regurgitation is leaflet and annular  
 
 calcification and degerneration with poor coaptation across all portions of  
 the valve.                           
       
 9. The aortic valve is moderately sclerotic and restricted. No aortic     
 stenosis.                            
       
 10. Severely enlarged left atrium.                   
   
 11. There is a small fenestrated secundum atrial septal defect, with     
 intermittent left-to-right flow via color-flow Doppler.            
 12. Moderate tricuspid regurgitation.                  
   
 13. Small plaque involving the ascending and transverse aorta.        
 14. The left atrial appendage is well visualized and there is no evidence of 
 thrombus present.     
 
 CARDIAC CATH: 2018
 Preliminary repoot
 Severe MAC
 There is non-obstructive disease in LAD andCirc
 There is 95% stenosis of the distal RCA prior to the PDA and PLV's
 
 RHC: Unremarkable filling pressures
 
 Coronary Angiography: Obstructive CAD of RCA
 PFTS:
 None 
 
 STS (Based Off Available Data):
 MV Replacement + CAB 
 Risk of Mortality: 7.374% 
 Morbidity or Mortality: 30.831% 
 Long Length of Stay: 19.775% 
 Short Length of Stay: 7.384% 
 Permanent Stroke: 2.652% 
 Prolonged Ventilation: 19.662% 
 DSW Infection: 0.263% 
 Renal Failure: 8.237% 
 Reoperation: 12.335% 
 
 Assessment and Recommendations:
 Ms. Vonnie Celaya is a 86 y.o. female referred [facility] for assessment
 of treatment options for mitral valve disease. 
 
 Ms. Celaya has developed progressive GARCIA and LE edema over the last 6 months and currentl
y reports symptoms that are NYHA functional class 3-4.  A review of the echocardiogram and t
 transesophageal echo show severe mitral annular calcification, severe mitral regurgitaito
n, with largest jet centrally but also extending to the commisures, with a mean trasmitral g
radient of 4-5 mmHg and an annular are of 830-930 mm2.   In addition the echocardiogram is n
otable for moderate to severe pulmonary hypertesnion with RVSP 55-60 mmHg. Additional diagno
stics that have been performed include coronary angiogrpahy that demonstrates severe distal 
RCA stenosis. 
 
  Based on her symptoms, physical exam, and diagnostic studies we agree that she has severe 
symptomatic  Mitral regurgitation. 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
ing, continuing with her quality of life the best she can, and not having a stroke. 
 
 The patient's STS based on available data is 7-8%   Surgical risk may be modified in follow
 up based on further diagnostic workup; a full surgical risk evaluation will be re-assessed 
by the Multidisciplinary Heart Valve Team at a subsequent visit once workup is complete.  
 
 
 The diagnostic workup plan is as follows:
 1. We would recommend that you continue to follow up with Dr. Mitchell in [facility] for your [facility] care. Discuss the options of stenting of the right coronary artery with him in follow up
 as needed if chest pain develops 
 
 2. Go up to the [facility] [place] to meet with Dr. Smart, Dr. Abrams, and Dr. Brennen Larios ed on your preferences options are to continue with medical therapy or to consider a possibl
e clip procedure, though the likelyhood of a successful placement of a clip we think is low 
and risk is higher for possible complication. Given your wishes, we will not pursue a hybrid
 surgical procedure. 
 
 3. We will discuss your the group decision (yourself and our doctors) with Dr. Mitchell to let 
him know the final plans. 
 
 4. IT was a pleasure meeting you today. It was our pleasure being involved in your care.  C
all Dominga Arambula RN or Ciara Riddle PA-C in the valve clinic at [phone] if you have a
ny questions.
 
 The patient and their family agree with the plan.  We will follow up with the patient to co
ordinate next steps once all relevant data has been completed and reviewed.      
 
 Theodore Wilkins MD
 
 [facility] [place]
 
 Electronically signed by Theodore Wilkins MD at 2018  2:45 PM PDTdocumented in thi
s encounter
 
 Plan of Treatment
 Not on filedocumented as of this encounter
 
 Visit Diagnoses
 
 
+--------------------------------------------------------------+
| Diagnosis                                                    |
+--------------------------------------------------------------+
|   Mitral valve insufficiency, unspecified etiology - Primary |
+--------------------------------------------------------------+
|   Mitral annular calcification  Mitral valve disorders       |
+--------------------------------------------------------------+
|   Persistent atrial fibrillation  Atrial fibrillation        |
+--------------------------------------------------------------+
 documented in this encounter

## 2020-01-01 NOTE — XMS
Encounter Summary
  Created on: 2020
 
 Vonnie Celaya
 External Reference #: [id number]
 : 32
 Sex: Female
 
 Demographics
 
 
+-----------------------+------------------------+
| Address               | [address]         |
|                       | [place]  [address]   |
+-----------------------+------------------------+
| Home Phone            | [phone]        |
+-----------------------+------------------------+
| Preferred Language    | Unknown                |
+-----------------------+------------------------+
| Marital Status        | Single                 |
+-----------------------+------------------------+
| Oriental orthodox Affiliation | NON                    |
+-----------------------+------------------------+
| Race                  | White                  |
+-----------------------+------------------------+
| Ethnic Group          | Not  or  |
+-----------------------+------------------------+
 
 
 Author
 
 
+--------------+------------------------------+
| Author       | [facility] |
+--------------+------------------------------+
| Organization | [facility] |
+--------------+------------------------------+
| Address      | Unknown                      |
+--------------+------------------------------+
| Phone        | Unavailable                  |
+--------------+------------------------------+
 
 
 
 Support
 
 
+--------------+--------------+---------+-----------------+
| Name         | Relationship | Address | Phone           |
+--------------+--------------+---------+-----------------+
| Lauryn Leroy | ECON         | Unknown | [phone] |
+--------------+--------------+---------+-----------------+
 
 
 
 Care Team Providers
 
 
 
+------------------------+------+-----------------+
| Care Team Member Name  | Role | Phone           |
+------------------------+------+-----------------+
| Jean Bermudez MD | PCP  | [phone] |
+------------------------+------+-----------------+
 
 
 
 Reason for Visit
 AUTH/CERT
 
+--------+--------+-----------+--------------+--------------+--------------+
| Status | Reason | Specialty | Diagnoses /  | Referred By  | Referred To  |
|        |        |           | Procedures   | Contact      | Contact      |
+--------+--------+-----------+--------------+--------------+--------------+
|        |        |           |              |              |              |
+--------+--------+-----------+--------------+--------------+--------------+
 
 
 
 
 Encounter Details
 
 
+--------+-------------+----------------------+----------------------+-------------+
| Date   | Type        | Department           | Care Team            | Description |
+--------+-------------+----------------------+----------------------+-------------+
| / | Anesthesia  |   Cardiac            |   Radha Whitt MD |             |
| 2018   | Event       | Non-Invasive Testing |   [address]        |             |
|        |             |  at [facility] | [address]      |             |
|        |             |   [address]   | [place], [place]         |             |
|        |             | [place]  Mailcode:      | [address]           |             |
|        |             | [id number]  [address]   | [phone]         |             |
|        |             | [facility]        | [phone] (Fax)   |             |
|        |             | [place], [place]         | [place],   |             |
|        |             | [address]           | CRNA  [address]    |             |
|        |             | [phone]         | [address]      |             |
|        |             |                      | [place], [place]         |             |
|        |             |                      | [address]           |             |
|        |             |                      | [phone]         |             |
|        |             |                      | [phone] (Fax)   |             |
+--------+-------------+----------------------+----------------------+-------------+
 
 
 
 Anesthesia Record
 
 
+------------------+------------------+-------------------+----------------------+
| Procedure Name   | Responsible      | Anesthesia Start  | Anesthesia Stop Time |
|                  | Anesthesiologist | Time              |                      |
+------------------+------------------+-------------------+----------------------+
| TRANSESOPHAGEAL  |                  |                   |                      |
| ECHOCARDIOGRAM,  |                  |                   |                      |
| ADULT            |                  |                   |                      |
+------------------+------------------+-------------------+----------------------+
 
 
 
+----+---+-----------+-------------------------------------------------------------------+
 
| Da | T | Event     | Comment                                                           |
| te | i |           |                                                                   |
|    | m |           |                                                                   |
|    | e |           |                                                                   |
+----+---+-----------+-------------------------------------------------------------------+
| 09 | 0 |           |                                                                   |
| /1 | 9 |           |                                                                   |
| 3/ | 4 |           |                                                                   |
| 20 | 4 |           |                                                                   |
| 18 |   |           |                                                                   |
+----+---+-----------+-------------------------------------------------------------------+
|    | 0 | Pt. Check | Prior to anesthesia start, pt. Identified, examined, chart        |
|    | 9 |           | reviewed, PARQ held, anesthetic plan made or approved by          |
|    | 4 |           | attending anesthesiologist.  NPO status confirmed as appropriate  |
|    | 4 |           | for procedure  Preoperative evaluation: unchanged                 |
+----+---+-----------+-------------------------------------------------------------------+
 
 
 
+------+
| Meds |
+------+
 
 
 
+-----------------+----------+
 No medications  | on file. |
+-----------------+----------+
 
 
 
+--------------------+
| No agents on file. |
+--------------------+
 
 
 
+-----------------------------------+
| No blood administrations on file. |
+-----------------------------------+
 
 
 
+------------------+
| No LDAs on file. |
+------------------+
 documented in this encounter
 
 Social History
 
 
+----------------+-------+-----------+--------+------+
| Tobacco Use    | Types | Packs/Day | Years  | Date |
|                |       |           | Used   |      |
+----------------+-------+-----------+--------+------+
| Never Assessed |       |           |        |      |
+----------------+-------+-----------+--------+------+
 
 
 
 
+------------------+---------------+
| Sex Assigned at  | Date Recorded |
| Birth            |               |
+------------------+---------------+
| Not on file      |               |
+------------------+---------------+
 
 
 
+----------------+-------------+-------------+
| Job Start Date | Occupation  | Industry    |
+----------------+-------------+-------------+
| Not on file    | Not on file | Not on file |
+----------------+-------------+-------------+
 
 
 
+----------------+--------------+------------+
| Travel History | Travel Start | Travel End |
+----------------+--------------+------------+
 
 
 
+-------------------------------------+
| No recent travel history available. |
+-------------------------------------+
 documented as of this encounter
 
 Plan of Treatment
 Not on filedocumented as of this encounter
 
 Visit Diagnoses
 Not on filedocumented in this encounter

## 2020-01-01 NOTE — XMS
Encounter Summary
  Created on: 2020
 
 Vonnie Celaya
 External Reference #: [id number]
 : 32
 Sex: Female
 
 Demographics
 
 
+-----------------------+------------------------+
| Address               | [address]         |
|                       | [place]  [address]   |
+-----------------------+------------------------+
| Home Phone            | [phone]        |
+-----------------------+------------------------+
| Preferred Language    | Unknown                |
+-----------------------+------------------------+
| Marital Status        | Single                 |
+-----------------------+------------------------+
| Anabaptist Affiliation | NON                    |
+-----------------------+------------------------+
| Race                  | White                  |
+-----------------------+------------------------+
| Ethnic Group          | Not  or  |
+-----------------------+------------------------+
 
 
 Author
 
 
+--------------+------------------------------+
| Author       | [facility] |
+--------------+------------------------------+
| Organization | [facility] |
+--------------+------------------------------+
| Address      | Unknown                      |
+--------------+------------------------------+
| Phone        | Unavailable                  |
+--------------+------------------------------+
 
 
 
 Support
 
 
+--------------+--------------+---------+-----------------+
| Name         | Relationship | Address | Phone           |
+--------------+--------------+---------+-----------------+
| Lauryn Leroy | ECON         | Unknown | [phone] |
+--------------+--------------+---------+-----------------+
 
 
 
 Care Team Providers
 
 
 
+------------------------+------+-----------------+
| Care Team Member Name  | Role | Phone           |
+------------------------+------+-----------------+
| Jean Bermudez MD | PCP  | [phone] |
+------------------------+------+-----------------+
 
 
 
 Reason for Visit
 
 
+--------+----------+
| Reason | Comments |
+--------+----------+
| Other  | surgery  |
+--------+----------+
 
 
 
 Encounter Details
 
 
+--------+-----------+----------------------+----------------------+------------------+
| Date   | Type      | Department           | Care Team            | Description      |
+--------+-----------+----------------------+----------------------+------------------+
| / | Telephone |   Cardiology at [facility]  |   Markus Smart,  | Other (surgery ) |
| 2019   |           |  [address]    | MD  [address] [facility]      |                  |
|        |           | Mailcode: [address]       | [address]      |                  |
|        |           | [facility]    | [place], [place]         |                  |
|        |           | and Healing,         | [address]           |                  |
|        |           | [facility]       | [phone]         |                  |
|        |           | Floor  [place], [place]  | [phone] (Fax)   |                  |
|        |           | [address]           |                      |                  |
|        |           | [phone]         |                      |                  |
+--------+-----------+----------------------+----------------------+------------------+
 
 
 
 Social History
 
 
+--------------+-------+-----------+--------+------+
| Tobacco Use  | Types | Packs/Day | Years  | Date |
|              |       |           | Used   |      |
+--------------+-------+-----------+--------+------+
| Never Smoker |       |           |        |      |
+--------------+-------+-----------+--------+------+
 
 
 
+---------------------+---+---+---+
| Smokeless Tobacco:  |   |   |   |
| Never Used          |   |   |   |
+---------------------+---+---+---+
 
 
 
+-------------+-------------+---------+----------+
| Alcohol Use | Drinks/Week | oz/Week | Comments |
+-------------+-------------+---------+----------+
 
| No          |             |         |          |
+-------------+-------------+---------+----------+
 
 
 
+------------------+---------------+
| Sex Assigned at  | Date Recorded |
| Birth            |               |
+------------------+---------------+
| Not on file      |               |
+------------------+---------------+
 
 
 
+----------------+-------------+-------------+
| Job Start Date | Occupation  | Industry    |
+----------------+-------------+-------------+
| Not on file    | Not on file | Not on file |
+----------------+-------------+-------------+
 
 
 
+----------------+--------------+------------+
| Travel History | Travel Start | Travel End |
+----------------+--------------+------------+
 
 
 
+-------------------------------------+
| No recent travel history available. |
+-------------------------------------+
 documented as of this encounter
 
 Plan of Treatment
 Not on filedocumented as of this encounter
 
 Visit Diagnoses
 Not on filedocumented in this encounter

## 2020-01-01 NOTE — XMS
Encounter Summary
  Created on: 2020
 
 Vonnie Celaya
 External Reference #: [id number]
 : 32
 Sex: Female
 
 Demographics
 
 
+-----------------------+---------------------------+
| Address               | [address]            |
|                       | [place]  [address] |
+-----------------------+---------------------------+
| Home Phone            | [phone]           |
+-----------------------+---------------------------+
| Preferred Language    | Unknown                   |
+-----------------------+---------------------------+
| Marital Status        |                    |
+-----------------------+---------------------------+
| Jainism Affiliation | Unknown                   |
+-----------------------+---------------------------+
| Race                  | Unknown                   |
+-----------------------+---------------------------+
| Ethnic Group          | Unknown                   |
+-----------------------+---------------------------+
 
 
 Author
 
 
+--------------+--------------------------------------------+
| Author       | [facility]  |
|              | [facility] [place]                                |
+--------------+--------------------------------------------+
| Organization | [facility]  |
|              | [facility] [place]                                |
+--------------+--------------------------------------------+
| Address      | Unknown                                    |
+--------------+--------------------------------------------+
| Phone        | Unavailable                                |
+--------------+--------------------------------------------+
 
 
 
 Support
 
 
+--------------+--------------+---------+-----------------+
| Name         | Relationship | Address | Phone           |
+--------------+--------------+---------+-----------------+
| Lauryn Leroy | ECON         | Unknown | [phone] |
+--------------+--------------+---------+-----------------+
 
 
 
 Care Team Providers
 
 
 
+------------------------+------+-----------------+
| Care Team Member Name  | Role | Phone           |
+------------------------+------+-----------------+
| Jean Bermudez MD | PCP  | [phone] |
+------------------------+------+-----------------+
 
 
 
 Encounter Details
 
 
+--------+-------------+----------------------+--------------------+-------------+
| Date   | Type        | Department           | Care Team          | Description |
+--------+-------------+----------------------+--------------------+-------------+
| / | Orders Only |   [facility]     |   Conversion       |             |
| 2018   |             | CONVERSION DEP  888  | Transaction,       |             |
|        |             | [address]           | Provider Unknown   |             |
|        |             | [place]         | [phone]       |             |
|        |             | [address]           | [phone] (Fax) |             |
|        |             | [phone]         |                    |             |
+--------+-------------+----------------------+--------------------+-------------+
 
 
 
 Social History
 
 
+----------------+-------+-----------+--------+------+
| Tobacco Use    | Types | Packs/Day | Years  | Date |
|                |       |           | Used   |      |
+----------------+-------+-----------+--------+------+
| Never Assessed |       |           |        |      |
+----------------+-------+-----------+--------+------+
 
 
 
+------------------+---------------+
| Sex Assigned at  | Date Recorded |
| Birth            |               |
+------------------+---------------+
| Not on file      |               |
+------------------+---------------+
 
 
 
+----------------+-------------+-------------+
| Job Start Date | Occupation  | Industry    |
+----------------+-------------+-------------+
| Not on file    | Not on file | Not on file |
+----------------+-------------+-------------+
 
 
 
+----------------+--------------+------------+
| Travel History | Travel Start | Travel End |
+----------------+--------------+------------+
 
 
 
 
+-------------------------------------+
| No recent travel history available. |
+-------------------------------------+
 documented as of this encounter
 
 Plan of Treatment
 
 
+--------+---------+------------+----------------------+-------------+
| Date   | Type    | Specialty  | Care Team            | Description |
+--------+---------+------------+----------------------+-------------+
| / | Office  | Cardiology |   [facility]    |             |
| 2020   | Visit   |            | [address]      |             |
|        |         |            | [address]   |             |
|        |         |            | [place] [address]   |             |
|        |         |            | [phone]         |             |
|        |         |            | [phone] (Fax)   |             |
+--------+---------+------------+----------------------+-------------+
 documented as of this encounter
 
 Visit Diagnoses
 Not on filedocumented in this encounter

## 2020-01-01 NOTE — XMS
Encounter Summary
  Created on: 2020
 
 Vonnie Celaya
 External Reference #: [id number]
 : 32
 Sex: Female
 
 Demographics
 
 
+-----------------------+------------------------+
| Address               | [address]         |
|                       | [place]  [address]   |
+-----------------------+------------------------+
| Home Phone            | [phone]        |
+-----------------------+------------------------+
| Preferred Language    | Unknown                |
+-----------------------+------------------------+
| Marital Status        | Single                 |
+-----------------------+------------------------+
| Mandaen Affiliation | NON                    |
+-----------------------+------------------------+
| Race                  | White                  |
+-----------------------+------------------------+
| Ethnic Group          | Not  or  |
+-----------------------+------------------------+
 
 
 Author
 
 
+--------------+------------------------------+
| Author       | [facility] |
+--------------+------------------------------+
| Organization | [facility] |
+--------------+------------------------------+
| Address      | Unknown                      |
+--------------+------------------------------+
| Phone        | Unavailable                  |
+--------------+------------------------------+
 
 
 
 Support
 
 
+--------------+--------------+---------+-----------------+
| Name         | Relationship | Address | Phone           |
+--------------+--------------+---------+-----------------+
| Lauryn Leroy | ECON         | Unknown | [phone] |
+--------------+--------------+---------+-----------------+
 
 
 
 Care Team Providers
 
 
 
+------------------------+------+-----------------+
| Care Team Member Name  | Role | Phone           |
+------------------------+------+-----------------+
| Jean Bermudez MD | PCP  | [phone] |
+------------------------+------+-----------------+
 
 
 
 Encounter Details
 
 
+--------+----------+----------------------+----------------------+-------------+
| Date   | Type     | Department           | Care Team            | Description |
+--------+----------+----------------------+----------------------+-------------+
| / | Abstract |   Cardiology at [facility]  |   Jame Wilkins  |             |
| 2018   |          |  [address]  [address]  |             |
|        |          | Mailcode: [address]       | [address]  [place], [place]    |             |
|        |          | [facility]    | [address]           |             |
|        |          | and [place],         | [phone]         |             |
|        |          |       | [phone] (Fax)   |             |
|        |          | [place], [place]  |                      |             |
|        |          | [address]           |                      |             |
|        |          | [phone]         |                      |             |
+--------+----------+----------------------+----------------------+-------------+
 
 
 
 Social History
 
 
+--------------+-------+-----------+--------+------+
| Tobacco Use  | Types | Packs/Day | Years  | Date |
|              |       |           | Used   |      |
+--------------+-------+-----------+--------+------+
| Never Smoker |       |           |        |      |
+--------------+-------+-----------+--------+------+
 
 
 
+---------------------+---+---+---+
| Smokeless Tobacco:  |   |   |   |
| Never Used          |   |   |   |
+---------------------+---+---+---+
 
 
 
+-------------+-------------+---------+----------+
| Alcohol Use | Drinks/Week | oz/Week | Comments |
+-------------+-------------+---------+----------+
| No          |             |         |          |
+-------------+-------------+---------+----------+
 
 
 
+------------------+---------------+
| Sex Assigned at  | Date Recorded |
| Birth            |               |
+------------------+---------------+
| Not on file      |               |
+------------------+---------------+
 
 
 
 
+----------------+-------------+-------------+
| Job Start Date | Occupation  | Industry    |
+----------------+-------------+-------------+
| Not on file    | Not on file | Not on file |
+----------------+-------------+-------------+
 
 
 
+----------------+--------------+------------+
| Travel History | Travel Start | Travel End |
+----------------+--------------+------------+
 
 
 
+-------------------------------------+
| No recent travel history available. |
+-------------------------------------+
 documented as of this encounter
 
 Plan of Treatment
 Not on filedocumented as of this encounter
 
 Visit Diagnoses
 Not on filedocumented in this encounter

## 2020-01-01 NOTE — XMS
Clinical Summary
  Created on: 2020
 
 Vonnie Celaya
 External Reference #: [id number]
 : 32
 Sex: Female
 
 Demographics
 
 
+-----------------------+------------------------+
| Address               | [address]         |
|                       | [place]  [address]   |
+-----------------------+------------------------+
| Home Phone            | [phone]        |
+-----------------------+------------------------+
| Preferred Language    | Unknown                |
+-----------------------+------------------------+
| Marital Status        | Single                 |
+-----------------------+------------------------+
| Voodoo Affiliation | NON                    |
+-----------------------+------------------------+
| Race                  | White                  |
+-----------------------+------------------------+
| Ethnic Group          | Not  or  |
+-----------------------+------------------------+
 
 
 Author
 
 
+--------------+---------------------+
| Author       | [facility] |
+--------------+---------------------+
| Organization | [facility] |
+--------------+---------------------+
| Address      | Unknown             |
+--------------+---------------------+
| Phone        | Unavailable         |
+--------------+---------------------+
 
 
 
 Support
 
 
+--------------+--------------+---------+-----------------+
| Name         | Relationship | Address | Phone           |
+--------------+--------------+---------+-----------------+
| Lauryn Leroy | ECON         | Unknown | [phone] |
+--------------+--------------+---------+-----------------+
 
 
 
 Care Team Providers
 
 
 
+------------------------+------+-----------------+
| Care Team Member Name  | Role | Phone           |
+------------------------+------+-----------------+
| Jean Bermudez MD | PCP  | [phone] |
+------------------------+------+-----------------+
 
 
 
 Source Comments
 [facility] is fully live on both [facility] and [facility]
 
 Allergies
 
 
+----------------------+-----------+----------+----------+------------------+
| Active Allergy       | Reactions | Severity | Noted    | Comments         |
|                      |           |          | Date     |                  |
+----------------------+-----------+----------+----------+------------------+
| Morphine             | Anxiety   |          | 20 |   "felt fidgety" |
|                      |           |          | 18       |                  |
+----------------------+-----------+----------+----------+------------------+
| Oxycodone-Acetaminop | Anxiety   |          | 20 |   "felt fidgety" |
| hen                  |           |          | 18       |                  |
+----------------------+-----------+----------+----------+------------------+
 
 
 
 Medications
 
 
+----------------------+----------------------+-----------+---------+------+------+-------+
| Medication           | Sig                  | Dispensed | Refills | Star | End  | Statu |
|                      |                      |           |         | t    | Date | s     |
|                      |                      |           |         | Date |      |       |
+----------------------+----------------------+-----------+---------+------+------+-------+
|   losartan 100 mg    | Take 100 mg by mouth |           | 0       |      |      | Activ |
| oral tablet          |  once daily.         |           |         |      |      | e     |
+----------------------+----------------------+-----------+---------+------+------+-------+
|   potassium chloride | Take by mouth.       |           | 0       |      |      | Activ |
|  SR 20 mEq oral      |                      |           |         |      |      | e     |
| tablet,ER            |                      |           |         |      |      |       |
| particles/crystals   |                      |           |         |      |      |       |
+----------------------+----------------------+-----------+---------+------+------+-------+
|   simvastatin 20 mg  | Take by mouth once   |           | 0       |      |      | Activ |
| oral tablet          | daily in the         |           |         |      |      | e     |
|                      | evening.             |           |         |      |      |       |
+----------------------+----------------------+-----------+---------+------+------+-------+
|   MAGNESIUM CHLORIDE | Take by mouth.       |           | 0       |      |      | Activ |
|  ORAL                |                      |           |         |      |      | e     |
+----------------------+----------------------+-----------+---------+------+------+-------+
|   warfarin 5 mg oral | Take by mouth.       |           | 0       |      |      | Activ |
|  tablet              |                      |           |         |      |      | e     |
+----------------------+----------------------+-----------+---------+------+------+-------+
|   levothyroxine 100  | Take by mouth.       |           | 0       |      |      | Activ |
| mcg oral tablet      |                      |           |         |      |      | e     |
+----------------------+----------------------+-----------+---------+------+------+-------+
|   metFORMIN    | Take by mouth.       |           | 0       |      |      | Activ |
| mg oral tablet,ER    |                      |           |         |      |      | e     |
| alexis.retention 24 hr |                      |           |         |      |      |       |
 
+----------------------+----------------------+-----------+---------+------+------+-------+
|   acetaminophen 325  | Take by mouth.       |           | 0       |      |      | Activ |
| mg oral tablet       |                      |           |         |      |      | e     |
+----------------------+----------------------+-----------+---------+------+------+-------+
|   furosemide 40 mg   | Take by mouth.       |           | 0       |      |      | Activ |
| oral tablet          |                      |           |         |      |      | e     |
+----------------------+----------------------+-----------+---------+------+------+-------+
|   metoprolol         | Take 100 mg by mouth |           | 0       | 07/3 |      | Activ |
| succinate 200 mg     |  once daily.         |           |         | 0/20 |      | e     |
| oral tablet extended |                      |           |         | 18   |      |       |
|  release 24 hr       |                      |           |         |      |      |       |
+----------------------+----------------------+-----------+---------+------+------+-------+
|   ascorbic acid      | Take 500 mg by mouth |           | 0       |      |      | Activ |
| (vitamin C) 500 mg   |  once daily.         |           |         |      |      | e     |
| oral tablet          |                      |           |         |      |      |       |
+----------------------+----------------------+-----------+---------+------+------+-------+
|                      | Take 2 tablets by    |           | 0       |      |      | Activ |
| gluc/chnd/om3/dha/ep | mouth once daily.    |           |         |      |      | e     |
| a/fish/str           |                      |           |         |      |      |       |
| (GLUCOSAMINE         |                      |           |         |      |      |       |
| CHONDROITIN PLUS     |                      |           |         |      |      |       |
| ORAL)                |                      |           |         |      |      |       |
+----------------------+----------------------+-----------+---------+------+------+-------+
 
 
 
 Active Problems
 
 
+-------------------------------------------------------------------------------------------
--------------------------------------------------------------------------------------------
------------------+
| Patient Care Coordination Note                                                            
                                                                                            
                  |
+-------------------------------------------------------------------------------------------
--------------------------------------------------------------------------------------------
------------------+
|   Formatting of this note might be different from the original.18: discussed at     
                                                                                            
                  |
| MDR.  Not appropriate for valve in MAC--mitral orifice rather large and anteriorly        
                                                                                            
                  |
| there                                                                                     
                                                                                            
                  |
|                                                                                           
                                                                                            
                  |
| s nothing holding the s3 29 in place.  this patient has pure MR and the anterior leaflet  
                                                                                            
                  |
|  has preserved motion.  Too high a risk of atrial embolization.  LUCAS notified pt that we   
                                                                                            
                  |
| cannot offer an intervention. [id number] Pt discussed in HV Multi-D review. Team       
                                                                                            
                  |
| decided to offer valve-in-MAC, pending 3D reconstruction.  LUCAS and SC did 3D               
 
                                                                                            
                  |
| reconstruction, ok to proceed w/procedure.  Nicko from [facility] will need to be present    
                                                                                            
                  |
| for case.  LUCAS talking w/Femi Arambula at [facility].  [facility] has the CT and is working on   
                                                                                            
                  |
| getting Nicko's availability.Date of Procedure: Valve Size & Type: 29mm [id number]:         
                                                                                            
                  |
| RFVAnticoag PRE: warfarin, hold x5 days before procedure, bridge w/325 mg ASA.  No hold   
                                                                                            
                  |
| ASA prior to procedureAnticoag POST: plavix load, then warfarin and baby ASA Dental       
                                                                                            
                  |
| status: Special Needs: confirm HS note from 18 will work for 1st CTS consult, or if  
                                                                                            
                  |
|  HS can amend, or ?F/U Plan: 2 wk and 30 day w/KR Date Complete: 1st Surgeon  HS      
                                                                                            
                  |
| 2nd Surgeon  DS  Consent  needs Frailty  18; repeat 6" WT on day of PMC PMC     
                                                                                            
                  |
| needs Results for orders placed or performed in visit on 18 12 LEAD ECG Result      
                                                                                            
                  |
| Value Ref Range  VENTRICULAR RATE 80 bpm  ATRIAL RATE 0 ms  P-R INTERVAL  ms  P AXIS      
                                                                                            
                  |
| deg  QRS DURATION 107 ms   ms  QTCB 451 ms  R AXIS -48 deg  T AXIS 6 deg  ECG       
                                                                                            
                  |
| IMPRESSION Atrial fibrillation   ECG IMPRESSION Minimal ST depression, inferior leads     
                                                                                            
                  |
| ECG IMPRESSION     Minimal ST elevation, anterolateral leads- ABNORMAL ECG -  ECG         
                                                                                            
                  |
| IMPRESSION     Electronically signed by: IZABELA MARTI 2018 17:39:25 18:        
                                                                                            
                  |
| discussed at [facility] and seen at [facility].  Severe MR and distal RCA disease.  Plan is to stent   
                                                                                            
                  |
| the RCA (via primary cardiologist), then re-eval in HVC approx 3 mos s/p stent.  MODESTO said  
                                                                                            
                  |
|  to schedule as a 30" visit during her lunch on an [facility] day so that way could be added    
                                                                                            
                  |
| on to [facility] that afternoon if needed.rkt                                                   
                                                                                            
                  |
|Result Value Ref Range                                                                     
                                                                                            
                  |
| VENTRICULAR RATE 80 bpm                                                                   
 
                                                                                            
                  |
| ATRIAL RATE 0 ms                                                                          
                                                                                            
                  |
| P-R INTERVAL  ms                                                                          
                                                                                            
                  |
| P AXIS  deg                                                                               
                                                                                            
                  |
| QRS DURATION 107 ms                                                                       
                                                                                            
                  |
|  ms                                                                                 
                                                                                            
                  |
| QTCB 451 ms                                                                               
                                                                                            
                  |
| R AXIS -48 deg                                                                            
                                                                                            
                  |
| T AXIS 6 deg                                                                              
                                                                                            
                  |
| ECG IMPRESSION Atrial fibrillation                                                        
                                                                                            
                  |
| ECG IMPRESSION Minimal ST depression, inferior leads                                      
                                                                                            
                  |
| ECG IMPRESSION                                                                            
                                                                                            
                  |
|  Minimal ST elevation, anterolateral leads- ABNORMAL ECG -                                
                                                                                            
                  |
| ECG IMPRESSION                                                                            
                                                                                            
                  |
|  Electronically signed by: IZABELA MARTI 2018 17:39:25                               
                                                                                            
                  |
|                                                                                           
                                                                                            
                  |
|18: discussed at [facility] and seen at [facility].  Severe MR and distal RCA disease.  Plan is to 
stent the RCA (via primary cardiologist), then re-eval in HVC approx 3 mos s/p stent.  MODESTO sa
id to schedule as |
| a 30" visit during her lunch on an [facility] day so that way could be added on to [facility] that aft
ernoon if needed.                                                                           
                  |
|rkt                                                                                        
                                                                                            
                  |
+-------------------------------------------------------------------------------------------
--------------------------------------------------------------------------------------------
------------------+
 
 
 
 
+------------------------------+------------+
| Problem                      | Noted Date |
+------------------------------+------------+
| Mitral annular calcification | 2018 |
+------------------------------+------------+
| Mitral regurgitation         | 2018 |
+------------------------------+------------+
| Atrial fibrillation          | 2018 |
+------------------------------+------------+
 
 
 
 Family History
 
 
+-----------------+----------+------+----------------+
| Medical History | Relation | Name | Comments       |
+-----------------+----------+------+----------------+
| Heart Attack    | Father   |      |                |
+-----------------+----------+------+----------------+
| Stroke          | Father   |      |                |
+-----------------+----------+------+----------------+
| Heart Failure   | Mother   |      |                |
+-----------------+----------+------+----------------+
| Sudden Death    | Other    |      | likely from PE |
+-----------------+----------+------+----------------+
 
 
 
+----------+------+--------+----------+
| Relation | Name | Status | Comments |
+----------+------+--------+----------+
| Father   |      |        |          |
+----------+------+--------+----------+
| Mother   |      |        |          |
+----------+------+--------+----------+
| Other    |      |        |          |
+----------+------+--------+----------+
 
 
 
 Social History
 
 
+--------------+-------+-----------+--------+------+
| Tobacco Use  | Types | Packs/Day | Years  | Date |
|              |       |           | Used   |      |
+--------------+-------+-----------+--------+------+
| Never Smoker |       |           |        |      |
+--------------+-------+-----------+--------+------+
 
 
 
+---------------------+---+---+---+
| Smokeless Tobacco:  |   |   |   |
| Never Used          |   |   |   |
+---------------------+---+---+---+
 
 
 
 
+-------------+-------------+---------+----------+
| Alcohol Use | Drinks/Week | oz/Week | Comments |
+-------------+-------------+---------+----------+
| No          |             |         |          |
+-------------+-------------+---------+----------+
 
 
 
+------------------+---------------+
| Sex Assigned at  | Date Recorded |
| Birth            |               |
+------------------+---------------+
| Not on file      |               |
+------------------+---------------+
 
 
 
+----------------+-------------+-------------+
| Job Start Date | Occupation  | Industry    |
+----------------+-------------+-------------+
| Not on file    | Not on file | Not on file |
+----------------+-------------+-------------+
 
 
 
+----------------+--------------+------------+
| Travel History | Travel Start | Travel End |
+----------------+--------------+------------+
 
 
 
+-------------------------------------+
| No recent travel history available. |
+-------------------------------------+
 
 
 
 Last Filed Vital Signs
 
 
+-------------------+---------------------+----------------------+----------+
| Vital Sign        | Reading             | Time Taken           | Comments |
+-------------------+---------------------+----------------------+----------+
| Blood Pressure    | 123/62              | 2018  2:18 PM  |          |
|                   |                     | PST                  |          |
+-------------------+---------------------+----------------------+----------+
| Pulse             | 73                  | 2018  2:18 PM  |          |
|                   |                     | PST                  |          |
+-------------------+---------------------+----------------------+----------+
| Temperature       | 36.6   C (97.9   F) | 2018  2:18 PM  |          |
|                   |                     | PST                  |          |
+-------------------+---------------------+----------------------+----------+
| Respiratory Rate  | 14                  | 2018  2:18 PM  |          |
|                   |                     | PST                  |          |
+-------------------+---------------------+----------------------+----------+
| Oxygen Saturation | 99%                 | 2018  2:18 PM  |          |
|                   |                     | PST                  |          |
+-------------------+---------------------+----------------------+----------+
 
| Inhaled Oxygen    | -                   | -                    |          |
| Concentration     |                     |                      |          |
+-------------------+---------------------+----------------------+----------+
| Weight            | 67 kg (147 lb 11.3  | 2018  2:18 PM  |          |
|                   | oz)                 | PST                  |          |
+-------------------+---------------------+----------------------+----------+
| Height            | 160 cm (5' 3")      | 2018  2:09 PM  |          |
|                   |                     | PDT                  |          |
+-------------------+---------------------+----------------------+----------+
| Body Mass Index   | 26.17               | 2018  2:09 PM  |          |
|                   |                     | PDT                  |          |
+-------------------+---------------------+----------------------+----------+
 
 
 
 Plan of Treatment
 
 
+----------------------+-----------+---------------------------------+----------+
| Health Maintenance   | Due Date  | Last Done                       | Comments |
+----------------------+-----------+---------------------------------+----------+
| Pneumococcal         |  |                                 |          |
| vaccination (1 of 2  | 7         |                                 |          |
| - PCV13)             |           |                                 |          |
+----------------------+-----------+---------------------------------+----------+
| Influenza (Flu)      | 10/01/201 | 10/04/2017, 10/07/2016,         |          |
| vaccination (#1)     | 9         | 10/09/2014, Additional history  |          |
|                      |           | exists                          |          |
+----------------------+-----------+---------------------------------+----------+
 
 
 
 Results
 Not on filefrom Last 3 Months
 
 Insurance
 
 
+-------------+--------+-------------+--------+-------------+------------+--------+
| Payer       | Benefi | Subscriber  | Effect | Phone       | Address    | Type   |
|             | t Plan | ID          | vickey    |             |            |        |
|             |  /     |             | Dates  |             |            |        |
|             | Group  |             |        |             |            |        |
+-------------+--------+-------------+--------+-------------+------------+--------+
| MEDICARE    | MEDICA | xxxxxxxxxxx | 19 | [phone] |   [address]   | [employer] |
|             | RE A & |             | 97-Pre | 1           | [phone]       | re     |
|             |  B     |             | sent   |             | [place]  |        |
|             |        |             |        |             | [id number]      |        |
+-------------+--------+-------------+--------+-------------+------------+--------+
| COMMERCIAL  | INDIVI | xxxxxxxxxxx | 20 |             |            | Indemn |
| INDIVIDUAL  | DUAL   |             | 18-Pre |             |            | ity    |
|             | COMMER |             | sent   |             |            |        |
|             | CIAL   |             |        |             |            |        |
+-------------+--------+-------------+--------+-------------+------------+--------+
 
 
 
+--------------------+--------+-------------+--------+-------------+---------------------+
| Guarantor Name     | Accoun | Relation to | Date   | Phone       | Billing Address     |
|                    | t Type |  Patient    | of     |             |                     |
 
|                    |        |             | Birth  |             |                     |
+--------------------+--------+-------------+--------+-------------+---------------------+
| Vonnie Celaya | Person | Self        | / |             |   [address]   |
|                    | al/Fam |             | 1932   | [phone] | [place] [id number] |
|                    | [place]    |             |        | 0 (Home)    |                     |
|                    |        |             |        | [phone] |                     |
|                    |        |             |        | 0 (Work)    |                     |
+--------------------+--------+-------------+--------+-------------+---------------------+
 
 
 
 Advance Directives
 
 
+-------------+-------------+-------------+----------+
| Code Status | Date        | Date        | Comments |
|             | Activated   | Inactivated |          |
+-------------+-------------+-------------+----------+
| Full Code   | 2018   | 2018   |          |
|             | 9:53 AM     | 11:28 PM    |          |
+-------------+-------------+-------------+----------+
 
 
 
+---------+-------------+-------------+---+
|         |             |             |   |
+---------+-------------+-------------+---+
| JOHN/ESTEVAN | 2018   | 2018   |   |
|         | 9:11 AM     | 6:10 PM     |   |
+---------+-------------+-------------+---+

## 2020-01-01 NOTE — XMS
Encounter Summary
  Created on: 2020
 
 Vonnie Celaya
 External Reference #: [id number]
 : 32
 Sex: Female
 
 Demographics
 
 
+-----------------------+------------------------+
| Address               | [address]         |
|                       | [place]  [address]   |
+-----------------------+------------------------+
| Home Phone            | [phone]        |
+-----------------------+------------------------+
| Preferred Language    | Unknown                |
+-----------------------+------------------------+
| Marital Status        | Single                 |
+-----------------------+------------------------+
| Restorationist Affiliation | NON                    |
+-----------------------+------------------------+
| Race                  | White                  |
+-----------------------+------------------------+
| Ethnic Group          | Not  or  |
+-----------------------+------------------------+
 
 
 Author
 
 
+--------------+------------------------------+
| Author       | [facility] |
+--------------+------------------------------+
| Organization | [facility] |
+--------------+------------------------------+
| Address      | Unknown                      |
+--------------+------------------------------+
| Phone        | Unavailable                  |
+--------------+------------------------------+
 
 
 
 Support
 
 
+--------------+--------------+---------+-----------------+
| Name         | Relationship | Address | Phone           |
+--------------+--------------+---------+-----------------+
| Lauryn Leroy | ECON         | Unknown | [phone] |
+--------------+--------------+---------+-----------------+
 
 
 
 Care Team Providers
 
 
 
+------------------------+------+-----------------+
| Care Team Member Name  | Role | Phone           |
+------------------------+------+-----------------+
| Jean Bermudez MD | PCP  | [phone] |
+------------------------+------+-----------------+
 
 
 
 Reason for Referral
 Diagnostic Testing (Routine)
 
+--------+--------+-----------+--------------+--------------+--------------+
| Status | Reason | Specialty | Diagnoses /  | Referred By  | Referred To  |
|        |        |           | Procedures   | Contact      | Contact      |
+--------+--------+-----------+--------------+--------------+--------------+
| Closed |        | Radiology |   Diagnoses  |              |              |
|        |        |           |  Mitral      | Claudette,  |              |
|        |        |           | valve        | Jame PARRA MD  |              |
|        |        |           | insufficienc |  [address]     |              |
|        |        |           | y,           | [address]     |              |
|        |        |           | unspecified  | [place], [place] |              |
|        |        |           | etiology     |  [id number]  |              |
|        |        |           | Procedures   |  Phone:      |              |
|        |        |           | CTA CHEST -  | [phone] |              |
|        |        |           | GATED W      |   Fax:       |              |
|        |        |           | CONTRAST     | [phone] |              |
+--------+--------+-----------+--------------+--------------+--------------+
 
 
 
 
 Reason for Visit
 AUTH/CERT
 
+--------+--------+-----------+--------------+--------------+--------------+
| Status | Reason | Specialty | Diagnoses /  | Referred By  | Referred To  |
|        |        |           | Procedures   | Contact      | Contact      |
+--------+--------+-----------+--------------+--------------+--------------+
|        |        |           |              |              |              |
+--------+--------+-----------+--------------+--------------+--------------+
 
 
 
 
 Encounter Details
 
 
+--------+-----------+----------------------+----------------------+-------------+
| Date   | Type      | Department           | Care Team            | Description |
+--------+-----------+----------------------+----------------------+-------------+
| / | Hospital  |   [facility] [address]  |   Jame Wilkins  |             |
| 2018   | Encounter | Aiden PARRA MD  [address]  |             |
|        |           |  [address]  [facility]  | [address]  [place], [place]    |             |
|        |           |  [place], [place]        | [address]           |             |
|        |           | [address]           | [phone]         |             |
|        |           | [phone]         | [phone] (Fax)   |             |
+--------+-----------+----------------------+----------------------+-------------+
 
 
 
 
 Social History
 
 
+----------------+-------+-----------+--------+------+
| Tobacco Use    | Types | Packs/Day | Years  | Date |
|                |       |           | Used   |      |
+----------------+-------+-----------+--------+------+
| Never Assessed |       |           |        |      |
+----------------+-------+-----------+--------+------+
 
 
 
+------------------+---------------+
| Sex Assigned at  | Date Recorded |
| Birth            |               |
+------------------+---------------+
| Not on file      |               |
+------------------+---------------+
 
 
 
+----------------+-------------+-------------+
| Job Start Date | Occupation  | Industry    |
+----------------+-------------+-------------+
| Not on file    | Not on file | Not on file |
+----------------+-------------+-------------+
 
 
 
+----------------+--------------+------------+
| Travel History | Travel Start | Travel End |
+----------------+--------------+------------+
 
 
 
+-------------------------------------+
| No recent travel history available. |
+-------------------------------------+
 documented as of this encounter
 
 Last Filed Vital Signs
 
 
+-------------------+---------+----------------------+----------+
| Vital Sign        | Reading | Time Taken           | Comments |
+-------------------+---------+----------------------+----------+
| Blood Pressure    | 136/98  | 2018 11:50 AM  |          |
|                   |         | PDT                  |          |
+-------------------+---------+----------------------+----------+
| Pulse             | 66      | 2018 11:50 AM  |          |
|                   |         | PDT                  |          |
+-------------------+---------+----------------------+----------+
| Temperature       | -       | -                    |          |
+-------------------+---------+----------------------+----------+
| Respiratory Rate  | 16      | 2018 11:50 AM  |          |
|                   |         | PDT                  |          |
+-------------------+---------+----------------------+----------+
| Oxygen Saturation | 100%    | 2018 11:50 AM  |          |
|                   |         | PDT                  |          |
+-------------------+---------+----------------------+----------+
 
| Inhaled Oxygen    | -       | -                    |          |
| Concentration     |         |                      |          |
+-------------------+---------+----------------------+----------+
| Weight            | -       | -                    |          |
+-------------------+---------+----------------------+----------+
| Height            | -       | -                    |          |
+-------------------+---------+----------------------+----------+
| Body Mass Index   | -       | -                    |          |
+-------------------+---------+----------------------+----------+
 documented in this encounter
 
 Discharge Instructions
 Instructions Chelo Auguste RN - 2018Post-transesophageal Echocardiogram Pat
ient Discharge Instructions
 
 If you have undergone a transesophageal echocardiogram (JENIFFER), please follow these instructi
ons:
 
 Activity Limits
 ? You must have a responsible adult drive you or take you home. Do not drive for the remain
kassandra of the day.
 ? Rest and relax today. Have someone stay with you through the day.
 
  For the next 24 hours:
 ? Do not use any equipment that could injure you or others.
 ? Do not make any legal decisions.
 ? Do not drink any alcohol.
 
 Medicines and Follow Up
 ? Take your medicine as your doctor ordered.
 ? Follow up with the doctor that ordered this test
 
 Call your doctor or go to the emergency room if you have:
 ? Severe abdominal or chest pain
 ? Shortness of breath or heavy breathing
 ? Bleeding that will not stop
 
 How to Reach Us
 Call the Hospital  at [phone] and ask to have your doctor paged.
 
 documented in this encounter
 
 Medications at Time of Discharge
 
 
+----------------------+----------------------+-----------+---------+----------+----------+
| Medication           | Sig                  | Dispensed | Refills | Start    | End Date |
|                      |                      |           |         | Date     |          |
+----------------------+----------------------+-----------+---------+----------+----------+
|   acetaminophen 325  | Take by mouth.       |           | 0       |          |          |
| mg oral tablet       |                      |           |         |          |          |
+----------------------+----------------------+-----------+---------+----------+----------+
|   ascorbic acid      | Take 500 mg by mouth |           | 0       |          |          |
| (vitamin C) 500 mg   |  once daily.         |           |         |          |          |
| oral tablet          |                      |           |         |          |          |
+----------------------+----------------------+-----------+---------+----------+----------+
|   furosemide 40 mg   | Take by mouth.       |           | 0       |          |          |
| oral tablet          |                      |           |         |          |          |
+----------------------+----------------------+-----------+---------+----------+----------+
|                      | Take 2 tablets by    |           | 0       |          |          |
 
| gluc/chnd/om3/dha/ep | mouth once daily.    |           |         |          |          |
| a/fish/str           |                      |           |         |          |          |
| (GLUCOSAMINE         |                      |           |         |          |          |
| CHONDROITIN PLUS     |                      |           |         |          |          |
| ORAL)                |                      |           |         |          |          |
+----------------------+----------------------+-----------+---------+----------+----------+
|   levothyroxine 100  | Take by mouth.       |           | 0       |          |          |
| mcg oral tablet      |                      |           |         |          |          |
+----------------------+----------------------+-----------+---------+----------+----------+
|   losartan 100 mg    | Take 100 mg by mouth |           | 0       |          |          |
| oral tablet          |  once daily.         |           |         |          |          |
+----------------------+----------------------+-----------+---------+----------+----------+
|   MAGNESIUM CHLORIDE | Take by mouth.       |           | 0       |          |          |
|  ORAL                |                      |           |         |          |          |
+----------------------+----------------------+-----------+---------+----------+----------+
|   metFORMIN    | Take by mouth.       |           | 0       |          |          |
| mg oral tablet,ER    |                      |           |         |          |          |
| alexis.retention 24 hr |                      |           |         |          |          |
+----------------------+----------------------+-----------+---------+----------+----------+
|   metoprolol         | Take 100 mg by mouth |           | 0       | 20 |          |
| succinate 200 mg     |  once daily.         |           |         | 18       |          |
| oral tablet extended |                      |           |         |          |          |
|  release 24 hr       |                      |           |         |          |          |
+----------------------+----------------------+-----------+---------+----------+----------+
|   potassium chloride | Take by mouth.       |           | 0       |          |          |
|  SR 20 mEq oral      |                      |           |         |          |          |
| tablet,ER            |                      |           |         |          |          |
| particles/crystals   |                      |           |         |          |          |
+----------------------+----------------------+-----------+---------+----------+----------+
|   simvastatin 20 mg  | Take by mouth once   |           | 0       |          |          |
| oral tablet          | daily in the         |           |         |          |          |
|                      | evening.             |           |         |          |          |
+----------------------+----------------------+-----------+---------+----------+----------+
|   warfarin 5 mg oral | Take by mouth.       |           | 0       |          |          |
|  tablet              |                      |           |         |          |          |
+----------------------+----------------------+-----------+---------+----------+----------+
 documented as of this encounter
 
 Progress Kevin Maguire - 2018 11:13 AM PDTTransesophageal echocardiogram was completed. F
inal report to follow. 
 Electronically signed by Kevin Bermudez at 2018 11:13 AM PDTdocumented in this enc
ounter
 
 Plan of Treatment
 Not on filedocumented as of this encounter
 
 Procedures
 
 
+----------------------+--------+-------------+----------------------+----------------------
+
| Procedure Name       | Priori | Date/Time   | Associated Diagnosis | Comments             
|
|                      | ty     |             |                      |                      
|
+----------------------+--------+-------------+----------------------+----------------------
+
| CTA CHEST - GATED W  | Routin | 2018  |   Mitral valve       |   Results for this   
|
 
| CONTRAST             | e      |  2:35 PM    | insufficiency,       | procedure are in the 
|
|                      |        | PDT         | unspecified etiology |  results section.    
|
+----------------------+--------+-------------+----------------------+----------------------
+
| CREATININE, POC      | Routin | 2018  |   Mitral valve       |   Results for this   
|
|                      | e      |  2:02 PM    | insufficiency,       | procedure are in the 
|
|                      |        | PDT         | unspecified etiology |  results section.    
|
+----------------------+--------+-------------+----------------------+----------------------
+
| TRANSESOPHAGEAL      | Routin | 2018  |                      |   Results for this   
|
| ECHOCARDIOGRAM       | e      | 11:19 AM    |                      | procedure are in the 
|
|                      |        | PDT         |                      |  results section.    
|
+----------------------+--------+-------------+----------------------+----------------------
+
| INR (PT), POC        | Urgent | 2018  |                      |   Results for this   
|
|                      |        | 10:02 AM    |                      | procedure are in the 
|
|                      |        | PDT         |                      |  results section.    
|
+----------------------+--------+-------------+----------------------+----------------------
+
 documented in this encounter
 
 Results
 CTA CHEST - GATED W CONTRAST (2018  2:35 PM PDT)
 
+----------+
| Specimen |
+----------+
|          |
+----------+
 
 
 
+------------------------------------------------------------------------+-----------------+
| Narrative                                                              | Performed At    |
+------------------------------------------------------------------------+-----------------+
|   EXAM: CTA CHEST OTHER     HISTORY: Mitral valve disease,             |   [facility]          |
| anticipation of TMVR.     COMPARISON: None.     TECHNIQUE;  Following  | RADIOLOGY VOICE |
| uneventful administration of intravenous contrast, cardiac gated       |  RECOGNITION 2  |
| helical scanning was obtained of the chest and reviewed in soft tissue |                 |
|  and lung algorithm. 3D reformatted images were generated at an        |                 |
| independent workstation and also reviewed.     CONTRAST: IOHEXOL 300   |                 |
| MG IODINE/ML INTRAVENOUS SOLUTION 90 mL     FINDINGS:     CARDIAC      |                 |
| MORPHOLOGY:  The aortic arch, cardiac apex and gastric lumen are on    |                 |
| the left.        Mitral valve area: 9.3 cm2  Perimeter: 10.7 mm  TT:   |                 |
| 27 mm  SL: 34mm     CHEST:  The heart is enlarged with preferential    |                 |
| enlargement of both atria. There is extensive mitral annular           |                 |
| calcifications. Minimal mitral aortic valvular and coronary arterial   |                 |
| calcifications are also noted. There is no pericardial or pleural      |                 |
| effusion. There is no pneumothorax.     There are no suspicious        |                 |
 
| mediastinal, or axillary lymph nodes.     The lungs are clear without  |                 |
| suspicious pulmonary nodules, masses or consolidative opacities.       |                 |
| Minimal bibasilar linear atelectasis/scarring is noted.     The spleen |                 |
|  is lobulated and diminutive in size. Rim calcified right superior     |                 |
| renal pole 5 cm cyst is not fully evaluated.     Remaining visualized  |                 |
| upper abdominal organs are unremarkable.     There is no suspicious    |                 |
| focal osseous abnormality.     IMPRESSION:  Extensive mitral annular   |                 |
| calcifications.     Mitral valvular measurements as above.        I    |                 |
| have personally reviewed the images and, if necessary, edited the      |                 |
| report. I agree with the report as now presented.      Final           |                 |
| signature: Henrietta Whitehead MD    2018 4:05 PM   Preliminary:       |                 |
| Henrietta Whitehead MD        Dictation initiated: Henrietta Whitehead MD        |                 |
| 2018 3:01 PM                                                      |                 |
+------------------------------------------------------------------------+-----------------+
 
 
 
+-------------------------------------------------------------------------------------------
--------------------------------------------------------------------------------------------
-------------------------------+
| Procedure Note                                                                            
                                                                                            
                               |
+-------------------------------------------------------------------------------------------
--------------------------------------------------------------------------------------------
-------------------------------+
|   Service Account, Radiant Res In Interface - 2018  4:06 PM PDT  EXAM: CTA CHEST    
                                                                                            
                               |
| OTHER HISTORY: Mitral valve disease, anticipation of TMVR. COMPARISON: None.              
                                                                                            
                               |
| TECHNIQUE;Following uneventful administration of intravenous contrast, cardiac gated      
                                                                                            
                               |
| helical scanning was obtained of the chest and reviewed in soft tissue and lung           
                                                                                            
                               |
| algorithm. 3D reformatted images were generated at an independent workstation and also    
                                                                                            
                               |
| reviewed. CONTRAST: IOHEXOL 300 MG IODINE/ML INTRAVENOUS SOLUTION 90 mL FINDINGS:         
                                                                                            
                               |
| CARDIAC MORPHOLOGY:The aortic arch, cardiac apex and gastric lumen are on the left.       
                                                                                            
                               |
| Mitral valve area: 9.3 ng7Mtimrnqli: 10.7 mmTT: 27 mmSL: 34mm CHEST:The heart is          
                                                                                            
                               |
| enlarged with preferential enlargement of both atria. There is extensive mitral annular   
                                                                                            
                               |
| calcifications. Minimal mitral aortic valvular and coronary arterial calcifications are   
                                                                                            
                               |
| also noted. There is no pericardial or pleural effusion. There is no pneumothorax. There  
                                                                                            
                               |
|  are no suspicious mediastinal, or axillary lymph nodes. The lungs are clear without      
 
                                                                                            
                               |
| suspicious pulmonary nodules, masses or consolidative opacities. Minimal bibasilar        
                                                                                            
                               |
| linear atelectasis/scarring is noted. The spleen is lobulated and diminutive in size.     
                                                                                            
                               |
| Rim calcified right superior renal pole 5 cm cyst is not fully evaluated. Remaining       
                                                                                            
                               |
| visualized upper abdominal organs are unremarkable. There is no suspicious focal osseous  
                                                                                            
                               |
|  abnormality. IMPRESSION:Extensive mitral annular calcifications. Mitral valvular         
                                                                                            
                               |
| measurements as above.  I have personally reviewed the images and, if necessary, edited   
                                                                                            
                               |
| the report. I agree with the report as now presented.  Final signature: Virginie Ochoa MD   2018 4:05 PM Preliminary: Henrietta Whitehead MD    Dictation initiated: Henrietta Whitehead MD   2018 3:01 PM                                                              
                                                                                            
                               |
|CHEST:                                                                                     
                                                                                            
                              |
|The heart is enlarged with preferential enlargement of both atria. There is extensive rebecca
l annular calcifications. Minimal mitral aortic valvular and coronary arterial calcification
s are also noted. There is no  |
|pericardial or pleural effusion. There is no pneumothorax.                                 
                                                                                            
                               |
|                                                                                           
                                                                                            
                               |
|There are no suspicious mediastinal, or axillary lymph nodes.                              
                                                                                            
                               |
|                                                                                           
                                                                                            
                               |
|The lungs are clear without suspicious pulmonary nodules, masses or consolidative opacities
. Minimal bibasilar linear atelectasis/scarring is noted.                                   
                               |
|                                                                                           
                                                                                            
                               |
|The spleen is lobulated and diminutive in size. Rim calcified right superior renal pole 5 c
m cyst is not fully evaluated.                                                              
                               |
|                                                                                           
                                                                                            
                               |
|Remaining visualized upper abdominal organs are unremarkable.                              
 
                                                                                            
                               |
|                                                                                           
                                                                                            
                               |
|There is no suspicious focal osseous abnormality.                                          
                                                                                            
                               |
|                                                                                           
                                                                                            
                               |
|IMPRESSION:                                                                                
                                                                                            
                              |
|Extensive mitral annular calcifications.                                                   
                                                                                            
                               |
|                                                                                           
                                                                                            
                               |
|Mitral valvular measurements as above.                                                     
                                                                                            
                               |
|                                                                                           
                                                                                            
                               |
|                                                                                           
                                                                                            
                               |
|I have personally reviewed the images and, if necessary, edited the report. I agree with th
e report as now presented.                                                                  
                               |
|                                                                                           
                                                                                            
                               |
|Final signature: Henrietta Whitehead MD   2018 4:05 PM                                     
                                                                                            
                               |
|Preliminary: Henrietta Whitehead MD                                                             
                                                                                            
                               |
|Dictation initiated: Henrietta Whitehead MD   2018 3:01 PM                                 
                                                                                            
                               |
+-------------------------------------------------------------------------------------------
--------------------------------------------------------------------------------------------
-------------------------------+
 
 
 
+---------------------+---------+--------------------+--------------+
| Performing          | Address | City/State/Zipcode | Phone Number |
| Organization        |         |                    |              |
+---------------------+---------+--------------------+--------------+
|   [facility]    |         |                    |              |
| VOICE RECOGNITION 2 |         |                    |              |
+---------------------+---------+--------------------+--------------+
 CREATININE, POC (2018  2:02 PM PDT)
 
+-------------+-------+-----------------+-------------+--------------+
 
| Component   | Value | Ref Range       | Performed   | Pathologist  |
|             |       |                 | At          | Signature    |
+-------------+-------+-----------------+-------------+--------------+
| CREATININE, | 0.7   | 0.6 - 1.1 mg/dL | [facility]      |              |
|  POC        |       |                 | [facility]     |              |
|             |       |                 | [facility] |              |
|             |       |                 |  [facility]    |              |
|             |       |                 | TESTS       |              |
+-------------+-------+-----------------+-------------+--------------+
 
 
 
+----------------+
| Specimen       |
+----------------+
| Blood - Blood  |
| (substance)    |
+----------------+
 
 
 
+----------------------+-------------------------+--------------------+--------------+
| Performing           | Address                 | City/State/Zipcode | Phone Number |
| Organization         |                         |                    |              |
+----------------------+-------------------------+--------------------+--------------+
|   [facility]     |   [address]  | [place], [place]       |              |
| [facility]  | [address]               | [phone]         |              |
| TESTS                |                         |                    |              |
+----------------------+-------------------------+--------------------+--------------+
 TRANSESOPHAGEAL ECHOCARDIOGRAM (2018 11:19 AM PDT)
 
+------------------------------------------------------------------------+--------------+
| Narrative                                                              | Performed At |
+------------------------------------------------------------------------+--------------+
|   Negrita Daniels MD,[facility]       2018 11:20 AM  Cardiology          |              |
| Preliminary Procedure Note (Full report to follow)     Primary Care    |              |
| Provider: Jean Bermudez MD  Referring Provider: Jame Wilkins  |              |
| MD     Echo Lab Staff:   Jame Wilkins MD (attending)  NEGRITA PARRA     |              |
| MD JUSTIN,[facility] (fellow)     Procedure(s):  Transesophageal             |              |
| Echocardiogram     Indications:  mitral regurgitation     Consent:     |              |
| After full PARQ, signed consent was obtained.      Description:  Time  |              |
| out performed at the bedside. Oropharynx examined   (Mallampati II)    |              |
| and anesthetized with 5 ml of 2% viscous   lidocaine and 3 ml of 4%    |              |
| aerosolized lidocaine. Bite block   placed. Sedation per anesthesia    |              |
| with propofol. The probe was   passed without difficulty. No abnormal  |              |
| hemodynamic, arrythmic or   hypoxemic events noted during the          |              |
| procedure. Estimated blood loss   was 0 mL.  ?     Findings: Severe    |              |
| MR, moderate TR. Full report to follow.  ?      Complications: None.   |              |
|     Negrita Daniels MD, MPH  Fellow (PGY-6), Cardiovascular Medicine  |              |
|  Pager #[phone]                                                          |              |
+------------------------------------------------------------------------+--------------+
 INR (PT), POC (2018 10:02 AM PDT)
 
+-------------+---------+---------------+-------------+--------------+
| Component   | Value   | Ref Range     | Performed   | Pathologist  |
|             |         |               | At          | Signature    |
+-------------+---------+---------------+-------------+--------------+
| PROTHROMBIN | 2.6 (H) | 0.9 - 1.2 INR | [facility]      |              |
|  TIME       |         |               | [facility]     |              |
| (INR), POC  |         |               | [facility] |              |
 
|             |         |               |  [facility]    |              |
|             |         |               | TESTS       |              |
+-------------+---------+---------------+-------------+--------------+
 
 
 
+----------------+
| Specimen       |
+----------------+
| Blood - Blood  |
| (substance)    |
+----------------+
 
 
 
+----------------------+-------------------------+--------------------+--------------+
| Performing           | Address                 | City/State/Zipcode | Phone Number |
| Organization         |                         |                    |              |
+----------------------+-------------------------+--------------------+--------------+
|   [facility]     |   [address]  | [place], [place]       |              |
| [facility]  | [address]               | [id number]         |              |
| TESTS                |                         |                    |              |
+----------------------+-------------------------+--------------------+--------------+
 documented in this encounter
 
 Visit Diagnoses
 
 
+----------------------------------------------------+
| Diagnosis                                          |
+----------------------------------------------------+
|   Mitral valve insufficiency, unspecified etiology |
+----------------------------------------------------+
 documented in this encounter
 
 Administered Medications
 
 
+------------------+--------+---------+------+------+------+
| Medication Order | MAR    | Action  | Dose | Rate | Site |
|                  | Action | Date    |      |      |      |
+------------------+--------+---------+------+------+------+
 
 
 
+-----------------------------------+---+
|   fentaNYL (SUBLIMAZE) injection  |   |
| 25 mcg  25 mcg, intravenous,      |   |
| PROCEDURE PRN, 8 doses, Starting  |   |
| u 18 at 0911, Until Thu    |   |
| 18 at 1805, until RASS of -3 |   |
|  achieved                         |   |
+-----------------------------------+---+
|                                   |   |
+-----------------------------------+---+
|   flumazenil (ROMAZICON)          |   |
| injection 0.2 mg  0.2 mg,         |   |
| intravenous, PROCEDURE PRN,       |   |
| Starting Thu 18 at 0911,     |   |
| Until u 18 at 1805,        |   |
 
| reversal of conscious sedation    |   |
| and general anesthesia            |   |
+-----------------------------------+---+
|                                   |   |
+-----------------------------------+---+
 
 
 
+---------------------------------+---------+----------+-------+---+---+
|   iohexol (OMNIPAQUE) 300 mg    | IV Push | 20 | 90 mL |   |   |
| iodine/mL 100 mL  100 mL,       |         | 18  2:45 |       |   |   |
| intravenous, PROCEDURE ONCE, 1  |         |  PM PDT  |       |   |   |
| dose, Thu 18 at 1445       |         |          |       |   |   |
+---------------------------------+---------+----------+-------+---+---+
 
 
 
+-----------------------------------+---+
|                                   |   |
+-----------------------------------+---+
|   midazolam (PF) (VERSED)         |   |
| injection 1 mg  1 mg,             |   |
| intravenous, PROCEDURE PRN, 10    |   |
| doses, Starting Thu 18 at    |   |
| 0911, Until u 18 at 1805,  |   |
| until RASS of -3 achieved.        |   |
+-----------------------------------+---+
|                                   |   |
+-----------------------------------+---+
 documented in this encounter

## 2020-01-01 NOTE — NUR
VITALS I&OS AND BLOOD SUGAR DONE AND CHARTED. INFORMED ANNE NEWBY OF BLOOD
SUGAR. FRESH WATER GIVEN. BEDSIDE TABLE AND CALL LIGHT IN REACH.

## 2020-01-01 NOTE — XMS
Encounter Summary
  Created on: 2020
 
 Vonnie Celaya
 External Reference #: [id number]
 : 32
 Sex: Female
 
 Demographics
 
 
+-----------------------+---------------------------+
| Address               | [address]            |
|                       | [place]  [address] |
+-----------------------+---------------------------+
| Home Phone            | [phone]           |
+-----------------------+---------------------------+
| Preferred Language    | Unknown                   |
+-----------------------+---------------------------+
| Marital Status        |                    |
+-----------------------+---------------------------+
| Jew Affiliation | Unknown                   |
+-----------------------+---------------------------+
| Race                  | Unknown                   |
+-----------------------+---------------------------+
| Ethnic Group          | Unknown                   |
+-----------------------+---------------------------+
 
 
 Author
 
 
+--------------+--------------------------------------------+
| Author       | [facility]  |
|              | and [place]                                |
+--------------+--------------------------------------------+
| Organization | [facility]  |
|              | and [place]                                |
+--------------+--------------------------------------------+
| Address      | Unknown                                    |
+--------------+--------------------------------------------+
| Phone        | Unavailable                                |
+--------------+--------------------------------------------+
 
 
 
 Support
 
 
+--------------+--------------+---------+-----------------+
| Name         | Relationship | Address | Phone           |
+--------------+--------------+---------+-----------------+
| Lauryn Leroy | ECON         | Unknown | [phone] |
+--------------+--------------+---------+-----------------+
 
 
 
 Care Team Providers
 
 
 
+------------------------+------+-----------------+
| Care Team Member Name  | Role | Phone           |
+------------------------+------+-----------------+
| Jean Bermudez MD | PCP  | [phone] |
+------------------------+------+-----------------+
 
 
 
 Reason for Visit
 
 
+--------------------+----------+
| Reason             | Comments |
+--------------------+----------+
| Follow-up, Office  | 4 month  |
| Visit              |          |
+--------------------+----------+
 
 
 
 Encounter Details
 
 
+--------+---------+----------------------+----------------------+----------------------+
| Date   | Type    | Department           | Care Team            | Description          |
+--------+---------+----------------------+----------------------+----------------------+
| / | Office  |   [facility]      |   Bre Justice    | Paroxysmal atrial    |
| 2019   | Visit   | CARDIOLOGY [facility] | [address]      | fibrillation (HCC)   |
|        |         |   [address]    | [address]   | (Primary Dx);        |
|        |         | [address]          | [place] [address]   | Coronary artery      |
|        |         | [place]        | [phone]         | disease of native    |
|        |         | [address]           | [phone] (Fax)   | artery of native     |
|        |         | [phone]         |                      | heart with stable    |
|        |         |                      |                      | angina pectoris      |
|        |         |                      |                      | (HCC); Status post   |
|        |         |                      |                      | insertion of         |
|        |         |                      |                      | drug-eluting stent   |
|        |         |                      |                      | into right coronary  |
|        |         |                      |                      | artery for coronary  |
|        |         |                      |                      | artery disease;      |
|        |         |                      |                      | Essential            |
|        |         |                      |                      | hypertension; Severe |
|        |         |                      |                      |  mitral              |
|        |         |                      |                      | regurgitation;       |
|        |         |                      |                      | Severe tricuspid     |
|        |         |                      |                      | regurgitation;       |
|        |         |                      |                      | Chronic diastolic    |
|        |         |                      |                      | congestive heart     |
|        |         |                      |                      | failure (HCC);       |
|        |         |                      |                      | Pulmonary            |
|        |         |                      |                      | hypertension,        |
|        |         |                      |                      | moderate to severe   |
|        |         |                      |                      | (HCC); Mixed         |
|        |         |                      |                      | hyperlipidemia;      |
|        |         |                      |                      | Stage 3 chronic      |
|        |         |                      |                      | kidney disease (HCC) |
+--------+---------+----------------------+----------------------+----------------------+
 
 
 
 
 Social History
 
 
+--------------+-------+-----------+--------+------+
| Tobacco Use  | Types | Packs/Day | Years  | Date |
|              |       |           | Used   |      |
+--------------+-------+-----------+--------+------+
| Never Smoker |       |           |        |      |
+--------------+-------+-----------+--------+------+
 
 
 
+---------------------+---+---+---+
| Smokeless Tobacco:  |   |   |   |
| Never Used          |   |   |   |
+---------------------+---+---+---+
 
 
 
+---------------+-------------+---------+----------+
| Alcohol Use   | Drinks/Week | oz/Week | Comments |
+---------------+-------------+---------+----------+
| Not Currently |             |         |          |
+---------------+-------------+---------+----------+
 
 
 
+------------------+---------------+
| Sex Assigned at  | Date Recorded |
| Birth            |               |
+------------------+---------------+
| Not on file      |               |
+------------------+---------------+
 
 
 
+----------------+-------------+-------------+
| Job Start Date | Occupation  | Industry    |
+----------------+-------------+-------------+
| Not on file    | Not on file | Not on file |
+----------------+-------------+-------------+
 
 
 
+----------------+--------------+------------+
| Travel History | Travel Start | Travel End |
+----------------+--------------+------------+
 
 
 
+-------------------------------------+
| No recent travel history available. |
+-------------------------------------+
 documented as of this encounter
 
 Last Filed Vital Signs
 
 
 
+-------------------+----------------------+----------------------+----------+
| Vital Sign        | Reading              | Time Taken           | Comments |
+-------------------+----------------------+----------------------+----------+
| Blood Pressure    | 114/52               | 2019 12:25 PM  |          |
|                   |                      | PDT                  |          |
+-------------------+----------------------+----------------------+----------+
| Pulse             | 75                   | 2019 12:25 PM  |          |
|                   |                      | PDT                  |          |
+-------------------+----------------------+----------------------+----------+
| Temperature       | -                    | -                    |          |
+-------------------+----------------------+----------------------+----------+
| Respiratory Rate  | -                    | -                    |          |
+-------------------+----------------------+----------------------+----------+
| Oxygen Saturation | 94%                  | 2019 12:25 PM  |          |
|                   |                      | PDT                  |          |
+-------------------+----------------------+----------------------+----------+
| Inhaled Oxygen    | -                    | -                    |          |
| Concentration     |                      |                      |          |
+-------------------+----------------------+----------------------+----------+
| Weight            | 65.3 kg (143 lb 14.4 | 2019 12:25 PM  |          |
|                   |  oz)                 | PDT                  |          |
+-------------------+----------------------+----------------------+----------+
| Height            | 160 cm (5' 3")       | 2019 12:25 PM  |          |
|                   |                      | PDT                  |          |
+-------------------+----------------------+----------------------+----------+
| Body Mass Index   | 25.49                | 2019 12:25 PM  |          |
|                   |                      | PDT                  |          |
+-------------------+----------------------+----------------------+----------+
 documented in this encounter
 
 Patient Instructions
 Patient Instructions Bre Justice BethBRIAN - 2019 12:30 PM PDT
 I made no changes to medications today, but if blood pressure gets too low, or if you becom
e dizzy, may need to decrease losartan .
 
 You seem very stable today, and can let me know if you want to do Echo next time 
 
 See me back in 6 months , see Dr. Mitchell in 1 year
 
 Electronically signed by BRIAN Taylor at 2019  1:23 PM PDT
 documented in this encounter
 
 Progress Notes
 AparnatorstenBreCherriBRIAN - 2019 12:30 PM PDTFormatting of this note might be differe
nt from the original.
 Date of visit: 2019
 Primary Care Physician: Jean Bermudez MD
 
 CHIEF COMPLAINT:  
 Chief Complaint 
 Patient presents with 
   Follow-up, Office Visit 
   4 month 
 
 HISTORY OF PRESENT ILLNESS:
    Ms. Vonnie Celaya   is a 87 year old woman who is here today for 4 month follow up. 
 She is accompanied today by her daughter Lauryn, who contributed to history, and who lives n
ext door to her in shared duplex.
  She  is a  patient of    and last seen by him 2019 .
  Today,  I  reviewed  all previous documentation available to me in electronic medical sally
 
rd and from  external sources.
    She has a history of severe valvular heart disease with  severe MR moderate to severe TR
, and mild aortic stenosis, which has contributed to chronic diastolic heart failure, chroni
c atrial fibrillation, coronary artery disease with a stent placed in right PAMELA 10/2018, sev
ere pulmonary hypertension, hypertension, hyperlipidemia, small secundum ASD with minor left
-to-right shunting, type 2 diabetes,and two   TIAs  and 
    Dr. Mitchell had noted at age 86 she was not a good candidate for open heart surgery, as she
 also had some mild memory deficits which were progressing.  He reported she had an extensiv
e evaluation at [facility] in the latter part of , and was ultimately felt not to be a good ca
ndidate for a mitral clip due to severe calcification of the mitral annulus, and not a good 
candidate for an off label TAVR valve in the mitral position.  
   He noted she should be on aspirin for life,  Plavix could be stopped at the end of her cu
rrent prescription and she should be back on warfarin for stroke prevention for atrial fibri
llation.
   He reported she previously been hospitalized from  until 2019 for d
ecompensated diastolic heart failure, that  she had ongoing dyspnea with exertion, occasiona
l PND but no orthopnea and no pedal edema and her weight had been stable and her blood press
ure well controlled and he made no changes to her medication.
  Her current and  previous testing and procedures are  detailed below .
   She reports today that she continues to have dyspnea with more extreme exertion, but over
all feels that it is stable since last seen, she is occasionally lightheaded, but it is very
 transient, her lower extremity swelling has been controlled with compression socks, and she
 has no edema today, and she has some mild ongoing fatigue.
 She denies any chest pain, palpitations, or syncope.  She also denies any signs or symptoms
 of stroke or TIA, or any emergency room admissions or hospitalizations since last seen.
    She recently saw her PCP, Dr. Bermudez , and he has started her on iron for 
anemia, and she had recent labs with him as well.
 She brought her medications to the clinic today, and personally reviewed by me.
 Ports she continues to be monitored by the [facility], and that her INRs
 have been therapeutic, and denies any bleeding.
   She is a lifelong non-smoker, does not drink alcohol and denies any use of recreational o
r illicit drug.
 Reports she is mostly independent in her activities of daily living, but does have some sit
uational depression related to her loss of ability to mobilize without a cane, and needing m
ore help from her daughter.
 
 REVIEW OF SYSTEMS:
 Negative except for pertinent items noted in HPI.
 
 Constitutional: Reports mild ongoing fatigue.  Denies unexplained weight loss.  Appetite is
 good.  Weight is stable.    Denies night sweats fevers or chills
 HENT: Denies nosebleeds.  Bilateral hearing loss.  Denies dysphagia
 Eyes: Early cataracts denies visual disturbance or double vision.  
 Respiratory/Sleep::  GARCIA more extreme exertion, not very active.  Occasional PND. Denies co
ugh. Denies hemoptysis or excessive sputum production.  Reports mild snoring, denies orthopn
ea.
 Cardiovascular: Lower extremity edema controlled with compression sock. Denies  chest pain,
 palpitations. Denies history of rheumatic fever. Denies claudication . 
 Gastrointestinal: Denies GERD. Denies PUD .  Denies  nausea, vomiting, abdominal pain and b
lood in stool. 
 Genitourinary:Chronic  Kidney disease stage III denies  hematuria.  
 Musculoskeletal: Osteoporosis, osteoarthritis.Denies myalgias.  Ambulates  with cane. 
 Skin: Eczema denies  color change.  Denies rash .
 Neurological: Short-term memory deficits.  two  TIA's:  , and  Denies history of st
roke.Denies history of seizures. Denies dizziness, syncope and numbness. 
 Hematological/Oncology .  History of iron deficiency anemia.   Denies previous blood transf
usions. bruises easily. Denies bleeding   Denies history of cancer
 Endocrine: Type II  diabetes.  Hypothyroidism.  Denies excessive thirst or hunger.  
 Psychiatric/Behavioral: Anxiety, denies previous history  of depression or  other psychiatr
ic illness.
 
 Vaccines: Current on 2018 flu vaccine.  Current on post 65  pneumonia vaccine.
 Habits/Social : Denies history of smoking.  Denies EtOH use. Drinks 2 servings of caffeine 
daily .  Denies recreational or  illicit drug use.  Exercises seldom due to joint and back p
aditya . Lives in [place], shares duplex with daughter Lauryn, independent in ADL.  .
 
 Outpatient Medications Prior to Visit 
 Medication Sig Dispense Refill 
   acetaminophen (TYLENOL) 325 mg tablet Take 650 mg by mouth every 6 (six) hours as neede
d for Pain. (Patient taking differently: Take 500 mg by mouth every 6 hours as needed (Pain)
.)   
   ascorbic acid (VITAMIN C) 1000 MG tablet Take 1,000 mg by mouth daily.   
   aspirin 81 MG tablet Take 1 tablet by mouth daily. Start 10/22/18 30 tablet 11 
   Cranberry Juice Extract 1000 MG CAPS Take 1 capsule by mouth daily.   
   furosemide (LASIX) 40 mg tablet Take 40 mg by mouth daily. (Patient taking differently:
 Take 40 mg by mouth 2 times daily.)   
   Gluc-Chonn-MSM-Boswellia-Vit D (GLUCOSAMINE CHOND TRIPLE/VIT D) TABS Take 1 tablet by m
outh daily. (Patient not taking: Reported on 2019)   
   IRON PO Take 50 mg by mouth 2 times daily. Every other day ,   
   levothyroxine (SYNTHROID) 100 mcg tablet Take 100 mcg by mouth every morning before kavita
akfast.   
   losartan (COZAAR) 100 MG tablet Take 100 mg by mouth daily.   
   magnesium chloride (MAG64) 64 mg EC tablet Take 1 tablet by mouth daily.   
   metFORMIN (GLUMETZA) 500 MG 24 hr tablet Take 500 mg by mouth 2 (two) times daily with 
meals.   
   metoprolol succinate (TOPROL-XL) 200 mg ER tablet Take 100 mg by mouth every morning. (
Patient not taking: Reported on 2019)   
   metoprolol succinate (TOPROL-XL) 50 mg 24 hr tablet Take 150 mg by mouth Daily. Take th
ree tablets by mouth daily at bedtime    
   potassium chloride (KLOR-CON M20) 20 mEq ER tablet Take 20 mEq by mouth daily.   
   simvastatin (ZOCOR) 20 mg tablet Take 10 mg by mouth nightly.   
   Turmeric Curcumin 500 MG CAPS Take  by mouth.   
   warfarin (COUMADIN) 5 mg tablet Take 5 mg by mouth daily. 1 pill daily Sun-Mon-Wed-Fri-
Sat, 1/2 pill (2.5 mg) Tue-Thur   
 
 No facility-administered medications prior to visit.  
 
 PHYSICAL EXAM:
 Wt Readings from Last 3 Encounters: 
 19 65.3 kg (143 lb 14.4 oz) 
 10/25/18 66.7 kg (147 lb 0.8 oz) 
 18 66 kg (145 lb 6.4 oz) 
 
 Temp Readings from Last 3 Encounters: 
 10/25/18 36.6 C (97.9 F) 
 
 BP Readings from Last 3 Encounters: 
 19 114/52 
 10/25/18 123/59 
 18 112/54 
 
 Pulse Readings from Last 3 Encounters: 
 19 75 
 10/25/18 70 
 18 95 
 
 Vital signs:2019:    Wt: 147  Lbs.  BP:126/66    . HR. 65
 
 GENERAL:  Well developed, well nourished, in no distress.  Appears approximately stated age
.
 HEENT:  Normocephalic, atraumatic.  
 
 EYES:     PERRL, EOM normal.
 MOUTH: Oral mucosae moist, dentition adequate, no lesions noted
 NECK:    No JVD, lymphadenopathy, thyromegaly, bruits.  Carotid pulses are 2+ bilaterally
 LUNGS/CHEST:  Clear bilaterally, with no rales, rhonchi or wheezing noted, respirations unl
abored
 HEART:  Nondisplaced PMI, irregularly irregular rhythm with controlled rate, S1, S2 normal.
Murmur RUSB 3/6 .2/6 diastolic murmur LLB towards apex.  No  rubs or gallops noted.
 ABDOMEN:  Soft, nontender, no organomegaly, masses or bruits.  Bowel sounds are normal in a
ll 4 quadrants.  The abdominal aortic pulsation is not palpable.
 EXTREMITIES:  No edema. Radial pulses 2+ bilaterally.  Femoral pulses are 2+ bilaterally wi
thout bruits.  DP and PT pulses are 2+ bilaterally.  No clubbing.
 SKIN:  Warm and dry, capillary refill is normal, no lesions.
 NEUROLOGIC:  Awake, alert and oriented x 3. No focal motor or sensory deficits. 
 PSYCHIATRIC:  Appropriate, affect appears normal
 
 DATA:
 Blood tests:
 Lab Results 
 Component Value Date 
  WBC 9.88 10/25/2018 
  RBC 3.67 (L) 10/25/2018 
  HGB 10.9 (L) 10/25/2018 
 
 Lab Results 
 Component Value Date 
   10/25/2018 
  K 4.6 10/25/2018 
   10/25/2018 
  CO2 19 (L) 10/25/2018 
  ANIONGAP 16 10/25/2018 
  GLUF 147 (H) 10/25/2018 
  BUN 21 10/25/2018 
  BCR 23 10/25/2018 
  EGFR 59 (L) 10/25/2018 
 
 Lab Results 
 Component Value Date 
  GLUF 147 (H) 10/25/2018 
 
 No results found for: BNP, TSH, CRP
 No results found for: TOTEPI
 
 CARDIAC PROCEDURES/IMAGING
  PCI (10/24/18): 3.0 x 16 mm Synergy MARA in right Posterolateral artery, complicated by a s
mall branch perforation.
 
  Right/Left Cardiac Cath (18 - [facility]): RA-5, PA-mean 25, 41/13, PCWP-16, v wave-33, CO-
3.4, CI-1.93, PA sat-66%, PVI-2.64 wood units.  LM-distal 20%, LAD-mid 20-30%, LCx-distal ta
ndem 30-40% stenoses, dominant RCA-prox 30-40%, distal calcified 99%
 
 ECHO
 Limited Echo (10/25/18): EF 65-70%, no pericardial effusion
 
  
 Echo: 2018: [facility]: EF 70-75%.  LV normal in size.  Normal wall motion.  Severe biatrial
 enlargement.  RV normal in size thickness and function.  Aortic valve trileaflet, moderatel
y calcified, no AI, left coronary cusp immobile.  Serum MAC.  Mean transmitral gradient 5.5 
mmHg at heart rate of 77 bpm.  Severe MR.  Mitral valve affected regurgitant orifice area is
 7 mm.  Tricuspid valve normal, mild TR.  RVSP upper limits of normal at 39.9 mmHg, mild p
ulmonary hypertension.  Trace PI.  IVC WNL.  No pericardial effusion
 
  
 JENIFFER 2018: [facility]: LV hyperdynamic.  EF greater than 75%.  RV normal in size thickness an
d function.  Severe MAC.  Large positive calcium at the posterolateral atrial aspect of the 
mitral annulus that partially disrupts the normal mitral annular shape and size.  Mitral emely
ular area 8.26 cm with an AP diameter of 3.76 cm, and a cc diameter of 2.52 cm.  No signif
icant mitral stenosis.  Mean mitral gradient is approximately 4 mmHg at a heart rate of 83 b
pm.  Severe MR.  EROA 0.47 cm squares regurgitant volume 67 mm, calculated regurgitant fract
ion is 52%.  Mitral valve anterior leaflet measures 2 cm.  Subvalvular and papillary muscles
 had calcification noted as well.  It is some of severe MR is leaflet and annular calcificat
ion and degeneration with poor coaptation cross all portions of the valve.  Aortic valve cur
rently sclerotic and restricted no stenosis.  Severely enlarged left atrium.  Small fenestra
raymundo secundum ASD with intermittent left-to-right flow.  Moderate TR.  Small plaque involving
 the ascending and transverse aorta.  Left atrial appendage is well visualized and no thromb
us.
 
 6-minute walk test: 2018: [facility]:Gait speed (time in seconds first 15 feet walked): 4 P
atient walked: 720 feet in 6 minutes
 Beginning heart rate: 78  Beginning blood pressure: 144/70 Beginning Sp02: 99% Beginning dy
spnea index (Franchesca scale) : 0
 Ending heart rate: 90 Ending blood pressure: 157/60 Ending Sp02: 98% Ending dyspnea index (
Franchesca scale): 4
 Oxygen level: 
 Observations: No level of distress noted, patient tolerated test fairly well
 Physician interpretation:High risk 6 minute walk test with moderate reported dyspnea (b
org scale 4) , no desaturations, and inability to walk > 900 ft.
 
 EKG/EVENT MONITOR 
 EK2018: Atrial fibrillation, old anterior infarct, low voltage QRS to limb leads, a
nd anterior leads.  Rate 60 bpm,  ms,  ms, tracing personally reviewed by me.
 
 EK2019: Atrial fibrillation, old septal infarct, improved QRS voltage to leads II, 
III and aVF.  Rate 81 bpm,  ms,  ms, tracing personally reviewed by me
 
 LABS
 Labs: 10/24/2018: BMP: Sodium 136, potassium 4.2, chloride 102, glucose 119, BUN 20, creati
nine 0.91, GFR 59.  CBC: WBC 7.58, RBC 4.06, hemoglobin 11.9, hematocrit 35.7, platelets 285
 
 labs: 2019: CBC: WBC 10.6, RBC 4.72, hemoglobin 13, hematocrit 40, platelets 377
 
 Labs: : 5/15/2019: Lipids: ( simvastatin 10 mg) cholesterol 150, triglycerides 135, HDL 55,
 LDL 68 CMP: Sodium 133, potassium 4.1, chloride 100, glucose 129, BUN 32, creatinine 0.96, 
GFR 55, AST 22, ALT 18, alk phos 54, total bili 0.5, albumin 4.3.  Hgb A1c: 6.4 (137).  Thyr
oid: TSH 1.81 CBC: WBC 6.6, RBC 3.99, hemoglobin 11, hematocrit 33.8, platelets 279
 
 ASSESSMENT & PLAN:
   She  was is here today with her daughter Lauryn for 4-month follow-up.
   She  has problems as detailed below.
 Overall she seems quite stable today EKG performed in the clinic showing atrial fibrillatio
n with controlled ventricular response, and she seems stable since last seen by Dr. Mitchell.
    Labs performed in May by her PCP are detailed above, and show that her lipids are well c
ontrolled on low-dose simvastatin 10 mg, and her CMP is normal with stable GFR in the mid 50
's, and her thyroid function is normal, but some mild anemia and her CBC, and her hemoglobin
 A1c was 6.4
    Her blood pressure is a little lower than when previously seen, and I discussed with her
 and her daughter that if it continued to trend down, or if she became lightheaded or dizzy,
 that her losartan dose may need to be decreased.  Today though she is asymptomatic.
    I made no changes to her cardiac medications today, and she should continue aspirin 81 m
g daily, furosemide 40 mg twice daily, losartan 100 mg daily, metoprolol succinate 150 mg ni
ghtly, simvastatin 10 mg nightly, and warfarin for target INR of 2-3, which is regulated by 
the [facility] .
 
  I discussed with her and her daughter today whether or not she wishes to have any further 
echoes or evaluation, as she was not felt to be a candidate for any intervention for her sev
ere valvular disease.
    They are going to think about it and let me know, and I will follow-up with her 6 months
, but sooner if needed, and she can see Dr. Mitchell in 1 year.
     
 
 1. Paroxysmal atrial fibrillation (HCC)  
 2. 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
C)  
 3. Status post insertion of drug-eluting stent into right coronary artery for coronary amaya
ry disease  
 4. Essential hypertension  
 5. Severe mitral regurgitation  
 6. Severe tricuspid regurgitation  
 7. Chronic diastolic congestive heart failure (HCC)  
 8. Pulmonary hypertension, moderate to severe (HCC)  
 9. Mixed hyperlipidemia  
 10. Stage 3 chronic kidney disease (HCC)  
 
 Orders Placed This Encounter 
 Procedures 
   ECG 12 lead 
 
 The following portions of the patient's history were personally reviewed by me  and updated
 as appropriate:
 EKG tracings, other  specialty provider and PCP notes,any Hospital admission and discharge 
summaries, any ER records , current and previous cardiac testing and procedure reports and d
viet,  medication bottles brought to visit today personally reviewed by me. 
 Allergies, current medications.labs
 Family history, past medical history, past social history, past surgical history.
 Problem list.
 
 Rosio CLARKE    [facility] 
 2019Electronically signed by BRIAN Taylor at 2019  5:11 PM Mary Kay mcgee in this encounter
 
 Plan of Treatment
 
 
+--------+---------+------------+----------------------+-------------+
| Date   | Type    | Specialty  | Care Team            | Description |
+--------+---------+------------+----------------------+-------------+
| / | Office  | Cardiology |   [facility]    |             |
| 2020   | Visit   |            | BRIAN Neal  [address]      |             |
|        |         |            | [address]   |             |
|        |         |            | [place] [address]   |             |
|        |         |            | [phone]         |             |
|        |         |            | [phone] (Fax)   |             |
+--------+---------+------------+----------------------+-------------+
 documented as of this encounter
 
 Procedures
 
 
+----------------+--------+-------------+----------------------+----------------------+
| Procedure Name | Priori | Date/Time   | Associated Diagnosis | Comments             |
|                | ty     |             |                      |                      |
+----------------+--------+-------------+----------------------+----------------------+
| ECG 12 LEAD    | Routin | 2019  |   Paroxysmal atrial  |   Results for this   |
 
|                | e      | 12:41 PM    | fibrillation (HCC)   | procedure are in the |
|                |        | PDT         | Coronary artery      |  results section.    |
|                |        |             | disease of native    |                      |
|                |        |             | artery of native     |                      |
|                |        |             | heart with stable    |                      |
|                |        |             | angina pectoris      |                      |
|                |        |             | (HCC)  Status post   |                      |
|                |        |             | insertion of         |                      |
|                |        |             | drug-eluting stent   |                      |
|                |        |             | into right coronary  |                      |
|                |        |             | artery for coronary  |                      |
|                |        |             | artery disease       |                      |
|                |        |             | Essential            |                      |
|                |        |             | hypertension  Severe |                      |
|                |        |             |  mitral              |                      |
|                |        |             | regurgitation        |                      |
|                |        |             | Severe tricuspid     |                      |
|                |        |             | regurgitation        |                      |
|                |        |             | Chronic diastolic    |                      |
|                |        |             | congestive heart     |                      |
|                |        |             | failure (HCC)        |                      |
|                |        |             | Pulmonary            |                      |
|                |        |             | hypertension,        |                      |
|                |        |             | moderate to severe   |                      |
|                |        |             | (HCC)  Mixed         |                      |
|                |        |             | hyperlipidemia       |                      |
+----------------+--------+-------------+----------------------+----------------------+
 documented in this encounter
 
 Results
 ECG 12 lead (2019 12:41 PM PDT)
 
+-------------+--------------------------+-----------+------------+--------------+
| Component   | Value                    | Ref Range | Performed  | Pathologist  |
|             |                          |           | At         | Signature    |
+-------------+--------------------------+-----------+------------+--------------+
| VENTRICULAR | 81                       | BPM       | [facility]  |              |
|  RATE EKG   |                          |           |            |              |
+-------------+--------------------------+-----------+------------+--------------+
| ATRIAL RATE | 85                       | BPM       | [facility]  |              |
+-------------+--------------------------+-----------+------------+--------------+
| QRS         | 100                      | ms        | [facility]  |              |
| DURATION    |                          |           |            |              |
+-------------+--------------------------+-----------+------------+--------------+
| Q-T         | 386                      | ms        | [facility]  |              |
| INTERVAL    |                          |           |            |              |
+-------------+--------------------------+-----------+------------+--------------+
| Q-T         | 448                      | ms        | [facility]  |              |
| INTERVAL    |                          |           |            |              |
| (CORRECTED) |                          |           |            |              |
+-------------+--------------------------+-----------+------------+--------------+
| QRS AXIS    | 83                       | degrees   | [facility]  |              |
+-------------+--------------------------+-----------+------------+--------------+
| T AXIS      | 75                       | degrees   | [facility]  |              |
+-------------+--------------------------+-----------+------------+--------------+
| INTERPRETAT | Please refer to          |           | [facility]  |              |
| ION TEXT    | Providers office visit   |           |            |              |
|             | note for Providers       |           |            |              |
|             | Interpretation.Confirmed |           |            |              |
|             |  by [facility] Read Only,  |           |            |              |
 
|             | [facility] ([id number]),      |           |            |              |
|             |  ALFONZO SAPP |           |            |              |
|             |  ([id number]) on 2019      |           |            |              |
|             | 12:48:59 PM              |           |            |              |
+-------------+--------------------------+-----------+------------+--------------+
 
 
 
+----------+
| Specimen |
+----------+
|          |
+----------+
 
 
 
+----------------------------------------------------------+--------------+
| Narrative                                                | Performed At |
+----------------------------------------------------------+--------------+
|   This result has an attachment that is not available.   |              |
+----------------------------------------------------------+--------------+
 
 
 
+--------------+---------+--------------------+--------------+
| Performing   | Address | City/State/Zipcode | Phone Number |
| Organization |         |                    |              |
+--------------+---------+--------------------+--------------+
|   [facility]  |         |                    |              |
+--------------+---------+--------------------+--------------+
 documented in this encounter
 
 Visit Diagnoses
 
 
+-----------------------------------------------------------------------------------------+
| Diagnosis                                                                               |
+-----------------------------------------------------------------------------------------+
|   Paroxysmal atrial fibrillation (HCC) - Primary  Atrial fibrillation                   |
+-----------------------------------------------------------------------------------------+
|   Coronary artery disease of native artery of native heart with stable angina pectoris  |
| (HCC)                                                                                   |
+-----------------------------------------------------------------------------------------+
|   Status post insertion of drug-eluting stent into right coronary artery for coronary   |
| artery disease                                                                          |
+-----------------------------------------------------------------------------------------+
|   Essential hypertension  Unspecified essential hypertension                            |
+-----------------------------------------------------------------------------------------+
|   Severe mitral regurgitation  Mitral valve disorders                                   |
+-----------------------------------------------------------------------------------------+
|   Severe tricuspid regurgitation  Diseases of tricuspid valve                           |
+-----------------------------------------------------------------------------------------+
|   Chronic diastolic congestive heart failure (HCC)  Chronic diastolic heart failure     |
+-----------------------------------------------------------------------------------------+
|   Pulmonary hypertension, moderate to severe (HCC)  Other chronic pulmonary heart       |
| diseases                                                                                |
+-----------------------------------------------------------------------------------------+
|   Mixed hyperlipidemia                                                                  |
+-----------------------------------------------------------------------------------------+
|   Stage 3 chronic kidney disease (HCC)                                                  |
 
+-----------------------------------------------------------------------------------------+
 documented in this encounter

## 2020-01-01 NOTE — XMS
Encounter Summary
  Created on: 2020
 
 Vonnie Celaya
 External Reference #: [id number]
 : 32
 Sex: Female
 
 Demographics
 
 
+-----------------------+------------------------+
| Address               | [address]         |
|                       | [place]  [address]   |
+-----------------------+------------------------+
| Home Phone            | [phone]        |
+-----------------------+------------------------+
| Preferred Language    | Unknown                |
+-----------------------+------------------------+
| Marital Status        | Single                 |
+-----------------------+------------------------+
| Scientology Affiliation | NON                    |
+-----------------------+------------------------+
| Race                  | White                  |
+-----------------------+------------------------+
| Ethnic Group          | Not  or  |
+-----------------------+------------------------+
 
 
 Author
 
 
+--------------+------------------------------+
| Author       | [facility] |
+--------------+------------------------------+
| Organization | [facility] |
+--------------+------------------------------+
| Address      | Unknown                      |
+--------------+------------------------------+
| Phone        | Unavailable                  |
+--------------+------------------------------+
 
 
 
 Support
 
 
+--------------+--------------+---------+-----------------+
| Name         | Relationship | Address | Phone           |
+--------------+--------------+---------+-----------------+
| Lauryn Leroy | ECON         | Unknown | [phone] |
+--------------+--------------+---------+-----------------+
 
 
 
 Care Team Providers
 
 
 
+------------------------+------+-----------------+
| Care Team Member Name  | Role | Phone           |
+------------------------+------+-----------------+
| Jean Bermudez MD | PCP  | [phone] |
+------------------------+------+-----------------+
 
 
 
 Reason for Visit
 
 
+--------------+----------+
| Reason       | Comments |
+--------------+----------+
| New patient  |          |
| consultation |          |
+--------------+----------+
 Consultation (Routine)
 
+--------+--------+-----------+--------------+--------------+---------------+
| Status | Reason | Specialty | Diagnoses /  | Referred By  | Referred To   |
|        |        |           | Procedures   | Contact      | Contact       |
+--------+--------+-----------+--------------+--------------+---------------+
| Closed |        | Cardiac   |   Diagnoses  |   Stephen,      |   Donnell,      |
|        |        | Surgery   |              | Varun PARRA,   | Shiloh SIMON,   |
|        |        |           | Nonrheumatic | MD  [address]     | MD  [address]   |
|        |        |           |  mitral      | [address]  | [address]   |
|        |        |           | (valve)      |  [address]       | [address]       |
|        |        |           | insufficienc | [place], [place] | [place], [place]  |
|        |        |           | y  Rheumatic |  [address]       | [address]    |
|        |        |           |  tricuspid   | Phone:       | Phone:        |
|        |        |           | insufficienc | [phone] | [phone]  |
|        |        |           | y  Pulmonary |   Fax:       |  Fax:         |
|        |        |           |              | [phone] | [phone]  |
|        |        |           | hypertension |              |               |
|        |        |           | ,            |              |               |
|        |        |           | unspecified  |              |               |
|        |        |           |  Procedures  |              |               |
|        |        |           |  OR NEW      |              |               |
|        |        |           | PATIENT      |              |               |
|        |        |           | LEVEL V      |              |               |
+--------+--------+-----------+--------------+--------------+---------------+
 
 
 
 
 Encounter Details
 
 
+--------+---------+----------------------+----------------------+----------------------+
| Date   | Type    | Department           | Care Team            | Description          |
+--------+---------+----------------------+----------------------+----------------------+
| / | Office  |   Cardiothoracic     |   Basil Dotson MD   | Non-rheumatic mitral |
| 2018   | Visit   | Surgery at [facility]  [address]  |  regurgitation       |
|        |         |  [address]    | [address]  [place],   | (Primary Dx)         |
|        |         | Mailcode: [id number]       | [place] [address]        |                      |
|        |         | Physician's Leena | [phone]         |                      |
|        |         |   [place], [place]       | [phone] (Fax)   |                      |
|        |         | [address]           |                      |                      |
|        |         | [phone]         |                      |                      |
 
+--------+---------+----------------------+----------------------+----------------------+
 
 
 
 Social History
 
 
+--------------+-------+-----------+--------+------+
| Tobacco Use  | Types | Packs/Day | Years  | Date |
|              |       |           | Used   |      |
+--------------+-------+-----------+--------+------+
| Never Smoker |       |           |        |      |
+--------------+-------+-----------+--------+------+
 
 
 
+---------------------+---+---+---+
| Smokeless Tobacco:  |   |   |   |
| Never Used          |   |   |   |
+---------------------+---+---+---+
 
 
 
+-------------+-------------+---------+----------+
| Alcohol Use | Drinks/Week | oz/Week | Comments |
+-------------+-------------+---------+----------+
| No          |             |         |          |
+-------------+-------------+---------+----------+
 
 
 
+------------------+---------------+
| Sex Assigned at  | Date Recorded |
| Birth            |               |
+------------------+---------------+
| Not on file      |               |
+------------------+---------------+
 
 
 
+----------------+-------------+-------------+
| Job Start Date | Occupation  | Industry    |
+----------------+-------------+-------------+
| Not on file    | Not on file | Not on file |
+----------------+-------------+-------------+
 
 
 
+----------------+--------------+------------+
| Travel History | Travel Start | Travel End |
+----------------+--------------+------------+
 
 
 
+-------------------------------------+
| No recent travel history available. |
+-------------------------------------+
 documented as of this encounter
 
 Last Filed Vital Signs
 
 
 
+-------------------+--------------------+----------------------+----------+
| Vital Sign        | Reading            | Time Taken           | Comments |
+-------------------+--------------------+----------------------+----------+
| Blood Pressure    | 110/59             | 2018  2:07 PM  |          |
|                   |                    | PDT                  |          |
+-------------------+--------------------+----------------------+----------+
| Pulse             | 78                 | 2018  2:07 PM  |          |
|                   |                    | PDT                  |          |
+-------------------+--------------------+----------------------+----------+
| Temperature       | 36.7   C (98   F)  | 2018  2:07 PM  |          |
|                   |                    | PDT                  |          |
+-------------------+--------------------+----------------------+----------+
| Respiratory Rate  | 20                 | 2018  2:07 PM  |          |
|                   |                    | PDT                  |          |
+-------------------+--------------------+----------------------+----------+
| Oxygen Saturation | 99%                | 2018  2:07 PM  |          |
|                   |                    | PDT                  |          |
+-------------------+--------------------+----------------------+----------+
| Inhaled Oxygen    | -                  | -                    |          |
| Concentration     |                    |                      |          |
+-------------------+--------------------+----------------------+----------+
| Weight            | 66 kg (145 lb 9.6  | 2018  2:07 PM  |          |
|                   | oz)                | PDT                  |          |
+-------------------+--------------------+----------------------+----------+
| Height            | 160 cm (5' 3")     | 2018  2:07 PM  |          |
|                   |                    | PDT                  |          |
+-------------------+--------------------+----------------------+----------+
| Body Mass Index   | 25.79              | 2018  2:07 PM  |          |
|                   |                    | PDT                  |          |
+-------------------+--------------------+----------------------+----------+
 documented in this encounter
 
 Progress Notes
 Basil Dotson MD - [facility]  [facility]
 
 Date of Service: 2018 
 
 Referring Providers:  
 Layla Greer 
 Patient Care Team:
 Jean Bermudez MD as PCP - General (Internal Medicine)
 
 Chief Complaint:  New patient consultation
 
 History:  Ms. 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  She
 has developed severe progressive functional limitation. The [facility] for [facility] evaluation.
 
 -------------------------------------------------------------------------------------------
---------------------------
 
 Allergies:
 
 The patient is allergic to morphine and percocet [oxycodone-acetaminophen]. 
 
 Medications: 
 
 acetaminophen 325 mg oral tablet, Take by mouth.
 
 ascorbic acid (vitamin C) 500 mg oral tablet, Take 500 mg by mouth once daily.
 cholecalciferol (Vitamin D3) 2,000 unit oral capsule, Take by mouth.
 furosemide 40 mg oral tablet, Take by mouth.
 gluc/chnd/om3/dha/epa/fish/str (GLUCOSAMINE CHONDROITIN PLUS ORAL), Take 2 tablets by mouth
 once daily.
 levothyroxine 100 mcg oral tablet, Take by mouth.
 losartan 100 mg oral tablet, Take 100 mg by mouth once daily.
 MAGNESIUM CHLORIDE ORAL, Take by mouth.
 metFORMIN  mg oral tablet,ER alexis.retention 24 hr, Take by mouth.
 metoprolol succinate 200 mg oral tablet extended release 24 hr, Take 100 mg by mouth once d
aily. 
 potassium chloride SR 20 mEq oral tablet,ER particles/crystals, Take by mouth.
 simvastatin 20 mg oral tablet, Take by mouth once daily in the evening. 
 warfarin 5 mg oral tablet, Take by mouth.
 
 Past Medical History:
  
 No date: Anemia
     Comment: resolved with iron supplements - no large GI 
              bleed, negative EGD.
 No date: Atrial fibrillation (HCC)
 No date: Hyperlipidemia
 No date: Hypertension
 No date: Hypothyroid
 No date: Pulmonary hypertension (HCC)
 No date: Type 2 diabetes mellitus (HCC)
 
 Past Surgical History:
 No past surgical history on file.
 
 Family History:
 Noncontributory
 
 Social History:
 The patient  reports that she has never smoked. She has never used smokeless tobacco. She r
eports that she does not drink alcohol or use drugs.
 
 Review of Systems:
 
 Constitutional:  positive for symptoms of  fatigue.
 Eyes:negative for symptoms
 ENT: negative for symptoms
 Respiratory: negative for symptoms
 Cardiovascular:  GARCIA
 Gastrointestinal: negative
 Genitourinary:  negative.
 Neurological:  negative for symptoms
 Integumentary: negative for symptoms
 Endocrine: negative for symptoms
 Musculoskeletal: Extremities normal, no deformities, edema, limited range of motion, or aayush
creased strength
 
 -------------------------------------------------------------------------------------------
---------------------------
 
 Physical Exam:
 Vital Signs: /59 | Pulse 78 | Temp (Src) 36.7 C (98 F) (Oral) | RR 20 | Ht 1.6 m 
(5' 3") | Wt 66 kg (145 lb 9.6 oz) | SpO2 99% | BMI 25.79 kg/(m^2)
 BMI: Body mass index is 25.79 kg/m.
 Constitutional: alert and cooperative.
 
 HEENT: Normal.
 Respiratory: negative.
 Cardiovascular: RRR, systolic murmur
 Gastrointestinal: no hepatomegaly, nontender to palpation, no masses, aorta not grossly enl
arged.
 Musculoskeletal: Extremities normal, no deformities, edema, limited range of motion, or aayush
creased strength
 Neurologic: negative.
 Skin: No discolorations, rashes, or ulcers.  Warm and dry
 
 Studies:
 I personally reviewed the patient's echo.  It shows severe MAC.  There is mild MS.  There i
s severe MR.  The angiogram shows a severe distal RCA lesion.
 
 Assessment:  
 In summary, Ms. Celaya is a 86 year old female who presents with Mitral Regurgitation and
 stenosis of the distal RCA.  I had a long conversation with Ms. Celaya and her dauthter r
egarding her condition and the natural history of this.  The patient is aware that she is at
 risk for Angina, Myocardial Infarction and progressive CHF in the future.
 
 Her MV is not ideal for intervention because of the severe MAC, mild MS, and potential for 
LVOT obstruction.  
 
 Plan:  
 Stenting of the RCA locally.
 
 Reconsider mitral clipping if she has continued intractable symptoms after stenting.
 
 BASIL DOTOSN MD
 CARDIOTHORACIC SURGERY AT [address]
 [address]: [address]
 [place] [address] [phone]
 
  Electronically signed by Basil Dotson MD at 2018  3:37 PM PDTdocumented in this enco
unter
 
 Plan of Treatment
 Not on filedocumented as of this encounter
 
 Visit Diagnoses
 
 
+------------------------------------------------+
| Diagnosis                                      |
+------------------------------------------------+
|   Non-rheumatic mitral regurgitation - Primary |
+------------------------------------------------+
 documented in this encounter

## 2020-01-01 NOTE — XMS
Encounter Summary
  Created on: 2020
 
 Vonnie Celaya
 External Reference #: [id number]
 : 32
 Sex: Female
 
 Demographics
 
 
+-----------------------+------------------------+
| Address               | [address]         |
|                       | [place]  [address]   |
+-----------------------+------------------------+
| Home Phone            | [phone]        |
+-----------------------+------------------------+
| Preferred Language    | Unknown                |
+-----------------------+------------------------+
| Marital Status        | Single                 |
+-----------------------+------------------------+
| Anglican Affiliation | NON                    |
+-----------------------+------------------------+
| Race                  | White                  |
+-----------------------+------------------------+
| Ethnic Group          | Not  or  |
+-----------------------+------------------------+
 
 
 Author
 
 
+--------------+------------------------------+
| Author       | [facility] |
+--------------+------------------------------+
| Organization | [facility] |
+--------------+------------------------------+
| Address      | Unknown                      |
+--------------+------------------------------+
| Phone        | Unavailable                  |
+--------------+------------------------------+
 
 
 
 Support
 
 
+--------------+--------------+---------+-----------------+
| Name         | Relationship | Address | Phone           |
+--------------+--------------+---------+-----------------+
| Lauryn Leroy | ECON         | Unknown | [phone] |
+--------------+--------------+---------+-----------------+
 
 
 
 Care Team Providers
 
 
 
+------------------------+------+-----------------+
| Care Team Member Name  | Role | Phone           |
+------------------------+------+-----------------+
| Jean Bermudez MD | PCP  | [phone] |
+------------------------+------+-----------------+
 
 
 
 Reason for Referral
 Diagnostic Testing (Routine)
 
+-------------+--------+------------+--------------+--------------+--------------+
| Status      | Reason | Specialty  | Diagnoses /  | Referred By  | Referred To  |
|             |        |            | Procedures   | Contact      | Contact      |
+-------------+--------+------------+--------------+--------------+--------------+
| New Request |        | Cardiology |   Diagnoses  |   Car Valve  |              |
|             |        |            |              | [address]   |              |
|             |        |            | Non-rheumati | [address]  |              |
|             |        |            | c mitral     |  Mailcode:   |              |
|             |        |            | regurgitatio | [facility] |              |
|             |        |            | n            |  for Health  |              |
|             |        |            | Procedures   | and Healing, |              |
|             |        |            | TRANSTHORACI |  [address], |              |
|             |        |            | C            |  9th Floor   |              |
|             |        |            | ECHOCARDIOGR | [place], [place] |              |
|             |        |            | AM, ADULT    |  [address]  |              |
|             |        |            |              |  Phone:      |              |
|             |        |            |              | [phone] |              |
|             |        |            |              |   Fax:       |              |
|             |        |            |              | [phone] |              |
+-------------+--------+------------+--------------+--------------+--------------+
 
 
 Diagnostic Testing (Routine)
 
+--------+--------+------------+--------------+--------------+---------------+
| Status | Reason | Specialty  | Diagnoses /  | Referred By  | Referred To   |
|        |        |            | Procedures   | Contact      | Contact       |
+--------+--------+------------+--------------+--------------+---------------+
| Closed |        | Cardiology |   Diagnoses  |   Car Valve  |   Car Echo    |
|        |        |            |              | [address]   | [address]  |
|        |        |            | Non-rheumati | [address]  | [address] |
|        |        |            | c mitral     |  Mailcode:   |   Mailcode:   |
|        |        |            | regurgitatio | [facility]  [facility] | [facility]  [facility]    |
|        |        |            | n            |  for Health  | [facility]  |
|        |        |            | Procedures   | and Healing, | Building      |
|        |        |            | TRANSESOPHAG |  [address], | [place], [place]  |
|        |        |            | EAL          |  9th Floor   | [address]    |
|        |        |            | ECHOCARDIOGR | [place], [place] | Phone:        |
|        |        |            | AM, ADULT    |  [address]  | [phone]  |
|        |        |            | MT ECHO      |  Phone:      |               |
|        |        |            | HEART,TRANSE | [phone] |               |
|        |        |            | JODI,CO |   Fax:       |               |
|        |        |            | MPLETE  MT   | [phone] |               |
|        |        |            | DOPPLER ECHO |              |               |
|        |        |            |              |              |               |
|        |        |            | HEART,LIMITE |              |               |
|        |        |            | D,F/U  MT    |              |               |
|        |        |            | DOPPLER      |              |               |
|        |        |            | COLOR FLOW   |              |               |
 
|        |        |            | VELOCITY MAP |              |               |
|        |        |            |   MT         |              |               |
|        |        |            | ECHOCARDIOGR |              |               |
|        |        |            | APHY,        |              |               |
|        |        |            | TRANSESOPHAG |              |               |
|        |        |            | EAL (JENIFFER)    |              |               |
|        |        |            | FOR GUIDANCE |              |               |
+--------+--------+------------+--------------+--------------+---------------+
 
 
 
 
 Reason for Visit
 
 
+--------------------+--------------------+
| Reason             | Comments           |
+--------------------+--------------------+
| Surgery Scheduling | MitraClip 18 |
+--------------------+--------------------+
 
 
 
 Encounter Details
 
 
+--------+-----------+----------------------+----------------------+----------------------+
| Date   | Type      | Department           | Care Team            | Description          |
+--------+-----------+----------------------+----------------------+----------------------+
| / | Telephone |   Cardiology at [facility]  |   Dominga Arambula,  | Surgery Scheduling   |
| 2018   |           |  [address]    | RN  [address]      | [facility] |
|        |           | Mailcode: [facility]       | [address]      |                      |
|        |           | [facility]    | [place], [place]         |                      |
|        |           | and [place],         | [address]           |                      |
|        |           | [facility] [address],       |                      |                      |
|        |           | [place], [place]  |                      |                      |
|        |           | [address]           |                      |                      |
|        |           | [phone]         |                      |                      |
+--------+-----------+----------------------+----------------------+----------------------+
 
 
 
 Social History
 
 
+--------------+-------+-----------+--------+------+
| Tobacco Use  | Types | Packs/Day | Years  | Date |
|              |       |           | Used   |      |
+--------------+-------+-----------+--------+------+
| Never Smoker |       |           |        |      |
+--------------+-------+-----------+--------+------+
 
 
 
+---------------------+---+---+---+
| Smokeless Tobacco:  |   |   |   |
| Never Used          |   |   |   |
+---------------------+---+---+---+
 
 
 
 
+-------------+-------------+---------+----------+
| Alcohol Use | Drinks/Week | oz/Week | Comments |
+-------------+-------------+---------+----------+
| No          |             |         |          |
+-------------+-------------+---------+----------+
 
 
 
+------------------+---------------+
| Sex Assigned at  | Date Recorded |
| Birth            |               |
+------------------+---------------+
| Not on file      |               |
+------------------+---------------+
 
 
 
+----------------+-------------+-------------+
| Job Start Date | Occupation  | Industry    |
+----------------+-------------+-------------+
| Not on file    | Not on file | Not on file |
+----------------+-------------+-------------+
 
 
 
+----------------+--------------+------------+
| Travel History | Travel Start | Travel End |
+----------------+--------------+------------+
 
 
 
+-------------------------------------+
| No recent travel history available. |
+-------------------------------------+
 documented as of this encounter
 
 Plan of Treatment
 
 
+------------------+------+--------+----------------------+---------------------+
| Name             | Type | Priori | Associated Diagnoses | Order Schedule      |
|                  |      | ty     |                      |                     |
+------------------+------+--------+----------------------+---------------------+
| TRANSESOPHAGEAL  | ECG  | Routin |   Non-rheumatic      | Ordered: 2018 |
| ECHOCARDIOGRAM,  |      | e      | mitral regurgitation |                     |
| ADULT            |      |        |                      |                     |
+------------------+------+--------+----------------------+---------------------+
| TRANSTHORACIC    | ECG  | Routin |   Non-rheumatic      | Ordered: 2018 |
| ECHOCARDIOGRAM,  |      | e      | mitral regurgitation |                     |
| ADULT            |      |        |                      |                     |
+------------------+------+--------+----------------------+---------------------+
 documented as of this encounter
 
 Procedures
 
 
+----------------------+--------+------------+----------------------+----------------------+
| Procedure Name       | Priori | Date/Time  | Associated Diagnosis | Comments             |
|                      | ty     |            |                      |                      |
 
+----------------------+--------+------------+----------------------+----------------------+
| 6-MINUTE WALK TEST - | Routin | 2018 |   Non-rheumatic      |   Results for this   |
|  ECG                 | e      |            | mitral regurgitation | procedure are in the |
|                      |        |            |                      |  results section.    |
+----------------------+--------+------------+----------------------+----------------------+
 documented in this encounter
 
 Results
 6-MINUTE WALK TEST - ECG (2018)
 
+------------------------------------------------------------------------+--------------+
| Narrative                                                              | Performed At |
+------------------------------------------------------------------------+--------------+
|   [facility]      Test Date:   2018      |              |
| Clinic Site: [facility]  Supervising Provider: Katelin Riddle  Technician: |              |
|    VS  Referring Provider: Katelin Riddle  Diagnosis: Non-rheumatic |              |
|  mitral regurgitation  Reason for Test: dyspnea     Patient Sypmtoms : |              |
|   Are you short of breath at rest? No            Six-Minute Walk Test  |              |
| Results:  Gait speed (time in seconds first 15 feet walked): 4         |              |
| Patient walked: 720 feet in 6 minutes  Beginning heart rate: 78        |              |
| Beginning blood pressure: 144/70  Beginning Sp02: 99%  Beginning       |              |
| dyspnea index (Franchesca scale) : 0     Ending heart rate: 90  Ending blood |              |
|  pressure: 157/60  Ending Sp02: 98%  Ending dyspnea index (Franchesca        |              |
| scale): 4  Oxygen level:      Observations: No level of distress       |              |
| noted, patient tolerated test fairly well        Physician             |              |
| interpretation:   High risk 6 minute walk test with moderate reported  |              |
| dyspnea (franchesca scale 4) , no desaturations, and inability to walk > 900 |              |
|  ft.        Jame Wilkins MD    [facility]       |              |
| [facility] [place]                                               |              |
+------------------------------------------------------------------------+--------------+
 documented in this encounter
 
 Visit Diagnoses
 
 
+------------------------------------------------+
| Diagnosis                                      |
+------------------------------------------------+
|   Non-rheumatic mitral regurgitation - Primary |
+------------------------------------------------+
 documented in this encounter

## 2020-01-01 NOTE — XMS
Encounter Summary
  Created on: 2020
 
 Vonnie Celaya
 External Reference #: [id number]
 : 32
 Sex: Female
 
 Demographics
 
 
+-----------------------+---------------------------+
| Address               | [address]            |
|                       | [place]  [address] |
+-----------------------+---------------------------+
| Home Phone            | [phone]           |
+-----------------------+---------------------------+
| Preferred Language    | Unknown                   |
+-----------------------+---------------------------+
| Marital Status        |                    |
+-----------------------+---------------------------+
| Sabianism Affiliation | Unknown                   |
+-----------------------+---------------------------+
| Race                  | Unknown                   |
+-----------------------+---------------------------+
| Ethnic Group          | Unknown                   |
+-----------------------+---------------------------+
 
 
 Author
 
 
+--------------+--------------------------------------------+
| Author       | [facility]  |
|              | [facility] [place]                                |
+--------------+--------------------------------------------+
| Organization | [facility]  |
|              | [facility] [place]                                |
+--------------+--------------------------------------------+
| Address      | Unknown                                    |
+--------------+--------------------------------------------+
| Phone        | Unavailable                                |
+--------------+--------------------------------------------+
 
 
 
 Support
 
 
+--------------+--------------+---------+-----------------+
| Name         | Relationship | Address | Phone           |
+--------------+--------------+---------+-----------------+
| Lauryn Leroy | ECON         | Unknown | [phone] |
+--------------+--------------+---------+-----------------+
 
 
 
 Care Team Providers
 
 
 
+------------------------+------+-----------------+
| Care Team Member Name  | Role | Phone           |
+------------------------+------+-----------------+
| Jean Bermudez MD | PCP  | [phone] |
+------------------------+------+-----------------+
 
 
 
 Encounter Details
 
 
+--------+-------------+----------------------+--------------------+-------------+
| Date   | Type        | Department           | Care Team          | Description |
+--------+-------------+----------------------+--------------------+-------------+
| 10/30/ | Orders Only |   [facility]     |   Conversion       |             |
| 2018   |             | CONVERSION DEP  [address]  | Transaction,       |             |
|        |             | [address]           | Provider Unknown   |             |
|        |             | [place]         | [phone]       |             |
|        |             | [address]           | [phone] (Fax) |             |
|        |             | [phone]         |                    |             |
+--------+-------------+----------------------+--------------------+-------------+
 
 
 
 Social History
 
 
+----------------+-------+-----------+--------+------+
| Tobacco Use    | Types | Packs/Day | Years  | Date |
|                |       |           | Used   |      |
+----------------+-------+-----------+--------+------+
| Never Assessed |       |           |        |      |
+----------------+-------+-----------+--------+------+
 
 
 
+------------------+---------------+
| Sex Assigned at  | Date Recorded |
| Birth            |               |
+------------------+---------------+
| Not on file      |               |
+------------------+---------------+
 
 
 
+----------------+-------------+-------------+
| Job Start Date | Occupation  | Industry    |
+----------------+-------------+-------------+
| Not on file    | Not on file | Not on file |
+----------------+-------------+-------------+
 
 
 
+----------------+--------------+------------+
| Travel History | Travel Start | Travel End |
+----------------+--------------+------------+
 
 
 
 
+-------------------------------------+
| No recent travel history available. |
+-------------------------------------+
 documented as of this encounter
 
 Plan of Treatment
 
 
+--------+---------+------------+----------------------+-------------+
| Date   | Type    | Specialty  | Care Team            | Description |
+--------+---------+------------+----------------------+-------------+
| / | Office  | Cardiology |   [facility]    |             |
| 2020   | Visit   |            | [address]  [address]      |             |
|        |         |            | [address]   |             |
|        |         |            | [place] [address]   |             |
|        |         |            | [phone]         |             |
|        |         |            | [phone] (Fax)   |             |
+--------+---------+------------+----------------------+-------------+
 documented as of this encounter
 
 Visit Diagnoses
 Not on filedocumented in this encounter

## 2020-01-01 NOTE — NUR
PT ARRIVED TO THE FLOOR VIA STRETCHER AND WAS ALREADY ASSISTED OVER TO THE BED
BEFORE THIS RN ENTERED THE ROOM. SHE IS ON 2LNC WITH RR EVEN AND NONLABORED.
CALL LIGHT IS CLOSE AND ORIENTED PT TO THE ROOM.

## 2020-01-01 NOTE — XMS
Encounter Summary
  Created on: 2020
 
 Vonnie Celaya
 External Reference #: [id number]
 : 32
 Sex: Female
 
 Demographics
 
 
+-----------------------+---------------------------+
| Address               | [address]            |
|                       | [place]  [address] |
+-----------------------+---------------------------+
| Home Phone            | [phone]           |
+-----------------------+---------------------------+
| Preferred Language    | Unknown                   |
+-----------------------+---------------------------+
| Marital Status        |                    |
+-----------------------+---------------------------+
| Zoroastrian Affiliation | Unknown                   |
+-----------------------+---------------------------+
| Race                  | Unknown                   |
+-----------------------+---------------------------+
| Ethnic Group          | Unknown                   |
+-----------------------+---------------------------+
 
 
 Author
 
 
+--------------+--------------------------------------------+
| Author       | [facility]  |
|              | and [place]                                |
+--------------+--------------------------------------------+
| Organization | [facility]  |
|              | and [place]                                |
+--------------+--------------------------------------------+
| Address      | Unknown                                    |
+--------------+--------------------------------------------+
| Phone        | Unavailable                                |
+--------------+--------------------------------------------+
 
 
 
 Support
 
 
+--------------+--------------+---------+-----------------+
| Name         | Relationship | Address | Phone           |
+--------------+--------------+---------+-----------------+
| Lauryn Leroy | ECON         | Unknown | [phone] |
+--------------+--------------+---------+-----------------+
 
 
 
 Care Team Providers
 
 
 
+-----------------------+------+-------------+
| Care Team Member Name | Role | Phone       |
+-----------------------+------+-------------+
 PCP  | Unavailable |
+-----------------------+------+-------------+
 
 
 
 Encounter Details
 
 
+--------+-----------+----------------------+----------------------+----------------------+
| Date   | Type      | Department           | Care Team            | Description          |
+--------+-----------+----------------------+----------------------+----------------------+
| 10/24/ | Hospital  |   [facility]    |   Conversion         | Coronary artery      |
|  - | Encounter | MEDICAL CENTER       | Transaction,         | disease involving    |
|        |           | CLINICAL DECISION    | Provider Unknown     | native coronary      |
| 10/25/ |           | UNIT  [address] | [phone]         | artery of native     |
| 2018   |           |   [place], [place]       | [phone] (Fax)   | heart, angina        |
|        |           | [address]           | Varun Mitchell MD  | presence             |
|        |           | [phone]         |  [address]    | unspecified; Anginal |
|        |           |                      | [place], [place]  |  equivalent ([facility]);   |
|        |           |                      | [address]  [phone]  | Chronic atrial       |
|        |           |                      |  [phone] (Fax)  | fibrillation ([facility]);  |
|        |           |                      |                      | Essential            |
|        |           |                      |                      | hypertension;        |
|        |           |                      |                      | Pulmonary            |
|        |           |                      |                      | hypertension,        |
|        |           |                      |                      | moderate to severe   |
|        |           |                      |                      | ([facility]); S/P drug      |
|        |           |                      |                      | eluting coronary     |
|        |           |                      |                      | stent placement;     |
|        |           |                      |                      | Severe mitral        |
|        |           |                      |                      | regurgitation;       |
|        |           |                      |                      | Severe tricuspid     |
|        |           |                      |                      | regurgitation; SOB   |
|        |           |                      |                      | (shortness of        |
|        |           |                      |                      | breath) on exertion  |
+--------+-----------+----------------------+----------------------+----------------------+
 
 
 
 Social History
 
 
+----------------+-------+-----------+--------+------+
| Tobacco Use    | Types | Packs/Day | Years  | Date |
|                |       |           | Used   |      |
+----------------+-------+-----------+--------+------+
| Never Assessed |       |           |        |      |
+----------------+-------+-----------+--------+------+
 
 
 
+------------------+---------------+
| Sex Assigned at  | Date Recorded |
| Birth            |               |
+------------------+---------------+
 
| Not on file      |               |
+------------------+---------------+
 
 
 
+----------------+-------------+-------------+
| Job Start Date | Occupation  | Industry    |
+----------------+-------------+-------------+
| Not on file    | Not on file | Not on file |
+----------------+-------------+-------------+
 
 
 
+----------------+--------------+------------+
| Travel History | Travel Start | Travel End |
+----------------+--------------+------------+
 
 
 
+-------------------------------------+
| No recent travel history available. |
+-------------------------------------+
 documented as of this encounter
 
 Last Filed Vital Signs
 
 
+-------------------+----------------------+----------------------+----------+
| Vital Sign        | Reading              | Time Taken           | Comments |
+-------------------+----------------------+----------------------+----------+
| Blood Pressure    | 123/59               | 10/25/2018  2:40 PM  |          |
|                   |                      | PDT                  |          |
+-------------------+----------------------+----------------------+----------+
| Pulse             | 70                   | 10/25/2018  2:40 PM  |          |
|                   |                      | PDT                  |          |
+-------------------+----------------------+----------------------+----------+
| Temperature       | 36.6   C (97.9   F)  | 10/25/2018  2:40 PM  |          |
|                   |                      | PDT                  |          |
+-------------------+----------------------+----------------------+----------+
| Respiratory Rate  | 16                   | 10/25/2018  2:40 PM  |          |
|                   |                      | PDT                  |          |
+-------------------+----------------------+----------------------+----------+
| Oxygen Saturation | -                    | -                    |          |
+-------------------+----------------------+----------------------+----------+
| Inhaled Oxygen    | -                    | -                    |          |
| Concentration     |                      |                      |          |
+-------------------+----------------------+----------------------+----------+
| Weight            | 66.7 kg (147 lb 0.8  | 10/25/2018  2:40 PM  |          |
|                   | oz)                  | PDT                  |          |
+-------------------+----------------------+----------------------+----------+
| Height            | 160 cm (5' 3")       | 10/25/2018  2:40 PM  |          |
|                   |                      | PDT                  |          |
+-------------------+----------------------+----------------------+----------+
| Body Mass Index   | 26.05                | 10/25/2018  2:40 PM  |          |
|                   |                      | PDT                  |          |
+-------------------+----------------------+----------------------+----------+
 documented in this encounter
 
 Discharge Summaries
 Daniel Funez MD - 10/25/2018 12:42 PM PDTFormatting of this note might be diffe
 
rent from the original.
 Discharge Summaries by Daniel Funez MD at 10/25/18 8212  
  Author:  Daniel Funez MD Service:  Cardiology Author Type:  Physician 
  Filed:  10/28/18 1957 Date of Service:  10/25/18 1242 Status:  Signed 
  :  Daniel Funez MD (Physician)   
  Related Notes:  Original Note by Daniel Funez MD (Physician) filed at 10/26/18 2133
   
   
 [facility]  
 Service:  Cardiology
 Discharge Summary
 
 Date of Admission:  10/24/2018
 Date of Discharge:  10/25/2018
 
 Discharge Provider:  Daniel Funez MD
 Treatment Team: 
 Admitting Provider: Varun Mitchell MD
 
 Discharge Diagnoses:  
 
 Active Problems:
   Pulmonary hypertension, moderate to severe (HCC)
   Atrial fibrillation (HCC)
   Severe tricuspid regurgitation
   Severe mitral regurgitation
   SOB (shortness of breath) on exertion
   Anginal equivalent (HCC)
   Essential hypertension
   Coronary artery disease of native artery of native heart with stable angina pectoris (HCC
)
   S/P drug eluting coronary stent placement
 Resolved Problems:
   * No resolved hospital problems. *
 
 Final Diagnoses:  
 1.  Coronary artery disease with anginal equivalent status post successful PTCA and stentin
g of the right posterolateral artery using 3.0 x 16 mm Synergy drug-eluting stent on 10/21/2
018.
 2.  Severe mitral regurgitation.
 3.  Severe tricuspid regurgitation.
 4.  Coronary atrial fibrillation.
 5.  Moderate to severe primary hypertension.
 6.  Hypertension.
 7.  Hyperlipidemia.
 8.  Atrial septal defect.
 9.  Mild aortic stenosis.
  
 
 Procedures:  
 PCI 10/24/2018
 Results
 Critical 99% calcified rPLA,, 30% prox and mid RCA
 Successful PTCA/stent rPLA using 3.0X16 mm Synergy MARA postdilated 3.5X12 NC balloon
 Probable small branch wire induced perforation with small, limited extravasation of contras
t
 
 Significant Diagnostic Studies:  
 Limited Echo 10/25/2018
 Impression 
 
 1. Overall left ventricular systolic function is normal with, an EF between 65 - 70 %. 
 2. There is no pericardial effusion. 
 
 BRIEF HISTORY OF PRESENTATION AND HOSPITAL COURSE:    
      Vonnie Celaya is a 86 y.o. female With a history of hypertension, hyperlipidemia,
 diabetes mellitus type 2, chronic atrial fibrillation, severe mitral and tricuspid regurgit
ation, who was referred for elective outpatient PCI of right posterolateral artery.  Patient
 was evaluated at [facility] for mitral clip and possible tricuspid clip.  She had a coronary rogelio
ogram done there that showed the critical stenosis of a large right posterolateral artery.  
She was referred by her primary cardiologist, Dr. Mitchell, for PCI.  Patient had a successful P
CI of the right posterolateral artery with deployment of a 3.0 x 16 mm Synergy drug-eluting 
stent.  Patient tolerated the procedure well.  There was a questionable left small extravasa
tion of contrast and we suspected a small branch wire induced perforation.  Patient was kept
 overnight for observation.  She had remained hemodynamically stable without symptoms.  We d
id a limited echocardiogram the next day which showed no evidence of pericardial effusion.  
The patient ambulated well.  She had chronic exertional shortness of breath which she felt m
ight have improved; however, she has not exerted herself to the level that she can do a good
 comparison.  She denies chest pain, orthopnea, paroxysmal nocturnal dyspnea, palpitations, 
lightheadedness, presyncope, or syncope.  She has been taking aspirin and Plavix before the 
procedure, and that will continue.  She was restarted on Coumadin on discharge to be restart
ed the next day.  Overall, the patient tolerated the procedure well and was discharged home 
in stable condition.  Her renal function remained stable.
 
 PLAN:
 1.  Continue aspirin 81 mg for life.
 2.  Continue Plavix 75 mg for at least 6 months.
 3.  Home medications including losartan, Toprol XL, and simvastatin will continue.
 4.  Cardiac rehabilitation as an outpatient.
 5.  Patient will follow with her primary cardiologist, Dr. Mitchell, in 1 week.
  
 Past Medical History   
 Diagnosis  Date 
   Acute diastolic heart failure (HCC)  
   ASD secundum  
   Atrial fibrillation (HCC)  
  persistent since then, never attempted cardioversion  
   Cerebrovascular accident (CVA) (HCC)  
   Congestive heart failure (HCC) 2018 
  acute/chronic Diastolic Heart Failure, NYHA class III  
   Coronary artery disease of native artery of native heart with stable angina pectoris (H
CC) 10/24/2018 
   Diabetes mellitus, type II (HCC)  
  controlled, no complications  
   Edema  
   Essential hypertension  
   Heart murmur  
  had scarlet fever  
   Hyperlipidemia  
   Hypothyroidism  
   Iron deficiency anemia  
   Migraines  
  resolved  
   Mild aortic stenosis  
   Osteoarthritis  
   Osteoporosis  
   Pulmonary hypertension, moderate to severe (HCC)  
   Severe mitral regurgitation  
   Severe tricuspid regurgitation  
  moderately severe  
 
 
 Past Surgical History    
 Procedure  Laterality Date 
   APPENDECTOMY   
   BLADDER SUSPENSION   
   BLEPHAROPLASTY Bilateral  
   BREAST SURGERY Right  
  benign lesion removed   
   BUNIONECTOMY   
   HYSTERECTOMY   
  partial hysterectomy separate   
   salpingo-oophorectomy Bilateral  
   TOE FUSION   
   TONSILLECTOMY   
 
 Allergies    
 Allergen   Reactions 
   Morphine  Other (See Comments) 
   Pt felt fidgety  
   Percocet [Oxycodone-Acetaminophen]  Other (See Comments) 
   Pt felt fidgety  
 
 Prescriptions Prior to Admission      
 Medication  Sig Dispense Refill Last Dose 
   acetaminophen (TYLENOL) 325 MG tablet Take 650 mg by mouth every 6 (six) hours as neede
d for Pain.   Taking at Unknown time 
   ascorbic acid (VITAMIN C) 1000 MG tablet Take 1,000 mg by mouth daily.   10/23/2018 at 
0800 
   aspirin 81 MG tablet Take 1 tablet by mouth daily. Start 10/22/18 30 tablet 11 10/24/20
18 at 0715 
   Cholecalciferol 2000 units CAPS Take 2,000 Units by mouth daily.   10/24/2018 at 0715 
   clopidogrel (PLAVIX) 75 MG tablet Take 1 tablet by mouth daily. Start 10/22/18 (stop wa
rfarin 10/20/18) 30 tablet 11 10/24/2018 at 0715 
   Cranberry 1000 MG CAPS Take 1 capsule by mouth daily.   10/24/2018 at 0715 
   furosemide (LASIX) 40 MG tablet Take 40 mg by mouth daily.   10/24/2018 at 0715 
   Gluc-Chonn-MSM-Boswellia-Vit D (GLUCOSAMINE CHOND TRIPLE/VIT D) TABS Take 1 tablet by m
outh daily.   10/24/2018 at 0715 
   levothyroxine (SYNTHROID) 100 MCG tablet Take 100 mcg by mouth every morning before kavita
akfast.   10/24/2018 at 0715 
   losartan (COZAAR) 100 MG tablet Take 100 mg by mouth daily.   10/23/2018 at 1800 
   magnesium chloride (MAG64) 64 mg EC tablet Take 1 tablet by mouth daily.   10/24/2018 a
t 0715 
   metFORMIN (GLUMETZA) 500 MG (MOD) 24 hr tablet Take 500 mg by mouth 2 (two) times daily
 with meals.   10/22/2018 at Unknown time 
   metoprolol (TOPROL-XL) 200 MG 24 hr tablet Take 100 mg by mouth every morning.   10/24/
2018 at 0715 
   potassium chloride SA (K-DUR,KLOR-CON) 20 MEQ tablet Take 20 mEq by mouth daily.   10/2
2018 at 0715 
   simvastatin (ZOCOR) 20 MG tablet Take 10 mg by mouth nightly.   10/23/2018 at 1800 
 
 DISCHARGE EXAM
 Vital Signs:
 /59 (BP Location: Left upper arm)  | Pulse 70  | Temp 97.9 F (36.6 C) (Oral)  | R
krista 16  | Ht 1.6 m (5' 3")  | Wt 66.7 kg (147 lb 0.8 oz)  | SpO2 98%  | BMI 26.05 kg/m 
 Temp:  [97.5 F (36.4 C)-98.6 F (37 C)] 97.9 F (36.6 C) (10/25 1054)
 BP: ()/() 114/57 (10/25 1054)
 Heart Rate:  [51-78] 67 (10/25 1054)
 Resp:  [11-22] 16 (10/25 1054)
 SpO2:  [84 %-100 %] 98 % (10/25 1054)
 Height:  [160 cm (5' 3")] 160 cm (5' 3") (10/24 135)
 Weight:  [66.7 kg (147 lb 0.8 oz)] 66.7 kg (147 lb 0.8 oz) (10/24 135)
 
 BMI (Calculated):  [26.1] 26.1 (10/24 1352)
 
 Physical Exam 
 Constitutional: She is oriented to person, place, and time. She appears well-developed and 
well-nourished. No distress. 
 HENT: 
 Head: Normocephalic and atraumatic. 
 Eyes: No scleral icterus. 
 Neck: Neck supple. No JVD present. Carotid bruit is not present. 
 Cardiovascular: Normal rate, S1 normal and S2 normal.  An irregularly irregular rhythm pres
ent. Exam reveals no gallop and no friction rub.  
 Murmur (2/6 systolic) heard.
 Pulmonary/Chest: Effort normal. No stridor. No respiratory distress. She has no wheezes. Sh
e has no rales. She exhibits no tenderness. 
 Abdomina/Gl: Soft. Bowel sounds are normal. There is no tenderness. 
 Musculoskeletal: She exhibits no edema or tenderness. 
 Neurological: She is alert and oriented to person, place, and time. No cranial nerve defici
t. 
 Skin: Skin is warm and dry. No rash noted. She is not diaphoretic. No erythema. No pallor. 
 Psychiatric: She has a normal mood and affect. 
 
 DATA
 
 CBC:  
 Lab Results    
 Component  Value Date 
  WBC 9.88 10/25/2018 
  RBC 3.67 (L) 10/25/2018 
  HGB 10.9 (L) 10/25/2018 
  HCT 31.9 (L) 10/25/2018 
  MCV 87.0 10/25/2018 
  MCH 29.6 10/25/2018 
  MCHC 34.0 10/25/2018 
  RDW 46.4 10/25/2018 
   10/25/2018 
  MPV 9.1 10/25/2018 
  DIFFTYPE MANUAL 10/25/2018 
 
 BMP:  
 Lab Results    
 Component  Value Date 
   10/25/2018 
  K 4.6 10/25/2018 
   10/25/2018 
  CO2 19 (L) 10/25/2018 
  ANIONGAP 16 10/25/2018 
  GLUF 147 (H) 10/25/2018 
  BUN 21 10/25/2018 
  CREATININE 0.9 10/25/2018 
  BCR 23 10/25/2018 
  CA 8.6 10/25/2018 
  EGFR 59 (L) 10/25/2018 
 
 Disposition:  Home
 Condition:  Stable
 Code Status:  Full Code
 
 No discharge procedures on file.
 
 Follow up:
 
 Gio Perez MD
 [address], 
 [place] [address] [phone]
 
 Varun Mitchell MD
 [address] [place] [address] [phone]
 
 In 1 week
 
  
 Medication List 
  
 CONTINUE taking these medications  
 acetaminophen 325 MG tablet
 Refills:  0
 Commonly known as:  TYLENOL
  
 ascorbic acid 1000 MG tablet
 Refills:  0
 Commonly known as:  VITAMIN C
  
 aspirin 81 MG tablet
 QTY:  30 tablet
 Refills:  11
 Take 1 tablet by mouth daily. Start 10/22/18
  
 Cholecalciferol 2000 units Caps
 Refills:  0
  
 clopidogrel 75 MG tablet
 QTY:  30 tablet
 Refills:  11
 Commonly known as:  PLAVIX
 Take 1 tablet by mouth daily. Start 10/22/18 (stop warfarin 10/20/18)
  
 Cranberry 1000 MG Caps
 Refills:  0
  
 furosemide 40 MG tablet
 Refills:  0
 Commonly known as:  LASIX
  
 GLUCOSAMINE CHOND TRIPLE/VIT D Tabs
 Refills:  0
  
 levothyroxine 100 MCG tablet
 Refills:  0
 Commonly known as:  SYNTHROID
  
 losartan 100 MG tablet
 Refills:  0
 Commonly known as:  COZAAR
  
 magnesium chloride 64 mg EC tablet
 Refills:  0
 Commonly known as:  MAG64
 
  
 metFORMIN 500 MG (MOD) 24 hr tablet
 Refills:  0
 Commonly known as:  GLUMETZA
  
 metoprolol 200 MG 24 hr tablet
 Refills:  0
 Commonly known as:  TOPROL-XL
  
 potassium chloride SA 20 MEQ tablet
 Refills:  0
 Commonly known as:  K-DUR,KLOR-CON
  
 simvastatin 20 MG tablet
 Refills:  0
 Commonly known as:  ZOCOR
  
  
 You might also be taking other medications not listed above. If you have questions about an
y of your other medications, talk to the person who prescribed them or your Primary Care Pro
vider. 
  
  
  
 
 Discharge took 40 minutes, to include final examination, discussion of admission, and prepa
ration of prescriptions, instructions for on-going care, follow-up and documentation of disc
harge summary.
 
 Daniel Funez MD
 10/25/2018
  
  Electronically signed by Daniel Funez MD at 10/28/2018  7:57 PM PDTdocumented i
n this encounter
 
 Medications at Time of Discharge
 
 
+----------------------+----------------------+-----------+---------+----------+-----------+
| Medication           | Sig                  | Dispensed | Refills | Start    | End Date  |
|                      |                      |           |         | Date     |           |
+----------------------+----------------------+-----------+---------+----------+-----------+
|   acetaminophen      | Take 650 mg by mouth |           | 0       | 20 |           |
| (TYLENOL) 325 mg     |  every 6 (six) hours |           |         | 18       |           |
| tablet               |  as needed for Pain. |           |         |          |           |
+----------------------+----------------------+-----------+---------+----------+-----------+
|   ascorbic acid      | Take 1,000 mg by     |           | 0       | 20 |           |
| (VITAMIN C) 1000 MG  | mouth daily.         |           |         | 18       |           |
| tablet               |                      |           |         |          |           |
+----------------------+----------------------+-----------+---------+----------+-----------+
|   Cranberry Juice    | Take 1 capsule by    |           | 0       | 20 |           |
| Extract 1000 MG CAPS | mouth daily.         |           |         | 18       |           |
+----------------------+----------------------+-----------+---------+----------+-----------+
|   furosemide (LASIX) | Take 40 mg by mouth  |           | 0       | 20 |           |
|  40 mg tablet        | daily.               |           |         | 18       |           |
+----------------------+----------------------+-----------+---------+----------+-----------+
|   levothyroxine      | Take 100 mcg by      |           | 0       | 20 |           |
| (SYNTHROID) 100 mcg  | mouth every morning  |           |         | 18       |           |
| tablet               | before breakfast.    |           |         |          |           |
+----------------------+----------------------+-----------+---------+----------+-----------+
 
|   losartan (COZAAR)  | Take 100 mg by mouth |           | 0       | 20 |           |
| 100 MG tablet        |  daily.              |           |         | 18       |           |
+----------------------+----------------------+-----------+---------+----------+-----------+
|   magnesium chloride | Take 1 tablet by     |           | 0       | 20 |           |
|  (MAG64) 64 mg EC    | mouth daily.         |           |         | 18       |           |
| tablet               |                      |           |         |          |           |
+----------------------+----------------------+-----------+---------+----------+-----------+
|   metFORMIN          | Take 500 mg by mouth |           | 0       | 20 |           |
| (GLUMETZA) 500 MG 24 |  2 (two) times daily |           |         | 18       |           |
|  hr tablet           |  with meals.         |           |         |          |           |
+----------------------+----------------------+-----------+---------+----------+-----------+
|   potassium chloride | Take 20 mEq by mouth |           | 0       | 20 |           |
|  (KLOR-CON M20) 20   |  daily.              |           |         | 18       |           |
| mEq ER tablet        |                      |           |         |          |           |
+----------------------+----------------------+-----------+---------+----------+-----------+
|   simvastatin        | Take 10 mg by mouth  |           | 0       | 20 |           |
| (ZOCOR) 20 mg tablet | nightly.             |           |         | 18       |           |
+----------------------+----------------------+-----------+---------+----------+-----------+
|   aspirin 81 MG      | Take 1 tablet by     |   30      | 11      | 10/19/20 | 10/19/201 |
| tablet               | mouth daily. Start   | tablet    |         | 18       | 9         |
|                      | 10/22/18             |           |         |          |           |
+----------------------+----------------------+-----------+---------+----------+-----------+
|                      | Take 1 tablet by     |           | 0       | 20 |  |
| Gluc-Chonn-MSM-Boswe | mouth daily.         |           |         | 18       | 9         |
| llia-Vit D           |                      |           |         |          |           |
| (GLUCOSAMINE CHOND   |                      |           |         |          |           |
| TRIPLE/VIT D) TABS   |                      |           |         |          |           |
+----------------------+----------------------+-----------+---------+----------+-----------+
|   metoprolol         | Take 100 mg by mouth |           | 0       | 20 |  |
| succinate            |  every morning.      |           |         | 18       | 9         |
| (TOPROL-XL) 200 mg   |                      |           |         |          |           |
| ER tablet            |                      |           |         |          |           |
+----------------------+----------------------+-----------+---------+----------+-----------+
 documented as of this encounter
 
 Progress Notes
 Conversion Transaction, Provider Unknown - 10/25/2018  3:43 PM PDTFormatting of this note m
ight be different from the original.
 Nurse Progress Note by Deya Latham RN at 10/25/18 3923  
  Author:  Deya Latham RN Service:  (none) Author Type:  Registered Nurse 
  Filed:  10/25/18 0824 Date of Service:  10/25/18 1543 Status:  Signed 
  :  Deya Latham RN (Registered Nurse)   
   
 Discharge summary and paperwork reviewed with patient and patient's daughter. IVs removed. 
Belongings sent with patient. Tele notified of patient discharge. All questions adressed and
 there are no further concerns. Patient escorted via wheelchair to private vehicle. 
 
 Deya Latham RN. 10/25/18 3:44 PM 
 
  
  Electronically signed by Conversion Transaction, Provider at 2019 10:04 PM PDTConver
heaven Transaction, Provider Unknown - 10/25/2018  9:56 AM PDTFormatting of this note might be
 different from the original.
 Case Management by PRISCA Najera at 10/25/18 0956  
  Author:  PRISCA Najera Service:  (none) Author Type:   
  Filed:  10/25/18 0957 Date of Service:  10/25/18 0956 Status:  Signed 
  :  PRISCA Najera ()   
   
 CM met with pt for discharge planning. Pt is 86 years old and lives with her daughter and g
randchildren in a 2-level home. Pt has 7 stairs at the main entrance. Pt is independent with
 
 her mobility. Pt denied any difficulty obtaining her medications and had no resource concer
ns at this time. CM will continue to follow as needed.
 
  10/25/18 0954 
 Discharge Planning Evaluation  
 Admitting Diagnosis Coronary Artery Disease 
 Readmission No 
 Living Arrangements Children 
 Support Systems Children 
 Type of Residence Private residence 
 House type House 2 story 
 Steps to enter 7 
 Bathrooms on 1st Floor 1-Full 
 Independent with ADL's Yes 
 Independent with Mobility Yes 
 Home Care Services No 
 Caregiver after Discharge Yes 
 Caregiver Name Lauryn Leroy 
 Relationship to Patient Daughter 
 Phone number [phone] 
 Mental Status Oriented 
 Anticipated Discharge Plan  
 Post Acute Care Needs None at this time 
 Resources  
 Financial concerns No 
 Transportation issues No 
 Patient/Family concerns No 
 Prescription Plan Yes 
 Anticipated Disposition  
 Facility Type Home 
 Medicare Important Message (GELA) Not applicable 
 Met with: Patient
 and discussed discharge planning, Pt is a 86 y.o., female
 Patient's PCP is: GIO PEREZ
 Patient's insurance: Medicare
 Coverage concerns: None
 Medication coverage/concerns: None
 Community resources utilized / needed: None
 Assistance in transportation: Not needed.
 Identification of any specific education / training: None
 Barriers to Discharge / Alternative housing needed: None 
 Anticipated DCP: Home
 
  
  Electronically signed by Conversion Transaction, Provider at 2019 10:05 PM PDTConver
heaven Transaction, Provider Unknown - 10/25/2018  6:35 AM PDTFormatting of this note might be
 different from the original.
 Nurse Progress Note by Mayra Deal RN at 10/25/18 0635  
  Author:  Mayra Deal RN Service:  (none) Author Type:  Registered Nurse 
  Filed:  10/25/18 0636 Date of Service:  10/25/18 0635 Status:  Addendum 
  :  Mayra Deal RN (Registered Nurse)   
  Related Notes:  Original Note by Mayra Deal RN (Registered Nurse) filed at 10/25/18 0
636   
   
 Updated Dr Funez on BP, we will continue to observe and await echo, no need for stat.
  
  Electronically signed by Conversion Transaction, Provider at 2019 10:01 PM PDTConver
heaven Transaction, Provider Unknown - 10/25/2018  6:28 AM PDTFormatting of this note might be
 different from the original.
 Nurse Progress Note by Mayra Deal RN at 10/25/18 0628  
 
  Author:  Mayra Deal RN Service:  (none) Author Type:  Registered Nurse 
  Filed:  10/25/18 0629 Date of Service:  10/25/18 0628 Status:  Signed 
  :  Mayra Deal RN (Registered Nurse)   
   
 Phoned and left  for echo regarding the need for Stat echo according to Dr Funez's note 
to rule out pericardial effusion.
  
  Electronically signed by Conversion Transaction, Provider at 2019 10:02 PM PDTConver
heaven Transaction, Provider Unknown - 10/25/2018  2:52 AM PDTFormatting of this note might be
 different from the original.
 Nurse Progress Note by Mayra Deal RN at 10/25/18 0252  
  Author:  Mayra Deal RN Service:  (none) Author Type:  Registered Nurse 
  Filed:  10/25/18 0253 Date of Service:  10/25/18 0252 Status:  Signed 
  :  Mayra Deal RN (Registered Nurse)   
   
 Chart check complete
  
  Electronically signed by Conversion Transaction, Provider at 2019  9:59 PM PDTConver
heaven Transaction, Provider Unknown - 10/25/2018  1:20 AM PDTFormatting of this note might be
 different from the original.
 Nurse Progress Note by Mayra Deal RN at 10/25/18 0120  
  Author:  Mayra Deal RN Service:  (none) Author Type:  Registered Nurse 
  Filed:  10/25/18 0138 Date of Service:  10/25/18 0120 Status:  Signed 
  :  Mayra Deal RN (Registered Nurse)   
   
 Assumed care of pt from ANNE Burns, pt resting at this time, no c/o.
  
  Electronically signed by Conversion Transaction, Provider at 2019  9:57 PM PDTConver
heaven Transaction, Provider Unknown - 10/24/2018  8:31 PM PDTFormatting of this note might be
 different from the original.
 Pharmacy Note by Mariah Up RPH at 10/24/18 2031  
  Author:  Mariah Up RPH Service:  Pharmacy Author Type:  Pharmacist 
  Filed:  10/24/18 2031 Date of Service:  10/24/18 2031 Status:  Signed 
  :  Mariah Up RPH (Pharmacist)   
   
 Renal Dosing Monitoring:
 Vonnie Celaya 86 y.o. female
 
 Pharmacy dosing for renal function per Dr. Funez
 
 Plan per protocol: 
 Medication / Dose: crcl ~ 40.7 ml/min bsed on scr of 0.91
 No renal dosage adjustments needed at this time
 
 Pharmacy will continue monitoring patient for appropriate dosing per renal function.
 10/24/2018 8:30 PM
 Pharmacist: MARIAH UP
 
  
  Electronically signed by Conversion Transaction, Provider at 2019 10:11 PM PDTConver
heaven Transaction, Provider Unknown - 10/24/2018  6:41 PM PDTFormatting of this note might be
 different from the original.
 Nurse Progress Note by Luci Coker RN at 10/24/18 1841  
  Author:  Luci Coker RN Service:  Interventional Cardiology Author Type:  Registered Nurs
e 
  Filed:  10/24/18 1842 Date of Service:  10/24/18 1841 Status:  Signed 
  :  Luci Coker RN (Registered Nurse)   
   
 Groin checked at BS with ANNE Pressley. Pt bleeding from groin site. Pressure being held by Ryan santoro RN. Site is soft to palpation per Huan.
 
 
 Luci Coker RN
 
  
  Electronically signed by Conversion Transaction, Provider at 2019 10:09 PM PDTConver
heaven Transaction, Provider Unknown - 10/23/2018  4:57 PM PDTFormatting of this note might be
 different from the original.
 Nurse Progress Note by Nadiya Tena RN at 10/23/18 1657  
  Author:  Nadiya Tena RN Service:  Cardiology Author Type:  Registered Nurse 
  Filed:  10/23/18 1703 Date of Service:  10/23/18 1657 Status:  Signed 
  :  Nadiya Tena RN (Registered Nurse)   
   
 [facility] 
 Pre-Procedure Telephone Call 
                                                       
 Type of Procedure   [facility]
 Spoke with:   Patient
  needed:    No
            Serbian    No
            Citizen of [place] No
            Other: 
  arranged: No                             
 Arrival Time: 1330
 Check In Location:  Registration
 Ride and Caregiver:  Lauryn
 Phone Number for Ride/Caregiver: 
 NPO from: 0700
 Diabetic:  Yes
  If Yes are they on Glucophage/Metformin   No 
                  Date of last Dose   {Blank single:99692::"NA"
 Is patient on Anticoagulants   Yes     Coumadin
                 Date of Last Dose  10/19/2018
 Iodine Allergy?  No
                 Action:  NA, Pre-medicated and Physician Notified
 Latex Allergy? No
 
 Patient informed to bring (medication list, etc):  Yes
 
 Any other comments:
 
 Nadiya Tena RN
 
  
  Electronically signed by Conversion Transaction, Provider at 2019 10:13 PM PDTdocume
nted in this encounter
 
 Plan of Treatment
 
 
+--------+---------+------------+----------------------+-------------+
| Date   | Type    | Specialty  | Care Team            | Description |
+--------+---------+------------+----------------------+-------------+
| / | Office  | Cardiology |   [facility]    |             |
|    | Visit   |            | [address]  [address]      |             |
|        |         |            | [address]   |             |
|        |         |            | [place] [address]   |             |
|        |         |            | [phone]         |             |
|        |         |            | [phone] (Fax)   |             |
+--------+---------+------------+----------------------+-------------+
 documented as of this encounter
 
 
 Procedures
 
 
+----------------------+--------+-------------+----------------------+----------------------
+
| Procedure Name       | Priori | Date/Time   | Associated Diagnosis | Comments             
|
|                      | ty     |             |                      |                      
|
+----------------------+--------+-------------+----------------------+----------------------
+
| POC GLUCOSE          | Routin | 10/25/2018  |                      |   Results for this   
|
|                      | e      | 11:18 AM    |                      | procedure are in the 
|
|                      |        | PDT         |                      |  results section.    
|
+----------------------+--------+-------------+----------------------+----------------------
+
| ECHO LIMITED         | Routin | 10/25/2018  |                      |   Results for this   
|
|                      | e      |  7:00 AM    |                      | procedure are in the 
|
|                      |        | PDT         |                      |  results section.    
|
+----------------------+--------+-------------+----------------------+----------------------
+
| POC GLUCOSE          | Routin | 10/25/2018  |                      |   Results for this   
|
|                      | e      |  5:32 AM    |                      | procedure are in the 
|
|                      |        | PDT         |                      |  results section.    
|
+----------------------+--------+-------------+----------------------+----------------------
+
| EXTERNAL LAB: CBC    | Routin | 10/25/2018  |                      |   Results for this   
|
|                      | e      |  4:37 AM    |                      | procedure are in the 
|
|                      |        | PDT         |                      |  results section.    
|
+----------------------+--------+-------------+----------------------+----------------------
+
| BASIC METABOLIC      | Routin | 10/25/2018  |                      |   Results for this   
|
| PANEL                | e      |  4:37 AM    |                      | procedure are in the 
|
|                      |        | PDT         |                      |  results section.    
|
+----------------------+--------+-------------+----------------------+----------------------
+
| POC GLUCOSE          | Routin | 10/24/2018  |                      |   Results for this   
|
|                      | e      |  9:37 PM    |                      | procedure are in the 
|
|                      |        | PDT         |                      |  results section.    
|
+----------------------+--------+-------------+----------------------+----------------------
+
 
| CV CARDIAC PROCEDURE | Routin | 10/24/2018  |                      |   Results for this   
|
|                      | e      |  6:26 PM    |                      | procedure are in the 
|
|                      |        | PDT         |                      |  results section.    
|
+----------------------+--------+-------------+----------------------+----------------------
+
| ACTIVATED CLOTTING   | Routin | 10/24/2018  |                      |   Results for this   
|
| TIME                 | e      |  5:47 PM    |                      | procedure are in the 
|
|                      |        | PDT         |                      |  results section.    
|
+----------------------+--------+-------------+----------------------+----------------------
+
| ACTIVATED CLOTTING   | Routin | 10/24/2018  |                      |   Results for this   
|
| TIME                 | e      |  5:07 PM    |                      | procedure are in the 
|
|                      |        | PDT         |                      |  results section.    
|
+----------------------+--------+-------------+----------------------+----------------------
+
| EXTERNAL LAB: CBC    | Routin | 10/24/2018  |                      |   Results for this   
|
|                      | e      |  1:52 PM    |                      | procedure are in the 
|
|                      |        | PDT         |                      |  results section.    
|
+----------------------+--------+-------------+----------------------+----------------------
+
| PROTIME INR          | Routin | 10/24/2018  |                      |   Results for this   
|
|                      | e      |  1:52 PM    |                      | procedure are in the 
|
|                      |        | PDT         |                      |  results section.    
|
+----------------------+--------+-------------+----------------------+----------------------
+
| BASIC METABOLIC      | Routin | 10/24/2018  |                      |   Results for this   
|
| PANEL                | e      |  1:52 PM    |                      | procedure are in the 
|
|                      |        | PDT         |                      |  results section.    
|
+----------------------+--------+-------------+----------------------+----------------------
+
 documented in this encounter
 
 Results
 POC Glucose (10/25/2018 11:18 AM PDT)
 
+-------------+--------------------------+---------------+------------+--------------+
| Component   | Value                    | Ref Range     | Performed  | Pathologist  |
|             |                          |               | At         | Signature    |
+-------------+--------------------------+---------------+------------+--------------+
| Glucose,    | 136 (H)Comment: Testing  | 65 - 99 mg/dL | EXTERNAL   |              |
| Fingerstick | performed at [facility];[address]     |               | LAB        |              |
|             | [address];[place], [place]   |               |            |              |
 
|             | [id number]                    |               |            |              |
+-------------+--------------------------+---------------+------------+--------------+
 
 
 
+----------+
| Specimen |
+----------+
|          |
+----------+
 
 
 
+----------------+---------+--------------------+--------------+
| Performing     | Address | City/State/Zipcode | Phone Number |
| Organization   |         |                    |              |
+----------------+---------+--------------------+--------------+
|   EXTERNAL LAB |         |                    |              |
+----------------+---------+--------------------+--------------+
 Echo Limited (10/25/2018  7:00 AM PDT)
 
+----------+
| Specimen |
+----------+
|          |
+----------+
 
 
 
+------------------------------------------------------------------------+--------------+
| Impressions                                                            | Performed At |
+------------------------------------------------------------------------+--------------+
|   1. Overall left ventricular systolic function is normal with, an EF  |              |
| between 65 - 70 %.  2. There is no pericardial effusion.               |              |
+------------------------------------------------------------------------+--------------+
 
 
 
+------------------------------------------------------------------------+--------------+
| Narrative                                                              | Performed At |
+------------------------------------------------------------------------+--------------+
|      Patient Name:   Vonnie Celaya  YOB: 1932    |              |
|   Accession:   [id number]     Performing Physician:   Daniel Funez MD |              |
|   _______________________________________________________________      |              |
| INDICATIONS  -----------  pericardial effusion     CONCLUSIONS         |              |
| -----------  1. Overall left ventricular systolic function is normal   |              |
| with, an EF between 65 - 70 %.  2. There is no pericardial effusion.   |              |
|    FINDINGS  --------  Study: A limited 2-dimensional transthoracic    |              |
| echocardiogram with limited spectral and color flow Doppler was        |              |
| performed.  Left Ventricle: Overall left ventricular systolic function |              |
|  is normal with, an EF between 65 - 70 %.  Pericardium: There is no    |              |
| pericardial effusion.     MEASUREMENTS  -------------     Sonographer: |              |
|  GD  Authenticated by:   Daniel Funez MD  Report Date/Time:        |              |
| 10- 12:14:37                                                    |              |
+------------------------------------------------------------------------+--------------+
 
 
 
+-------------------------------------------------------------------------------------------
-----------------------------+
 
| Procedure Note                                                                            
                             |
+-------------------------------------------------------------------------------------------
-----------------------------+
|   Casimiro Mar Conversion - 2019  3:45 PM PDT   Patient Name:  Yodit Celaya    
                             |
| of Birth:  1932 Accession:  [id number] Performing Physician:  Daniel Funez         
                             |
| MD_______________________________________________________________INDICATIONS-----------p  
                             |
| ericardial effusion CONCLUSIONS-----------1. Overall left ventricular systolic function   
                             |
| is normal with, an EF between 65 - 70 %.2. There is no pericardial effusion.              
                             |
| FINDINGS--------Study: A limited 2-dimensional transthoracic echocardiogram with limited  
                             |
|  spectral and color flow Doppler was performed.Left Ventricle: Overall left ventricular   
                             |
| systolic function is normal with, an EF between 65 - 70 %.Pericardium: There is no        
                             |
| pericardial effusion. MEASUREMENTS------------- Sonographer: Radhaticated by:          
                             |
| Daniel Funez MDReport Date/Time: 10- 12:14:37 IMPRESSION: 1. Overall left      
                             |
| ventricular systolic function is normal with, an EF between 65 - 70 %.2. There is no      
                             |
| pericardial effusion.                                                                     
                             |
|CONCLUSIONS                                                                                
                            |
|-----------                                                                                
                              |
|1. Overall left ventricular systolic function is normal with, an EF between 65 - 70 %.     
                             |
|2. There is no pericardial effusion.                                                       
                             |
|FINDINGS                                                                                   
                            |
|--------                                                                                   
                              |
|Study: A limited 2-dimensional transthoracic echocardiogram with limited spectral and color
 flow Doppler was performed. |
|Left Ventricle: Overall left ventricular systolic function is normal with, an EF between 65
 - 70 %.                     |
|Pericardium: There is no pericardial effusion.                                             
                             |
|MEASUREMENTS                                                                               
                            |
|-------------                                                                              
                              |
|Sonographer: VINH                                                                            
                             |
|Authenticated by:  Daniel Funez MD                                                     
                             |
 
|Report Date/Time: 10- 12:14:37                                                      
                             |
|IMPRESSION:                                                                                
                             |
|1. Overall left ventricular systolic function is normal with, an EF between 65 - 70 %.     
                             |
|2. There is no pericardial effusion.                                                       
                             |
+-------------------------------------------------------------------------------------------
-----------------------------+
 POC Glucose (10/25/2018  5:32 AM PDT)
 
+-------------+--------------------------+---------------+------------+--------------+
| Component   | Value                    | Ref Range     | Performed  | Pathologist  |
|             |                          |               | At         | Signature    |
+-------------+--------------------------+---------------+------------+--------------+
| Glucose,    | 132 (H)Comment: Testing  | 65 - 99 mg/dL | EXTERNAL   |              |
| Fingerstick | performed at [facility];[address]     |               | LAB        |              |
|             | [address];[place]   |               |            |              |
|             | [id number]                    |               |            |              |
+-------------+--------------------------+---------------+------------+--------------+
 
 
 
+----------+
| Specimen |
+----------+
|          |
+----------+
 
 
 
+----------------+---------+--------------------+--------------+
| Performing     | Address | City/State/Zipcode | Phone Number |
| Organization   |         |                    |              |
+----------------+---------+--------------------+--------------+
|   EXTERNAL LAB |         |                    |              |
+----------------+---------+--------------------+--------------+
 External Lab: CBC (10/25/2018  4:37 AM PDT)
 
+-------------+-------------------------+-----------------+------------+--------------+
| Component   | Value                   | Ref Range       | Performed  | Pathologist  |
|             |                         |                 | At         | Signature    |
+-------------+-------------------------+-----------------+------------+--------------+
| WBC         | 9.88                    | 3.80 - 11.00    | EXTERNAL   |              |
|             |                         | K/uL            | LAB        |              |
+-------------+-------------------------+-----------------+------------+--------------+
| RED CELL    | 3.67 (L)                | 3.70 - 5.10     | EXTERNAL   |              |
| COUNT       |                         | M/uL            | LAB        |              |
+-------------+-------------------------+-----------------+------------+--------------+
| Hgb         | 10.9 (L)                | 11.3 - 15.5     | EXTERNAL   |              |
|             |                         | g/dL            | LAB        |              |
+-------------+-------------------------+-----------------+------------+--------------+
| Hematocrit, | 31.9 (L)                | 34.0 - 46.0 %   | EXTERNAL   |              |
|  POC        |                         |                 | LAB        |              |
+-------------+-------------------------+-----------------+------------+--------------+
| MCV         | 87.0                    | 80.0 - 100.0 fl | EXTERNAL   |              |
|             |                         |                 | LAB        |              |
 
+-------------+-------------------------+-----------------+------------+--------------+
| MCH         | 29.6                    | 27.0 - 34.0 pg  | EXTERNAL   |              |
|             |                         |                 | LAB        |              |
+-------------+-------------------------+-----------------+------------+--------------+
| MCHC        | 34.0                    | 32.0 - 35.5     | EXTERNAL   |              |
|             |                         | g/dL            | LAB        |              |
+-------------+-------------------------+-----------------+------------+--------------+
| RDW-CV      | 46.4                    | 37 - 53 fl      | EXTERNAL   |              |
|             |                         |                 | LAB        |              |
+-------------+-------------------------+-----------------+------------+--------------+
| Platelet    | 251                     | 150 - 400 K/uL  | EXTERNAL   |              |
| Count       |                         |                 | LAB        |              |
| Plasma      |                         |                 |            |              |
+-------------+-------------------------+-----------------+------------+--------------+
| MPV         | 9.1                     | fl              | EXTERNAL   |              |
|             |                         |                 | LAB        |              |
+-------------+-------------------------+-----------------+------------+--------------+
| Differentia | MANUAL                  |                 | EXTERNAL   |              |
| l Type      |                         |                 | LAB        |              |
+-------------+-------------------------+-----------------+------------+--------------+
| Segmented   | 84                      | %               | EXTERNAL   |              |
| Neutrophils |                         |                 | LAB        |              |
|  Manual     |                         |                 |            |              |
+-------------+-------------------------+-----------------+------------+--------------+
| Lymphocytes | 13                      | %               | EXTERNAL   |              |
|  Manual     |                         |                 | LAB        |              |
+-------------+-------------------------+-----------------+------------+--------------+
| Monocytes   | 3                       | %               | EXTERNAL   |              |
| Manual      |                         |                 | LAB        |              |
+-------------+-------------------------+-----------------+------------+--------------+
| Absolute    | 8.30 (H)                | 1.90 - 7.40     | EXTERNAL   |              |
| Neutrophils |                         | K/uL            | LAB        |              |
+-------------+-------------------------+-----------------+------------+--------------+
| Absolute    | 1.28                    | 1.00 - 3.90     | EXTERNAL   |              |
| Lymphocytes |                         | K/uL            | LAB        |              |
+-------------+-------------------------+-----------------+------------+--------------+
| Absolute    | 0.30                    | 0.00 - 0.80     | EXTERNAL   |              |
| Monocytes   |                         | K/uL            | LAB        |              |
+-------------+-------------------------+-----------------+------------+--------------+
| RBC         | RBC AND PLT MORPHOLOGY  |                 | EXTERNAL   |              |
| Morphology  | APPEAR NORMALComment:   |                 | LAB        |              |
|             | Testing performed at    |                 |            |              |
|             | [facility], [address] [place] [address]  |                 |            |              |
|             | [place], [place]     |                 |            |              |
|             | [id number]                   |                 |            |              |
+-------------+-------------------------+-----------------+------------+--------------+
 
 
 
+-----------------+
| Specimen        |
+-----------------+
| Blood specimen  |
| (specimen)      |
+-----------------+
 
 
 
+----------------+---------+--------------------+--------------+
| Performing     | Address | City/State/Zipcode | Phone Number |
 
| Organization   |         |                    |              |
+----------------+---------+--------------------+--------------+
|   EXTERNAL LAB |         |                    |              |
+----------------+---------+--------------------+--------------+
 Basic Metabolic Panel (10/25/2018  4:37 AM PDT)
 
+-------------+--------------------------+-----------------+------------+--------------+
| Component   | Value                    | Ref Range       | Performed  | Pathologist  |
|             |                          |                 | At         | Signature    |
+-------------+--------------------------+-----------------+------------+--------------+
| Na          | 136                      | 135 - 145       | EXTERNAL   |              |
|             |                          | mmol/L          | LAB        |              |
+-------------+--------------------------+-----------------+------------+--------------+
| K           | 4.6Comment: SPECIMEN     | 3.5 - 4.9       | EXTERNAL   |              |
|             | SLIGHTLY HEMOLYZED       | mmol/L          | LAB        |              |
+-------------+--------------------------+-----------------+------------+--------------+
| Cl          | 106                      | 99 - 109 mmol/L | EXTERNAL   |              |
|             |                          |                 | LAB        |              |
+-------------+--------------------------+-----------------+------------+--------------+
| CO2         | 19 (L)                   | 23 - 32 mmol/L  | EXTERNAL   |              |
|             |                          |                 | LAB        |              |
+-------------+--------------------------+-----------------+------------+--------------+
| Anion Gap   | 16                       | 5 - 20 mmol/L   | EXTERNAL   |              |
|             |                          |                 | LAB        |              |
+-------------+--------------------------+-----------------+------------+--------------+
| Glucose,    | 147 (H)Comment: SPECIMEN | 65 - 99 mg/dL   | EXTERNAL   |              |
| Fasting     |  SLIGHTLY HEMOLYZED      |                 | LAB        |              |
+-------------+--------------------------+-----------------+------------+--------------+
| BUN         | 21                       | 8 - 25 mg/dL    | EXTERNAL   |              |
|             |                          |                 | LAB        |              |
+-------------+--------------------------+-----------------+------------+--------------+
| Creatinine  | 0.9Comment: SPECIMEN     | 0.50 - 1.00     | EXTERNAL   |              |
|             | SLIGHTLY HEMOLYZED       | mg/dL           | LAB        |              |
+-------------+--------------------------+-----------------+------------+--------------+
| BUN/Creatin | 23                       |                 | EXTERNAL   |              |
| ine Ratio   |                          |                 | LAB        |              |
+-------------+--------------------------+-----------------+------------+--------------+
| Calcium     | 8.6                      | 8.5 - 10.5      | EXTERNAL   |              |
|             |                          | mg/dL           | LAB        |              |
+-------------+--------------------------+-----------------+------------+--------------+
| Estimated   | 59 (L)Comment: GFR <60:  | mL/min/1.73m2   | EXTERNAL   |              |
| GFR         | CHRONIC KIDNEY DISEASE,  |                 | LAB        |              |
|             | IF FOUND OVER A 3 MONTH  |                 |            |              |
|             | PERIOD.GFR <15: KIDNEY   |                 |            |              |
|             | FAILURE.FOR       |                 |            |              |
|             | [employer], MULTIPLY THE  |                 |            |              |
|             | CALCULATED GFR BY        |                 |            |              |
|             | 1.210.This eGFR is       |                 |            |              |
|             | calculated using the     |                 |            |              |
|             | MDRD IDMS traceable      |                 |            |              |
|             | equation.Testing         |                 |            |              |
|             | performed at [facility], [address] |                 |            |              |
|             |  [facility], [place],        |                 |            |              |
|             | [place]   [address]    |                 |            |              |
+-------------+--------------------------+-----------------+------------+--------------+
 
 
 
+-----------------+
| Specimen        |
 
+-----------------+
| Blood specimen  |
| (specimen)      |
+-----------------+
 
 
 
+----------------+---------+--------------------+--------------+
| Performing     | Address | City/State/Zipcode | Phone Number |
| Organization   |         |                    |              |
+----------------+---------+--------------------+--------------+
|   EXTERNAL LAB |         |                    |              |
+----------------+---------+--------------------+--------------+
 POC Glucose (10/24/2018  9:37 PM PDT)
 
+-------------+--------------------------+---------------+------------+--------------+
| Component   | Value                    | Ref Range     | Performed  | Pathologist  |
|             |                          |               | At         | Signature    |
+-------------+--------------------------+---------------+------------+--------------+
| Glucose,    | 151 (H)Comment: Testing  | 65 - 99 mg/dL | EXTERNAL   |              |
| Fingerstick | performed at [facility];[address]     |               | LAB        |              |
|             | [address];[place], [place]   |               |            |              |
|             | [address]                    |               |            |              |
+-------------+--------------------------+---------------+------------+--------------+
 
 
 
+----------+
| Specimen |
+----------+
|          |
+----------+
 
 
 
+----------------+---------+--------------------+--------------+
| Performing     | Address | City/State/Zipcode | Phone Number |
| Organization   |         |                    |              |
+----------------+---------+--------------------+--------------+
|   EXTERNAL LAB |         |                    |              |
+----------------+---------+--------------------+--------------+
 CV CARDIAC PROCEDURE (10/24/2018  6:26 PM PDT)
 
+----------+
| Specimen |
+----------+
|          |
+----------+
 
 
 
+------------------------------------------------------------------------+--------------+
| Impressions                                                            | Performed At |
+------------------------------------------------------------------------+--------------+
|   1.   Successful PTCA and stenting of the right posterolateral artery |              |
|  using a  3.0 x 16 mm Synergy drug-eluting stent post-dilated using    |              |
| 3.0 noncompliant  balloon.  2.   Probable limited wire-induced         |              |
| perforation of a small branch of right  posterolateral artery.         |              |
| PLAN:   The patient will be observed overnight.   She has been on      |              |
| Plavix 75  mg daily and received an additional 300 mg in the cath lab. |              |
 
|    We will  continue Plavix 75 mg daily for at least 6 months. We will |              |
|  do limited  echocardiogram tomorrow morning to assess for any         |              |
| pericardial effusion.   If  the patient develop any hypertension, then |              |
|  we might consider emergent  echocardiogram to rule out pericardial    |              |
| effusion.     Read by DANIEL FUNEZ MD 10/28/2018 04:56 P        |              |
| Electronically signed by Daniel Funez MD on 10/29/2018 12:40 PM   |              |
+------------------------------------------------------------------------+--------------+
 
 
 
+------------------------------------------------------------------------+--------------+
| Narrative                                                              | Performed At |
+------------------------------------------------------------------------+--------------+
|   Accession:   [id number]                                                 |              |
| ____________________________________________________________________   |              |
|    PROCEDURES PERFORMED:  1.   Selective right coronary artery         |              |
| angiogram.  2.   Successful PTCA and stenting of the right             |              |
| posterolateral artery using a  3.0 x 16 mm Synergy drug-eluting stent  |              |
| post-dilated using a 3.0  noncompliant balloon.  3.   Right common     |              |
| femoral artery angiogram.  4.   Successful Angio-seal closure of the   |              |
| right common femoral artery.     INDICATION:   Shortness of breath,    |              |
| angina equivalent, severe mitral and  tricuspid regurgitation,         |              |
| anticipated MitraClip procedure.     HISTORY OF PRESENT ILLNESS:       |              |
|   This is an 86-year-old female with history of  hypertension,         |              |
| hyperlipidemia, chronic atrial fibrillation, type 2 diabetes  mellitus |              |
|  and severe mitral and tricuspid regurgitation, who was evaluated  at  |              |
| [facility] for possible mitral and tricuspid valve percutaneous              |              |
| interventions.  Coronary angiogram was done there and reported as      |              |
| critical distal right  coronary artery disease.   The patient was      |              |
| referred for intervention in the  right coronary artery.   The patient |              |
|  had been experiencing significant  exertional shortness of breath     |              |
| that has been progressively worse.   The  procedure, risks, benefits,  |              |
| alternatives were discussed with the patient  and she agreed to        |              |
| proceed.     FRAILTY SCORE: 6     TECHNIQUE:   The patient was brought |              |
|  to the cardiac catheterization  laboratory in the fasting state.      |              |
|   After informed consent obtained the  patient was prepped and draped  |              |
| in the usual sterile fashion.   Time-out for  patient safety was       |              |
| performed.    One percent lidocaine was used for local  anesthesia of  |              |
| the right groin.   The right femoral artery was accessed using         |              |
| micropuncture needle.   A 6-French sheath was placed in the right      |              |
| femoral  artery without difficulty.   The patient was given 5000 units |              |
|  of heparin  intravenously.   ACT was monitored.   The patient         |              |
| received additional heparin  boluses to keep ACT more than 250.     A  |              |
| 6-French JR4 guiding catheter was used for selective engagement of the |              |
|   right coronary system and baseline angiogram was obtained.           |              |
|   Engagement was  suboptimal, and there is diffusely calcified and     |              |
| tortuous right coronary  artery and tortuous right posterolateral      |              |
| artery with critical calcified  lesion in the right posterolateral     |              |
| artery.   The patient was also having  some oozing from around the     |              |
| 6-French sheath at this time, and we decided to  proceed with the      |              |
| 7-French system to have more support.   The 6-French sheath  was       |              |
| upgraded to a 7-French for a 5-cm sheath.   A 7-French AL1 guiding     |              |
| catheter was used for selective engagement of the right coronary       |              |
| artery.   A  BMW wire was advanced.   I was not able to cross the      |              |
| heavily calcified  subtotally occluded right posterolateral artery.    |              |
|   A 2.25 x 12 mm balloon  was advanced to help to provide more         |              |
| support, and even with this support  the balloon was not able to       |              |
| cross.   At this time the balloon and the [employer]  wire were removed.      |              |
|   [employer] wire was advanced and 2.25 x 12 mm balloon  was advanced. |              |
|    With the help of the balloon I was able to cross the lesion  and    |              |
 
| advanced into the distal right posterolateral artery.   I was not able |              |
|   to advance the balloon across the lesion.   The balloon was removed. |              |
|    A  5.5-French GuideLiner catheter was advanced and at one point 5 x |              |
|  12 mm  balloon was advanced.   With the help of the GuideLiner I was  |              |
| able to cross  the lesion.   The lesion was predilated up to 10        |              |
| atmospheres.   The 1.5  balloon was removed and 2.25 x 12 mm balloon   |              |
| was advanced.   I was able to  advance it across the lesion only with  |              |
| the help of the GuideLiner, advanced  all the way to the distal right  |              |
| coronary artery.   The balloon was inflated  multiple times up to 10   |              |
| atmospheres.   The balloon was removed and a 3.0 x  16 mm Synergy      |              |
| drug-eluting stent was advanced.   The stent would not go and  the     |              |
| stent was then advanced gradually and the GuideLiner was advanced over |              |
|   the stent.   I was able to advance the stent to the right            |              |
| posterolateral  artery.   Given the combination of calcification and   |              |
| tortuosity in the right  posterolateral artery, it was difficult to    |              |
| advance the stent.   The  GuideLiner was advanced all the way to the   |              |
| right posterolateral artery and  then I was able to deliver the stent. |              |
|    Angiogram for positioning was  obtained after removing the          |              |
| GuideLiner and the stent was deployed at 12  atmospheres.   The stent  |              |
| was then postdilated using a 3.0 x 12 mm  noncompliant Quantum balloon |              |
|  which was again advanced with the help of the  GuideLiner.   The      |              |
| balloon was inflated multiple times up to 18 atmospheres.  Distant     |              |
| balloon and the GuideLiner were removed.   Post-intervention           |              |
| angiogram was obtained, which shows excellent results with reduction   |              |
| of  degree of stenosis from 99 percent down to 0 percent with JESSICA 3   |              |
| flow.   No  evidence of dissection or perforation.   The long 7-French |              |
|  sheath was  removed off of the guidewire and a short 7-French sheath  |              |
| was placed into  the right femoral artery.   Angiogram of the right    |              |
| common femoral artery was  obtained at the beginning by injection of   |              |
| contrast through the  micropuncture sheath which showed the puncture   |              |
| site above the bifurcation  at the end of the procedure.   Angio-seal  |              |
| closure of the right common  femoral artery was done successfully.     |              |
|   We have noted in the  post-intervention angiogram there is some      |              |
| possible dye extravasation in the  pericardium; however, there was no  |              |
| active bleeding or active extravasation  of blood flow from any of the |              |
|  branches; however, I suspected this might be  a wire-induced          |              |
| perforation of a small branch.   The patient had remained              |              |
| hemodynamically stable throughout the procedure and after the          |              |
| procedure.   I  decided to follow that clinically and with followup    |              |
| echocardiogram in the  morning.   Overall, the patient tolerated the   |              |
| procedure well.     TOTAL CONTRAST:   128 mL of Isovue.     TOTAL      |              |
| BLOOD LOSS:   Less than 20 mL.     RESULTS:  1.   Selective right      |              |
| coronary artery angiogram.  2.   Right coronary artery is the large    |              |
| dominant vessel that gives rise to a  large right posterolateral       |              |
| artery and gives rise to right posterior  descending artery.   Right   |              |
| coronary artery is diffusely calcified and  tortuous vessel.   The     |              |
| right posterolateral artery is tortuous proximally  with high-grade    |              |
| heavily calcified lesion in the range of 99 percent in the             |              |
| midportion.   The   proximal right coronary artery had moderate        |              |
| disease in  the range of 30 percent.   Mid right coronary artery had   |              |
| diffuse  mild-to-moderate disease in the range of 30 percent.          |              |
+------------------------------------------------------------------------+--------------+
 
 
 
+-----------------------------------------------------------------------------+
| Procedure Note                                                              |
+-----------------------------------------------------------------------------+
|   Casimiro Mar Conversion - 2019  3:57 PM PDT  Accession:  [id number]        |
| ____________________________________________________________________        |
 
|                                                                             |
| PROCEDURES PERFORMED:                                                       |
| 1.  Selective right coronary artery angiogram.                              |
| 2.  Successful PTCA and stenting of the right posterolateral artery using a |
| 3.0 x 16 mm Synergy drug-eluting stent post-dilated using a 3.0             |
| noncompliant balloon.                                                       |
| 3.  Right common femoral artery angiogram.                                  |
| 4.  Successful Angio-seal closure of the right common femoral artery.       |
|                                                                             |
| INDICATION:  Shortness of breath, angina equivalent, severe mitral and      |
| tricuspid regurgitation, anticipated MitraClip procedure.                   |
|                                                                             |
| HISTORY OF PRESENT ILLNESS:  This is an 86-year-old female with history of  |
| hypertension, hyperlipidemia, chronic atrial fibrillation, type 2 diabetes  |
| mellitus and severe mitral and tricuspid regurgitation, who was evaluated   |
| at [facility] for possible mitral and tricuspid valve percutaneous interventions. |
| Coronary angiogram was done there and reported as critical distal right     |
| coronary artery disease.  The patient was referred for intervention in the  |
| right coronary artery.  The patient had been experiencing significant       |
| exertional shortness of breath that has been progressively worse.  The      |
| procedure, risks, benefits, alternatives were discussed with the patient    |
| and she agreed to proceed.                                                  |
|                                                                             |
| FRAILTY SCORE: 6                                                            |
|                                                                             |
| TECHNIQUE:  The patient was brought to the cardiac catheterization          |
| laboratory in the fasting state.  After informed consent obtained the       |
| patient was prepped and draped in the usual sterile fashion.  Time-out for  |
| patient safety was performed.   One percent lidocaine was used for local    |
| anesthesia of the right groin.  The right femoral artery was accessed using |
| micropuncture needle.  A 6-French sheath was placed in the right femoral    |
| artery without difficulty.  The patient was given 5000 units of heparin     |
| intravenously.  ACT was monitored.  The patient received additional heparin |
| boluses to keep ACT more than 250.                                          |
|                                                                             |
| A 6-French JR4 guiding catheter was used for selective engagement of the    |
| right coronary system and baseline angiogram was obtained.  Engagement was  |
| suboptimal, and there is diffusely calcified and tortuous right coronary    |
| artery and tortuous right posterolateral artery with critical calcified     |
| lesion in the right posterolateral artery.  The patient was also having     |
| some oozing from around the 6-French sheath at this time, and we decided to |
| proceed with the 7-French system to have more support.  The 6-French sheath |
| was upgraded to a 7-French for a 5-cm sheath.  A 7-French AL1 guiding       |
| catheter was used for selective engagement of the right coronary artery.  A |
| BMW wire was advanced.  I was not able to cross the heavily calcified       |
| subtotally occluded right posterolateral artery.  A 2.25 x 12 mm balloon    |
| was advanced to help to provide more support, and even with this support    |
| the balloon was not able to cross.  At this time the balloon and the BMW    |
| wire were removed.  Fielder FC wire was advanced and 2.25 x 12 mm balloon   |
| was advanced.  With the help of the balloon I was able to cross the lesion  |
| and advanced into the distal right posterolateral artery.  I was not able   |
| to advance the balloon across the lesion.  The balloon was removed.  A      |
| 5.5-French GuideLiner catheter was advanced and at one point 5 x 12 mm      |
| balloon was advanced.  With the help of the GuideLiner I was able to cross  |
| the lesion.  The lesion was predilated up to 10 atmospheres.  The 1.5       |
| balloon was removed and 2.25 x 12 mm balloon was advanced.  I was able to   |
| advance it across the lesion only with the help of the GuideLiner, advanced |
| all the way to the distal right coronary artery.  The balloon was inflated  |
| multiple times up to 10 atmospheres.  The balloon was removed and a 3.0 x   |
| 16 mm Synergy drug-eluting stent was advanced.  The stent would not go and  |
 
| the stent was then advanced gradually and the GuideLiner was advanced over  |
| the stent.  I was able to advance the stent to the right posterolateral     |
| artery.  Given the combination of calcification and tortuosity in the right |
| posterolateral artery, it was difficult to advance the stent.  The          |
| GuideLiner was advanced all the way to the right posterolateral artery and  |
| then I was able to deliver the stent.  Angiogram for positioning was        |
| obtained after removing the GuideLiner and the stent was deployed at 12     |
| atmospheres.  The stent was then postdilated using a 3.0 x 12 mm            |
| noncompliant Quantum balloon which was again advanced with the help of the  |
| GuideLiner.  The balloon was inflated multiple times up to 18 atmospheres.  |
| Distant balloon and the GuideLiner were removed.  Post-intervention         |
| angiogram was obtained, which shows excellent results with reduction of     |
| degree of stenosis from 99 percent down to 0 percent with JESSICA 3 flow.  No  |
| evidence of dissection or perforation.  The long 7-French sheath was        |
| removed off of the guidewire and a short 7-French sheath was placed into    |
| the right femoral artery.  Angiogram of the right common femoral artery was |
| obtained at the beginning by injection of contrast through the              |
| micropuncture sheath which showed the puncture site above the bifurcation   |
| at the end of the procedure.  Angio-seal closure of the right common        |
| femoral artery was done successfully.  We have noted in the                 |
| post-intervention angiogram there is some possible dye extravasation in the |
| pericardium; however, there was no active bleeding or active extravasation  |
| of blood flow from any of the branches; however, I suspected this might be  |
| a wire-induced perforation of a small branch.  The patient had remained     |
| hemodynamically stable throughout the procedure and after the procedure.  I |
| decided to follow that clinically and with followup echocardiogram in the   |
| morning.  Overall, the patient tolerated the procedure well.                |
|                                                                             |
| TOTAL CONTRAST:  128 mL of Isovue.                                          |
|                                                                             |
| TOTAL BLOOD LOSS:  Less than 20 mL.                                         |
|                                                                             |
| RESULTS:                                                                    |
| 1.  Selective right coronary artery angiogram.                              |
| 2.  Right coronary artery is the large dominant vessel that gives rise to a |
| large right posterolateral artery and gives rise to right posterior         |
| descending artery.  Right coronary artery is diffusely calcified and        |
| tortuous vessel.  The right posterolateral artery is tortuous proximally    |
| with high-grade heavily calcified lesion in the range of 99 percent in the  |
| midportion.  The  proximal right coronary artery had moderate disease in    |
| the range of 30 percent.  Mid right coronary artery had diffuse             |
| mild-to-moderate disease in the range of 30 percent.                        |
|                                                                             |
| IMPRESSION:                                                                 |
| 1.  Successful PTCA and stenting of the right posterolateral artery using a |
| 3.0 x 16 mm Synergy drug-eluting stent post-dilated using 3.0 noncompliant  |
| balloon.                                                                    |
| 2.  Probable limited wire-induced perforation of a small branch of right    |
| posterolateral artery.                                                      |
|                                                                             |
| PLAN:  The patient will be observed overnight.  She has been on Plavix 75   |
| mg daily and received an additional 300 mg in the cath lab.  We will        |
| continue Plavix 75 mg daily for at least 6 months. We will do limited       |
| echocardiogram tomorrow morning to assess for any pericardial effusion.  If |
| the patient develop any hypertension, then we might consider emergent       |
| echocardiogram to rule out pericardial effusion.                            |
|                                                                             |
| Read by DANIEL FUNEZ MD 10/28/2018 04:56 P                           |
|                                                                             |
| Electronically signed by Daniel Funez MD on 10/29/2018 12:40 PM        |
 
+-----------------------------------------------------------------------------+
 Activated clotting time (10/24/2018  5:47 PM PDT)
 
+------------+--------------------------+-----------+------------+--------------+
| Component  | Value                    | Ref Range | Performed  | Pathologist  |
|            |                          |           | At         | Signature    |
+------------+--------------------------+-----------+------------+--------------+
| Activated  | 235 (H)Comment: Testing  | 74 - 137  | EXTERNAL   |              |
| Clotting   | performed at [facility];[address]     | seconds   | LAB        |              |
| time, POC  | [address];[place], [place]   |           |            |              |
|            | [id number]                    |           |            |              |
+------------+--------------------------+-----------+------------+--------------+
 
 
 
+----------+
| Specimen |
+----------+
|          |
+----------+
 
 
 
+----------------+---------+--------------------+--------------+
| Performing     | Address | City/State/Zipcode | Phone Number |
| Organization   |         |                    |              |
+----------------+---------+--------------------+--------------+
|   EXTERNAL LAB |         |                    |              |
+----------------+---------+--------------------+--------------+
 Activated clotting time (10/24/2018  5:07 PM PDT)
 
+------------+--------------------------+-----------+------------+--------------+
| Component  | Value                    | Ref Range | Performed  | Pathologist  |
|            |                          |           | At         | Signature    |
+------------+--------------------------+-----------+------------+--------------+
| Activated  | 169 (H)Comment: Testing  | 74 - 137  | EXTERNAL   |              |
| Clotting   | performed at [facility];[address]     | seconds   | LAB        |              |
| time, POC  | [address];[place]   |           |            |              |
|            | [id number]                    |           |            |              |
+------------+--------------------------+-----------+------------+--------------+
 
 
 
+----------+
| Specimen |
+----------+
|          |
+----------+
 
 
 
+----------------+---------+--------------------+--------------+
| Performing     | Address | City/State/Zipcode | Phone Number |
| Organization   |         |                    |              |
+----------------+---------+--------------------+--------------+
|   EXTERNAL LAB |         |                    |              |
+----------------+---------+--------------------+--------------+
 Protime INR (10/24/2018  1:52 PM PDT)
 
+-----------+--------------------------+-----------+------------+--------------+
 
| Component | Value                    | Ref Range | Performed  | Pathologist  |
|           |                          |           | At         | Signature    |
+-----------+--------------------------+-----------+------------+--------------+
| INR       | 1.2Comment: REFERENCE    |           | EXTERNAL   |              |
|           | RANGE:0.9 - 1.2          |           | LAB        |              |
|           |   NON-ANTICOAGULATED2.0  |           |            |              |
|           | - 3.0   ALL OTHER        |           |            |              |
|           | THERAPEUTIC              |           |            |              |
|           | INDICATIONS2.5 - 3.5     |           |            |              |
|           | MECHANICAL HEART VALVES, |           |            |              |
|           |  RECURRENT OR SYSTEMIC   |           |            |              |
|           | EMBOLISMTesting          |           |            |              |
|           | performed at [facility];[address]     |           |            |              |
|           | [address];[place], [place]   |           |            |              |
|           | [id number]                    |           |            |              |
+-----------+--------------------------+-----------+------------+--------------+
 
 
 
+-----------------+
| Specimen        |
+-----------------+
| Blood specimen  |
| (specimen)      |
+-----------------+
 
 
 
+----------------+---------+--------------------+--------------+
| Performing     | Address | City/State/Zipcode | Phone Number |
| Organization   |         |                    |              |
+----------------+---------+--------------------+--------------+
|   EXTERNAL LAB |         |                    |              |
+----------------+---------+--------------------+--------------+
 External Lab: [facility] (10/24/2018  1:52 PM PDT)
 
+-------------+-------------------------+-----------------+------------+--------------+
| Component   | Value                   | Ref Range       | Performed  | Pathologist  |
|             |                         |                 | At         | Signature    |
+-------------+-------------------------+-----------------+------------+--------------+
| WBC         | 7.58                    | 3.80 - 11.00    | EXTERNAL   |              |
|             |                         | K/uL            | LAB        |              |
+-------------+-------------------------+-----------------+------------+--------------+
| RED CELL    | 4.06                    | 3.70 - 5.10     | EXTERNAL   |              |
| COUNT       |                         | M/uL            | LAB        |              |
+-------------+-------------------------+-----------------+------------+--------------+
| Hgb         | 11.9                    | 11.3 - 15.5     | EXTERNAL   |              |
|             |                         | g/dL            | LAB        |              |
+-------------+-------------------------+-----------------+------------+--------------+
| Hematocrit, | 35.7                    | 34.0 - 46.0 %   | EXTERNAL   |              |
|  POC        |                         |                 | LAB        |              |
+-------------+-------------------------+-----------------+------------+--------------+
| MCV         | 87.9                    | 80.0 - 100.0 fl | EXTERNAL   |              |
|             |                         |                 | LAB        |              |
+-------------+-------------------------+-----------------+------------+--------------+
| MCH         | 29.3                    | 27.0 - 34.0 pg  | EXTERNAL   |              |
|             |                         |                 | LAB        |              |
+-------------+-------------------------+-----------------+------------+--------------+
| MCHC        | 33.4                    | 32.0 - 35.5     | EXTERNAL   |              |
|             |                         | g/dL            | LAB        |              |
 
+-------------+-------------------------+-----------------+------------+--------------+
| RDW-CV      | 46.4                    | 37 - 53 fl      | EXTERNAL   |              |
|             |                         |                 | LAB        |              |
+-------------+-------------------------+-----------------+------------+--------------+
| Platelet    | 285                     | 150 - 400 K/uL  | EXTERNAL   |              |
| Count       |                         |                 | LAB        |              |
| Plasma      |                         |                 |            |              |
+-------------+-------------------------+-----------------+------------+--------------+
| MPV         | 8.4                     | fl              | EXTERNAL   |              |
|             |                         |                 | LAB        |              |
+-------------+-------------------------+-----------------+------------+--------------+
| Differentia | AUTOMATED               |                 | EXTERNAL   |              |
| l Type      |                         |                 | LAB        |              |
+-------------+-------------------------+-----------------+------------+--------------+
| % Segmented | 61.46                   | %               | EXTERNAL   |              |
|             |                         |                 | LAB        |              |
| Neutrophils |                         |                 |            |              |
+-------------+-------------------------+-----------------+------------+--------------+
| %           | 25.43                   | %               | EXTERNAL   |              |
| Lymphocytes |                         |                 | LAB        |              |
+-------------+-------------------------+-----------------+------------+--------------+
| % Monocytes | 7.59                    | %               | EXTERNAL   |              |
|             |                         |                 | LAB        |              |
+-------------+-------------------------+-----------------+------------+--------------+
| %           | 4.36                    | %               | EXTERNAL   |              |
| Eosinophils |                         |                 | LAB        |              |
+-------------+-------------------------+-----------------+------------+--------------+
| % Basophils | 1.16                    | %               | EXTERNAL   |              |
|             |                         |                 | LAB        |              |
+-------------+-------------------------+-----------------+------------+--------------+
| Absolute    | 4.66                    | 1.90 - 7.40     | EXTERNAL   |              |
| Segmented   |                         | K/uL            | LAB        |              |
| Neutrophils |                         |                 |            |              |
+-------------+-------------------------+-----------------+------------+--------------+
| Absolute    | 1.93                    | 1.00 - 3.90     | EXTERNAL   |              |
| Lymphocytes |                         | K/uL            | LAB        |              |
+-------------+-------------------------+-----------------+------------+--------------+
| Absolute    | 0.58                    | 0.00 - 0.80     | EXTERNAL   |              |
| Monocytes   |                         | K/uL            | LAB        |              |
+-------------+-------------------------+-----------------+------------+--------------+
| Absolute    | 0.33                    | 0.00 - 0.50     | EXTERNAL   |              |
| Eosinophils |                         | K/uL            | LAB        |              |
+-------------+-------------------------+-----------------+------------+--------------+
| Absolute    | 0.09Comment: Testing    | 0.00 - 0.10     | EXTERNAL   |              |
| Basophils   | performed at [facility];[address]    | K/uL            | LAB        |              |
|             | [address];[place]  |                 |            |              |
|             | [id number]                   |                 |            |              |
+-------------+-------------------------+-----------------+------------+--------------+
 
 
 
+-----------------+
| Specimen        |
+-----------------+
| Blood specimen  |
| (specimen)      |
+-----------------+
 
 
 
 
+----------------+---------+--------------------+--------------+
| Performing     | Address | City/State/Zipcode | Phone Number |
| Organization   |         |                    |              |
+----------------+---------+--------------------+--------------+
|   EXTERNAL LAB |         |                    |              |
+----------------+---------+--------------------+--------------+
 Basic Metabolic Panel (10/24/2018  1:52 PM PDT)
 
+-------------+--------------------------+-----------------+------------+--------------+
| Component   | Value                    | Ref Range       | Performed  | Pathologist  |
|             |                          |                 | At         | Signature    |
+-------------+--------------------------+-----------------+------------+--------------+
| Na          | 136                      | 135 - 145       | EXTERNAL   |              |
|             |                          | mmol/L          | LAB        |              |
+-------------+--------------------------+-----------------+------------+--------------+
| K           | 4.2                      | 3.5 - 4.9       | EXTERNAL   |              |
|             |                          | mmol/L          | LAB        |              |
+-------------+--------------------------+-----------------+------------+--------------+
| Cl          | 102                      | 99 - 109 mmol/L | EXTERNAL   |              |
|             |                          |                 | LAB        |              |
+-------------+--------------------------+-----------------+------------+--------------+
| CO2         | 23                       | 23 - 32 mmol/L  | EXTERNAL   |              |
|             |                          |                 | LAB        |              |
+-------------+--------------------------+-----------------+------------+--------------+
| Anion Gap   | 15                       | 5 - 20 mmol/L   | EXTERNAL   |              |
|             |                          |                 | LAB        |              |
+-------------+--------------------------+-----------------+------------+--------------+
| Glucose,    | 119 (H)                  | 65 - 99 mg/dL   | EXTERNAL   |              |
| Fasting     |                          |                 | LAB        |              |
+-------------+--------------------------+-----------------+------------+--------------+
| BUN         | 20                       | 8 - 25 mg/dL    | EXTERNAL   |              |
|             |                          |                 | LAB        |              |
+-------------+--------------------------+-----------------+------------+--------------+
| Creatinine  | 0.91                     | 0.50 - 1.00     | EXTERNAL   |              |
|             |                          | mg/dL           | LAB        |              |
+-------------+--------------------------+-----------------+------------+--------------+
| BUN/Creatin | 22                       |                 | EXTERNAL   |              |
| ine Ratio   |                          |                 | LAB        |              |
+-------------+--------------------------+-----------------+------------+--------------+
| Calcium     | 9.0                      | 8.5 - 10.5      | EXTERNAL   |              |
|             |                          | mg/dL           | LAB        |              |
+-------------+--------------------------+-----------------+------------+--------------+
| Estimated   | 59 (L)Comment: GFR <60:  | mL/min/1.73m2   | EXTERNAL   |              |
| GFR         | CHRONIC KIDNEY DISEASE,  |                 | LAB        |              |
|             | IF FOUND OVER A 3 MONTH  |                 |            |              |
|             | PERIOD.GFR <15: KIDNEY   |                 |            |              |
|             | FAILURE.FOR       |                 |            |              |
|             | AMERICANS, MULTIPLY THE  |                 |            |              |
|             | CALCULATED GFR BY        |                 |            |              |
|             | 1.210.This eGFR is       |                 |            |              |
|             | calculated using the     |                 |            |              |
|             | MDRD IDMS traceable      |                 |            |              |
|             | equation.Testing         |                 |            |              |
|             | performed at [facility];[address]     |                 |            |              |
|             | [address] [facility];[place], [place]   |                 |            |              |
|             | [address]                    |                 |            |              |
+-------------+--------------------------+-----------------+------------+--------------+
 
 
 
 
+-----------------+
| Specimen        |
+-----------------+
| Blood specimen  |
| (specimen)      |
+-----------------+
 
 
 
+----------------+---------+--------------------+--------------+
| Performing     | Address | City/State/Zipcode | Phone Number |
| Organization   |         |                    |              |
+----------------+---------+--------------------+--------------+
|   EXTERNAL LAB |         |                    |              |
+----------------+---------+--------------------+--------------+
 documented in this encounter
 
 Visit Diagnoses
 
 
+-------------------------------------------------------------------------------------+
| Diagnosis                                                                           |
+-------------------------------------------------------------------------------------+
|   Coronary artery disease involving native coronary artery of native heart, angina  |
| presence unspecified                                                                |
+-------------------------------------------------------------------------------------+
|   Anginal equivalent (HCC)                                                          |
+-------------------------------------------------------------------------------------+
|   Chronic atrial fibrillation  Atrial fibrillation                                  |
+-------------------------------------------------------------------------------------+
|   Essential hypertension  Unspecified essential hypertension                        |
+-------------------------------------------------------------------------------------+
|   Pulmonary hypertension, moderate to severe (HCC)  Other chronic pulmonary heart   |
| diseases                                                                            |
+-------------------------------------------------------------------------------------+
|   S/P drug eluting coronary stent placement                                         |
+-------------------------------------------------------------------------------------+
|   Severe mitral regurgitation  Mitral valve disorders                               |
+-------------------------------------------------------------------------------------+
|   Severe tricuspid regurgitation  Diseases of tricuspid valve                       |
+-------------------------------------------------------------------------------------+
|   SOB (shortness of breath) on exertion  Shortness of breath                        |
+-------------------------------------------------------------------------------------+
 documented in this encounter

## 2020-01-01 NOTE — XMS
Encounter Summary
  Created on: 2020
 
 Vonnie Celaya
 External Reference #: [id number]
 : 32
 Sex: Female
 
 Demographics
 
 
+-----------------------+------------------------+
| Address               | [address]         |
|                       | [place]  [address]   |
+-----------------------+------------------------+
| Home Phone            | [phone]        |
+-----------------------+------------------------+
| Preferred Language    | Unknown                |
+-----------------------+------------------------+
| Marital Status        | Single                 |
+-----------------------+------------------------+
| Temple Affiliation | NON                    |
+-----------------------+------------------------+
| Race                  | White                  |
+-----------------------+------------------------+
| Ethnic Group          | Not  or  |
+-----------------------+------------------------+
 
 
 Author
 
 
+--------------+------------------------------+
| Author       | [facility] |
+--------------+------------------------------+
| Organization | [facility] |
+--------------+------------------------------+
| Address      | Unknown                      |
+--------------+------------------------------+
| Phone        | Unavailable                  |
+--------------+------------------------------+
 
 
 
 Support
 
 
+--------------+--------------+---------+-----------------+
| Name         | Relationship | Address | Phone           |
+--------------+--------------+---------+-----------------+
| Lauryn Leroy | ECON         | Unknown | [phone] |
+--------------+--------------+---------+-----------------+
 
 
 
 Care Team Providers
 
 
 
+------------------------+------+-----------------+
| Care Team Member Name  | Role | Phone           |
+------------------------+------+-----------------+
| Jean Bermudez MD | PCP  | [phone] |
+------------------------+------+-----------------+
 
 
 
 Encounter Details
 
 
+--------+-----------+----------------------+----------------------+----------------------+
| Date   | Type      | Department           | Care Team            | Description          |
+--------+-----------+----------------------+----------------------+----------------------+
| / | Hospital  |   Cardiology -       |   [facility]  | Canceled (Scheduling |
| 2018   | Encounter | Non-Invasive Testing |  [address]   |  error)              |
|        |           |   [address]   | [place] [address]   |                      |
|        |           | Mailcode: [id number]       |                      |                      |
|        |           | [facility]    |                      |                      |
|        |           | and [facility]aling,         |                      |                      |
|        |           | [address]           |                      |                      |
|        |           | [place], [place]         |                      |                      |
|        |           | [address]           |                      |                      |
|        |           | [phone]         |                      |                      |
+--------+-----------+----------------------+----------------------+----------------------+
 
 
 
 Social History
 
 
+--------------+-------+-----------+--------+------+
| Tobacco Use  | Types | Packs/Day | Years  | Date |
|              |       |           | Used   |      |
+--------------+-------+-----------+--------+------+
| Never Smoker |       |           |        |      |
+--------------+-------+-----------+--------+------+
 
 
 
+---------------------+---+---+---+
| Smokeless Tobacco:  |   |   |   |
| Never Used          |   |   |   |
+---------------------+---+---+---+
 
 
 
+-------------+-------------+---------+----------+
| Alcohol Use | Drinks/Week | oz/Week | Comments |
+-------------+-------------+---------+----------+
| No          |             |         |          |
+-------------+-------------+---------+----------+
 
 
 
+------------------+---------------+
| Sex Assigned at  | Date Recorded |
| Birth            |               |
+------------------+---------------+
| Not on file      |               |
 
+------------------+---------------+
 
 
 
+----------------+-------------+-------------+
| Job Start Date | Occupation  | Industry    |
+----------------+-------------+-------------+
| Not on file    | Not on file | Not on file |
+----------------+-------------+-------------+
 
 
 
+----------------+--------------+------------+
| Travel History | Travel Start | Travel End |
+----------------+--------------+------------+
 
 
 
+-------------------------------------+
| No recent travel history available. |
+-------------------------------------+
 documented as of this encounter
 
 Medications at Time of Discharge
 
 
+----------------------+----------------------+-----------+---------+----------+----------+
| Medication           | Sig                  | Dispensed | Refills | Start    | End Date |
|                      |                      |           |         | Date     |          |
+----------------------+----------------------+-----------+---------+----------+----------+
|   acetaminophen 325  | Take by mouth.       |           | 0       |          |          |
| mg oral tablet       |                      |           |         |          |          |
+----------------------+----------------------+-----------+---------+----------+----------+
|   ascorbic acid      | Take 500 mg by mouth |           | 0       |          |          |
| (vitamin C) 500 mg   |  once daily.         |           |         |          |          |
| oral tablet          |                      |           |         |          |          |
+----------------------+----------------------+-----------+---------+----------+----------+
|   furosemide 40 mg   | Take by mouth.       |           | 0       |          |          |
| oral tablet          |                      |           |         |          |          |
+----------------------+----------------------+-----------+---------+----------+----------+
|                      | Take 2 tablets by    |           | 0       |          |          |
| gluc/chnd/om3/dha/ep | mouth once daily.    |           |         |          |          |
| a/fish/str           |                      |           |         |          |          |
| (GLUCOSAMINE         |                      |           |         |          |          |
| CHONDROITIN PLUS     |                      |           |         |          |          |
| ORAL)                |                      |           |         |          |          |
+----------------------+----------------------+-----------+---------+----------+----------+
|   levothyroxine 100  | Take by mouth.       |           | 0       |          |          |
| mcg oral tablet      |                      |           |         |          |          |
+----------------------+----------------------+-----------+---------+----------+----------+
|   losartan 100 mg    | Take 100 mg by mouth |           | 0       |          |          |
| oral tablet          |  once daily.         |           |         |          |          |
+----------------------+----------------------+-----------+---------+----------+----------+
|   MAGNESIUM CHLORIDE | Take by mouth.       |           | 0       |          |          |
|  ORAL                |                      |           |         |          |          |
+----------------------+----------------------+-----------+---------+----------+----------+
|   metFORMIN    | Take by mouth.       |           | 0       |          |          |
| mg oral tablet,ER    |                      |           |         |          |          |
| alexis.retention 24 hr |                      |           |         |          |          |
+----------------------+----------------------+-----------+---------+----------+----------+
 
|   metoprolol         | Take 100 mg by mouth |           | 0       | 07/30/20 |          |
| succinate 200 mg     |  once daily.         |           |         | 18       |          |
| oral tablet extended |                      |           |         |          |          |
|  release 24 hr       |                      |           |         |          |          |
+----------------------+----------------------+-----------+---------+----------+----------+
|   potassium chloride | Take by mouth.       |           | 0       |          |          |
|  SR 20 mEq oral      |                      |           |         |          |          |
| tablet,ER            |                      |           |         |          |          |
| particles/crystals   |                      |           |         |          |          |
+----------------------+----------------------+-----------+---------+----------+----------+
|   simvastatin 20 mg  | Take by mouth once   |           | 0       |          |          |
| oral tablet          | daily in the         |           |         |          |          |
|                      | evening.             |           |         |          |          |
+----------------------+----------------------+-----------+---------+----------+----------+
|   warfarin 5 mg oral | Take by mouth.       |           | 0       |          |          |
|  tablet              |                      |           |         |          |          |
+----------------------+----------------------+-----------+---------+----------+----------+
 documented as of this encounter
 
 Progress Amanda Man - 2018  1:05 PM [facility] Non Invasive Cardiac Services 
 
 Test Date:  2018
 Clinic Site: [facility]
 Supervising Provider: Katelin Riddle
 Technician:  TATIANA
 Referring Provider: Katelin Riddle
 Diagnosis: Non-rheumatic mitral regurgitation
 Reason for Test: dyspnea
 
 Patient Sypmtoms :
 Are you short of breath at rest? No 
 
 Six-Minute Walk Test Results:
 Gait speed (time in seconds first 15 feet walked): 4
 Patient walked: 720 feet in 6 minutes
 Beginning heart rate: 78
 Beginning blood pressure: 144/70
 Beginning Sp02: 99%
 Beginning dyspnea index (Franchesca scale) : 0
 
 Ending heart rate: 90
 Ending blood pressure: 157/60
 Ending Sp02: 98%
 Ending dyspnea index (Franchesca scale): 4
 Oxygen level: 
 
 Observations: No level of distress noted, patient tolerated test fairly well
 
 Physician interpretation:
 
 Electronically signed by Amanda Hendrix at 2018  1:10 PM PSTdocumented in this enco
unter
 
 Plan of Treatment
 Not on filedocumented as of this encounter
 
 Visit Diagnoses
 Not on filedocumented in this encounter

## 2020-01-01 NOTE — XMS
Encounter Summary
  Created on: 2020
 
 Vonnie Celaya
 External Reference #: [id number]
 : 32
 Sex: Female
 
 Demographics
 
 
+-----------------------+------------------------+
| Address               | [address]         |
|                       | [place]  [address]   |
+-----------------------+------------------------+
| Home Phone            | [phone]        |
+-----------------------+------------------------+
| Preferred Language    | Unknown                |
+-----------------------+------------------------+
| Marital Status        | Single                 |
+-----------------------+------------------------+
| Latter-day Affiliation | NON                    |
+-----------------------+------------------------+
| Race                  | White                  |
+-----------------------+------------------------+
| Ethnic Group          | Not  or  |
+-----------------------+------------------------+
 
 
 Author
 
 
+--------------+------------------------------+
| Author       | [facility] |
+--------------+------------------------------+
| Organization | [facility] |
+--------------+------------------------------+
| Address      | Unknown                      |
+--------------+------------------------------+
| Phone        | Unavailable                  |
+--------------+------------------------------+
 
 
 
 Support
 
 
+--------------+--------------+---------+-----------------+
| Name         | Relationship | Address | Phone           |
+--------------+--------------+---------+-----------------+
| Lauryn Leroy | ECON         | Unknown | [phone] |
+--------------+--------------+---------+-----------------+
 
 
 
 Care Team Providers
 
 
 
+------------------------+------+-----------------+
| Care Team Member Name  | Role | Phone           |
+------------------------+------+-----------------+
| Jean Bermudez MD | PCP  | [phone] |
+------------------------+------+-----------------+
 
 
 
 Reason for Visit
 
 
+--------------+----------+
| Reason       | Comments |
+--------------+----------+
| New patient  |          |
| consultation |          |
+--------------+----------+
 Consultation (Routine)
 
+----------+--------+------------+--------------+--------------+---------------+
| Status   | Reason | Specialty  | Diagnoses /  | Referred By  | Referred To   |
|          |        |            | Procedures   | Contact      | Contact       |
+----------+--------+------------+--------------+--------------+---------------+
| Medical  |        | Cardiology |   Diagnoses  |   Stephen,      |   Claudette, |
| Review   |        |            |              | Varun PARRA,   |  Theodore PARRA MD  |
|          |        |            | Nonrheumatic | MD  [address]     |  [address] |
|          |        |            |  mitral      | [address]  |  Ave          |
|          |        |            | (valve)      |  [address]       | [place]  |
|          |        |            | insufficienc | [place], [place] | [address]    |
|          |        |            | y  Rheumatic |  [address]       | Phone:        |
|          |        |            |  tricuspid   | Phone:       | [phone]  |
|          |        |            | insufficienc | [phone] |  Fax:         |
|          |        |            | y  Pulmonary |   Fax:       | [phone]  |
|          |        |            |              | [phone] |               |
|          |        |            | hypertension |              |               |
|          |        |            | ,            |              |               |
|          |        |            | unspecified  |              |               |
|          |        |            |  Procedures  |              |               |
|          |        |            |  [place] NEW      |              |               |
|          |        |            | PATIENT      |              |               |
|          |        |            | LEVEL V      |              |               |
+----------+--------+------------+--------------+--------------+---------------+
 
 
 
 
 Encounter Details
 
 
+--------+---------+----------------------+----------------------+----------------------+
| Date   | Type    | Department           | Care Team            | Description          |
+--------+---------+----------------------+----------------------+----------------------+
| / | Office  |   Cardiology at [facility]  |   Theodore Wilkins  | Mitral valve         |
|    | Visit   |  [address]    | MD AIDA  [address]  | insufficiency,       |
|        |         | Mailcode: [address]       | Ave  [place], [place]    | unspecified etiology |
|        |         | [place] for [facility]    | [address]           |  (Primary Dx);       |
|        |         | and Healing,         | [phone]         | Mitral annular       |
|        |         | Building       | [phone] (Fax)   | calcification;       |
|        |         | Floor  [place], [place]  |                      | Persistent atrial    |
|        |         | [address]           |                      | fibrillation (HCC)   |
 
|        |         | [phone]         |                      |                      |
+--------+---------+----------------------+----------------------+----------------------+
 
 
 
 Social History
 
 
+--------------+-------+-----------+--------+------+
| Tobacco Use  | Types | Packs/Day | Years  | Date |
|              |       |           | Used   |      |
+--------------+-------+-----------+--------+------+
| Never Smoker |       |           |        |      |
+--------------+-------+-----------+--------+------+
 
 
 
+---------------------+---+---+---+
| Smokeless Tobacco:  |   |   |   |
| Never Used          |   |   |   |
+---------------------+---+---+---+
 
 
 
+-------------+-------------+---------+----------+
| Alcohol Use | Drinks/Week | oz/Week | Comments |
+-------------+-------------+---------+----------+
| No          |             |         |          |
+-------------+-------------+---------+----------+
 
 
 
+------------------+---------------+
| Sex Assigned at  | Date Recorded |
| Birth            |               |
+------------------+---------------+
| Not on file      |               |
+------------------+---------------+
 
 
 
+----------------+-------------+-------------+
| Job Start Date | Occupation  | Industry    |
+----------------+-------------+-------------+
| Not on file    | Not on file | Not on file |
+----------------+-------------+-------------+
 
 
 
+----------------+--------------+------------+
| Travel History | Travel Start | Travel End |
+----------------+--------------+------------+
 
 
 
+-------------------------------------+
| No recent travel history available. |
+-------------------------------------+
 documented as of this encounter
 
 
 Last Filed Vital Signs
 
 
+-------------------+--------------------+----------------------+----------+
| Vital Sign        | Reading            | Time Taken           | Comments |
+-------------------+--------------------+----------------------+----------+
| Blood Pressure    | 110/59             | 2018 12:11 PM  |          |
|                   |                    | PDT                  |          |
+-------------------+--------------------+----------------------+----------+
| Pulse             | 78                 | 2018 12:11 PM  |          |
|                   |                    | PDT                  |          |
+-------------------+--------------------+----------------------+----------+
| Temperature       | 36.7   C (98   F)  | 2018 12:11 PM  |          |
|                   |                    | PDT                  |          |
+-------------------+--------------------+----------------------+----------+
| Respiratory Rate  | 20                 | 2018 12:11 PM  |          |
|                   |                    | PDT                  |          |
+-------------------+--------------------+----------------------+----------+
| Oxygen Saturation | 99%                | 2018 12:11 PM  |          |
|                   |                    | PDT                  |          |
+-------------------+--------------------+----------------------+----------+
| Inhaled Oxygen    | -                  | -                    |          |
| Concentration     |                    |                      |          |
+-------------------+--------------------+----------------------+----------+
| Weight            | 66 kg (145 lb 9.6  | 2018 12:11 PM  |          |
|                   | oz)                | PDT                  |          |
+-------------------+--------------------+----------------------+----------+
| Height            | 160 cm (5' 3")     | 2018 12:11 PM  |          |
|                   |                    | PDT                  |          |
+-------------------+--------------------+----------------------+----------+
| Body Mass Index   | 25.79              | 2018 12:11 PM  |          |
|                   |                    | PDT                  |          |
+-------------------+--------------------+----------------------+----------+
 documented in this encounter
 
 Patient Instructions
 Patient Instructions Theodore Wilkins MD - 2018 12:00 PM PDT1. We would recommend
 that you continue to follow up with Dr. Mitchell in [facility] for your heart care. Discuss the 
options of stenting of the right coronary artery with him in follow up as needed if chest pa
in develops 
 
 2. Go up to the [facility] [place] to meet with Dr. Smart, Dr. Abrams, and Dr. Brennen Larios ed on your preferences options are to continue with medical therapy or to consider a possibl
e clip procedure, though the likelyhood of a successful placement of a clip we think is low 
and risk is higher for possible complication. Given your wishes, we will not pursue a hybrid
 surgical procedure. 
 
 3. We will discuss your the group decision (yourself and our doctors) with Dr. Mitchell to let 
him know the final plans. 
 
 4. IT was a pleasure meeting you today. It was our pleasure being involved in your care.  C
all Dominga Arambula RN or Ciara Riddle PA-C in the valve clinic at [phone] if you have a
ny questions.
   
 
 Electronically signed by Theodore Wilkins MD at 2018  1:16 PM PDT
 documented in this encounter
 
 Progress Notes
 Jordyn Davis MA - 2018 12:00 PM PDTW[place]ial[place] Cardiomyopathy Questionnaire
 
 
 Over the past two weeks:
 1. How much are you limited by heart failure to do the following: 
 a. Showering/bathing:SLIGHTLY LIMITED 4
 b. Walking 1 block on level ground: MODERATELY LIMITED 3
 c. Hurrying or jogging:MODERATELY LIMITED 3
 2. How many times did you have swelling in your feet, ankles or legs when you woke up in th
 morning: NEVER OVER THE PAST 2 WEEKS 5
 3. How many times has fatigue limited your ability to do what you wanted: SEVERAL TIMES PER
 DAY 2
 4.  How many times has shortness of breath limited your ability to do what you wanted:SEVER
AL TIMES PER DAY 2
 5. How many times did you sleep sitting up in a chair or with at least 3 pillows to prop yo
u up because of shortness of breath: NEVER OVER THE PAST 2 WEEKS 5
 6. How much has your heart failure limited your enjoyment of life:EXTREMELY LIMITED 1
 7. If you had to spend the rest of your life with your heart failure the way it is right no
w, how would you feel about this?: NOT AT ALL SATISFIED 1
 8. How much does your heart failure affect your lifestyle in the following: 
 a. Hobbies, recreational activities: EXTREMELY LIMITED 1
 b. Working/household chores: EXTREMELY LIMITED 1
 c. Visiting outside the home: EXTREMELY LIMITED 1
 
 RESULTS: 29
 
 Electronically signed by Jordyn Davis MA at 2018  2:45 PM Theodore Sargent M
D - 2018 12:00 PM PDTFormatting of this note might be different from the original.
 Heart Valve Clinic -  Initial Consultation
 Date:  2018
 Author:   THEODORE WILKINS MD
 Referring Provider: Layla Greer 
 
 HPI: Mrs Celaya is a very pleasant 87 y/o female with a hx of recurrent heart failure adm
ission for "diastolic heart failure" and valvular heart disease over the past 6 months who i
s referred for considerations of options to treat severe mitral annular calcification and se
mauricio mitral regurgitation. She has a past med Hx signifncat for persistent Afib, Tyep 2 DM, 
hypertension, hyperlipidemia, hypothyroidism, iron deficiency anemia, pulmonary hypertension
, moderate to severe TR, and severe MR.  
 
 Mrs Celaya, lives in [facility] and is brought in by her daughter today who lives w
ith her.  They report that she has been experiencing progressive GARCIA that has been slow and 
progressive for many years. She was first hospitalized at [facility] in [facility] i
n 2018 with s/s of heart failure. Over the course of the spring she has noticed new
 and progressive GARCIA as well as new LE edema. She was previously active but could no longer 
walk a 1/4 block. She was re-admitted to the hospital in [facility] in May 2018 again with he
art failure.
 Echo in may was notable for normal to hyperdynamic LV systolic function, severe MAC, severe
 MR, moderate to severe TR, and mild AS. Additionally it showed moderate to severe Pulm HTN 
as well as left to right shnt across tunneled PFO. She has followed up with Dr Stephen Mitchell 
at [facility] in the [facility] and referred to [facility] for consideration of MitraClip
.  
 
 Since May she has had no further decompensated heart failure episodes 
 
 To expidite her evaluation, she has already been referred and undergone TTE, JENIFFER, CT scan o
f chest a week ago a well as angiogrpahy yesterday on 2018. 
 
 Symptoms & Exercise/Activity Levels:
 Lives in [facility] with her Daughter in a 2 story house. She lives in the ground floor and 
her Daughter lives up stairs. She She has 7 steps to get up into the house and can still matthew
erate gettign in to the house but is markedly winded and would have to rest going up stirs (
 
19 steps) When walkinf up the stairs he endorses no chest pain but predominatly GARCIA.  Her ac
tivity is limited and does not describe chest discomfort in her brief activities. She is abl
e to make her bed but feels tired afterwards. Her activities are typically quilting for truedash and the enVerid and sedentary without dyspnea.  Any little activty such 
as mopping or vaccuming makes her short of breath and tired.  She is worn out after activiti
es and needs to rest. She walks outside of the home with a large walking stick and no walker
. 
 
 Denies any swellign in her legs since May 2018 but does wear compression stockings.  Has no
w been on Lasix 40 mg daily was previously on 20 daily prior to May and has at stable. She i
s ana tto lay flat in bed at night and at first there is orthopnea but after a couple deep 
breaths she can tolerate it. She has no PND.  
 Currently she is limited by lack of energy.  On some days she can walk up to a mile, but elisabeth kc no longer feels comfortable shopping by herself 
 
 [place] summer Her energy level was great. She previously was working out at [employer]. She would g
o out ot her "[employer] groups.  Was completely independent  In all activities and shopping co
oking and cleaning. She typically has lunch outside of the house, cooks her own simple dinne
rs, and has oatmeal for breakfast.  
 
 She has had an appointment for consultation with Dr. Mitchell who will continue to follow her u
p in [facility].  
 
 The patient denies rest or exertional chest pain or shortness of breath, palpitations, pres
yncope, syncope, PND, orthopnea, abdominal swelling, lower extremity edema, or claudication.
 
 
 Review of Systems:
 14 point review of systems as stated in the HPI, otherwise remainder is negative.  
 
 Past Medical History: 
 Diagnosis Date 
   Anemia  
  resolved with iron supplements - no large GI bleed, negative EGD. 
   Atrial fibrillation (HCC)  
   Hyperlipidemia  
   Hypertension  
   Hypothyroid  
   Pulmonary hypertension (HCC)  
   Type 2 diabetes mellitus (HCC)  
 
  
 No past surgical history on file.
 
 Current Medications Include:
 
 Current Medication List  
 Name Sig 
 ACETAMINOPHEN 325 MG TABLET Take by mouth. 
 ASCORBIC ACID (VITAMIN C) 500 MG TABLET Take 500 mg by mouth once daily. 
 CHOLECALCIFEROL (VITAMIN D3) 2,000 UNIT CAPSULE Take by mouth. 
 FUROSEMIDE 40 MG TABLET Take by mouth. 
 GLUCOSAMINE CHONDROITIN PLUS ORAL Take 2 tablets by mouth once daily. 
 LEVOTHYROXINE 100 MCG TABLET Take by mouth. 
 LOSARTAN 100 MG TABLET Take 100 mg by mouth once daily. 
 MAGNESIUM CHLORIDE ORAL Take by mouth. 
 METFORMIN  MG 24 HR TABLET,EXTENDED RELEASE Take by mouth. 
 METOPROLOL SUCCINATE  MG TABLET,EXTENDED RELEASE 24 HR Take 100 mg by mouth once arturo
y.  
 POTASSIUM CHLORIDE ER 20 MEQ TABLET,EXTENDED RELEASE(PART/CRYST) Take by mouth. 
 
 SIMVASTATIN 20 MG TABLET Take by mouth once daily in the evening.  
 WARFARIN 5 MG TABLET Take by mouth. 
 
 Allergies 
 Allergen Reactions 
   Morphine Anxiety 
   "felt fidgety" 
   Percocet [Oxycodone-Acetaminophen] Anxiety 
   "felt fidgety" 
 
 Social History 
 
 Social History 
   Marital status: Single 
   Spouse name: N/A 
   Number of children: N/A 
   Years of education: N/A 
 
 Occupational History 
   Not on file. 
 
 Social History Main Topics 
   Smoking status: Never Smoker 
   Smokeless tobacco: Never Used 
   Alcohol use No 
   Drug use: No 
   Sexual activity: Not on file 
 
 Other Topics Concern 
   Not on file 
 
 Social History Narrative 
   No narrative on file 
 
 FAmily Hx: non contrinbutory.  
 
 Physical Exam: 
 
 Last Vitals: /59 | Pulse 78 | Temp (Src) 36.7 C (98 F) (Oral) | RR 20 | Ht 1.6 m 
(5' 3") | Wt 66 kg (145 lb 9.6 oz) | SpO2 99% | BMI 25.79 kg/(m^2)
 Body mass index is 25.79 kg/m.
 
 General Appearance:  Ederly appearing, well developed well nourished adult female appearing
 stated age in NAD.  
 HEENT:  PERRLA, EOMI, mouth without lesions.
 Neck:  Neck w/o LAD
 Respiratory: CTA bilaterally. No wheezes or rales
 Cardiovascular: JVP at 5 cm.  2+ carotid pulses without bruits.  PMI nondisplaced. RRR. Nor
mal S1 covered & soft S2.  3/6 holosystolic murmur best audible at LLSB radiating to axilla.
  No gallops, or rubs. 2+  distal pulses. 
 Gastrointestinal: +BS, soft, nondistended, nontender, no abdominal bruits
 Musculoskeletal: No gross deformities
 Extremities: No cyanosis, clubbing, or edema.
 Neurologic: Grossly nonfocal.  UE and LE proximal and distal strength 5/5 and equal bilater
ally.  
 Skin: No rash or ulcers.
 
 Data
 Lab Results 
 Component Value Date 
 
  WBC 7.45 2018 
  HB 12.6 2018 
  HCT 37.5 2018 
   2018 
  MCV 87.4 2018 
  RDW 49.3 2018 
 
 Lab Results 
 Component Value Date 
   2018 
  K 3.9 2018 
   2018 
  BICARB 26 2018 
  BUN 16 2018 
  CR 0.78 2018 
   2018 
  CA 9.1 2018 
  ANIONGAP 10 2018 
 
 No results found for: NTPROBNP
 No results found for: A1C
 Lab Results 
 Component Value Date 
  INRPT 1.27 (H) 2018 
 
 FRAILTY ASSESSMENT:
 Frailty Index Date Taken: 2018 
 1. Left  Strength (kg): 12
     Right  Strength (kg): 10 
 2. Five Meter Walk (sec):   NA
 3. Serum albumin:   
 4. Walden ADL (#/6) :5 
 Frailty Outcome: Based on Partner trial definition the patient does not qualify as frail. 
 
 Walden ADL Date Taken: 2018 
 
 Bathing: No
 Dressing: No
 Toileting: No
 Transferring: No
 Continence: Yes
 Feeding: No
 
 EFT FRAILTY ASSESSMENT
 
 EFT 1 to possibly 2 points
 1 for low sit to stand
 No known albumin level
 
 EKG:
 Atrial fibrillation 
 Poor anterior R wave progression 
 Inferior mild st depression 0.5 mm 2/3. 
 
 TTE 3/6/2018
 1. Left ventricular systolic function is hyperdynamic with an estimated EF of >70%.
 2. The right ventricle is normal in size and function.
 3. The left atrium is markedly enlarged.
 4. The right atrium is markedly enlarged.
 5. Mild aortic stenosis with peak/mean pressure gradient of 16.83mmHg / 9.02mmHg, the aorti
 
c valve area by continuity equation is 1.7cm.
 6. Severe mitral regurgitation is present.
 7. Moderate mitral stenosis, with peak/mean transvalvular gradients measured at 25.2 / 8.0 
mm Hg, and MVA (by VTI) 1.50 cm . There is mi
 8. Moderate to severe tricuspid regurgitation present.
 9. There is moderate to severe pulmonary hypertension.The peak right ventricular systol
ic pressure (pulmonary artery systolic pressure), as measured by Doppler, is 55.8 - 60.8 mm 
Hg.
 10. There is a small secundum atrial septal defect measuring <10 mm in diameter.There i
s left-to-right shunting noted on color Doppler.
 
 TTE: 2018 
 Final Impressions:                         
                              
                                
                  
 1. The left ventricular cavity size is normal.               
 
 2. The LV function is hyperdynamic.                   
   
 3. Visually estimated left ventricular ejection fraction is 70 - 75%.     
 4. The aortic valve is trileaflet and moderately calcified.          
 5. Severe mitral annular calcification as well as papillary and subchordal  
 bulky calcification.                        
     
 6. While there is calcification of the bases of the mitral valve leaflets   
 due to the severe mitral annular calcificaiton, the mid portions of the    
 leaflets themseleves are only midly calcified and thin, however there is the 
 severe central mitral valve regurgitation.                 
 
 7. The mean transmitral gradient is 5.5 mmHg at a heart rate of 77 bpm.    
  The mitral valve effective regurgitant orifice area is 7 mm2.         
                             
 8. Severe biatrial enlargement.                    
    
 9. Right ventricular size, thickness and function are normal. 
 
 JENIFFER: 2018
 Final Impressions:                         
                             
              
                              
         
 1. The LV function is hyperdynamic.                   
   
 2. The visually estimated ejection fraction is > 75%.             
 3. Right ventricular size, thickness and function are normal.         
 4. Severe circumferential mitral annular calcification. A large deposit of  
 calcium at the posterolateral atrial aspect of themitral annulus that     
 partially disrupts the normal mitral annular shape and size. Mitral annular  
 area is measured at 8.26 cm2, with an AP diameter of 3.76 cm, and CC     
 diameter of 2.52 cm. However, there is no significant mitral stenosis. Mean  
 mitral gradient is approximately 4 mmHg at a heart rate of 83 bpm.      
 5. Severe mitral valve regurgitation. The EROA is 0.47 cm2 (using a PISA   
 radius of 1.1 cm, an aliasing velocity of 0.32 m/s, and a mean MR       
 regurgitant velocity of 5.1 m/s), the calculated regurgitant volume is 67   
 mL, and the calculated regurgitant fraction is 52%.              
 6. The mitral valve anterior leaflet measures 2.0 cm.             
 7. There is subvalvar and papilary muscle head calcifidation noted as well.  
 8. The mechanism for the severe mitral regurgitation is leaflet and annular  
 
 calcification and degerneration with poor coaptation across all portions of  
 the valve.                           
       
 9. The aortic valve is moderately sclerotic and restricted. No aortic     
 stenosis.                            
       
 10. Severely enlarged left atrium.                   
   
 11. There is a small fenestrated secundum atrial septal defect, with     
 intermittent left-to-right flow via color-flow Doppler.            
 12. Moderate tricuspid regurgitation.                  
   
 13. Small plaque involving the ascending and transverse aorta.        
 14. The left atrial appendage is well visualized and there is no evidence of 
 thrombus present.     
 
 CARDIAC CATH: 2018
 Preliminary repoot
 Severe MAC
 There is non-obstructive disease in LAD andCirc
 There is 95% stenosis of the distal RCA prior to the PDA and PLV's
 
 RHC: Unremarkable filling pressures
 
 Coronary Angiography: Obstructive CAD of RCA
 PFTS:
 None 
 
 STS (Based Off Available Data):
 MV Replacement + CAB 
 Risk of Mortality: 7.374% 
 Morbidity or Mortality: 30.831% 
 Long Length of Stay: 19.775% 
 Short Length of Stay: 7.384% 
 Permanent Stroke: 2.652% 
 Prolonged Ventilation: 19.662% 
 DSW Infection: 0.263% 
 Renal Failure: 8.237% 
 Reoperation: 12.335% 
 
 Assessment and Recommendations:
 Ms. Vonnie Celaya is a 86 y.o. female referred [facility] for assessment
 of treatment options for mitral valve disease. 
 
 Ms. Celaya has developed progressive GARCIA and LE edema over the last 6 months and currentl
y reports symptoms that are NYHA functional class 3-4.  A review of the echocardiogram and t
 transesophageal echo show severe mitral annular calcification, severe mitral regurgitaito
n, with largest jet centrally but also extending to the commisures, with a mean trasmitral g
radient of 4-5 mmHg and an annular are of 830-930 mm2.   In addition the echocardiogram is n
otable for moderate to severe pulmonary hypertesnion with RVSP 55-60 mmHg. Additional diagno
stics that have been performed include coronary angiogrpahy that demonstrates severe distal 
RCA stenosis. 
 
  Based on her symptoms, physical exam, and diagnostic studies we agree that she has severe 
symptomatic  Mitral regurgitation. 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
ing, continuing with her quality of life the best she can, and not having a stroke. 
 
 The patient's STS based on available data is 7-8%   Surgical risk may be modified in follow
 up based on further diagnostic workup; a full surgical risk evaluation will be re-assessed 
by the Multidisciplinary Heart Valve Team at a subsequent visit once workup is complete.  
 
 
 The diagnostic workup plan is as follows:
 1. We would recommend that you continue to follow up with Dr. Mitchell in [facility] for your [facility] care. Discuss the options of stenting of the right coronary artery with him in follow up
 as needed if chest pain develops 
 
 2. Go up to the [facility] [place] to meet with Dr. Smart, Dr. Abrams, and Dr. Brennen Larios ed on your preferences options are to continue with medical therapy or to consider a possibl
e clip procedure, though the likelyhood of a successful placement of a clip we think is low 
and risk is higher for possible complication. Given your wishes, we will not pursue a hybrid
 surgical procedure. 
 
 3. We will discuss your the group decision (yourself and our doctors) with Dr. Mitchell to let 
him know the final plans. 
 
 4. IT was a pleasure meeting you today. It was our pleasure being involved in your care.  C
all Dominga Arambula RN or Ciara Riddle PA-C in the valve clinic at [phone] if you have a
ny questions.
 
 The patient and their family agree with the plan.  We will follow up with the patient to co
ordinate next steps once all relevant data has been completed and reviewed.      
 
 Theodore Wilkins MD
 
 [facility] [place]
 
 Electronically signed by Theodore Wilkins MD at 2018  2:45 PM PDTdocumented in thi
s encounter
 
 Plan of Treatment
 Not on filedocumented as of this encounter
 
 Visit Diagnoses
 
 
+--------------------------------------------------------------+
| Diagnosis                                                    |
+--------------------------------------------------------------+
|   Mitral valve insufficiency, unspecified etiology - Primary |
+--------------------------------------------------------------+
|   Mitral annular calcification  Mitral valve disorders       |
+--------------------------------------------------------------+
|   Persistent atrial fibrillation  Atrial fibrillation        |
+--------------------------------------------------------------+
 documented in this encounter

## 2020-01-01 NOTE — XMS
Encounter Summary
  Created on: 2020
 
 Vonnie Celaya
 External Reference #: [id number]
 : 32
 Sex: Female
 
 Demographics
 
 
+-----------------------+------------------------+
| Address               | [address]         |
|                       | [place]  [address]   |
+-----------------------+------------------------+
| Home Phone            | [phone]        |
+-----------------------+------------------------+
| Preferred Language    | Unknown                |
+-----------------------+------------------------+
| Marital Status        | Single                 |
+-----------------------+------------------------+
| Confucianism Affiliation | NON                    |
+-----------------------+------------------------+
| Race                  | White                  |
+-----------------------+------------------------+
| Ethnic Group          | Not  or  |
+-----------------------+------------------------+
 
 
 Author
 
 
+--------------+------------------------------+
| Author       | [facility] |
+--------------+------------------------------+
| Organization | [facility] |
+--------------+------------------------------+
| Address      | Unknown                      |
+--------------+------------------------------+
| Phone        | Unavailable                  |
+--------------+------------------------------+
 
 
 
 Support
 
 
+--------------+--------------+---------+-----------------+
| Name         | Relationship | Address | Phone           |
+--------------+--------------+---------+-----------------+
| Lauryn Leroy | ECON         | Unknown | [phone] |
+--------------+--------------+---------+-----------------+
 
 
 
 Care Team Providers
 
 
 
+------------------------+------+-----------------+
| Care Team Member Name  | Role | Phone           |
+------------------------+------+-----------------+
| Jean Bermudez MD | PCP  | [phone] |
+------------------------+------+-----------------+
 
 
 
 Encounter Details
 
 
+--------+-------------+----------------------+----------------------+-------------+
| Date   | Type        | Department           | Care Team            | Description |
+--------+-------------+----------------------+----------------------+-------------+
| 10/31/ | Documentati |   Cardiology General |   Jame Wilkins  |             |
| 2018   | on          |  at [facility]  [address]     Virginie PARRA MD  [address]  |             |
|        |             | [address]  Mailcode:  | Ave  [place], [place]    |             |
|        |             | [address]     | [address]           |             |
|        |             | [facility],  | [phone]         |             |
|        |             |       | [phone] (Fax)   |             |
|        |             | [place], [place]  |                      |             |
|        |             | [address]           |                      |             |
|        |             | [phone]         |                      |             |
+--------+-------------+----------------------+----------------------+-------------+
 
 
 
 Social History
 
 
+--------------+-------+-----------+--------+------+
| Tobacco Use  | Types | Packs/Day | Years  | Date |
|              |       |           | Used   |      |
+--------------+-------+-----------+--------+------+
| Never Smoker |       |           |        |      |
+--------------+-------+-----------+--------+------+
 
 
 
+---------------------+---+---+---+
| Smokeless Tobacco:  |   |   |   |
| Never Used          |   |   |   |
+---------------------+---+---+---+
 
 
 
+-------------+-------------+---------+----------+
| Alcohol Use | Drinks/Week | oz/Week | Comments |
+-------------+-------------+---------+----------+
| No          |             |         |          |
+-------------+-------------+---------+----------+
 
 
 
+------------------+---------------+
| Sex Assigned at  | Date Recorded |
| Birth            |               |
+------------------+---------------+
| Not on file      |               |
+------------------+---------------+
 
 
 
 
+----------------+-------------+-------------+
| Job Start Date | Occupation  | Industry    |
+----------------+-------------+-------------+
| Not on file    | Not on file | Not on file |
+----------------+-------------+-------------+
 
 
 
+----------------+--------------+------------+
| Travel History | Travel Start | Travel End |
+----------------+--------------+------------+
 
 
 
+-------------------------------------+
| No recent travel history available. |
+-------------------------------------+
 documented as of this encounter
 
 Plan of Treatment
 Not on filedocumented as of this encounter
 
 Visit Diagnoses
 Not on filedocumented in this encounter

## 2020-01-01 NOTE — NUR
ASSISTED PT TO THE RESTROOM AND BACK TO BED. TOOK A STANDING WEIGHT. PT DENIES
FURTHER NEEDS AT THIS TIME. CALL LIGHT IS CLOSE.

## 2020-01-01 NOTE — XMS
Clinical Summary
  Created on: 2020
 
 FabiolalonnyyaminiVonnie
 External Reference #: [id number]
 : 32
 Sex: Female
 
 Demographics
 
 
+-----------------------+---------------------------+
| Address               | [address]           |
|                       | [place]  [address] |
+-----------------------+---------------------------+
| Home Phone            | [phone]           |
+-----------------------+---------------------------+
| Preferred Language    | Unknown                   |
+-----------------------+---------------------------+
| Marital Status        |                    |
+-----------------------+---------------------------+
| Jew Affiliation | Unknown                   |
+-----------------------+---------------------------+
| Race                  | Unknown                   |
+-----------------------+---------------------------+
| Ethnic Group          | Unknown                   |
+-----------------------+---------------------------+
 
 
 Author
 
 
+--------------+--------------------------------------------+
| Author       | [facility]  |
|              | and [place]                                |
+--------------+--------------------------------------------+
| Organization | [facility]  |
|              | and [place]                                |
+--------------+--------------------------------------------+
| Address      | Unknown                                    |
+--------------+--------------------------------------------+
| Phone        | Unavailable                                |
+--------------+--------------------------------------------+
 
 
 
 Support
 
 
+--------------+--------------+---------+-----------------+
| Name         | Relationship | Address | Phone           |
+--------------+--------------+---------+-----------------+
| Lauryn Leroy | ECON         | Unknown | [phone] |
+--------------+--------------+---------+-----------------+
 
 
 
 Care Team Providers
 
 
 
+------------------------+------+-----------------+
| Care Team Member Name  | Role | Phone           |
+------------------------+------+-----------------+
| Jean Bermudez MD | PCP  | [phone] |
+------------------------+------+-----------------+
 
 
 
 Allergies
 
 
+----------------------+----------------------+----------+----------+-------------------+
| Active Allergy       | Reactions            | Severity | Noted    | Comments          |
|                      |                      |          | Date     |                   |
+----------------------+----------------------+----------+----------+-------------------+
| Morphine             | Other (See Comments) | Medium   | 20 |   Pt felt fidgety |
|                      |                      |          | 18       |                   |
+----------------------+----------------------+----------+----------+-------------------+
| Oxycodone-Acetaminop | Other (See Comments) | Medium   | 20 |   Pt felt fidgety |
| hen                  |                      |          | 18       |                   |
+----------------------+----------------------+----------+----------+-------------------+
 
 
 
 Medications
 
 
+----------------------+----------------------+-----------+---------+------+------+-------+
| Medication           | Sig                  | Dispensed | Refills | Star | End  | Statu |
|                      |                      |           |         | t    | Date | s     |
|                      |                      |           |         | Date |      |       |
+----------------------+----------------------+-----------+---------+------+------+-------+
|   levothyroxine      | Take 100 mcg by      |           | 0       |  |      | Activ |
| (SYNTHROID) 100 mcg  | mouth every morning  |           |         | 3/20 |      | e     |
| tablet               | before breakfast.    |           |         | 18   |      |       |
+----------------------+----------------------+-----------+---------+------+------+-------+
|   losartan (COZAAR)  | Take 100 mg by mouth |           | 0       | 08/1 |      | Activ |
| 100 MG tablet        |  daily.              |           |         | 3/20 |      | e     |
|                      |                      |           |         | 18   |      |       |
+----------------------+----------------------+-----------+---------+------+------+-------+
|   simvastatin        | Take 10 mg by mouth  |           | 0       | 08/1 |      | Activ |
| (ZOCOR) 20 mg tablet | nightly.             |           |         | 3/20 |      | e     |
|                      |                      |           |         | 18   |      |       |
+----------------------+----------------------+-----------+---------+------+------+-------+
|   potassium chloride | Take 20 mEq by mouth |           | 0       | 08/1 |      | Activ |
|  (KLOR-CON M20) 20   |  daily.              |           |         | 3/20 |      | e     |
| mEq ER tablet        |                      |           |         | 18   |      |       |
+----------------------+----------------------+-----------+---------+------+------+-------+
|   metFORMIN          | Take 500 mg by mouth |           | 0       | 08/1 |      | Activ |
| (GLUMETZA) 500 MG 24 |  2 (two) times daily |           |         | 3/20 |      | e     |
|  hr tablet           |  with meals.         |           |         | 18   |      |       |
+----------------------+----------------------+-----------+---------+------+------+-------+
|   magnesium chloride | Take 1 tablet by     |           | 0       | 08/1 |      | Activ |
|  (MAG64) 64 mg EC    | mouth daily.         |           |         | 3/20 |      | e     |
| tablet               |                      |           |         | 18   |      |       |
+----------------------+----------------------+-----------+---------+------+------+-------+
|   Cranberry Juice    | Take 1 capsule by    |           | 0       | 08/1 |      | Activ |
| Extract 1000 MG CAPS | mouth daily.         |           |         | 3/20 |      | e     |
 
|                      |                      |           |         | 18   |      |       |
+----------------------+----------------------+-----------+---------+------+------+-------+
|   ascorbic acid      | Take 1,000 mg by     |           | 0       | 08/1 |      | Activ |
| (VITAMIN C) 1000 MG  | mouth daily.         |           |         | 3/20 |      | e     |
| tablet               |                      |           |         | 18   |      |       |
+----------------------+----------------------+-----------+---------+------+------+-------+
|   furosemide (LASIX) | Take 40 mg by mouth  |           | 0       | 08/1 |      | Activ |
|  40 mg tablet        | daily.               |           |         | 3/20 |      | e     |
|                      |                      |           |         | 18   |      |       |
+----------------------+----------------------+-----------+---------+------+------+-------+
|   acetaminophen      | Take 650 mg by mouth |           | 0       | 08/1 |      | Activ |
| (TYLENOL) 325 mg     |  every 6 (six) hours |           |         | 3/20 |      | e     |
| tablet               |  as needed for Pain. |           |         | 18   |      |       |
+----------------------+----------------------+-----------+---------+------+------+-------+
|   warfarin           | Take 5 mg by mouth   |           | 0       | 10/3 |      | Activ |
| (COUMADIN) 5 mg      | daily. 1 pill daily  |           |         | 0/20 |      | e     |
| tablet               | Sun-Mon-Wed-Fri-Sat, |           |         | 18   |      |       |
|                      |  1/2 pill (2.5 mg)   |           |         |      |      |       |
|                      | Tue-Thur             |           |         |      |      |       |
+----------------------+----------------------+-----------+---------+------+------+-------+
|   Turmeric Curcumin  | Take  by mouth.      |           | 0       |      |      | Activ |
| 500 MG CAPS          |                      |           |         |      |      | e     |
+----------------------+----------------------+-----------+---------+------+------+-------+
|   IRON PO            | Take 50 mg by mouth  |           | 0       |      |      | Activ |
|                      | 2 times daily. Every |           |         |      |      | e     |
|                      |  other day ,         |           |         |      |      |       |
+----------------------+----------------------+-----------+---------+------+------+-------+
|   metoprolol         | Take 150 mg by mouth |           | 0       |      |      | Activ |
| succinate            |  Daily. Take three   |           |         |      |      | e     |
| (TOPROL-XL) 50 mg 24 | tablets by mouth     |           |         |      |      |       |
|  hr tablet           | daily at bedtime     |           |         |      |      |       |
+----------------------+----------------------+-----------+---------+------+------+-------+
 
 
 
 Active Problems
 
 
+------------------------------------------------------------------+------------+
| Problem                                                          | Noted Date |
+------------------------------------------------------------------+------------+
| Mixed hyperlipidemia                                             | 2019 |
+------------------------------------------------------------------+------------+
| Stage 3 chronic kidney disease                                   | 2019 |
+------------------------------------------------------------------+------------+
| SOB (shortness of breath) on exertion                            | 10/24/2018 |
+------------------------------------------------------------------+------------+
| Anginal equivalent                                               | 10/24/2018 |
+------------------------------------------------------------------+------------+
| Essential hypertension                                           | 10/24/2018 |
+------------------------------------------------------------------+------------+
| Coronary artery disease of native artery of native heart with    | 10/24/2018 |
| stable angina pectoris                                           |            |
+------------------------------------------------------------------+------------+
| S/P drug eluting coronary stent placement                        | 10/24/2018 |
+------------------------------------------------------------------+------------+
| Status post insertion of drug-eluting stent into right coronary  | 10/24/2018 |
| artery for coronary artery disease                               |            |
+------------------------------------------------------------------+------------+
 
 
 
 
+----------------------------------------------------------------+
|   Overview:   3.0 x 16 mm Synergy MARA in right Posterolateral  |
| artery                                                         |
+----------------------------------------------------------------+
 
 
 
+--------------------------+------------+
| Congestive heart failure | 2018 |
+--------------------------+------------+
 
 
 
+------------------------------------------------------------------+
|   Overview:   acute/chronic Diastolic Heart Failure, NYHA class  |
| III                                                              |
+------------------------------------------------------------------+
 
 
 
+---------------------+------------+
| Atrial fibrillation | 1996 |
+---------------------+------------+
 
 
 
+-------------------------------------------------------+
|   Overview:   persistent since then, never attempted  |
| cardioversion                                         |
+-------------------------------------------------------+
 
 
 
+--------------------------------------------+---+
| Pulmonary hypertension, moderate to severe |   |
+--------------------------------------------+---+
| Severe tricuspid regurgitation             |   |
+--------------------------------------------+---+
 
 
 
+---------------------------------+
|   Overview:   moderately severe |
+---------------------------------+
 
 
 
+-----------------------------+---+
| Severe mitral regurgitation |   |
+-----------------------------+---+
 
 
 
 Family History
 
 
+----------------------+----------+---------+---------------------------------------------+
| Medical History      | Relation | Name    | Comments                                    |
 
+----------------------+----------+---------+---------------------------------------------+
| Other (see comment)  | Brother Virginie Chau    | CVA                                         |
+----------------------+----------+---------+---------------------------------------------+
| Thyroid cancer       | Daughter | Devorah   |                                             |
+----------------------+----------+---------+---------------------------------------------+
| Other (see comment)  | Daughter |         | CVA                                         |
+----------------------+----------+---------+---------------------------------------------+
| Obesity              | Daughter |         |                                             |
+----------------------+----------+---------+---------------------------------------------+
| Arthritis            | Daughter |         |                                             |
+----------------------+----------+---------+---------------------------------------------+
| Coronary artery      | Father   |         |                                             |
| disease              |          |         |                                             |
+----------------------+----------+---------+---------------------------------------------+
| Other (see comment)  | Father   |         | CVA - cerebral hemorrhage                   |
+----------------------+----------+---------+---------------------------------------------+
| Heart failure        | Mother   |         |                                             |
+----------------------+----------+---------+---------------------------------------------+
| Other (see comment)  | Mother   |         | CVA - recurrent CVA at 74,  a few days  |
|                      |          |         | later/Diabetes Mellitus II                  |
+----------------------+----------+---------+---------------------------------------------+
| Other (see comment)  | Sister   | Khushbu | Leukemia - basophilic leukemia              |
+----------------------+----------+---------+---------------------------------------------+
| Heart disease        | Sister   | Fabienne | ? thrombus, not clear                       |
+----------------------+----------+---------+---------------------------------------------+
| Deep vein thrombosis | Sister   | Rhea    |                                             |
+----------------------+----------+---------+---------------------------------------------+
 
 
 
+----------+---------+----------+--------------------------------------------+
| Relation | Name    | Status   | Comments                                   |
+----------+---------+----------+--------------------------------------------+
| Brother  | Isac    |  | CVA                                        |
|          |         |   (Age   |                                            |
|          |         | 75)      |                                            |
+----------+---------+----------+--------------------------------------------+
| Daughter |         |  | cerebral thrombosis post umbilical hernia  |
|          |         |   (Age   | repair                                     |
|          |         | 56)      |                                            |
+----------+---------+----------+--------------------------------------------+
| Daughter |         | Alive    |                                            |
+----------+---------+----------+--------------------------------------------+
| Daughter | Devorah   | Alive    |                                            |
+----------+---------+----------+--------------------------------------------+
| Daughter |         |          |                                            |
+----------+---------+----------+--------------------------------------------+
| Daughter |         |          |                                            |
+----------+---------+----------+--------------------------------------------+
| Daughter |         |          |                                            |
+----------+---------+----------+--------------------------------------------+
| Father   |         |  | cerebral hemorrhage                        |
|          |         |   (Age   |                                            |
|          |         | 63)      |                                            |
+----------+---------+----------+--------------------------------------------+
| Mother   |         |  | CHF                                        |
|          |         |   (Age   |                                            |
|          |         | 74)      |                                            |
+----------+---------+----------+--------------------------------------------+
| Sister   | Khushbu |  |                                            |
 
|          |         |   (Age   |                                            |
|          |         | 56)      |                                            |
+----------+---------+----------+--------------------------------------------+
|    | Fabienne | Alive    |                                            |
+----------+---------+----------+--------------------------------------------+
|    | Rhea    | Alive    |                                            |
+----------+---------+----------+--------------------------------------------+
| Son      |         | Alive    |                                            |
+----------+---------+----------+--------------------------------------------+
 
 
 
 Social History
 
 
+--------------+-------+-----------+--------+------+
| Tobacco Use  | Types | Packs/Day | Years  | Date |
|              |       |           | Used   |      |
+--------------+-------+-----------+--------+------+
| Never Smoker |       |           |        |      |
+--------------+-------+-----------+--------+------+
 
 
 
+---------------------+---+---+---+
| Smokeless Tobacco:  |   |   |   |
| Never Used          |   |   |   |
+---------------------+---+---+---+
 
 
 
+---------------+-------------+---------+----------+
| Alcohol Use   | Drinks/Week | oz/Week | Comments |
+---------------+-------------+---------+----------+
| Not Currently |             |         |          |
+---------------+-------------+---------+----------+
 
 
 
+------------------+---------------+
| Sex Assigned at  | Date Recorded |
| Birth            |               |
+------------------+---------------+
| Not on file      |               |
+------------------+---------------+
 
 
 
+----------------+-------------+-------------+
| Job Start Date | Occupation  | Industry    |
+----------------+-------------+-------------+
| Not on file    | Not on file | Not on file |
+----------------+-------------+-------------+
 
 
 
+----------------+--------------+------------+
| Travel History | Travel Start | Travel End |
+----------------+--------------+------------+
 
 
 
 
+-------------------------------------+
| No recent travel history available. |
+-------------------------------------+
 
 
 
 Last Filed Vital Signs
 
 
+-------------------+----------------------+----------------------+----------+
| Vital Sign        | Reading              | Time Taken           | Comments |
+-------------------+----------------------+----------------------+----------+
| Blood Pressure    | 114/52               | 2019 12:25 PM  |          |
|                   |                      | PDT                  |          |
+-------------------+----------------------+----------------------+----------+
| Pulse             | 75                   | 2019 12:25 PM  |          |
|                   |                      | PDT                  |          |
+-------------------+----------------------+----------------------+----------+
| Temperature       | 36.6   C (97.9   F)  | 10/25/2018  2:40 PM  |          |
|                   |                      | PDT                  |          |
+-------------------+----------------------+----------------------+----------+
| Respiratory Rate  | 16                   | 10/25/2018  2:40 PM  |          |
|                   |                      | PDT                  |          |
+-------------------+----------------------+----------------------+----------+
| Oxygen Saturation | 94%                  | 2019 12:25 PM  |          |
|                   |                      | PDT                  |          |
+-------------------+----------------------+----------------------+----------+
| Inhaled Oxygen    | -                    | -                    |          |
| Concentration     |                      |                      |          |
+-------------------+----------------------+----------------------+----------+
| Weight            | 65.3 kg (143 lb 14.4 | 2019 12:25 PM  |          |
|                   |  oz)                 | PDT                  |          |
+-------------------+----------------------+----------------------+----------+
| Height            | 160 cm (5' 3")       | 2019 12:25 PM  |          |
|                   |                      | PDT                  |          |
+-------------------+----------------------+----------------------+----------+
| Body Mass Index   | 25.49                | 2019 12:25 PM  |          |
|                   |                      | PDT                  |          |
+-------------------+----------------------+----------------------+----------+
 
 
 
 Plan of Treatment
 
 
+--------+---------+------------+----------------------+-------------+
| Date   | Type    | Specialty  | Care Team            | Description |
+--------+---------+------------+----------------------+-------------+
| / | Office  | Cardiology |   [facility]    |             |
| 2020   | Visit   |            | [address]      |             |
|        |         |            | [address]   |             |
|        |         |            | [place] [address]   |             |
|        |         |            | [phone]         |             |
|        |         |            | [phone] (Fax)   |             |
+--------+---------+------------+----------------------+-------------+
 
 
 
 
+----------------------+-----------+---------------------------------+----------+
| Health Maintenance   | Due Date  | Last Done                       | Comments |
+----------------------+-----------+---------------------------------+----------+
| Vaccine:             |  |                                 |          |
| Dtap/Tdap/Td (1 -    | 3         |                                 |          |
| Tdap)                |           |                                 |          |
+----------------------+-----------+---------------------------------+----------+
| Vaccine:             |  |                                 |          |
| Pneumococcal 65+ (1  | 7         |                                 |          |
| of 2 - PCV13)        |           |                                 |          |
+----------------------+-----------+---------------------------------+----------+
| Vaccine: Zoster (2   |  | 2016                      |          |
| of 3)                | 7         |                                 |          |
+----------------------+-----------+---------------------------------+----------+
| Adult Annual         |  |                                 |          |
| Wellness Visit       | 9         |                                 |          |
+----------------------+-----------+---------------------------------+----------+
| Vaccine: Influenza   | Completed | 2019, 10/15/2018,         |          |
|                      |           | 10/04/2017, Additional history  |          |
|                      |           | exists                          |          |
+----------------------+-----------+---------------------------------+----------+
 
 
 
 Results
 Not on filefrom Last 3 Months
 
 Insurance
 
 
+------------------+--------+-------------+--------+-------------+---------+--------+
| Payer            | Benefi | Subscriber  | Effect | Phone       | Address | Type   |
|                  | t Plan | ID          | vickey    |             |         |        |
|                  |  /     |             | Dates  |             |         |        |
|                  | Group  |             |        |             |         |        |
+------------------+--------+-------------+--------+-------------+---------+--------+
| MEDICARE         | MEDICA | [id number] | 19 | [phone] |         | [employer] |
|                  | RE     |             | 97-Pre | 5           |         | re     |
|                  | PART A |             | sent   |             |         |        |
|                  |  AND B |             |        |             |         |        |
+------------------+--------+-------------+--------+-------------+---------+--------+
| [employer] | [employer] | [id number]   | 20 | [phone] |         | Indemn |
|                  | S LIFE |             | 07-Pre | 4           |         | ity    |
|                  |  INS   |             | sent   |             |         |        |
+------------------+--------+-------------+--------+-------------+---------+--------+
 
 
 
+--------------------+--------+-------------+--------+-------------+---------------------+
| Guarantor Name     | Accoun | Relation to | Date   | Phone       | Billing Address     |
|                    | t Type |  Patient    | of     |             |                     |
|                    |        |             | Birth  |             |                     |
+--------------------+--------+-------------+--------+-------------+---------------------+
| Vonnie Celaya | Person | Self        | / |             |   [address]   |
|                    | al/Fam |             | 1932   | [phone] | [place], [place]       |
|                    | [place]    |             |        | 0 (Home)    | [id number]          |
+--------------------+--------+-------------+--------+-------------+---------------------+
 
 
 
 
 [employer] Directives
 
 
+-----------+---------------+----------------+-------------+
| Type      | Date Recorded | Patient        | Explanation |
|           |               | Representative |             |
+-----------+---------------+----------------+-------------+
| Power of  |               |                |             |
|   |               |                |             |
+-----------+---------------+----------------+-------------+
| [employer]   |               |                |             |
| Directive |               |                |             |
+-----------+---------------+----------------+-------------+

## 2020-01-01 NOTE — XMS
Encounter Summary
  Created on: 2020
 
 Alvin Harp
 External Reference #: [id number]
 : 32
 Sex: Female
 
 Demographics
 
 
+-----------------------+------------------------+
| Address               | [address]         |
|                       | [place]  [address]   |
+-----------------------+------------------------+
| Home Phone            | [phone]        |
+-----------------------+------------------------+
| Preferred Language    | Unknown                |
+-----------------------+------------------------+
| Marital Status        | Single                 |
+-----------------------+------------------------+
| Bahai Affiliation | NON                    |
+-----------------------+------------------------+
| Race                  | White                  |
+-----------------------+------------------------+
| Ethnic Group          | Not  or  |
+-----------------------+------------------------+
 
 
 Author
 
 
+--------------+------------------------------+
| Author       | [facility] |
+--------------+------------------------------+
| Organization | [facility] |
+--------------+------------------------------+
| Address      | Unknown                      |
+--------------+------------------------------+
| Phone        | Unavailable                  |
+--------------+------------------------------+
 
 
 
 Support
 
 
+--------------+--------------+---------+-----------------+
| Name         | Relationship | Address | Phone           |
+--------------+--------------+---------+-----------------+
| Lauryn Leroy | ECON         | Unknown | [phone] |
+--------------+--------------+---------+-----------------+
 
 
 
 Care Team Providers
 
 
 
+-----------------------+------+-------------+
| Care Team Member Name | Role | Phone       |
+-----------------------+------+-------------+
 PCP  | Unavailable |
+-----------------------+------+-------------+
 
 
 
 Reason for Referral
 Diagnostic Testing (Routine)
 
+--------+--------+------------+--------------+--------------+--------------+
| Status | Reason | Specialty  | Diagnoses /  | Referred By  | Referred To  |
|        |        |            | Procedures   | Contact      | Contact      |
+--------+--------+------------+--------------+--------------+--------------+
| Closed |        | Cardiology |   Diagnoses  |              |              |
|        |        |            |  Mitral      | Claudette,  |              |
|        |        |            | valve        | Jame PARRA MD  |              |
|        |        |            | insufficienc |  [address]     |              |
|        |        |            | y,           | [address]     |              |
|        |        |            | unspecified  | [place], [place] |              |
|        |        |            | etiology     |  [address]  |              |
|        |        |            | Procedures   |  Phone:      |              |
|        |        |            | TRANSTHORACI | [phone] |              |
|        |        |            | C            |   Fax:       |              |
|        |        |            | ECHOCARDIOGR | [phone] |              |
|        |        |            | AM, ADULT    |              |              |
+--------+--------+------------+--------------+--------------+--------------+
 
 
 Diagnostic Testing (Routine)
 
+--------+--------+------------+--------------+--------------+---------------+
| Status | Reason | Specialty  | Diagnoses /  | Referred By  | Referred To   |
|        |        |            | Procedures   | Contact      | Contact       |
+--------+--------+------------+--------------+--------------+---------------+
| Closed |        | Cardiology |   Diagnoses  |              |   Car Echo    |
|        |        |            |  Mitral      | Claudette,  | [facility]  [address]  |
|        |        |            | valve        | Jame PARRA MD  | [address] |
|        |        |            | insufficienc |  [address]     |   Mailcode:   |
|        |        |            | y,           | [address]     | [id number]  [place]    |
|        |        |            | unspecified  | [place], [place] | [facility]  |
|        |        |            | etiology     |  [address]  | Building      |
|        |        |            | Procedures   |  Phone:      | [place], [place]  |
|        |        |            | TRANSESOPHAG | [phone] | [phone]    |
|        |        |            | EAL          |   Fax:       | Phone:        |
|        |        |            | ECHOCARDIOGR | [phone] | [phone]  |
|        |        |            | AM, ADULT    |              |               |
|        |        |            | DC ECHO      |              |               |
|        |        |            | HEART,TRANSE |              |               |
|        |        |            | SOPHAGEAL,CO |              |               |
|        |        |            | MPLETE  DC   |              |               |
|        |        |            | DOPPLER ECHO |              |               |
|        |        |            |              |              |               |
|        |        |            | HEART,LIMITE |              |               |
|        |        |            | D,F/U  DC    |              |               |
|        |        |            | DOPPLER      |              |               |
|        |        |            | COLOR FLOW   |              |               |
|        |        |            | VELOCITY MAP |              |               |
+--------+--------+------------+--------------+--------------+---------------+
 
 
 
 Diagnostic Testing (Routine)
 
+--------+--------+-----------+--------------+--------------+--------------+
| Status | Reason | Specialty | Diagnoses /  | Referred By  | Referred To  |
|        |        |           | Procedures   | Contact      | Contact      |
+--------+--------+-----------+--------------+--------------+--------------+
| Closed |        | Radiology |   Diagnoses  |              |              |
|        |        |           |  Mitral      | Claudette,  |              |
|        |        |           | valve        | Jame PARRA MD  |              |
|        |        |           | insufficienc |  [address]     |              |
|        |        |           | y,           | [address]     |              |
|        |        |           | unspecified  | [place], [place] |              |
|        |        |           | etiology     |  [address]  |              |
|        |        |           | Procedures   |  Phone:      |              |
|        |        |           | CTA CHEST -  | [phone] |              |
|        |        |           | GATED W      |   Fax:       |              |
|        |        |           | CONTRAST     | [phone] |              |
+--------+--------+-----------+--------------+--------------+--------------+
 
 
 
 
 Reason for Visit
 
 
+--------+----------+
| Reason | Comments |
+--------+----------+
| Other  | [facility] appt |
+--------+----------+
 
 
 
 Encounter Details
 
 
+--------+-----------+----------------------+----------------------+------------------+
| Date   | Type      | Department           | Care Team            | Description      |
+--------+-----------+----------------------+----------------------+------------------+
| / | Telephone |   Cardiology at [facility]  |   Jame Wilkins  | Other ([facility] appt) |
|    |           |  [address]   [address]  |                  |
|        |           | Mailcode: [address]       | [address]  [place], [place]    |                  |
|        |           | [facility]    | [address]           |                  |
|        |           | and [place],         | [phone]         |                  |
|        |           |       | [phone] (Fax)   |                  |
|        |           | [place], [place]  |                      |                  |
|        |           | [address]           |                      |                  |
|        |           | [phone]         |                      |                  |
+--------+-----------+----------------------+----------------------+------------------+
 
 
 
 Social History
 
 
+----------------+-------+-----------+--------+------+
| Tobacco Use    | Types | Packs/Day | Years  | Date |
|                |       |           | Used   |      |
 
+----------------+-------+-----------+--------+------+
| Never Assessed |       |           |        |      |
+----------------+-------+-----------+--------+------+
 
 
 
+------------------+---------------+
| Sex Assigned at  | Date Recorded |
| Birth            |               |
+------------------+---------------+
| Not on file      |               |
+------------------+---------------+
 
 
 
+----------------+-------------+-------------+
| Job Start Date | Occupation  | Industry    |
+----------------+-------------+-------------+
| Not on file    | Not on file | Not on file |
+----------------+-------------+-------------+
 
 
 
+----------------+--------------+------------+
| Travel History | Travel Start | Travel End |
+----------------+--------------+------------+
 
 
 
+-------------------------------------+
| No recent travel history available. |
+-------------------------------------+
 documented as of this encounter
 
 Plan of Treatment
 
 
+----------------------+------+--------+----------------------+---------------------+
| Name                 | Type | Priori | Associated Diagnoses | Order Schedule      |
|                      |      | ty     |                      |                     |
+----------------------+------+--------+----------------------+---------------------+
| 12 LEAD ECG          | ECG  | Routin |   Mitral valve       | Ordered: 2018 |
|                      |      | e      | insufficiency,       |                     |
|                      |      |        | unspecified etiology |                     |
+----------------------+------+--------+----------------------+---------------------+
| 6-MINUTE WALK TEST - | ECG  | Routin |   Mitral valve       | Ordered: 2018 |
|  ECG                 |      | e      | insufficiency,       |                     |
|                      |      |        | unspecified etiology |                     |
+----------------------+------+--------+----------------------+---------------------+
 documented as of this encounter
 
 Procedures
 
 
+------------------+--------+-------------+----------------------+----------------------+
| Procedure Name   | Priori | Date/Time   | Associated Diagnosis | Comments             |
|                  | ty     |             |                      |                      |
+------------------+--------+-------------+----------------------+----------------------+
| TRANSESOPHAGEAL  | Routin | 2018  |   Mitral valve       |   Results for this   |
| ECHOCARDIOGRAM,  | e      |  9:51 AM    | insufficiency,       | procedure are in the |
 
| ADULT            |        | PDT         | unspecified etiology |  results section.    |
+------------------+--------+-------------+----------------------+----------------------+
| TRANSTHORACIC    | Routin | 2018  |   Mitral valve       |   Results for this   |
| ECHOCARDIOGRAM,  | e      |  7:31 AM    | insufficiency,       | procedure are in the |
| ADULT            |        | PDT         | unspecified etiology |  results section.    |
+------------------+--------+-------------+----------------------+----------------------+
 documented in this encounter
 
 Results
 CTA CHEST - GATED W CONTRAST (2018  2:35 PM PDT)
 
+----------+
| Specimen |
+----------+
|          |
+----------+
 
 
 
+------------------------------------------------------------------------+-----------------+
| Narrative                                                              | Performed At    |
+------------------------------------------------------------------------+-----------------+
|   EXAM: CTA CHEST OTHER     HISTORY: Mitral valve disease,             |   [facility]          |
| anticipation of TMVR.     COMPARISON: None.     TECHNIQUE;  Following  | RADIOLOGY VOICE |
| uneventful administration of intravenous contrast, cardiac gated       |  RECOGNITION 2  |
| helical scanning was obtained of the chest and reviewed in soft tissue |                 |
|  and lung algorithm. 3D reformatted images were generated at an        |                 |
| independent workstation and also reviewed.     CONTRAST: IOHEXOL 300   |                 |
| MG IODINE/ML INTRAVENOUS SOLUTION 90 mL     FINDINGS:     CARDIAC      |                 |
| MORPHOLOGY:  The aortic arch, cardiac apex and gastric lumen are on    |                 |
| the left.        Mitral valve area: 9.3 cm2  Perimeter: 10.7 mm  TT:   |                 |
| 27 mm  SL: 34mm     CHEST:  The heart is enlarged with preferential    |                 |
| enlargement of both atria. There is extensive mitral annular           |                 |
| calcifications. Minimal mitral aortic valvular and coronary arterial   |                 |
| calcifications are also noted. There is no pericardial or pleural      |                 |
| effusion. There is no pneumothorax.     There are no suspicious        |                 |
| mediastinal, or axillary lymph nodes.     The lungs are clear without  |                 |
| suspicious pulmonary nodules, masses or consolidative opacities.       |                 |
| Minimal bibasilar linear atelectasis/scarring is noted.     The spleen |                 |
|  is lobulated and diminutive in size. Rim calcified right superior     |                 |
| renal pole 5 cm cyst is not fully evaluated.     Remaining visualized  |                 |
| upper abdominal organs are unremarkable.     There is no suspicious    |                 |
| focal osseous abnormality.     IMPRESSION:  Extensive mitral annular   |                 |
| calcifications.     Mitral valvular measurements as above.        I    |                 |
| have personally reviewed the images and, if necessary, edited the      |                 |
| report. I agree with the report as now presented.      Final           |                 |
| signature: Henrietta Whitehead MD    2018 4:05 PM   Preliminary:       |                 |
| Henrietta Whitehead MD        Dictation initiated: Henrietta Whitehead MD        |                 |
| 2018 3:01 PM                                                      |                 |
+------------------------------------------------------------------------+-----------------+
 
 
 
+-------------------------------------------------------------------------------------------
--------------------------------------------------------------------------------------------
-------------------------------+
| Procedure Note                                                                            
                                                                                            
                               |
+-------------------------------------------------------------------------------------------
 
--------------------------------------------------------------------------------------------
-------------------------------+
|   Service Account, Radiant Res In Interface - 2018  4:06 PM PDT  EXAM: CTA CHEST    
                                                                                            
                               |
| OTHER HISTORY: Mitral valve disease, anticipation of TMVR. COMPARISON: None.              
                                                                                            
                               |
| TECHNIQUE;Following uneventful administration of intravenous contrast, cardiac gated      
                                                                                            
                               |
| helical scanning was obtained of the chest and reviewed in soft tissue and lung           
                                                                                            
                               |
| algorithm. 3D reformatted images were generated at an independent workstation and also    
                                                                                            
                               |
| reviewed. CONTRAST: IOHEXOL 300 MG IODINE/ML INTRAVENOUS SOLUTION 90 mL FINDINGS:         
                                                                                            
                               |
| CARDIAC MORPHOLOGY:The aortic arch, cardiac apex and gastric lumen are on the left.       
                                                                                            
                               |
| Mitral valve area: 9.3 [id number]: 10.7 mmTT: 27 mmSL: 34mm CHEST:The heart is          
                                                                                            
                               |
| enlarged with preferential enlargement of both atria. There is extensive mitral annular   
                                                                                            
                               |
| calcifications. Minimal mitral aortic valvular and coronary arterial calcifications are   
                                                                                            
                               |
| also noted. There is no pericardial or pleural effusion. There is no pneumothorax. There  
                                                                                            
                               |
|  are no suspicious mediastinal, or axillary lymph nodes. The lungs are clear without      
                                                                                            
                               |
| suspicious pulmonary nodules, masses or consolidative opacities. Minimal bibasilar        
                                                                                            
                               |
| linear atelectasis/scarring is noted. The spleen is lobulated and diminutive in size.     
                                                                                            
                               |
| Rim calcified right superior renal pole 5 cm cyst is not fully evaluated. Remaining       
                                                                                            
                               |
| visualized upper abdominal organs are unremarkable. There is no suspicious focal osseous  
                                                                                            
                               |
|  abnormality. IMPRESSION:Extensive mitral annular calcifications. Mitral valvular         
                                                                                            
                               |
| measurements as above.  I have personally reviewed the images and, if necessary, edited   
                                                                                            
                               |
| the report. I agree with the report as now presented.  Final signature: Virginie Ochoa
| MD   2018 4:05 PM Preliminary: Henrietta Whitehead MD    Dictation initiated: Henrietta Whitehead MD   2018 3:01 PM                                                              
                                                                                            
                               |
|CHEST:                                                                                     
                                                                                            
                              |
|The heart is enlarged with preferential enlargement of both atria. There is extensive rebecca
l annular calcifications. Minimal mitral aortic valvular and coronary arterial calcification
s are also noted. There is no  |
|pericardial or pleural effusion. There is no pneumothorax.                                 
                                                                                            
                               |
|                                                                                           
                                                                                            
                               |
|There are no suspicious mediastinal, or axillary lymph nodes.                              
                                                                                            
                               |
|                                                                                           
                                                                                            
                               |
|The lungs are clear without suspicious pulmonary nodules, masses or consolidative opacities
. Minimal bibasilar linear atelectasis/scarring is noted.                                   
                               |
|                                                                                           
                                                                                            
                               |
|The spleen is lobulated and diminutive in size. Rim calcified right superior renal pole 5 c
m cyst is not fully evaluated.                                                              
                               |
|                                                                                           
                                                                                            
                               |
|Remaining visualized upper abdominal organs are unremarkable.                              
                                                                                            
                               |
|                                                                                           
                                                                                            
                               |
|There is no suspicious focal osseous abnormality.                                          
                                                                                            
                               |
|                                                                                           
                                                                                            
                               |
|IMPRESSION:                                                                                
                                                                                            
                              |
|Extensive mitral annular calcifications.                                                   
                                                                                            
                               |
|                                                                                           
                                                                                            
                               |
|Mitral valvular measurements as above.                                                     
                                                                                            
                               |
|                                                                                           
 
                                                                                            
                               |
|                                                                                           
                                                                                            
                               |
|I have personally reviewed the images and, if necessary, edited the report. I agree with th
e report as now presented.                                                                  
                               |
|                                                                                           
                                                                                            
                               |
|Final signature: Henrietta Whitehead MD   2018 4:05 PM                                     
                                                                                            
                               |
|Preliminary: Henrietta Whitehead MD                                                             
                                                                                            
                               |
|Dictation initiated: Henrietta Whitehead MD   2018 3:01 PM                                 
                                                                                            
                               |
+-------------------------------------------------------------------------------------------
--------------------------------------------------------------------------------------------
-------------------------------+
 
 
 
+---------------------+---------+--------------------+--------------+
| Performing          | Address | City/State/Zipcode | Phone Number |
| Organization        |         |                    |              |
+---------------------+---------+--------------------+--------------+
|   [facility]    |         |                    |              |
| VOICE RECOGNITION 2 |         |                    |              |
+---------------------+---------+--------------------+--------------+
 TRANSESOPHAGEAL ECHOCARDIOGRAM, ADULT (2018  9:51 AM PDT)
 
+-------------+-----------------+-----------+------------+--------------+
| Component   | Value           | Ref Range | Performed  | Pathologist  |
|             |                 |           | At         | Signature    |
+-------------+-----------------+-----------+------------+--------------+
| EJECTION    | greater than 75 |           | [facility]  |              |
| FRACTION    |                 |           | OF         |              |
|             |                 |           | CARDIOLOGY |              |
+-------------+-----------------+-----------+------------+--------------+
| EJECTION    | 75              | %         | [facility]  |              |
| FRACTION    |                 |           | OF         |              |
| RANGE MEAN  |                 |           | CARDIOLOGY |              |
| VALUE       |                 |           |            |              |
+-------------+-----------------+-----------+------------+--------------+
 
 
 
+----------+
| Specimen |
+----------+
|          |
+----------+
 
 
 
+--------------------------------------------------------------------------------------+----
 
-------------+
| Narrative                                                                            | Per
formed At    |
+--------------------------------------------------------------------------------------+----
-------------+
|   This result has an attachment that is not available.  [facility]                |   O
[facility] DEPT OF  |
| [facility]    Adult Echocardiography Laboratory      [id number]      |
|  [address] [place], [place] [address] Ph:                  |    
             |
| [phone] Fax: [phone]  Pt Name: ALVIN HARP                      |    
             |
|   Study Date/Time       2018 / 9:51:19 AMMRN:       [id number]                     |    
             |
|              Most recent prior:   18Acc #:    [id number]                         |    
             |
|       No. previous echos: 1DOB:       1932 86 years   Heart Rate:               |    
             |
|              83 bpmHeight:   63.0 in                   Blood Pressure:               |    
             |
|        136/75 mm/HgWeight:   145.0 lb                  Gender:                       |    
             |
|             FBSA:       1.69 m2                   Order ID:                          |    
             |
|       [id number] Sonographer: go  Referring Provider: Jame PARRA                         |    
             |
| [place] [facility] Performed: Transesophageal                   |    
             |
| echocardiogram and Due to incomplete visualization of the mitral valve               |    
             |
|  structure, online 3D reconstruction was clinically indicated and                    |    
             |
| performed under the concurrent supervision of the interpreting                       |    
             |
| cardiologist.Study Quality: Good.Exam Indication: Mitral                             |    
             |
| regurgitationHistory: 87 yO f with severe MR, mod-severe TR, mild AS,                |    
             |
| and small secundum ASD referred for percuteanous MV repair /                         |    
             |
| replacement +/- tricuspid valve intervention . This history was                      |    
             |
| obtained from the patient's EHR.Transesophageal Echocardiographic                    |    
             |
| ReportConsent and Probe Insertion:The risks and benefits of JENIFFER were                 |    
             |
| explained to the patient and informed written consent was obtained.                  |    
             |
| The oropharynx was anesthetized with topical lidocaine. Sedation                     |    
             |
| administered by Anesthesiology. The JENIFFER probe was placed into the                    |    
             |
| esophagus, without complications. The patient was monitored throughout               |    
             |
|  the exam with a single lead electrocardiogram and blood pressure                    |    
             |
| cuff. Oxygen was administered via nasal cannula. Direct visualization                |    
             |
| of the oropharynx has shown a Mallampati score of Class II.Conscious                 |    
 
             |
| sedation duration:                                                                   |    
             |
| +---------------------------------------------------------------------               |    
             |
| ---------+Final Impressions:                                                         |    
             |
|                                                                                      |    
             |
|                                                                                      |    
             |
|                                                                                      |    
             |
|                                                                                      |    
             |
|      1. The LV function is hyperdynamic.                                             |    
             |
|                                   2. The visually estimated ejection                 |    
             |
| fraction is > 75%.                                     3. Right                      |    
             |
| ventricular size, thickness and function are normal.                                 |    
             |
|        4. Severe circumferential mitral annular calcification. A large               |    
             |
|  deposit of      calcium at the posterolateral atrial aspect of                      |    
             |
| themitral annulus that             partially disrupts the normal                     |    
             |
| mitral annular shape and size. Mitral annular    area is measured at                 |    
             |
| 8.26 cm2, with an AP diameter of 3.76 cm, and CC                                     |    
             |
|   diameter of 2.52 cm. However, there is no significant mitral                       |    
             |
| stenosis. Mean    mitral gradient is approximately 4 mmHg at a heart                 |    
             |
| rate of 83 bpm.                  5. Severe mitral valve regurgitation.               |    
             |
|  The EROA is 0.47 cm2 (using a PISA         radius of 1.1 cm, an                     |    
             |
| aliasing velocity of 0.32 m/s, and a mean MR                                         |    
             |
| regurgitant velocity of 5.1 m/s), the calculated regurgitant volume is               |    
             |
|  67       mL, and the calculated regurgitant fraction is 52%.                        |    
             |
|                                6. The mitral valve anterior leaflet                  |    
             |
| measures 2.0 cm.                                     7. There is                     |    
             |
| subvalvar and papilary muscle head calcifidation noted as well.    8.                |    
             |
| The mechanism for the severe mitral regurgitation is leaflet and                     |    
             |
| annular    calcification and degerneration with poor coaptation across               |    
             |
|  all portions of    the valve.                                                       |    
             |
|                                                               9. The                 |    
 
             |
| aortic valve is moderately sclerotic and restricted. No aortic                       |    
             |
|       stenosis.                                                                      |    
             |
|                                                 10. Severely enlarged                |    
             |
| left atrium.                                                                         |    
             |
|         11. There is a small fenestrated secundum atrial septal                      |    
             |
| defect, with               intermittent left-to-right flow via                       |    
             |
| color-flow Doppler.                                  12. Moderate                    |    
             |
| tricuspid regurgitation.                                                             |    
             |
|                13. Small plaque involving the ascending and transverse               |    
             |
|  aorta.                        14. The left atrial appendage is well                 |    
             |
| visualized and there is no evidence of   thrombus present.                           |    
             |
|                                                                                      |    
             |
|                                                                                      |    
             |
|                                                                                      |    
             |
|   +-------------------------------------------------------------------               |    
             |
| -----------+  Findings:Cardiac Rhythm: atrial fibrillation Left                      |    
             |
| Ventricle: The LV function is hyperdynamic. Left ventricular systolic                |    
             |
| thickening is normal in all segments. The visually estimated ejection                |    
             |
| fraction is > 75%. Left Atrium: Left atrial size is severely enlarged.               |    
             |
|  Left atrial appendage: The left atrial appendage is well visualized                 |    
             |
| and there is no evidence of thrombus present. Right Ventricle: Right                 |    
             |
| ventricular size, thickness and function are normal. Aortic Valve: The               |    
             |
|  aortic valve is moderately sclerotic and restricted. No aortic                      |    
             |
| stenosis. No indication of aortic valve regurgitation. On this study,                |    
             |
| transgastric Doppler evaluation reveals an aortic valve peak velocity                |    
             |
| of 1.8 m/s, peak gradient of 13 mmHg, mean gradient of 5.6 mmHg, and                 |    
             |
| DVI of 0.75 (with an AV VTI of 0.299 and LVOT VTI of 0.226). Mitral                  |    
             |
| Valve: Mitral leaflet mobility is normal. Severe mitral annular                      |    
             |
| calcification. Severe mitral valve regurgitation. The jet is                         |    
             |
| centrally-directed. The EROA is 0.47 cm2, using a PISA radius of 1.1                 |    
 
             |
| cm, an aliasing velocity of 0.32 m/s, and a mean MR regurgitant                      |    
             |
| velocity of 5.1 m/s. There is no mitral stenosis. Mean mitral gradient               |    
             |
|  is approximately 4 mmHg at a heart rate of 83 bpm. Tricuspid Valve:                 |    
             |
| The tricuspid valve is structurally normal. Moderate tricuspid                       |    
             |
| regurgitation. Pulmonic Valve: The pulmonic valve is structurally                    |    
             |
| normal. Aorta: There is a small, layered, immobile plaque, involving                 |    
             |
| the ascending and transverse aorta. Venous: The inferior vena cava was               |    
             |
|  not well visualized. Septa: There is a small fenestrated secundum                   |    
             |
| atrial septal defect, with intermittent left-to-right flow via                       |    
             |
| color-flow Doppler.Siemens JENIFFER probe SN# [id number].Transesophageal echo               |    
             |
|  probe was passed by Cardiologist: Yes. Fellow participating in exam:                |    
             |
| Lincoln KIRANeport electronically signed by: [id number] Jame                     |    
             |
| Claudette TAVAREZ (2018 6:28:58 PM)   *** Final ***                               |    
             |
|Tricuspid Valve: The tricuspid valve is structurally normal. Moderate tricuspid       |    
             |
|regurgitation.                                                                       |     
            |
|                                                                                      |    
             |
|Pulmonic Valve: The pulmonic valve is structurally normal.                            |    
             |
|                                                                                      |    
             |
|Aorta: There is a small, layered, immobile plaque, involving the ascending and        |    
             |
|transverse aorta.                                                                     |    
             |
|                                                                                      |    
             |
|Venous: The inferior vena cava was not well visualized.                               |    
             |
|                                                                                      |    
             |
|Septa: There is a small fenestrated secundum atrial septal defect, with intermittent  |    
             |
|left-to-right flow via color-flow Doppler.                                            |    
             |
|Siemens JENIFFER probe SN# [id number].                                                       |    
             |
|Transesophageal echo probe was passed by Cardiologist: Yes.                           |    
             |
|                                                                                      |    
             |
|Fellow participating in exam: Lincoln Daniels MD                                         |    
             |
|Report electronically signed by: [id number] Jame Wilkins M.D. (2018 6:28:58  |    
 
             |
|PM)                                                                                   |    
             |
|                                                                                      |    
             |
|                                                                                      |    
             |
|                                                                                      |    
             |
|*** Final ***                                                                         |    
             |
+--------------------------------------------------------------------------------------+----
-------------+
 
 
 
+------------------------------------------------------------------------------------------+
| Procedure Note                                                                           |
+------------------------------------------------------------------------------------------+
|   Interface, Cardiology Results - 2018  6:29 PM [facility]    |
| [facility] Echocardiography Laboratory    [address] [place] [address]        |
| [place], [place] [address] Ph: [phone] Fax: [phone] Pt Name: ALVIN BLAKE    |
| TESHAASMITA  Study Date/Time     2018 / 9:51:19 AMMRN:     [id number]             Most    |
| recent prior:  18Acc #:   [id number]           No. previous echos: 1DOB:             |
| 1932 86 years  Heart Rate:         83 bpmHeight:  63.0 in             Blood         |
| Pressure:     136/75 mm/HgWeight:  145.0 lb            Gender:             FBSA:         |
| 1.69 m2             Order ID:           [id number]: go Referring Provider:     |
| Jame Sullivan Location: [facility] Performed: Transesophageal               |
| echocardiogram and Due to incomplete visualization of the mitral valve structure, online |
|  3D reconstruction was clinically indicated and performed under the concurrent           |
| supervision of the interpreting cardiologist.Study Quality: Good.Exam Indication: Mitral |
|  regurgitationHistory: 87 yO f with severe MR, mod-severe TR, mild AS, and small         |
| secundum ASD referred for percuteanous MV repair / replacement +/- tricuspid valve       |
| intervention . This history was obtained from the patient's EHR.Transesophageal          |
| Echocardiographic ReportConsent and Probe Insertion:The risks and benefits of JENIFFER were   |
| explained to the patient and informed written consent was obtained. The oropharynx was   |
| anesthetized with topical lidocaine. Sedation administered by Anesthesiology. The JENIFFER    |
| probe was placed into the esophagus, without complications. The patient was monitored    |
| throughout the exam with a single lead electrocardiogram and blood pressure cuff. Oxygen |
|  was administered via nasal cannula. Direct visualization of the oropharynx has shown a  |
| Mallampati score of Class II.Conscious sedation                                          |
| duration:+------------------------------------------------------------------------------ |
| +Final Impressions:                                                                      |
|                                                                                          |
|                                                            1. The LV function is         |
| hyperdynamic.                                           2. The visually estimated        |
| ejection fraction is > 75%.                         3. Right ventricular size, thickness |
|  and function are normal.                 4. Severe circumferential mitral annular       |
| calcification. A large deposit of    calcium at the posterolateral atrial aspect of      |
| themitral annulus that         partially disrupts the normal mitral annular shape and    |
| size. Mitral annular   area is measured at 8.26 cm2, with an AP diameter of 3.76 cm, and |
|  CC          diameter of 2.52 cm. However, there is no significant mitral stenosis. Mean |
|    mitral gradient is approximately 4 mmHg at a heart rate of 83 bpm.            5.      |
| Severe mitral valve regurgitation. The EROA is 0.47 cm2 (using a PISA      radius of 1.1 |
|  cm, an aliasing velocity of 0.32 m/s, and a mean MR             regurgitant velocity of |
|  5.1 m/s), the calculated regurgitant volume is 67     mL, and the calculated            |
| regurgitant fraction is 52%.                           6. The mitral valve anterior      |
| leaflet measures 2.0 cm.                         7. There is subvalvar and papilary      |
| muscle head calcifidation noted as well.   8. The mechanism for the severe mitral        |
| regurgitation is leaflet and annular   calcification and degerneration with poor         |
 
| coaptation across all portions of   the valve.                                           |
|                           9. The aortic valve is moderately sclerotic and restricted. No |
|  aortic         stenosis.                                                                |
|       10. Severely enlarged left atrium.                                            11.  |
| There is a small fenestrated secundum atrial septal defect, with          intermittent   |
| left-to-right flow via color-flow Doppler.                       12. Moderate tricuspid  |
| regurgitation.                                         13. Small plaque involving the    |
| ascending and transverse aorta.                14. The left atrial appendage is well     |
| visualized and there is no evidence of  thrombus present.                                |
|                                                                                          |
|                                                                                          |
| +------------------------------------------------------------------------------+         |
| Findings:Cardiac Rhythm: atrial fibrillation Left Ventricle: The LV function is          |
| hyperdynamic. Left ventricular systolic thickening is normal in all segments. The        |
| visually estimated ejection fraction is > 75%. Left Atrium: Left atrial size is severely |
|  enlarged. Left atrial appendage: The left atrial appendage is well visualized and there |
|  is no evidence of thrombus present. Right Ventricle: Right ventricular size, thickness  |
| and function are normal. Aortic Valve: The aortic valve is moderately sclerotic and      |
| restricted. No aortic stenosis. No indication of aortic valve regurgitation. On this     |
| study, transgastric Doppler evaluation reveals an aortic valve peak velocity of 1.8 m/s, |
|  peak gradient of 13 mmHg, mean gradient of 5.6 mmHg, and DVI of 0.75 (with an AV VTI of |
|  0.299 and LVOT VTI of 0.226). Mitral Valve: Mitral leaflet mobility is normal. Severe   |
| mitral annular calcification. Severe mitral valve regurgitation. The jet is              |
| centrally-directed. The EROA is 0.47 cm2, using a PISA radius of 1.1 cm, an aliasing     |
| velocity of 0.32 m/s, and a mean MR regurgitant velocity of 5.1 m/s. There is no mitral  |
| stenosis. Mean mitral gradient is approximately 4 mmHg at a heart rate of 83 bpm.        |
| Tricuspid Valve: The tricuspid valve is structurally normal. Moderate tricuspid          |
| regurgitation. Pulmonic Valve: The pulmonic valve is structurally normal. Aorta: There   |
| is a small, layered, immobile plaque, involving the ascending and transverse aorta.      |
| Venous: The inferior vena cava was not well visualized. Septa: There is a small          |
| fenestrated secundum atrial septal defect, with intermittent left-to-right flow via      |
| color-flow Doppler.Siemens JENIFFER probe SN# [id number].Transesophageal echo probe was passed  |
| by Cardiologist: Yes.Fellow participating in exam: Lincoln Daniels [facility]  |
| signed by: [id number] Jame Wilkins M.D. (2018 6:28:58 PM) *** Final ***         |
|                                                                                          |
|Findings:                                                                                |
|Cardiac Rhythm: atrial fibrillation                                                       |
|                                                                                          |
|Left Ventricle: The LV function is hyperdynamic. Left ventricular systolic thickening     |
| is normal in all segments. The visually estimated ejection fraction is > 75%.            |
|                                                                                          |
|Left Atrium: Left atrial size is severely enlarged.                                       |
|                                                                                          |
|Left atrial appendage: The left atrial appendage is well visualized and there is no       |
|evidence of thrombus present.                                                             |
|                                                                                          |
|Right Ventricle: Right ventricular size, thickness and function are normal.               |
|                                                                                          |
|Aortic Valve: The aortic valve is moderately sclerotic and restricted. No aortic          |
|stenosis. No indication of aortic valve regurgitation. On this study, transgastric       |
|Doppler evaluation reveals an aortic valve peak velocity of 1.8 m/s, peak gradient of     |
| 13 mmHg, mean gradient of 5.6 mmHg, and DVI of 0.75 (with an AV VTI of 0.299 and         |
|LVOT VTI of 0.226).                                                                       |
|                                                                                          |
|Mitral Valve: Mitral leaflet mobility is normal. Severe mitral annular calcification.     |
| Severe mitral valve regurgitation. The jet is centrally-directed. The EROA is 0.47       |
|cm2, using a PISA radius of 1.1 cm, an aliasing velocity of 0.32 m/s, and a mean MR       |
|regurgitant velocity of 5.1 m/s. There is no mitral stenosis. Mean mitral gradient is     |
| approximately 4 mmHg at a heart rate of 83 bpm.                                          |
|                                                                                          |
 
|Tricuspid Valve: The tricuspid valve is structurally normal. Moderate tricuspid           |
|regurgitation.                                                                           |
|                                                                                          |
|Pulmonic Valve: The pulmonic valve is structurally normal.                                |
|                                                                                          |
|Aorta: There is a small, layered, immobile plaque, involving the ascending and            |
|transverse aorta.                                                                         |
|                                                                                          |
|Venous: The inferior vena cava was not well visualized.                                   |
|                                                                                          |
|Septa: There is a small fenestrated secundum atrial septal defect, with intermittent      |
|left-to-right flow via color-flow Doppler.                                                |
|Siemens JENIFFER probe SN# [id number].                                                           |
|Transesophageal echo probe was passed by Cardiologist: Yes.                               |
|                                                                                          |
|Fellow participating in exam: Lincoln Daniels MD                                             |
|Report electronically signed by: [id number] Jame Wilkins M.D. (2018 6:28:58      |
|PM)                                                                                       |
|                                                                                          |
|*** Final ***                                                                             |
+------------------------------------------------------------------------------------------+
 
 
 
+-----------------+------------------------+--------------------+--------------+
| Performing      | Address                | City/State/Zipcode | Phone Number |
| Organization    |                        |                    |              |
+-----------------+------------------------+--------------------+--------------+
|   [facility]  |   [id number]  | [place], [place]       |              |
| CARDIOLOGY      | [address]              | [address]         |              |
+-----------------+------------------------+--------------------+--------------+
 TRANSTHORACIC ECHOCARDIOGRAM, ADULT (2018  7:31 AM PDT)
 
+-------------+----------+-----------+------------+--------------+
| Component   | Value    | Ref Range | Performed  | Pathologist  |
|             |          |           | At         | Signature    |
+-------------+----------+-----------+------------+--------------+
| EJECTION    | 70 to 75 |           | [facility]  |              |
| FRACTION    |          |           | OF         |              |
|             |          |           | CARDIOLOGY |              |
+-------------+----------+-----------+------------+--------------+
| LA          | 4.4      |           | [facility]  |              |
| DIMENSION   |          |           | OF         |              |
|             |          |           | CARDIOLOGY |              |
+-------------+----------+-----------+------------+--------------+
| LVIDD       | 4.4      |           | [facility]  |              |
|             |          |           | OF         |              |
|             |          |           | CARDIOLOGY |              |
+-------------+----------+-----------+------------+--------------+
| MV E?       | 0.0      |           | [facility]  |              |
|             |          |           | OF         |              |
|             |          |           | CARDIOLOGY |              |
+-------------+----------+-----------+------------+--------------+
| MV E VMAX   | 1.7      |           | [facility]  |              |
|             |          |           | OF         |              |
|             |          |           | CARDIOLOGY |              |
+-------------+----------+-----------+------------+--------------+
| RVSP        | 40       |           | [facility]  |              |
 
|             |          |           | OF         |              |
|             |          |           | CARDIOLOGY |              |
+-------------+----------+-----------+------------+--------------+
| RV TAPSE    | 1.8      |           | [facility]  |              |
|             |          |           | OF         |              |
|             |          |           | CARDIOLOGY |              |
+-------------+----------+-----------+------------+--------------+
| RV TDI S?   | 12.0     |           | [facility]  |              |
|             |          |           | OF         |              |
|             |          |           | CARDIOLOGY |              |
+-------------+----------+-----------+------------+--------------+
| EJECTION    | 72.5     | %         | [facility]  |              |
| FRACTION    |          |           | OF         |              |
| RANGE MEAN  |          |           | CARDIOLOGY |              |
| VALUE       |          |           |            |              |
+-------------+----------+-----------+------------+--------------+
 
 
 
+----------+
| Specimen |
+----------+
|          |
+----------+
 
 
 
+-----------------------------------------------------------------------------------------+-
----------------+
| Narrative                                                                               | 
Performed At    |
+-----------------------------------------------------------------------------------------+-
----------------+
|   This result has an attachment that is not available.  [facility]                   | 
  [facility]PT OF  |
| [facility]    Adult Echocardiography Laboratory      [id number]                  | 
[address]      |
|  [address] [place], [place] [address] Ph:                     | 
                |
| [phone] Fax: [phone]  Pt Name: ALVIN HARP                         | 
                |
|   Study Date/Time       2018 [facility]31[facility]24 [facility]       [id number]                        | 
                |
|              Most recent prior:   -[facility] #:    [id number]                                  | 
                |
| No. previous echos: 0DOB:       1932 86 years   Heart Rate:                        | 
                |
|        77 bpmHeight:   63.0 in                   Blood Pressure:                        | 
                |
|  124/61 mm/HgWeight:   142.0 lb                  Gender:                                | 
                |
|       FBSA:       1.67 m2                   Order ID:                                   | 
                |
| [id number]  Sonographer: Belkis Beach RDCSSonographer 2:Referring                         | 
                |
| Provider: Jame Sullivan [facility] Performed:                     | 
                |
| 2D, Color flow, Spectral Doppler.Study Quality: Good.Exam Indication:                   | 
                |
| DyspneaHistory: Acute diastolic heart failure. She has hyperdynamic LV                  | 
 
                |
|  systolic function on her echocardiogram, but marked valvular disease,                  | 
                |
|  with severe mitral regurgitation, moderately severe tricuspid                          | 
                |
| regurgitation, and mild aortic stenosis. She denies any history of                      | 
                |
| rheumatic fever, but had scarlet fever at age 13, which clearly could                   | 
                |
| have been misdiagnosed. There is no mitral stenosis. Moderately severe                  | 
                |
|  pulmonary hypertension and a small secundum ASD with minor                             | 
                |
| left-to-right shunting. Patient history has been obtained from the EHR                  | 
                |
|  Transthoracic Echocardiographic Report                                                 | 
                |
| +---------------------------------------------------------------------                  | 
                |
| ---------+Final Impressions:                                                            | 
                |
|                                                                                         | 
                |
|                                                                                         | 
                |
|                                                                                         | 
                |
|                                                                                         | 
                |
|      1. The left ventricular cavity size is normal.                                     | 
                |
|                              2. The LV function is hyperdynamic.                        | 
                |
|                                                           3. Visually                   | 
                |
| estimated left ventricular ejection fraction is 70 - 75%.                               | 
                |
| 4. The aortic valve is trileaflet and moderately calcified.                             | 
                |
|                   5. Severe mitral annular calcification as well as                     | 
                |
| papillary and subchordal      bulky calcification.                                      | 
                |
|                                                                     6.                  | 
                |
|  While there is calcification of the bases of the mitral valve                          | 
                |
| leaflets       due to the severe mitral annular calcificaiton, the mid                  | 
                |
|  portions of the          leaflets themseleves are only midly                           | 
                |
| calcified and thin, however there is the   severe central mitral valve                  | 
                |
|  regurgitation.                                                                         | 
                |
|   7. The mean transmitral gradient is 5.5 mmHg at a heart rate of 77                    | 
                |
| bpm.                                                                                    | 
                |
|                                                               8.                        | 
 
                |
| Severe biatrial enlargement.                                                            | 
                |
|                             9. Right ventricular size, thickness and                    | 
                |
| function are normal.                                                                    | 
                |
|                                                                                         | 
                |
|                                                                                         | 
                |
|   +-------------------------------------------------------------------                  | 
                |
| -----------+  Description of Findings: Cardiac Rhythm: Normal sinus                     | 
                |
| rhythm.Left Ventricle: The left ventricular cavity size is normal.                      | 
                |
| Visually estimated left ventricular ejection fraction is 70 - 75%. The                  | 
                |
|  LV function is hyperdynamic.Left Ventricular Wall Motion: Left                         | 
                |
| ventricular systolic thickening is normal in all segments.Atria:                        | 
                |
| Severe biatrial enlargement.Right Ventricle: Right ventricular size,                    | 
                |
| thickness and function are normal. TAPSE measures 1.8cm. The RV TDI s'                  | 
                |
|  velocity is 12.0cm/sec.Aortic Valve: The aortic valve is trileaflet                    | 
                |
| and moderately calcified. No indication of aortic valve regurgitation.                  | 
                |
|  The left coronary cusp is immobile.Mitral Valve: Severe mitral                         | 
                |
| annular calcification. The mean transmitral gradient is 5.5 mmHg at a                   | 
                |
| heart rate of 77 bpm. Severe mitral valve regurgitation. The mitral                     | 
                |
| valve effective regurgitant orifice area is 7 mm2.Tricuspid Valve: The                  | 
                |
|  tricuspid valve is structurally normal. Mild tricuspid regurgitation.                  | 
                |
|  The tricuspid regurgitant velocity is 2.95 m/s, and with an assumed                    | 
                |
| right atrial pressure of 5 mmHg, the estimated right ventricular                        | 
                |
| systolic pressure is upper limits of normal at 39.9 mmHg.Pulmonic                       | 
                |
| Valve: The pulmonic valve is structurally normal. Trace pulmonary                       | 
                |
| valve regurgitation.Venous: Inferior vena cava is normal with normal                    | 
                |
| inspiratory collapse.Pericardium: No pericardial effusion is seen.2D                    | 
                |
| Measurements                      Doppler Measurements                                  | 
                |
|  2D       NL Values    Aortic             MitralLVID(d)      4.43                       | 
                |
| (3.5-5.7cm) Max Breezy    1.54 Peak E         1.68                cm                       | 
                |
|                                     m/s                     m/sLVID(s)                  | 
 
                |
|       3.45                      Mean grad 4.6   Peak A                                  | 
                |
| cm                                        mmHgIVS(d)       0.83                         | 
                |
| (0.6-1.1cm) LVOT Breezy   0.96 E/A Ratio                cm                                 | 
                |
|                          m/sLVPW(d)      1.75    (0.6-1.1cm)                            | 
                |
|               TDI (E/e')   42.1                cm                                       | 
                |
|     LVOT Diam 1.95 MV mn gd      6 mmHgLA A/Ps 2D 4.41    (2.7-3.9cm)                   | 
                |
|                cm                cm                         AI P1/2                     | 
                |
|          MR ERO         7 mm2LA vol A/L 187.9   (40-73ml)    timeBP                     | 
                |
|           ml                                                MR Volume                   | 
                |
|    11 mlLA vol A/L 112.4   (16-34)       Tricuspid                                      | 
                |
|   Pulmonicindex         ml/m2                     TR Vmax    2.95 PV                    | 
                |
| Vmax       0.8                                                                          | 
                |
|      m/s                     m/s                                                        | 
                |
|         RA Press   5      RVOT VTI      10.1                                            | 
                |
|                                    mmHg                   cm                            | 
                |
|                                    RVSP         40    PV mn gd                          | 
                |
|                                                      mmHg                               | 
                |
|                                  Aorta:                                                 | 
                |
| Index:                                             Ao Sinus   2.87                      | 
                |
| (2.1-3.5cm)                                                                             | 
                |
|   cm                                             Asc Ao      2.73                       | 
                |
|                                          (prox)      cmEvaluation of                    | 
                |
| chamber size and geometry is accomplished through the incorporation of                  | 
                |
|  linear, volumetric, and indexed values Report electronically signed                    | 
                |
| by: [id number] Jame Wilkins M.D. (2018, 12:57:18 PM)   ***                     | 
                |
| Final ***                                                                               | 
                |
|                                                                                         | 
                |
|                                             Aorta:                               Index: | 
                |
|                                             Ao Sinus   2.87 (2.1-3.5cm)                 | 
                |
|                                                            cm                           | 
 
                |
|                                             Asc Ao      2.73                            | 
                |
|                                             (prox)      cm                              | 
                |
|Evaluation of chamber size and geometry is accomplished through the incorporation of     | 
                |
|linear, volumetric, and indexed values                                                   | 
                |
|                                                                                         | 
                |
|Report electronically signed by: [id number] Jame Wilkins M.D. (2018,            | 
                |
|12:57:18 PM)                                                                             | 
                |
|                                                                                         | 
                |
|                                                                                         | 
                |
|                                                                                         | 
                |
|*** Final ***                                                                            | 
                |
+-----------------------------------------------------------------------------------------+-
----------------+
 
 
 
+------------------------------------------------------------------------------------------+
| Procedure Note                                                                           |
+------------------------------------------------------------------------------------------+
|   Interface, Cardiology Results - 2018 12:57 PM [facility]    |
| [facility] Echocardiography Laboratory    [address] [place] [address]        |
| [place], [place] [address] Ph: [phone] Fax: [phone] Pt Name: ALVIN HARP  Study Date/Time     2018 [facility] 7:31:24 [facility]     [id number]             Most    |
| recent prior:  -[facility] #:   [id number]           No. previous echos: 0DOB:     1932 86  |
| years  Heart Rate:         77 bpmHeight:  63.0 in             Blood Pressure:     124/61 |
|  mm/HgWeight:  142.0 lb            Gender:             FBSA:     1.67 m2                 |
| Order ID:           [id number] Sonographer: Belkis Beach RDCSSonographer 2:Referring       |
| Provider: Jame Sullivan [facility] Performed: 2D, Color flow,      |
| Spectral Doppler.Study Quality: Good.Exam Indication: DyspneaHistory: Acute diastolic    |
| heart failure. She has hyperdynamic LV systolic function on her echocardiogram, but      |
| marked valvular disease, with severe mitral regurgitation, moderately severe tricuspid   |
| regurgitation, and mild aortic stenosis. She denies any history of rheumatic fever, but  |
| had scarlet fever at age 13, which clearly could have been misdiagnosed. There is no     |
| mitral stenosis. Moderately severe pulmonary hypertension and a small secundum ASD with  |
| minor left-to-right shunting. Patient history has been obtained from the EHR             |
| Transthoracic Echocardiographic                                                          |
| Report+------------------------------------------------------------------------------+Fi |
| nal Impressions:                                                                         |
|                                                                                          |
|                                                         1. The left ventricular cavity   |
| size is normal.                                2. The LV function is hyperdynamic.       |
|                                      3. Visually estimated left ventricular ejection     |
| fraction is 70 - 75%.         4. The aortic valve is trileaflet and moderately           |
| calcified.                   5. Severe mitral annular calcification as well as papillary |
|  and subchordal    bulky calcification.                                                  |
|          6. While there is calcification of the bases of the mitral valve leaflets       |
| due to the severe mitral annular calcificaiton, the mid portions of the       leaflets   |
| themseleves are only midly calcified and thin, however there is the  severe central      |
 
| mitral valve regurgitation.                                    7. The mean transmitral   |
| gradient is 5.5 mmHg at a heart rate of 77 bpm.                                          |
|                                             8. Severe biatrial enlargement.              |
|                                   9. Right ventricular size, thickness and function are  |
| normal.                                                                                  |
|                                                                                          |
| +------------------------------------------------------------------------------+         |
| Description of Findings: Cardiac Rhythm: Normal sinus rhythm.Left Ventricle: The left    |
| ventricular cavity size is normal. Visually estimated left ventricular ejection fraction |
|  is 70 - 75%. The LV function is hyperdynamic.Left Ventricular Wall Motion: Left         |
| ventricular systolic thickening is normal in all segments.Atria: Severe biatrial         |
| enlargement.Right Ventricle: Right ventricular size, thickness and function are normal.  |
| TAPSE measures 1.8cm. The RV TDI s' velocity is 12.0cm/sec.Aortic Valve: The aortic      |
| valve is trileaflet and moderately calcified. No indication of aortic valve              |
| regurgitation. The left coronary cusp is immobile.Mitral Valve: Severe mitral annular    |
| calcification. The mean transmitral gradient is 5.5 mmHg at a heart rate of 77 bpm.      |
| Severe mitral valve regurgitation. The mitral valve effective regurgitant orifice area   |
| is 7 mm2.Tricuspid Valve: The tricuspid valve is structurally normal. Mild tricuspid     |
| regurgitation. The tricuspid regurgitant velocity is 2.95 m/s, and with an assumed right |
|  atrial pressure of 5 mmHg, the estimated right ventricular systolic pressure is upper   |
| limits of normal at 39.9 mmHg.Pulmonic Valve: The pulmonic valve is structurally normal. |
|  Trace pulmonary valve regurgitation.Venous: Inferior vena cava is normal with normal    |
| inspiratory collapse.Pericardium: No pericardial effusion is seen.2D Measurements        |
|         Doppler Measurements            2D     NL Values   Aortic         MitralLVID(d)  |
|    4.43   (3.5-5.7cm) Max Breezy   1.54 Peak E      1.68           cm                       |
|      m/s              m/sLVID(s)    3.45               Mean grad 4.6  Peak A             |
| cm                           mmHgIVS(d)     0.83   (0.6-1.1cm) LVOT Breezy  0.96 E/A Ratio  |
|           cm                           m/sLVPW(d)    1.75   (0.6-1.1cm)                  |
| TDI (E/e')  42.1           cm                 LVOT Diam 1.95 MV mn gd    6 mmHgLA A/Ps   |
| 2D 4.41   (2.7-3.9cm)           cm           cm                 AI P1/2        MR ERO    |
|    7 mm2LA vol A/L 187.9  (40-73ml)   timeBP         ml                                  |
| MR Volume   11 mlLA vol A/L 112.4  (16-34)     Tricuspid      Pulmonicindex      ml/m2   |
|             TR Vmax   2.95 PV Vmax     0.8                                        m/s    |
|            m/s                              RA Press  5    RVOT VTI    10.1              |
|                            mmHg             cm                              RVSP      40 |
|    PV mn gd                                        mmHg                                  |
| Aorta:                     Index:                              Ao Sinus  2.87            |
| (2.1-3.5cm)                                        cm                              Asc   |
| Ao    2.73                              (prox)    cmEvaluation of chamber size and       |
| geometry is accomplished through the incorporation of linear, volumetric, and indexed    |
| values Report electronically signed by: [id number] Jame Wilkins M.D. (2018,     |
| 12:57:18 PM) *** Final ***                                                               |
|regurgitation. The tricuspid regurgitant velocity is 2.95 m/s, and with an assumed       |
|right atrial pressure of 5 mmHg, the estimated right ventricular systolic pressure is     |
| upper limits of normal at 39.9 mmHg.                                                     |
|Pulmonic Valve: The pulmonic valve is structurally normal. Trace pulmonary valve          |
|regurgitation.                                                                           |
|Venous: Inferior vena cava is normal with normal inspiratory collapse.                    |
|Pericardium: No pericardial effusion is seen.                                             |
|2D Measurements               Doppler Measurements                                        |
|                                                                                          |
|           2D     NL Values   Aortic         Mitral                                       |
|LVID(d)    4.43   (3.5-5.7cm) Max Breezy   1.54 Peak E      1.68                             |
|           cm                           m/s              m/s                              |
|LVID(s)    3.45               Mean grad 4.6  Peak A                                       |
|           cm                           mmHg                                              |
|IVS(d)     0.83   (0.6-1.1cm) LVOT Breezy  0.96 E/A Ratio                                    |
|           cm                           m/s                                               |
|LVPW(d)    1.75   (0.6-1.1cm)                TDI (E/e')  42.1                             |
|           cm                 LVOT Diam 1.95 MV mn gd    6 mmHg                           |
 
|LA A/Ps 2D 4.41   (2.7-3.9cm)           cm                                                |
|           cm                 AI P1/2        MR ERO      7 mm2                            |
|LA vol A/L 187.9  (40-73ml)   time                                                        |
|BP         ml                                MR Volume   11 ml                            |
|LA vol A/L 112.4  (16-34)     Tricuspid      Pulmonic                                     |
|index      ml/m2              TR Vmax   2.95 PV Vmax     0.8                              |
|                                        m/s              m/s                              |
|                              RA Press  5    RVOT VTI    10.1                             |
|                                        mmHg             cm                               |
|                              RVSP      40   PV mn gd                                     |
|                                        mmHg                                              |
|                                                                                          |
|                              Aorta:                     Index:                           |
|                              Ao Sinus  2.87 (2.1-3.5cm)                                  |
|                                        cm                                                |
|                              Asc Ao    2.73                                              |
|                              (prox)    cm                                                |
|Evaluation of chamber size and geometry is accomplished through the incorporation of      |
|linear, volumetric, and indexed values                                                    |
|                                                                                          |
|Report electronically signed by: [id number] Jame Wilkins M.D. (2018,             |
|12:57:18 PM)                                                                              |
|                                                                                          |
|*** Final ***                                                                             |
+------------------------------------------------------------------------------------------+
 
 
 
+-----------------+------------------------+--------------------+--------------+
| Performing      | Address                | City/State/Zipcode | Phone Number |
| Organization    |                        |                    |              |
+-----------------+------------------------+--------------------+--------------+
|   [facility]  |   [address]  | [place], [place]       |              |
| CARDIOLOGY      | [address]              | [address]         |              |
+-----------------+------------------------+--------------------+--------------+
 documented in this encounter
 
 Visit Diagnoses
 
 
+--------------------------------------------------------------+
| Diagnosis                                                    |
+--------------------------------------------------------------+
|   Mitral valve insufficiency, unspecified etiology - Primary |
+--------------------------------------------------------------+
 documented in this encounter

## 2020-01-01 NOTE — XMS
Encounter Summary
  Created on: 2020
 
 Vonnie Celaya
 External Reference #: [id number]
 : 32
 Sex: Female
 
 Demographics
 
 
+-----------------------+------------------------+
| Address               | [address]         |
|                       | [place]  [address]   |
+-----------------------+------------------------+
| Home Phone            | [phone]        |
+-----------------------+------------------------+
| Preferred Language    | Unknown                |
+-----------------------+------------------------+
| Marital Status        | Single                 |
+-----------------------+------------------------+
| Restoration Affiliation | NON                    |
+-----------------------+------------------------+
| Race                  | White                  |
+-----------------------+------------------------+
| Ethnic Group          | Not  or  |
+-----------------------+------------------------+
 
 
 Author
 
 
+--------------+------------------------------+
| Author       | [facility] |
+--------------+------------------------------+
| Organization | [facility] |
+--------------+------------------------------+
| Address      | Unknown                      |
+--------------+------------------------------+
| Phone        | Unavailable                  |
+--------------+------------------------------+
 
 
 
 Support
 
 
+--------------+--------------+---------+-----------------+
| Name         | Relationship | Address | Phone           |
+--------------+--------------+---------+-----------------+
| Lauryn Leroy | ECON         | Unknown | [phone] |
+--------------+--------------+---------+-----------------+
 
 
 
 Care Team Providers
 
 
 
+------------------------+------+-----------------+
| Care Team Member Name  | Role | Phone           |
+------------------------+------+-----------------+
| Jean Bermudez MD | PCP  | [phone] |
+------------------------+------+-----------------+
 
 
 
 Reason for Visit
 
 
+--------+----------------+
| Reason | Comments       |
+--------+----------------+
| Other  | appts on  |
+--------+----------------+
 
 
 
 Encounter Details
 
 
+--------+-----------+----------------------+----------------------+------------------+
| Date   | Type      | Department           | Care Team            | Description      |
+--------+-----------+----------------------+----------------------+------------------+
| / | Telephone |   Cardiology Complex |   Markus Smart,  | Other (appts on  |
| 2019   |           |  Valve at [facility]  [address]  | MD  [address]      | 02/19)           |
|        |           | [address]     | [place]       |                  |
|        |           | Mailcode: [address]      | [place], [place]         |                  |
|        |           | [facility] | [address]           |                  |
|        |           |  [address]  [place],  | [phone]         |                  |
|        |           | [place] [address]        | [phone] (Fax)   |                  |
|        |           | [phone]         |                      |                  |
+--------+-----------+----------------------+----------------------+------------------+
 
 
 
 Social History
 
 
+--------------+-------+-----------+--------+------+
| Tobacco Use  | Types | Packs/Day | Years  | Date |
|              |       |           | Used   |      |
+--------------+-------+-----------+--------+------+
| Never Smoker |       |           |        |      |
+--------------+-------+-----------+--------+------+
 
 
 
+---------------------+---+---+---+
| Smokeless Tobacco:  |   |   |   |
| Never Used          |   |   |   |
+---------------------+---+---+---+
 
 
 
+-------------+-------------+---------+----------+
| Alcohol Use | Drinks/Week | oz/Week | Comments |
+-------------+-------------+---------+----------+
| No          |             |         |          |
 
+-------------+-------------+---------+----------+
 
 
 
+------------------+---------------+
| Sex Assigned at  | Date Recorded |
| Birth            |               |
+------------------+---------------+
| Not on file      |               |
+------------------+---------------+
 
 
 
+----------------+-------------+-------------+
| Job Start Date | Occupation  | Industry    |
+----------------+-------------+-------------+
| Not on file    | Not on file | Not on file |
+----------------+-------------+-------------+
 
 
 
+----------------+--------------+------------+
| Travel History | Travel Start | Travel End |
+----------------+--------------+------------+
 
 
 
+-------------------------------------+
| No recent travel history available. |
+-------------------------------------+
 documented as of this encounter
 
 Plan of Treatment
 Not on filedocumented as of this encounter
 
 Visit Diagnoses
 Not on filedocumented in this encounter

## 2020-01-01 NOTE — XMS
Encounter Summary
  Created on: 2020
 
 Vonnie Celaya
 External Reference #: [id number]
 : 32
 Sex: Female
 
 Demographics
 
 
+-----------------------+------------------------+
| Address               | [address]         |
|                       | [place]  [address]   |
+-----------------------+------------------------+
| Home Phone            | [phone]        |
+-----------------------+------------------------+
| Preferred Language    | Unknown                |
+-----------------------+------------------------+
| Marital Status        | Single                 |
+-----------------------+------------------------+
| Jainism Affiliation | NON                    |
+-----------------------+------------------------+
| Race                  | White                  |
+-----------------------+------------------------+
| Ethnic Group          | Not  or  |
+-----------------------+------------------------+
 
 
 Author
 
 
+--------------+------------------------------+
| Author       | [facility] |
+--------------+------------------------------+
| Organization | [facility] |
+--------------+------------------------------+
| Address      | Unknown                      |
+--------------+------------------------------+
| Phone        | Unavailable                  |
+--------------+------------------------------+
 
 
 
 Support
 
 
+--------------+--------------+---------+-----------------+
| Name         | Relationship | Address | Phone           |
+--------------+--------------+---------+-----------------+
| Lauryn Leroy | ECON         | Unknown | [phone] |
+--------------+--------------+---------+-----------------+
 
 
 
 Care Team Providers
 
 
 
+------------------------+------+-----------------+
| Care Team Member Name  | Role | Phone           |
+------------------------+------+-----------------+
| Jean Bermudez MD | PCP  | [phone] |
+------------------------+------+-----------------+
 
 
 
 Encounter Details
 
 
+--------+-----------+----------------------+----------------------+-------------+
| Date   | Type      | Department           | Care Team            | Description |
+--------+-----------+----------------------+----------------------+-------------+
| / | Hospital  |   Cardiac            |   Sj, Car [facility]g Tech  |             |
| 2018   | Encounter | Non-Invasive Testing |  [address]        |             |
|        |           |  at [facility] | [facility]    |             |
|        |           |   [address]   | [place] [address]   |             |
|        |           | Loop  Mailcode:      |                      |             |
|        |           | [id number]  [address]   |                      |             |
|        |           | [facility]        |                      |             |
|        |           | [place], [place]         |                      |             |
|        |           | [id number]           |                      |             |
|        |           | [phone]         |                      |             |
+--------+-----------+----------------------+----------------------+-------------+
 
 
 
 Social History
 
 
+--------------+-------+-----------+--------+------+
| Tobacco Use  | Types | Packs/Day | Years  | Date |
|              |       |           | Used   |      |
+--------------+-------+-----------+--------+------+
| Never Smoker |       |           |        |      |
+--------------+-------+-----------+--------+------+
 
 
 
+---------------------+---+---+---+
| Smokeless Tobacco:  |   |   |   |
| Never Used          |   |   |   |
+---------------------+---+---+---+
 
 
 
+-------------+-------------+---------+----------+
| Alcohol Use | Drinks/Week | oz/Week | Comments |
+-------------+-------------+---------+----------+
| No          |             |         |          |
+-------------+-------------+---------+----------+
 
 
 
+------------------+---------------+
| Sex Assigned at  | Date Recorded |
| Birth            |               |
+------------------+---------------+
| Not on file      |               |
 
+------------------+---------------+
 
 
 
+----------------+-------------+-------------+
| Job Start Date | Occupation  | Industry    |
+----------------+-------------+-------------+
| Not on file    | Not on file | Not on file |
+----------------+-------------+-------------+
 
 
 
+----------------+--------------+------------+
| Travel History | Travel Start | Travel End |
+----------------+--------------+------------+
 
 
 
+-------------------------------------+
| No recent travel history available. |
+-------------------------------------+
 documented as of this encounter
 
 Medications at Time of Discharge
 
 
+----------------------+----------------------+-----------+---------+----------+----------+
| Medication           | Sig                  | Dispensed | Refills | Start    | End Date |
|                      |                      |           |         | Date     |          |
+----------------------+----------------------+-----------+---------+----------+----------+
|   acetaminophen 325  | Take by mouth.       |           | 0       |          |          |
| mg oral tablet       |                      |           |         |          |          |
+----------------------+----------------------+-----------+---------+----------+----------+
|   ascorbic acid      | Take 500 mg by mouth |           | 0       |          |          |
| (vitamin C) 500 mg   |  once daily.         |           |         |          |          |
| oral tablet          |                      |           |         |          |          |
+----------------------+----------------------+-----------+---------+----------+----------+
|   furosemide 40 mg   | Take by mouth.       |           | 0       |          |          |
| oral tablet          |                      |           |         |          |          |
+----------------------+----------------------+-----------+---------+----------+----------+
|                      | Take 2 tablets by    |           | 0       |          |          |
| gluc/chnd/om3/dha/ep | mouth once daily.    |           |         |          |          |
| a/fish/str           |                      |           |         |          |          |
| (GLUCOSAMINE         |                      |           |         |          |          |
| CHONDROITIN PLUS     |                      |           |         |          |          |
| ORAL)                |                      |           |         |          |          |
+----------------------+----------------------+-----------+---------+----------+----------+
|   levothyroxine 100  | Take by mouth.       |           | 0       |          |          |
| mcg oral tablet      |                      |           |         |          |          |
+----------------------+----------------------+-----------+---------+----------+----------+
|   losartan 100 mg    | Take 100 mg by mouth |           | 0       |          |          |
| oral tablet          |  once daily.         |           |         |          |          |
+----------------------+----------------------+-----------+---------+----------+----------+
|   MAGNESIUM CHLORIDE | Take by mouth.       |           | 0       |          |          |
|  ORAL                |                      |           |         |          |          |
+----------------------+----------------------+-----------+---------+----------+----------+
|   metFORMIN    | Take by mouth.       |           | 0       |          |          |
| mg oral tablet,ER    |                      |           |         |          |          |
| alexis.retention 24 hr |                      |           |         |          |          |
+----------------------+----------------------+-----------+---------+----------+----------+
 
|   metoprolol         | Take 100 mg by mouth |           | 0       | / |          |
| succinate 200 mg     |  once daily.         |           |         | 18       |          |
| oral tablet extended |                      |           |         |          |          |
|  release 24 hr       |                      |           |         |          |          |
+----------------------+----------------------+-----------+---------+----------+----------+
|   potassium chloride | Take by mouth.       |           | 0       |          |          |
|  SR 20 mEq oral      |                      |           |         |          |          |
| tablet,ER            |                      |           |         |          |          |
| particles/crystals   |                      |           |         |          |          |
+----------------------+----------------------+-----------+---------+----------+----------+
|   simvastatin 20 mg  | Take by mouth once   |           | 0       |          |          |
| oral tablet          | daily in the         |           |         |          |          |
|                      | evening.             |           |         |          |          |
+----------------------+----------------------+-----------+---------+----------+----------+
|   warfarin 5 mg oral | Take by mouth.       |           | 0       |          |          |
|  tablet              |                      |           |         |          |          |
+----------------------+----------------------+-----------+---------+----------+----------+
 documented as of this encounter
 
 Plan of Treatment
 
 
+----------------------+------+--------+----------------------+---------------------+
| Name                 | Type | Priori | Associated Diagnoses | Order Schedule      |
|                      |      | ty     |                      |                     |
+----------------------+------+--------+----------------------+---------------------+
| 6-MINUTE WALK TEST - | ECG  | Routin |   Mitral valve       | Ordered: 2018 |
|  ECG                 |      | e      | insufficiency,       |                     |
|                      |      |        | unspecified etiology |                     |
+----------------------+------+--------+----------------------+---------------------+
 documented as of this encounter
 
 Visit Diagnoses
 
 
+----------------------------------------------------+
| Diagnosis                                          |
+----------------------------------------------------+
|   Mitral valve insufficiency, unspecified etiology |
+----------------------------------------------------+
 documented in this encounter

## 2020-01-01 NOTE — XMS
Encounter Summary
  Created on: 2020
 
 Vonnie Celaya
 External Reference #: [id number]
 : 32
 Sex: Female
 
 Demographics
 
 
+-----------------------+------------------------+
| Address               | [address]         |
|                       | [place]  [address]   |
+-----------------------+------------------------+
| Home Phone            | [phone]        |
+-----------------------+------------------------+
| Preferred Language    | Unknown                |
+-----------------------+------------------------+
| Marital Status        | Single                 |
+-----------------------+------------------------+
| Orthodoxy Affiliation | NON                    |
+-----------------------+------------------------+
| Race                  | White                  |
+-----------------------+------------------------+
| Ethnic Group          | Not  or  |
+-----------------------+------------------------+
 
 
 Author
 
 
+--------------+------------------------------+
| Author       | [facility] |
+--------------+------------------------------+
| Organization | [facility] |
+--------------+------------------------------+
| Address      | Unknown                      |
+--------------+------------------------------+
| Phone        | Unavailable                  |
+--------------+------------------------------+
 
 
 
 Support
 
 
+--------------+--------------+---------+-----------------+
| Name         | Relationship | Address | Phone           |
+--------------+--------------+---------+-----------------+
| Lauryn Leroy | ECON         | Unknown | [phone] |
+--------------+--------------+---------+-----------------+
 
 
 
 Care Team Providers
 
 
 
+------------------------+------+-----------------+
| Care Team Member Name  | Role | Phone           |
+------------------------+------+-----------------+
| Jean Bermudez MD | PCP  | [phone] |
+------------------------+------+-----------------+
 
 
 
 Reason for Visit
 
 
+--------------+----------+
| Reason       | Comments |
+--------------+----------+
| New patient  |          |
| consultation |          |
+--------------+----------+
 Consultation (Routine)
 
+--------+--------+------------+--------------+--------------+---------------+
| Status | Reason | Specialty  | Diagnoses /  | Referred By  | Referred To   |
|        |        |            | Procedures   | Contact      | Contact       |
+--------+--------+------------+--------------+--------------+---------------+
| Closed |        | Cardiology |   Diagnoses  |   Stephen,      |   Markus Smart |
|        |        |            |              | Varun PARRA,   |  MD Craig     |
|        |        |            | Nonrheumatic | MD  [address]     | [address]   |
|        |        |            |  mitral      | [address]  | [address]  |
|        |        |            | (valve)      |  [address]       | [address]  [place], |
|        |        |            | insufficienc | [place], [place] |  [place]           |
|        |        |            | y  Rheumatic |  [address]       | [address]    |
|        |        |            |  tricuspid   | Phone:       | Phone:        |
|        |        |            | insufficienc | [phone] | [phone]  |
|        |        |            | y  Pulmonary |   Fax:       |  Fax:         |
|        |        |            |              | [phone] | [phone]  |
|        |        |            | hypertension |              |               |
|        |        |            | ,            |              |               |
|        |        |            | unspecified  |              |               |
|        |        |            |  Procedures  |              |               |
|        |        |            |  AL NEW      |              |               |
|        |        |            | PATIENT      |              |               |
|        |        |            | LEVEL V      |              |               |
+--------+--------+------------+--------------+--------------+---------------+
 
 
 
 
 Encounter Details
 
 
+--------+---------+----------------------+----------------------+----------------------+
| Date   | Type    | Department           | Care Team            | Description          |
+--------+---------+----------------------+----------------------+----------------------+
| / | Office  |   Cardiology Complex |   Markus Smart,  | Non-rheumatic mitral |
| 2018   | Visit   |  Valve at [facility]  [address]  | MD  [address]      |  regurgitation       |
|        |         | [address]     | [address]      | (Primary Dx); Mitral |
|        |         | Mailcode: [address]      | [place], [place]         |  annular             |
|        |         | [facility] | [address]           | calcification        |
|        |         |  [address]  [place],  | [phone]         |                      |
|        |         | [place] [address]        | [phone] (Fax)   |                      |
|        |         | [phone]         |                      |                      |
 
+--------+---------+----------------------+----------------------+----------------------+
 
 
 
 Social History
 
 
+--------------+-------+-----------+--------+------+
| Tobacco Use  | Types | Packs/Day | Years  | Date |
|              |       |           | Used   |      |
+--------------+-------+-----------+--------+------+
| Never Smoker |       |           |        |      |
+--------------+-------+-----------+--------+------+
 
 
 
+---------------------+---+---+---+
| Smokeless Tobacco:  |   |   |   |
| Never Used          |   |   |   |
+---------------------+---+---+---+
 
 
 
+-------------+-------------+---------+----------+
| Alcohol Use | Drinks/Week | oz/Week | Comments |
+-------------+-------------+---------+----------+
| No          |             |         |          |
+-------------+-------------+---------+----------+
 
 
 
+------------------+---------------+
| Sex Assigned at  | Date Recorded |
| Birth            |               |
+------------------+---------------+
| Not on file      |               |
+------------------+---------------+
 
 
 
+----------------+-------------+-------------+
| Job Start Date | Occupation  | Industry    |
+----------------+-------------+-------------+
| Not on file    | Not on file | Not on file |
+----------------+-------------+-------------+
 
 
 
+----------------+--------------+------------+
| Travel History | Travel Start | Travel End |
+----------------+--------------+------------+
 
 
 
+-------------------------------------+
| No recent travel history available. |
+-------------------------------------+
 documented as of this encounter
 
 Last Filed Vital Signs
 
 
 
+-------------------+--------------------+----------------------+----------+
| Vital Sign        | Reading            | Time Taken           | Comments |
+-------------------+--------------------+----------------------+----------+
| Blood Pressure    | 110/59             | 2018  2:09 PM  |          |
|                   |                    | PDT                  |          |
+-------------------+--------------------+----------------------+----------+
| Pulse             | 78                 | 2018  2:09 PM  |          |
|                   |                    | PDT                  |          |
+-------------------+--------------------+----------------------+----------+
| Temperature       | 36.7   C (98   F)  | 2018  2:09 PM  |          |
|                   |                    | PDT                  |          |
+-------------------+--------------------+----------------------+----------+
| Respiratory Rate  | 20                 | 2018  2:09 PM  |          |
|                   |                    | PDT                  |          |
+-------------------+--------------------+----------------------+----------+
| Oxygen Saturation | 99%                | 2018  2:09 PM  |          |
|                   |                    | PDT                  |          |
+-------------------+--------------------+----------------------+----------+
| Inhaled Oxygen    | -                  | -                    |          |
| Concentration     |                    |                      |          |
+-------------------+--------------------+----------------------+----------+
| Weight            | 66 kg (145 lb 9.6  | 2018  2:09 PM  |          |
|                   | oz)                | PDT                  |          |
+-------------------+--------------------+----------------------+----------+
| Height            | 160 cm (5' 3")     | 2018  2:09 PM  |          |
|                   |                    | PDT                  |          |
+-------------------+--------------------+----------------------+----------+
| Body Mass Index   | 25.79              | 2018  2:09 PM  |          |
|                   |                    | PDT                  |          |
+-------------------+--------------------+----------------------+----------+
 documented in this encounter
 
 Progress Notes
 Markus Smart MD - 2018  2:00 PM PDTFormatting of this note might be different fr
om the original.
 
 Author:   MARKUS SMART MD
 Referring Provider: Layla Greer 
 
 HPI: Mrs Celaya is a very pleasant 85 y/o female with a hx of recurrent heart failure adm
ission for "diastolic heart failure" and valvular heart disease over the past 6 months who i
s referred for considerations of options to treat severe mitral annular calcification and se
mauricio mitral regurgitation. She has a past med Hx signifncat for persistent Afib, Tyep 2 DM, 
hypertension, hyperlipidemia, hypothyroidism, iron deficiency anemia, pulmonary hypertension
, moderate to severe TR, and severe MR.  
 
 Mrs Celaya, lives in [facility] and is brought in by her daughter today who lives w
ith her.  They report that she has been experiencing progressive GARCIA that has been slow and 
progressive for many years. She was first hospitalized at [facility] in [facility] i
n 2018 with s/s of heart failure. Over the course of the spring she has noticed new
 and progressive GARCIA as well as new LE edema. She was previously active but could no longer 
walk a 1/4 block. She was re-admitted to the hospital in [facility] in May 2018 again with he
art failure.
 Echo in may was notable for normal to hyperdynamic LV systolic function, severe MAC, severe
 MR, moderate to severe TR, and mild AS. Additionally it showed moderate to severe Pulm HTN 
as well as left to right shnt across tunneled PFO. She has followed up with Dr Stephen Mitchell 
at [facility] in the [facility] and referred to [facility] for consideration of MitraClip
.  
 
 
 Since May she has had no further decompensated heart failure episodes 
 
 To expidite her evaluation, she has already been referred and undergone TTE, JENIFFER, CT scan o
f chest a week ago a well as angiogrpahy yesterday on 2018. 
 
 Symptoms & Exercise/Activity Levels:
 Lives in [facility] with her Daughter in a 2 story house. She lives in the ground floor and 
her Daughter lives up stairs. She She has 7 steps to get up into the house and can still matthew
erate gettign in to the house but is markedly winded and would have to rest going up stirs (
19 steps) When walkinf up the stairs he endorses no chest pain but predominatly GARCIA.  Her ac
tivity is limited and does not describe chest discomfort in her brief activities. She is abl
e to make her bed but feels tired afterwards. 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  Any little activty such 
as mopping or vaccuming makes her short of breath and tired.  She is worn out after activiti
es and needs to rest. She walks outside of the home with a large walking stick and no walker
. 
 
 Denies any swellign in her legs since May 2018 but does wear compression stockings.  Has no
w been on Lasix 40 mg daily was previously on 20 daily prior to May and has at stable. She i
s ana tto lay flat in bed at night and at first there is orthopnea but after a couple deep 
breaths she can tolerate it. She has no PND.  
 Currently she is limited by lack of energy.  On some days she can walk up to a mile, but sh
e no longer feels comfortable shopping by herself 
 
 [place] summer Her energy level was great. She previously was working out at [employer]. She would g
o out ot her [employer] groups.  Was completely independent  In all activities and shopping co
oking and cleaning. She typically has lunch outside of the house, cooks her own simple dinne
rs, and has oatmeal for breakfast.  
 
 She has had an appointment for consultation with Dr. Mitchell who will continue to follow her u
p in [facility].  
 
 The patient denies rest or exertional chest pain or shortness of breath, palpitations, pres
yncope, syncope, PND, orthopnea, abdominal swelling, lower extremity edema, or claudication.
 
 
 Review of Systems:
 14 point review of systems as stated in the HPI, otherwise remainder is negative.  
 
    
 Past Medical History: 
 Diagnosis Date 
   Anemia  
  resolved with iron supplements - no large GI bleed, negative EGD. 
   Atrial fibrillation (HCC)  
   Hyperlipidemia  
   Hypertension  
   Hypothyroid  
   Pulmonary hypertension (HCC)  
   Type 2 diabetes mellitus (HCC)  
 
  
 No past surgical history on file.
 
 Current Medications Include:
 
   
 Current Medication List  
 Name Sig 
 
 ACETAMINOPHEN 325 MG TABLET Take by mouth. 
 ASCORBIC ACID (VITAMIN C) 500 MG TABLET Take 500 mg by mouth once daily. 
 CHOLECALCIFEROL (VITAMIN D3) 2,000 UNIT CAPSULE Take by mouth. 
 FUROSEMIDE 40 MG TABLET Take by mouth. 
 GLUCOSAMINE CHONDROITIN PLUS ORAL Take 2 tablets by mouth once daily. 
 LEVOTHYROXINE 100 MCG TABLET Take by mouth. 
 LOSARTAN 100 MG TABLET Take 100 mg by mouth once daily. 
 MAGNESIUM CHLORIDE ORAL Take by mouth. 
 METFORMIN  MG 24 HR TABLET,EXTENDED RELEASE Take by mouth. 
 METOPROLOL SUCCINATE  MG TABLET,EXTENDED RELEASE 24 HR Take 100 mg by mouth once arturo
y.  
 POTASSIUM CHLORIDE ER 20 MEQ TABLET,EXTENDED RELEASE(PART/CRYST) Take by mouth. 
 SIMVASTATIN 20 MG TABLET Take by mouth once daily in the evening.  
 WARFARIN 5 MG TABLET Take by mouth. 
 
 
     
 Allergies 
 Allergen Reactions 
   Morphine Anxiety 
   "felt fidgety" 
   Percocet [Oxycodone-Acetaminophen] Anxiety 
   "felt fidgety" 
 
 
 SocialHistory 
 Social History 
 
     
 Social History 
   Marital status: Single 
   Spouse name: N/A 
   Number of children: N/A 
   Years of education: N/A 
 
   
 Occupational History 
   Not on file. 
 
    
 Social History Main Topics 
   Smoking status: Never Smoker 
   Smokeless tobacco: Never Used 
   Alcohol use No 
   Drug use: No 
   Sexual activity: Not on file 
 
    
 Other Topics Concern 
   Not on file 
 
   
 Social History Narrative 
   No narrative on file 
  
 
 FAmily Hx: non contrinbutory.  
 
 Physical Exam: 
 
 
 Last Vitals: /59 | Pulse 78 | Temp (Src) 36.7 C (98 F) (Oral) | RR 20 | Ht 1.6 m 
(5' 3") | Wt 66 kg (145 lb 9.6 oz) | SpO2 99% | BMI 25.79 kg/(m^2)
 Body mass index is 25.79 kg/m.
 
 General Appearance:  Ederly appearing, well developed well nourished adult female appearing
 stated age in NAD.  
 HEENT:  PERRLA, EOMI, mouth without lesions.
 Neck:  Neck w/o LAD
 Respiratory: CTA bilaterally. No wheezes or rales
 Cardiovascular: JVP at 5 cm.  2+ carotid pulses without bruits.  PMI nondisplaced. RRR. Nor
mal S1 covered & soft S2.  3/6 holosystolic murmur best audible at LLSB radiating to axilla.
  No gallops, or rubs. 2+  distal pulses. 
 Gastrointestinal: +BS, soft, nondistended, nontender, no abdominal bruits
 Musculoskeletal: No gross deformities
 Extremities: No cyanosis, clubbing, or edema.
 Neurologic: Grossly nonfocal.  UE and LE proximal and distal strength 5/5 and equal bilater
ally.  
 Skin: No rash or ulcers.
 
 Data
     
 Lab Results 
 Component Value Date 
  WBC 7.45 2018 
  HB 12.6 2018 
  HCT 37.5 2018 
   2018 
  MCV 87.4 2018 
  RDW 49.3 2018 
 
     
 Lab Results 
 Component Value Date 
   2018 
  K 3.9 2018 
   2018 
  BICARB 26 2018 
  BUN 16 2018 
  CR 0.78 2018 
   2018 
  CA 9.1 2018 
  ANIONGAP 10 2018 
 
 No results found for: NTPROBNP
 No results found for: A1C
     
 Lab Results 
 Component Value Date 
  INRPT 1.27 (H) 2018 
 
 
 FRAILTY ASSESSMENT:
 Frailty Index Date Taken: 2018 
 1. Left  Strength (kg): 12
     Right  Strength (kg): 10 
 2. Five Meter Walk (sec):   NA
 3. Serum albumin:   
 4. Walden ADL (#/6) :5 
 Frailty Outcome: Based on Partner trial definition the patient does not qualify as frail. 
 
 
 Walden ADL Date Taken: 2018 
 
 Bathing: No
 Dressing: No
 Toileting: No
 Transferring: No
 Continence: Yes
 Feeding: No
 
 EFT FRAILTY ASSESSMENT
 
 EFT 1 to possibly 2 points
 1 for low sit to stand
 No known albumin level
 
 EKG:
 Atrial fibrillation 
 Poor anterior R wave progression 
 Inferior mild st depression 0.5 mm 2/3. 
 
 
 TTE 3/6/2018
 1. Left ventricular systolic function is hyperdynamic with an estimated EF of >70%.
 2. The right ventricle is normal in size and function.
 3. The left atrium is markedly enlarged.
 4. The right atrium is markedly enlarged.
 5. Mild aortic stenosis with peak/mean pressure gradient of 16.83mmHg / 9.02mmHg, the aorti
c valve area by continuity equation is 1.7cm.
 6. Severe mitral regurgitation is present.
 7. Moderate mitral stenosis, with peak/mean transvalvular gradients measured at 25.2 / 8.0 
mm Hg, and MVA (by VTI) 1.50 cm . There is mi
 8. Moderate to severe tricuspid regurgitation present.
 9. There is moderate to severe pulmonary hypertension.The peak right ventricular systol
ic pressure (pulmonary artery systolic pressure), as measured by Doppler, is 55.8 - 60.8 mm 
Hg.
 10. There is a small secundum atrial septal defect measuring <10 mm in diameter.There i
s left-to-right shunting noted on color Doppler.
 
 TTE: 2018 
 Final Impressions:                         
                              
                                
                  
 1. The left ventricular cavity size is normal.               
 
 2. The LV function is hyperdynamic.                   
   
 3. Visually estimated left ventricular ejection fraction is 70 - 75%.     
 4. The aortic valve is trileaflet and moderately calcified.          
 5. Severe mitral annular calcification as well as papillary and subchordal  
 bulky calcification.                        
     
 6. While there is calcification of the bases of the mitral valve leaflets   
 due to the severe mitral annular calcificaiton, the mid portions of the    
 leaflets themseleves are only midly calcified and thin, however there is the 
 severe central mitral valve regurgitation.                 
 
 7. The mean transmitral gradient is 5.5 mmHg at a heart rate of 77 bpm.    
  The mitral valve effective regurgitant orifice area is 7 mm2.         
                             
 
 8. Severe biatrial enlargement.                    
    
 9. Right ventricular size, thickness and function are normal. 
 
 
 JENIFFER: 2018
 Final Impressions:                         
                             
              
                              
         
 1. The LV function is hyperdynamic.                   
   
 2. The visually estimated ejection fraction is > 75%.             
 3. Right ventricular size, thickness and function are normal.         
 4. Severe circumferential mitral annular calcification. A large deposit of  
 calcium at the posterolateral atrial aspect of themitral annulus that     
 partially disrupts the normal mitral annular shape and size. Mitral annular  
 area is measured at 8.26 cm2, with an AP diameter of 3.76 cm, and CC     
 diameter of 2.52 cm. However, there is no significant mitral stenosis. Mean  
 mitral gradient is approximately 4 mmHg at a heart rate of 83 bpm.      
 5. Severe mitral valve regurgitation. The EROA is 0.47 cm2 (using a PISA   
 radius of 1.1 cm, an aliasing velocity of 0.32 m/s, and a mean MR       
 regurgitant velocity of 5.1 m/s), the calculated regurgitant volume is 67   
 mL, and the calculated regurgitant fraction is 52%.              
 6. The mitral valve anterior leaflet measures 2.0 cm.             
 7. There is subvalvar and papilary muscle head calcifidation noted as well.  
 8. The mechanism for the severe mitral regurgitation is leaflet and annular  
 calcification and degerneration with poor coaptation across all portions of  
 the valve.                           
       
 9. The aortic valve is moderately sclerotic and restricted. No aortic     
 stenosis.                            
       
 10. Severely enlarged left atrium.                   
   
 11. There is a small fenestrated secundum atrial septal defect, with     
 intermittent left-to-right flow via color-flow Doppler.            
 12. Moderate tricuspid regurgitation.                  
   
 13. Small plaque involving the ascending and transverse aorta.        
 14. The left atrial appendage is well visualized and there is no evidence of 
 thrombus present.     
 
 CARDIAC CATH: 2018
 Preliminary repoot
 Severe MAC
 There is non-obstructive disease in LAD andCirc
 There is 95% stenosis of the distal RCA prior to the PDA and PLV's
 
 
 RHC: Unremarkable filling pressures
 
 Coronary Angiography: Obstructive CAD of RCA
 PFTS:
 None 
 
 STS (Based Off Available Data):
 MV Replacement + CAB 
 Risk of Mortality: 7.374% 
 
 Morbidity or Mortality: 30.831% 
 Long Length of Stay: 19.775% 
 Short Length of Stay: 7.384% 
 Permanent Stroke: 2.652% 
 Prolonged Ventilation: 19.662% 
 DSW Infection: 0.263% 
 Renal Failure: 8.237% 
 Reoperation: 12.335% 
 
 Assessment and Recommendations:
 Ms. Vonnie Celaya is a 86 y.o. female referred [facility] for assessment
 of treatment options for mitral valve disease. 
 
 Ms. Celaya has developed progressive GARCIA and LE edema over the last 6 months and currentl
y reports symptoms that are NYHA functional class 3-4.  A review of the echocardiogram and t
he transesophageal echo show severe mitral annular calcification, severe mitral regurgitaito
n, with largest jet centrally but also extending to the commisures, with a mean trasmitral g
radient of 4-5 mmHg and an annular are of 830-930 mm2.   In addition the echocardiogram is n
otable for moderate to severe pulmonary hypertesnion with RVSP 55-60 mmHg. Additional diagno
stics that have been performed include coronary angiogrpahy that demonstrates severe distal 
RCA stenosis. 
 
  Based on her symptoms, physical exam, and diagnostic studies we agree that she has severe 
symptomatic  Mitral regurgitation. 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
ing, continuing with her quality of life the best she can, and not having a stroke. 
 
 The patient's STS based on available data is 7-8%  . I am concerned about the risk of LVOT 
obstruction with valve and Mac, and I explained to the patient that this procedure is off la
bel use of the Echeverria valve and it carries high risk of valve embolization, perivalvular le
ak, LVOT obstruction. I also explained to her that mitral clip might be challenging given se
mauricio mitral annulus calcification as well as already elevated transmitral gradient.
 The simplest option would be to either continue medical therapy plus minus PCI to the RCA a
nd if she continues to be symptomatic we can consider hybrid procedure with resection of ant
erior leaflet and Echeverria valve deployment in severe dense mitral annulus
 I spent 45 minutes with the patient explaining all treatment options
 MARKUS SMART MD
 
 Electronically signed by Markus Smart MD at 10/02/2018 11:51 AM PDTdocumented in this e
ncounter
 
 Plan of Treatment
 Not on filedocumented as of this encounter
 
 Visit Diagnoses
 
 
+--------------------------------------------------------+
| Diagnosis                                              |
+--------------------------------------------------------+
|   Non-rheumatic mitral regurgitation - Primary         |
+--------------------------------------------------------+
|   Mitral annular calcification  Mitral valve disorders |
+--------------------------------------------------------+
 documented in this encounter

## 2020-01-01 NOTE — XMS
Encounter Summary
  Created on: 2020
 
 Vonnie Celaya
 External Reference #: [id number]
 : 32
 Sex: Female
 
 Demographics
 
 
+-----------------------+------------------------+
| Address               | [address]         |
|                       | [place]  [address]   |
+-----------------------+------------------------+
| Home Phone            | [phone]        |
+-----------------------+------------------------+
| Preferred Language    | Unknown                |
+-----------------------+------------------------+
| Marital Status        | Single                 |
+-----------------------+------------------------+
| Taoist Affiliation | NON                    |
+-----------------------+------------------------+
| Race                  | White                  |
+-----------------------+------------------------+
| Ethnic Group          | Not  or  |
+-----------------------+------------------------+
 
 
 Author
 
 
+--------------+------------------------------+
| Author       | [facility] |
+--------------+------------------------------+
| Organization | [facility] |
+--------------+------------------------------+
| Address      | Unknown                      |
+--------------+------------------------------+
| Phone        | Unavailable                  |
+--------------+------------------------------+
 
 
 
 Support
 
 
+--------------+--------------+---------+-----------------+
| Name         | Relationship | Address | Phone           |
+--------------+--------------+---------+-----------------+
| Lauryn Leroy | ECON         | Unknown | [phone] |
+--------------+--------------+---------+-----------------+
 
 
 
 Care Team Providers
 
 
 
+------------------------+------+-----------------+
| Care Team Member Name  | Role | Phone           |
+------------------------+------+-----------------+
| Jean Bermudez MD | PCP  | [phone] |
+------------------------+------+-----------------+
 
 
 
 Reason for Referral
 Diagnostic Testing (Routine)
 
+--------+--------+---------------+--------------+--------------+---------------+
| Status | Reason | Specialty     | Diagnoses /  | Referred By  | Referred To   |
|        |        |               | Procedures   | Contact      | Contact       |
+--------+--------+---------------+--------------+--------------+---------------+
| Closed |        | Cardiac       |   Diagnoses  |              |   Car Cardiac |
|        |        | Catheterizati |  Mitral      | Claud[place],  |  Cath Lab     |
|        |        | on            | valve        | Jame PARRA MD  | [address]   |
|        |        |               | stenosis,    |  [address]     | [address]  |
|        |        |               | unspecified  | [address]     | Rd  [facility]      |
|        |        |               | etiology     | [place], [place] | [facility]      |
|        |        |               | Procedures   |  [id number]  | [place], [place]  |
|        |        |               | CATH LAB INT |  Phone:      | [phone]    |
|        |        |               |  CORONARY    | [phone] | Phone:        |
|        |        |               | ANGIOGRAM    |   Fax:       | [phone]  |
|        |        |               | PA R HRT     | [phone] |  Fax:         |
|        |        |               | CORONARY     |              | [phone]  |
|        |        |               | ARTERY ANGIO |              |               |
+--------+--------+---------------+--------------+--------------+---------------+
 
 
 
 
 Reason for Visit
 AUTH/CERT
 
+--------+--------+-----------+--------------+--------------+--------------+
| Status | Reason | Specialty | Diagnoses /  | Referred By  | Referred To  |
|        |        |           | Procedures   | Contact      | Contact      |
+--------+--------+-----------+--------------+--------------+--------------+
|        |        |           |              |              |              |
+--------+--------+-----------+--------------+--------------+--------------+
 
 
 
 
 Encounter Details
 
 
+--------+-----------+----------------------+----------------------+-------------+
| Date   | Type      | Department           | Care Team            | Description |
+--------+-----------+----------------------+----------------------+-------------+
| / | Hospital  |   [facility] [address]  |   Neal He,  |             |
| 2018   | Encounter | [address]  | MD  [address] |             |
|        |           |  [address]  [facility]  |   [address]  [place], |             |
|        |           |  [place], [place]        |  [place] [address]       |             |
|        |           | [address]           | [phone]         |             |
|        |           | [phone]         | [phone] (Fax)   |             |
+--------+-----------+----------------------+----------------------+-------------+
 
 
 
 
 Social History
 
 
+----------------+-------+-----------+--------+------+
| Tobacco Use    | Types | Packs/Day | Years  | Date |
|                |       |           | Used   |      |
+----------------+-------+-----------+--------+------+
| Never Assessed |       |           |        |      |
+----------------+-------+-----------+--------+------+
 
 
 
+------------------+---------------+
| Sex Assigned at  | Date Recorded |
| Birth            |               |
+------------------+---------------+
| Not on file      |               |
+------------------+---------------+
 
 
 
+----------------+-------------+-------------+
| Job Start Date | Occupation  | Industry    |
+----------------+-------------+-------------+
| Not on file    | Not on file | Not on file |
+----------------+-------------+-------------+
 
 
 
+----------------+--------------+------------+
| Travel History | Travel Start | Travel End |
+----------------+--------------+------------+
 
 
 
+-------------------------------------+
| No recent travel history available. |
+-------------------------------------+
 documented as of this encounter
 
 Last Filed Vital Signs
 
 
+-------------------+---------------------+----------------------+----------+
| Vital Sign        | Reading             | Time Taken           | Comments |
+-------------------+---------------------+----------------------+----------+
| Blood Pressure    | 108/50              | 2018  4:00 PM  |          |
|                   |                     | PDT                  |          |
+-------------------+---------------------+----------------------+----------+
| Pulse             | 56                  | 2018  4:00 PM  |          |
|                   |                     | PDT                  |          |
+-------------------+---------------------+----------------------+----------+
| Temperature       | 36.8   C (98.2   F) | 2018 10:13 AM  |          |
|                   |                     | PDT                  |          |
+-------------------+---------------------+----------------------+----------+
| Respiratory Rate  | 13                  | 2018  4:00 PM  |          |
|                   |                     | PDT                  |          |
+-------------------+---------------------+----------------------+----------+
 
| Oxygen Saturation | 96%                 | 2018  4:00 PM  |          |
|                   |                     | PDT                  |          |
+-------------------+---------------------+----------------------+----------+
| Inhaled Oxygen    | -                   | -                    |          |
| Concentration     |                     |                      |          |
+-------------------+---------------------+----------------------+----------+
| Weight            | 63.9 kg (140 lb 14  | 2018  9:50 AM  |          |
|                   | oz)                 | PDT                  |          |
+-------------------+---------------------+----------------------+----------+
| Height            | 160 cm (5' 3")      | 2018  9:50 AM  |          |
|                   |                     | PDT                  |          |
+-------------------+---------------------+----------------------+----------+
| Body Mass Index   | 24.95               | 2018  9:50 AM  |          |
|                   |                     | PDT                  |          |
+-------------------+---------------------+----------------------+----------+
 documented in this encounter
 
 Discharge Instructions
 Instructions Good Mendoza RN - 2018.Home Care for
 Cardiac Catheterization
 
 Call your doctor if you notice any unusual symptoms. Remember: you are under the influence 
of medicines. You must have someone else take you home, either by car or taxi. Don  t drive
, operate machinery or power tools. Don  t drink any alcoholic beverages. Don  t make any 
important decisions or sign legal papers. 
 
 Wound Care
 ? Change dressing as needed. Dressing can be removed in the morning. 
 ? You may shower, but do not take a bath, hot tub, or swim for 5 days.
 ? If you have any bleeding from the puncture site:
 1. Sit down and apply firm pressure to site with your fingers x 10 minutes.
 2. If the bleeding stops, continue to sit quietly, keeping your wrist straight for 2 hours.
  Notify your physician as soon as possible.
 3. If bleeding does not stop after 10 minutes, or if there is a large amount of bleeding or
 spurting, call bleeding or spurting, call 911 immediately. Do not drive yourself to the [facility].
   
 Diet
 ? Resume your regular diet.
 ? Drink an extra 3 to 4 glasses of fluid tonight. Avoid drinks with caffeine (coffee, tea, 
cola) or alcohol (wine, beer, liquor). 
 
 Rest and Activity
 ? For 24 hours, no blood pressures on affected arm, no excessive wrist movement and do not 
drive a car.
 ? Take it easy for the rest of the day.
 ? Do not lift anything over 1 pound for the next 48 hours.
 ? For 1 week, no activity with excessive pushing or pulling of the affected arm.
 
 Call your doctor if:
 Call your Doctor right away or go to the Emergency Room if your arm looks or feels differen
t. Call if your arm is: Pale, cold, numb, tingling (pins & needles) or turns purple or red.
 
 How to Reach your Doctor
  8:00    4:00 call Cardiac Catheterization Lab at [phone].
 
 For Cardiac Catheterization related emergencies after hours, weekends, and holidays, call [facility]  at [phone] and ask to have the   Cardiology Fellow on-call   p
aged.
 
 
 documented in this encounter
 
 Medications at Time of Discharge
 
 
+----------------------+----------------------+-----------+---------+----------+----------+
| Medication           | Sig                  | Dispensed | Refills | Start    | End Date |
|                      |                      |           |         | Date     |          |
+----------------------+----------------------+-----------+---------+----------+----------+
|   acetaminophen 325  | Take by mouth.       |           | 0       |          |          |
| mg oral tablet       |                      |           |         |          |          |
+----------------------+----------------------+-----------+---------+----------+----------+
|   ascorbic acid      | Take 500 mg by mouth |           | 0       |          |          |
| (vitamin C) 500 mg   |  once daily.         |           |         |          |          |
| oral tablet          |                      |           |         |          |          |
+----------------------+----------------------+-----------+---------+----------+----------+
|   furosemide 40 mg   | Take by mouth.       |           | 0       |          |          |
| oral tablet          |                      |           |         |          |          |
+----------------------+----------------------+-----------+---------+----------+----------+
|                      | Take 2 tablets by    |           | 0       |          |          |
| gluc/chnd/om3/dha/ep | mouth once daily.    |           |         |          |          |
| a/fish/str           |                      |           |         |          |          |
| (GLUCOSAMINE         |                      |           |         |          |          |
| CHONDROITIN PLUS     |                      |           |         |          |          |
| ORAL)                |                      |           |         |          |          |
+----------------------+----------------------+-----------+---------+----------+----------+
|   levothyroxine 100  | Take by mouth.       |           | 0       |          |          |
| mcg oral tablet      |                      |           |         |          |          |
+----------------------+----------------------+-----------+---------+----------+----------+
|   losartan 100 mg    | Take 100 mg by mouth |           | 0       |          |          |
| oral tablet          |  once daily.         |           |         |          |          |
+----------------------+----------------------+-----------+---------+----------+----------+
|   MAGNESIUM CHLORIDE | Take by mouth.       |           | 0       |          |          |
|  ORAL                |                      |           |         |          |          |
+----------------------+----------------------+-----------+---------+----------+----------+
|   metFORMIN    | Take by mouth.       |           | 0       |          |          |
| mg oral tablet,ER    |                      |           |         |          |          |
| alexis.retention 24 hr |                      |           |         |          |          |
+----------------------+----------------------+-----------+---------+----------+----------+
|   metoprolol         | Take 100 mg by mouth |           | 0       | 20 |          |
| succinate 200 mg     |  once daily.         |           |         | 18       |          |
| oral tablet extended |                      |           |         |          |          |
|  release 24 hr       |                      |           |         |          |          |
+----------------------+----------------------+-----------+---------+----------+----------+
|   potassium chloride | Take by mouth.       |           | 0       |          |          |
|  SR 20 mEq oral      |                      |           |         |          |          |
| tablet,ER            |                      |           |         |          |          |
| particles/crystals   |                      |           |         |          |          |
+----------------------+----------------------+-----------+---------+----------+----------+
|   simvastatin 20 mg  | Take by mouth once   |           | 0       |          |          |
| oral tablet          | daily in the         |           |         |          |          |
|                      | evening.             |           |         |          |          |
+----------------------+----------------------+-----------+---------+----------+----------+
|   warfarin 5 mg oral | Take by mouth.       |           | 0       |          |          |
|  tablet              |                      |           |         |          |          |
+----------------------+----------------------+-----------+---------+----------+----------+
 documented as of this encounter
 
 Progress Notes
 Salomón Paniagua MD - 2018  3:20 PM PDTFormatting of this note might be different from
 
 the original.
 Post-Procedure Access Site Check
 
 S: Feels well following the procedure, no complaints. Daughter at bedside, staying in [facility]. 
 
 Last Vitals: /56 | Pulse 62 | Temp 36.8 C (98.2 F) | RR 22 | Ht 1.6 m (5' 3") | W
t 63.9 kg (140 lb 14 oz) | SpO2 96% | BMI 24.95 kg/(m^2)
 24 Hour Vital Min/Max: 
 Systolic (24hrs), Av , Min:117 , Max:158 
 Diastolic (24hrs), Av, Min:56, Max:75
 Pulse  Min: 54  Max: 78
 Temp  Min: 36.8 C (98.2 F)  Max: 36.8 C (98.2 F)
 Resp  Min: 12  Max: 31
 SpO2  Min: 95 %  Max: 98 %
 
 Intake/Output Summary (Last 24 hours) at 18 1520
 Last data filed at 18 1500
  Gross per 24 hour 
 Intake                8 ml 
 Output               13 ml 
 Net               -5 ml 
 
 Gen: comfortable appearing.
 Access Site: No hematoma or oozing around right radial artery or right IJ 
 Barbeau: Type A
 Pulse:right radial: 2+
 Skin: No embolic phenomena in hands/feet.
 Neuro: Gait normal. Reflexes normal and symmetric. Sensation grossly intact
 
 A/P: No evidence of acute complications following procedure. Continue current post-cath car
e. 
 
 Salomón Paniagua MD
 Cardiology Fellow
 
 Electronically signed by Salomón Paniagua MD at 2018  3:21 PM Salomón Marc MD - 0
2018 12:35 PM PDTCardiology Preliminary Procedure Note (Full report to follow)
 
 Primary Care Provider: Jean Bermudez MD
 Referring Provider: aJme Wilkins MD
 
 Cath Lab Staff:
 Neal He MD
 
 Procedure(s):
 Coronary Angiography
 Right Heart Catheterization
 
 Indications:
 Pre-mitral valve intervention workup
 
 Access:
 5F RRA
 7F RIJ
 
 Post Procedure Access:
 No evidence of bleeding or hematoma  right Radial access site bleeding, oozing, hematoma. a
nd No evidence of distal limb ischemia or distal embolization  Or any immediate complication
s noted in right IJ.
 
 
 Estimated Blood Loss:
 35 mL
 
 Medications:
 Heparin 3000 units    Route: IV
 Midazolam 1 mg    Route: IV
 Nitroglycerin 200 mcg    Route:  IA
 Verapamil 2 mg    Route:  IA
 
 Contrast: 55 mL Omnipaque
 
 Findings:
 PRESSURES
 
 RHC: Unremarkable filling pressures
 
 Coronary Angiography: Obstructive CAD of RCA
 
 Intervention:
 None
 
 Complications:
 None
 
 Hemostasis:
 Manual compression in cath lab.    Site: [facility].
 
 Recommendations: 
 Patient Status: Day patient
 
 Usual post cath care.
 
 Electronically signed by Salomón Paniagua MD at 2018 12:40 PM PDTdocumented in this enc
ounter
 
 Plan of Treatment
 Not on filedocumented as of this encounter
 
 Procedures
 
 
+----------------------+--------+-------------+----------------------+----------------------
+
| Procedure Name       | Priori | Date/Time   | Associated Diagnosis | Comments             
|
|                      | ty     |             |                      |                      
|
+----------------------+--------+-------------+----------------------+----------------------
+
| CARDIAC CATH         |        | 2018  |                      |   Results for this   
|
|                      |        |  1:58 PM    |                      | procedure are in the 
|
|                      |        | PDT         |                      |  results section.    
|
+----------------------+--------+-------------+----------------------+----------------------
+
| INR                  | Urgent | 2018  |                      |   Results for this   
 
|
|                      |        | 10:15 AM    |                      | procedure are in the 
|
|                      |        | PDT         |                      |  results section.    
|
+----------------------+--------+-------------+----------------------+----------------------
+
| BASIC METABOLIC SET  | Routin | 2018  |                      |   Results for this   
|
| (NA, K, CL, TCO2,    | e      | 10:15 AM    |                      | procedure are in the 
|
| BUN, CR, GLU, CA)    |        | PDT         |                      |  results section.    
|
+----------------------+--------+-------------+----------------------+----------------------
+
| CBC (HEMOGRAM) ONLY  | Routin | 2018  |                      |   Results for this   
|
|                      | e      | 10:14 AM    |                      | procedure are in the 
|
|                      |        | PDT         |                      |  results section.    
|
+----------------------+--------+-------------+----------------------+----------------------
+
| CBC ONLY             | Routin | 2018  |                      |   Results for this   
|
|                      | e      | 10:14 AM    |                      | procedure are in the 
|
|                      |        | PDT         |                      |  results section.    
|
+----------------------+--------+-------------+----------------------+----------------------
+
| CAPILLARY BLOOD      | Routin | 2018  |   Mitral valve       |   Results for this   
|
| GLUCOSE (NO CHG),    | e      | 10:08 AM    | stenosis,            | procedure are in the 
|
| POC                  |        | PDT         | unspecified etiology |  results section.    
|
+----------------------+--------+-------------+----------------------+----------------------
+
| INTRAPROCEDURE       | Routin | 2018  |                      |   Results for this   
|
| IMAGING              | e      |  9:53 AM    |                      | procedure are in the 
|
|                      |        | PDT         |                      |  results section.    
|
+----------------------+--------+-------------+----------------------+----------------------
+
| CATH LAB INT         | Routin | 2018  |   Mitral valve       |   Results for this   
|
| CORONARY ANGIOGRAM   | e      |  9:37 AM    | stenosis,            | procedure are in the 
|
|                      |        | PDT         | unspecified etiology |  results section.    
|
+----------------------+--------+-------------+----------------------+----------------------
+
| CARDIOLOGY           |        | 2018  |                      |   Results for this   
|
|                      |        | 12:00 AM    |                      | procedure are in the 
|
|                      |        | PDT         |                      |  results section.    
 
|
+----------------------+--------+-------------+----------------------+----------------------
+
| CARDIOLOGY           |        | 2018  |                      |   Results for this   
|
|                      |        | 12:00 AM    |                      | procedure are in the 
|
|                      |        | PDT         |                      |  results section.    
|
+----------------------+--------+-------------+----------------------+----------------------
+
 documented in this encounter
 
 Results
 CARDIAC CATH (2018  1:58 PM PDT)
 
+-------------------------------------------------------------------------------------------
--------------------------------------------------------------------------------------------
-------------------------------------------------+
| Procedure Note                                                                            
                                                                                            
                                                 |
+-------------------------------------------------------------------------------------------
--------------------------------------------------------------------------------------------
-------------------------------------------------+
|   Neal He MD - 2018  1:58 PM PDT  DATE OF PROCEDURE:         
                                                                                            
                                                 |
| PATIENT DATA:Height 160 cmWeight 76.6 kgBSA 1.76 w2TCCOPIPDHU PHYSICIAN:Neal         
                                                                                            
                                                 |
| Neri TAVAREZ, MedicineDepartment of CardiologyFELLOW:Adalberto Parker,      
                                                                                            
                                                 |
| MDFellowGeneral CardiologyINDICATIONS:Severe MRREFERRING PHYSICIAN:Jame Wilkins,      
                                                                                            
                                                 |
| M.D., CardiologyPROCEDURES PERFORMED:1. Right heart                    
                                                                                            
                                                 |
| catheterization.2. Selective coronary angiography.MEDICATIONS:1. Heparin IV 3000          
                                                                                            
                                                 |
| units.2. Midazolam IV 1 mg.3. Nitroglycerin 200 mcg intra-arterial.4. Verapamil 2 mg      
                                                                                            
                                                 |
| intra-arterial.CONTRAST:Omnipaque 55 mL.FLUOROSCOPY TIME:6.4 minutes.FLUOROSCOPY          
                                                                                            
                                                 |
| DAP:4151.0 [id number] OF PROCEDURE:The procedure, its risks, benefits, and         
                                                                                            
                                                 |
| alternatives, were discussed with the patient and the patient was brought to the cath     
                                                                                            
                                                 |
| lab in the non-sedated state.  A full PARQ was performed.  A team pause was performed     
                                                                                            
                                                 |
| before the start of the procedure.  A 7-French sheath was placed in the right internal    
                                                                                            
 
                                                 |
| jugular vein using ultrasound guidance and micropuncture needle.  Through the 7-French    
                                                                                            
                                                 |
| sheath, we introduced a 7-French balloon wedge catheter all the way up to the pulmonary   
                                                                                            
                                                 |
| capillary wedge position, and pressures were obtained in the various cardiac chambers.    
                                                                                            
                                                 |
| After the right catheterization was completed, we proceeded toward selective coronary     
                                                                                            
                                                 |
| angiography.  Initially, we went with the Kuldeep catheter.  However, it was difficult to   
                                                                                            
                                                 |
| engage the Kuldeep catheter into the left or the right coronary artery, and henceforth we   
                                                                                            
                                                 |
| decided to switch to JL3.5 and JR4 catheters.  We were very gentle with using the Kuldeep   
                                                                                            
                                                 |
| catheter as well because the patient had a lot of aortic calcium.  We were able to        
                                                                                            
                                                 |
| easily engage the left and the right coronary artery using JL3.5 and JR4 catheters.       
                                                                                            
                                                 |
| After multiple cineangiographic views were obtained, after we had sufficient              
                                                                                            
                                                 |
| information, we discussed the case with Dr. Jame Wilkins, the General Cardiology      
                                                                                            
                                                 |
| attending for this patient, and we decided to complete the case.  The TR band was used    
                                                                                            
                                                 |
| for achieving local hemostasis at the right radial artery access site and the right IJ    
                                                                                            
                                                 |
| was removed and manual compression was applied for achievement of local                   
                                                                                            
                                                 |
| hemostasis.FINDINGS:1. Hemodynamic findings right heart catheterization:  a. Right        
                                                                                            
                                                 |
| arterial pressure mean of 5 mmHg.  b. Right ventricular pressure of 36/0 and a right      
                                                                                            
                                                 |
| ventricular end-diastolic pressure of 40 mmHg.c. Pulmonary artery pressure of 41/13 with  
                                                                                            
                                                 |
|  a mean pulmonary artery pressure of 25 mmHg.d. Pulmonary capillary wedge pressure of 16  
                                                                                            
                                                 |
|  mmHg with V wave up to 33 mmHg.e. Heart rate of 60 beats per minute.f. Cardiac output    
                                                                                            
                                                 |
| of 3.4 L/min per Hellen method and cardiac index of 1.93 L/min/sq m. g. Opening aortic      
                                                                                            
 
                                                 |
| pressure of 128/68 mmHg with a mean aortic pressure of 72 mmHg.h. Pulmonary artery        
                                                                                            
                                                 |
| saturation of 66% and aortic saturation of 99%.i. Pulmonary vascular resistance of 2.64   
                                                                                            
                                                 |
| Wood units. j. Systemic vascular resistance of 19.98 Wood units.2. Left heart             
                                                                                            
                                                 |
| catheterization:a. Left main coronary artery.  The left main coronary artery is a         
                                                                                            
                                                 |
| large-sized vessel which has about 20% calcific stenosis in its distal portion at its     
                                                                                            
                                                 |
| bifurcation with the left anterior descending and the left circumflex artery.b. Left      
                                                                                            
                                                 |
| anterior descending artery.  The left anterior descending artery is a moderate-sized      
                                                                                            
                                                 |
| vessel which courses all the way up to the apex. It is tortuous in nature.  It has mild   
                                                                                            
                                                 |
| 20% to 30% stenosis in its midportion at its branch point with the diagonal artery.       
                                                                                            
                                                 |
| Otherwise, the left anterior descending artery has no other major angiographic            
                                                                                            
                                                 |
| stenosis.c. Left circumflex artery.  The left circumflex artery has 2 stenoses in tandem  
                                                                                            
                                                 |
|  which are about 30% to 40% each as it courses around the posterior intraventricular      
                                                                                            
                                                 |
| groove.d. Right coronary artery the right coronary artery is a dominant vessel.  The      
                                                                                            
                                                 |
| proximal portion of the right coronary artery has about 30% to 40% calcific stenosis      
                                                                                            
                                                 |
| which may be an under-estimate given the eccentricity of the plaque.e. The distal RCA     
                                                                                            
                                                 |
| has 99% calcific stenosis prior to its bifurcation with the PDA and PLV branches.  It is  
                                                                                            
                                                 |
|  an extremely tortuous right coronary artery.ASSESSMENT:1. One-vessel coronary artery     
                                                                                            
                                                 |
| disease with significant stenosis of the distal right coronary artery at 99% prior to     
                                                                                            
                                                 |
| the posterior descending artery posterior left ventricular branches.2. Non-obstructive    
                                                                                            
                                                 |
| coronary artery disease in the left anterior descending and left circumflex branches.3.   
                                                                                            
 
                                                 |
| Mildly elevated left heart filling pressures.PLAN:The patient will be seen in the clinic  
                                                                                            
                                                 |
|  tomorrow by Dr. Jame Wilkins, and after the clinic the the PCI option will be      
                                                                                            
                                                 |
| discussed.MACHELLE Simmons/BOBD:  2018 13:06:40"A resident/fellow assisted     
                                                                                            
                                                 |
| with documenting this service.   I saw the patient and reviewed and verified all          
                                                                                            
                                                 |
| information documented by the resident/fellow and made modifications to such              
                                                                                            
                                                 |
| information, when appropriate. The risks and benefits of the procedure were explained to  
                                                                                            
                                                 |
|  the patient in its entirety and all the questions were answered to patient               
                                                                                            
                                                 |
| satisfaction. I was present and supervised all the aspects of this procedure. "Neal      
                                                                                            
                                                 |
| Heather He Interventional/Structural Heart Cardiologist[facility]    
                                                                                            
                                                 |
| [place]  2018 13:58:20Job #:               
                                                                                            
                                                 |
| [id number]                                                                          
                                                                                            
                                                 |
|h. Pulmonary artery saturation of 66% and aortic saturation of 99%.                        
                                                                                            
                                                |
|i. Pulmonary vascular resistance of 2.64 Wood units.                                       
                                                                                            
                                                |
|j. Systemic vascular resistance of 19.98 Wood units.                                       
                                                                                            
                                                |
|2. Left heart catheterization:                                                             
                                                                                            
                                                 |
|a. Left main coronary artery.  The left main coronary artery is a large-sized vessel which 
has about 20% calcific stenosis in its distal portion at its bifurcation with the left anter
ior descending and the left circumflex artery.  |
|b. Left anterior descending artery.  The left anterior descending artery is a moderate-size
d vessel which courses all the way up to the apex. It is tortuous in nature.  It has mild 20
% to 30% stenosis in its midportion at          |
|its branch point with the diagonal artery.  Otherwise, the left anterior descending artery 
has no other major angiographic stenosis.                                                   
                                                 |
|c. Left circumflex artery.  The left circumflex artery has 2 stenoses in tandem which are a
bout 30% to 40% each as it courses around the posterior intraventricular groove.            
                                                |
|d. Right coronary artery the right coronary artery is a dominant vessel.  The proximal port
ion of the right coronary artery has about 30% to 40% calcific stenosis which may be an unde
 
r-estimate given the eccentricity of the plaque. |
|e. The distal RCA has 99% calcific stenosis prior to its bifurcation with the PDA and PLV b
ranches.  It is an extremely tortuous right coronary artery.                                
                                                |
|                                                                                           
                                                                                            
                                                 |
|ASSESSMENT:                                                                                
                                                                                            
                                                |
|1. One-vessel coronary artery disease with significant stenosis of the distal right coronar
y artery at 99% prior to the posterior descending artery posterior left ventricular branches
.                                                |
|2. Non-obstructive coronary artery disease in the left anterior descending and left circumf
daniel branches.                                                                               
                                                 |
|3. Mildly elevated left heart filling pressures.                                           
                                                                                            
                                                 |
|                                                                                           
                                                                                            
                                                 |
|PLAN:                                                                                      
                                                                                            
                                                |
|The patient will be seen in the clinic tomorrow by Dr. Jame Wilkins, and after the cl
inic the the PCI option will be discussed.                                                  
                                                 |
|                                                                                           
                                                                                            
                                                 |
|Neal He MD                                                                          
                                                                                            
                                                 |
|HG/MODL                                                                                    
                                                                                            
                                                 |
|DD:  2018 13:06:40                                                                   
                                                                                            
                                                 |
|                                                                                           
                                                                                            
                                                 |
|"A resident/fellow assisted with documenting this service.   I saw the patient and reviewed
 and verified all information documented by the resident/fellow and made modifications to hernandez
ch information, when                             |
|appropriate. The risks and benefits of the procedure were explained to the patient in its e
ntirety and all the questions were answered to patient satisfaction. I was present and super
vised all the aspects of this procedure. "      |
|                                                                                           
                                                                                            
                                                 |
|Neal He MD                                                                          
                                                                                            
                                                 |
|Attending Interventional/Structural Heart Cardiologist                                     
                                                                                            
                                                 |
|[facility] [place] [place]                        
                                                                                            
 
                                                 |
|DT:  2018 13:58:20                                                                   
                                                                                            
                                                 |
|Job #:  [id number]                                                                   
                                                                                            
                                                 |
+-------------------------------------------------------------------------------------------
--------------------------------------------------------------------------------------------
-------------------------------------------------+
 INR (2018 10:15 AM PDT)
 
+-----------+----------+-----------------+-------------+--------------+
| Component | Value    | Ref Range       | Performed   | Pathologist  |
|           |          |                 | At          | Signature    |
+-----------+----------+-----------------+-------------+--------------+
| INR       | 1.27 (H) | 0.90 - 1.20 INR | [facility]        |              |
|           |          |                 | LABORATORY  |              |
|           |          |                 | SERVICES,   |              |
|           |          |                 | [facility]        |              |
+-----------+----------+-----------------+-------------+--------------+
 
 
 
+----------------+
| Specimen       |
+----------------+
| Blood - Blood  |
| (substance)    |
+----------------+
 
 
 
+---------------------------------------------------------------------+----------------+
| Narrative                                                           | Performed At   |
+---------------------------------------------------------------------+----------------+
|         INR Therapeutic ranges for full anticoagulation:   INR for  |   [facility]         |
| Venous Thromboembolism                   (2.0 - 3.0) INR   INR for  | LABORATORY     |
| most patients with mech. valves      (2.5 - 3.5) INR                | SERVICES, [facility] |
+---------------------------------------------------------------------+----------------+
 
 
 
+--------------------+------------------------+--------------------+--------------+
| Performing         | Address                | City/State/Zipcode | Phone Number |
| Organization       |                        |                    |              |
+--------------------+------------------------+--------------------+--------------+
|   [facility]  |   [address]  | [place] [address] |              |
| SERVICES, [facility]     | [address]                |                    |              |
+--------------------+------------------------+--------------------+--------------+
 BASIC METABOLIC SET (NA, K, CL, TCO2, BUN, CR, GLU, CA) (2018 10:15 AM PDT)
 
+-------------+---------+-----------------+-------------+--------------+
| Component   | Value   | Ref Range       | Performed   | Pathologist  |
|             |         |                 | At          | Signature    |
+-------------+---------+-----------------+-------------+--------------+
| GLUCOSE,    | 122 (H) | 70 - 99 mg/dL   | [facility]        |              |
| PLASMA      |         |                 | LABORATORY  |              |
| (LAB)       |         |                 | SERVICES,   |              |
|             |         |                 | CORE        |              |
 
+-------------+---------+-----------------+-------------+--------------+
| BUN, PLASMA | 16      | 6 - 20 mg/dL    | [facility]        |              |
|  (LAB)      |         |                 | LABORATORY  |              |
|             |         |                 | SERVICES,   |              |
|             |         |                 | CORE        |              |
+-------------+---------+-----------------+-------------+--------------+
| CREATININE  | 0.78    | 0.60 - 1.10     | [facility]        |              |
| PLASMA      |         | mg/dL           | LABORATORY  |              |
| (LAB)       |         |                 | SERVICES,   |              |
|             |         |                 | CORE        |              |
+-------------+---------+-----------------+-------------+--------------+
| EGFR        | >60     | >60 mL/min      | [facility]        |              |
| -    |         |                 | LABORATORY  |              |
| [facility]    |         |                 | SERVICES,   |              |
|             |         |                 | CORE        |              |
+-------------+---------+-----------------+-------------+--------------+
| EGFR NON    | >60     | >60 mL/min      | [facility]        |              |
| -[facility] |         |                 | LABORATORY  |              |
| [facility]       |         |                 | SERVICES,   |              |
|             |         |                 | CORE        |              |
+-------------+---------+-----------------+-------------+--------------+
| SODIUM,     | 136     | 136 - 145       | [facility]        |              |
| PLASMA      |         | mmol/L          | LABORATORY  |              |
| (LAB)       |         |                 | SERVICES,   |              |
|             |         |                 | CORE        |              |
+-------------+---------+-----------------+-------------+--------------+
| POTASSIUM,  | 3.9     | 3.4 - 5.0       | [facility]        |              |
| PLASMA      |         | mmol/L          | LABORATORY  |              |
| (LAB)       |         |                 | SERVICES,   |              |
|             |         |                 | CORE        |              |
+-------------+---------+-----------------+-------------+--------------+
| CHLORIDE,   | 100     | 97 - 108 mmol/L | [facility]        |              |
| PLASMA      |         |                 | LABORATORY  |              |
| (LAB)       |         |                 | SERVICES,   |              |
|             |         |                 | CORE        |              |
+-------------+---------+-----------------+-------------+--------------+
| TOTAL CO2,  | 26      | 21 - 32 mmol/L  | [facility]        |              |
| PLASMA      |         |                 | LABORATORY  |              |
| (LAB)       |         |                 | SERVICES,   |              |
|             |         |                 | CORE        |              |
+-------------+---------+-----------------+-------------+--------------+
| CALCIUM,    | 9.1     | 8.6 - 10.2      | [facility]        |              |
| PLASMA      |         | mg/dL           | LABORATORY  |              |
| (LAB)       |         |                 | SERVICES,   |              |
|             |         |                 | CORE        |              |
+-------------+---------+-----------------+-------------+--------------+
| ANION GAP   | 10      | 4 - 11 mmol/L   | [facility]        |              |
|             |         |                 | LABORATORY  |              |
|             |         |                 | SERVICES,   |              |
|             |         |                 | CORE        |              |
+-------------+---------+-----------------+-------------+--------------+
| POTASSIUM   | No Hemo |                 | [facility]        |              |
| CMNT        |         |                 | LABORATORY  |              |
|             |         |                 | SERVICES,   |              |
|             |         |                 | CORE        |              |
+-------------+---------+-----------------+-------------+--------------+
 
 
 
+----------------+
 
| Specimen       |
+----------------+
| Blood - Blood  |
| (substance)    |
+----------------+
 
 
 
+------------------------------------------------------------------------+----------------+
| Narrative                                                              | Performed At   |
+------------------------------------------------------------------------+----------------+
|      GFR is estimated using the MDRD equation recommended by the       |   [facility]         |
| National Kidney Disease Education Program.     Estimated GFR           | LABORATORY     |
| Interpretive Information:  <60 mL/min/1.73 sq m                        | SERVICES, CORE |
| Chronic Kidney Disease  <15 mL/min/1.73 sq m                           |                |
| Kidney Failure  Estimated GFR greater that 60 mL/min/1.73 sq m is of   |                |
| limited clinical value.     The MDRD equation is not valid in the      |                |
| following situations:  - Patients under 18 years of age  - Severe      |                |
| malnutrition or obesity  - Vegetarian diet  - Rapidly changing kidney  |                |
| function  - Amputees, paraplegics, or other muscle-wasting diseses     |                |
+------------------------------------------------------------------------+----------------+
 
 
 
+--------------------+------------------------+--------------------+--------------+
| Performing         | Address                | City/State/Zipcode | Phone Number |
| Organization       |                        |                    |              |
+--------------------+------------------------+--------------------+--------------+
|   [facility]  |   [id number]  | [place] [address] |              |
| SERVICES, CORE     | [address]                |                    |              |
+--------------------+------------------------+--------------------+--------------+
 CBC (HEMOGRAM) ONLY (2018 10:14 AM PDT)
 
+-------------+----------+-----------------+-------------+--------------+
| Component   | Value    | Ref Range       | Performed   | Pathologist  |
|             |          |                 | At          | Signature    |
+-------------+----------+-----------------+-------------+--------------+
| WHITE CELL  | 7.45     | 3.50 - 10.80    | [facility]        |              |
| COUNT       |          | K/cu mm         | LABORATORY  |              |
|             |          |                 | SERVICES,   |              |
|             |          |                 | CORE        |              |
+-------------+----------+-----------------+-------------+--------------+
| RED CELL    | 4.29     | 4.00 - 5.20     | [facility]        |              |
| COUNT       |          | M/cu mm         | LABORATORY  |              |
|             |          |                 | SERVICES,   |              |
|             |          |                 | CORE        |              |
+-------------+----------+-----------------+-------------+--------------+
| HEMOGLOBIN  | 12.6     | 12.0 - 16.0     | [facility]        |              |
|             |          | g/dL            | LABORATORY  |              |
|             |          |                 | SERVICES,   |              |
|             |          |                 | CORE        |              |
+-------------+----------+-----------------+-------------+--------------+
| HEMATOCRIT  | 37.5     | 36.0 - 46.0 %   | [facility]        |              |
|             |          |                 | LABORATORY  |              |
|             |          |                 | SERVICES,   |              |
|             |          |                 | CORE        |              |
+-------------+----------+-----------------+-------------+--------------+
| MCV         | 87.4     | 80.0 - 100.0 fL | [facility]        |              |
|             |          |                 | LABORATORY  |              |
|             |          |                 | SERVICES,   |              |
 
|             |          |                 | CORE        |              |
+-------------+----------+-----------------+-------------+--------------+
| MCHC        | 33.6     | 32.0 - 36.0     | [facility]        |              |
|             |          | g/dL            | LABORATORY  |              |
|             |          |                 | SERVICES,   |              |
|             |          |                 | CORE        |              |
+-------------+----------+-----------------+-------------+--------------+
| RDW SD      | 49.3 (H) | 35.1 - 46.3 fL  | [facility]        |              |
|             |          |                 | LABORATORY  |              |
|             |          |                 | SERVICES,   |              |
|             |          |                 | CORE        |              |
+-------------+----------+-----------------+-------------+--------------+
| PLATELET    | 315      | 150 - 400 K/cu  | [facility]        |              |
| COUNT       |          | mm              | LABORATORY  |              |
|             |          |                 | SERVICES,   |              |
|             |          |                 | CORE        |              |
+-------------+----------+-----------------+-------------+--------------+
| MPV         | 10.0     | 9.7 - 12.3 fL   | [facility]        |              |
|             |          |                 | LABORATORY  |              |
|             |          |                 | SERVICES,   |              |
|             |          |                 | CORE        |              |
+-------------+----------+-----------------+-------------+--------------+
| NRBC%       | 0.0      | 0.0 - 0.3 %     | [facility]        |              |
|             |          |                 | LABORATORY  |              |
|             |          |                 | SERVICES,   |              |
|             |          |                 | CORE        |              |
+-------------+----------+-----------------+-------------+--------------+
| NRBC#       | 0.00     | 0.00 - 0.02     | [facility]        |              |
|             |          | K/cu mm         | LABORATORY  |              |
|             |          |                 | SERVICES,   |              |
|             |          |                 | CORE        |              |
+-------------+----------+-----------------+-------------+--------------+
 
 
 
+----------------+
| Specimen       |
+----------------+
| Blood - Blood  |
| (substance)    |
+----------------+
 
 
 
+----------------------------------------------------------------------+----------------+
| Narrative                                                            | Performed At   |
+----------------------------------------------------------------------+----------------+
|      New reference ranges for MCV, MCHC, PLT, IG% and IG# effective  |   [facility]         |
| 5/10/2018                                                            | LABORATORY     |
|                                                                      | SERVICES, CORE |
+----------------------------------------------------------------------+----------------+
 
 
 
+--------------------+------------------------+--------------------+--------------+
| Performing         | Address                | City/State/Zipcode | Phone Number |
| Organization       |                        |                    |              |
+--------------------+------------------------+--------------------+--------------+
|   [facility]  |   [address]  | [place] [address] |              |
| SERVICES, CORE     | [address]                |                    |              |
 
+--------------------+------------------------+--------------------+--------------+
 CAPILLARY BLOOD GLUCOSE (NO CHG), POC (2018 10:08 AM PDT)
 
+-----------+---------+---------------+-------------+--------------+
| Component | Value   | Ref Range     | Performed   | Pathologist  |
|           |         |               | At          | Signature    |
+-----------+---------+---------------+-------------+--------------+
| BLOOD     | 142 (H) | 60 - 99 mg/dL | [facility]      |              |
| GLUCOSE,  |         |               | [place]     |              |
| POC       |         |               | [facility] |              |
|           |         |               |  OF CARE    |              |
|           |         |               | TESTS       |              |
+-----------+---------+---------------+-------------+--------------+
 
 
 
+----------+
| Specimen |
+----------+
|          |
+----------+
 
 
 
+----------------------+-------------------------+--------------------+--------------+
| Performing           | Address                 | City/State/Zipcode | Phone Number |
| Organization         |                         |                    |              |
+----------------------+-------------------------+--------------------+--------------+
|   [facility]     |   [address]  | [place], [place]       |              |
| [facility]  | [address]               | [phone]         |              |
| TESTS                |                         |                    |              |
+----------------------+-------------------------+--------------------+--------------+
 INTRAPROCEDURE IMAGING (2018  9:53 AM PDT)
 
+----------+
| Specimen |
+----------+
|          |
+----------+
 
 
 
+-----------------------------------------------------------------------+--------------+
| Narrative                                                             | Performed At |
+-----------------------------------------------------------------------+--------------+
|   See admission or procedure notes for details of any intraprocedure  |              |
| images   obtained.                                                    |              |
+-----------------------------------------------------------------------+--------------+
 CATH LAB INT CORONARY ANGIOGRAM (2018  9:37 AM PDT)
 
+----------+
| Specimen |
+----------+
|          |
+----------+
 
 
 
+-----------------------------------------------------------------------+----------------+
| Narrative                                                             | Performed At   |
 
+-----------------------------------------------------------------------+----------------+
|   Procedure performed in the Cardiac Cath Lab.   See procedure notes  |   [facility] -       |
| for   details.                                                        | [facility],  |
|                                                                       | POINT OF CARE  |
|                                                                       | TESTS          |
+-----------------------------------------------------------------------+----------------+
 
 
 
+----------------------+-------------------------+--------------------+--------------+
| Performing           | Address                 | City/State/Zipcode | Phone Number |
| Organization         |                         |                    |              |
+----------------------+-------------------------+--------------------+--------------+
|   [facility]     |   [address]  | [place], [place]       |              |
| [facility]  | [address]               | [phone]         |              |
| TESTS                |                         |                    |              |
+----------------------+-------------------------+--------------------+--------------+
 CARDIOLOGY (2018 12:00 AM PDT)
 
+----------------------------------------------------------+--------------+
| Narrative                                                | Performed At |
+----------------------------------------------------------+--------------+
|   This result has an attachment that is not available.   |              |
+----------------------------------------------------------+--------------+
 CARDIOLOGY (2018 12:00 AM PDT)
 
+----------------------------------------------------------+--------------+
| Narrative                                                | Performed At |
+----------------------------------------------------------+--------------+
|   This result has an attachment that is not available.   |              |
+----------------------------------------------------------+--------------+
 documented in this encounter
 
 Visit Diagnoses
 
 
+-----------------------------------------------+
| Diagnosis                                     |
+-----------------------------------------------+
|   Mitral valve stenosis, unspecified etiology |
+-----------------------------------------------+
 documented in this encounter
 
 Administered Medications
 
 
+-----------------------------------+--------+----------+--------+------+------+
| Medication Order                  | MAR    | Action   | Dose   | Rate | Site |
|                                   | Action | Date     |        |      |      |
+-----------------------------------+--------+----------+--------+------+------+
|   heparin injection  intravenous, | Given  | 20 | 3,000  |      |      |
|  INTRAPROCEDURE PRN, Starting Mon |        | 18 12:01 | Units  |      |      |
|  18 at 1201, Until Mon       |        |  PM PDT  |        |      |      |
| 18 at 1201                   |        |          |        |      |      |
+-----------------------------------+--------+----------+--------+------+------+
 
 
 
+---+---+
|   |   |
 
+---+---+
 
 
 
+-----------------------------------+-------+----------+-------+---+---+
|   iohexol (OMNIPAQUE) 350 mg      | Given | 20 | 55 mL |   |   |
| iodine/mL injection               |       | 18 12:32 |       |   |   |
| INTRAPROCEDURE PRN, Starting Mon  |       |  PM PDT  |       |   |   |
| 18 at 1232, Until Mon        |       |          |       |   |   |
| 18 at 1232                   |       |          |       |   |   |
+-----------------------------------+-------+----------+-------+---+---+
 
 
 
+---+---+
|   |   |
+---+---+
 
 
 
+-----------------------------------+-------+----------+------+---+---+
|   lidocaine (XYLOCAINE) 10 mg/mL  | Given | 20 | 1 mL |   |   |
| (1 %) injection  infiltration,    |       | 18 11:43 |      |   |   |
| INTRAPROCEDURE PRN, Starting Mon  |       |  AM PDT  |      |   |   |
| 18 at 1127, Until Mon        |       |          |      |   |   |
| 18 at 1143                   |       |          |      |   |   |
+-----------------------------------+-------+----------+------+---+---+
 
 
 
+-------+----------+------+---+---+
| Given | 20 | 5 mL |   |   |
|       | 18 11:27 |      |   |   |
|       |  AM PDT  |      |   |   |
+-------+----------+------+---+---+
 
 
 
+---+---+
|   |   |
+---+---+
 
 
 
+---------------------------------+-------+----------+--------+---+---+
|   midazolam (PF) (VERSED)       | Given | 20 | 0.5 mg |   |   |
| injection  INTRAPROCEDURE PRN,  |       | 18 11:56 |        |   |   |
| Starting 18 at 1116,   |       |  AM PDT  |        |   |   |
| Until 18 at 1156       |       |          |        |   |   |
+---------------------------------+-------+----------+--------+---+---+
 
 
 
+-------+----------+--------+---+---+
| Given | 20 | 0.5 mg |   |   |
|       | 18 11:16 |        |   |   |
|       |  AM PDT  |        |   |   |
+-------+----------+--------+---+---+
 
 
 
 
+---+---+
|   |   |
+---+---+
 
 
 
+-----------------------------------+-------+----------+---------+---+---+
|   nitroGLYCERIN 2 mg/10 mL (200   | Given | 20 | 200 mcg |   |   |
| mcg/mL) in D5W IV  INTRAPROCEDURE |       | 18 11:54 |         |   |   |
|  PRN, Starting 18 at     |       |  AM PDT  |         |   |   |
| 1154, Until 18 at 1154   |       |          |         |   |   |
+-----------------------------------+-------+----------+---------+---+---+
 
 
 
+---+---+
|   |   |
+---+---+
 
 
 
+----------------------------------+---------+----------+--------+---+---+
|   sodium chloride 0.9 % (NS) IV  | New Bag | 20 | 150 mL |   |   |
| infusion  INTRAPROCEDURE         |         | 18 12:32 |        |   |   |
| CONTINUOUS PRN, Starting Mon     |         |  PM PDT  |        |   |   |
| 18 at 1232, Until Mon       |         |          |        |   |   |
| 18 at 1232                  |         |          |        |   |   |
+----------------------------------+---------+----------+--------+---+---+
 
 
 
+---+---+
|   |   |
+---+---+
 
 
 
+-----------------------------------+-------+----------+------+---+---+
|   verapamil (ISOPTIN) injection   | Given | 20 | 2 mg |   |   |
| INTRAPROCEDURE PRN, Starting Mon  |       | 18 11:54 |      |   |   |
| 18 at 1154, Until Mon        |       |  AM PDT  |      |   |   |
| 9/24/18 at 1154                   |       |          |      |   |   |
+-----------------------------------+-------+----------+------+---+---+
 
 
 
+---+---+
|   |   |
+---+---+
 documented in this encounter

## 2020-01-01 NOTE — XMS
Encounter Summary
  Created on: 2020
 
 Vonnie Celaya
 External Reference #: [id number]
 : 32
 Sex: Female
 
 Demographics
 
 
+-----------------------+------------------------+
| Address               | [address]         |
|                       | [place]  [address]   |
+-----------------------+------------------------+
| Home Phone            | [phone]        |
+-----------------------+------------------------+
| Preferred Language    | Unknown                |
+-----------------------+------------------------+
| Marital Status        | Single                 |
+-----------------------+------------------------+
| Voodoo Affiliation | NON                    |
+-----------------------+------------------------+
| Race                  | White                  |
+-----------------------+------------------------+
| Ethnic Group          | Not  or  |
+-----------------------+------------------------+
 
 
 Author
 
 
+--------------+------------------------------+
| Author       | [facility] |
+--------------+------------------------------+
| Organization | [facility] |
+--------------+------------------------------+
| Address      | Unknown                      |
+--------------+------------------------------+
| Phone        | Unavailable                  |
+--------------+------------------------------+
 
 
 
 Support
 
 
+--------------+--------------+---------+-----------------+
| Name         | Relationship | Address | Phone           |
+--------------+--------------+---------+-----------------+
| Lauryn Leroy | ECON         | Unknown | [phone] |
+--------------+--------------+---------+-----------------+
 
 
 
 Care Team Providers
 
 
 
+------------------------+------+-----------------+
| Care Team Member Name  | Role | Phone           |
+------------------------+------+-----------------+
| Jean Bermudez MD | PCP  | [phone] |
+------------------------+------+-----------------+
 
 
 
 Reason for Visit
 
 
+---------------------+----------------+
| Reason              | Comments       |
+---------------------+----------------+
| Education procedure | angiogram, [facility] |
+---------------------+----------------+
 
 
 
 Encounter Details
 
 
+--------+-----------+----------------------+----------------------+----------------------+
| Date   | Type      | Department           | Care Team            | Description          |
+--------+-----------+----------------------+----------------------+----------------------+
| / | Telephone |   Cardiac Cath Lab   |   Sandra Joseph RN   | Education procedure  |
| 2018   |           | at [address]  [address]  | [address]  | (angiogram, [facility])     |
|        |           | [address]      | [address]  [place],   |                      |
|        |           | [facility]        | [place] [address]        |                      |
|        |           | [place], [place]         |                      |                      |
|        |           | [address]           |                      |                      |
|        |           | [phone]         |                      |                      |
+--------+-----------+----------------------+----------------------+----------------------+
 
 
 
 Social History
 
 
+----------------+-------+-----------+--------+------+
| Tobacco Use    | Types | Packs/Day | Years  | Date |
|                |       |           | Used   |      |
+----------------+-------+-----------+--------+------+
| Never Assessed |       |           |        |      |
+----------------+-------+-----------+--------+------+
 
 
 
+------------------+---------------+
| Sex Assigned at  | Date Recorded |
| Birth            |               |
+------------------+---------------+
| Not on file      |               |
+------------------+---------------+
 
 
 
+----------------+-------------+-------------+
| Job Start Date | Occupation  | Industry    |
+----------------+-------------+-------------+
 
| Not on file    | Not on file | Not on file |
+----------------+-------------+-------------+
 
 
 
+----------------+--------------+------------+
| Travel History | Travel Start | Travel End |
+----------------+--------------+------------+
 
 
 
+-------------------------------------+
| No recent travel history available. |
+-------------------------------------+
 documented as of this encounter
 
 Plan of Treatment
 Not on filedocumented as of this encounter
 
 Visit Diagnoses
 Not on filedocumented in this encounter

## 2020-01-01 NOTE — XMS
Encounter Summary
  Created on: 2020
 
 Vonnie Celaya
 External Reference #: [id number]
 : 32
 Sex: Female
 
 Demographics
 
 
+-----------------------+------------------------+
| Address               | [address]         |
|                       | [place]  [address]   |
+-----------------------+------------------------+
| Home Phone            | [phone]        |
+-----------------------+------------------------+
| Preferred Language    | Unknown                |
+-----------------------+------------------------+
| Marital Status        | Single                 |
+-----------------------+------------------------+
| Mandaen Affiliation | NON                    |
+-----------------------+------------------------+
| Race                  | White                  |
+-----------------------+------------------------+
| Ethnic Group          | Not  or  |
+-----------------------+------------------------+
 
 
 Author
 
 
+--------------+------------------------------+
| Author       | [facility] |
+--------------+------------------------------+
| Organization | [facility] |
+--------------+------------------------------+
| Address      | Unknown                      |
+--------------+------------------------------+
| Phone        | Unavailable                  |
+--------------+------------------------------+
 
 
 
 Support
 
 
+--------------+--------------+---------+-----------------+
| Name         | Relationship | Address | Phone           |
+--------------+--------------+---------+-----------------+
| Lauryn Leroy | ECON         | Unknown | [phone] |
+--------------+--------------+---------+-----------------+
 
 
 
 Care Team Providers
 
 
 
+-----------------------+------+-------------+
| Care Team Member Name | Role | Phone       |
+-----------------------+------+-------------+
| No Pcp Per Patient    | PCP  | Unavailable |
+-----------------------+------+-------------+
 
 
 
 Reason for Visit
 
 
+------------------+------------+
| Reason           | Comments   |
+------------------+------------+
| Medical Records  | [facility] review |
| Review           |            |
+------------------+------------+
 
 
 
 Encounter Details
 
 
+--------+----------+----------------------+--------------+---------------------+
| Date   | Type     | Department           | Care Team    | Description         |
+--------+----------+----------------------+--------------+---------------------+
| 08/15/ | Abstract |   Cardiology at [facility]  |   Unknown  . | Medical Records     |
|    |          |  [address]    |              | Review ([facility] review) |
|        |          | Mailcode: [place]       |              |                     |
|        |          | [facility]    |              |                     |
|        |          | and Healing,         |              |                     |
|        |          | Building       |              |                     |
|        |          | Floor  [place], [place]  |              |                     |
|        |          | [address]           |              |                     |
|        |          | [phone]         |              |                     |
+--------+----------+----------------------+--------------+---------------------+
 
 
 
 Social History
 
 
+----------------+-------+-----------+--------+------+
| Tobacco Use    | Types | Packs/Day | Years  | Date |
|                |       |           | Used   |      |
+----------------+-------+-----------+--------+------+
| Never Assessed |       |           |        |      |
+----------------+-------+-----------+--------+------+
 
 
 
+------------------+---------------+
| Sex Assigned at  | Date Recorded |
| Birth            |               |
+------------------+---------------+
| Not on file      |               |
+------------------+---------------+
 
 
 
 
+----------------+-------------+-------------+
| Job Start Date | Occupation  | Industry    |
+----------------+-------------+-------------+
| Not on file    | Not on file | Not on file |
+----------------+-------------+-------------+
 
 
 
+----------------+--------------+------------+
| Travel History | Travel Start | Travel End |
+----------------+--------------+------------+
 
 
 
+-------------------------------------+
| No recent travel history available. |
+-------------------------------------+
 documented as of this encounter
 
 Progress Krista Clark - 08/15/2018 10:26 AM PDTAppt info sent in mail
 Electronically signed by Krista Haywood at 2018 10:46 AM Krista Henry - 08/15/2
018 10:26 AM PDTFormatting of this note might be different from the original.
 Heart Valve Tier 1 Review 
 Patient Name: Vonnie Celaya
 MRN: [id number]
 Referring Provider: Varun Mitchell
 Designated Family/Support Person Name:
 Contact Number:
 
 Data Elements / Results Needed Prior Medical Review Required for Medical Review  Required f
or Clinic Received
 (Y / N) Records / Notes
 (Care Everywhere / Abstract Enc) Imaging 
 (IMPAX / Disc / Not Available)  Comments 
 ECHO/TTE
 (if performed within the last two years)  Report Report / Images yes Care Everywhere  IMPAX
 18
 [facility] 
 LABS    CBC, BMP
 (from last 6 months)  Results  Results  no    
 CT Scan    Chest / Abd / Pelvis 
 (if performed within last two years)  Report Report / Images  no    
 Stress Tests (if applicable)  Report  no    
 Provider Notes - Cardiology / Cardiac Surgeon 
 (Last two years)  Notes Notes  yes Abstract Encounter   
 Provider Note    PCP 
 (Last note)  Note Note no    
 PFTs
 (if performed within the last two years)  Report  Report  no    
 CATH 
 (if performed within the last two years)   Report / Images no    
 JENIFFER
 (if performed within the last two years)   no    
 Operative Notes
 (Most recent thoracic, cardiac, vascular surgeries, breast implants)   no    
 
 Additional Comments:
 
 Heart Valve Tier 2 Review 
 
 
 Patient Name: Vonnie Celaya
 MRN: [id number]
 Referring Provider: 
 Designated Family/Support Person Name:
 Contact Number:
 Refer to Tier 3 No
 If Yes, rationale: 
 
 Tests to Schedule  Yes / No   Comments 
 ECHO - TTE Y Pre HVC 
 ECHO - JENIFFER Y Post HVC 
 LABS    CBC, BMP Y Pre HVC 
 CT Scan    Chest / Abd / Pelvis  Y  Post HVC 
 Dobutamine Stress Echo    
 PFTs   
 CATH  Y Post HVC 
 6 Min Walk Test Y Pre-HVC 
 
 For Tier 2 Review, place orders immediately
 For Tier 3 Review, place orders at time of HVC Attending response
 
 This data is from outside sources, and has not been individually verified.
 
  
 Heart Valve Tier 3 Response:
 
 Since coming from [facility], will need to consolidate trips if possible
 
 Would like to see on a Tuesday, have baseline testing, 6 min walk, TTE that AM.  Gated CT o
f chest that afternoon. 
 Would have her stay the night for JENIFFER on Wednesday. 
 
 Would then need to come back for interventional HVC on a Tuesday afternoon but be pre-booke
d for angiogram Tuesday morning prior to Int HVC that afternoon
 Would have to come over Monday night for that appointment likely 
 
 May need surgical MVR based on imaging of severe mitral regurgitation and moderate to sever
e mitral annular calcification vs TMVR if less MAC or Valve in MAC if severe MAC.
 
 Please edouard patient to see if they are up for all that testing.
 
 Will place orders once confirmed. 
 
 Jame Wilkins MD
 
 [facility]
 
 Electronically signed by Jame Wilkins MD at 2018 12:38 PM PDTdocumented in thi
s encounter
 
 Plan of Treatment
 Not on filedocumented as of this encounter
 
 Visit Diagnoses
 Not on filedocumented in this encounter

## 2020-01-01 NOTE — XMS
Encounter Summary
  Created on: 2020
 
 Vonnie Celaya
 External Reference #: [id number]
 : 32
 Sex: Female
 
 Demographics
 
 
+-----------------------+------------------------+
| Address               | [address]         |
|                       | [place]  [address]   |
+-----------------------+------------------------+
| Home Phone            | [phone]        |
+-----------------------+------------------------+
| Preferred Language    | Unknown                |
+-----------------------+------------------------+
| Marital Status        | Single                 |
+-----------------------+------------------------+
| [employer] Affiliation | NON                    |
+-----------------------+------------------------+
| Race                  | White                  |
+-----------------------+------------------------+
| Ethnic Group          | Not  or  |
+-----------------------+------------------------+
 
 
 Author
 
 
+--------------+------------------------------+
| Author       | [facility] |
+--------------+------------------------------+
| Organization | [facility] |
+--------------+------------------------------+
| Address      | Unknown                      |
+--------------+------------------------------+
| Phone        | Unavailable                  |
+--------------+------------------------------+
 
 
 
 Support
 
 
+--------------+--------------+---------+-----------------+
| Name         | Relationship | Address | Phone           |
+--------------+--------------+---------+-----------------+
| Lauryn Leroy | ECON         | Unknown | [phone] |
+--------------+--------------+---------+-----------------+
 
 
 
 Care Team Providers
 
 
 
+------------------------+------+-----------------+
| Care Team Member Name  | Role | Phone           |
+------------------------+------+-----------------+
| Jean Bermudez MD | PCP  | [phone] |
+------------------------+------+-----------------+
 
 
 
 Encounter Details
 
 
+--------+-------------+----------------------+----------------------+-------------+
| Date   | Type        | Department           | Care Team            | Description |
+--------+-------------+----------------------+----------------------+-------------+
| 10/31/ | Documentati |   Cardiology General |   Jame Wilkins  |             |
| 2018   | on          |  at [facility]  [address]     Virginie PARRA MD  [address]  |             |
|        |             | [address]  Mailcode:  | Ave  [place], [place]    |             |
|        |             | [address]     | [address]           |             |
|        |             | Health and Healing,  | [phone]         |             |
|        |             |       | [phone] (Fax)   |             |
|        |             | [place], [place]  |                      |             |
|        |             | [address]           |                      |             |
|        |             | [phone]         |                      |             |
+--------+-------------+----------------------+----------------------+-------------+
 
 
 
 Social History
 
 
+--------------+-------+-----------+--------+------+
| Tobacco Use  | Types | Packs/Day | Years  | Date |
|              |       |           | Used   |      |
+--------------+-------+-----------+--------+------+
| Never Smoker |       |           |        |      |
+--------------+-------+-----------+--------+------+
 
 
 
+---------------------+---+---+---+
| Smokeless Tobacco:  |   |   |   |
| Never Used          |   |   |   |
+---------------------+---+---+---+
 
 
 
+-------------+-------------+---------+----------+
| Alcohol Use | Drinks/Week | oz/Week | Comments |
+-------------+-------------+---------+----------+
| No          |             |         |          |
+-------------+-------------+---------+----------+
 
 
 
+------------------+---------------+
| Sex Assigned at  | Date Recorded |
| Birth            |               |
+------------------+---------------+
| Not on file      |               |
+------------------+---------------+
 
 
 
 
+----------------+-------------+-------------+
| Job Start Date | Occupation  | Industry    |
+----------------+-------------+-------------+
| Not on file    | Not on file | Not on file |
+----------------+-------------+-------------+
 
 
 
+----------------+--------------+------------+
| Travel History | Travel Start | Travel End |
+----------------+--------------+------------+
 
 
 
+-------------------------------------+
| No recent travel history available. |
+-------------------------------------+
 documented as of this encounter
 
 Plan of Treatment
 Not on filedocumented as of this encounter
 
 Visit Diagnoses
 Not on filedocumented in this encounter

## 2020-01-01 NOTE — XMS
Encounter Summary
  Created on: 2020
 
 Vonnie Celaya
 External Reference #: [id number]
 : 32
 Sex: Female
 
 Demographics
 
 
+-----------------------+---------------------------+
| Address               | [address]            |
|                       | [place]  [address] |
+-----------------------+---------------------------+
| Home Phone            | [phone]           |
+-----------------------+---------------------------+
| Preferred Language    | Unknown                   |
+-----------------------+---------------------------+
| Marital Status        |                    |
+-----------------------+---------------------------+
| Gnosticist Affiliation | Unknown                   |
+-----------------------+---------------------------+
| Race                  | Unknown                   |
+-----------------------+---------------------------+
| Ethnic Group          | Unknown                   |
+-----------------------+---------------------------+
 
 
 Author
 
 
+--------------+--------------------------------------------+
| Author       | [facility]  |
|              | and [place]                                |
+--------------+--------------------------------------------+
| Organization | [facility]  |
|              | and [place]                                |
+--------------+--------------------------------------------+
| Address      | Unknown                                    |
+--------------+--------------------------------------------+
| Phone        | Unavailable                                |
+--------------+--------------------------------------------+
 
 
 
 Support
 
 
+--------------+--------------+---------+-----------------+
| Name         | Relationship | Address | Phone           |
+--------------+--------------+---------+-----------------+
| Lauryn Leroy | ECON         | Unknown | [phone] |
+--------------+--------------+---------+-----------------+
 
 
 
 Care Team Providers
 
 
 
+-----------------------+------+-------------+
| Care Team Member Name | Role | Phone       |
+-----------------------+------+-------------+
 PCP  | Unavailable |
+-----------------------+------+-------------+
 
 
 
 Encounter Details
 
 
+--------+-----------+----------------------+----------------------+----------------------+
| Date   | Type      | Department           | Care Team            | Description          |
+--------+-----------+----------------------+----------------------+----------------------+
| 10/24/ | Hospital  |   [facility]    |   Conversion         | Coronary artery      |
|  - | Encounter | MEDICAL CENTER       | Transaction,         | disease involving    |
|        |           | CLINICAL DECISION    | Provider Unknown     | native coronary      |
| 10/25/ |           | UNIT  [address] | [phone]         | artery of native     |
| 2018   |           |   [place], [place]       | [phone] (Fax)   | heart, angina        |
|        |           | [address]           | Varun Mitchell MD  | presence             |
|        |           | [phone]         |  [address]    | unspecified; Anginal |
|        |           |                      | [place]  [place], [place]  |  equivalent ([facility]);   |
|        |           |                      | [address]  [phone]  | Chronic atrial       |
|        |           |                      |  [phone] (Fax)  | fibrillation ([facility]);  |
|        |           |                      |                      | Essential            |
|        |           |                      |                      | hypertension;        |
|        |           |                      |                      | Pulmonary            |
|        |           |                      |                      | hypertension,        |
|        |           |                      |                      | moderate to severe   |
|        |           |                      |                      | ([facility]); S/P drug      |
|        |           |                      |                      | eluting coronary     |
|        |           |                      |                      | stent placement;     |
|        |           |                      |                      | Severe mitral        |
|        |           |                      |                      | regurgitation;       |
|        |           |                      |                      | Severe tricuspid     |
|        |           |                      |                      | regurgitation; SOB   |
|        |           |                      |                      | (shortness of        |
|        |           |                      |                      | breath) on exertion  |
+--------+-----------+----------------------+----------------------+----------------------+
 
 
 
 Social History
 
 
+----------------+-------+-----------+--------+------+
| Tobacco Use    | Types | Packs/Day | Years  | Date |
|                |       |           | Used   |      |
+----------------+-------+-----------+--------+------+
| Never Assessed |       |           |        |      |
+----------------+-------+-----------+--------+------+
 
 
 
+------------------+---------------+
| Sex Assigned at  | Date Recorded |
| Birth            |               |
+------------------+---------------+
 
| Not on file      |               |
+------------------+---------------+
 
 
 
+----------------+-------------+-------------+
| Job Start Date | Occupation  | Industry    |
+----------------+-------------+-------------+
| Not on file    | Not on file | Not on file |
+----------------+-------------+-------------+
 
 
 
+----------------+--------------+------------+
| Travel History | Travel Start | Travel End |
+----------------+--------------+------------+
 
 
 
+-------------------------------------+
| No recent travel history available. |
+-------------------------------------+
 documented as of this encounter
 
 Last Filed Vital Signs
 
 
+-------------------+----------------------+----------------------+----------+
| Vital Sign        | Reading              | Time Taken           | Comments |
+-------------------+----------------------+----------------------+----------+
| Blood Pressure    | 123/59               | 10/25/2018  2:40 PM  |          |
|                   |                      | PDT                  |          |
+-------------------+----------------------+----------------------+----------+
| Pulse             | 70                   | 10/25/2018  2:40 PM  |          |
|                   |                      | PDT                  |          |
+-------------------+----------------------+----------------------+----------+
| Temperature       | 36.6   C (97.9   F)  | 10/25/2018  2:40 PM  |          |
|                   |                      | PDT                  |          |
+-------------------+----------------------+----------------------+----------+
| Respiratory Rate  | 16                   | 10/25/2018  2:40 PM  |          |
|                   |                      | PDT                  |          |
+-------------------+----------------------+----------------------+----------+
| Oxygen Saturation | -                    | -                    |          |
+-------------------+----------------------+----------------------+----------+
| Inhaled Oxygen    | -                    | -                    |          |
| Concentration     |                      |                      |          |
+-------------------+----------------------+----------------------+----------+
| Weight            | 66.7 kg (147 lb 0.8  | 10/25/2018  2:40 PM  |          |
|                   | oz)                  | PDT                  |          |
+-------------------+----------------------+----------------------+----------+
| Height            | 160 cm (5' 3")       | 10/25/2018  2:40 PM  |          |
|                   |                      | PDT                  |          |
+-------------------+----------------------+----------------------+----------+
| Body Mass Index   | 26.05                | 10/25/2018  2:40 PM  |          |
|                   |                      | PDT                  |          |
+-------------------+----------------------+----------------------+----------+
 documented in this encounter
 
 Discharge Summaries
 Daniel Funez MD - 10/25/2018 12:42 PM PDTFormatting of this note might be diffe
 
rent from the original.
 Discharge Summaries by Daniel Funez MD at 10/25/18 7002  
  Author:  Daniel Funez MD Service:  Cardiology Author Type:  Physician 
  Filed:  10/28/18 1957 Date of Service:  10/25/18 1242 Status:  Signed 
  :  Daniel Funez MD (Physician)   
  Related Notes:  Original Note by Daniel Funez MD (Physician) filed at 10/26/18 2133
   
   
 [facility]  
 Service:  Cardiology
 Discharge Summary
 
 Date of Admission:  10/24/2018
 Date of Discharge:  10/25/2018
 
 Discharge Provider:  Daniel Funez MD
 Treatment Team: 
 Admitting Provider: Varun Mitchell MD
 
 Discharge Diagnoses:  
 
 Active Problems:
   Pulmonary hypertension, moderate to severe (HCC)
   Atrial fibrillation (HCC)
   Severe tricuspid regurgitation
   Severe mitral regurgitation
   SOB (shortness of breath) on exertion
   Anginal equivalent (HCC)
   Essential hypertension
   Coronary artery disease of native artery of native heart with stable angina pectoris (HCC
)
   S/P drug eluting coronary stent placement
 Resolved Problems:
   * No resolved hospital problems. *
 
 Final Diagnoses:  
 1.  Coronary artery disease with anginal equivalent status post successful PTCA and stentin
g of the right posterolateral artery using 3.0 x 16 mm Synergy drug-eluting stent on 10/21/2
018.
 2.  Severe mitral regurgitation.
 3.  Severe tricuspid regurgitation.
 4.  Coronary atrial fibrillation.
 5.  Moderate to severe primary hypertension.
 6.  Hypertension.
 7.  Hyperlipidemia.
 8.  Atrial septal defect.
 9.  Mild aortic stenosis.
  
 
 Procedures:  
 PCI 10/24/2018
 Results
 Critical 99% calcified rPLA,, 30% prox and mid RCA
 Successful PTCA/stent rPLA using 3.0X16 mm Synergy MARA postdilated 3.5X12 NC balloon
 Probable small branch wire induced perforation with small, limited extravasation of contras
t
 
 Significant Diagnostic Studies:  
 Limited Echo 10/25/2018
 Impression 
 
 1. Overall left ventricular systolic function is normal with, an EF between 65 - 70 %. 
 2. There is no pericardial effusion. 
 
 BRIEF HISTORY OF PRESENTATION AND HOSPITAL COURSE:    
      Vonnie Celaya is a 86 y.o. female With a history of hypertension, hyperlipidemia,
 diabetes mellitus type 2, chronic atrial fibrillation, severe mitral and tricuspid regurgit
ation, who was referred for elective outpatient PCI of right posterolateral artery.  Patient
 was evaluated at [facility] for mitral clip and possible tricuspid clip.  She had a coronary rogelio
ogram done there that showed the critical stenosis of a large right posterolateral artery.  
She was referred by her primary cardiologist, Dr. Mitchell, for PCI.  Patient had a successful P
CI of the right posterolateral artery with deployment of a 3.0 x 16 mm Synergy drug-eluting 
stent.  Patient tolerated the procedure well.  There was a questionable left small extravasa
tion of contrast and we suspected a small branch wire induced perforation.  Patient was kept
 overnight for observation.  She had remained hemodynamically stable without symptoms.  We d
id a limited echocardiogram the next day which showed no evidence of pericardial effusion.  
The patient ambulated well.  She had chronic exertional shortness of breath which she felt m
ight have improved; however, she has not exerted herself to the level that she can do a good
 comparison.  She denies chest pain, orthopnea, paroxysmal nocturnal dyspnea, palpitations, 
lightheadedness, presyncope, or syncope.  She has been taking aspirin and Plavix before the 
procedure, and that will continue.  She was restarted on Coumadin on discharge to be restart
ed the next day.  Overall, the patient tolerated the procedure well and was discharged home 
in stable condition.  Her renal function remained stable.
 
 PLAN:
 1.  Continue aspirin 81 mg for life.
 2.  Continue Plavix 75 mg for at least 6 months.
 3.  Home medications including losartan, Toprol XL, and simvastatin will continue.
 4.  Cardiac rehabilitation as an outpatient.
 5.  Patient will follow with her primary cardiologist, Dr. Mitchell, in 1 week.
  
 Past Medical History   
 Diagnosis  Date 
   Acute diastolic heart failure (HCC)  
   ASD secundum  
   Atrial fibrillation (HCC)  
  persistent since then, never attempted cardioversion  
   Cerebrovascular accident (CVA) (HCC)  
   Congestive heart failure (HCC) 2018 
  acute/chronic Diastolic Heart Failure, NYHA class III  
   Coronary artery disease of native artery of native heart with stable angina pectoris (H
CC) 10/24/2018 
   Diabetes mellitus, type II (HCC)  
  controlled, no complications  
   Edema  
   Essential hypertension  
   Heart murmur  
  had scarlet fever  
   Hyperlipidemia  
   Hypothyroidism  
   Iron deficiency anemia  
   Migraines  
  resolved  
   Mild aortic stenosis  
   Osteoarthritis  
   Osteoporosis  
   Pulmonary hypertension, moderate to severe (HCC)  
   Severe mitral regurgitation  
   Severe tricuspid regurgitation  
  moderately severe  
 
 
 Past Surgical History    
 Procedure  Laterality Date 
   APPENDECTOMY   
   BLADDER SUSPENSION   
   BLEPHAROPLASTY Bilateral  
   BREAST SURGERY Right  
  benign lesion removed   
   BUNIONECTOMY   
   HYSTERECTOMY   
  partial hysterectomy separate   
   salpingo-oophorectomy Bilateral  
   TOE FUSION   
   TONSILLECTOMY   
 
 Allergies    
 Allergen   Reactions 
   Morphine  Other (See Comments) 
   Pt felt fidgety  
   Percocet [Oxycodone-Acetaminophen]  Other (See Comments) 
   Pt felt fidgety  
 
 Prescriptions Prior to Admission      
 Medication  Sig Dispense Refill Last Dose 
   acetaminophen (TYLENOL) 325 MG tablet Take 650 mg by mouth every 6 (six) hours as neede
d for Pain.   Taking at Unknown time 
   ascorbic acid (VITAMIN C) 1000 MG tablet Take 1,000 mg by mouth daily.   10/23/2018 at 
0800 
   aspirin 81 MG tablet Take 1 tablet by mouth daily. Start 10/22/18 30 tablet 11 10/24/20
18 at 0715 
   Cholecalciferol 2000 units CAPS Take 2,000 Units by mouth daily.   10/24/2018 at 0715 
   clopidogrel (PLAVIX) 75 MG tablet Take 1 tablet by mouth daily. Start 10/22/18 (stop wa
rfarin 10/20/18) 30 tablet 11 10/24/2018 at 0715 
   Cranberry 1000 MG CAPS Take 1 capsule by mouth daily.   10/24/2018 at 0715 
   furosemide (LASIX) 40 MG tablet Take 40 mg by mouth daily.   10/24/2018 at 0715 
   Gluc-Chonn-MSM-Boswellia-Vit D (GLUCOSAMINE CHOND TRIPLE/VIT D) TABS Take 1 tablet by m
outh daily.   10/24/2018 at 0715 
   levothyroxine (SYNTHROID) 100 MCG tablet Take 100 mcg by mouth every morning before kavita
akfast.   10/24/2018 at 0715 
   losartan (COZAAR) 100 MG tablet Take 100 mg by mouth daily.   10/23/2018 at 1800 
   magnesium chloride (MAG64) 64 mg EC tablet Take 1 tablet by mouth daily.   10/24/2018 a
t 0715 
   metFORMIN (GLUMETZA) 500 MG (MOD) 24 hr tablet Take 500 mg by mouth 2 (two) times daily
 with meals.   10/22/2018 at Unknown time 
   metoprolol (TOPROL-XL) 200 MG 24 hr tablet Take 100 mg by mouth every morning.   10/24/
2018 at 0715 
   potassium chloride SA (K-DUR,KLOR-CON) 20 MEQ tablet Take 20 mEq by mouth daily.   10/2
2018 at 0715 
   simvastatin (ZOCOR) 20 MG tablet Take 10 mg by mouth nightly.   10/23/2018 at 1800 
 
 DISCHARGE EXAM
 Vital Signs:
 /59 (BP Location: Left upper arm)  | Pulse 70  | Temp 97.9 F (36.6 C) (Oral)  | R
krista 16  | Ht 1.6 m (5' 3")  | Wt 66.7 kg (147 lb 0.8 oz)  | SpO2 98%  | BMI 26.05 kg/m 
 Temp:  [97.5 F (36.4 C)-98.6 F (37 C)] 97.9 F (36.6 C) (10/25 1054)
 BP: ()/() 114/57 (10/25 1054)
 Heart Rate:  [51-78] 67 (10/25 1054)
 Resp:  [11-22] 16 (10/25 1054)
 SpO2:  [84 %-100 %] 98 % (10/25 1054)
 Height:  [160 cm (5' 3")] 160 cm (5' 3") (10/24 135)
 Weight:  [66.7 kg (147 lb 0.8 oz)] 66.7 kg (147 lb 0.8 oz) (10/24 135)
 
 BMI (Calculated):  [26.1] 26.1 (10/24 1352)
 
 Physical Exam 
 Constitutional: She is oriented to person, place, and time. She appears well-developed and 
well-nourished. No distress. 
 HENT: 
 Head: Normocephalic and atraumatic. 
 Eyes: No scleral icterus. 
 Neck: Neck supple. No JVD present. Carotid bruit is not present. 
 Cardiovascular: Normal rate, S1 normal and S2 normal.  An irregularly irregular rhythm pres
ent. Exam reveals no gallop and no friction rub.  
 Murmur (2/6 systolic) heard.
 Pulmonary/Chest: Effort normal. No stridor. No respiratory distress. She has no wheezes. Sh
e has no rales. She exhibits no tenderness. 
 Abdomina/Gl: Soft. Bowel sounds are normal. There is no tenderness. 
 Musculoskeletal: She exhibits no edema or tenderness. 
 Neurological: She is alert and oriented to person, place, and time. No cranial nerve defici
t. 
 Skin: Skin is warm and dry. No rash noted. She is not diaphoretic. No erythema. No pallor. 
 Psychiatric: She has a normal mood and affect. 
 
 DATA
 
 CBC:  
 Lab Results    
 Component  Value Date 
  WBC 9.88 10/25/2018 
  RBC 3.67 (L) 10/25/2018 
  HGB 10.9 (L) 10/25/2018 
  HCT 31.9 (L) 10/25/2018 
  MCV 87.0 10/25/2018 
  MCH 29.6 10/25/2018 
  MCHC 34.0 10/25/2018 
  RDW 46.4 10/25/2018 
   10/25/2018 
  MPV 9.1 10/25/2018 
  DIFFTYPE MANUAL 10/25/2018 
 
 BMP:  
 Lab Results    
 Component  Value Date 
   10/25/2018 
  K 4.6 10/25/2018 
   10/25/2018 
  CO2 19 (L) 10/25/2018 
  ANIONGAP 16 10/25/2018 
  GLUF 147 (H) 10/25/2018 
  BUN 21 10/25/2018 
  CREATININE 0.9 10/25/2018 
  BCR 23 10/25/2018 
  CA 8.6 10/25/2018 
  EGFR 59 (L) 10/25/2018 
 
 Disposition:  Home
 Condition:  Stable
 Code Status:  Full Code
 
 No discharge procedures on file.
 
 Follow up:
 
 Gio Perez MD
 [address], 
 [place] [address] [phone]
 
 Varun Mitchell MD
 [address] [place] [address] [phone]
 
 In 1 week
 
  
 Medication List 
  
 CONTINUE taking these medications  
 acetaminophen 325 MG tablet
 Refills:  0
 Commonly known as:  TYLENOL
  
 ascorbic acid 1000 MG tablet
 Refills:  0
 Commonly known as:  VITAMIN C
  
 aspirin 81 MG tablet
 QTY:  30 tablet
 Refills:  11
 Take 1 tablet by mouth daily. Start 10/22/18
  
 Cholecalciferol 2000 units Caps
 Refills:  0
  
 clopidogrel 75 MG tablet
 QTY:  30 tablet
 Refills:  11
 Commonly known as:  PLAVIX
 Take 1 tablet by mouth daily. Start 10/22/18 (stop warfarin 10/20/18)
  
 Cranberry 1000 MG Caps
 Refills:  0
  
 furosemide 40 MG tablet
 Refills:  0
 Commonly known as:  LASIX
  
 GLUCOSAMINE CHOND TRIPLE/VIT D Tabs
 Refills:  0
  
 levothyroxine 100 MCG tablet
 Refills:  0
 Commonly known as:  SYNTHROID
  
 losartan 100 MG tablet
 Refills:  0
 Commonly known as:  COZAAR
  
 magnesium chloride 64 mg EC tablet
 Refills:  0
 Commonly known as:  MAG64
 
  
 metFORMIN 500 MG (MOD) 24 hr tablet
 Refills:  0
 Commonly known as:  GLUMETZA
  
 metoprolol 200 MG 24 hr tablet
 Refills:  0
 Commonly known as:  TOPROL-XL
  
 potassium chloride SA 20 MEQ tablet
 Refills:  0
 Commonly known as:  K-DUR,KLOR-CON
  
 simvastatin 20 MG tablet
 Refills:  0
 Commonly known as:  ZOCOR
  
  
 You might also be taking other medications not listed above. If you have questions about an
y of your other medications, talk to the person who prescribed them or your Primary Care Pro
vider. 
  
  
  
 
 Discharge took 40 minutes, to include final examination, discussion of admission, and prepa
ration of prescriptions, instructions for on-going care, follow-up and documentation of disc
harge summary.
 
 Daniel Funez MD
 10/25/2018
  
  Electronically signed by Daniel Funez MD at 10/28/2018  7:57 PM PDTdocumented i
n this encounter
 
 Medications at Time of Discharge
 
 
+----------------------+----------------------+-----------+---------+----------+-----------+
| Medication           | Sig                  | Dispensed | Refills | Start    | End Date  |
|                      |                      |           |         | Date     |           |
+----------------------+----------------------+-----------+---------+----------+-----------+
|   acetaminophen      | Take 650 mg by mouth |           | 0       | 20 |           |
| (TYLENOL) 325 mg     |  every 6 (six) hours |           |         | 18       |           |
| tablet               |  as needed for Pain. |           |         |          |           |
+----------------------+----------------------+-----------+---------+----------+-----------+
|   ascorbic acid      | Take 1,000 mg by     |           | 0       | 20 |           |
| (VITAMIN C) 1000 MG  | mouth daily.         |           |         | 18       |           |
| tablet               |                      |           |         |          |           |
+----------------------+----------------------+-----------+---------+----------+-----------+
|   Cranberry Juice    | Take 1 capsule by    |           | 0       | 20 |           |
| Extract 1000 MG CAPS | mouth daily.         |           |         | 18       |           |
+----------------------+----------------------+-----------+---------+----------+-----------+
|   furosemide (LASIX) | Take 40 mg by mouth  |           | 0       | 20 |           |
|  40 mg tablet        | daily.               |           |         | 18       |           |
+----------------------+----------------------+-----------+---------+----------+-----------+
|   levothyroxine      | Take 100 mcg by      |           | 0       | 20 |           |
| (SYNTHROID) 100 mcg  | mouth every morning  |           |         | 18       |           |
| tablet               | before breakfast.    |           |         |          |           |
+----------------------+----------------------+-----------+---------+----------+-----------+
 
|   losartan (COZAAR)  | Take 100 mg by mouth |           | 0       | 20 |           |
| 100 MG tablet        |  daily.              |           |         | 18       |           |
+----------------------+----------------------+-----------+---------+----------+-----------+
|   magnesium chloride | Take 1 tablet by     |           | 0       | 20 |           |
|  (MAG64) 64 mg EC    | mouth daily.         |           |         | 18       |           |
| tablet               |                      |           |         |          |           |
+----------------------+----------------------+-----------+---------+----------+-----------+
|   metFORMIN          | Take 500 mg by mouth |           | 0       | 20 |           |
| (GLUMETZA) 500 MG 24 |  2 (two) times daily |           |         | 18       |           |
|  hr tablet           |  with meals.         |           |         |          |           |
+----------------------+----------------------+-----------+---------+----------+-----------+
|   potassium chloride | Take 20 mEq by mouth |           | 0       | 20 |           |
|  (KLOR-CON M20) 20   |  daily.              |           |         | 18       |           |
| mEq ER tablet        |                      |           |         |          |           |
+----------------------+----------------------+-----------+---------+----------+-----------+
|   simvastatin        | Take 10 mg by mouth  |           | 0       | 20 |           |
| (ZOCOR) 20 mg tablet | nightly.             |           |         | 18       |           |
+----------------------+----------------------+-----------+---------+----------+-----------+
|   aspirin 81 MG      | Take 1 tablet by     |   30      | 11      | 10/19/20 | 10/19/201 |
| tablet               | mouth daily. Start   | tablet    |         | 18       | 9         |
|                      | 10/22/18             |           |         |          |           |
+----------------------+----------------------+-----------+---------+----------+-----------+
|                      | Take 1 tablet by     |           | 0       | 20 |  |
| Gluc-Chonn-MSM-Boswe | mouth daily.         |           |         | 18       | 9         |
| llia-Vit D           |                      |           |         |          |           |
| (GLUCOSAMINE CHOND   |                      |           |         |          |           |
| TRIPLE/VIT D) TABS   |                      |           |         |          |           |
+----------------------+----------------------+-----------+---------+----------+-----------+
|   metoprolol         | Take 100 mg by mouth |           | 0       | 20 |  |
| succinate            |  every morning.      |           |         | 18       | 9         |
| (TOPROL-XL) 200 mg   |                      |           |         |          |           |
| ER tablet            |                      |           |         |          |           |
+----------------------+----------------------+-----------+---------+----------+-----------+
 documented as of this encounter
 
 Progress Notes
 Conversion Transaction, Provider Unknown - 10/25/2018  3:43 PM PDTFormatting of this note m
ight be different from the original.
 Nurse Progress Note by Deya Latham RN at 10/25/18 0383  
  Author:  Deya Latham RN Service:  (none) Author Type:  Registered Nurse 
  Filed:  10/25/18 7524 Date of Service:  10/25/18 1543 Status:  Signed 
  :  Deya Latham RN (Registered Nurse)   
   
 Discharge summary and paperwork reviewed with patient and patient's daughter. IVs removed. 
Belongings sent with patient. Tele notified of patient discharge. All questions adressed and
 there are no further concerns. Patient escorted via wheelchair to private vehicle. 
 
 Deya Latham RN. 10/25/18 3:44 PM 
 
  
  Electronically signed by Conversion Transaction, Provider at 2019 10:04 PM PDTConver
heaven Transaction, Provider Unknown - 10/25/2018  9:56 AM PDTFormatting of this note might be
 different from the original.
 Case Management by PRISCA Najera at 10/25/18 0956  
  Author:  PRISCA Najera Service:  (none) Author Type:   
  Filed:  10/25/18 0957 Date of Service:  10/25/18 0956 Status:  Signed 
  :  PRISCA Najera ()   
   
 CM met with pt for discharge planning. Pt is 86 years old and lives with her daughter and g
randchildren in a 2-level home. Pt has 7 stairs at the main entrance. Pt is independent with
 
 her mobility. Pt denied any difficulty obtaining her medications and had no resource concer
ns at this time. CM will continue to follow as needed.
 
  10/25/18 0954 
 Discharge Planning Evaluation  
 Admitting Diagnosis Coronary Artery Disease 
 Readmission No 
 Living Arrangements Children 
 Support Systems Children 
 Type of Residence Private residence 
 House type House 2 story 
 Steps to enter 7 
 Bathrooms on 1st Floor 1-Full 
 Independent with ADL's Yes 
 Independent with Mobility Yes 
 Home Care Services No 
 Caregiver after Discharge Yes 
 Caregiver Name Lauryn Leroy 
 Relationship to Patient Daughter 
 Phone number [phone] 
 Mental Status Oriented 
 Anticipated Discharge Plan  
 Post Acute Care Needs None at this time 
 Resources  
 Financial concerns No 
 Transportation issues No 
 Patient/Family concerns No 
 Prescription Plan Yes 
 Anticipated Disposition  
 Facility Type Home 
 Medicare Important Message (GELA) Not applicable 
 Met with: Patient
 and discussed discharge planning, Pt is a 86 y.o., female
 Patient's PCP is: GIO PEREZ
 Patient's insurance: Medicare
 Coverage concerns: None
 Medication coverage/concerns: None
 Community resources utilized / needed: None
 Assistance in transportation: Not needed.
 Identification of any specific education / training: None
 Barriers to Discharge / Alternative housing needed: None 
 Anticipated DCP: Home
 
  
  Electronically signed by Conversion Transaction, Provider at 2019 10:05 PM PDTConver
heaven Transaction, Provider Unknown - 10/25/2018  6:35 AM PDTFormatting of this note might be
 different from the original.
 Nurse Progress Note by Mayra Deal RN at 10/25/18 0635  
  Author:  Mayra Deal RN Service:  (none) Author Type:  Registered Nurse 
  Filed:  10/25/18 0636 Date of Service:  10/25/18 0635 Status:  Addendum 
  :  Mayra Deal RN (Registered Nurse)   
  Related Notes:  Original Note by Mayra Deal RN (Registered Nurse) filed at 10/25/18 0
636   
   
 Updated Dr Funez on BP, we will continue to observe and await echo, no need for stat.
  
  Electronically signed by Conversion Transaction, Provider at 2019 10:01 PM PDTConver
heaven Transaction, Provider Unknown - 10/25/2018  6:28 AM PDTFormatting of this note might be
 different from the original.
 Nurse Progress Note by Mayra Deal RN at 10/25/18 0628  
 
  Author:  Mayra Deal RN Service:  (none) Author Type:  Registered Nurse 
  Filed:  10/25/18 0629 Date of Service:  10/25/18 0628 Status:  Signed 
  :  Mayra Deal RN (Registered Nurse)   
   
 Phoned and left  for echo regarding the need for Stat echo according to Dr Funez's note 
to rule out pericardial effusion.
  
  Electronically signed by Conversion Transaction, Provider at 2019 10:02 PM PDTConver
heaven Transaction, Provider Unknown - 10/25/2018  2:52 AM PDTFormatting of this note might be
 different from the original.
 Nurse Progress Note by Mayra Deal RN at 10/25/18 0252  
  Author:  Mayra Deal RN Service:  (none) Author Type:  Registered Nurse 
  Filed:  10/25/18 0253 Date of Service:  10/25/18 0252 Status:  Signed 
  :  Mayra Deal RN (Registered Nurse)   
   
 Chart check complete
  
  Electronically signed by Conversion Transaction, Provider at 2019  9:59 PM PDTConver
heaven Transaction, Provider Unknown - 10/25/2018  1:20 AM PDTFormatting of this note might be
 different from the original.
 Nurse Progress Note by Mayra Deal RN at 10/25/18 0120  
  Author:  Mayra Deal RN Service:  (none) Author Type:  Registered Nurse 
  Filed:  10/25/18 0138 Date of Service:  10/25/18 0120 Status:  Signed 
  :  Mayra Deal RN (Registered Nurse)   
   
 Assumed care of pt from ANNE Burns, pt resting at this time, no c/o.
  
  Electronically signed by Conversion Transaction, Provider at 2019  9:57 PM PDTConver
heaven Transaction, Provider Unknown - 10/24/2018  8:31 PM PDTFormatting of this note might be
 different from the original.
 Pharmacy Note by Mariah Up RPH at 10/24/18 2031  
  Author:  Mariah Up RPH Service:  Pharmacy Author Type:  Pharmacist 
  Filed:  10/24/18 2031 Date of Service:  10/24/18 2031 Status:  Signed 
  :  Mariah Up RPH (Pharmacist)   
   
 Renal Dosing Monitoring:
 Vonnie Celaya 86 y.o. female
 
 Pharmacy dosing for renal function per Dr. Funez
 
 Plan per protocol: 
 Medication / Dose: crcl ~ 40.7 ml/min bsed on scr of 0.91
 No renal dosage adjustments needed at this time
 
 Pharmacy will continue monitoring patient for appropriate dosing per renal function.
 10/24/2018 8:30 PM
 Pharmacist: MARIAH UP
 
  
  Electronically signed by Conversion Transaction, Provider at 2019 10:11 PM PDTConver
heaven Transaction, Provider Unknown - 10/24/2018  6:41 PM PDTFormatting of this note might be
 different from the original.
 Nurse Progress Note by Luci Coker RN at 10/24/18 1841  
  Author:  Luci Coker RN Service:  Interventional Cardiology Author Type:  Registered Nurs
e 
  Filed:  10/24/18 1842 Date of Service:  10/24/18 1841 Status:  Signed 
  :  Luci Coker RN (Registered Nurse)   
   
 Groin checked at BS with ANNE Pressley. Pt bleeding from groin site. Pressure being held by Ryan santoro RN. Site is soft to palpation per Huan.
 
 
 Luci Coker RN
 
  
  Electronically signed by Conversion Transaction, Provider at 2019 10:09 PM PDTConver
heaven Transaction, Provider Unknown - 10/23/2018  4:57 PM PDTFormatting of this note might be
 different from the original.
 Nurse Progress Note by Nadiya Tena RN at 10/23/18 1657  
  Author:  Nadiya Tena RN Service:  Cardiology Author Type:  Registered Nurse 
  Filed:  10/23/18 1703 Date of Service:  10/23/18 1657 Status:  Signed 
  :  Nadiya Tena RN (Registered Nurse)   
   
 [facility] 
 Pre-Procedure Telephone Call 
                                                       
 Type of Procedure   [facility]
 Spoke with:   Patient
  needed:    No
            Nepalese    No
            Welsh No
            Other: 
  arranged: No                             
 Arrival Time: 1330
 Check In Location:  Registration
 Ride and Caregiver:  Lauryn
 Phone Number for Ride/Caregiver: 
 NPO from: 0700
 Diabetic:  Yes
  If Yes are they on Glucophage/Metformin   No 
                  Date of last Dose   {Blank single:66497::"NA"
 Is patient on Anticoagulants   Yes     Coumadin
                 Date of Last Dose  10/19/2018
 Iodine Allergy?  No
                 Action:  NA, Pre-medicated and Physician Notified
 Latex Allergy? No
 
 Patient informed to bring (medication list, etc):  Yes
 
 Any other comments:
 
 Nadiya Tena RN
 
  
  Electronically signed by Conversion Transaction, Provider at 2019 10:13 PM PDTdocume
nted in this encounter
 
 Plan of Treatment
 
 
+--------+---------+------------+----------------------+-------------+
| Date   | Type    | Specialty  | Care Team            | Description |
+--------+---------+------------+----------------------+-------------+
| / | Office  | Cardiology |   [facility]    |             |
|    | Visit   |            | [address]      |             |
|        |         |            | [address]   |             |
|        |         |            | [place] [address]   |             |
|        |         |            | [phone]         |             |
|        |         |            | [phone] (Fax)   |             |
+--------+---------+------------+----------------------+-------------+
 documented as of this encounter
 
 
 Procedures
 
 
+----------------------+--------+-------------+----------------------+----------------------
+
| Procedure Name       | Priori | Date/Time   | Associated Diagnosis | Comments             
|
|                      | ty     |             |                      |                      
|
+----------------------+--------+-------------+----------------------+----------------------
+
| POC GLUCOSE          | Routin | 10/25/2018  |                      |   Results for this   
|
|                      | e      | 11:18 AM    |                      | procedure are in the 
|
|                      |        | PDT         |                      |  results section.    
|
+----------------------+--------+-------------+----------------------+----------------------
+
| ECHO LIMITED         | Routin | 10/25/2018  |                      |   Results for this   
|
|                      | e      |  7:00 AM    |                      | procedure are in the 
|
|                      |        | PDT         |                      |  results section.    
|
+----------------------+--------+-------------+----------------------+----------------------
+
| POC GLUCOSE          | Routin | 10/25/2018  |                      |   Results for this   
|
|                      | e      |  5:32 AM    |                      | procedure are in the 
|
|                      |        | PDT         |                      |  results section.    
|
+----------------------+--------+-------------+----------------------+----------------------
+
| EXTERNAL LAB: CBC    | Routin | 10/25/2018  |                      |   Results for this   
|
|                      | e      |  4:37 AM    |                      | procedure are in the 
|
|                      |        | PDT         |                      |  results section.    
|
+----------------------+--------+-------------+----------------------+----------------------
+
| BASIC METABOLIC      | Routin | 10/25/2018  |                      |   Results for this   
|
| PANEL                | e      |  4:37 AM    |                      | procedure are in the 
|
|                      |        | PDT         |                      |  results section.    
|
+----------------------+--------+-------------+----------------------+----------------------
+
| POC GLUCOSE          | Routin | 10/24/2018  |                      |   Results for this   
|
|                      | e      |  9:37 PM    |                      | procedure are in the 
|
|                      |        | PDT         |                      |  results section.    
|
+----------------------+--------+-------------+----------------------+----------------------
+
 
| CV CARDIAC PROCEDURE | Routin | 10/24/2018  |                      |   Results for this   
|
|                      | e      |  6:26 PM    |                      | procedure are in the 
|
|                      |        | PDT         |                      |  results section.    
|
+----------------------+--------+-------------+----------------------+----------------------
+
| ACTIVATED CLOTTING   | Routin | 10/24/2018  |                      |   Results for this   
|
| TIME                 | e      |  5:47 PM    |                      | procedure are in the 
|
|                      |        | PDT         |                      |  results section.    
|
+----------------------+--------+-------------+----------------------+----------------------
+
| ACTIVATED CLOTTING   | Routin | 10/24/2018  |                      |   Results for this   
|
| TIME                 | e      |  5:07 PM    |                      | procedure are in the 
|
|                      |        | PDT         |                      |  results section.    
|
+----------------------+--------+-------------+----------------------+----------------------
+
| EXTERNAL LAB: CBC    | Routin | 10/24/2018  |                      |   Results for this   
|
|                      | e      |  1:52 PM    |                      | procedure are in the 
|
|                      |        | PDT         |                      |  results section.    
|
+----------------------+--------+-------------+----------------------+----------------------
+
| PROTIME INR          | Routin | 10/24/2018  |                      |   Results for this   
|
|                      | e      |  1:52 PM    |                      | procedure are in the 
|
|                      |        | PDT         |                      |  results section.    
|
+----------------------+--------+-------------+----------------------+----------------------
+
| BASIC METABOLIC      | Routin | 10/24/2018  |                      |   Results for this   
|
| PANEL                | e      |  1:52 PM    |                      | procedure are in the 
|
|                      |        | PDT         |                      |  results section.    
|
+----------------------+--------+-------------+----------------------+----------------------
+
 documented in this encounter
 
 Results
 POC Glucose (10/25/2018 11:18 AM PDT)
 
+-------------+--------------------------+---------------+------------+--------------+
| Component   | Value                    | Ref Range     | Performed  | Pathologist  |
|             |                          |               | At         | Signature    |
+-------------+--------------------------+---------------+------------+--------------+
| Glucose,    | 136 (H)Comment: Testing  | 65 - 99 mg/dL | EXTERNAL   |              |
| Fingerstick | performed at [facility];[address]     |               | LAB        |              |
|             | [address];[place], [place]   |               |            |              |
 
|             | [address]                    |               |            |              |
+-------------+--------------------------+---------------+------------+--------------+
 
 
 
+----------+
| Specimen |
+----------+
|          |
+----------+
 
 
 
+----------------+---------+--------------------+--------------+
| Performing     | Address | City/State/Zipcode | Phone Number |
| Organization   |         |                    |              |
+----------------+---------+--------------------+--------------+
|   EXTERNAL LAB |         |                    |              |
+----------------+---------+--------------------+--------------+
 Echo Limited (10/25/2018  7:00 AM PDT)
 
+----------+
| Specimen |
+----------+
|          |
+----------+
 
 
 
+------------------------------------------------------------------------+--------------+
| Impressions                                                            | Performed At |
+------------------------------------------------------------------------+--------------+
|   1. Overall left ventricular systolic function is normal with, an EF  |              |
| between 65 - 70 %.  2. There is no pericardial effusion.               |              |
+------------------------------------------------------------------------+--------------+
 
 
 
+------------------------------------------------------------------------+--------------+
| Narrative                                                              | Performed At |
+------------------------------------------------------------------------+--------------+
|      Patient Name:   Vonnie Celaya  YOB: 1932    |              |
|   Accession:   [id number]     Performing Physician:   Daniel Funez MD |              |
|   _______________________________________________________________      |              |
| INDICATIONS  -----------  pericardial effusion     CONCLUSIONS         |              |
| -----------  1. Overall left ventricular systolic function is normal   |              |
| with, an EF between 65 - 70 %.  2. There is no pericardial effusion.   |              |
|    FINDINGS  --------  Study: A limited 2-dimensional transthoracic    |              |
| echocardiogram with limited spectral and color flow Doppler was        |              |
| performed.  Left Ventricle: Overall left ventricular systolic function |              |
|  is normal with, an EF between 65 - 70 %.  Pericardium: There is no    |              |
| pericardial effusion.     MEASUREMENTS  -------------     Sonographer: |              |
|  GD  Authenticated by:   Daniel Funez MD  Report Date/Time:        |              |
| 10- 12:14:37                                                    |              |
+------------------------------------------------------------------------+--------------+
 
 
 
+-------------------------------------------------------------------------------------------
-----------------------------+
 
| Procedure Note                                                                            
                             |
+-------------------------------------------------------------------------------------------
-----------------------------+
|   Casimiro Mar Conversion - 2019  3:45 PM PDT   Patient Name:  Yodit Celaya    
                             |
| of Birth:  1932 Accession:  [id number] Performing Physician:  Daniel Funez         
                             |
| MD_______________________________________________________________INDICATIONS-----------p  
                             |
| ericardial effusion CONCLUSIONS-----------1. Overall left ventricular systolic function   
                             |
| is normal with, an EF between 65 - 70 %.2. There is no pericardial effusion.              
                             |
| FINDINGS--------Study: A limited 2-dimensional transthoracic echocardiogram with limited  
                             |
|  spectral and color flow Doppler was performed.Left Ventricle: Overall left ventricular   
                             |
| systolic function is normal with, an EF between 65 - 70 %.Pericardium: There is no        
                             |
| pericardial effusion. MEASUREMENTS------------- Sonographer: Radhaticated by:          
                             |
| Daniel Funez MDReport Date/Time: 10- 12:14:37 IMPRESSION: 1. Overall left      
                             |
| ventricular systolic function is normal with, an EF between 65 - 70 %.2. There is no      
                             |
| pericardial effusion.                                                                     
                             |
|CONCLUSIONS                                                                                
                            |
|-----------                                                                                
                              |
|1. Overall left ventricular systolic function is normal with, an EF between 65 - 70 %.     
                             |
|2. There is no pericardial effusion.                                                       
                             |
|FINDINGS                                                                                   
                            |
|--------                                                                                   
                              |
|Study: A limited 2-dimensional transthoracic echocardiogram with limited spectral and color
 flow Doppler was performed. |
|Left Ventricle: Overall left ventricular systolic function is normal with, an EF between 65
 - 70 %.                     |
|Pericardium: There is no pericardial effusion.                                             
                             |
|MEASUREMENTS                                                                               
                            |
|-------------                                                                              
                              |
|Sonographer: VINH                                                                            
                             |
|Authenticated by:  Daniel Funez MD                                                     
                             |
 
|Report Date/Time: 10- 12:14:37                                                      
                             |
|IMPRESSION:                                                                                
                             |
|1. Overall left ventricular systolic function is normal with, an EF between 65 - 70 %.     
                             |
|2. There is no pericardial effusion.                                                       
                             |
+-------------------------------------------------------------------------------------------
-----------------------------+
 POC Glucose (10/25/2018  5:32 AM PDT)
 
+-------------+--------------------------+---------------+------------+--------------+
| Component   | Value                    | Ref Range     | Performed  | Pathologist  |
|             |                          |               | At         | Signature    |
+-------------+--------------------------+---------------+------------+--------------+
| Glucose,    | 132 (H)Comment: Testing  | 65 - 99 mg/dL | EXTERNAL   |              |
| Fingerstick | performed at [facility];[address]     |               | LAB        |              |
|             | [address];[place]   |               |            |              |
|             | [address]                    |               |            |              |
+-------------+--------------------------+---------------+------------+--------------+
 
 
 
+----------+
| Specimen |
+----------+
|          |
+----------+
 
 
 
+----------------+---------+--------------------+--------------+
| Performing     | Address | City/State/Zipcode | Phone Number |
| Organization   |         |                    |              |
+----------------+---------+--------------------+--------------+
|   EXTERNAL LAB |         |                    |              |
+----------------+---------+--------------------+--------------+
 External Lab: CBC (10/25/2018  4:37 AM PDT)
 
+-------------+-------------------------+-----------------+------------+--------------+
| Component   | Value                   | Ref Range       | Performed  | Pathologist  |
|             |                         |                 | At         | Signature    |
+-------------+-------------------------+-----------------+------------+--------------+
| WBC         | 9.88                    | 3.80 - 11.00    | EXTERNAL   |              |
|             |                         | K/uL            | LAB        |              |
+-------------+-------------------------+-----------------+------------+--------------+
| RED CELL    | 3.67 (L)                | 3.70 - 5.10     | EXTERNAL   |              |
| COUNT       |                         | M/uL            | LAB        |              |
+-------------+-------------------------+-----------------+------------+--------------+
| Hgb         | 10.9 (L)                | 11.3 - 15.5     | EXTERNAL   |              |
|             |                         | g/dL            | LAB        |              |
+-------------+-------------------------+-----------------+------------+--------------+
| Hematocrit, | 31.9 (L)                | 34.0 - 46.0 %   | EXTERNAL   |              |
|  POC        |                         |                 | LAB        |              |
+-------------+-------------------------+-----------------+------------+--------------+
| MCV         | 87.0                    | 80.0 - 100.0 fl | EXTERNAL   |              |
|             |                         |                 | LAB        |              |
 
+-------------+-------------------------+-----------------+------------+--------------+
| MCH         | 29.6                    | 27.0 - 34.0 pg  | EXTERNAL   |              |
|             |                         |                 | LAB        |              |
+-------------+-------------------------+-----------------+------------+--------------+
| MCHC        | 34.0                    | 32.0 - 35.5     | EXTERNAL   |              |
|             |                         | g/dL            | LAB        |              |
+-------------+-------------------------+-----------------+------------+--------------+
| RDW-CV      | 46.4                    | 37 - 53 fl      | EXTERNAL   |              |
|             |                         |                 | LAB        |              |
+-------------+-------------------------+-----------------+------------+--------------+
| Platelet    | 251                     | 150 - 400 K/uL  | EXTERNAL   |              |
| Count       |                         |                 | LAB        |              |
| Plasma      |                         |                 |            |              |
+-------------+-------------------------+-----------------+------------+--------------+
| MPV         | 9.1                     | fl              | EXTERNAL   |              |
|             |                         |                 | LAB        |              |
+-------------+-------------------------+-----------------+------------+--------------+
| Differentia | MANUAL                  |                 | EXTERNAL   |              |
| l Type      |                         |                 | LAB        |              |
+-------------+-------------------------+-----------------+------------+--------------+
| Segmented   | 84                      | %               | EXTERNAL   |              |
| Neutrophils |                         |                 | LAB        |              |
|  Manual     |                         |                 |            |              |
+-------------+-------------------------+-----------------+------------+--------------+
| Lymphocytes | 13                      | %               | EXTERNAL   |              |
|  Manual     |                         |                 | LAB        |              |
+-------------+-------------------------+-----------------+------------+--------------+
| Monocytes   | 3                       | %               | EXTERNAL   |              |
| Manual      |                         |                 | LAB        |              |
+-------------+-------------------------+-----------------+------------+--------------+
| Absolute    | 8.30 (H)                | 1.90 - 7.40     | EXTERNAL   |              |
| Neutrophils |                         | K/uL            | LAB        |              |
+-------------+-------------------------+-----------------+------------+--------------+
| Absolute    | 1.28                    | 1.00 - 3.90     | EXTERNAL   |              |
| Lymphocytes |                         | K/uL            | LAB        |              |
+-------------+-------------------------+-----------------+------------+--------------+
| Absolute    | 0.30                    | 0.00 - 0.80     | EXTERNAL   |              |
| Monocytes   |                         | K/uL            | LAB        |              |
+-------------+-------------------------+-----------------+------------+--------------+
| RBC         | RBC AND PLT MORPHOLOGY  |                 | EXTERNAL   |              |
| Morphology  | APPEAR NORMALComment:   |                 | LAB        |              |
|             | Testing performed at    |                 |            |              |
|             | [facility], [address] [place] [address]  |                 |            |              |
|             | [place], [address] [address]     |                 |            |              |
|             | [id number]                   |                 |            |              |
+-------------+-------------------------+-----------------+------------+--------------+
 
 
 
+-----------------+
| Specimen        |
+-----------------+
| Blood specimen  |
| (specimen)      |
+-----------------+
 
 
 
+----------------+---------+--------------------+--------------+
| Performing     | Address | City/State/Zipcode | Phone Number |
 
| Organization   |         |                    |              |
+----------------+---------+--------------------+--------------+
|   EXTERNAL LAB |         |                    |              |
+----------------+---------+--------------------+--------------+
 Basic Metabolic Panel (10/25/2018  4:37 AM PDT)
 
+-------------+--------------------------+-----------------+------------+--------------+
| Component   | Value                    | Ref Range       | Performed  | Pathologist  |
|             |                          |                 | At         | Signature    |
+-------------+--------------------------+-----------------+------------+--------------+
| Na          | 136                      | 135 - 145       | EXTERNAL   |              |
|             |                          | mmol/L          | LAB        |              |
+-------------+--------------------------+-----------------+------------+--------------+
| K           | 4.6Comment: SPECIMEN     | 3.5 - 4.9       | EXTERNAL   |              |
|             | SLIGHTLY HEMOLYZED       | mmol/L          | LAB        |              |
+-------------+--------------------------+-----------------+------------+--------------+
| Cl          | 106                      | 99 - 109 mmol/L | EXTERNAL   |              |
|             |                          |                 | LAB        |              |
+-------------+--------------------------+-----------------+------------+--------------+
| CO2         | 19 (L)                   | 23 - 32 mmol/L  | EXTERNAL   |              |
|             |                          |                 | LAB        |              |
+-------------+--------------------------+-----------------+------------+--------------+
| Anion Gap   | 16                       | 5 - 20 mmol/L   | EXTERNAL   |              |
|             |                          |                 | LAB        |              |
+-------------+--------------------------+-----------------+------------+--------------+
| Glucose,    | 147 (H)Comment: SPECIMEN | 65 - 99 mg/dL   | EXTERNAL   |              |
| Fasting     |  SLIGHTLY HEMOLYZED      |                 | LAB        |              |
+-------------+--------------------------+-----------------+------------+--------------+
| BUN         | 21                       | 8 - 25 mg/dL    | EXTERNAL   |              |
|             |                          |                 | LAB        |              |
+-------------+--------------------------+-----------------+------------+--------------+
| Creatinine  | 0.9Comment: SPECIMEN     | 0.50 - 1.00     | EXTERNAL   |              |
|             | SLIGHTLY HEMOLYZED       | mg/dL           | LAB        |              |
+-------------+--------------------------+-----------------+------------+--------------+
| BUN/Creatin | 23                       |                 | EXTERNAL   |              |
| ine Ratio   |                          |                 | LAB        |              |
+-------------+--------------------------+-----------------+------------+--------------+
| Calcium     | 8.6                      | 8.5 - 10.5      | EXTERNAL   |              |
|             |                          | mg/dL           | LAB        |              |
+-------------+--------------------------+-----------------+------------+--------------+
| Estimated   | 59 (L)Comment: GFR <60:  | mL/min/1.73m2   | EXTERNAL   |              |
| GFR         | CHRONIC KIDNEY DISEASE,  |                 | LAB        |              |
|             | IF FOUND OVER A 3 MONTH  |                 |            |              |
|             | PERIOD.GFR <15: KIDNEY   |                 |            |              |
|             | FAILURE.FOR       |                 |            |              |
|             | [employer], MULTIPLY THE  |                 |            |              |
|             | CALCULATED GFR BY        |                 |            |              |
|             | 1.210.This eGFR is       |                 |            |              |
|             | calculated using the     |                 |            |              |
|             | MDRD IDMS traceable      |                 |            |              |
|             | equation.Testing         |                 |            |              |
|             | performed at [facility], [address] |                 |            |              |
|             |  [facility],        |                 |            |              |
|             | [place]   [address]    |                 |            |              |
+-------------+--------------------------+-----------------+------------+--------------+
 
 
 
+-----------------+
| Specimen        |
 
+-----------------+
| Blood specimen  |
| (specimen)      |
+-----------------+
 
 
 
+----------------+---------+--------------------+--------------+
| Performing     | Address | City/State/Zipcode | Phone Number |
| Organization   |         |                    |              |
+----------------+---------+--------------------+--------------+
|   EXTERNAL LAB |         |                    |              |
+----------------+---------+--------------------+--------------+
 POC Glucose (10/24/2018  9:37 PM PDT)
 
+-------------+--------------------------+---------------+------------+--------------+
| Component   | Value                    | Ref Range     | Performed  | Pathologist  |
|             |                          |               | At         | Signature    |
+-------------+--------------------------+---------------+------------+--------------+
| Glucose,    | 151 (H)Comment: Testing  | 65 - 99 mg/dL | EXTERNAL   |              |
| Fingerstick | performed at [facility];[address]     |               | LAB        |              |
|             | [address];[place], [place]   |               |            |              |
|             | [id number]                    |               |            |              |
+-------------+--------------------------+---------------+------------+--------------+
 
 
 
+----------+
| Specimen |
+----------+
|          |
+----------+
 
 
 
+----------------+---------+--------------------+--------------+
| Performing     | Address | City/State/Zipcode | Phone Number |
| Organization   |         |                    |              |
+----------------+---------+--------------------+--------------+
|   EXTERNAL LAB |         |                    |              |
+----------------+---------+--------------------+--------------+
 CV CARDIAC PROCEDURE (10/24/2018  6:26 PM PDT)
 
+----------+
| Specimen |
+----------+
|          |
+----------+
 
 
 
+------------------------------------------------------------------------+--------------+
| Impressions                                                            | Performed At |
+------------------------------------------------------------------------+--------------+
|   1.   Successful PTCA and stenting of the right posterolateral artery |              |
|  using a  3.0 x 16 mm Synergy drug-eluting stent post-dilated using    |              |
| 3.0 noncompliant  balloon.  2.   Probable limited wire-induced         |              |
| perforation of a small branch of right  posterolateral artery.         |              |
| PLAN:   The patient will be observed overnight.   She has been on      |              |
| Plavix 75  mg daily and received an additional 300 mg in the cath lab. |              |
 
|    We will  continue Plavix 75 mg daily for at least 6 months. We will |              |
|  do limited  echocardiogram tomorrow morning to assess for any         |              |
| pericardial effusion.   If  the patient develop any hypertension, then |              |
|  we might consider emergent  echocardiogram to rule out pericardial    |              |
| effusion.     Read by DANIEL FUNEZ MD 10/28/2018 04:56 P        |              |
| Electronically signed by Daniel Funez MD on 10/29/2018 12:40 PM   |              |
+------------------------------------------------------------------------+--------------+
 
 
 
+------------------------------------------------------------------------+--------------+
| Narrative                                                              | Performed At |
+------------------------------------------------------------------------+--------------+
|   Accession:   [id number]                                                 |              |
| ____________________________________________________________________   |              |
|    PROCEDURES PERFORMED:  1.   Selective right coronary artery         |              |
| angiogram.  2.   Successful PTCA and stenting of the right             |              |
| posterolateral artery using a  3.0 x 16 mm Synergy drug-eluting stent  |              |
| post-dilated using a 3.0  noncompliant balloon.  3.   Right common     |              |
| femoral artery angiogram.  4.   Successful Angio-seal closure of the   |              |
| right common femoral artery.     INDICATION:   Shortness of breath,    |              |
| angina equivalent, severe mitral and  tricuspid regurgitation,         |              |
| anticipated MitraClip procedure.     HISTORY OF PRESENT ILLNESS:       |              |
|   This is an 86-year-old female with history of  hypertension,         |              |
| hyperlipidemia, chronic atrial fibrillation, type 2 diabetes  mellitus |              |
|  and severe mitral and tricuspid regurgitation, who was evaluated  at  |              |
| [facility] for possible mitral and tricuspid valve percutaneous              |              |
| interventions.  Coronary angiogram was done there and reported as      |              |
| critical distal right  coronary artery disease.   The patient was      |              |
| referred for intervention in the  right coronary artery.   The patient |              |
|  had been experiencing significant  exertional shortness of breath     |              |
| that has been progressively worse.   The  procedure, risks, benefits,  |              |
| alternatives were discussed with the patient  and she agreed to        |              |
| proceed.     FRAILTY SCORE: 6     TECHNIQUE:   The patient was brought |              |
|  to the cardiac catheterization  laboratory in the fasting state.      |              |
|   After informed consent obtained the  patient was prepped and draped  |              |
| in the usual sterile fashion.   Time-out for  patient safety was       |              |
| performed.    One percent lidocaine was used for local  anesthesia of  |              |
| the right groin.   The right femoral artery was accessed using         |              |
| micropuncture needle.   A 6-French sheath was placed in the right      |              |
| femoral  artery without difficulty.   The patient was given 5000 units |              |
|  of heparin  intravenously.   ACT was monitored.   The patient         |              |
| received additional heparin  boluses to keep ACT more than 250.     A  |              |
| 6-French JR4 guiding catheter was used for selective engagement of the |              |
|   right coronary system and baseline angiogram was obtained.           |              |
|   Engagement was  suboptimal, and there is diffusely calcified and     |              |
| tortuous right coronary  artery and tortuous right posterolateral      |              |
| artery with critical calcified  lesion in the right posterolateral     |              |
| artery.   The patient was also having  some oozing from around the     |              |
| 6-French sheath at this time, and we decided to  proceed with the      |              |
| 7-French system to have more support.   The 6-French sheath  was       |              |
| upgraded to a 7-French for a 5-cm sheath.   A 7-French AL1 guiding     |              |
| catheter was used for selective engagement of the right coronary       |              |
| artery.   A  BMW wire was advanced.   I was not able to cross the      |              |
| heavily calcified  subtotally occluded right posterolateral artery.    |              |
|   A 2.25 x 12 mm balloon  was advanced to help to provide more         |              |
| support, and even with this support  the balloon was not able to       |              |
| cross.   At this time the balloon and the [employer]  wire were removed.      |              |
|   [employer] wire was advanced and 2.25 x 12 mm balloon  was advanced. |              |
|    With the help of the balloon I was able to cross the lesion  and    |              |
 
| advanced into the distal right posterolateral artery.   I was not able |              |
|   to advance the balloon across the lesion.   The balloon was removed. |              |
|    A  5.5-French GuideLiner catheter was advanced and at one point 5 x |              |
|  12 mm  balloon was advanced.   With the help of the GuideLiner I was  |              |
| able to cross  the lesion.   The lesion was predilated up to 10        |              |
| atmospheres.   The 1.5  balloon was removed and 2.25 x 12 mm balloon   |              |
| was advanced.   I was able to  advance it across the lesion only with  |              |
| the help of the GuideLiner, advanced  all the way to the distal right  |              |
| coronary artery.   The balloon was inflated  multiple times up to 10   |              |
| atmospheres.   The balloon was removed and a 3.0 x  16 mm Synergy      |              |
| drug-eluting stent was advanced.   The stent would not go and  the     |              |
| stent was then advanced gradually and the GuideLiner was advanced over |              |
|   the stent.   I was able to advance the stent to the right            |              |
| posterolateral  artery.   Given the combination of calcification and   |              |
| tortuosity in the right  posterolateral artery, it was difficult to    |              |
| advance the stent.   The  GuideLiner was advanced all the way to the   |              |
| right posterolateral artery and  then I was able to deliver the stent. |              |
|    Angiogram for positioning was  obtained after removing the          |              |
| GuideLiner and the stent was deployed at 12  atmospheres.   The stent  |              |
| was then postdilated using a 3.0 x 12 mm  noncompliant Quantum balloon |              |
|  which was again advanced with the help of the  GuideLiner.   The      |              |
| balloon was inflated multiple times up to 18 atmospheres.  Distant     |              |
| balloon and the GuideLiner were removed.   Post-intervention           |              |
| angiogram was obtained, which shows excellent results with reduction   |              |
| of  degree of stenosis from 99 percent down to 0 percent with JESSICA 3   |              |
| flow.   No  evidence of dissection or perforation.   The long 7-French |              |
|  sheath was  removed off of the guidewire and a short 7-French sheath  |              |
| was placed into  the right femoral artery.   Angiogram of the right    |              |
| common femoral artery was  obtained at the beginning by injection of   |              |
| contrast through the  micropuncture sheath which showed the puncture   |              |
| site above the bifurcation  at the end of the procedure.   Angio-seal  |              |
| closure of the right common  femoral artery was done successfully.     |              |
|   We have noted in the  post-intervention angiogram there is some      |              |
| possible dye extravasation in the  pericardium; however, there was no  |              |
| active bleeding or active extravasation  of blood flow from any of the |              |
|  branches; however, I suspected this might be  a wire-induced          |              |
| perforation of a small branch.   The patient had remained              |              |
| hemodynamically stable throughout the procedure and after the          |              |
| procedure.   I  decided to follow that clinically and with followup    |              |
| echocardiogram in the  morning.   Overall, the patient tolerated the   |              |
| procedure well.     TOTAL CONTRAST:   128 mL of Isovue.     TOTAL      |              |
| BLOOD LOSS:   Less than 20 mL.     RESULTS:  1.   Selective right      |              |
| coronary artery angiogram.  2.   Right coronary artery is the large    |              |
| dominant vessel that gives rise to a  large right posterolateral       |              |
| artery and gives rise to right posterior  descending artery.   Right   |              |
| coronary artery is diffusely calcified and  tortuous vessel.   The     |              |
| right posterolateral artery is tortuous proximally  with high-grade    |              |
| heavily calcified lesion in the range of 99 percent in the             |              |
| midportion.   The   proximal right coronary artery had moderate        |              |
| disease in  the range of 30 percent.   Mid right coronary artery had   |              |
| diffuse  mild-to-moderate disease in the range of 30 percent.          |              |
+------------------------------------------------------------------------+--------------+
 
 
 
+-----------------------------------------------------------------------------+
| Procedure Note                                                              |
+-----------------------------------------------------------------------------+
|   Casimiro Mar Conversion - 2019  3:57 PM PDT  Accession:  [id number]        |
| ____________________________________________________________________        |
 
|                                                                             |
| PROCEDURES PERFORMED:                                                       |
| 1.  Selective right coronary artery angiogram.                              |
| 2.  Successful PTCA and stenting of the right posterolateral artery using a |
| 3.0 x 16 mm Synergy drug-eluting stent post-dilated using a 3.0             |
| noncompliant balloon.                                                       |
| 3.  Right common femoral artery angiogram.                                  |
| 4.  Successful Angio-seal closure of the right common femoral artery.       |
|                                                                             |
| INDICATION:  Shortness of breath, angina equivalent, severe mitral and      |
| tricuspid regurgitation, anticipated MitraClip procedure.                   |
|                                                                             |
| HISTORY OF PRESENT ILLNESS:  This is an 86-year-old female with history of  |
| hypertension, hyperlipidemia, chronic atrial fibrillation, type 2 diabetes  |
| mellitus and severe mitral and tricuspid regurgitation, who was evaluated   |
| at [facility] for possible mitral and tricuspid valve percutaneous interventions. |
| Coronary angiogram was done there and reported as critical distal right     |
| coronary artery disease.  The patient was referred for intervention in the  |
| right coronary artery.  The patient had been experiencing significant       |
| exertional shortness of breath that has been progressively worse.  The      |
| procedure, risks, benefits, alternatives were discussed with the patient    |
| and she agreed to proceed.                                                  |
|                                                                             |
| FRAILTY SCORE: 6                                                            |
|                                                                             |
| TECHNIQUE:  The patient was brought to the cardiac catheterization          |
| laboratory in the fasting state.  After informed consent obtained the       |
| patient was prepped and draped in the usual sterile fashion.  Time-out for  |
| patient safety was performed.   One percent lidocaine was used for local    |
| anesthesia of the right groin.  The right femoral artery was accessed using |
| micropuncture needle.  A 6-French sheath was placed in the right femoral    |
| artery without difficulty.  The patient was given 5000 units of heparin     |
| intravenously.  ACT was monitored.  The patient received additional heparin |
| boluses to keep ACT more than 250.                                          |
|                                                                             |
| A 6-French JR4 guiding catheter was used for selective engagement of the    |
| right coronary system and baseline angiogram was obtained.  Engagement was  |
| suboptimal, and there is diffusely calcified and tortuous right coronary    |
| artery and tortuous right posterolateral artery with critical calcified     |
| lesion in the right posterolateral artery.  The patient was also having     |
| some oozing from around the 6-French sheath at this time, and we decided to |
| proceed with the 7-French system to have more support.  The 6-French sheath |
| was upgraded to a 7-French for a 5-cm sheath.  A 7-French AL1 guiding       |
| catheter was used for selective engagement of the right coronary artery.  A |
| BMW wire was advanced.  I was not able to cross the heavily calcified       |
| subtotally occluded right posterolateral artery.  A 2.25 x 12 mm balloon    |
| was advanced to help to provide more support, and even with this support    |
| the balloon was not able to cross.  At this time the balloon and the BMW    |
| wire were removed.  Fielder FC wire was advanced and 2.25 x 12 mm balloon   |
| was advanced.  With the help of the balloon I was able to cross the lesion  |
| and advanced into the distal right posterolateral artery.  I was not able   |
| to advance the balloon across the lesion.  The balloon was removed.  A      |
| 5.5-French GuideLiner catheter was advanced and at one point 5 x 12 mm      |
| balloon was advanced.  With the help of the GuideLiner I was able to cross  |
| the lesion.  The lesion was predilated up to 10 atmospheres.  The 1.5       |
| balloon was removed and 2.25 x 12 mm balloon was advanced.  I was able to   |
| advance it across the lesion only with the help of the GuideLiner, advanced |
| all the way to the distal right coronary artery.  The balloon was inflated  |
| multiple times up to 10 atmospheres.  The balloon was removed and a 3.0 x   |
| 16 mm Synergy drug-eluting stent was advanced.  The stent would not go and  |
 
| the stent was then advanced gradually and the GuideLiner was advanced over  |
| the stent.  I was able to advance the stent to the right posterolateral     |
| artery.  Given the combination of calcification and tortuosity in the right |
| posterolateral artery, it was difficult to advance the stent.  The          |
| GuideLiner was advanced all the way to the right posterolateral artery and  |
| then I was able to deliver the stent.  Angiogram for positioning was        |
| obtained after removing the GuideLiner and the stent was deployed at 12     |
| atmospheres.  The stent was then postdilated using a 3.0 x 12 mm            |
| noncompliant Quantum balloon which was again advanced with the help of the  |
| GuideLiner.  The balloon was inflated multiple times up to 18 atmospheres.  |
| Distant balloon and the GuideLiner were removed.  Post-intervention         |
| angiogram was obtained, which shows excellent results with reduction of     |
| degree of stenosis from 99 percent down to 0 percent with JESSICA 3 flow.  No  |
| evidence of dissection or perforation.  The long 7-French sheath was        |
| removed off of the guidewire and a short 7-French sheath was placed into    |
| the right femoral artery.  Angiogram of the right common femoral artery was |
| obtained at the beginning by injection of contrast through the              |
| micropuncture sheath which showed the puncture site above the bifurcation   |
| at the end of the procedure.  Angio-seal closure of the right common        |
| femoral artery was done successfully.  We have noted in the                 |
| post-intervention angiogram there is some possible dye extravasation in the |
| pericardium; however, there was no active bleeding or active extravasation  |
| of blood flow from any of the branches; however, I suspected this might be  |
| a wire-induced perforation of a small branch.  The patient had remained     |
| hemodynamically stable throughout the procedure and after the procedure.  I |
| decided to follow that clinically and with followup echocardiogram in the   |
| morning.  Overall, the patient tolerated the procedure well.                |
|                                                                             |
| TOTAL CONTRAST:  128 mL of Isovue.                                          |
|                                                                             |
| TOTAL BLOOD LOSS:  Less than 20 mL.                                         |
|                                                                             |
| RESULTS:                                                                    |
| 1.  Selective right coronary artery angiogram.                              |
| 2.  Right coronary artery is the large dominant vessel that gives rise to a |
| large right posterolateral artery and gives rise to right posterior         |
| descending artery.  Right coronary artery is diffusely calcified and        |
| tortuous vessel.  The right posterolateral artery is tortuous proximally    |
| with high-grade heavily calcified lesion in the range of 99 percent in the  |
| midportion.  The  proximal right coronary artery had moderate disease in    |
| the range of 30 percent.  Mid right coronary artery had diffuse             |
| mild-to-moderate disease in the range of 30 percent.                        |
|                                                                             |
| IMPRESSION:                                                                 |
| 1.  Successful PTCA and stenting of the right posterolateral artery using a |
| 3.0 x 16 mm Synergy drug-eluting stent post-dilated using 3.0 noncompliant  |
| balloon.                                                                    |
| 2.  Probable limited wire-induced perforation of a small branch of right    |
| posterolateral artery.                                                      |
|                                                                             |
| PLAN:  The patient will be observed overnight.  She has been on Plavix 75   |
| mg daily and received an additional 300 mg in the cath lab.  We will        |
| continue Plavix 75 mg daily for at least 6 months. We will do limited       |
| echocardiogram tomorrow morning to assess for any pericardial effusion.  If |
| the patient develop any hypertension, then we might consider emergent       |
| echocardiogram to rule out pericardial effusion.                            |
|                                                                             |
| Read by DANIEL FUNEZ MD 10/28/2018 04:56 P                           |
|                                                                             |
| Electronically signed by Daniel Funez MD on 10/29/2018 12:40 PM        |
 
+-----------------------------------------------------------------------------+
 Activated clotting time (10/24/2018  5:47 PM PDT)
 
+------------+--------------------------+-----------+------------+--------------+
| Component  | Value                    | Ref Range | Performed  | Pathologist  |
|            |                          |           | At         | Signature    |
+------------+--------------------------+-----------+------------+--------------+
| Activated  | 235 (H)Comment: Testing  | 74 - 137  | EXTERNAL   |              |
| Clotting   | performed at [facility];[address]     | seconds   | LAB        |              |
| time, POC  | [address];[place], [place]   |           |            |              |
|            | [id number]                    |           |            |              |
+------------+--------------------------+-----------+------------+--------------+
 
 
 
+----------+
| Specimen |
+----------+
|          |
+----------+
 
 
 
+----------------+---------+--------------------+--------------+
| Performing     | Address | City/State/Zipcode | Phone Number |
| Organization   |         |                    |              |
+----------------+---------+--------------------+--------------+
|   EXTERNAL LAB |         |                    |              |
+----------------+---------+--------------------+--------------+
 Activated clotting time (10/24/2018  5:07 PM PDT)
 
+------------+--------------------------+-----------+------------+--------------+
| Component  | Value                    | Ref Range | Performed  | Pathologist  |
|            |                          |           | At         | Signature    |
+------------+--------------------------+-----------+------------+--------------+
| Activated  | 169 (H)Comment: Testing  | 74 - 137  | EXTERNAL   |              |
| Clotting   | performed at [facility];[address]     | seconds   | LAB        |              |
| time, POC  | [address];[place]   |           |            |              |
|            | [address]                    |           |            |              |
+------------+--------------------------+-----------+------------+--------------+
 
 
 
+----------+
| Specimen |
+----------+
|          |
+----------+
 
 
 
+----------------+---------+--------------------+--------------+
| Performing     | Address | City/State/Zipcode | Phone Number |
| Organization   |         |                    |              |
+----------------+---------+--------------------+--------------+
|   EXTERNAL LAB |         |                    |              |
+----------------+---------+--------------------+--------------+
 Protime INR (10/24/2018  1:52 PM PDT)
 
+-----------+--------------------------+-----------+------------+--------------+
 
| Component | Value                    | Ref Range | Performed  | Pathologist  |
|           |                          |           | At         | Signature    |
+-----------+--------------------------+-----------+------------+--------------+
| INR       | 1.2Comment: REFERENCE    |           | EXTERNAL   |              |
|           | RANGE:0.9 - 1.2          |           | LAB        |              |
|           |   NON-ANTICOAGULATED2.0  |           |            |              |
|           | - 3.0   ALL OTHER        |           |            |              |
|           | THERAPEUTIC              |           |            |              |
|           | INDICATIONS2.5 - 3.5     |           |            |              |
|           | MECHANICAL HEART VALVES, |           |            |              |
|           |  RECURRENT OR SYSTEMIC   |           |            |              |
|           | EMBOLISMTesting          |           |            |              |
|           | performed at [facility];[address]     |           |            |              |
|           | [address];[place], [place]   |           |            |              |
|           | [id number]                    |           |            |              |
+-----------+--------------------------+-----------+------------+--------------+
 
 
 
+-----------------+
| Specimen        |
+-----------------+
| Blood specimen  |
| (specimen)      |
+-----------------+
 
 
 
+----------------+---------+--------------------+--------------+
| Performing     | Address | City/State/Zipcode | Phone Number |
| Organization   |         |                    |              |
+----------------+---------+--------------------+--------------+
|   EXTERNAL LAB |         |                    |              |
+----------------+---------+--------------------+--------------+
 External Lab: [facility] (10/24/2018  1:52 PM PDT)
 
+-------------+-------------------------+-----------------+------------+--------------+
| Component   | Value                   | Ref Range       | Performed  | Pathologist  |
|             |                         |                 | At         | Signature    |
+-------------+-------------------------+-----------------+------------+--------------+
| WBC         | 7.58                    | 3.80 - 11.00    | EXTERNAL   |              |
|             |                         | K/uL            | LAB        |              |
+-------------+-------------------------+-----------------+------------+--------------+
| RED CELL    | 4.06                    | 3.70 - 5.10     | EXTERNAL   |              |
| COUNT       |                         | M/uL            | LAB        |              |
+-------------+-------------------------+-----------------+------------+--------------+
| Hgb         | 11.9                    | 11.3 - 15.5     | EXTERNAL   |              |
|             |                         | g/dL            | LAB        |              |
+-------------+-------------------------+-----------------+------------+--------------+
| Hematocrit, | 35.7                    | 34.0 - 46.0 %   | EXTERNAL   |              |
|  POC        |                         |                 | LAB        |              |
+-------------+-------------------------+-----------------+------------+--------------+
| MCV         | 87.9                    | 80.0 - 100.0 fl | EXTERNAL   |              |
|             |                         |                 | LAB        |              |
+-------------+-------------------------+-----------------+------------+--------------+
| MCH         | 29.3                    | 27.0 - 34.0 pg  | EXTERNAL   |              |
|             |                         |                 | LAB        |              |
+-------------+-------------------------+-----------------+------------+--------------+
| MCHC        | 33.4                    | 32.0 - 35.5     | EXTERNAL   |              |
|             |                         | g/dL            | LAB        |              |
 
+-------------+-------------------------+-----------------+------------+--------------+
| RDW-CV      | 46.4                    | 37 - 53 fl      | EXTERNAL   |              |
|             |                         |                 | LAB        |              |
+-------------+-------------------------+-----------------+------------+--------------+
| Platelet    | 285                     | 150 - 400 K/uL  | EXTERNAL   |              |
| Count       |                         |                 | LAB        |              |
| Plasma      |                         |                 |            |              |
+-------------+-------------------------+-----------------+------------+--------------+
| MPV         | 8.4                     | fl              | EXTERNAL   |              |
|             |                         |                 | LAB        |              |
+-------------+-------------------------+-----------------+------------+--------------+
| Differentia | AUTOMATED               |                 | EXTERNAL   |              |
| l Type      |                         |                 | LAB        |              |
+-------------+-------------------------+-----------------+------------+--------------+
| % Segmented | 61.46                   | %               | EXTERNAL   |              |
|             |                         |                 | LAB        |              |
| Neutrophils |                         |                 |            |              |
+-------------+-------------------------+-----------------+------------+--------------+
| %           | 25.43                   | %               | EXTERNAL   |              |
| Lymphocytes |                         |                 | LAB        |              |
+-------------+-------------------------+-----------------+------------+--------------+
| % Monocytes | 7.59                    | %               | EXTERNAL   |              |
|             |                         |                 | LAB        |              |
+-------------+-------------------------+-----------------+------------+--------------+
| %           | 4.36                    | %               | EXTERNAL   |              |
| Eosinophils |                         |                 | LAB        |              |
+-------------+-------------------------+-----------------+------------+--------------+
| % Basophils | 1.16                    | %               | EXTERNAL   |              |
|             |                         |                 | LAB        |              |
+-------------+-------------------------+-----------------+------------+--------------+
| Absolute    | 4.66                    | 1.90 - 7.40     | EXTERNAL   |              |
| Segmented   |                         | K/uL            | LAB        |              |
| Neutrophils |                         |                 |            |              |
+-------------+-------------------------+-----------------+------------+--------------+
| Absolute    | 1.93                    | 1.00 - 3.90     | EXTERNAL   |              |
| Lymphocytes |                         | K/uL            | LAB        |              |
+-------------+-------------------------+-----------------+------------+--------------+
| Absolute    | 0.58                    | 0.00 - 0.80     | EXTERNAL   |              |
| Monocytes   |                         | K/uL            | LAB        |              |
+-------------+-------------------------+-----------------+------------+--------------+
| Absolute    | 0.33                    | 0.00 - 0.50     | EXTERNAL   |              |
| Eosinophils |                         | K/uL            | LAB        |              |
+-------------+-------------------------+-----------------+------------+--------------+
| Absolute    | 0.09Comment: Testing    | 0.00 - 0.10     | EXTERNAL   |              |
| Basophils   | performed at [facility];[address]    | K/uL            | LAB        |              |
|             | [address];[place]  |                 |            |              |
|             | [id number]                   |                 |            |              |
+-------------+-------------------------+-----------------+------------+--------------+
 
 
 
+-----------------+
| Specimen        |
+-----------------+
| Blood specimen  |
| (specimen)      |
+-----------------+
 
 
 
 
+----------------+---------+--------------------+--------------+
| Performing     | Address | City/State/Zipcode | Phone Number |
| Organization   |         |                    |              |
+----------------+---------+--------------------+--------------+
|   EXTERNAL LAB |         |                    |              |
+----------------+---------+--------------------+--------------+
 Basic Metabolic Panel (10/24/2018  1:52 PM PDT)
 
+-------------+--------------------------+-----------------+------------+--------------+
| Component   | Value                    | Ref Range       | Performed  | Pathologist  |
|             |                          |                 | At         | Signature    |
+-------------+--------------------------+-----------------+------------+--------------+
| Na          | 136                      | 135 - 145       | EXTERNAL   |              |
|             |                          | mmol/L          | LAB        |              |
+-------------+--------------------------+-----------------+------------+--------------+
| K           | 4.2                      | 3.5 - 4.9       | EXTERNAL   |              |
|             |                          | mmol/L          | LAB        |              |
+-------------+--------------------------+-----------------+------------+--------------+
| Cl          | 102                      | 99 - 109 mmol/L | EXTERNAL   |              |
|             |                          |                 | LAB        |              |
+-------------+--------------------------+-----------------+------------+--------------+
| CO2         | 23                       | 23 - 32 mmol/L  | EXTERNAL   |              |
|             |                          |                 | LAB        |              |
+-------------+--------------------------+-----------------+------------+--------------+
| Anion Gap   | 15                       | 5 - 20 mmol/L   | EXTERNAL   |              |
|             |                          |                 | LAB        |              |
+-------------+--------------------------+-----------------+------------+--------------+
| Glucose,    | 119 (H)                  | 65 - 99 mg/dL   | EXTERNAL   |              |
| Fasting     |                          |                 | LAB        |              |
+-------------+--------------------------+-----------------+------------+--------------+
| BUN         | 20                       | 8 - 25 mg/dL    | EXTERNAL   |              |
|             |                          |                 | LAB        |              |
+-------------+--------------------------+-----------------+------------+--------------+
| Creatinine  | 0.91                     | 0.50 - 1.00     | EXTERNAL   |              |
|             |                          | mg/dL           | LAB        |              |
+-------------+--------------------------+-----------------+------------+--------------+
| BUN/Creatin | 22                       |                 | EXTERNAL   |              |
| ine Ratio   |                          |                 | LAB        |              |
+-------------+--------------------------+-----------------+------------+--------------+
| Calcium     | 9.0                      | 8.5 - 10.5      | EXTERNAL   |              |
|             |                          | mg/dL           | LAB        |              |
+-------------+--------------------------+-----------------+------------+--------------+
| Estimated   | 59 (L)Comment: GFR <60:  | mL/min/1.73m2   | EXTERNAL   |              |
| GFR         | CHRONIC KIDNEY DISEASE,  |                 | LAB        |              |
|             | IF FOUND OVER A 3 MONTH  |                 |            |              |
|             | PERIOD.GFR <15: KIDNEY   |                 |            |              |
|             | FAILURE.FOR       |                 |            |              |
|             | AMERIC[employer], MULTIPLY THE  |                 |            |              |
|             | CALCULATED GFR BY        |                 |            |              |
|             | 1.210.This eGFR is       |                 |            |              |
|             | calculated using the     |                 |            |              |
|             | MDRD IDMS traceable      |                 |            |              |
|             | equation.Testing         |                 |            |              |
|             | performed at [facility];[address]     |                 |            |              |
|             | [facility];[place], [place]   |                 |            |              |
|             | [id number]                    |                 |            |              |
+-------------+--------------------------+-----------------+------------+--------------+
 
 
 
 
+-----------------+
| Specimen        |
+-----------------+
| Blood specimen  |
| (specimen)      |
+-----------------+
 
 
 
+----------------+---------+--------------------+--------------+
| Performing     | Address | City/State/Zipcode | Phone Number |
| Organization   |         |                    |              |
+----------------+---------+--------------------+--------------+
|   EXTERNAL LAB |         |                    |              |
+----------------+---------+--------------------+--------------+
 documented in this encounter
 
 Visit Diagnoses
 
 
+-------------------------------------------------------------------------------------+
| Diagnosis                                                                           |
+-------------------------------------------------------------------------------------+
|   Coronary artery disease involving native coronary artery of native heart, angina  |
| presence unspecified                                                                |
+-------------------------------------------------------------------------------------+
|   Anginal equivalent (HCC)                                                          |
+-------------------------------------------------------------------------------------+
|   Chronic atrial fibrillation  Atrial fibrillation                                  |
+-------------------------------------------------------------------------------------+
|   Essential hypertension  Unspecified essential hypertension                        |
+-------------------------------------------------------------------------------------+
|   Pulmonary hypertension, moderate to severe (HCC)  Other chronic pulmonary heart   |
| diseases                                                                            |
+-------------------------------------------------------------------------------------+
|   S/P drug eluting coronary stent placement                                         |
+-------------------------------------------------------------------------------------+
|   Severe mitral regurgitation  Mitral valve disorders                               |
+-------------------------------------------------------------------------------------+
|   Severe tricuspid regurgitation  Diseases of tricuspid valve                       |
+-------------------------------------------------------------------------------------+
|   SOB (shortness of breath) on exertion  Shortness of breath                        |
+-------------------------------------------------------------------------------------+
 documented in this encounter

## 2020-01-01 NOTE — XMS
Encounter Summary
  Created on: 2020
 
 Vonnie Celaya
 External Reference #: [id number]
 : 32
 Sex: Female
 
 Demographics
 
 
+-----------------------+------------------------+
| Address               | [address]         |
|                       | [place]  [address]   |
+-----------------------+------------------------+
| Home Phone            | [phone]        |
+-----------------------+------------------------+
| Preferred Language    | Unknown                |
+-----------------------+------------------------+
| Marital Status        | Single                 |
+-----------------------+------------------------+
| Latter-day Affiliation | NON                    |
+-----------------------+------------------------+
| Race                  | White                  |
+-----------------------+------------------------+
| Ethnic Group          | Not  or  |
+-----------------------+------------------------+
 
 
 Author
 
 
+--------------+------------------------------+
| Author       | [facility] |
+--------------+------------------------------+
| Organization | [facility] |
+--------------+------------------------------+
| Address      | Unknown                      |
+--------------+------------------------------+
| Phone        | Unavailable                  |
+--------------+------------------------------+
 
 
 
 Support
 
 
+--------------+--------------+---------+-----------------+
| Name         | Relationship | Address | Phone           |
+--------------+--------------+---------+-----------------+
| Lauryn Leroy | ECON         | Unknown | [phone] |
+--------------+--------------+---------+-----------------+
 
 
 
 Care Team Providers
 
 
 
+------------------------+------+-----------------+
| Care Team Member Name  | Role | Phone           |
+------------------------+------+-----------------+
| Jean Bermudez MD | PCP  | [phone] |
+------------------------+------+-----------------+
 
 
 
 Reason for Visit
 
 
+--------------+----------+
| Reason       | Comments |
+--------------+----------+
| New patient  |          |
| consultation |          |
+--------------+----------+
 Consultation (Routine)
 
+--------+--------+-----------+--------------+--------------+---------------+
| Status | Reason | Specialty | Diagnoses /  | Referred By  | Referred To   |
|        |        |           | Procedures   | Contact      | Contact       |
+--------+--------+-----------+--------------+--------------+---------------+
| Closed |        | Cardiac   |   Diagnoses  |   Stephen,      |   Donnell,      |
|        |        | Surgery   |              | Varun PARRA,   | Shiloh SIMON,   |
|        |        |           | Nonrheumatic | MD  [address]     | MD  [address]   |
|        |        |           |  mitral      | [address]  | [address]   |
|        |        |           | (valve)      |  [address]       | [address]       |
|        |        |           | insufficienc | [place], [place] | [place], [place]  |
|        |        |           | y  Rheumatic |  [address]       | [address]    |
|        |        |           |  tricuspid   | Phone:       | Phone:        |
|        |        |           | insufficienc | [phone] | [phone]  |
|        |        |           | y  Pulmonary |   Fax:       |  Fax:         |
|        |        |           |              | [phone] | [phone]  |
|        |        |           | hypertension |              |               |
|        |        |           | ,            |              |               |
|        |        |           | unspecified  |              |               |
|        |        |           |  Procedures  |              |               |
|        |        |           |  MT NEW      |              |               |
|        |        |           | PATIENT      |              |               |
|        |        |           | LEVEL V      |              |               |
+--------+--------+-----------+--------------+--------------+---------------+
 
 
 
 
 Encounter Details
 
 
+--------+---------+----------------------+----------------------+----------------+
| Date   | Type    | Department           | Care Team            | Description    |
+--------+---------+----------------------+----------------------+----------------+
| / | Office  |   Cardiothoracic     |   Shiloh Jacobo, | Severe mitral  |
| 2018   | Visit   | Surgery at [facility]  [address] |  MD  [address]     | regurgitation  |
|        |         |  [address]    | [address]      | (Primary Dx)   |
|        |         | Mailcode: [address]       | [place], [place]         |                |
|        |         | [facility] | [address]           |                |
|        |         |   [place]       | [phone]         |                |
|        |         | [address]           | [phone] (Fax)   |                |
|        |         | [phone]         |                      |                |
 
+--------+---------+----------------------+----------------------+----------------+
 
 
 
 Social History
 
 
+--------------+-------+-----------+--------+------+
| Tobacco Use  | Types | Packs/Day | Years  | Date |
|              |       |           | Used   |      |
+--------------+-------+-----------+--------+------+
| Never Smoker |       |           |        |      |
+--------------+-------+-----------+--------+------+
 
 
 
+---------------------+---+---+---+
| Smokeless Tobacco:  |   |   |   |
| Never Used          |   |   |   |
+---------------------+---+---+---+
 
 
 
+-------------+-------------+---------+----------+
| Alcohol Use | Drinks/Week | oz/Week | Comments |
+-------------+-------------+---------+----------+
| No          |             |         |          |
+-------------+-------------+---------+----------+
 
 
 
+------------------+---------------+
| Sex Assigned at  | Date Recorded |
| Birth            |               |
+------------------+---------------+
| Not on file      |               |
+------------------+---------------+
 
 
 
+----------------+-------------+-------------+
| Job Start Date | Occupation  | Industry    |
+----------------+-------------+-------------+
| Not on file    | Not on file | Not on file |
+----------------+-------------+-------------+
 
 
 
+----------------+--------------+------------+
| Travel History | Travel Start | Travel End |
+----------------+--------------+------------+
 
 
 
+-------------------------------------+
| No recent travel history available. |
+-------------------------------------+
 documented as of this encounter
 
 Last Filed Vital Signs
 
 
 
+-------------------+---------------------+----------------------+----------+
| Vital Sign        | Reading             | Time Taken           | Comments |
+-------------------+---------------------+----------------------+----------+
| Blood Pressure    | 123/62              | 2018  2:12 PM  |          |
|                   |                     | PST                  |          |
+-------------------+---------------------+----------------------+----------+
| Pulse             | 73                  | 2018  2:12 PM  |          |
|                   |                     | PST                  |          |
+-------------------+---------------------+----------------------+----------+
| Temperature       | 36.6   C (97.9   F) | 2018  2:12 PM  |          |
|                   |                     | PST                  |          |
+-------------------+---------------------+----------------------+----------+
| Respiratory Rate  | 14                  | 2018  2:12 PM  |          |
|                   |                     | PST                  |          |
+-------------------+---------------------+----------------------+----------+
| Oxygen Saturation | 99%                 | 2018  2:12 PM  |          |
|                   |                     | PST                  |          |
+-------------------+---------------------+----------------------+----------+
| Inhaled Oxygen    | -                   | -                    |          |
| Concentration     |                     |                      |          |
+-------------------+---------------------+----------------------+----------+
| Weight            | 67 kg (147 lb 9.6   | 2018  2:12 PM  |          |
|                   | oz)                 | PST                  |          |
+-------------------+---------------------+----------------------+----------+
| Height            | -                   | -                    |          |
+-------------------+---------------------+----------------------+----------+
| Body Mass Index   | 26.15               | 2018  2:09 PM  |          |
|                   |                     | PDT                  |          |
+-------------------+---------------------+----------------------+----------+
 documented in this encounter
 
 Progress Notes
 Shiloh Jacobo MD - 2018  2:00 PM PSTFormatting of this note might be different f
rom the original.
 Complex Valve Clinic Evaluation
 
 History of present illness: Vonnie Celaya is a 86 y.o. female with a past medical hist
ory significant for DM on oral hypoglycemics, pHTN, HTN, HL, AF on coumadin, CAD and severe 
mitral regurgitation. They complain of fatigue with daily chores and shortness of breath. Th
ey have lower extremity edema that is controlled with lasix and compression hose. They deny 
chest pain, palpitations and orthopnea. They had two admissions for heart failure exacerbati
on this year. Once in February and again in May 2018. No admissions since May and lower extr
emity edema has improved.
 
 They are accompanied by one of their children today in clinic, who seems quite invested in 
their care.
 
 DENTAL: saw dentist one week ago, explained need for dental clearance for possible valve pr
ocedure
 
 Past Medical History: 
 Diagnosis Date 
   Anemia  
  resolved with iron supplements - no large GI bleed, negative EGD. 
   Atrial fibrillation (HCC)  
   Coronary artery disease 2018 
   One-vessel coronary artery disease with significant stenosis of the distal right coronary
 artery at 99% prior to the posterior descending artery posterior left ventricular branches.
 
 
   Hyperlipidemia  
   Hypertension  
   Hypothyroid  
   Pulmonary hypertension (HCC)  
   Secundum ASD  
  noted on JENIFFER 18 
   Severe mitral regurgitation 2018 
   Type 2 diabetes mellitus (HCC)  
 
 Past Surgical History 
 Procedure Laterality Date 
   Tonsillectomy   
   Hysterectomy   
   age ~[age] 
   Bladder suspension   
   Laparoscopic cholecystectomy   
   Umbilical hernia repair   
   Bso (bilateral salpingo-oophorectomy)   
   [age] ~[age] 
   Bladder suspension   
   second bladder suspension 1970-80s 
   Toe surgery   
   2nd toe on left 
   Bunion surgery   
   left foot 
   Appendectomy concurrent with major procedure   
   at time of completion hysterectomy 
   Blepharoplasty of both upper eyelids   
   had this donce twice 
 
 Family History 
 Problem Relation 
   Heart Failure Mother 
   Stroke Father 
   Heart Attack Father 
   Sudden Death Other 
   likely from PE 
 
  
 Social History 
 
 Social History 
   Marital status: Single 
   Spouse name: N/A 
   Number of children: N/A 
   Years of education: N/A 
 
 Occupational History 
   Not on file. 
 
 Social History Main Topics 
   Smoking status: Never Smoker 
   Smokeless tobacco: Never Used 
   Alcohol use No 
   Drug use: No 
   Sexual activity: Not on file 
 
 Other Topics Concern 
   Not on file 
 
 
 Social History Narrative 
   No narrative on file 
 
 Review of Systems 
 Constitutional: Positive for diaphoresis and malaise/fatigue. 
      Fatigued with daily chores 
 HENT: Positive for hearing loss and nosebleeds.  
 Eyes: Negative for blurred vision and double vision. 
 Respiratory: Positive for shortness of breath. Negative for cough.  
 Cardiovascular: Negative for chest pain, palpitations, orthopnea and leg swelling. 
      Had palpitations in the pas but not for many years. No edema with compression hose. LE
 edema back in February 
 Gastrointestinal: Negative for abdominal pain, constipation, diarrhea, heartburn, nausea an
d vomiting. 
 Genitourinary: Positive for urgency. 
 Musculoskeletal: Negative for back pain, joint pain, myalgias and neck pain. 
 Skin: Positive for itching and rash. 
      Fungal skin patches on right arm and face. Notes a rash in groin area from shaving, pr
uritis 
 Neurological: Negative for dizziness and headaches. 
      Occasional lightheadedness when overdoing it 
 Endo/Heme/Allergies: Bruises/bleeds easily. 
 Psychiatric/Behavioral: Positive for depression. The patient is nervous/anxious.  
 
 Current Medication List  
 Name Sig 
 ACETAMINOPHEN 325 MG TABLET Take by mouth. 
 ASCORBIC ACID (VITAMIN C) 500 MG TABLET Take 500 mg by mouth once daily. 
 FUROSEMIDE 40 MG TABLET Take by mouth. 
 GLUCOSAMINE CHONDROITIN PLUS ORAL Take 2 tablets by mouth once daily. 
 LEVOTHYROXINE 100 MCG TABLET Take by mouth. 
 LOSARTAN 100 MG TABLET Take 100 mg by mouth once daily. 
 MAGNESIUM CHLORIDE ORAL Take by mouth. 
 METFORMIN  MG 24 HR TABLET,EXTENDED RELEASE Take by mouth. 
 METOPROLOL SUCCINATE  MG TABLET,EXTENDED RELEASE 24 HR Take 100 mg by mouth once arturo
y.  
 POTASSIUM CHLORIDE ER 20 MEQ TABLET,EXTENDED RELEASE(PART/CRYST) Take by mouth. 
 SIMVASTATIN 20 MG TABLET Take by mouth once daily in the evening.  
 WARFARIN 5 MG TABLET Take by mouth. 
 
 Allergies 
 Allergen Reactions 
   Morphine Anxiety 
   "felt fidgety" 
   Percocet [Oxycodone-Acetaminophen] Anxiety 
   "felt fidgety" 
 
 Filed Vitals: 
  2018
  2:12 PM 
 Weight: 67 kg (147 lb 9.6 oz) 
 BP: 123/62 
 Pulse: 73 
 Temp: 36.6 C (97.9 F) 
 TempSrc: Oral 
 Resp: 14 
 SpO2: 99% 
 PainSc: 0 - Zero 
 BMI:  26.15 kg/(m^2)
 
 
 Physical exam:
 Constitutional: frail, elderly person in wheelchair
 Eyes: no scleral icterus, EOMI
 ENT: oropharynx clear, some missing teeth
 Lymph: no cervical or supraclavicular LAD
 Respiratory: CTAB
 Cardiovascular: irregularly irregular rhythm, +murmur, no carotid bruits
 Gastro: normal bowel sounds, no tenderness
 Skin: bruises over left forearm, rash over right forearm and left chin
 Musculoskeletal: 5/5 strength in upper and lower extremities
 Psychiatric: normal insight, no anxious or depressive affect
 Neurologic: cranial nerves intact, no gross deficits
 
 Studies personally reviewed with cardiologist.
 
 Cardiac cath 18
 FINDINGS:
 1.         Hemodynamic findings right heart catheterization:  
 a.         Right arterial pressure mean of 5 mmHg.  
 b.         Right ventricular pressure of 36/0 and a right ventricular end-diastolic pressur
e of 40 mmHg.
 c.         Pulmonary artery pressure of 41/13 with a mean pulmonary artery pressure of 25 m
mHg.
 d.         Pulmonary capillary wedge pressure of 16 mmHg with V wave up to 33 mmHg.
 e.         Heart rate of 60 beats per minute.
 f.          Cardiac output of 3.4 L/min per Hellen method and cardiac index of 1.93 L/min/sq 
m. 
 g.         Opening aortic pressure of 128/68 mmHg with a mean aortic pressure of 72 mmHg.
 h.         Pulmonary artery saturation of 66% and aortic saturation of 99%.
 i.          Pulmonary vascular resistance of 2.64 Wood units. 
 j.          Systemic vascular resistance of 19.98 Wood units.
 2.         Left heart catheterization:
 a.         Left main coronary artery.  The left main coronary artery is a large-sized vesse
l which has about 20% calcific stenosis in its distal portion at its bifurcation with the le
ft anterior descending and the left circumflex artery.
 b.         Left anterior descending artery.  The left anterior descending artery is a moder
ate-sized vessel which courses all the way up to the apex. It is tortuous in nature.  It has
 mild 20% to 30% stenosis in its midportion at its branch point with the diagonal artery.  O
therwise, the left anterior descending artery has no other major angiographic stenosis.
 c.         Left circumflex artery.  The left circumflex artery has 2 stenoses in tandem whi
ch are about 30% to 40% each as it courses around the posterior intraventricular groove.
 d.         Right coronary artery the right coronary artery is a dominant vessel.  The proxi
mal portion of the right coronary artery has about 30% to 40% calcific stenosis which may be
 an under-estimate given the eccentricity of the plaque.
 e.         The distal RCA has 99% calcific stenosis prior to its bifurcation with the PDA a
nd PLV branches.  It is an extremely tortuous right coronary artery.
 
 ASSESSMENT:
 1.         One-vessel coronary artery disease with significant stenosis of the distal right
 coronary artery at 99% prior to the posterior descending artery posterior left ventricular 
branches.
 2.         Non-obstructive coronary artery disease in the left anterior descending and left
 circumflex branches.
 3.         Mildly elevated left heart filling pressures.
 
 Transthoracic echo 18
 1. The left ventricular cavity size is normal.               
 
 2. The LV function is hyperdynamic.                   
 
   
 3. Visually estimated left ventricular ejection fraction is 70 - 75%.     
 4. The aortic valve is trileaflet and moderately calcified.          
 5. Severe mitral annular calcification as well as papillary and subchordal  
 bulky calcification.                        
     
 6. While there is calcification of the bases of the mitral valve leaflets   
 due to the severe mitral annular calcificaiton, the mid portions of the    
 leaflets themseleves are only midly calcified and thin, however there is the 
 severe central mitral valve regurgitation.                 
 
 7. The mean transmitral gradient is 5.5 mmHg at a heart rate of 77 bpm.    
                              
         
 8. Severe biatrial enlargement.                    
    
 9. Right ventricular size, thickness and function are normal.         
                              
         
 +------------------------------------------------------------------------------+
 
 
 Description of Findings:
 
 Cardiac Rhythm: Normal sinus rhythm.
 Left Ventricle: The left ventricular cavity size is normal. Visually estimated left 
 ventricular ejection fraction is 70 - 75%. The LV function is hyperdynamic.
 Left Ventricular Wall Motion: Left ventricular systolic thickening is normal in all 
 segments.
 Atria: Severe biatrial enlargement.
 Right Ventricle: Right ventricular size, thickness and function are normal. TAPSE 
 measures 1.8cm. The RV TDI s' velocity is 12.0cm/sec.
 Aortic Valve: The aortic valve is trileaflet and moderately calcified. No indication 
 of aortic valve regurgitation. The left coronary cusp is immobile.
 Mitral Valve: Severe mitral annular calcification. The mean transmitral gradient is 
 5.5 mmHg at a heart rate of 77 bpm. Severe mitral valve regurgitation. The mitral 
 valve effective regurgitant orifice area is 7 mm2.
 Tricuspid Valve: The tricuspid valve is structurally normal. Mild tricuspid 
 regurgitation. The tricuspid regurgitant velocity is 2.95 m/s, and with an assumed 
 right atrial pressure of 5 mmHg, the estimated right ventricular systolic pressure is
 upper limits of normal at 39.9 mmHg.
 Pulmonic Valve: The pulmonic valve is structurally normal. Trace pulmonary valve 
 regurgitation.
 Venous: Inferior vena cava is normal with normal inspiratory collapse.
 Pericardium: No pericardial effusion is seen.
 
 Transesophageal echo 18
 1. The LV function is hyperdynamic.                   
   
 2. The visually estimated ejection fraction is > 75%.             
 3. Right ventricular size, thickness and function are normal.         
 4. Severe circumferential mitral annular calcification. A large deposit of  
 calcium at the posterolateral atrial aspect of themitral annulus that     
 partially disrupts the normal mitral annular shape and size. Mitral annular  
 area is measured at 8.26 cm2, with an AP diameter of 3.76 cm, and CC     
 diameter of 2.52 cm. However, there is no significant mitral stenosis. Mean  
 mitral gradient is approximately 4 mmHg at a heart rate of 83 bpm.      
 5. Severe mitral valve regurgitation. The EROA is 0.47 cm2 (using a PISA   
 radius of 1.1 cm, an aliasing velocity of 0.32 m/s, and a mean MR       
 regurgitant velocity of 5.1 m/s), the calculated regurgitant volume is 67   
 
 mL, and the calculated regurgitant fraction is 52%.              
 6. The mitral valve anterior leaflet measures 2.0 cm.             
 7. There is subvalvar and papilary muscle head calcifidation noted as well.  
 8. The mechanism for the severe mitral regurgitation is leaflet and annular  
 calcification and degeneration with poor coaptation across all portions of  
 the valve.                           
       
 9. The aortic valve is moderately sclerotic and restricted. No aortic     
 stenosis.                            
       
 10. Severely enlarged left atrium.                   
   
 11. There is a small fenestrated secundum atrial septal defect, with     
 intermittent left-to-right flow via color-flow Doppler.            
 12. Moderate tricuspid regurgitation.                  
   
 13. Small plaque involving the ascending and transverse aorta.        
 14. The left atrial appendage is well visualized and there is no evidence of 
 thrombus present.                         
      
                              
         
 +------------------------------------------------------------------------------+
 
 
 Findings:
 Cardiac Rhythm: atrial fibrillation
 
 Left Ventricle: The LV function is hyperdynamic. Left ventricular systolic thickening
 is normal in all segments. The visually estimated ejection fraction is > 75%.
 
 Left Atrium: Left atrial size is severely enlarged.
 
 Left atrial appendage: The left atrial appendage is well visualized and there is no 
 evidence of thrombus present.
 
 Right Ventricle: Right ventricular size, thickness and function are normal.
 
 Aortic Valve: The aortic valve is moderately sclerotic and restricted. No aortic 
 stenosis. No indication of aortic valve regurgitation. On this study, transgastric 
 Doppler evaluation reveals an aortic valve peak velocity of 1.8 m/s, peak gradient of
 13 mmHg, mean gradient of 5.6 mmHg, and DVI of 0.75 (with an AV VTI of 0.299 and 
 LVOT VTI of 0.226).
 
 Mitral Valve: Mitral leaflet mobility is normal. Severe mitral annular calcification.
 Severe mitral valve regurgitation. The jet is centrally-directed. The EROA is 0.47 
 cm2, using a PISA radius of 1.1 cm, an aliasing velocity of 0.32 m/s, and a mean MR 
 regurgitant velocity of 5.1 m/s. There is no mitral stenosis. Mean mitral gradient is
 approximately 4 mmHg at a heart rate of 83 bpm.
 
 Tricuspid Valve: The tricuspid valve is structurally normal. Moderate tricuspid 
 regurgitation.
 
 Pulmonic Valve: The pulmonic valve is structurally normal.
 
 Aorta: There is a small, layered, immobile plaque, involving the ascending and 
 transverse aorta.
 
 Venous: The inferior vena cava was not well visualized.
 
 
 Septa: There is a small fenestrated secundum atrial septal defect, with intermittent 
 left-to-right flow via color-flow Doppler.
 
 Assessment and plan: 
 Vonnie Celaya is a 86 year old female with AF, DM, and severe symptomatic mitral regur
gitation. I have reviewed all of the patient's imaging and studies with a the multidisciplin
[facility] valve team. The patient is not an ideal candidate for off-label valve-in-MAC dep
loyment of a TAVR valve in the mitral position due to high risk of LVOT obstruction. The CT 
scan will be further reviewed with an experienced chest radiologist to determine the feasibi
lity of this procedure. Unfortunately, the mitral valve is not likely amenable to mitraclip 
therapy due to leaflet morphology and severe MAC. Given the patient's advanced age and frail
ty, they are not a candidate for surgical repair.
 
 I spent 41 minutes face to face with the patient.  Greater than 50% of this time was spent 
on counseling and coordination of care.
 
 
 Shiloh Jacobo MD
  of Cardiac Surgery
 CARDIOTHORACIC SURGERY AT [address]
 Mailcode: [id number]
 [place] [address] [phone]
 
 Electronically signed by Shiloh Jacobo MD at 2018  9:25 PM PSTdocumented in this 
encounter
 
 Plan of Treatment
 Not on filedocumented as of this encounter
 
 Visit Diagnoses
 
 
+-----------------------------------------------------------------+
| Diagnosis                                                       |
+-----------------------------------------------------------------+
|   Severe mitral regurgitation - Primary  Mitral valve disorders |
+-----------------------------------------------------------------+
 documented in this encounter

## 2020-01-01 NOTE — XMS
Encounter Summary
  Created on: 2020
 
 Vonnie Celaya
 External Reference #: [id number]
 : 32
 Sex: Female
 
 Demographics
 
 
+-----------------------+------------------------+
| Address               | [address]         |
|                       | [place]  [address]   |
+-----------------------+------------------------+
| Home Phone            | [phone]        |
+-----------------------+------------------------+
| Preferred Language    | Unknown                |
+-----------------------+------------------------+
| Marital Status        | Single                 |
+-----------------------+------------------------+
| Congregational Affiliation | NON                    |
+-----------------------+------------------------+
| Race                  | White                  |
+-----------------------+------------------------+
| Ethnic Group          | Not  or  |
+-----------------------+------------------------+
 
 
 Author
 
 
+--------------+------------------------------+
| Author       | [facility] |
+--------------+------------------------------+
| Organization | [facility] |
+--------------+------------------------------+
| Address      | Unknown                      |
+--------------+------------------------------+
| Phone        | Unavailable                  |
+--------------+------------------------------+
 
 
 
 Support
 
 
+--------------+--------------+---------+-----------------+
| Name         | Relationship | Address | Phone           |
+--------------+--------------+---------+-----------------+
| Lauryn Leroy | ECON         | Unknown | [phone] |
+--------------+--------------+---------+-----------------+
 
 
 
 Care Team Providers
 
 
 
+------------------------+------+-----------------+
| Care Team Member Name  | Role | Phone           |
+------------------------+------+-----------------+
| Jean Bermudez MD | PCP  | [phone] |
+------------------------+------+-----------------+
 
 
 
 Reason for Visit
 
 
+--------------+----------+
| Reason       | Comments |
+--------------+----------+
| New patient  |          |
| consultation |          |
+--------------+----------+
 Consultation (Routine)
 
+--------+--------+-----------+--------------+--------------+---------------+
| Status | Reason | Specialty | Diagnoses /  | Referred By  | Referred To   |
|        |        |           | Procedures   | Contact      | Contact       |
+--------+--------+-----------+--------------+--------------+---------------+
| Closed |        | Cardiac   |   Diagnoses  |   Stephen,      |   Donnell,      |
|        |        | Surgery   |              | Varun PARRA,   | Shiloh SIMON,   |
|        |        |           | Nonrheumatic | MD  [address]     | MD  [address]   |
|        |        |           |  mitral      | [address]  | [address]   |
|        |        |           | (valve)      |  [address]       | [address]       |
|        |        |           | insufficienc | [place], [place] | [place], [place]  |
|        |        |           | y  Rheumatic |  [address]       | [address]    |
|        |        |           |  tricuspid   | Phone:       | Phone:        |
|        |        |           | insufficienc | [phone] | [phone]  |
|        |        |           | y  Pulmonary |   Fax:       |  Fax:         |
|        |        |           |              | [phone] | [phone]  |
|        |        |           | hypertension |              |               |
|        |        |           | ,            |              |               |
|        |        |           | unspecified  |              |               |
|        |        |           |  Procedures  |              |               |
|        |        |           |  NV NEW      |              |               |
|        |        |           | PATIENT      |              |               |
|        |        |           | LEVEL V      |              |               |
+--------+--------+-----------+--------------+--------------+---------------+
 
 
 
 
 Encounter Details
 
 
+--------+---------+----------------------+----------------------+----------------+
| Date   | Type    | Department           | Care Team            | Description    |
+--------+---------+----------------------+----------------------+----------------+
| / | Office  |   Cardiothoracic     |   Shiloh Jacobo, | Severe mitral  |
| 2018   | Visit   | Surgery at [facility]  [address] |  MD  [address]     | regurgitation  |
|        |         |  [address]    | [address]      | (Primary Dx)   |
|        |         | Mailcode: [id number]       | [place], [place]         |                |
|        |         | [facility] | [address]           |                |
|        |         |   [place], [place]       | [phone]         |                |
|        |         | [address]           | [phone] (Fax)   |                |
|        |         | [phone]         |                      |                |
 
+--------+---------+----------------------+----------------------+----------------+
 
 
 
 Social History
 
 
+--------------+-------+-----------+--------+------+
| Tobacco Use  | Types | Packs/Day | Years  | Date |
|              |       |           | Used   |      |
+--------------+-------+-----------+--------+------+
| Never Smoker |       |           |        |      |
+--------------+-------+-----------+--------+------+
 
 
 
+---------------------+---+---+---+
| Smokeless Tobacco:  |   |   |   |
| Never Used          |   |   |   |
+---------------------+---+---+---+
 
 
 
+-------------+-------------+---------+----------+
| Alcohol Use | Drinks/Week | oz/Week | Comments |
+-------------+-------------+---------+----------+
| No          |             |         |          |
+-------------+-------------+---------+----------+
 
 
 
+------------------+---------------+
| Sex Assigned at  | Date Recorded |
| Birth            |               |
+------------------+---------------+
| Not on file      |               |
+------------------+---------------+
 
 
 
+----------------+-------------+-------------+
| Job Start Date | Occupation  | Industry    |
+----------------+-------------+-------------+
| Not on file    | Not on file | Not on file |
+----------------+-------------+-------------+
 
 
 
+----------------+--------------+------------+
| Travel History | Travel Start | Travel End |
+----------------+--------------+------------+
 
 
 
+-------------------------------------+
| No recent travel history available. |
+-------------------------------------+
 documented as of this encounter
 
 Last Filed Vital Signs
 
 
 
+-------------------+---------------------+----------------------+----------+
| Vital Sign        | Reading             | Time Taken           | Comments |
+-------------------+---------------------+----------------------+----------+
| Blood Pressure    | 123/62              | 2018  2:12 PM  |          |
|                   |                     | PST                  |          |
+-------------------+---------------------+----------------------+----------+
| Pulse             | 73                  | 2018  2:12 PM  |          |
|                   |                     | PST                  |          |
+-------------------+---------------------+----------------------+----------+
| Temperature       | 36.6   C (97.9   F) | 2018  2:12 PM  |          |
|                   |                     | PST                  |          |
+-------------------+---------------------+----------------------+----------+
| Respiratory Rate  | 14                  | 2018  2:12 PM  |          |
|                   |                     | PST                  |          |
+-------------------+---------------------+----------------------+----------+
| Oxygen Saturation | 99%                 | 2018  2:12 PM  |          |
|                   |                     | PST                  |          |
+-------------------+---------------------+----------------------+----------+
| Inhaled Oxygen    | -                   | -                    |          |
| Concentration     |                     |                      |          |
+-------------------+---------------------+----------------------+----------+
| Weight            | 67 kg (147 lb 9.6   | 2018  2:12 PM  |          |
|                   | oz)                 | PST                  |          |
+-------------------+---------------------+----------------------+----------+
| Height            | -                   | -                    |          |
+-------------------+---------------------+----------------------+----------+
| Body Mass Index   | 26.15               | 2018  2:09 PM  |          |
|                   |                     | PDT                  |          |
+-------------------+---------------------+----------------------+----------+
 documented in this encounter
 
 Progress Notes
 Shiloh Jacobo MD - 2018  2:00 PM PSTFormatting of this note might be different f
rom the original.
 Complex Valve Clinic Evaluation
 
 History of present illness: Vonnie Celaya is a 86 y.o. female with a past medical hist
ory significant for DM on oral hypoglycemics, pHTN, HTN, HL, AF on coumadin, CAD and severe 
mitral regurgitation. They complain of fatigue with daily chores and shortness of breath. Th
ey have lower extremity edema that is controlled with lasix and compression hose. They deny 
chest pain, palpitations and orthopnea. They had two admissions for heart failure exacerbati
on this year. Once in February and again in May 2018. No admissions since May and lower extr
emity edema has improved.
 
 They are accompanied by one of their children today in clinic, who seems quite invested in 
their care.
 
 DENTAL: saw dentist one week ago, explained need for dental clearance for possible valve pr
ocedure
 
 Past Medical History: 
 Diagnosis Date 
   Anemia  
  resolved with iron supplements - no large GI bleed, negative EGD. 
   Atrial fibrillation (HCC)  
   Coronary artery disease 2018 
   One-vessel coronary artery disease with significant stenosis of the distal right coronary
 artery at 99% prior to the posterior descending artery posterior left ventricular branches.
 
 
   Hyperlipidemia  
   Hypertension  
   Hypothyroid  
   Pulmonary hypertension (HCC)  
   Secundum ASD  
  noted on JENIFFER 18 
   Severe mitral regurgitation 2018 
   Type 2 diabetes mellitus (HCC)  
 
 Past Surgical History 
 Procedure Laterality Date 
   Tonsillectomy   
   Hysterectomy   
   age ~[age] 
   Bladder suspension   
   Laparoscopic cholecystectomy   
   Umbilical hernia repair   
   Bso (bilateral salpingo-oophorectomy)   
   [age] ~[age] 
   Bladder suspension   
   second bladder suspension 1970-80s 
   Toe surgery   
   2nd toe on left 
   Bunion surgery   
   left foot 
   Appendectomy concurrent with major procedure   
   at time of completion hysterectomy 
   Blepharoplasty of both upper eyelids   
   had this donce twice 
 
 Family History 
 Problem Relation 
   Heart Failure Mother 
   Stroke Father 
   Heart Attack Father 
   Sudden Death Other 
   likely from PE 
 
  
 Social History 
 
 Social History 
   Marital status: Single 
   Spouse name: N/A 
   Number of children: N/A 
   Years of education: N/A 
 
 Occupational History 
   Not on file. 
 
 Social History Main Topics 
   Smoking status: Never Smoker 
   Smokeless tobacco: Never Used 
   Alcohol use No 
   Drug use: No 
   Sexual activity: Not on file 
 
 Other Topics Concern 
   Not on file 
 
 
 Social History Narrative 
   No narrative on file 
 
 Review of Systems 
 Constitutional: Positive for diaphoresis and malaise/fatigue. 
      Fatigued with daily chores 
 HENT: Positive for hearing loss and nosebleeds.  
 Eyes: Negative for blurred vision and double vision. 
 Respiratory: Positive for shortness of breath. Negative for cough.  
 Cardiovascular: Negative for chest pain, palpitations, orthopnea and leg swelling. 
      Had palpitations in the pas but not for many years. No edema with compression hose. LE
 edema back in February 
 Gastrointestinal: Negative for abdominal pain, constipation, diarrhea, heartburn, nausea an
d vomiting. 
 Genitourinary: Positive for urgency. 
 Musculoskeletal: Negative for back pain, joint pain, myalgias and neck pain. 
 Skin: Positive for itching and rash. 
      Fungal skin patches on right arm and face. Notes a rash in groin area from shaving, pr
uritis 
 Neurological: Negative for dizziness and headaches. 
      Occasional lightheadedness when overdoing it 
 Endo/Heme/Allergies: Bruises/bleeds easily. 
 Psychiatric/Behavioral: Positive for depression. The patient is nervous/anxious.  
 
 Current Medication List  
 Name Sig 
 ACETAMINOPHEN 325 MG TABLET Take by mouth. 
 ASCORBIC ACID (VITAMIN C) 500 MG TABLET Take 500 mg by mouth once daily. 
 FUROSEMIDE 40 MG TABLET Take by mouth. 
 GLUCOSAMINE CHONDROITIN PLUS ORAL Take 2 tablets by mouth once daily. 
 LEVOTHYROXINE 100 MCG TABLET Take by mouth. 
 LOSARTAN 100 MG TABLET Take 100 mg by mouth once daily. 
 MAGNESIUM CHLORIDE ORAL Take by mouth. 
 METFORMIN  MG 24 HR TABLET,EXTENDED RELEASE Take by mouth. 
 METOPROLOL SUCCINATE  MG TABLET,EXTENDED RELEASE 24 HR Take 100 mg by mouth once arturo
y.  
 POTASSIUM CHLORIDE ER 20 MEQ TABLET,EXTENDED RELEASE(PART/CRYST) Take by mouth. 
 SIMVASTATIN 20 MG TABLET Take by mouth once daily in the evening.  
 WARFARIN 5 MG TABLET Take by mouth. 
 
 Allergies 
 Allergen Reactions 
   Morphine Anxiety 
   "felt fidgety" 
   Percocet [Oxycodone-Acetaminophen] Anxiety 
   "felt fidgety" 
 
 Filed Vitals: 
  2018
  2:12 PM 
 Weight: 67 kg (147 lb 9.6 oz) 
 BP: 123/62 
 Pulse: 73 
 Temp: 36.6 C (97.9 F) 
 TempSrc: Oral 
 Resp: 14 
 SpO2: 99% 
 PainSc: 0 - Zero 
 BMI:  26.15 kg/(m^2)
 
 
 Physical exam:
 Constitutional: frail, elderly person in wheelchair
 Eyes: no scleral icterus, EOMI
 ENT: oropharynx clear, some missing teeth
 Lymph: no cervical or supraclavicular LAD
 Respiratory: CTAB
 Cardiovascular: irregularly irregular rhythm, +murmur, no carotid bruits
 Gastro: normal bowel sounds, no tenderness
 Skin: bruises over left forearm, rash over right forearm and left chin
 Musculoskeletal: 5/5 strength in upper and lower extremities
 Psychiatric: normal insight, no anxious or depressive affect
 Neurologic: cranial nerves intact, no gross deficits
 
 Studies personally reviewed with cardiologist.
 
 Cardiac cath 18
 FINDINGS:
 1.         Hemodynamic findings right heart catheterization:  
 a.         Right arterial pressure mean of 5 mmHg.  
 b.         Right ventricular pressure of 36/0 and a right ventricular end-diastolic pressur
e of 40 mmHg.
 c.         Pulmonary artery pressure of 41/13 with a mean pulmonary artery pressure of 25 m
mHg.
 d.         Pulmonary capillary wedge pressure of 16 mmHg with V wave up to 33 mmHg.
 e.         Heart rate of 60 beats per minute.
 f.          Cardiac output of 3.4 L/min per Hellen method and cardiac index of 1.93 L/min/sq 
m. 
 g.         Opening aortic pressure of 128/68 mmHg with a mean aortic pressure of 72 mmHg.
 h.         Pulmonary artery saturation of 66% and aortic saturation of 99%.
 i.          Pulmonary vascular resistance of 2.64 Wood units. 
 j.          Systemic vascular resistance of 19.98 Wood units.
 2.         Left heart catheterization:
 a.         Left main coronary artery.  The left main coronary artery is a large-sized vesse
l which has about 20% calcific stenosis in its distal portion at its bifurcation with the le
ft anterior descending and the left circumflex artery.
 b.         Left anterior descending artery.  The left anterior descending artery is a moder
ate-sized vessel which courses all the way up to the apex. It is tortuous in nature.  It has
 mild 20% to 30% stenosis in its midportion at its branch point with the diagonal artery.  O
therwise, the left anterior descending artery has no other major angiographic stenosis.
 c.         Left circumflex artery.  The left circumflex artery has 2 stenoses in tandem whi
ch are about 30% to 40% each as it courses around the posterior intraventricular groove.
 d.         Right coronary artery the right coronary artery is a dominant vessel.  The proxi
mal portion of the right coronary artery has about 30% to 40% calcific stenosis which may be
 an under-estimate given the eccentricity of the plaque.
 e.         The distal RCA has 99% calcific stenosis prior to its bifurcation with the PDA a
nd PLV branches.  It is an extremely tortuous right coronary artery.
 
 ASSESSMENT:
 1.         One-vessel coronary artery disease with significant stenosis of the distal right
 coronary artery at 99% prior to the posterior descending artery posterior left ventricular 
branches.
 2.         Non-obstructive coronary artery disease in the left anterior descending and left
 circumflex branches.
 3.         Mildly elevated left heart filling pressures.
 
 Transthoracic echo 18
 1. The left ventricular cavity size is normal.               
 
 2. The LV function is hyperdynamic.                   
 
   
 3. Visually estimated left ventricular ejection fraction is 70 - 75%.     
 4. The aortic valve is trileaflet and moderately calcified.          
 5. Severe mitral annular calcification as well as papillary and subchordal  
 bulky calcification.                        
     
 6. While there is calcification of the bases of the mitral valve leaflets   
 due to the severe mitral annular calcificaiton, the mid portions of the    
 leaflets themseleves are only midly calcified and thin, however there is the 
 severe central mitral valve regurgitation.                 
 
 7. The mean transmitral gradient is 5.5 mmHg at a heart rate of 77 bpm.    
                              
         
 8. Severe biatrial enlargement.                    
    
 9. Right ventricular size, thickness and function are normal.         
                              
         
 +------------------------------------------------------------------------------+
 
 
 Description of Findings:
 
 Cardiac Rhythm: Normal sinus rhythm.
 Left Ventricle: The left ventricular cavity size is normal. Visually estimated left 
 ventricular ejection fraction is 70 - 75%. The LV function is hyperdynamic.
 Left Ventricular Wall Motion: Left ventricular systolic thickening is normal in all 
 segments.
 Atria: Severe biatrial enlargement.
 Right Ventricle: Right ventricular size, thickness and function are normal. TAPSE 
 measures 1.8cm. The RV TDI s' velocity is 12.0cm/sec.
 Aortic Valve: The aortic valve is trileaflet and moderately calcified. No indication 
 of aortic valve regurgitation. The left coronary cusp is immobile.
 Mitral Valve: Severe mitral annular calcification. The mean transmitral gradient is 
 5.5 mmHg at a heart rate of 77 bpm. Severe mitral valve regurgitation. The mitral 
 valve effective regurgitant orifice area is 7 mm2.
 Tricuspid Valve: The tricuspid valve is structurally normal. Mild tricuspid 
 regurgitation. The tricuspid regurgitant velocity is 2.95 m/s, and with an assumed 
 right atrial pressure of 5 mmHg, the estimated right ventricular systolic pressure is
 upper limits of normal at 39.9 mmHg.
 Pulmonic Valve: The pulmonic valve is structurally normal. Trace pulmonary valve 
 regurgitation.
 Venous: Inferior vena cava is normal with normal inspiratory collapse.
 Pericardium: No pericardial effusion is seen.
 
 Transesophageal echo 18
 1. The LV function is hyperdynamic.                   
   
 2. The visually estimated ejection fraction is > 75%.             
 3. Right ventricular size, thickness and function are normal.         
 4. Severe circumferential mitral annular calcification. A large deposit of  
 calcium at the posterolateral atrial aspect of themitral annulus that     
 partially disrupts the normal mitral annular shape and size. Mitral annular  
 area is measured at 8.26 cm2, with an AP diameter of 3.76 cm, and CC     
 diameter of 2.52 cm. However, there is no significant mitral stenosis. Mean  
 mitral gradient is approximately 4 mmHg at a heart rate of 83 bpm.      
 5. Severe mitral valve regurgitation. The EROA is 0.47 cm2 (using a PISA   
 radius of 1.1 cm, an aliasing velocity of 0.32 m/s, and a mean MR       
 regurgitant velocity of 5.1 m/s), the calculated regurgitant volume is 67   
 
 mL, and the calculated regurgitant fraction is 52%.              
 6. The mitral valve anterior leaflet measures 2.0 cm.             
 7. There is subvalvar and papilary muscle head calcifidation noted as well.  
 8. The mechanism for the severe mitral regurgitation is leaflet and annular  
 calcification and degeneration with poor coaptation across all portions of  
 the valve.                           
       
 9. The aortic valve is moderately sclerotic and restricted. No aortic     
 stenosis.                            
       
 10. Severely enlarged left atrium.                   
   
 11. There is a small fenestrated secundum atrial septal defect, with     
 intermittent left-to-right flow via color-flow Doppler.            
 12. Moderate tricuspid regurgitation.                  
   
 13. Small plaque involving the ascending and transverse aorta.        
 14. The left atrial appendage is well visualized and there is no evidence of 
 thrombus present.                         
      
                              
         
 +------------------------------------------------------------------------------+
 
 
 Findings:
 Cardiac Rhythm: atrial fibrillation
 
 Left Ventricle: The LV function is hyperdynamic. Left ventricular systolic thickening
 is normal in all segments. The visually estimated ejection fraction is > 75%.
 
 Left Atrium: Left atrial size is severely enlarged.
 
 Left atrial appendage: The left atrial appendage is well visualized and there is no 
 evidence of thrombus present.
 
 Right Ventricle: Right ventricular size, thickness and function are normal.
 
 Aortic Valve: The aortic valve is moderately sclerotic and restricted. No aortic 
 stenosis. No indication of aortic valve regurgitation. On this study, transgastric 
 Doppler evaluation reveals an aortic valve peak velocity of 1.8 m/s, peak gradient of
 13 mmHg, mean gradient of 5.6 mmHg, and DVI of 0.75 (with an AV VTI of 0.299 and 
 LVOT VTI of 0.226).
 
 Mitral Valve: Mitral leaflet mobility is normal. Severe mitral annular calcification.
 Severe mitral valve regurgitation. The jet is centrally-directed. The EROA is 0.47 
 cm2, using a PISA radius of 1.1 cm, an aliasing velocity of 0.32 m/s, and a mean MR 
 regurgitant velocity of 5.1 m/s. There is no mitral stenosis. Mean mitral gradient is
 approximately 4 mmHg at a heart rate of 83 bpm.
 
 Tricuspid Valve: The tricuspid valve is structurally normal. Moderate tricuspid 
 regurgitation.
 
 Pulmonic Valve: The pulmonic valve is structurally normal.
 
 Aorta: There is a small, layered, immobile plaque, involving the ascending and 
 transverse aorta.
 
 Venous: The inferior vena cava was not well visualized.
 
 
 Septa: There is a small fenestrated secundum atrial septal defect, with intermittent 
 left-to-right flow via color-flow Doppler.
 
 Assessment and plan: 
 Vonnie Celaya is a 86 year old female with AF, DM, and severe symptomatic mitral regur
gitation. I have reviewed all of the patient's imaging and studies with a the multidisciplin
[facility] valve team. The patient is not an ideal candidate for off-label valve-in-MAC dep
loyment of a TAVR valve in the mitral position due to high risk of LVOT obstruction. The CT 
scan will be further reviewed with an experienced chest radiologist to determine the feasibi
lity of this procedure. Unfortunately, the mitral valve is not likely amenable to mitraclip 
therapy due to leaflet morphology and severe MAC. Given the patient's advanced age and frail
ty, they are not a candidate for surgical repair.
 
 I spent 41 minutes face to face with the patient.  Greater than 50% of this time was spent 
on counseling and coordination of care.
 
 
 Shiloh Jacobo MD
  of Cardiac Surgery
 CARDIOTHORACIC SURGERY AT [address]
 Mailcode: [id number]
 [place] [address] [phone]
 
 Electronically signed by Shiloh Jacobo MD at 2018  9:25 PM PSTdocumented in this 
encounter
 
 Plan of Treatment
 Not on filedocumented as of this encounter
 
 Visit Diagnoses
 
 
+-----------------------------------------------------------------+
| Diagnosis                                                       |
+-----------------------------------------------------------------+
|   Severe mitral regurgitation - Primary  Mitral valve disorders |
+-----------------------------------------------------------------+
 documented in this encounter

## 2020-01-01 NOTE — XMS
Encounter Summary
  Created on: 2020
 
 Vonnie Celaya
 External Reference #: [id number]
 : 32
 Sex: Female
 
 Demographics
 
 
+-----------------------+------------------------+
| Address               | [address]         |
|                       | [place]  [address]   |
+-----------------------+------------------------+
| Home Phone            | [phone]        |
+-----------------------+------------------------+
| Preferred Language    | Unknown                |
+-----------------------+------------------------+
| Marital Status        | Single                 |
+-----------------------+------------------------+
| Restorationist Affiliation | NON                    |
+-----------------------+------------------------+
| Race                  | White                  |
+-----------------------+------------------------+
| Ethnic Group          | Not  or  |
+-----------------------+------------------------+
 
 
 Author
 
 
+--------------+------------------------------+
| Author       | [facility] |
+--------------+------------------------------+
| Organization | [facility] |
+--------------+------------------------------+
| Address      | Unknown                      |
+--------------+------------------------------+
| Phone        | Unavailable                  |
+--------------+------------------------------+
 
 
 
 Support
 
 
+--------------+--------------+---------+-----------------+
| Name         | Relationship | Address | Phone           |
+--------------+--------------+---------+-----------------+
| Lauryn Leroy | ECON         | Unknown | [phone] |
+--------------+--------------+---------+-----------------+
 
 
 
 Care Team Providers
 
 
 
+------------------------+------+-----------------+
| Care Team Member Name  | Role | Phone           |
+------------------------+------+-----------------+
| Jean Bermudez MD | PCP  | [phone] |
+------------------------+------+-----------------+
 
 
 
 Encounter Details
 
 
+--------+-----------+----------------------+----------------------+-------------+
| Date   | Type      | Department           | Care Team            | Description |
+--------+-----------+----------------------+----------------------+-------------+
| / | Hospital  |   Cardiac            |   Sj, Car Ecg Tech  |             |
| 2018   | Encounter | Non-Invasive Testing |  [address]        |             |
|        |           |  at [facility] | [facility]    |             |
|        |           |   [address]   | [place] [address]   |             |
|        |           | Loop  Mailcode:      |                      |             |
|        |           | [id number]  [address]   |                      |             |
|        |           | [facility]        |                      |             |
|        |           | [place], [place]         |                      |             |
|        |           | [address]           |                      |             |
|        |           | [phone]         |                      |             |
+--------+-----------+----------------------+----------------------+-------------+
 
 
 
 Social History
 
 
+--------------+-------+-----------+--------+------+
| Tobacco Use  | Types | Packs/Day | Years  | Date |
|              |       |           | Used   |      |
+--------------+-------+-----------+--------+------+
| Never Smoker |       |           |        |      |
+--------------+-------+-----------+--------+------+
 
 
 
+---------------------+---+---+---+
| Smokeless Tobacco:  |   |   |   |
| Never Used          |   |   |   |
+---------------------+---+---+---+
 
 
 
+-------------+-------------+---------+----------+
| Alcohol Use | Drinks/Week | oz/Week | Comments |
+-------------+-------------+---------+----------+
| No          |             |         |          |
+-------------+-------------+---------+----------+
 
 
 
+------------------+---------------+
| Sex Assigned at  | Date Recorded |
| Birth            |               |
+------------------+---------------+
| Not on file      |               |
 
+------------------+---------------+
 
 
 
+----------------+-------------+-------------+
| Job Start Date | Occupation  | Industry    |
+----------------+-------------+-------------+
| Not on file    | Not on file | Not on file |
+----------------+-------------+-------------+
 
 
 
+----------------+--------------+------------+
| Travel History | Travel Start | Travel End |
+----------------+--------------+------------+
 
 
 
+-------------------------------------+
| No recent travel history available. |
+-------------------------------------+
 documented as of this encounter
 
 Medications at Time of Discharge
 
 
+----------------------+----------------------+-----------+---------+----------+----------+
| Medication           | Sig                  | Dispensed | Refills | Start    | End Date |
|                      |                      |           |         | Date     |          |
+----------------------+----------------------+-----------+---------+----------+----------+
|   acetaminophen 325  | Take by mouth.       |           | 0       |          |          |
| mg oral tablet       |                      |           |         |          |          |
+----------------------+----------------------+-----------+---------+----------+----------+
|   ascorbic acid      | Take 500 mg by mouth |           | 0       |          |          |
| (vitamin C) 500 mg   |  once daily.         |           |         |          |          |
| oral tablet          |                      |           |         |          |          |
+----------------------+----------------------+-----------+---------+----------+----------+
|   furosemide 40 mg   | Take by mouth.       |           | 0       |          |          |
| oral tablet          |                      |           |         |          |          |
+----------------------+----------------------+-----------+---------+----------+----------+
|                      | Take 2 tablets by    |           | 0       |          |          |
| gluc/chnd/om3/dha/ep | mouth once daily.    |           |         |          |          |
| a/fish/str           |                      |           |         |          |          |
| (GLUCOSAMINE         |                      |           |         |          |          |
| CHONDROITIN PLUS     |                      |           |         |          |          |
| ORAL)                |                      |           |         |          |          |
+----------------------+----------------------+-----------+---------+----------+----------+
|   levothyroxine 100  | Take by mouth.       |           | 0       |          |          |
| mcg oral tablet      |                      |           |         |          |          |
+----------------------+----------------------+-----------+---------+----------+----------+
|   losartan 100 mg    | Take 100 mg by mouth |           | 0       |          |          |
| oral tablet          |  once daily.         |           |         |          |          |
+----------------------+----------------------+-----------+---------+----------+----------+
|   MAGNESIUM CHLORIDE | Take by mouth.       |           | 0       |          |          |
|  ORAL                |                      |           |         |          |          |
+----------------------+----------------------+-----------+---------+----------+----------+
|   metFORMIN    | Take by mouth.       |           | 0       |          |          |
| mg oral tablet,ER    |                      |           |         |          |          |
| alexis.retention 24 hr |                      |           |         |          |          |
+----------------------+----------------------+-----------+---------+----------+----------+
 
|   metoprolol         | Take 100 mg by mouth |           | 0       | / |          |
| succinate 200 mg     |  once daily.         |           |         | 18       |          |
| oral tablet extended |                      |           |         |          |          |
|  release 24 hr       |                      |           |         |          |          |
+----------------------+----------------------+-----------+---------+----------+----------+
|   potassium chloride | Take by mouth.       |           | 0       |          |          |
|  SR 20 mEq oral      |                      |           |         |          |          |
| tablet,ER            |                      |           |         |          |          |
| particles/crystals   |                      |           |         |          |          |
+----------------------+----------------------+-----------+---------+----------+----------+
|   simvastatin 20 mg  | Take by mouth once   |           | 0       |          |          |
| oral tablet          | daily in the         |           |         |          |          |
|                      | evening.             |           |         |          |          |
+----------------------+----------------------+-----------+---------+----------+----------+
|   warfarin 5 mg oral | Take by mouth.       |           | 0       |          |          |
|  tablet              |                      |           |         |          |          |
+----------------------+----------------------+-----------+---------+----------+----------+
 documented as of this encounter
 
 Plan of Treatment
 
 
+----------------------+------+--------+----------------------+------------+
| Name                 | Type | Priori | Associated Diagnoses | Date/Time  |
|                      |      | ty     |                      |            |
+----------------------+------+--------+----------------------+------------+
| 6-MINUTE WALK TEST - | ECG  | Routin |   Mitral valve       | 2018 |
|  ECG                 |      | e      | insufficiency,       |            |
|                      |      |        | unspecified etiology |            |
+----------------------+------+--------+----------------------+------------+
 documented as of this encounter
 
 Visit Diagnoses
 
 
+----------------------------------------------------+
| Diagnosis                                          |
+----------------------------------------------------+
|   Mitral valve insufficiency, unspecified etiology |
+----------------------------------------------------+
 documented in this encounter

## 2020-01-01 NOTE — XMS
Encounter Summary
  Created on: 2020
 
 Vonnie Celaya
 External Reference #: [id number]
 : 32
 Sex: Female
 
 Demographics
 
 
+-----------------------+------------------------+
| Address               | [address]         |
|                       | [place]  [address]   |
+-----------------------+------------------------+
| Home Phone            | [phone]        |
+-----------------------+------------------------+
| Preferred Language    | Unknown                |
+-----------------------+------------------------+
| Marital Status        | Single                 |
+-----------------------+------------------------+
| Tenriism Affiliation | NON                    |
+-----------------------+------------------------+
| Race                  | White                  |
+-----------------------+------------------------+
| Ethnic Group          | Not  or  |
+-----------------------+------------------------+
 
 
 Author
 
 
+--------------+------------------------------+
| Author       | [facility] |
+--------------+------------------------------+
| Organization | [facility] |
+--------------+------------------------------+
| Address      | Unknown                      |
+--------------+------------------------------+
| Phone        | Unavailable                  |
+--------------+------------------------------+
 
 
 
 Support
 
 
+--------------+--------------+---------+-----------------+
| Name         | Relationship | Address | Phone           |
+--------------+--------------+---------+-----------------+
| Lauryn Leroy | ECON         | Unknown | [phone] |
+--------------+--------------+---------+-----------------+
 
 
 
 Care Team Providers
 
 
 
+------------------------+------+-----------------+
| Care Team Member Name  | Role | Phone           |
+------------------------+------+-----------------+
| Jean Bermudez MD | PCP  | [phone] |
+------------------------+------+-----------------+
 
 
 
 Encounter Details
 
 
+--------+-------------+----------------------+----------------------+-------------+
| Date   | Type        | Department           | Care Team            | Description |
+--------+-------------+----------------------+----------------------+-------------+
| 10/31/ | Documentati |   Cardiology General |   Jame Wilkins  |             |
| 2018   | on          |  at [facility]  [address]     Virginie PARRA MD  [address]  |             |
|        |             | [address]  Mailcode:  | Ave  [place], [place]    |             |
|        |             | [address]     | [address]           |             |
|        |             | [facility],  | [phone]         |             |
|        |             |       | [phone] (Fax)   |             |
|        |             | [place], [place]  |                      |             |
|        |             | [address]           |                      |             |
|        |             | [phone]         |                      |             |
+--------+-------------+----------------------+----------------------+-------------+
 
 
 
 Social History
 
 
+--------------+-------+-----------+--------+------+
| Tobacco Use  | Types | Packs/Day | Years  | Date |
|              |       |           | Used   |      |
+--------------+-------+-----------+--------+------+
| Never Smoker |       |           |        |      |
+--------------+-------+-----------+--------+------+
 
 
 
+---------------------+---+---+---+
| Smokeless Tobacco:  |   |   |   |
| Never Used          |   |   |   |
+---------------------+---+---+---+
 
 
 
+-------------+-------------+---------+----------+
| Alcohol Use | Drinks/Week | oz/Week | Comments |
+-------------+-------------+---------+----------+
| No          |             |         |          |
+-------------+-------------+---------+----------+
 
 
 
+------------------+---------------+
| Sex Assigned at  | Date Recorded |
| Birth            |               |
+------------------+---------------+
| Not on file      |               |
+------------------+---------------+
 
 
 
 
+----------------+-------------+-------------+
| Job Start Date | Occupation  | Industry    |
+----------------+-------------+-------------+
| Not on file    | Not on file | Not on file |
+----------------+-------------+-------------+
 
 
 
+----------------+--------------+------------+
| Travel History | Travel Start | Travel End |
+----------------+--------------+------------+
 
 
 
+-------------------------------------+
| No recent travel history available. |
+-------------------------------------+
 documented as of this encounter
 
 Plan of Treatment
 Not on filedocumented as of this encounter
 
 Visit Diagnoses
 Not on filedocumented in this encounter

## 2020-01-01 NOTE — XMS
Encounter Summary
  Created on: 2020
 
 Vonnie Celaya
 External Reference #: [id number]
 : 32
 Sex: Female
 
 Demographics
 
 
+-----------------------+------------------------+
| Address               | [address]         |
|                       | [place]  [address]   |
+-----------------------+------------------------+
| Home Phone            | [phone]        |
+-----------------------+------------------------+
| Preferred Language    | Unknown                |
+-----------------------+------------------------+
| Marital Status        | Single                 |
+-----------------------+------------------------+
| Yazidism Affiliation | NON                    |
+-----------------------+------------------------+
| Race                  | White                  |
+-----------------------+------------------------+
| Ethnic Group          | Not  or  |
+-----------------------+------------------------+
 
 
 Author
 
 
+--------------+------------------------------+
| Author       | [facility] |
+--------------+------------------------------+
| Organization | [facility] |
+--------------+------------------------------+
| Address      | Unknown                      |
+--------------+------------------------------+
| Phone        | Unavailable                  |
+--------------+------------------------------+
 
 
 
 Support
 
 
+--------------+--------------+---------+-----------------+
| Name         | Relationship | Address | Phone           |
+--------------+--------------+---------+-----------------+
| Lauryn Leroy | ECON         | Unknown | [phone] |
+--------------+--------------+---------+-----------------+
 
 
 
 Care Team Providers
 
 
 
+------------------------+------+-----------------+
| Care Team Member Name  | Role | Phone           |
+------------------------+------+-----------------+
| Jean Bermudez MD | PCP  | [phone] |
+------------------------+------+-----------------+
 
 
 
 Encounter Details
 
 
+--------+------------+----------------------+-----------+-------------+
| Date   | Type       | Department           | Care Team | Description |
+--------+------------+----------------------+-----------+-------------+
| / | Procedure  |   Diagnostic Imaging |           |             |
|    | Pass       |  Services at [facility]     |           |             |
|        |            | [address]  |           |             |
|        |            | [place]  Mailcode:   |           |             |
|        |            | [address]  [facility]  |           |             |
|        |            |  [place], [place]        |           |             |
|        |            | [address]           |           |             |
|        |            | [phone]         |           |             |
+--------+------------+----------------------+-----------+-------------+
 
 
 
 Social History
 
 
+----------------+-------+-----------+--------+------+
| Tobacco Use    | Types | Packs/Day | Years  | Date |
|                |       |           | Used   |      |
+----------------+-------+-----------+--------+------+
| Never Assessed |       |           |        |      |
+----------------+-------+-----------+--------+------+
 
 
 
+------------------+---------------+
| Sex Assigned at  | Date Recorded |
| Birth            |               |
+------------------+---------------+
| Not on file      |               |
+------------------+---------------+
 
 
 
+----------------+-------------+-------------+
| Job Start Date | Occupation  | Industry    |
+----------------+-------------+-------------+
| Not on file    | Not on file | Not on file |
+----------------+-------------+-------------+
 
 
 
+----------------+--------------+------------+
| Travel History | Travel Start | Travel End |
+----------------+--------------+------------+
 
 
 
 
+-------------------------------------+
| No recent travel history available. |
+-------------------------------------+
 documented as of this encounter
 
 Plan of Treatment
 Not on filedocumented as of this encounter
 
 Visit Diagnoses
 Not on filedocumented in this encounter

## 2020-01-01 NOTE — XMS
Encounter Summary
  Created on: 2020
 
 Vonnie Celaya
 External Reference #: [id number]
 : 32
 Sex: Female
 
 Demographics
 
 
+-----------------------+------------------------+
| Address               | [address]         |
|                       | [place]  [address]   |
+-----------------------+------------------------+
| Home Phone            | [phone]        |
+-----------------------+------------------------+
| Preferred Language    | Unknown                |
+-----------------------+------------------------+
| Marital Status        | Single                 |
+-----------------------+------------------------+
| Episcopal Affiliation | NON                    |
+-----------------------+------------------------+
| Race                  | White                  |
+-----------------------+------------------------+
| Ethnic Group          | Not  or  |
+-----------------------+------------------------+
 
 
 Author
 
 
+--------------+------------------------------+
| Author       | [facility] |
+--------------+------------------------------+
| Organization | [facility] |
+--------------+------------------------------+
| Address      | Unknown                      |
+--------------+------------------------------+
| Phone        | Unavailable                  |
+--------------+------------------------------+
 
 
 
 Support
 
 
+--------------+--------------+---------+-----------------+
| Name         | Relationship | Address | Phone           |
+--------------+--------------+---------+-----------------+
| Lauryn Leroy | ECON         | Unknown | [phone] |
+--------------+--------------+---------+-----------------+
 
 
 
 Care Team Providers
 
 
 
+------------------------+------+-----------------+
| Care Team Member Name  | Role | Phone           |
+------------------------+------+-----------------+
| Jean Bermudez MD | PCP  | [phone] |
+------------------------+------+-----------------+
 
 
 
 Reason for Visit
 
 
+---------------------+----------+
| Reason              | Comments |
+---------------------+----------+
| Education procedure | JENIFFER      |
+---------------------+----------+
 
 
 
 Encounter Details
 
 
+--------+-----------+----------------------+----------------------+----------------------+
| Date   | Type      | Department           | Care Team            | Description          |
+--------+-----------+----------------------+----------------------+----------------------+
| / | Telephone |   Cardiac Cath Lab   |   Sandra Joseph RN   | Education procedure  |
| 2018   |           | at [facility]  [address]  | [address]  | (JENIFFER)                |
|        |           | [address]      | [place]  [place],   |                      |
|        |           | [facility]        | [place] [address]        |                      |
|        |           | [place], [place]         |                      |                      |
|        |           | [address]           |                      |                      |
|        |           | [phone]         |                      |                      |
+--------+-----------+----------------------+----------------------+----------------------+
 
 
 
 Social History
 
 
+----------------+-------+-----------+--------+------+
| Tobacco Use    | Types | Packs/Day | Years  | Date |
|                |       |           | Used   |      |
+----------------+-------+-----------+--------+------+
| Never Assessed |       |           |        |      |
+----------------+-------+-----------+--------+------+
 
 
 
+------------------+---------------+
| Sex Assigned at  | Date Recorded |
| Birth            |               |
+------------------+---------------+
| Not on file      |               |
+------------------+---------------+
 
 
 
+----------------+-------------+-------------+
| Job Start Date | Occupation  | Industry    |
+----------------+-------------+-------------+
 
| Not on file    | Not on file | Not on file |
+----------------+-------------+-------------+
 
 
 
+----------------+--------------+------------+
| Travel History | Travel Start | Travel End |
+----------------+--------------+------------+
 
 
 
+-------------------------------------+
| No recent travel history available. |
+-------------------------------------+
 documented as of this encounter
 
 Plan of Treatment
 Not on filedocumented as of this encounter
 
 Visit Diagnoses
 Not on filedocumented in this encounter

## 2020-01-01 NOTE — XMS
Encounter Summary
  Created on: 2020
 
 Vonnie Celaya
 External Reference #: [id number]
 : 32
 Sex: Female
 
 Demographics
 
 
+-----------------------+------------------------+
| Address               | [address]         |
|                       | [place]  [address]   |
+-----------------------+------------------------+
| Home Phone            | [phone]        |
+-----------------------+------------------------+
| Preferred Language    | Unknown                |
+-----------------------+------------------------+
| Marital Status        | Single                 |
+-----------------------+------------------------+
| Sabianism Affiliation | NON                    |
+-----------------------+------------------------+
| Race                  | White                  |
+-----------------------+------------------------+
| Ethnic Group          | Not  or  |
+-----------------------+------------------------+
 
 
 Author
 
 
+--------------+------------------------------+
| Author       | [facility] |
+--------------+------------------------------+
| Organization | [facility] |
+--------------+------------------------------+
| Address      | Unknown                      |
+--------------+------------------------------+
| Phone        | Unavailable                  |
+--------------+------------------------------+
 
 
 
 Support
 
 
+--------------+--------------+---------+-----------------+
| Name         | Relationship | Address | Phone           |
+--------------+--------------+---------+-----------------+
| Lauryn Leroy | ECON         | Unknown | [phone] |
+--------------+--------------+---------+-----------------+
 
 
 
 Care Team Providers
 
 
 
+------------------------+------+-----------------+
| Care Team Member Name  | Role | Phone           |
+------------------------+------+-----------------+
| Jean Bermudez MD | PCP  | [phone] |
+------------------------+------+-----------------+
 
 
 
 Encounter Details
 
 
+--------+----------+----------------------+----------------------+-------------+
| Date   | Type     | Department           | Care Team            | Description |
+--------+----------+----------------------+----------------------+-------------+
| / | Abstract |   Cardiology at [facility]  |   Jame Wilkins  |             |
| 2018   |          |  [address]  [address]  |             |
|        |          | Mailcode: [address]       | Ave  [place], [place]    |             |
|        |          | [facility]    | [address]           |             |
|        |          | and [place],         | [phone]         |             |
|        |          |       | [phone] (Fax)   |             |
|        |          | [place], [place]  |                      |             |
|        |          | [address]           |                      |             |
|        |          | [phone]         |                      |             |
+--------+----------+----------------------+----------------------+-------------+
 
 
 
 Social History
 
 
+--------------+-------+-----------+--------+------+
| Tobacco Use  | Types | Packs/Day | Years  | Date |
|              |       |           | Used   |      |
+--------------+-------+-----------+--------+------+
| Never Smoker |       |           |        |      |
+--------------+-------+-----------+--------+------+
 
 
 
+---------------------+---+---+---+
| Smokeless Tobacco:  |   |   |   |
| Never Used          |   |   |   |
+---------------------+---+---+---+
 
 
 
+-------------+-------------+---------+----------+
| Alcohol Use | Drinks/Week | oz/Week | Comments |
+-------------+-------------+---------+----------+
| No          |             |         |          |
+-------------+-------------+---------+----------+
 
 
 
+------------------+---------------+
| Sex Assigned at  | Date Recorded |
| Birth            |               |
+------------------+---------------+
| Not on file      |               |
+------------------+---------------+
 
 
 
 
+----------------+-------------+-------------+
| Job Start Date | Occupation  | Industry    |
+----------------+-------------+-------------+
| Not on file    | Not on file | Not on file |
+----------------+-------------+-------------+
 
 
 
+----------------+--------------+------------+
| Travel History | Travel Start | Travel End |
+----------------+--------------+------------+
 
 
 
+-------------------------------------+
| No recent travel history available. |
+-------------------------------------+
 documented as of this encounter
 
 Plan of Treatment
 Not on filedocumented as of this encounter
 
 Visit Diagnoses
 Not on filedocumented in this encounter

## 2020-01-01 NOTE — XMS
Encounter Summary
  Created on: 2020
 
 Vonnie Celaya
 External Reference #: [id number]
 : 32
 Sex: Female
 
 Demographics
 
 
+-----------------------+------------------------+
| Address               | [address]         |
|                       | [place]  [address]   |
+-----------------------+------------------------+
| Home Phone            | [phone]        |
+-----------------------+------------------------+
| Preferred Language    | Unknown                |
+-----------------------+------------------------+
| Marital Status        | Single                 |
+-----------------------+------------------------+
| Episcopalian Affiliation | NON                    |
+-----------------------+------------------------+
| Race                  | White                  |
+-----------------------+------------------------+
| Ethnic Group          | Not  or  |
+-----------------------+------------------------+
 
 
 Author
 
 
+--------------+------------------------------+
| Author       | [facility] |
+--------------+------------------------------+
| Organization | [facility] |
+--------------+------------------------------+
| Address      | Unknown                      |
+--------------+------------------------------+
| Phone        | Unavailable                  |
+--------------+------------------------------+
 
 
 
 Support
 
 
+--------------+--------------+---------+-----------------+
| Name         | Relationship | Address | Phone           |
+--------------+--------------+---------+-----------------+
| Lauryn Leroy | ECON         | Unknown | [phone] |
+--------------+--------------+---------+-----------------+
 
 
 
 Care Team Providers
 
 
 
+------------------------+------+-----------------+
| Care Team Member Name  | Role | Phone           |
+------------------------+------+-----------------+
| Jean Bermudez MD | PCP  | [phone] |
+------------------------+------+-----------------+
 
 
 
 Reason for Visit
 
 
+--------+----------+
| Reason | Comments |
+--------+----------+
| Other  | surgery  |
+--------+----------+
 
 
 
 Encounter Details
 
 
+--------+-----------+----------------------+----------------------+------------------+
| Date   | Type      | Department           | Care Team            | Description      |
+--------+-----------+----------------------+----------------------+------------------+
| / | Telephone |   Cardiology at [facility]  |   Markus Smart,  | Other (surgery ) |
| 2019   |           |  [address]    | MD  [address] [facility]      |                  |
|        |           | Mailcode: [address]       | [address]      |                  |
|        |           | [facility]    | [place], [place]         |                  |
|        |           | and [place],         | [address]           |                  |
|        |           | [facility]       | [phone]         |                  |
|        |           | Floor  [place], [place]  | [phone] (Fax)   |                  |
|        |           | [address]           |                      |                  |
|        |           | [phone]         |                      |                  |
+--------+-----------+----------------------+----------------------+------------------+
 
 
 
 Social History
 
 
+--------------+-------+-----------+--------+------+
| Tobacco Use  | Types | Packs/Day | Years  | Date |
|              |       |           | Used   |      |
+--------------+-------+-----------+--------+------+
| Never Smoker |       |           |        |      |
+--------------+-------+-----------+--------+------+
 
 
 
+---------------------+---+---+---+
| Smokeless Tobacco:  |   |   |   |
| Never Used          |   |   |   |
+---------------------+---+---+---+
 
 
 
+-------------+-------------+---------+----------+
| Alcohol Use | Drinks/Week | oz/Week | Comments |
+-------------+-------------+---------+----------+
 
| No          |             |         |          |
+-------------+-------------+---------+----------+
 
 
 
+------------------+---------------+
| Sex Assigned at  | Date Recorded |
| Birth            |               |
+------------------+---------------+
| Not on file      |               |
+------------------+---------------+
 
 
 
+----------------+-------------+-------------+
| Job Start Date | Occupation  | Industry    |
+----------------+-------------+-------------+
| Not on file    | Not on file | Not on file |
+----------------+-------------+-------------+
 
 
 
+----------------+--------------+------------+
| Travel History | Travel Start | Travel End |
+----------------+--------------+------------+
 
 
 
+-------------------------------------+
| No recent travel history available. |
+-------------------------------------+
 documented as of this encounter
 
 Plan of Treatment
 Not on filedocumented as of this encounter
 
 Visit Diagnoses
 Not on filedocumented in this encounter

## 2020-01-01 NOTE — XMS
Encounter Summary
  Created on: 2020
 
 Vonnie Celaya
 External Reference #: [id number]
 : 32
 Sex: Female
 
 Demographics
 
 
+-----------------------+------------------------+
| Address               | [address]         |
|                       | [place]  [address]   |
+-----------------------+------------------------+
| Home Phone            | [phone]        |
+-----------------------+------------------------+
| Preferred Language    | Unknown                |
+-----------------------+------------------------+
| Marital Status        | Single                 |
+-----------------------+------------------------+
| [employer]yb[employer]ian Affiliation | NON                    |
+-----------------------+------------------------+
| Race                  | White                  |
+-----------------------+------------------------+
| Ethnic Group          | Not  or  |
+-----------------------+------------------------+
 
 
 Author
 
 
+--------------+------------------------------+
| Author       | [facility] |
+--------------+------------------------------+
| Organization | [facility] |
+--------------+------------------------------+
| Address      | Unknown                      |
+--------------+------------------------------+
| Phone        | Unavailable                  |
+--------------+------------------------------+
 
 
 
 Support
 
 
+--------------+--------------+---------+-----------------+
| Name         | Relationship | Address | Phone           |
+--------------+--------------+---------+-----------------+
| Lauryn Leroy | ECON         | Unknown | [phone] |
+--------------+--------------+---------+-----------------+
 
 
 
 Care Team Providers
 
 
 
+------------------------+------+-----------------+
| Care Team Member Name  | Role | Phone           |
+------------------------+------+-----------------+
| Jean Bermudez MD | PCP  | [phone] |
+------------------------+------+-----------------+
 
 
 
 Reason for Visit
 
 
+---------------------+----------------+
| Reason              | Comments       |
+---------------------+----------------+
| Education procedure | angiogram, [facility] |
+---------------------+----------------+
 
 
 
 Encounter Details
 
 
+--------+-----------+----------------------+----------------------+----------------------+
| Date   | Type      | Department           | Care Team            | Description          |
+--------+-----------+----------------------+----------------------+----------------------+
| / | Telephone |   Cardiac Cath Lab   |   Sandra Joseph RN   | Education procedure  |
| 2018   |           | at [address]  [address]  | [address]  | (angiogram, [facility])     |
|        |           | [address]      | [place]  [place],   |                      |
|        |           | [facility]        | [place] [address]        |                      |
|        |           | [place], [place]         |                      |                      |
|        |           | [address]           |                      |                      |
|        |           | [phone]         |                      |                      |
+--------+-----------+----------------------+----------------------+----------------------+
 
 
 
 Social History
 
 
+----------------+-------+-----------+--------+------+
| Tobacco Use    | Types | Packs/Day | Years  | Date |
|                |       |           | Used   |      |
+----------------+-------+-----------+--------+------+
| Never Assessed |       |           |        |      |
+----------------+-------+-----------+--------+------+
 
 
 
+------------------+---------------+
| Sex Assigned at  | Date Recorded |
| Birth            |               |
+------------------+---------------+
| Not on file      |               |
+------------------+---------------+
 
 
 
+----------------+-------------+-------------+
| Job Start Date | Occupation  | Industry    |
+----------------+-------------+-------------+
 
| Not on file    | Not on file | Not on file |
+----------------+-------------+-------------+
 
 
 
+----------------+--------------+------------+
| Travel History | Travel Start | Travel End |
+----------------+--------------+------------+
 
 
 
+-------------------------------------+
| No recent travel history available. |
+-------------------------------------+
 documented as of this encounter
 
 Plan of Treatment
 Not on filedocumented as of this encounter
 
 Visit Diagnoses
 Not on filedocumented in this encounter

## 2020-01-01 NOTE — XMS
Encounter Summary
  Created on: 2020
 
 Vonnie Celaya
 External Reference #: [id number]
 : 32
 Sex: Female
 
 Demographics
 
 
+-----------------------+------------------------+
| Address               | [address]         |
|                       | [place]  [address]   |
+-----------------------+------------------------+
| Home Phone            | [phone]        |
+-----------------------+------------------------+
| Preferred Language    | Unknown                |
+-----------------------+------------------------+
| Marital Status        | Single                 |
+-----------------------+------------------------+
| Yazidism Affiliation | NON                    |
+-----------------------+------------------------+
| Race                  | White                  |
+-----------------------+------------------------+
| Ethnic Group          | Not  or  |
+-----------------------+------------------------+
 
 
 Author
 
 
+--------------+------------------------------+
| Author       | [facility] |
+--------------+------------------------------+
| Organization | [facility] |
+--------------+------------------------------+
| Address      | Unknown                      |
+--------------+------------------------------+
| Phone        | Unavailable                  |
+--------------+------------------------------+
 
 
 
 Support
 
 
+--------------+--------------+---------+-----------------+
| Name         | Relationship | Address | Phone           |
+--------------+--------------+---------+-----------------+
| Lauryn Leroy | ECON         | Unknown | [phone] |
+--------------+--------------+---------+-----------------+
 
 
 
 Care Team Providers
 
 
 
+------------------------+------+-----------------+
| Care Team Member Name  | Role | Phone           |
+------------------------+------+-----------------+
| Jean Bermudez MD | PCP  | [phone] |
+------------------------+------+-----------------+
 
 
 
 Reason for Visit
 
 
+--------------------+------------------------------+
| Reason             | Comments                     |
+--------------------+------------------------------+
| Surgery Scheduling | cancel MitraClip on 18 |
+--------------------+------------------------------+
 
 
 
 Encounter Details
 
 
+--------+-----------+----------------------+----------------------+----------------------+
| Date   | Type      | Department           | Care Team            | Description          |
+--------+-----------+----------------------+----------------------+----------------------+
| / | Telephone |   Cardiology at [facility]  |   Estefania Arambulabaljeet GTZ,  | Surgery Scheduling   |
| 2018   |           |  [address]    | RN  [address]      | (cancel MitraClip on |
|        |           | Mailcode: [id number]       | [address]      |  18)           |
|        |           | [facility]    | [place], [place]         |                      |
|        |           | and Healing,         | [address]           |                      |
|        |           | [address],       |                      |                      |
|        |           | [place], [place]  |                      |                      |
|        |           | [address]           |                      |                      |
|        |           | [phone]         |                      |                      |
+--------+-----------+----------------------+----------------------+----------------------+
 
 
 
 Social History
 
 
+--------------+-------+-----------+--------+------+
| Tobacco Use  | Types | Packs/Day | Years  | Date |
|              |       |           | Used   |      |
+--------------+-------+-----------+--------+------+
| Never Smoker |       |           |        |      |
+--------------+-------+-----------+--------+------+
 
 
 
+---------------------+---+---+---+
| Smokeless Tobacco:  |   |   |   |
| Never Used          |   |   |   |
+---------------------+---+---+---+
 
 
 
+-------------+-------------+---------+----------+
| Alcohol Use | Drinks/Week | oz/Week | Comments |
+-------------+-------------+---------+----------+
 
| No          |             |         |          |
+-------------+-------------+---------+----------+
 
 
 
+------------------+---------------+
| Sex Assigned at  | Date Recorded |
| Birth            |               |
+------------------+---------------+
| Not on file      |               |
+------------------+---------------+
 
 
 
+----------------+-------------+-------------+
| Job Start Date | Occupation  | Industry    |
+----------------+-------------+-------------+
| Not on file    | Not on file | Not on file |
+----------------+-------------+-------------+
 
 
 
+----------------+--------------+------------+
| Travel History | Travel Start | Travel End |
+----------------+--------------+------------+
 
 
 
+-------------------------------------+
| No recent travel history available. |
+-------------------------------------+
 documented as of this encounter
 
 Plan of Treatment
 Not on filedocumented as of this encounter
 
 Visit Diagnoses
 Not on filedocumented in this encounter

## 2020-01-01 NOTE — XMS
Encounter Summary
  Created on: 2020
 
 Vonnie Celaya
 External Reference #: [id number]
 : 32
 Sex: Female
 
 Demographics
 
 
+-----------------------+------------------------+
| Address               | [address]         |
|                       | [place]  [address]   |
+-----------------------+------------------------+
| Home Phone            | [phone]        |
+-----------------------+------------------------+
| Preferred Language    | Unknown                |
+-----------------------+------------------------+
| Marital Status        | Single                 |
+-----------------------+------------------------+
| Scientology Affiliation | NON                    |
+-----------------------+------------------------+
| Race                  | White                  |
+-----------------------+------------------------+
| Ethnic Group          | Not  or  |
+-----------------------+------------------------+
 
 
 Author
 
 
+--------------+------------------------------+
| Author       | [facility] |
+--------------+------------------------------+
| Organization | [facility] |
+--------------+------------------------------+
| Address      | Unknown                      |
+--------------+------------------------------+
| Phone        | Unavailable                  |
+--------------+------------------------------+
 
 
 
 Support
 
 
+--------------+--------------+---------+-----------------+
| Name         | Relationship | Address | Phone           |
+--------------+--------------+---------+-----------------+
| Lauryn Leroy | ECON         | Unknown | [phone] |
+--------------+--------------+---------+-----------------+
 
 
 
 Care Team Providers
 
 
 
+------------------------+------+-----------------+
| Care Team Member Name  | Role | Phone           |
+------------------------+------+-----------------+
| Jean Bermudez MD | PCP  | [phone] |
+------------------------+------+-----------------+
 
 
 
 Reason for Visit
 
 
+--------------+----------+
| Reason       | Comments |
+--------------+----------+
| New patient  |          |
| consultation |          |
+--------------+----------+
 Consultation (Routine)
 
+--------+--------+-----------+--------------+--------------+---------------+
| Status | Reason | Specialty | Diagnoses /  | Referred By  | Referred To   |
|        |        |           | Procedures   | Contact      | Contact       |
+--------+--------+-----------+--------------+--------------+---------------+
| Closed |        | Cardiac   |   Diagnoses  |   Stephen,      |   Donnell,      |
|        |        | Surgery   |              | Varun PARRA,   | Shiloh SIMON,   |
|        |        |           | Nonrheumatic | MD  [address]     | MD  [address]   |
|        |        |           |  mitral      | [address]  | [address]   |
|        |        |           | (valve)      |  [address]       | [address]       |
|        |        |           | insufficienc | [place], [place] | [place], [place]  |
|        |        |           | y  Rheumatic |  [address]       | [address]    |
|        |        |           |  tricuspid   | Phone:       | Phone:        |
|        |        |           | insufficienc | [phone] | [phone]  |
|        |        |           | y  Pulmonary |   Fax:       |  Fax:         |
|        |        |           |              | [phone] | [phone]  |
|        |        |           | hypertension |              |               |
|        |        |           | ,            |              |               |
|        |        |           | unspecified  |              |               |
|        |        |           |  Procedures  |              |               |
|        |        |           |  MS NEW      |              |               |
|        |        |           | PATIENT      |              |               |
|        |        |           | LEVEL V      |              |               |
+--------+--------+-----------+--------------+--------------+---------------+
 
 
 
 
 Encounter Details
 
 
+--------+---------+----------------------+----------------------+----------------+
| Date   | Type    | Department           | Care Team            | Description    |
+--------+---------+----------------------+----------------------+----------------+
| / | Office  |   Cardiothoracic     |   Shiloh Jacobo, | Severe mitral  |
| 2018   | Visit   | Surgery at [facility]  [address] |  MD  [address]     | regurgitation  |
|        |         |  [address]    | [address]      | (Primary Dx)   |
|        |         | Mailcode: [id number]       | [place], [place]         |                |
|        |         | [facility] | [address]           |                |
|        |         |   [place], [place]       | [phone]         |                |
|        |         | [address]           | [phone] (Fax)   |                |
|        |         | [phone]         |                      |                |
 
+--------+---------+----------------------+----------------------+----------------+
 
 
 
 Social History
 
 
+--------------+-------+-----------+--------+------+
| Tobacco Use  | Types | Packs/Day | Years  | Date |
|              |       |           | Used   |      |
+--------------+-------+-----------+--------+------+
| Never Smoker |       |           |        |      |
+--------------+-------+-----------+--------+------+
 
 
 
+---------------------+---+---+---+
| Smokeless Tobacco:  |   |   |   |
| Never Used          |   |   |   |
+---------------------+---+---+---+
 
 
 
+-------------+-------------+---------+----------+
| Alcohol Use | Drinks/Week | oz/Week | Comments |
+-------------+-------------+---------+----------+
| No          |             |         |          |
+-------------+-------------+---------+----------+
 
 
 
+------------------+---------------+
| Sex Assigned at  | Date Recorded |
| Birth            |               |
+------------------+---------------+
| Not on file      |               |
+------------------+---------------+
 
 
 
+----------------+-------------+-------------+
| Job Start Date | Occupation  | Industry    |
+----------------+-------------+-------------+
| Not on file    | Not on file | Not on file |
+----------------+-------------+-------------+
 
 
 
+----------------+--------------+------------+
| Travel History | Travel Start | Travel End |
+----------------+--------------+------------+
 
 
 
+-------------------------------------+
| No recent travel history available. |
+-------------------------------------+
 documented as of this encounter
 
 Last Filed Vital Signs
 
 
 
+-------------------+---------------------+----------------------+----------+
| Vital Sign        | Reading             | Time Taken           | Comments |
+-------------------+---------------------+----------------------+----------+
| Blood Pressure    | 123/62              | 2018  2:12 PM  |          |
|                   |                     | PST                  |          |
+-------------------+---------------------+----------------------+----------+
| Pulse             | 73                  | 2018  2:12 PM  |          |
|                   |                     | PST                  |          |
+-------------------+---------------------+----------------------+----------+
| Temperature       | 36.6   C (97.9   F) | 2018  2:12 PM  |          |
|                   |                     | PST                  |          |
+-------------------+---------------------+----------------------+----------+
| Respiratory Rate  | 14                  | 2018  2:12 PM  |          |
|                   |                     | PST                  |          |
+-------------------+---------------------+----------------------+----------+
| Oxygen Saturation | 99%                 | 2018  2:12 PM  |          |
|                   |                     | PST                  |          |
+-------------------+---------------------+----------------------+----------+
| Inhaled Oxygen    | -                   | -                    |          |
| Concentration     |                     |                      |          |
+-------------------+---------------------+----------------------+----------+
| Weight            | 67 kg (147 lb 9.6   | 2018  2:12 PM  |          |
|                   | oz)                 | PST                  |          |
+-------------------+---------------------+----------------------+----------+
| Height            | -                   | -                    |          |
+-------------------+---------------------+----------------------+----------+
| Body Mass Index   | 26.15               | 2018  2:09 PM  |          |
|                   |                     | PDT                  |          |
+-------------------+---------------------+----------------------+----------+
 documented in this encounter
 
 Progress Notes
 Shiloh Jacobo MD - 2018  2:00 PM PSTFormatting of this note might be different f
rom the original.
 Complex Valve Clinic Evaluation
 
 History of present illness: Vonnie Celaya is a 86 y.o. female with a past medical hist
ory significant for DM on oral hypoglycemics, pHTN, HTN, HL, AF on coumadin, CAD and severe 
mitral regurgitation. They complain of fatigue with daily chores and shortness of breath. Th
ey have lower extremity edema that is controlled with lasix and compression hose. They deny 
chest pain, palpitations and orthopnea. They had two admissions for heart failure exacerbati
on this year. Once in February and again in May 2018. No admissions since May and lower extr
emity edema has improved.
 
 They are accompanied by one of their children today in clinic, who seems quite invested in 
their care.
 
 DENTAL: saw dentist one week ago, explained need for dental clearance for possible valve pr
ocedure
 
 Past Medical History: 
 Diagnosis Date 
   Anemia  
  resolved with iron supplements - no large GI bleed, negative EGD. 
   Atrial fibrillation (HCC)  
   Coronary artery disease 2018 
   One-vessel coronary artery disease with significant stenosis of the distal right coronary
 artery at 99% prior to the posterior descending artery posterior left ventricular branches.
 
 
   Hyperlipidemia  
   Hypertension  
   Hypothyroid  
   Pulmonary hypertension (HCC)  
   Secundum ASD  
  noted on JENIFFER 18 
   Severe mitral regurgitation 2018 
   Type 2 diabetes mellitus (HCC)  
 
 Past Surgical History 
 Procedure Laterality Date 
   Tonsillectomy   
   Hysterectomy   
   age ~[age] 
   Bladder suspension   
   Laparoscopic cholecystectomy   
   Umbilical hernia repair   
   Bso (bilateral salpingo-oophorectomy)   
   [age] ~[age] 
   Bladder suspension   
   second bladder suspension 1970-80s 
   Toe surgery   
   2nd toe on left 
   Bunion surgery   
   left foot 
   Appendectomy concurrent with major procedure   
   at time of completion hysterectomy 
   Blepharoplasty of both upper eyelids   
   had this donce twice 
 
 Family History 
 Problem Relation 
   Heart Failure Mother 
   Stroke Father 
   Heart Attack Father 
   Sudden Death Other 
   likely from PE 
 
  
 Social History 
 
 Social History 
   Marital status: Single 
   Spouse name: N/A 
   Number of children: N/A 
   Years of education: N/A 
 
 Occupational History 
   Not on file. 
 
 Social History Main Topics 
   Smoking status: Never Smoker 
   Smokeless tobacco: Never Used 
   Alcohol use No 
   Drug use: No 
   Sexual activity: Not on file 
 
 Other Topics Concern 
   Not on file 
 
 
 Social History Narrative 
   No narrative on file 
 
 Review of Systems 
 Constitutional: Positive for diaphoresis and malaise/fatigue. 
      Fatigued with daily chores 
 HENT: Positive for hearing loss and nosebleeds.  
 Eyes: Negative for blurred vision and double vision. 
 Respiratory: Positive for shortness of breath. Negative for cough.  
 Cardiovascular: Negative for chest pain, palpitations, orthopnea and leg swelling. 
      Had palpitations in the pas but not for many years. No edema with compression hose. LE
 edema back in February 
 Gastrointestinal: Negative for abdominal pain, constipation, diarrhea, heartburn, nausea an
d vomiting. 
 Genitourinary: Positive for urgency. 
 Musculoskeletal: Negative for back pain, joint pain, myalgias and neck pain. 
 Skin: Positive for itching and rash. 
      Fungal skin patches on right arm and face. Notes a rash in groin area from shaving, pr
uritis 
 Neurological: Negative for dizziness and headaches. 
      Occasional lightheadedness when overdoing it 
 Endo/Heme/Allergies: Bruises/bleeds easily. 
 Psychiatric/Behavioral: Positive for depression. The patient is nervous/anxious.  
 
 Current Medication List  
 Name Sig 
 ACETAMINOPHEN 325 MG TABLET Take by mouth. 
 ASCORBIC ACID (VITAMIN C) 500 MG TABLET Take 500 mg by mouth once daily. 
 FUROSEMIDE 40 MG TABLET Take by mouth. 
 GLUCOSAMINE CHONDROITIN PLUS ORAL Take 2 tablets by mouth once daily. 
 LEVOTHYROXINE 100 MCG TABLET Take by mouth. 
 LOSARTAN 100 MG TABLET Take 100 mg by mouth once daily. 
 MAGNESIUM CHLORIDE ORAL Take by mouth. 
 METFORMIN  MG 24 HR TABLET,EXTENDED RELEASE Take by mouth. 
 METOPROLOL SUCCINATE  MG TABLET,EXTENDED RELEASE 24 HR Take 100 mg by mouth once arturo
y.  
 POTASSIUM CHLORIDE ER 20 MEQ TABLET,EXTENDED RELEASE(PART/CRYST) Take by mouth. 
 SIMVASTATIN 20 MG TABLET Take by mouth once daily in the evening.  
 WARFARIN 5 MG TABLET Take by mouth. 
 
 Allergies 
 Allergen Reactions 
   Morphine Anxiety 
   "felt fidgety" 
   Percocet [Oxycodone-Acetaminophen] Anxiety 
   "felt fidgety" 
 
 Filed Vitals: 
  2018
  2:12 PM 
 Weight: 67 kg (147 lb 9.6 oz) 
 BP: 123/62 
 Pulse: 73 
 Temp: 36.6 C (97.9 F) 
 TempSrc: Oral 
 Resp: 14 
 SpO2: 99% 
 PainSc: 0 - Zero 
 BMI:  26.15 kg/(m^2)
 
 
 Physical exam:
 Constitutional: frail, elderly person in wheelchair
 Eyes: no scleral icterus, EOMI
 ENT: oropharynx clear, some missing teeth
 Lymph: no cervical or supraclavicular LAD
 Respiratory: CTAB
 Cardiovascular: irregularly irregular rhythm, +murmur, no carotid bruits
 Gastro: normal bowel sounds, no tenderness
 Skin: bruises over left forearm, rash over right forearm and left chin
 Musculoskeletal: 5/5 strength in upper and lower extremities
 Psychiatric: normal insight, no anxious or depressive affect
 Neurologic: cranial nerves intact, no gross deficits
 
 Studies personally reviewed with cardiologist.
 
 Cardiac cath 18
 FINDINGS:
 1.         Hemodynamic findings right heart catheterization:  
 a.         Right arterial pressure mean of 5 mmHg.  
 b.         Right ventricular pressure of 36/0 and a right ventricular end-diastolic pressur
e of 40 mmHg.
 c.         Pulmonary artery pressure of 41/13 with a mean pulmonary artery pressure of 25 m
mHg.
 d.         Pulmonary capillary wedge pressure of 16 mmHg with V wave up to 33 mmHg.
 e.         Heart rate of 60 beats per minute.
 f.          Cardiac output of 3.4 L/min per Hellen method and cardiac index of 1.93 L/min/sq 
m. 
 g.         Opening aortic pressure of 128/68 mmHg with a mean aortic pressure of 72 mmHg.
 h.         Pulmonary artery saturation of 66% and aortic saturation of 99%.
 i.          Pulmonary vascular resistance of 2.64 Wood units. 
 j.          Systemic vascular resistance of 19.98 Wood units.
 2.         Left heart catheterization:
 a.         Left main coronary artery.  The left main coronary artery is a large-sized vesse
l which has about 20% calcific stenosis in its distal portion at its bifurcation with the le
ft anterior descending and the left circumflex artery.
 b.         Left anterior descending artery.  The left anterior descending artery is a moder
ate-sized vessel which courses all the way up to the apex. It is tortuous in nature.  It has
 mild 20% to 30% stenosis in its midportion at its branch point with the diagonal artery.  O
therwise, the left anterior descending artery has no other major angiographic stenosis.
 c.         Left circumflex artery.  The left circumflex artery has 2 stenoses in tandem whi
ch are about 30% to 40% each as it courses around the posterior intraventricular groove.
 d.         Right coronary artery the right coronary artery is a dominant vessel.  The proxi
mal portion of the right coronary artery has about 30% to 40% calcific stenosis which may be
 an under-estimate given the eccentricity of the plaque.
 e.         The distal RCA has 99% calcific stenosis prior to its bifurcation with the PDA a
nd PLV branches.  It is an extremely tortuous right coronary artery.
 
 ASSESSMENT:
 1.         One-vessel coronary artery disease with significant stenosis of the distal right
 coronary artery at 99% prior to the posterior descending artery posterior left ventricular 
branches.
 2.         Non-obstructive coronary artery disease in the left anterior descending and left
 circumflex branches.
 3.         Mildly elevated left heart filling pressures.
 
 Transthoracic echo 18
 1. The left ventricular cavity size is normal.               
 
 2. The LV function is hyperdynamic.                   
 
   
 3. Visually estimated left ventricular ejection fraction is 70 - 75%.     
 4. The aortic valve is trileaflet and moderately calcified.          
 5. Severe mitral annular calcification as well as papillary and subchordal  
 bulky calcification.                        
     
 6. While there is calcification of the bases of the mitral valve leaflets   
 due to the severe mitral annular calcificaiton, the mid portions of the    
 leaflets themseleves are only midly calcified and thin, however there is the 
 severe central mitral valve regurgitation.                 
 
 7. The mean transmitral gradient is 5.5 mmHg at a heart rate of 77 bpm.    
                              
         
 8. Severe biatrial enlargement.                    
    
 9. Right ventricular size, thickness and function are normal.         
                              
         
 +------------------------------------------------------------------------------+
 
 
 Description of Findings:
 
 Cardiac Rhythm: Normal sinus rhythm.
 Left Ventricle: The left ventricular cavity size is normal. Visually estimated left 
 ventricular ejection fraction is 70 - 75%. The LV function is hyperdynamic.
 Left Ventricular Wall Motion: Left ventricular systolic thickening is normal in all 
 segments.
 Atria: Severe biatrial enlargement.
 Right Ventricle: Right ventricular size, thickness and function are normal. TAPSE 
 measures 1.8cm. The RV TDI s' velocity is 12.0cm/sec.
 Aortic Valve: The aortic valve is trileaflet and moderately calcified. No indication 
 of aortic valve regurgitation. The left coronary cusp is immobile.
 Mitral Valve: Severe mitral annular calcification. The mean transmitral gradient is 
 5.5 mmHg at a heart rate of 77 bpm. Severe mitral valve regurgitation. The mitral 
 valve effective regurgitant orifice area is 7 mm2.
 Tricuspid Valve: The tricuspid valve is structurally normal. Mild tricuspid 
 regurgitation. The tricuspid regurgitant velocity is 2.95 m/s, and with an assumed 
 right atrial pressure of 5 mmHg, the estimated right ventricular systolic pressure is
 upper limits of normal at 39.9 mmHg.
 Pulmonic Valve: The pulmonic valve is structurally normal. Trace pulmonary valve 
 regurgitation.
 Venous: Inferior vena cava is normal with normal inspiratory collapse.
 Pericardium: No pericardial effusion is seen.
 
 Transesophageal echo 18
 1. The LV function is hyperdynamic.                   
   
 2. The visually estimated ejection fraction is > 75%.             
 3. Right ventricular size, thickness and function are normal.         
 4. Severe circumferential mitral annular calcification. A large deposit of  
 calcium at the posterolateral atrial aspect of themitral annulus that     
 partially disrupts the normal mitral annular shape and size. Mitral annular  
 area is measured at 8.26 cm2, with an AP diameter of 3.76 cm, and CC     
 diameter of 2.52 cm. However, there is no significant mitral stenosis. Mean  
 mitral gradient is approximately 4 mmHg at a heart rate of 83 bpm.      
 5. Severe mitral valve regurgitation. The EROA is 0.47 cm2 (using a PISA   
 radius of 1.1 cm, an aliasing velocity of 0.32 m/s, and a mean MR       
 regurgitant velocity of 5.1 m/s), the calculated regurgitant volume is 67   
 
 mL, and the calculated regurgitant fraction is 52%.              
 6. The mitral valve anterior leaflet measures 2.0 cm.             
 7. There is subvalvar and papilary muscle head calcifidation noted as well.  
 8. The mechanism for the severe mitral regurgitation is leaflet and annular  
 calcification and degeneration with poor coaptation across all portions of  
 the valve.                           
       
 9. The aortic valve is moderately sclerotic and restricted. No aortic     
 stenosis.                            
       
 10. Severely enlarged left atrium.                   
   
 11. There is a small fenestrated secundum atrial septal defect, with     
 intermittent left-to-right flow via color-flow Doppler.            
 12. Moderate tricuspid regurgitation.                  
   
 13. Small plaque involving the ascending and transverse aorta.        
 14. The left atrial appendage is well visualized and there is no evidence of 
 thrombus present.                         
      
                              
         
 +------------------------------------------------------------------------------+
 
 
 Findings:
 Cardiac Rhythm: atrial fibrillation
 
 Left Ventricle: The LV function is hyperdynamic. Left ventricular systolic thickening
 is normal in all segments. The visually estimated ejection fraction is > 75%.
 
 Left Atrium: Left atrial size is severely enlarged.
 
 Left atrial appendage: The left atrial appendage is well visualized and there is no 
 evidence of thrombus present.
 
 Right Ventricle: Right ventricular size, thickness and function are normal.
 
 Aortic Valve: The aortic valve is moderately sclerotic and restricted. No aortic 
 stenosis. No indication of aortic valve regurgitation. On this study, transgastric 
 Doppler evaluation reveals an aortic valve peak velocity of 1.8 m/s, peak gradient of
 13 mmHg, mean gradient of 5.6 mmHg, and DVI of 0.75 (with an AV VTI of 0.299 and 
 LVOT VTI of 0.226).
 
 Mitral Valve: Mitral leaflet mobility is normal. Severe mitral annular calcification.
 Severe mitral valve regurgitation. The jet is centrally-directed. The EROA is 0.47 
 cm2, using a PISA radius of 1.1 cm, an aliasing velocity of 0.32 m/s, and a mean MR 
 regurgitant velocity of 5.1 m/s. There is no mitral stenosis. Mean mitral gradient is
 approximately 4 mmHg at a heart rate of 83 bpm.
 
 Tricuspid Valve: The tricuspid valve is structurally normal. Moderate tricuspid 
 regurgitation.
 
 Pulmonic Valve: The pulmonic valve is structurally normal.
 
 Aorta: There is a small, layered, immobile plaque, involving the ascending and 
 transverse aorta.
 
 Venous: The inferior vena cava was not well visualized.
 
 
 Septa: There is a small fenestrated secundum atrial septal defect, with intermittent 
 left-to-right flow via color-flow Doppler.
 
 Assessment and plan: 
 Vonnie Celaya is a 86 year old female with AF, DM, and severe symptomatic mitral regur
gitation. I have reviewed all of the patient's imaging and studies with a the multidisciplin
[place] complex valve team. The patient is not an ideal candidate for off-label valve-in-MAC dep
loyment of a TAVR valve in the mitral position due to high risk of LVOT obstruction. The CT 
scan will be further reviewed with an experienced chest radiologist to determine the feasibi
lity of this procedure. Unfortunately, the mitral valve is not likely amenable to mitraclip 
therapy due to leaflet morphology and severe MAC. Given the patient's advanced age and frail
ty, they are not a candidate for surgical repair.
 
 I spent 41 minutes face to face with the patient.  Greater than 50% of this time was spent 
on counseling and coordination of care.
 
 
 Shiloh Jacobo MD
  of Cardiac Surgery
 CARDIOTHORACIC SURGERY AT [address]
 Mail[address]: [id number]
 [place] [address] [phone]
 
 Electronically signed by Shiloh Jacobo MD at 2018  9:25 PM PSTdocumented in this 
encounter
 
 Plan of Treatment
 Not on filedocumented as of this encounter
 
 Visit Diagnoses
 
 
+-----------------------------------------------------------------+
| Diagnosis                                                       |
+-----------------------------------------------------------------+
|   Severe mitral regurgitation - Primary  Mitral valve disorders |
+-----------------------------------------------------------------+
 documented in this encounter

## 2020-01-01 NOTE — XMS
Encounter Summary
  Created on: 2020
 
 Alvin Harp
 External Reference #: [id number]
 : 32
 Sex: Female
 
 Demographics
 
 
+-----------------------+------------------------+
| Address               | [address]         |
|                       | [place]  [address]   |
+-----------------------+------------------------+
| Home Phone            | [phone]        |
+-----------------------+------------------------+
| Preferred Language    | Unknown                |
+-----------------------+------------------------+
| Marital Status        | Single                 |
+-----------------------+------------------------+
| Episcopal Affiliation | NON                    |
+-----------------------+------------------------+
| Race                  | White                  |
+-----------------------+------------------------+
| Ethnic Group          | Not  or  |
+-----------------------+------------------------+
 
 
 Author
 
 
+--------------+------------------------------+
| Author       | [facility] |
+--------------+------------------------------+
| Organization | [facility] |
+--------------+------------------------------+
| Address      | Unknown                      |
+--------------+------------------------------+
| Phone        | Unavailable                  |
+--------------+------------------------------+
 
 
 
 Support
 
 
+--------------+--------------+---------+-----------------+
| Name         | Relationship | Address | Phone           |
+--------------+--------------+---------+-----------------+
| Lauryn Leroy | ECON         | Unknown | [phone] |
+--------------+--------------+---------+-----------------+
 
 
 
 Care Team Providers
 
 
 
+-----------------------+------+-------------+
| Care Team Member Name | Role | Phone       |
+-----------------------+------+-------------+
 PCP  | Unavailable |
+-----------------------+------+-------------+
 
 
 
 Reason for Referral
 Diagnostic Testing (Routine)
 
+--------+--------+------------+--------------+--------------+--------------+
| Status | Reason | Specialty  | Diagnoses /  | Referred By  | Referred To  |
|        |        |            | Procedures   | Contact      | Contact      |
+--------+--------+------------+--------------+--------------+--------------+
| Closed |        | Cardiology |   Diagnoses  |              |              |
|        |        |            |  Mitral      | Claudette,  |              |
|        |        |            | valve        | Jame PARRA MD  |              |
|        |        |            | insufficienc |  [address]     |              |
|        |        |            | y,           | [address]     |              |
|        |        |            | unspecified  | [place], [place] |              |
|        |        |            | etiology     |  [address]  |              |
|        |        |            | Procedures   |  Phone:      |              |
|        |        |            | TRANSTHORACI | [phone] |              |
|        |        |            | C            |   Fax:       |              |
|        |        |            | ECHOCARDIOGR | [phone] |              |
|        |        |            | AM, ADULT    |              |              |
+--------+--------+------------+--------------+--------------+--------------+
 
 
 Diagnostic Testing (Routine)
 
+--------+--------+------------+--------------+--------------+---------------+
| Status | Reason | Specialty  | Diagnoses /  | Referred By  | Referred To   |
|        |        |            | Procedures   | Contact      | Contact       |
+--------+--------+------------+--------------+--------------+---------------+
| Closed |        | Cardiology |   Diagnoses  |              |   Car Echo    |
|        |        |            |  Mitral      | Claudette,  | [facility]  [address]  |
|        |        |            | valve        | Jame PARRA MD  | [address] |
|        |        |            | insufficienc |  [address]     |   Mailcode:   |
|        |        |            | y,           | [address]     | [id number]    |
|        |        |            | unspecified  | [place], [place] | [facility]  |
|        |        |            | etiology     |  [address]  | Building      |
|        |        |            | Procedures   |  Phone:      | [place]  |
|        |        |            | TRANSESOPHAG | [phone] | [address]    |
|        |        |            | EAL          |   Fax:       | Phone:        |
|        |        |            | ECHOCARDIOGR | [phone] | [phone]  |
|        |        |            | AM, ADULT    |              |               |
|        |        |            | TX ECHO      |              |               |
|        |        |            | HEART,TRANSE |              |               |
|        |        |            | SOPHAGEAL,CO |              |               |
|        |        |            | MPLETE  TX   |              |               |
|        |        |            | DOPPLER ECHO |              |               |
|        |        |            |              |              |               |
|        |        |            | HEART,LIMITE |              |               |
|        |        |            | D,F/U  TX    |              |               |
|        |        |            | DOPPLER      |              |               |
|        |        |            | COLOR FLOW   |              |               |
|        |        |            | VELOCITY MAP |              |               |
+--------+--------+------------+--------------+--------------+---------------+
 
 
 
 Diagnostic Testing (Routine)
 
+--------+--------+-----------+--------------+--------------+--------------+
| Status | Reason | Specialty | Diagnoses /  | Referred By  | Referred To  |
|        |        |           | Procedures   | Contact      | Contact      |
+--------+--------+-----------+--------------+--------------+--------------+
| Closed |        | Radiology |   Diagnoses  |              |              |
|        |        |           |  Mitral      | Claudette,  |              |
|        |        |           | valve        | Jame PARRA MD  |              |
|        |        |           | insufficienc |  [address]     |              |
|        |        |           | y,           | [address]     |              |
|        |        |           | unspecified  | [place], [place] |              |
|        |        |           | etiology     |  [address]  |              |
|        |        |           | Procedures   |  Phone:      |              |
|        |        |           | CTA CHEST -  | [phone] |              |
|        |        |           | GATED W      |   Fax:       |              |
|        |        |           | CONTRAST     | [phone] |              |
+--------+--------+-----------+--------------+--------------+--------------+
 
 
 
 
 Reason for Visit
 
 
+--------+----------+
| Reason | Comments |
+--------+----------+
| Other  | [facility] appt |
+--------+----------+
 
 
 
 Encounter Details
 
 
+--------+-----------+----------------------+----------------------+------------------+
| Date   | Type      | Department           | Care Team            | Description      |
+--------+-----------+----------------------+----------------------+------------------+
| / | Telephone |   Cardiology at [facility]  |   Jame Wilkins  | Other ([facility] appt) |
|    |           |  [address]   [address]  |                  |
|        |           | Mailcode: [address]       | [address]  [place], [place]    |                  |
|        |           | [facility]    | [address]           |                  |
|        |           | and [place],         | [phone]         |                  |
|        |           |       | [phone] (Fax)   |                  |
|        |           | [place], [place]  |                      |                  |
|        |           | [address]           |                      |                  |
|        |           | [phone]         |                      |                  |
+--------+-----------+----------------------+----------------------+------------------+
 
 
 
 Social History
 
 
+----------------+-------+-----------+--------+------+
| Tobacco Use    | Types | Packs/Day | Years  | Date |
|                |       |           | Used   |      |
 
+----------------+-------+-----------+--------+------+
| Never Assessed |       |           |        |      |
+----------------+-------+-----------+--------+------+
 
 
 
+------------------+---------------+
| Sex Assigned at  | Date Recorded |
| Birth            |               |
+------------------+---------------+
| Not on file      |               |
+------------------+---------------+
 
 
 
+----------------+-------------+-------------+
| Job Start Date | Occupation  | Industry    |
+----------------+-------------+-------------+
| Not on file    | Not on file | Not on file |
+----------------+-------------+-------------+
 
 
 
+----------------+--------------+------------+
| Travel History | Travel Start | Travel End |
+----------------+--------------+------------+
 
 
 
+-------------------------------------+
| No recent travel history available. |
+-------------------------------------+
 documented as of this encounter
 
 Plan of Treatment
 
 
+----------------------+------+--------+----------------------+---------------------+
| Name                 | Type | Priori | Associated Diagnoses | Order Schedule      |
|                      |      | ty     |                      |                     |
+----------------------+------+--------+----------------------+---------------------+
| 12 LEAD ECG          | ECG  | Routin |   Mitral valve       | Ordered: 2018 |
|                      |      | e      | insufficiency,       |                     |
|                      |      |        | unspecified etiology |                     |
+----------------------+------+--------+----------------------+---------------------+
| 6-MINUTE WALK TEST - | ECG  | Routin |   Mitral valve       | Ordered: 2018 |
|  ECG                 |      | e      | insufficiency,       |                     |
|                      |      |        | unspecified etiology |                     |
+----------------------+------+--------+----------------------+---------------------+
 documented as of this encounter
 
 Procedures
 
 
+------------------+--------+-------------+----------------------+----------------------+
| Procedure Name   | Priori | Date/Time   | Associated Diagnosis | Comments             |
|                  | ty     |             |                      |                      |
+------------------+--------+-------------+----------------------+----------------------+
| TRANSESOPHAGEAL  | Routin | 2018  |   Mitral valve       |   Results for this   |
| ECHOCARDIOGRAM,  | e      |  9:51 AM    | insufficiency,       | procedure are in the |
 
| ADULT            |        | PDT         | unspecified etiology |  results section.    |
+------------------+--------+-------------+----------------------+----------------------+
| TRANSTHORACIC    | Routin | 2018  |   Mitral valve       |   Results for this   |
| ECHOCARDIOGRAM,  | e      |  7:31 AM    | insufficiency,       | procedure are in the |
| ADULT            |        | PDT         | unspecified etiology |  results section.    |
+------------------+--------+-------------+----------------------+----------------------+
 documented in this encounter
 
 Results
 CTA CHEST - GATED W CONTRAST (2018  2:35 PM PDT)
 
+----------+
| Specimen |
+----------+
|          |
+----------+
 
 
 
+------------------------------------------------------------------------+-----------------+
| Narrative                                                              | Performed At    |
+------------------------------------------------------------------------+-----------------+
|   EXAM: CTA CHEST OTHER     HISTORY: Mitral valve disease,             |   [facility]          |
| anticipation of TMVR.     COMPARISON: None.     TECHNIQUE;  Following  | RADIOLOGY VOICE |
| uneventful administration of intravenous contrast, cardiac gated       |  RECOGNITION 2  |
| helical scanning was obtained of the chest and reviewed in soft tissue |                 |
|  and lung algorithm. 3D reformatted images were generated at an        |                 |
| independent workstation and also reviewed.     CONTRAST: IOHEXOL 300   |                 |
| MG IODINE/ML INTRAVENOUS SOLUTION 90 mL     FINDINGS:     CARDIAC      |                 |
| MORPHOLOGY:  The aortic arch, cardiac apex and gastric lumen are on    |                 |
| the left.        Mitral valve area: 9.3 cm2  Perimeter: 10.7 mm  TT:   |                 |
| 27 mm  SL: 34mm     CHEST:  The heart is enlarged with preferential    |                 |
| enlargement of both atria. There is extensive mitral annular           |                 |
| calcifications. Minimal mitral aortic valvular and coronary arterial   |                 |
| calcifications are also noted. There is no pericardial or pleural      |                 |
| effusion. There is no pneumothorax.     There are no suspicious        |                 |
| mediastinal, or axillary lymph nodes.     The lungs are clear without  |                 |
| suspicious pulmonary nodules, masses or consolidative opacities.       |                 |
| Minimal bibasilar linear atelectasis/scarring is noted.     The spleen |                 |
|  is lobulated and diminutive in size. Rim calcified right superior     |                 |
| renal pole 5 cm cyst is not fully evaluated.     Remaining visualized  |                 |
| upper abdominal organs are unremarkable.     There is no suspicious    |                 |
| focal osseous abnormality.     IMPRESSION:  Extensive mitral annular   |                 |
| calcifications.     Mitral valvular measurements as above.        I    |                 |
| have personally reviewed the images and, if necessary, edited the      |                 |
| report. I agree with the report as now presented.      Final           |                 |
| signature: Henrietta Whitehead MD    2018 4:05 PM   Preliminary:       |                 |
| Henrietta Whitehead MD        Dictation initiated: Henrietta Whitehead MD        |                 |
| 2018 3:01 PM                                                      |                 |
+------------------------------------------------------------------------+-----------------+
 
 
 
+-------------------------------------------------------------------------------------------
--------------------------------------------------------------------------------------------
-------------------------------+
| Procedure Note                                                                            
                                                                                            
                               |
+-------------------------------------------------------------------------------------------
 
--------------------------------------------------------------------------------------------
-------------------------------+
|   Service Account, Radiant Res In Interface - 2018  4:06 PM PDT  EXAM: CTA CHEST    
                                                                                            
                               |
| OTHER HISTORY: Mitral valve disease, anticipation of TMVR. COMPARISON: None.              
                                                                                            
                               |
| TECHNIQUE;Following uneventful administration of intravenous contrast, cardiac gated      
                                                                                            
                               |
| helical scanning was obtained of the chest and reviewed in soft tissue and lung           
                                                                                            
                               |
| algorithm. 3D reformatted images were generated at an independent workstation and also    
                                                                                            
                               |
| reviewed. CONTRAST: IOHEXOL 300 MG IODINE/ML INTRAVENOUS SOLUTION 90 mL FINDINGS:         
                                                                                            
                               |
| CARDIAC MORPHOLOGY:The aortic arch, cardiac apex and gastric lumen are on the left.       
                                                                                            
                               |
| Mitral valve area: 9.3 go0Xjfvqaxgi: 10.7 mmTT: 27 mmSL: 34mm CHEST:The heart is          
                                                                                            
                               |
| enlarged with preferential enlargement of both atria. There is extensive mitral annular   
                                                                                            
                               |
| calcifications. Minimal mitral aortic valvular and coronary arterial calcifications are   
                                                                                            
                               |
| also noted. There is no pericardial or pleural effusion. There is no pneumothorax. There  
                                                                                            
                               |
|  are no suspicious mediastinal, or axillary lymph nodes. The lungs are clear without      
                                                                                            
                               |
| suspicious pulmonary nodules, masses or consolidative opacities. Minimal bibasilar        
                                                                                            
                               |
| linear atelectasis/scarring is noted. The spleen is lobulated and diminutive in size.     
                                                                                            
                               |
| Rim calcified right superior renal pole 5 cm cyst is not fully evaluated. Remaining       
                                                                                            
                               |
| visualized upper abdominal organs are unremarkable. There is no suspicious focal osseous  
                                                                                            
                               |
|  abnormality. IMPRESSION:Extensive mitral annular calcifications. Mitral valvular         
                                                                                            
                               |
| measurements as above.  I have personally reviewed the images and, if necessary, edited   
                                                                                            
                               |
| the report. I agree with the report as now presented.  Final signature: Virginie Ochoa
| MD   2018 4:05 PM Preliminary: Henrietta Whitehead MD    Dictation initiated: Henrietta Whitehead MD   2018 3:01 PM                                                              
                                                                                            
                               |
|CHEST:                                                                                     
                                                                                            
                              |
|The heart is enlarged with preferential enlargement of both atria. There is extensive rebecca
l annular calcifications. Minimal mitral aortic valvular and coronary arterial calcification
s are also noted. There is no  |
|pericardial or pleural effusion. There is no pneumothorax.                                 
                                                                                            
                               |
|                                                                                           
                                                                                            
                               |
|There are no suspicious mediastinal, or axillary lymph nodes.                              
                                                                                            
                               |
|                                                                                           
                                                                                            
                               |
|The lungs are clear without suspicious pulmonary nodules, masses or consolidative opacities
. Minimal bibasilar linear atelectasis/scarring is noted.                                   
                               |
|                                                                                           
                                                                                            
                               |
|The spleen is lobulated and diminutive in size. Rim calcified right superior renal pole 5 c
m cyst is not fully evaluated.                                                              
                               |
|                                                                                           
                                                                                            
                               |
|Remaining visualized upper abdominal organs are unremarkable.                              
                                                                                            
                               |
|                                                                                           
                                                                                            
                               |
|There is no suspicious focal osseous abnormality.                                          
                                                                                            
                               |
|                                                                                           
                                                                                            
                               |
|IMPRESSION:                                                                                
                                                                                            
                              |
|Extensive mitral annular calcifications.                                                   
                                                                                            
                               |
|                                                                                           
                                                                                            
                               |
|Mitral valvular measurements as above.                                                     
                                                                                            
                               |
|                                                                                           
 
                                                                                            
                               |
|                                                                                           
                                                                                            
                               |
|I have personally reviewed the images and, if necessary, edited the report. I agree with th
e report as now presented.                                                                  
                               |
|                                                                                           
                                                                                            
                               |
|Final signature: Henrietta Whiethead MD   2018 4:05 PM                                     
                                                                                            
                               |
|Preliminary: Henrietta Whitehead MD                                                             
                                                                                            
                               |
|Dictation initiated: Henrietta Whitehead MD   2018 3:01 PM                                 
                                                                                            
                               |
+-------------------------------------------------------------------------------------------
--------------------------------------------------------------------------------------------
-------------------------------+
 
 
 
+---------------------+---------+--------------------+--------------+
| Performing          | Address | City/State/Zipcode | Phone Number |
| Organization        |         |                    |              |
+---------------------+---------+--------------------+--------------+
|   [facility]    |         |                    |              |
| VOICE RECOGNITION 2 |         |                    |              |
+---------------------+---------+--------------------+--------------+
 TRANSESOPHAGEAL ECHOCARDIOGRAM, ADULT (2018  9:51 AM PDT)
 
+-------------+-----------------+-----------+------------+--------------+
| Component   | Value           | Ref Range | Performed  | Pathologist  |
|             |                 |           | At         | Signature    |
+-------------+-----------------+-----------+------------+--------------+
| EJECTION    | greater than 75 |           | [facility]  |              |
| FRACTION    |                 |           | OF         |              |
|             |                 |           | CARDIOLOGY |              |
+-------------+-----------------+-----------+------------+--------------+
| EJECTION    | 75              | %         | [facility]  |              |
| FRACTION    |                 |           | OF         |              |
| RANGE MEAN  |                 |           | CARDIOLOGY |              |
| VALUE       |                 |           |            |              |
+-------------+-----------------+-----------+------------+--------------+
 
 
 
+----------+
| Specimen |
+----------+
|          |
+----------+
 
 
 
+--------------------------------------------------------------------------------------+----
 
-------------+
| Narrative                                                                            | Per
formed At    |
+--------------------------------------------------------------------------------------+----
-------------+
|   This result has an attachment that is not available.  [facility]                |   O
[facility] DEPT OF  |
| [facility]    Adult Echocardiography Laboratory      [id number]      |
|  [address] [place], [place] [address] Ph:                  |    
             |
| [phone] Fax: [phone]  Pt Name: ALVIN HARP                      |    
             |
|   Study Date/Time       2018 / 9:51:19 AMMRN:       [id number]                     |    
             |
|              Most recent prior:   18Acc #:    [id number]                         |    
             |
|       No. previous echos: 1DOB:       1932 86 years   Heart Rate:               |    
             |
|              83 bpmHeight:   63.0 in                   Blood Pressure:               |    
             |
|        136/75 mm/HgWeight:   145.0 lb                  Gender:                       |    
             |
|             FBSA:       1.69 m2                   Order ID:                          |    
             |
|       [id number] Sonographer: go  Referring Provider: Jame PARRA                         |    
             |
| [place] [facility] Performed: Trans[facility]ophageal                   |    
             |
| echocardiogram and Due to incomplete visualization of the mitral valve               |    
             |
|  structure, online 3D reconstruction was clinically indicated and                    |    
             |
| performed under the concurrent supervision of the interpreting                       |    
             |
| cardiologist.Study Quality: Good.Exam Indication: Mitral                             |    
             |
| regurgitationHistory: 87 yO f with severe MR, mod-severe TR, mild AS,                |    
             |
| and small secundum ASD referred for percuteanous MV repair /                         |    
             |
| replacement +/- tricuspid valve intervention . This history was                      |    
             |
| obtained from the patient's EHR.Transesophageal Echocardiographic                    |    
             |
| ReportConsent and Probe Insertion:The risks and benefits of JENIFFER were                 |    
             |
| explained to the patient and informed written consent was obtained.                  |    
             |
| The oropharynx was anesthetized with topical lidocaine. Sedation                     |    
             |
| administered by Anesthesiology. The JENIFFER probe was placed into the                    |    
             |
| esophagus, without complications. The patient was monitored throughout               |    
             |
|  the exam with a single lead electrocardiogram and blood pressure                    |    
             |
| cuff. Oxygen was administered via nasal cannula. Direct visualization                |    
             |
| of the oropharynx has shown a Mallampati score of Class II.Conscious                 |    
 
             |
| sedation duration:                                                                   |    
             |
| +---------------------------------------------------------------------               |    
             |
| ---------+Final Impressions:                                                         |    
             |
|                                                                                      |    
             |
|                                                                                      |    
             |
|                                                                                      |    
             |
|                                                                                      |    
             |
|      1. The LV function is hyperdynamic.                                             |    
             |
|                                   2. The visually estimated ejection                 |    
             |
| fraction is > 75%.                                     3. Right                      |    
             |
| ventricular size, thickness and function are normal.                                 |    
             |
|        4. Severe circumferential mitral annular calcification. A large               |    
             |
|  deposit of      calcium at the posterolateral atrial aspect of                      |    
             |
| themitral annulus that             partially disrupts the normal                     |    
             |
| mitral annular shape and size. Mitral annular    area is measured at                 |    
             |
| 8.26 cm2, with an AP diameter of 3.76 cm, and CC                                     |    
             |
|   diameter of 2.52 cm. However, there is no significant mitral                       |    
             |
| stenosis. Mean    mitral gradient is approximately 4 mmHg at a heart                 |    
             |
| rate of 83 bpm.                  5. Severe mitral valve regurgitation.               |    
             |
|  The EROA is 0.47 cm2 (using a PISA         radius of 1.1 cm, an                     |    
             |
| aliasing velocity of 0.32 m/s, and a mean MR                                         |    
             |
| regurgitant velocity of 5.1 m/s), the calculated regurgitant volume is               |    
             |
|  67       mL, and the calculated regurgitant fraction is 52%.                        |    
             |
|                                6. The mitral valve anterior leaflet                  |    
             |
| measures 2.0 cm.                                     7. There is                     |    
             |
| subvalvar and papilary muscle head calcifidation noted as well.    8.                |    
             |
| The mechanism for the severe mitral regurgitation is leaflet and                     |    
             |
| annular    calcification and degerneration with poor coaptation across               |    
             |
|  all portions of    the valve.                                                       |    
             |
|                                                               9. The                 |    
 
             |
| aortic valve is moderately sclerotic and restricted. No aortic                       |    
             |
|       stenosis.                                                                      |    
             |
|                                                 10. Severely enlarged                |    
             |
| left atrium.                                                                         |    
             |
|         11. There is a small fenestrated secundum atrial septal                      |    
             |
| defect, with               intermittent left-to-right flow via                       |    
             |
| color-flow Doppler.                                  12. Moderate                    |    
             |
| tricuspid regurgitation.                                                             |    
             |
|                13. Small plaque involving the ascending and transverse               |    
             |
|  aorta.                        14. The left atrial appendage is well                 |    
             |
| visualized and there is no evidence of   thrombus present.                           |    
             |
|                                                                                      |    
             |
|                                                                                      |    
             |
|                                                                                      |    
             |
|   +-------------------------------------------------------------------               |    
             |
| -----------+  Findings:Cardiac Rhythm: atrial fibrillation Left                      |    
             |
| Ventricle: The LV function is hyperdynamic. Left ventricular systolic                |    
             |
| thickening is normal in all segments. The visually estimated ejection                |    
             |
| fraction is > 75%. Left Atrium: Left atrial size is severely enlarged.               |    
             |
|  Left atrial appendage: The left atrial appendage is well visualized                 |    
             |
| and there is no evidence of thrombus present. Right Ventricle: Right                 |    
             |
| ventricular size, thickness and function are normal. Aortic Valve: The               |    
             |
|  aortic valve is moderately sclerotic and restricted. No aortic                      |    
             |
| stenosis. No indication of aortic valve regurgitation. On this study,                |    
             |
| transgastric Doppler evaluation reveals an aortic valve peak velocity                |    
             |
| of 1.8 m/s, peak gradient of 13 mmHg, mean gradient of 5.6 mmHg, and                 |    
             |
| DVI of 0.75 (with an AV VTI of 0.299 and LVOT VTI of 0.226). Mitral                  |    
             |
| Valve: Mitral leaflet mobility is normal. Severe mitral annular                      |    
             |
| calcification. Severe mitral valve regurgitation. The jet is                         |    
             |
| centrally-directed. The EROA is 0.47 cm2, using a PISA radius of 1.1                 |    
 
             |
| cm, an aliasing velocity of 0.32 m/s, and a mean MR regurgitant                      |    
             |
| velocity of 5.1 m/s. There is no mitral stenosis. Mean mitral gradient               |    
             |
|  is approximately 4 mmHg at a heart rate of 83 bpm. Tricuspid Valve:                 |    
             |
| The tricuspid valve is structurally normal. Moderate tricuspid                       |    
             |
| regurgitation. Pulmonic Valve: The pulmonic valve is structurally                    |    
             |
| normal. Aorta: There is a small, layered, immobile plaque, involving                 |    
             |
| the ascending and transverse aorta. Venous: The inferior vena cava was               |    
             |
|  not well visualized. Septa: There is a small fenestrated secundum                   |    
             |
| atrial septal defect, with intermittent left-to-right flow via                       |    
             |
| color-flow Doppler.Siemens JENIFFER probe SN# [id number].Transesophageal echo               |    
             |
|  probe was passed by Cardiologist: Yes. Fellow participating in exam:                |    
             |
| Lincoln KIRANeport electronically signed by: [id number] Jame                     |    
             |
| Claudette TAVAREZ (2018 6:28:58 PM)   *** Final ***                               |    
             |
|Tricuspid Valve: The tricuspid valve is structurally normal. Moderate tricuspid       |    
             |
|regurgitation.                                                                       |     
            |
|                                                                                      |    
             |
|Pulmonic Valve: The pulmonic valve is structurally normal.                            |    
             |
|                                                                                      |    
             |
|Aorta: There is a small, layered, immobile plaque, involving the ascending and        |    
             |
|transverse aorta.                                                                     |    
             |
|                                                                                      |    
             |
|Venous: The inferior vena cava was not well visualized.                               |    
             |
|                                                                                      |    
             |
|Septa: There is a small fenestrated secundum atrial septal defect, with intermittent  |    
             |
|left-to-right flow via color-flow Doppler.                                            |    
             |
|Siemens JENIFFER probe SN# [id number].                                                       |    
             |
|Transesophageal echo probe was passed by Cardiologist: Yes.                           |    
             |
|                                                                                      |    
             |
|Fellow participating in exam: Lincoln Daniels MD                                         |    
             |
|Report electronically signed by: [id number] Jame Wilkins M.D. (2018 6:28:58  |    
 
             |
|PM)                                                                                   |    
             |
|                                                                                      |    
             |
|                                                                                      |    
             |
|                                                                                      |    
             |
|*** Final ***                                                                         |    
             |
+--------------------------------------------------------------------------------------+----
-------------+
 
 
 
+------------------------------------------------------------------------------------------+
| Procedure Note                                                                           |
+------------------------------------------------------------------------------------------+
|   Interface, Cardiology Results - 2018  6:29 PM [facility]    |
| [facility] Echocardiography Laboratory    [address] [place] [address]        |
| [place], [place] [address] Ph: [phone] Fax: [phone] Pt Name: ALVIN BLAKE    |
| TESHAASMITA  Study Date/Time     2018 / 9:51:19 AMMRN:     [id number]             Most    |
| recent prior:  18Acc #:   [id number]           No. previous echos: 1DOB:             |
| 1932 86 years  Heart Rate:         83 bpmHeight:  63.0 in             Blood         |
| Pressure:     136/75 mm/HgWeight:  145.0 lb            Gender:             FBSA:         |
| 1.69 m2             Order ID:           [id number]: go Referring Provider:     |
| Jame Sullivan Location: [facility] Performed: Transesophageal               |
| echocardiogram and Due to incomplete visualization of the mitral valve structure, online |
|  3D reconstruction was clinically indicated and performed under the concurrent           |
| supervision of the interpreting cardiologist.Study Quality: Good.Exam Indication: Mitral |
|  regurgitationHistory: 87 yO f with severe MR, mod-severe TR, mild AS, and small         |
| secundum ASD referred for percuteanous MV repair / replacement +/- tricuspid valve       |
| intervention . This history was obtained from the patient's EHR.Transesophageal          |
| Echocardiographic ReportConsent and Probe Insertion:The risks and benefits of JENIFFER were   |
| explained to the patient and informed written consent was obtained. The oropharynx was   |
| anesthetized with topical lidocaine. Sedation administered by Anesthesiology. The JENIFFER    |
| probe was placed into the esophagus, without complications. The patient was monitored    |
| throughout the exam with a single lead electrocardiogram and blood pressure cuff. Oxygen |
|  was administered via nasal cannula. Direct visualization of the oropharynx has shown a  |
| Mallampati score of Class II.Conscious sedation                                          |
| duration:+------------------------------------------------------------------------------ |
| +Final Impressions:                                                                      |
|                                                                                          |
|                                                            1. The LV function is         |
| hyperdynamic.                                           2. The visually estimated        |
| ejection fraction is > 75%.                         3. Right ventricular size, thickness |
|  and function are normal.                 4. Severe circumferential mitral annular       |
| calcification. A large deposit of    calcium at the posterolateral atrial aspect of      |
| themitral annulus that         partially disrupts the normal mitral annular shape and    |
| size. Mitral annular   area is measured at 8.26 cm2, with an AP diameter of 3.76 cm, and |
|  CC          diameter of 2.52 cm. However, there is no significant mitral stenosis. Mean |
|    mitral gradient is approximately 4 mmHg at a heart rate of 83 bpm.            5.      |
| Severe mitral valve regurgitation. The EROA is 0.47 cm2 (using a PISA      radius of 1.1 |
|  cm, an aliasing velocity of 0.32 m/s, and a mean MR             regurgitant velocity of |
|  5.1 m/s), the calculated regurgitant volume is 67     mL, and the calculated            |
| regurgitant fraction is 52%.                           6. The mitral valve anterior      |
| leaflet measures 2.0 cm.                         7. There is subvalvar and papilary      |
| muscle head calcifidation noted as well.   8. The mechanism for the severe mitral        |
| regurgitation is leaflet and annular   calcification and degerneration with poor         |
 
| coaptation across all portions of   the valve.                                           |
|                           9. The aortic valve is moderately sclerotic and restricted. No |
|  aortic         stenosis.                                                                |
|       10. Severely enlarged left atrium.                                            11.  |
| There is a small fenestrated secundum atrial septal defect, with          intermittent   |
| left-to-right flow via color-flow Doppler.                       12. Moderate tricuspid  |
| regurgitation.                                         13. Small plaque involving the    |
| ascending and transverse aorta.                14. The left atrial appendage is well     |
| visualized and there is no evidence of  thrombus present.                                |
|                                                                                          |
|                                                                                          |
| +------------------------------------------------------------------------------+         |
| Findings:Cardiac Rhythm: atrial fibrillation Left Ventricle: The LV function is          |
| hyperdynamic. Left ventricular systolic thickening is normal in all segments. The        |
| visually estimated ejection fraction is > 75%. Left Atrium: Left atrial size is severely |
|  enlarged. Left atrial appendage: The left atrial appendage is well visualized and there |
|  is no evidence of thrombus present. Right Ventricle: Right ventricular size, thickness  |
| and function are normal. Aortic Valve: The aortic valve is moderately sclerotic and      |
| restricted. No aortic stenosis. No indication of aortic valve regurgitation. On this     |
| study, transgastric Doppler evaluation reveals an aortic valve peak velocity of 1.8 m/s, |
|  peak gradient of 13 mmHg, mean gradient of 5.6 mmHg, and DVI of 0.75 (with an AV VTI of |
|  0.299 and LVOT VTI of 0.226). Mitral Valve: Mitral leaflet mobility is normal. Severe   |
| mitral annular calcification. Severe mitral valve regurgitation. The jet is              |
| centrally-directed. The EROA is 0.47 cm2, using a PISA radius of 1.1 cm, an aliasing     |
| velocity of 0.32 m/s, and a mean MR regurgitant velocity of 5.1 m/s. There is no mitral  |
| stenosis. Mean mitral gradient is approximately 4 mmHg at a heart rate of 83 bpm.        |
| Tricuspid Valve: The tricuspid valve is structurally normal. Moderate tricuspid          |
| regurgitation. Pulmonic Valve: The pulmonic valve is structurally normal. Aorta: There   |
| is a small, layered, immobile plaque, involving the ascending and transverse aorta.      |
| Venous: The inferior vena cava was not well visualized. Septa: There is a small          |
| fenestrated secundum atrial septal defect, with intermittent left-to-right flow via      |
| color-flow Doppler.Siemens JENIFFER probe SN# [id number].Transesophageal echo probe was passed  |
| by Cardiologist: Yes.Fellow participating in exam: Lincoln Daniels [facility] electronically  |
| signed by: [id number] Jame Wilkins M.D. (2018 6:28:58 PM) *** Final ***         |
|                                                                                          |
|Findings:                                                                                |
|Cardiac Rhythm: atrial fibrillation                                                       |
|                                                                                          |
|Left Ventricle: The LV function is hyperdynamic. Left ventricular systolic thickening     |
| is normal in all segments. The visually estimated ejection fraction is > 75%.            |
|                                                                                          |
|Left Atrium: Left atrial size is severely enlarged.                                       |
|                                                                                          |
|Left atrial appendage: The left atrial appendage is well visualized and there is no       |
|evidence of thrombus present.                                                             |
|                                                                                          |
|Right Ventricle: Right ventricular size, thickness and function are normal.               |
|                                                                                          |
|Aortic Valve: The aortic valve is moderately sclerotic and restricted. No aortic          |
|stenosis. No indication of aortic valve regurgitation. On this study, transgastric       |
|Doppler evaluation reveals an aortic valve peak velocity of 1.8 m/s, peak gradient of     |
| 13 mmHg, mean gradient of 5.6 mmHg, and DVI of 0.75 (with an AV VTI of 0.299 and         |
|LVOT VTI of 0.226).                                                                       |
|                                                                                          |
|Mitral Valve: Mitral leaflet mobility is normal. Severe mitral annular calcification.     |
| Severe mitral valve regurgitation. The jet is centrally-directed. The EROA is 0.47       |
|cm2, using a PISA radius of 1.1 cm, an aliasing velocity of 0.32 m/s, and a mean MR       |
|regurgitant velocity of 5.1 m/s. There is no mitral stenosis. Mean mitral gradient is     |
| approximately 4 mmHg at a heart rate of 83 bpm.                                          |
|                                                                                          |
 
|Tricuspid Valve: The tricuspid valve is structurally normal. Moderate tricuspid           |
|regurgitation.                                                                           |
|                                                                                          |
|Pulmonic Valve: The pulmonic valve is structurally normal.                                |
|                                                                                          |
|Aorta: There is a small, layered, immobile plaque, involving the ascending and            |
|transverse aorta.                                                                         |
|                                                                                          |
|Venous: The inferior vena cava was not well visualized.                                   |
|                                                                                          |
|Septa: There is a small fenestrated secundum atrial septal defect, with intermittent      |
|left-to-right flow via color-flow Doppler.                                                |
|Siemens JENIFFER probe SN# [id number].                                                           |
|Transesophageal echo probe was passed by Cardiologist: Yes.                               |
|                                                                                          |
|Fellow participating in exam: Lincoln Daniels MD                                             |
|Report electronically signed by: [id number] Jame Wilkins M.D. (2018 6:28:58      |
|PM)                                                                                       |
|                                                                                          |
|*** Final ***                                                                             |
+------------------------------------------------------------------------------------------+
 
 
 
+-----------------+------------------------+--------------------+--------------+
| Performing      | Address                | City/State/Zipcode | Phone Number |
| Organization    |                        |                    |              |
+-----------------+------------------------+--------------------+--------------+
|   [facility]  |   [id number]  | [place], [place]       |              |
| CARDIOLOGY      | [address]              | [address]         |              |
+-----------------+------------------------+--------------------+--------------+
 TRANSTHORACIC ECHOCARDIOGRAM, ADULT (2018  7:31 AM PDT)
 
+-------------+----------+-----------+------------+--------------+
| Component   | Value    | Ref Range | Performed  | Pathologist  |
|             |          |           | At         | Signature    |
+-------------+----------+-----------+------------+--------------+
| EJECTION    | 70 to 75 |           | [facility]  |              |
| FRACTION    |          |           | OF         |              |
|             |          |           | CARDIOLOGY |              |
+-------------+----------+-----------+------------+--------------+
| LA          | 4.4      |           | [facility]  |              |
| DIMENSION   |          |           | OF         |              |
|             |          |           | CARDIOLOGY |              |
+-------------+----------+-----------+------------+--------------+
| LVIDD       | 4.4      |           | [facility]  |              |
|             |          |           | OF         |              |
|             |          |           | CARDIOLOGY |              |
+-------------+----------+-----------+------------+--------------+
| MV E?       | 0.0      |           | [facility]  |              |
|             |          |           | OF         |              |
|             |          |           | CARDIOLOGY |              |
+-------------+----------+-----------+------------+--------------+
| MV E VMAX   | 1.7      |           | [facility]  |              |
|             |          |           | OF         |              |
|             |          |           | CARDIOLOGY |              |
+-------------+----------+-----------+------------+--------------+
| RVSP        | 40       |           | [facility]  |              |
 
|             |          |           | OF         |              |
|             |          |           | CARDIOLOGY |              |
+-------------+----------+-----------+------------+--------------+
| RV TAPSE    | 1.8      |           | [facility]  |              |
|             |          |           | OF         |              |
|             |          |           | CARDIOLOGY |              |
+-------------+----------+-----------+------------+--------------+
| RV TDI S?   | 12.0     |           | [facility]  |              |
|             |          |           | OF         |              |
|             |          |           | CARDIOLOGY |              |
+-------------+----------+-----------+------------+--------------+
| EJECTION    | 72.5     | %         | [facility]  |              |
| FRACTION    |          |           | OF         |              |
| RANGE MEAN  |          |           | CARDIOLOGY |              |
| VALUE       |          |           |            |              |
+-------------+----------+-----------+------------+--------------+
 
 
 
+----------+
| Specimen |
+----------+
|          |
+----------+
 
 
 
+-----------------------------------------------------------------------------------------+-
----------------+
| Narrative                                                                               | 
Performed At    |
+-----------------------------------------------------------------------------------------+-
----------------+
|   This result has an attachment that is not available.  [facility]                   | 
  [facility]  |
| [facility]    Adult Echocardiography Laboratory      [id number]                  | 
[address]      |
|  [address] [place], [place] [address] Ph:                     | 
                |
| [phone] Fax: [phone]  Pt Name: ALVIN HARP                         | 
                |
|   Study Date/Time       2018 [id number]                        | 
                |
|              Most recent prior:   -[facility] #:    [id number]                                  | 
                |
| No. previous echos: 0DOB:       1932 86 years   Heart Rate:                        | 
                |
|        77 bpmHeight:   63.0 in                   Blood Pressure:                        | 
                |
|  124/61 mm/HgWeight:   142.0 lb                  Gender:                                | 
                |
|       FBSA:       1.67 m2                   Order ID:                                   | 
                |
| [id number]  Sonographer: Belkis Beach RDCSSonographer 2:Referring                         | 
                |
| Provider: Jame Sullivan [facility] Performed:                     | 
                |
| 2D, Color flow, Spectral Doppler.Study Quality: Good.Exam Indication:                   | 
                |
| DyspneaHistory: Acute diastolic heart failure. She has hyperdynamic LV                  | 
 
                |
|  systolic function on her echocardiogram, but marked valvular disease,                  | 
                |
|  with severe mitral regurgitation, moderately severe tricuspid                          | 
                |
| regurgitation, and mild aortic stenosis. She denies any history of                      | 
                |
| rheumatic fever, but had scarlet fever at age 13, which clearly could                   | 
                |
| have been misdiagnosed. There is no mitral stenosis. Moderately severe                  | 
                |
|  pulmonary hypertension and a small secundum ASD with minor                             | 
                |
| left-to-right shunting. Patient history has been obtained from the EHR                  | 
                |
|  Transthoracic Echocardiographic Report                                                 | 
                |
| +---------------------------------------------------------------------                  | 
                |
| ---------+Final Impressions:                                                            | 
                |
|                                                                                         | 
                |
|                                                                                         | 
                |
|                                                                                         | 
                |
|                                                                                         | 
                |
|      1. The left ventricular cavity size is normal.                                     | 
                |
|                              2. The LV function is hyperdynamic.                        | 
                |
|                                                           3. Visually                   | 
                |
| estimated left ventricular ejection fraction is 70 - 75%.                               | 
                |
| 4. The aortic valve is trileaflet and moderately calcified.                             | 
                |
|                   5. Severe mitral annular calcification as well as                     | 
                |
| papillary and subchordal      bulky calcification.                                      | 
                |
|                                                                     6.                  | 
                |
|  While there is calcification of the bases of the mitral valve                          | 
                |
| leaflets       due to the severe mitral annular calcificaiton, the mid                  | 
                |
|  portions of the          leaflets themseleves are only midly                           | 
                |
| calcified and thin, however there is the   severe central mitral valve                  | 
                |
|  regurgitation.                                                                         | 
                |
|   7. The mean transmitral gradient is 5.5 mmHg at a heart rate of 77                    | 
                |
| bpm.                                                                                    | 
                |
|                                                               8.                        | 
 
                |
| Severe biatrial enlargement.                                                            | 
                |
|                             9. Right ventricular size, thickness and                    | 
                |
| function are normal.                                                                    | 
                |
|                                                                                         | 
                |
|                                                                                         | 
                |
|   +-------------------------------------------------------------------                  | 
                |
| -----------+  Description of Findings: Cardiac Rhythm: Normal sinus                     | 
                |
| rhythm.Left Ventricle: The left ventricular cavity size is normal.                      | 
                |
| Visually estimated left ventricular ejection fraction is 70 - 75%. The                  | 
                |
|  LV function is hyperdynamic.Left Ventricular Wall Motion: Left                         | 
                |
| ventricular systolic thickening is normal in all segments.Atria:                        | 
                |
| Severe biatrial enlargement.Right Ventricle: Right ventricular size,                    | 
                |
| thickness and function are normal. TAPSE measures 1.8cm. The RV TDI s'                  | 
                |
|  velocity is 12.0cm/sec.Aortic Valve: The aortic valve is trileaflet                    | 
                |
| and moderately calcified. No indication of aortic valve regurgitation.                  | 
                |
|  The left coronary cusp is immobile.Mitral Valve: Severe mitral                         | 
                |
| annular calcification. The mean transmitral gradient is 5.5 mmHg at a                   | 
                |
| heart rate of 77 bpm. Severe mitral valve regurgitation. The mitral                     | 
                |
| valve effective regurgitant orifice area is 7 mm2.Tricuspid Valve: The                  | 
                |
|  tricuspid valve is structurally normal. Mild tricuspid regurgitation.                  | 
                |
|  The tricuspid regurgitant velocity is 2.95 m/s, and with an assumed                    | 
                |
| right atrial pressure of 5 mmHg, the estimated right ventricular                        | 
                |
| systolic pressure is upper limits of normal at 39.9 mmHg.Pulmonic                       | 
                |
| Valve: The pulmonic valve is structurally normal. Trace pulmonary                       | 
                |
| valve regurgitation.Venous: Inferior vena cava is normal with normal                    | 
                |
| inspiratory collapse.Pericardium: No pericardial effusion is seen.2D                    | 
                |
| Measurements                      Doppler Measurements                                  | 
                |
|  2D       NL Values    Aortic             MitralLVID(d)      4.43                       | 
                |
| (3.5-5.7cm) Max Breezy    1.54 Peak E         1.68                cm                       | 
                |
|                                     m/s                     m/sLVID(s)                  | 
 
                |
|       3.45                      Mean grad 4.6   Peak A                                  | 
                |
| cm                                        mmHgIVS(d)       0.83                         | 
                |
| (0.6-1.1cm) LVOT Breezy   0.96 E/A Ratio                cm                                 | 
                |
|                          m/sLVPW(d)      1.75    (0.6-1.1cm)                            | 
                |
|               TDI (E/e')   42.1                cm                                       | 
                |
|     LVOT Diam 1.95 MV mn gd      6 mmHgLA A/Ps 2D 4.41    (2.7-3.9cm)                   | 
                |
|                cm                cm                         AI P1/2                     | 
                |
|          MR ERO         7 mm2LA vol A/L 187.9   (40-73ml)    timeBP                     | 
                |
|           ml                                                MR Volume                   | 
                |
|    11 mlLA vol A/L 112.4   (16-34)       Tricuspid                                      | 
                |
|   Pulmonicindex         ml/m2                     TR Vmax    2.95 PV                    | 
                |
| Vmax       0.8                                                                          | 
                |
|      m/s                     m/s                                                        | 
                |
|         RA Press   5      RVOT VTI      10.1                                            | 
                |
|                                    mmHg                   cm                            | 
                |
|                                    RVSP         40    PV mn gd                          | 
                |
|                                                      mmHg                               | 
                |
|                                  Aorta:                                                 | 
                |
| Index:                                             Ao Sinus   2.87                      | 
                |
| (2.1-3.5cm)                                                                             | 
                |
|   cm                                             Asc Ao      2.73                       | 
                |
|                                          (prox)      cmEvaluation of                    | 
                |
| chamber size and geometry is accomplished through the incorporation of                  | 
                |
|  linear, volumetric, and indexed values Report electronically signed                    | 
                |
| by: [id number] Jame Wilkins M.D. (2018, 12:57:18 PM)   ***                     | 
                |
| Final ***                                                                               | 
                |
|                                                                                         | 
                |
|                                             Aorta:                               Index: | 
                |
|                                             Ao Sinus   2.87 (2.1-3.5cm)                 | 
                |
|                                                            cm                           | 
 
                |
|                                             Asc Ao      2.73                            | 
                |
|                                             (prox)      cm                              | 
                |
|Evaluation of chamber size and geometry is accomplished through the incorporation of     | 
                |
|linear, volumetric, and indexed values                                                   | 
                |
|                                                                                         | 
                |
|Report electronically signed by: [id number] Jame Wilkins M.D. (2018,            | 
                |
|12:57:18 PM)                                                                             | 
                |
|                                                                                         | 
                |
|                                                                                         | 
                |
|                                                                                         | 
                |
|*** Final ***                                                                            | 
                |
+-----------------------------------------------------------------------------------------+-
----------------+
 
 
 
+------------------------------------------------------------------------------------------+
| Procedure Note                                                                           |
+------------------------------------------------------------------------------------------+
|   Interface, Cardiology Results - 2018 12:57 PM [facility]    |
| [facility] Echocardiography Laboratory    [address] [place] [address]        |
| [place], [place] [address] Ph: [phone] Fax: [phone] Pt Name: ALVIN HARP  Study Date/Time     2018 [facility]31[facility]     [id number]             Most    |
| recent prior:  -[facility] #:   [id number]           No. previous echos: 0DOB:     1932 86  |
| years  Heart Rate:         77 bpmHeight:  63.0 in             Blood Pressure:     124/61 |
|  mm/HgWeight:  142.0 lb            Gender:             FBSA:     1.67 m2                 |
| Order ID:           [id number] Sonographer: Belkis Beach RDCSSonographer 2:Referring       |
| Provider: Jame Sullivan Location: [facility] Performed: 2D, Color flow,      |
| Spectral Doppler.Study Quality: Good.Exam Indication: DyspneaHistory: Acute diastolic    |
| heart failure. She has hyperdynamic LV systolic function on her echocardiogram, but      |
| marked valvular disease, with severe mitral regurgitation, moderately severe tricuspid   |
| regurgitation, and mild aortic stenosis. She denies any history of rheumatic fever, but  |
| had scarlet fever at age 13, which clearly could have been misdiagnosed. There is no     |
| mitral stenosis. Moderately severe pulmonary hypertension and a small secundum ASD with  |
| minor left-to-right shunting. Patient history has been obtained from the EHR             |
| Transthoracic Echocardiographic                                                          |
| Report+------------------------------------------------------------------------------+Fi |
| nal Impressions:                                                                         |
|                                                                                          |
|                                                         1. The left ventricular cavity   |
| size is normal.                                2. The LV function is hyperdynamic.       |
|                                      3. Visually estimated left ventricular ejection     |
| fraction is 70 - 75%.         4. The aortic valve is trileaflet and moderately           |
| calcified.                   5. Severe mitral annular calcification as well as papillary |
|  and subchordal    bulky calcification.                                                  |
|          6. While there is calcification of the bases of the mitral valve leaflets       |
| due to the severe mitral annular calcificaiton, the mid portions of the       leaflets   |
| themseleves are only midly calcified and thin, however there is the  severe central      |
 
| mitral valve regurgitation.                                    7. The mean transmitral   |
| gradient is 5.5 mmHg at a heart rate of 77 bpm.                                          |
|                                             8. Severe biatrial enlargement.              |
|                                   9. Right ventricular size, thickness and function are  |
| normal.                                                                                  |
|                                                                                          |
| +------------------------------------------------------------------------------+         |
| Description of Findings: Cardiac Rhythm: Normal sinus rhythm.Left Ventricle: The left    |
| ventricular cavity size is normal. Visually estimated left ventricular ejection fraction |
|  is 70 - 75%. The LV function is hyperdynamic.Left Ventricular Wall Motion: Left         |
| ventricular systolic thickening is normal in all segments.Atria: Severe biatrial         |
| enlargement.Right Ventricle: Right ventricular size, thickness and function are normal.  |
| TAPSE measures 1.8cm. The RV TDI s' velocity is 12.0cm/sec.Aortic Valve: The aortic      |
| valve is trileaflet and moderately calcified. No indication of aortic valve              |
| regurgitation. The left coronary cusp is immobile.Mitral Valve: Severe mitral annular    |
| calcification. The mean transmitral gradient is 5.5 mmHg at a heart rate of 77 bpm.      |
| Severe mitral valve regurgitation. The mitral valve effective regurgitant orifice area   |
| is 7 mm2.Tricuspid Valve: The tricuspid valve is structurally normal. Mild tricuspid     |
| regurgitation. The tricuspid regurgitant velocity is 2.95 m/s, and with an assumed right |
|  atrial pressure of 5 mmHg, the estimated right ventricular systolic pressure is upper   |
| limits of normal at 39.9 mmHg.Pulmonic Valve: The pulmonic valve is structurally normal. |
|  Trace pulmonary valve regurgitation.Venous: Inferior vena cava is normal with normal    |
| inspiratory collapse.Pericardium: No pericardial effusion is seen.2D Measurements        |
|         Doppler Measurements            2D     NL Values   Aortic         MitralLVID(d)  |
|    4.43   (3.5-5.7cm) Max Breezy   1.54 Peak E      1.68           cm                       |
|      m/s              m/sLVID(s)    3.45               Mean grad 4.6  Peak A             |
| cm                           mmHgIVS(d)     0.83   (0.6-1.1cm) LVOT Breezy  0.96 E/A Ratio  |
|           cm                           m/sLVPW(d)    1.75   (0.6-1.1cm)                  |
| TDI (E/e')  42.1           cm                 LVOT Diam 1.95 MV mn gd    6 mmHgLA A/Ps   |
| 2D 4.41   (2.7-3.9cm)           cm           cm                 AI P1/2        MR ERO    |
|    7 mm2LA vol A/L 187.9  (40-73ml)   timeBP         ml                                  |
| MR Volume   11 mlLA vol A/L 112.4  (16-34)     Tricuspid      Pulmonicindex      ml/m2   |
|             TR Vmax   2.95 PV Vmax     0.8                                        m/s    |
|            m/s                              RA Press  5    RVOT VTI    10.1              |
|                            mmHg             cm                              RVSP      40 |
|    PV mn gd                                        mmHg                                  |
| Aorta:                     Index:                              Ao Sinus  2.87            |
| (2.1-3.5cm)                                        cm                              Asc   |
| Ao    2.73                              (prox)    cmEvaluation of chamber size and       |
| geometry is accomplished through the incorporation of linear, volumetric, and indexed    |
| values Report electronically signed by: [id number] Jame Wilkins M.D. (2018,     |
| 12:57:18 PM) *** Final ***                                                               |
|regurgitation. The tricuspid regurgitant velocity is 2.95 m/s, and with an assumed       |
|right atrial pressure of 5 mmHg, the estimated right ventricular systolic pressure is     |
| upper limits of normal at 39.9 mmHg.                                                     |
|Pulmonic Valve: The pulmonic valve is structurally normal. Trace pulmonary valve          |
|regurgitation.                                                                           |
|Venous: Inferior vena cava is normal with normal inspiratory collapse.                    |
|Pericardium: No pericardial effusion is seen.                                             |
|2D Measurements               Doppler Measurements                                        |
|                                                                                          |
|           2D     NL Values   Aortic         Mitral                                       |
|LVID(d)    4.43   (3.5-5.7cm) Max Breezy   1.54 Peak E      1.68                             |
|           cm                           m/s              m/s                              |
|LVID(s)    3.45               Mean grad 4.6  Peak A                                       |
|           cm                           mmHg                                              |
|IVS(d)     0.83   (0.6-1.1cm) LVOT Breezy  0.96 E/A Ratio                                    |
|           cm                           m/s                                               |
|LVPW(d)    1.75   (0.6-1.1cm)                TDI (E/e')  42.1                             |
|           cm                 LVOT Diam 1.95 MV mn gd    6 mmHg                           |
 
|LA A/Ps 2D 4.41   (2.7-3.9cm)           cm                                                |
|           cm                 AI P1/2        MR ERO      7 mm2                            |
|LA vol A/L 187.9  (40-73ml)   time                                                        |
|BP         ml                                MR Volume   11 ml                            |
|LA vol A/L 112.4  (16-34)     Tricuspid      Pulmonic                                     |
|index      ml/m2              TR Vmax   2.95 PV Vmax     0.8                              |
|                                        m/s              m/s                              |
|                              RA Press  5    RVOT VTI    10.1                             |
|                                        mmHg             cm                               |
|                              RVSP      40   PV mn gd                                     |
|                                        mmHg                                              |
|                                                                                          |
|                              Aorta:                     Index:                           |
|                              Ao Sinus  2.87 (2.1-3.5cm)                                  |
|                                        cm                                                |
|                              Asc Ao    2.73                                              |
|                              (prox)    cm                                                |
|Evaluation of chamber size and geometry is accomplished through the incorporation of      |
|linear, volumetric, and indexed values                                                    |
|                                                                                          |
|Report electronically signed by: [id number] Jame Wilkins M.D. (2018,             |
|12:57:18 PM)                                                                              |
|                                                                                          |
|*** Final ***                                                                             |
+------------------------------------------------------------------------------------------+
 
 
 
+-----------------+------------------------+--------------------+--------------+
| Performing      | Address                | City/State/Zipcode | Phone Number |
| Organization    |                        |                    |              |
+-----------------+------------------------+--------------------+--------------+
|   [facility]  |   [address]  | [place], [place]       |              |
| CARDIOLOGY      | [address]              | [address]         |              |
+-----------------+------------------------+--------------------+--------------+
 documented in this encounter
 
 Visit Diagnoses
 
 
+--------------------------------------------------------------+
| Diagnosis                                                    |
+--------------------------------------------------------------+
|   Mitral valve insufficiency, unspecified etiology - Primary |
+--------------------------------------------------------------+
 documented in this encounter

## 2020-01-01 NOTE — XMS
Encounter Summary
  Created on: 2020
 
 Vonnie Celaya
 External Reference #: [id number]
 : 32
 Sex: Female
 
 Demographics
 
 
+-----------------------+------------------------+
| Address               | [address]         |
|                       | [place]  [address]   |
+-----------------------+------------------------+
| Home Phone            | [phone]        |
+-----------------------+------------------------+
| Preferred Language    | Unknown                |
+-----------------------+------------------------+
| Marital Status        | Single                 |
+-----------------------+------------------------+
| Episcopalian Affiliation | NON                    |
+-----------------------+------------------------+
| Race                  | White                  |
+-----------------------+------------------------+
| Ethnic Group          | Not  or  |
+-----------------------+------------------------+
 
 
 Author
 
 
+--------------+------------------------------+
| Author       | [facility] |
+--------------+------------------------------+
| Organization | [facility] |
+--------------+------------------------------+
| Address      | Unknown                      |
+--------------+------------------------------+
| Phone        | Unavailable                  |
+--------------+------------------------------+
 
 
 
 Support
 
 
+--------------+--------------+---------+-----------------+
| Name         | Relationship | Address | Phone           |
+--------------+--------------+---------+-----------------+
| Lauryn Leroy | ECON         | Unknown | [phone] |
+--------------+--------------+---------+-----------------+
 
 
 
 Care Team Providers
 
 
 
+------------------------+------+-----------------+
| Care Team Member Name  | Role | Phone           |
+------------------------+------+-----------------+
| Jean Bermudez MD | PCP  | [phone] |
+------------------------+------+-----------------+
 
 
 
 Encounter Details
 
 
+--------+-----------+----------------------+----------------------+----------------------+
| Date   | Type      | Department           | Care Team            | Description          |
+--------+-----------+----------------------+----------------------+----------------------+
| / | Hospital  |   Cardiology -       |   [facility]  | Canceled (Scheduling |
| 2018   | Encounter | Non-Invasive Testing |  [address]   |  error)              |
|        |           |   [address]   | [place] [address]   |                      |
|        |           | Mailcode: [facility]       |                      |                      |
|        |           | [facility]    |                      |                      |
|        |           | and Healing,         |                      |                      |
|        |           | [address]           |                      |                      |
|        |           | [place], [place]         |                      |                      |
|        |           | [address]           |                      |                      |
|        |           | [phone]         |                      |                      |
+--------+-----------+----------------------+----------------------+----------------------+
 
 
 
 Social History
 
 
+--------------+-------+-----------+--------+------+
| Tobacco Use  | Types | Packs/Day | Years  | Date |
|              |       |           | Used   |      |
+--------------+-------+-----------+--------+------+
| Never Smoker |       |           |        |      |
+--------------+-------+-----------+--------+------+
 
 
 
+---------------------+---+---+---+
| Smokeless Tobacco:  |   |   |   |
| Never Used          |   |   |   |
+---------------------+---+---+---+
 
 
 
+-------------+-------------+---------+----------+
| Alcohol Use | Drinks/Week | oz/Week | Comments |
+-------------+-------------+---------+----------+
| No          |             |         |          |
+-------------+-------------+---------+----------+
 
 
 
+------------------+---------------+
| Sex Assigned at  | Date Recorded |
| Birth            |               |
+------------------+---------------+
| Not on file      |               |
 
+------------------+---------------+
 
 
 
+----------------+-------------+-------------+
| Job Start Date | Occupation  | Industry    |
+----------------+-------------+-------------+
| Not on file    | Not on file | Not on file |
+----------------+-------------+-------------+
 
 
 
+----------------+--------------+------------+
| Travel History | Travel Start | Travel End |
+----------------+--------------+------------+
 
 
 
+-------------------------------------+
| No recent travel history available. |
+-------------------------------------+
 documented as of this encounter
 
 Medications at Time of Discharge
 
 
+----------------------+----------------------+-----------+---------+----------+----------+
| Medication           | Sig                  | Dispensed | Refills | Start    | End Date |
|                      |                      |           |         | Date     |          |
+----------------------+----------------------+-----------+---------+----------+----------+
|   acetaminophen 325  | Take by mouth.       |           | 0       |          |          |
| mg oral tablet       |                      |           |         |          |          |
+----------------------+----------------------+-----------+---------+----------+----------+
|   ascorbic acid      | Take 500 mg by mouth |           | 0       |          |          |
| (vitamin C) 500 mg   |  once daily.         |           |         |          |          |
| oral tablet          |                      |           |         |          |          |
+----------------------+----------------------+-----------+---------+----------+----------+
|   furosemide 40 mg   | Take by mouth.       |           | 0       |          |          |
| oral tablet          |                      |           |         |          |          |
+----------------------+----------------------+-----------+---------+----------+----------+
|                      | Take 2 tablets by    |           | 0       |          |          |
| gluc/chnd/om3/dha/ep | mouth once daily.    |           |         |          |          |
| a/fish/str           |                      |           |         |          |          |
| (GLUCOSAMINE         |                      |           |         |          |          |
| CHONDROITIN PLUS     |                      |           |         |          |          |
| ORAL)                |                      |           |         |          |          |
+----------------------+----------------------+-----------+---------+----------+----------+
|   levothyroxine 100  | Take by mouth.       |           | 0       |          |          |
| mcg oral tablet      |                      |           |         |          |          |
+----------------------+----------------------+-----------+---------+----------+----------+
|   losartan 100 mg    | Take 100 mg by mouth |           | 0       |          |          |
| oral tablet          |  once daily.         |           |         |          |          |
+----------------------+----------------------+-----------+---------+----------+----------+
|   MAGNESIUM CHLORIDE | Take by mouth.       |           | 0       |          |          |
|  ORAL                |                      |           |         |          |          |
+----------------------+----------------------+-----------+---------+----------+----------+
|   metFORMIN    | Take by mouth.       |           | 0       |          |          |
| mg oral tablet,ER    |                      |           |         |          |          |
| alexis.retention 24 hr |                      |           |         |          |          |
+----------------------+----------------------+-----------+---------+----------+----------+
 
|   metoprolol         | Take 100 mg by mouth |           | 0       | 07/30/20 |          |
| succinate 200 mg     |  once daily.         |           |         | 18       |          |
| oral tablet extended |                      |           |         |          |          |
|  release 24 hr       |                      |           |         |          |          |
+----------------------+----------------------+-----------+---------+----------+----------+
|   potassium chloride | Take by mouth.       |           | 0       |          |          |
|  SR 20 mEq oral      |                      |           |         |          |          |
| tablet,ER            |                      |           |         |          |          |
| particles/crystals   |                      |           |         |          |          |
+----------------------+----------------------+-----------+---------+----------+----------+
|   simvastatin 20 mg  | Take by mouth once   |           | 0       |          |          |
| oral tablet          | daily in the         |           |         |          |          |
|                      | evening.             |           |         |          |          |
+----------------------+----------------------+-----------+---------+----------+----------+
|   warfarin 5 mg oral | Take by mouth.       |           | 0       |          |          |
|  tablet              |                      |           |         |          |          |
+----------------------+----------------------+-----------+---------+----------+----------+
 documented as of this encounter
 
 Progress Amanda Man - 2018  1:05 PM [facility] 
 
 Test Date:  2018
 Clinic Site: [facility]
 Supervising Provider: Katelin Riddle
 Technician:  TATIANA
 Referring Provider: Katelin Riddle
 Diagnosis: Non-rheumatic mitral regurgitation
 Reason for Test: dyspnea
 
 Patient Sypmtoms :
 Are you short of breath at rest? No 
 
 Six-Minute Walk Test Results:
 Gait speed (time in seconds first 15 feet walked): 4
 Patient walked: 720 feet in 6 minutes
 Beginning heart rate: 78
 Beginning blood pressure: 144/70
 Beginning Sp02: 99%
 Beginning dyspnea index (Franchesca scale) : 0
 
 Ending heart rate: 90
 Ending blood pressure: 157/60
 Ending Sp02: 98%
 Ending dyspnea index (Franchesca scale): 4
 Oxygen level: 
 
 Observations: No level of distress noted, patient tolerated test fairly well
 
 Physician interpretation:
 
 Electronically signed by Amanda Hendrix at 2018  1:10 PM PSTdocumented in this enco
unter
 
 Plan of Treatment
 Not on filedocumented as of this encounter
 
 Visit Diagnoses
 Not on filedocumented in this encounter

## 2020-01-01 NOTE — XMS
PreManage Notification: ALVIN HARP MRN:[id number]
 
Security Information
 
Security Events
No recent Security Events currently on file
 
 
 
CRITERIA MET
------------
- [facility] - Has Care Guidelines
 
 
CARE PROVIDERS
-------------------------------------------------------------------------------------
GIO PEREZ     Internal Medicine     01/28/2019-Current
 
PHONE: Unknown
-------------------------------------------------------------------------------------
Rafy Patrick MD     Primary Care     Current
 
PHONE: [phone]
-------------------------------------------------------------------------------------
george     Case or Care Manager     Current
 
PHONE: Unknown
-------------------------------------------------------------------------------------
 
[facility] has no Care Guidelines for this patient.
Care History
Medical/Surgical
01/31/2019    [facility]
 
      - PATIENT AND FAMILY WOULD LIKE THIS INFORMATION STATED AND PRESENTED TO THE 
      ED PHYSICIAN RIGHT AWAY WHEN PATIENT IS IN THE ED:
      - PATIENT DIAGNOSED WITH CHF IN 2018. 
      - STENT PLACED IN 2018 AT [facility] 
      - DR BRYANT IS PATIENT CARDIOLOGIST AT [facility]- [phone] 
      - PATIENT HAS SEEN DR MEDEIROS AT [facility]- [phone]
01/28/2019    [facility]
 
      - Patient is currently established with [facility]. If patient is seen 
      in the ED during business hours. Please contact [facility] at [facility].
      Care Recommendation: This patient has had 5 or more Emergency Department
      visits in the last 12 months.\T\nbsp; Patient requires education on the scope
      and purpose of the ED as an acute care provider not a Primary Care Provider
      and should not be utilized for chronic conditions.\T\nbsp; These are
      guidelines and the provider should exercise clinical judgment when providing
      care.
E.D. VISIT COUNT (12 MO.)
-------------------------------------------------------------------------------------
2 [facility].
-------------------------------------------------------------------------------------
TOTAL 2
-------------------------------------------------------------------------------------
NOTE: Visits indicate total known visits.
 
ED/UCC VISIT TRACKING (12 MO.)
 
-------------------------------------------------------------------------------------
01/01/2020 16:41
[facility]
 
TYPE: Emergency
 
COMPLAINT:
- SOB
-------------------------------------------------------------------------------------
01/23/2019 14:31
[facility] [place]
 
TYPE: Emergency
 
COMPLAINT:
- SOB,DIFFICULTY BREATHING
-------------------------------------------------------------------------------------
 
 
INPATIENT VISIT TRACKING (12 MO.)
-------------------------------------------------------------------------------------
01/23/2019 17:18
[facility] [place]
 
TYPE: Medical Surgical
 
COMPLAINT:
- CHF,POSS PNA
 
DIAGNOSES:
- Long term (current) use of anticoagulants
- Hyperlipidemia, unspecified
- Athscl heart disease of native coronary artery w/o ang pctrs
- Acute respiratory failure with hypoxia
- Elevated white blood cell count, unspecified
- Long term (current) use of oral hypoglycemic drugs
- Rheumatic mitral stenosis with insufficiency
- Unspecified acute lower respiratory infection
- Urinary tract infection, site not specified
- Unsp Escherichia coli as the cause of diseases classd elswhr
- Allergy status to narcotic agent status
- Long term (current) use of aspirin
- Long term (current) use of antithrombotics/antiplatelets
- Acute on chronic diastolic (congestive) heart failure
- Coronary angioplasty status
- 1 Type 2 diabetes mellitus without complications
- Chronic atrial fibrillation
- Essential (primary) hypertension
- Other long term (current) drug therapy
- Hypothyroidism, unspecified
-------------------------------------------------------------------------------------
 
[email or web address]

## 2020-01-01 NOTE — XMS
Encounter Summary
  Created on: 2020
 
 Vonnie Celaya
 External Reference #: [id number]
 : 32
 Sex: Female
 
 Demographics
 
 
+-----------------------+------------------------+
| Address               | [address]         |
|                       | [place]  [address]   |
+-----------------------+------------------------+
| Home Phone            | [phone]        |
+-----------------------+------------------------+
| Preferred Language    | Unknown                |
+-----------------------+------------------------+
| Marital Status        | Single                 |
+-----------------------+------------------------+
| Catholic Affiliation | NON                    |
+-----------------------+------------------------+
| Race                  | White                  |
+-----------------------+------------------------+
| Ethnic Group          | Not  or  |
+-----------------------+------------------------+
 
 
 Author
 
 
+--------------+------------------------------+
| Author       | [facility] |
+--------------+------------------------------+
| Organization | [facility] |
+--------------+------------------------------+
| Address      | Unknown                      |
+--------------+------------------------------+
| Phone        | Unavailable                  |
+--------------+------------------------------+
 
 
 
 Support
 
 
+--------------+--------------+---------+-----------------+
| Name         | Relationship | Address | Phone           |
+--------------+--------------+---------+-----------------+
| Lauryn Leroy | ECON         | Unknown | [phone] |
+--------------+--------------+---------+-----------------+
 
 
 
 Care Team Providers
 
 
 
+------------------------+------+-----------------+
| Care Team Member Name  | Role | Phone           |
+------------------------+------+-----------------+
| Jean Bermudez MD | PCP  | [phone] |
+------------------------+------+-----------------+
 
 
 
 Encounter Details
 
 
+--------+-------------+----------------------+----------------------+-------------+
| Date   | Type        | Department           | Care Team            | Description |
+--------+-------------+----------------------+----------------------+-------------+
| / | Documentati |   Cardiology at [facility]  |   Dominga Arambula,  |             |
| 2018   | on          |  [address]    | RN  [address]      |             |
|        |             | Mailcode: CH[address]       | [address]      |             |
|        |             | [facility]    | [place], [place]         |             |
|        |             | and [place],         | [address]           |             |
|        |             |       |                      |             |
|        |             | [place], [place]  |                      |             |
|        |             | [address]           |                      |             |
|        |             | [phone]         |                      |             |
+--------+-------------+----------------------+----------------------+-------------+
 
 
 
 Social History
 
 
+--------------+-------+-----------+--------+------+
| Tobacco Use  | Types | Packs/Day | Years  | Date |
|              |       |           | Used   |      |
+--------------+-------+-----------+--------+------+
| Never Smoker |       |           |        |      |
+--------------+-------+-----------+--------+------+
 
 
 
+---------------------+---+---+---+
| Smokeless Tobacco:  |   |   |   |
| Never Used          |   |   |   |
+---------------------+---+---+---+
 
 
 
+-------------+-------------+---------+----------+
| Alcohol Use | Drinks/Week | oz/Week | Comments |
+-------------+-------------+---------+----------+
| No          |             |         |          |
+-------------+-------------+---------+----------+
 
 
 
+------------------+---------------+
| Sex Assigned at  | Date Recorded |
| Birth            |               |
+------------------+---------------+
| Not on file      |               |
+------------------+---------------+
 
 
 
 
+----------------+-------------+-------------+
| Job Start Date | Occupation  | Industry    |
+----------------+-------------+-------------+
| Not on file    | Not on file | Not on file |
+----------------+-------------+-------------+
 
 
 
+----------------+--------------+------------+
| Travel History | Travel Start | Travel End |
+----------------+--------------+------------+
 
 
 
+-------------------------------------+
| No recent travel history available. |
+-------------------------------------+
 documented as of this encounter
 
 Plan of Treatment
 Not on filedocumented as of this encounter
 
 Visit Diagnoses
 Not on filedocumented in this encounter

## 2020-01-01 NOTE — XMS
Encounter Summary
  Created on: 2020
 
 Alvin Harp
 External Reference #: [id number]
 : 32
 Sex: Female
 
 Demographics
 
 
+-----------------------+------------------------+
| Address               | [address]         |
|                       | [place]  [address]   |
+-----------------------+------------------------+
| Home Phone            | [phone]        |
+-----------------------+------------------------+
| Preferred Language    | Unknown                |
+-----------------------+------------------------+
| Marital Status        | Single                 |
+-----------------------+------------------------+
| Episcopalian Affiliation | NON                    |
+-----------------------+------------------------+
| Race                  | White                  |
+-----------------------+------------------------+
| Ethnic Group          | Not  or  |
+-----------------------+------------------------+
 
 
 Author
 
 
+--------------+------------------------------+
| Author       | [facility] |
+--------------+------------------------------+
| Organization | [facility] |
+--------------+------------------------------+
| Address      | Unknown                      |
+--------------+------------------------------+
| Phone        | Unavailable                  |
+--------------+------------------------------+
 
 
 
 Support
 
 
+--------------+--------------+---------+-----------------+
| Name         | Relationship | Address | Phone           |
+--------------+--------------+---------+-----------------+
| Lauryn Leroy | ECON         | Unknown | [phone] |
+--------------+--------------+---------+-----------------+
 
 
 
 Care Team Providers
 
 
 
+-----------------------+------+-------------+
| Care Team Member Name | Role | Phone       |
+-----------------------+------+-------------+
 PCP  | Unavailable |
+-----------------------+------+-------------+
 
 
 
 Reason for Referral
 Diagnostic Testing (Routine)
 
+--------+--------+------------+--------------+--------------+--------------+
| Status | Reason | Specialty  | Diagnoses /  | Referred By  | Referred To  |
|        |        |            | Procedures   | Contact      | Contact      |
+--------+--------+------------+--------------+--------------+--------------+
| Closed |        | Cardiology |   Diagnoses  |              |              |
|        |        |            |  Mitral      | Claudette,  |              |
|        |        |            | valve        | Jame PARRA MD  |              |
|        |        |            | insufficienc |  [address]     |              |
|        |        |            | y,           | [address]     |              |
|        |        |            | unspecified  | [place], [place] |              |
|        |        |            | etiology     |  [address]  |              |
|        |        |            | Procedures   |  Phone:      |              |
|        |        |            | TRANSTHORACI | [phone] |              |
|        |        |            | C            |   Fax:       |              |
|        |        |            | ECHOCARDIOGR | [phone] |              |
|        |        |            | AM, ADULT    |              |              |
+--------+--------+------------+--------------+--------------+--------------+
 
 
 Diagnostic Testing (Routine)
 
+--------+--------+------------+--------------+--------------+---------------+
| Status | Reason | Specialty  | Diagnoses /  | Referred By  | Referred To   |
|        |        |            | Procedures   | Contact      | Contact       |
+--------+--------+------------+--------------+--------------+---------------+
| Closed |        | Cardiology |   Diagnoses  |              |   [facility] Echo    |
|        |        |            |  Mitral      | Claudette,  | [facility]  [address]  |
|        |        |            | valve        | Jame PARRA MD  | [address] |
|        |        |            | insufficienc |  [address]     |   Mailcode:   |
|        |        |            | y,           | [address]     | [id number]  [place]    |
|        |        |            | unspecified  | [place], [place] | [place]  |
|        |        |            | etiology     |  [address]  | Building      |
|        |        |            | Procedures   |  Phone:      | [place]  |
|        |        |            | TRANSESOPHAG | [phone] | [phone]    |
|        |        |            | EAL          |   Fax:       | Phone:        |
|        |        |            | ECHOCARDIOGR | [phone] | [phone]  |
|        |        |            | AM, ADULT    |              |               |
|        |        |            | [place] ECHO      |              |               |
|        |        |            | HEART,TRANSE |              |               |
|        |        |            | SOPHAGEAL,CO |              |               |
|        |        |            | MPLETE  [place]   |              |               |
|        |        |            | DOPPLER ECHO |              |               |
|        |        |            |              |              |               |
|        |        |            | HEART,LIMITE |              |               |
|        |        |            | D,F/U  [place]    |              |               |
|        |        |            | DOPPLER      |              |               |
|        |        |            | COLOR FLOW   |              |               |
|        |        |            | VELOCITY MAP |              |               |
+--------+--------+------------+--------------+--------------+---------------+
 
 
 
 Diagnostic Testing (Routine)
 
+--------+--------+-----------+--------------+--------------+--------------+
| Status | Reason | Specialty | Diagnoses /  | Referred By  | Referred To  |
|        |        |           | Procedures   | Contact      | Contact      |
+--------+--------+-----------+--------------+--------------+--------------+
| Closed |        | Radiology |   Diagnoses  |              |              |
|        |        |           |  Mitral      | Claudette,  |              |
|        |        |           | valve        | Jame PARRA MD  |              |
|        |        |           | insufficienc |  [address]     |              |
|        |        |           | y,           | [address]     |              |
|        |        |           | unspecified  | [place], [place] |              |
|        |        |           | etiology     |  [address]  |              |
|        |        |           | Procedures   |  Phone:      |              |
|        |        |           | CTA CHEST -  | [phone] |              |
|        |        |           | GATED W      |   Fax:       |              |
|        |        |           | CONTRAST     | [phone] |              |
+--------+--------+-----------+--------------+--------------+--------------+
 
 
 
 
 Reason for Visit
 
 
+--------+----------+
| Reason | Comments |
+--------+----------+
| Other  | Psychiatric appt |
+--------+----------+
 
 
 
 Encounter Details
 
 
+--------+-----------+----------------------+----------------------+------------------+
| Date   | Type      | Department           | Care Team            | Description      |
+--------+-----------+----------------------+----------------------+------------------+
| / | Telephone |   Cardiology at [facility]  |   Jame Wilkins  | Other (Psychiatric appt) |
|    |           |  [address]   [address]  |                  |
|        |           | Mailcode: [address]       | [address]  [place], [place]    |                  |
|        |           | [facility]    | [address]           |                  |
|        |           | and Carlos,         | [phone]         |                  |
|        |           |       | [phone] (Fax)   |                  |
|        |           | [place], [place]  |                      |                  |
|        |           | [address]           |                      |                  |
|        |           | [phone]         |                      |                  |
+--------+-----------+----------------------+----------------------+------------------+
 
 
 
 Social History
 
 
+----------------+-------+-----------+--------+------+
| Tobacco Use    | Types | Packs/Day | Years  | Date |
|                |       |           | Used   |      |
 
+----------------+-------+-----------+--------+------+
| Never Assessed |       |           |        |      |
+----------------+-------+-----------+--------+------+
 
 
 
+------------------+---------------+
| Sex Assigned at  | Date Recorded |
| Birth            |               |
+------------------+---------------+
| Not on file      |               |
+------------------+---------------+
 
 
 
+----------------+-------------+-------------+
| Job Start Date | Occupation  | Industry    |
+----------------+-------------+-------------+
| Not on file    | Not on file | Not on file |
+----------------+-------------+-------------+
 
 
 
+----------------+--------------+------------+
| Travel History | Travel Start | Travel End |
+----------------+--------------+------------+
 
 
 
+-------------------------------------+
| No recent travel history available. |
+-------------------------------------+
 documented as of this encounter
 
 Plan of Treatment
 
 
+----------------------+------+--------+----------------------+---------------------+
| Name                 | Type | Priori | Associated Diagnoses | Order Schedule      |
|                      |      | ty     |                      |                     |
+----------------------+------+--------+----------------------+---------------------+
| 12 LEAD ECG          | ECG  | Routin |   Mitral valve       | Ordered: 2018 |
|                      |      | e      | insufficiency,       |                     |
|                      |      |        | unspecified etiology |                     |
+----------------------+------+--------+----------------------+---------------------+
| 6-MINUTE WALK TEST - | ECG  | Routin |   Mitral valve       | Ordered: 2018 |
|  ECG                 |      | e      | insufficiency,       |                     |
|                      |      |        | unspecified etiology |                     |
+----------------------+------+--------+----------------------+---------------------+
 documented as of this encounter
 
 Procedures
 
 
+------------------+--------+-------------+----------------------+----------------------+
| Procedure Name   | Priori | Date/Time   | Associated Diagnosis | Comments             |
|                  | ty     |             |                      |                      |
+------------------+--------+-------------+----------------------+----------------------+
| TRANSESOPHAGEAL  | Routin | 2018  |   Mitral valve       |   Results for this   |
| ECHOCARDIOGRAM,  | e      |  9:51 AM    | insufficiency,       | procedure are in the |
 
| ADULT            |        | PDT         | unspecified etiology |  results section.    |
+------------------+--------+-------------+----------------------+----------------------+
| TRANSTHORACIC    | Routin | 2018  |   Mitral valve       |   Results for this   |
| ECHOCARDIOGRAM,  | e      |  7:31 AM    | insufficiency,       | procedure are in the |
| ADULT            |        | PDT         | unspecified etiology |  results section.    |
+------------------+--------+-------------+----------------------+----------------------+
 documented in this encounter
 
 Results
 CTA CHEST - GATED W CONTRAST (2018  2:35 PM PDT)
 
+----------+
| Specimen |
+----------+
|          |
+----------+
 
 
 
+------------------------------------------------------------------------+-----------------+
| Narrative                                                              | Performed At    |
+------------------------------------------------------------------------+-----------------+
|   EXAM: CTA CHEST OTHER     HISTORY: Mitral valve disease,             |   [facility]          |
| anticipation of TMVR.     COMPARISON: None.     TECHNIQUE;  Following  | RADIOLOGY VOICE |
| uneventful administration of intravenous contrast, cardiac gated       |  RECOGNITION 2  |
| helical scanning was obtained of the chest and reviewed in soft tissue |                 |
|  and lung algorithm. 3D reformatted images were generated at an        |                 |
| independent workstation and also reviewed.     CONTRAST: IOHEXOL 300   |                 |
| MG IODINE/ML INTRAVENOUS SOLUTION 90 mL     FINDINGS:     CARDIAC      |                 |
| MORPHOLOGY:  The aortic arch, cardiac apex and gastric lumen are on    |                 |
| the left.        Mitral valve area: 9.3 cm2  Perimeter: 10.7 mm  TT:   |                 |
| 27 mm  SL: 34mm     CHEST:  The heart is enlarged with preferential    |                 |
| enlargement of both atria. There is extensive mitral annular           |                 |
| calcifications. Minimal mitral aortic valvular and coronary arterial   |                 |
| calcifications are also noted. There is no pericardial or pleural      |                 |
| effusion. There is no pneumothorax.     There are no suspicious        |                 |
| mediastinal, or axillary lymph nodes.     The lungs are clear without  |                 |
| suspicious pulmonary nodules, masses or consolidative opacities.       |                 |
| Minimal bibasilar linear atelectasis/scarring is noted.     The spleen |                 |
|  is lobulated and diminutive in size. Rim calcified right superior     |                 |
| renal pole 5 cm cyst is not fully evaluated.     Remaining visualized  |                 |
| upper abdominal organs are unremarkable.     There is no suspicious    |                 |
| focal osseous abnormality.     IMPRESSION:  Extensive mitral annular   |                 |
| calcifications.     Mitral valvular measurements as above.        I    |                 |
| have personally reviewed the images and, if necessary, edited the      |                 |
| report. I agree with the report as now presented.      Final           |                 |
| signature: Henrietta Whitehead MD    2018 4:05 PM   Preliminary:       |                 |
| Henrietta Whitehead MD        Dictation initiated: Henrietta Whitehead MD        |                 |
| 2018 3:01 PM                                                      |                 |
+------------------------------------------------------------------------+-----------------+
 
 
 
+-------------------------------------------------------------------------------------------
--------------------------------------------------------------------------------------------
-------------------------------+
| Procedure Note                                                                            
                                                                                            
                               |
+-------------------------------------------------------------------------------------------
 
--------------------------------------------------------------------------------------------
-------------------------------+
|   Service Account, Radiant Res In Interface - 2018  4:06 PM PDT  EXAM: CTA CHEST    
                                                                                            
                               |
| OTHER HISTORY: Mitral valve disease, anticipation of TMVR. COMPARISON: None.              
                                                                                            
                               |
| TECHNIQUE;Following uneventful administration of intravenous contrast, cardiac gated      
                                                                                            
                               |
| helical scanning was obtained of the chest and reviewed in soft tissue and lung           
                                                                                            
                               |
| algorithm. 3D reformatted images were generated at an independent workstation and also    
                                                                                            
                               |
| reviewed. CONTRAST: IOHEXOL 300 MG IODINE/ML INTRAVENOUS SOLUTION 90 mL FINDINGS:         
                                                                                            
                               |
| CARDIAC MORPHOLOGY:The aortic arch, cardiac apex and gastric lumen are on the left.       
                                                                                            
                               |
| Mitral valve area: 9.3 gl7Zwhyjvfia: 10.7 mmTT: 27 mmSL: 34mm CHEST:The heart is          
                                                                                            
                               |
| enlarged with preferential enlargement of both atria. There is extensive mitral annular   
                                                                                            
                               |
| calcifications. Minimal mitral aortic valvular and coronary arterial calcifications are   
                                                                                            
                               |
| also noted. There is no pericardial or pleural effusion. There is no pneumothorax. There  
                                                                                            
                               |
|  are no suspicious mediastinal, or axillary lymph nodes. The lungs are clear without      
                                                                                            
                               |
| suspicious pulmonary nodules, masses or consolidative opacities. Minimal bibasilar        
                                                                                            
                               |
| linear atelectasis/scarring is noted. The spleen is lobulated and diminutive in size.     
                                                                                            
                               |
| Rim calcified right superior renal pole 5 cm cyst is not fully evaluated. Remaining       
                                                                                            
                               |
| visualized upper abdominal organs are unremarkable. There is no suspicious focal osseous  
                                                                                            
                               |
|  abnormality. IMPRESSION:Extensive mitral annular calcifications. Mitral valvular         
                                                                                            
                               |
| measurements as above.  I have personally reviewed the images and, if necessary, edited   
                                                                                            
                               |
| the report. I agree with the report as now presented.  Final signature: Virginie Ochoa
| MD   2018 4:05 PM Preliminary: Henrietta Whitehead MD    Dictation initiated: Henrietta Whitehead MD   2018 3:01 PM                                                              
                                                                                            
                               |
|CHEST:                                                                                     
                                                                                            
                              |
|The heart is enlarged with preferential enlargement of both atria. There is extensive rebecca
l annular calcifications. Minimal mitral aortic valvular and coronary arterial calcification
s are also noted. There is no  |
|pericardial or pleural effusion. There is no pneumothorax.                                 
                                                                                            
                               |
|                                                                                           
                                                                                            
                               |
|There are no suspicious mediastinal, or axillary lymph nodes.                              
                                                                                            
                               |
|                                                                                           
                                                                                            
                               |
|The lungs are clear without suspicious pulmonary nodules, masses or consolidative opacities
. Minimal bibasilar linear atelectasis/scarring is noted.                                   
                               |
|                                                                                           
                                                                                            
                               |
|The spleen is lobulated and diminutive in size. Rim calcified right superior renal pole 5 c
m cyst is not fully evaluated.                                                              
                               |
|                                                                                           
                                                                                            
                               |
|Remaining visualized upper abdominal organs are unremarkable.                              
                                                                                            
                               |
|                                                                                           
                                                                                            
                               |
|There is no suspicious focal osseous abnormality.                                          
                                                                                            
                               |
|                                                                                           
                                                                                            
                               |
|IMPRESSION:                                                                                
                                                                                            
                              |
|Extensive mitral annular calcifications.                                                   
                                                                                            
                               |
|                                                                                           
                                                                                            
                               |
|Mitral valvular measurements as above.                                                     
                                                                                            
                               |
|                                                                                           
 
                                                                                            
                               |
|                                                                                           
                                                                                            
                               |
|I have personally reviewed the images and, if necessary, edited the report. I agree with th
e report as now presented.                                                                  
                               |
|                                                                                           
                                                                                            
                               |
|Final signature: Henrietta Whitehead MD   2018 4:05 PM                                     
                                                                                            
                               |
|Preliminary: Henrietta Whitehead MD                                                             
                                                                                            
                               |
|Dictation initiated: Henrietta Whitehead MD   2018 3:01 PM                                 
                                                                                            
                               |
+-------------------------------------------------------------------------------------------
--------------------------------------------------------------------------------------------
-------------------------------+
 
 
 
+---------------------+---------+--------------------+--------------+
| Performing          | Address | City/State/Zipcode | Phone Number |
| Organization        |         |                    |              |
+---------------------+---------+--------------------+--------------+
|   [facility]    |         |                    |              |
| VOICE RECOGNITION 2 |         |                    |              |
+---------------------+---------+--------------------+--------------+
 TRANSESOPHAGEAL ECHOCARDIOGRAM, ADULT (2018  9:51 AM PDT)
 
+-------------+-----------------+-----------+------------+--------------+
| Component   | Value           | Ref Range | Performed  | Pathologist  |
|             |                 |           | At         | Signature    |
+-------------+-----------------+-----------+------------+--------------+
| EJECTION    | greater than 75 |           | [facility]  |              |
| FRACTION    |                 |           | OF         |              |
|             |                 |           | CARDIOLOGY |              |
+-------------+-----------------+-----------+------------+--------------+
| EJECTION    | 75              | %         | [facility]  |              |
| FRACTION    |                 |           | OF         |              |
| RANGE MEAN  |                 |           | CARDIOLOGY |              |
| VALUE       |                 |           |            |              |
+-------------+-----------------+-----------+------------+--------------+
 
 
 
+----------+
| Specimen |
+----------+
|          |
+----------+
 
 
 
+--------------------------------------------------------------------------------------+----
 
-------------+
| Narrative                                                                            | Per
formed At    |
+--------------------------------------------------------------------------------------+----
-------------+
|   This result has an attachment that is not available.  [facility]                |   O
[facility] DEPT OF  |
| [facility]    Adult Echocardiography Laboratory      [id number]      |
|  [address], [place] [address] Ph:                  |    
             |
| [phone] Fax: [phone]  Pt Name: ALVIN HARP                      |    
             |
|   Study Date/Time       2018 / 9:51:19 AMMRN:       [id number]                     |    
             |
|              Most recent prior:   18Acc #:    [id number]                         |    
             |
|       No. previous echos: 1DOB:       1932 86 years   Heart Rate:               |    
             |
|              83 bpmHeight:   63.0 in                   Blood Pressure:               |    
             |
|        136/75 mm/HgWeight:   145.0 lb                  Gender:                       |    
             |
|             FBSA:       1.69 m2                   Order ID:                          |    
             |
|       [id number] Sonographer: go  Referring Provider: Jame PARRA                         |    
             |
| Laurie [facility] Performed: Transesophageal                   |    
             |
| echocardiogram and Due to incomplete visualization of the mitral valve               |    
             |
|  structure, online 3D reconstruction was clinically indicated and                    |    
             |
| performed under the concurrent supervision of the interpreting                       |    
             |
| cardiologist.Study Quality: Good.Exam Indication: Mitral                             |    
             |
| regurgitationHistory: 85 yO f with severe MR, mod-severe TR, mild AS,                |    
             |
| and small secundum ASD referred for percuteanous MV repair /                         |    
             |
| replacement +/- tricuspid valve intervention . This history was                      |    
             |
| obtained from the patient's EHR.Transesophageal Echocardiographic                    |    
             |
| ReportConsent and Probe Insertion:The risks and benefits of JENIFFER were                 |    
             |
| explained to the patient and informed written consent was obtained.                  |    
             |
| The oropharynx was anesthetized with topical lidocaine. Sedation                     |    
             |
| administered by Anesthesiology. The JENIFFER probe was placed into the                    |    
             |
| esophagus, without complications. The patient was monitored throughout               |    
             |
|  the exam with a single lead electrocardiogram and blood pressure                    |    
             |
| cuff. Oxygen was administered via nasal cannula. Direct visualization                |    
             |
| of the oropharynx has shown a Mallampati score of Class II.Conscious                 |    
 
             |
| sedation duration:                                                                   |    
             |
| +---------------------------------------------------------------------               |    
             |
| ---------+Final Impressions:                                                         |    
             |
|                                                                                      |    
             |
|                                                                                      |    
             |
|                                                                                      |    
             |
|                                                                                      |    
             |
|      1. The LV function is hyperdynamic.                                             |    
             |
|                                   2. The visually estimated ejection                 |    
             |
| fraction is > 75%.                                     3. Right                      |    
             |
| ventricular size, thickness and function are normal.                                 |    
             |
|        4. Severe circumferential mitral annular calcification. A large               |    
             |
|  deposit of      calcium at the posterolateral atrial aspect of                      |    
             |
| themitral annulus that             partially disrupts the normal                     |    
             |
| mitral annular shape and size. Mitral annular    area is measured at                 |    
             |
| 8.26 cm2, with an AP diameter of 3.76 cm, and CC                                     |    
             |
|   diameter of 2.52 cm. However, there is no significant mitral                       |    
             |
| stenosis. Mean    mitral gradient is approximately 4 mmHg at a heart                 |    
             |
| rate of 83 bpm.                  5. Severe mitral valve regurgitation.               |    
             |
|  The EROA is 0.47 cm2 (using a PISA         radius of 1.1 cm, an                     |    
             |
| aliasing velocity of 0.32 m/s, and a mean MR                                         |    
             |
| regurgitant velocity of 5.1 m/s), the calculated regurgitant volume is               |    
             |
|  67       mL, and the calculated regurgitant fraction is 52%.                        |    
             |
|                                6. The mitral valve anterior leaflet                  |    
             |
| measures 2.0 cm.                                     7. There is                     |    
             |
| subvalvar and papilary muscle head calcifidation noted as well.    8.                |    
             |
| The mechanism for the severe mitral regurgitation is leaflet and                     |    
             |
| annular    calcification and degerneration with poor coaptation across               |    
             |
|  all portions of    the valve.                                                       |    
             |
|                                                               9. The                 |    
 
             |
| aortic valve is moderately sclerotic and restricted. No aortic                       |    
             |
|       stenosis.                                                                      |    
             |
|                                                 10. Severely enlarged                |    
             |
| left atrium.                                                                         |    
             |
|         11. There is a small fenestrated secundum atrial septal                      |    
             |
| defect, with               intermittent left-to-right flow via                       |    
             |
| color-flow Doppler.                                  12. Moderate                    |    
             |
| tricuspid regurgitation.                                                             |    
             |
|                13. Small plaque involving the ascending and transverse               |    
             |
|  aorta.                        14. The left atrial appendage is well                 |    
             |
| visualized and there is no evidence of   thrombus present.                           |    
             |
|                                                                                      |    
             |
|                                                                                      |    
             |
|                                                                                      |    
             |
|   +-------------------------------------------------------------------               |    
             |
| -----------+  Findings:Cardiac Rhythm: atrial fibrillation Left                      |    
             |
| Ventricle: The LV function is hyperdynamic. Left ventricular systolic                |    
             |
| thickening is normal in all segments. The visually estimated ejection                |    
             |
| fraction is > 75%. Left Atrium: Left atrial size is severely enlarged.               |    
             |
|  Left atrial appendage: The left atrial appendage is well visualized                 |    
             |
| and there is no evidence of thrombus present. Right Ventricle: Right                 |    
             |
| ventricular size, thickness and function are normal. Aortic Valve: The               |    
             |
|  aortic valve is moderately sclerotic and restricted. No aortic                      |    
             |
| stenosis. No indication of aortic valve regurgitation. On this study,                |    
             |
| transgastric Doppler evaluation reveals an aortic valve peak velocity                |    
             |
| of 1.8 m/s, peak gradient of 13 mmHg, mean gradient of 5.6 mmHg, and                 |    
             |
| DVI of 0.75 (with an AV VTI of 0.299 and LVOT VTI of 0.226). Mitral                  |    
             |
| Valve: Mitral leaflet mobility is normal. Severe mitral annular                      |    
             |
| calcification. Severe mitral valve regurgitation. The jet is                         |    
             |
| centrally-directed. The EROA is 0.47 cm2, using a PISA radius of 1.1                 |    
 
             |
| cm, an aliasing velocity of 0.32 m/s, and a mean MR regurgitant                      |    
             |
| velocity of 5.1 m/s. There is no mitral stenosis. Mean mitral gradient               |    
             |
|  is approximately 4 mmHg at a heart rate of 83 bpm. Tricuspid Valve:                 |    
             |
| The tricuspid valve is structurally normal. Moderate tricuspid                       |    
             |
| regurgitation. Pulmonic Valve: The pulmonic valve is structurally                    |    
             |
| normal. Aorta: There is a small, layered, immobile plaque, involving                 |    
             |
| the ascending and transverse aorta. Venous: The inferior vena cava was               |    
             |
|  not well visualized. Septa: There is a small fenestrated secundum                   |    
             |
| atrial septal defect, with intermittent left-to-right flow via                       |    
             |
| color-flow Doppler.Siemens JENIFFER probe SN# [id number].Transesophageal echo               |    
             |
|  probe was passed by Cardiologist: Yes. Fellow participating in exam:                |    
             |
| Lincoln KIRANeport electronically signed by: [id number] Jame                     |    
             |
| Claudette TAVAREZ (2018 6:28:58 PM)   *** Final ***                               |    
             |
|Tricuspid Valve: The tricuspid valve is structurally normal. Moderate tricuspid       |    
             |
|regurgitation.                                                                       |     
            |
|                                                                                      |    
             |
|Pulmonic Valve: The pulmonic valve is structurally normal.                            |    
             |
|                                                                                      |    
             |
|Aorta: There is a small, layered, immobile plaque, involving the ascending and        |    
             |
|transverse aorta.                                                                     |    
             |
|                                                                                      |    
             |
|Venous: The inferior vena cava was not well visualized.                               |    
             |
|                                                                                      |    
             |
|Septa: There is a small fenestrated secundum atrial septal defect, with intermittent  |    
             |
|left-to-right flow via color-flow Doppler.                                            |    
             |
|Siemens JENIFFER probe SN# [id number].                                                       |    
             |
|Transesophageal echo probe was passed by Cardiologist: Yes.                           |    
             |
|                                                                                      |    
             |
|Fellow participating in exam: Lincoln Daniels MD                                         |    
             |
|Report electronically signed by: [id number] Jame Wilkins M.D. (2018 6:28:58  |    
 
             |
|PM)                                                                                   |    
             |
|                                                                                      |    
             |
|                                                                                      |    
             |
|                                                                                      |    
             |
|*** Final ***                                                                         |    
             |
+--------------------------------------------------------------------------------------+----
-------------+
 
 
 
+------------------------------------------------------------------------------------------+
| Procedure Note                                                                           |
+------------------------------------------------------------------------------------------+
|   Interface, Cardiology Results - 2018  6:29 PM [facility]    |
| [facility] Echocardiography Laboratory    [address] [place] [address]        |
| [place], [place] [address] Ph: [phone] Fax: [phone] Pt Name: ALVIN BLAKE    |
| TESHAASMITA  Study Date/Time     2018 / 9:51:19 AMMRN:     [id number]             Most    |
| recent prior:  18Acc #:   [id number]           No. previous echos: 1DOB:             |
| 1932 86 years  Heart Rate:         83 bpmHeight:  63.0 in             Blood         |
| Pressure:     136/75 mm/HgWeight:  145.0 lb            Gender:             FBSA:         |
| 1.69 m2             Order ID:           [id number]: go Referring Provider:     |
| Jame Sullivan Location: [facility] Performed: Transesophageal               |
| echocardiogram and Due to incomplete visualization of the mitral valve structure, online |
|  3D reconstruction was clinically indicated and performed under the concurrent           |
| supervision of the interpreting cardiologist.Study Quality: Good.Exam Indication: Mitral |
|  regurgitationHistory: 85 yO f with severe MR, mod-severe TR, mild AS, and small         |
| secundum ASD referred for percuteanous MV repair / replacement +/- tricuspid valve       |
| intervention . This history was obtained from the patient's EHR.Transesophageal          |
| Echocardiographic ReportConsent and Probe Insertion:The risks and benefits of JENIFFER were   |
| explained to the patient and informed written consent was obtained. The oropharynx was   |
| anesthetized with topical lidocaine. Sedation administered by Anesthesiology. The JENIFFER    |
| probe was placed into the esophagus, without complications. The patient was monitored    |
| throughout the exam with a single lead electrocardiogram and blood pressure cuff. Oxygen |
|  was administered via nasal cannula. Direct visualization of the oropharynx has shown a  |
| Mallampati score of Class II.Conscious sedation                                          |
| duration:+------------------------------------------------------------------------------ |
| +Final Impressions:                                                                      |
|                                                                                          |
|                                                            1. The LV function is         |
| hyperdynamic.                                           2. The visually estimated        |
| ejection fraction is > 75%.                         3. Right ventricular size, thickness |
|  and function are normal.                 4. Severe circumferential mitral annular       |
| calcification. A large deposit of    calcium at the posterolateral atrial aspect of      |
| themitral annulus that         partially disrupts the normal mitral annular shape and    |
| size. Mitral annular   area is measured at 8.26 cm2, with an AP diameter of 3.76 cm, and |
|  CC          diameter of 2.52 cm. However, there is no significant mitral stenosis. Mean |
|    mitral gradient is approximately 4 mmHg at a heart rate of 83 bpm.            5.      |
| Severe mitral valve regurgitation. The EROA is 0.47 cm2 (using a PISA      radius of 1.1 |
|  cm, an aliasing velocity of 0.32 m/s, and a mean MR             regurgitant velocity of |
|  5.1 m/s), the calculated regurgitant volume is 67     mL, and the calculated            |
| regurgitant fraction is 52%.                           6. The mitral valve anterior      |
| leaflet measures 2.0 cm.                         7. There is subvalvar and papilary      |
| muscle head calcifidation noted as well.   8. The mechanism for the severe mitral        |
| regurgitation is leaflet and annular   calcification and degerneration with poor         |
 
| coaptation across all portions of   the valve.                                           |
|                           9. The aortic valve is moderately sclerotic and restricted. No |
|  aortic         stenosis.                                                                |
|       10. Severely enlarged left atrium.                                            11.  |
| There is a small fenestrated secundum atrial septal defect, with          intermittent   |
| left-to-right flow via color-flow Doppler.                       12. Moderate tricuspid  |
| regurgitation.                                         13. Small plaque involving the    |
| ascending and transverse aorta.                14. The left atrial appendage is well     |
| visualized and there is no evidence of  thrombus present.                                |
|                                                                                          |
|                                                                                          |
| +------------------------------------------------------------------------------+         |
| Findings:Cardiac Rhythm: atrial fibrillation Left Ventricle: The LV function is          |
| hyperdynamic. Left ventricular systolic thickening is normal in all segments. The        |
| visually estimated ejection fraction is > 75%. Left Atrium: Left atrial size is severely |
|  enlarged. Left atrial appendage: The left atrial appendage is well visualized and there |
|  is no evidence of thrombus present. Right Ventricle: Right ventricular size, thickness  |
| and function are normal. Aortic Valve: The aortic valve is moderately sclerotic and      |
| restricted. No aortic stenosis. No indication of aortic valve regurgitation. On this     |
| study, transgastric Doppler evaluation reveals an aortic valve peak velocity of 1.8 m/s, |
|  peak gradient of 13 mmHg, mean gradient of 5.6 mmHg, and DVI of 0.75 (with an AV VTI of |
|  0.299 and LVOT VTI of 0.226). Mitral Valve: Mitral leaflet mobility is normal. Severe   |
| mitral annular calcification. Severe mitral valve regurgitation. The jet is              |
| centrally-directed. The EROA is 0.47 cm2, using a PISA radius of 1.1 cm, an aliasing     |
| velocity of 0.32 m/s, and a mean MR regurgitant velocity of 5.1 m/s. There is no mitral  |
| stenosis. Mean mitral gradient is approximately 4 mmHg at a heart rate of 83 bpm.        |
| Tricuspid Valve: The tricuspid valve is structurally normal. Moderate tricuspid          |
| regurgitation. Pulmonic Valve: The pulmonic valve is structurally normal. Aorta: There   |
| is a small, layered, immobile plaque, involving the ascending and transverse aorta.      |
| Venous: The inferior vena cava was not well visualized. Septa: There is a small          |
| fenestrated secundum atrial septal defect, with intermittent left-to-right flow via      |
| color-flow Doppler.Siemens JENIFFER probe SN# [id number].Transesophageal echo probe was passed  |
| by Cardiologist: Yes.Fellow participating in exam: Lincoln Daniels [facility]  |
| signed by: [id number] Jame Wilkins M.D. (2018 6:28:58 PM) *** Final ***         |
|                                                                                          |
|Findings:                                                                                |
|Cardiac Rhythm: atrial fibrillation                                                       |
|                                                                                          |
|Left Ventricle: The LV function is hyperdynamic. Left ventricular systolic thickening     |
| is normal in all segments. The visually estimated ejection fraction is > 75%.            |
|                                                                                          |
|Left Atrium: Left atrial size is severely enlarged.                                       |
|                                                                                          |
|Left atrial appendage: The left atrial appendage is well visualized and there is no       |
|evidence of thrombus present.                                                             |
|                                                                                          |
|Right Ventricle: Right ventricular size, thickness and function are normal.               |
|                                                                                          |
|Aortic Valve: The aortic valve is moderately sclerotic and restricted. No aortic          |
|stenosis. No indication of aortic valve regurgitation. On this study, transgastric       |
|Doppler evaluation reveals an aortic valve peak velocity of 1.8 m/s, peak gradient of     |
| 13 mmHg, mean gradient of 5.6 mmHg, and DVI of 0.75 (with an AV VTI of 0.299 and         |
|LVOT VTI of 0.226).                                                                       |
|                                                                                          |
|Mitral Valve: Mitral leaflet mobility is normal. Severe mitral annular calcification.     |
| Severe mitral valve regurgitation. The jet is centrally-directed. The EROA is 0.47       |
|cm2, using a PISA radius of 1.1 cm, an aliasing velocity of 0.32 m/s, and a mean MR       |
|regurgitant velocity of 5.1 m/s. There is no mitral stenosis. Mean mitral gradient is     |
| approximately 4 mmHg at a heart rate of 83 bpm.                                          |
|                                                                                          |
 
|Tricuspid Valve: The tricuspid valve is structurally normal. Moderate tricuspid           |
|regurgitation.                                                                           |
|                                                                                          |
|Pulmonic Valve: The pulmonic valve is structurally normal.                                |
|                                                                                          |
|Aorta: There is a small, layered, immobile plaque, involving the ascending and            |
|transverse aorta.                                                                         |
|                                                                                          |
|Venous: The inferior vena cava was not well visualized.                                   |
|                                                                                          |
|Septa: There is a small fenestrated secundum atrial septal defect, with intermittent      |
|left-to-right flow via color-flow Doppler.                                                |
|Siemens JENIFFER probe SN# [id number].                                                           |
|Transesophageal echo probe was passed by Cardiologist: Yes.                               |
|                                                                                          |
|Fellow participating in exam: Lincoln Daniels MD                                             |
|Report electronically signed by: [id number] Jame Wilkins M.D. (2018 6:28:58      |
|PM)                                                                                       |
|                                                                                          |
|*** Final ***                                                                             |
+------------------------------------------------------------------------------------------+
 
 
 
+-----------------+------------------------+--------------------+--------------+
| Performing      | Address                | City/State/Zipcode | Phone Number |
| Organization    |                        |                    |              |
+-----------------+------------------------+--------------------+--------------+
|   [facility]  |   [address]  | [place], [place]       |              |
| CARDIOLOGY      | [address]              | [address]         |              |
+-----------------+------------------------+--------------------+--------------+
 TRANSTHORACIC ECHOCARDIOGRAM, ADULT (2018  7:31 AM PDT)
 
+-------------+----------+-----------+------------+--------------+
| Component   | Value    | Ref Range | Performed  | Pathologist  |
|             |          |           | At         | Signature    |
+-------------+----------+-----------+------------+--------------+
| EJECTION    | 70 to 75 |           | [facility]  |              |
| FRACTION    |          |           | OF         |              |
|             |          |           | CARDIOLOGY |              |
+-------------+----------+-----------+------------+--------------+
| LA          | 4.4      |           | [facility]  |              |
| DIMENSION   |          |           | OF         |              |
|             |          |           | CARDIOLOGY |              |
+-------------+----------+-----------+------------+--------------+
| LVIDD       | 4.4      |           | [facility]  |              |
|             |          |           | OF         |              |
|             |          |           | CARDIOLOGY |              |
+-------------+----------+-----------+------------+--------------+
| MV E?       | 0.0      |           | [facility]  |              |
|             |          |           | OF         |              |
|             |          |           | CARDIOLOGY |              |
+-------------+----------+-----------+------------+--------------+
| MV E VMAX   | 1.7      |           | [facility]  |              |
|             |          |           | OF         |              |
|             |          |           | CARDIOLOGY |              |
+-------------+----------+-----------+------------+--------------+
| RVSP        | 40       |           | [facility]  |              |
 
|             |          |           | OF         |              |
|             |          |           | CARDIOLOGY |              |
+-------------+----------+-----------+------------+--------------+
| RV TAPSE    | 1.8      |           | [facility]  |              |
|             |          |           | OF         |              |
|             |          |           | CARDIOLOGY |              |
+-------------+----------+-----------+------------+--------------+
| RV TDI S?   | 12.0     |           | [facility]  |              |
|             |          |           | OF         |              |
|             |          |           | CARDIOLOGY |              |
+-------------+----------+-----------+------------+--------------+
| EJECTION    | 72.5     | %         | [facility]  |              |
| FRACTION    |          |           | OF         |              |
| RANGE MEAN  |          |           | CARDIOLOGY |              |
| VALUE       |          |           |            |              |
+-------------+----------+-----------+------------+--------------+
 
 
 
+----------+
| Specimen |
+----------+
|          |
+----------+
 
 
 
+-----------------------------------------------------------------------------------------+-
----------------+
| Narrative                                                                               | 
Performed At    |
+-----------------------------------------------------------------------------------------+-
----------------+
|   This result has an attachment that is not available.  [facility]                   | 
  [facility] DEPT OF  |
| [facility]    Adult Echocardiography Laboratory      [id number]                  | 
CARDIOLOGY      |
|  [address] [place], [place] [address] Ph:                     | 
                |
| [phone] Fax: [phone]  Pt Name: ALVIN HARP                         | 
                |
|   Study Date/Time       2018 [id number]                        | 
                |
|              Most recent prior:   -[facility] #:    [id number]                                  | 
                |
| No. previous echos: 0DOB:       1932 86 years   Heart Rate:                        | 
                |
|        77 bpmHeight:   63.0 in                   Blood Pressure:                        | 
                |
|  124/61 mm/HgWeight:   142.0 lb                  Gender:                                | 
                |
|       FBSA:       1.67 m2                   Order ID:                                   | 
                |
| [id number]  Sonographer: Belkis Beach RDCSSonographer 2:Referring                         | 
                |
| Provider: Jame Sullivan [facility] Performed:                     | 
                |
| 2D, Color flow, Spectral Doppler.Study Quality: Good.Exam Indication:                   | 
                |
| DyspneaHistory: Acute diastolic heart failure. She has hyperdynamic LV                  | 
 
                |
|  systolic function on her echocardiogram, but marked valvular disease,                  | 
                |
|  with severe mitral regurgitation, moderately severe tricuspid                          | 
                |
| regurgitation, and mild aortic stenosis. She denies any history of                      | 
                |
| rheumatic fever, but had scarlet fever at age 13, which clearly could                   | 
                |
| have been misdiagnosed. There is no mitral stenosis. Moderately severe                  | 
                |
|  pulmonary hypertension and a small secundum ASD with minor                             | 
                |
| left-to-right shunting. Patient history has been obtained from the EHR                  | 
                |
|  Transthoracic Echocardiographic Report                                                 | 
                |
| +---------------------------------------------------------------------                  | 
                |
| ---------+Final Impressions:                                                            | 
                |
|                                                                                         | 
                |
|                                                                                         | 
                |
|                                                                                         | 
                |
|                                                                                         | 
                |
|      1. The left ventricular cavity size is normal.                                     | 
                |
|                              2. The LV function is hyperdynamic.                        | 
                |
|                                                           3. Visually                   | 
                |
| estimated left ventricular ejection fraction is 70 - 75%.                               | 
                |
| 4. The aortic valve is trileaflet and moderately calcified.                             | 
                |
|                   5. Severe mitral annular calcification as well as                     | 
                |
| papillary and subchordal      bulky calcification.                                      | 
                |
|                                                                     6.                  | 
                |
|  While there is calcification of the bases of the mitral valve                          | 
                |
| leaflets       due to the severe mitral annular calcificaiton, the mid                  | 
                |
|  portions of the          leaflets themseleves are only midly                           | 
                |
| calcified and thin, however there is the   severe central mitral valve                  | 
                |
|  regurgitation.                                                                         | 
                |
|   7. The mean transmitral gradient is 5.5 mmHg at a heart rate of 77                    | 
                |
| bpm.                                                                                    | 
                |
|                                                               8.                        | 
 
                |
| Severe biatrial enlargement.                                                            | 
                |
|                             9. Right ventricular size, thickness and                    | 
                |
| function are normal.                                                                    | 
                |
|                                                                                         | 
                |
|                                                                                         | 
                |
|   +-------------------------------------------------------------------                  | 
                |
| -----------+  Description of Findings: Cardiac Rhythm: Normal sinus                     | 
                |
| rhythm.Left Ventricle: The left ventricular cavity size is normal.                      | 
                |
| Visually estimated left ventricular ejection fraction is 70 - 75%. The                  | 
                |
|  LV function is hyperdynamic.Left Ventricular Wall Motion: Left                         | 
                |
| ventricular systolic thickening is normal in all segments.Atria:                        | 
                |
| Severe biatrial enlargement.Right Ventricle: Right ventricular size,                    | 
                |
| thickness and function are normal. TAPSE measures 1.8cm. The RV TDI s'                  | 
                |
|  velocity is 12.0cm/sec.Aortic Valve: The aortic valve is trileaflet                    | 
                |
| and moderately calcified. No indication of aortic valve regurgitation.                  | 
                |
|  The left coronary cusp is immobile.Mitral Valve: Severe mitral                         | 
                |
| annular calcification. The mean transmitral gradient is 5.5 mmHg at a                   | 
                |
| heart rate of 77 bpm. Severe mitral valve regurgitation. The mitral                     | 
                |
| valve effective regurgitant orifice area is 7 mm2.Tricuspid Valve: The                  | 
                |
|  tricuspid valve is structurally normal. Mild tricuspid regurgitation.                  | 
                |
|  The tricuspid regurgitant velocity is 2.95 m/s, and with an assumed                    | 
                |
| right atrial pressure of 5 mmHg, the estimated right ventricular                        | 
                |
| systolic pressure is upper limits of normal at 39.9 mmHg.Pulmonic                       | 
                |
| Valve: The pulmonic valve is structurally normal. Trace pulmonary                       | 
                |
| valve regurgitation.Venous: Inferior vena cava is normal with normal                    | 
                |
| inspiratory collapse.Pericardium: No pericardial effusion is seen.2D                    | 
                |
| Measurements                      Doppler Measurements                                  | 
                |
|  2D       NL Values    Aortic             MitralLVID(d)      4.43                       | 
                |
| (3.5-5.7cm) Max Breezy    1.54 Peak E         1.68                cm                       | 
                |
|                                     m/s                     m/sLVID(s)                  | 
 
                |
|       3.45                      Mean grad 4.6   Peak A                                  | 
                |
| cm                                        mmHgIVS(d)       0.83                         | 
                |
| (0.6-1.1cm) LVOT Breezy   0.96 E/A Ratio                cm                                 | 
                |
|                          m/sLVPW(d)      1.75    (0.6-1.1cm)                            | 
                |
|               TDI (E/e')   42.1                cm                                       | 
                |
|     LVOT Diam 1.95 MV mn gd      6 mmHgLA A/Ps 2D 4.41    (2.7-3.9cm)                   | 
                |
|                cm                cm                         AI P1/2                     | 
                |
|          MR ERO         7 mm2LA vol A/L 187.9   (40-73ml)    timeBP                     | 
                |
|           ml                                                MR Volume                   | 
                |
|    11 mlLA vol A/L 112.4   (16-34)       Tricuspid                                      | 
                |
|   Pulmonicindex         ml/m2                     TR Vmax    2.95 PV                    | 
                |
| Vmax       0.8                                                                          | 
                |
|      m/s                     m/s                                                        | 
                |
|         RA Press   5      RVOT VTI      10.1                                            | 
                |
|                                    mmHg                   cm                            | 
                |
|                                    RVSP         40    PV mn gd                          | 
                |
|                                                      mmHg                               | 
                |
|                                  Aorta:                                                 | 
                |
| Index:                                             Ao Sinus   2.87                      | 
                |
| (2.1-3.5cm)                                                                             | 
                |
|   cm                                             Asc Ao      2.73                       | 
                |
|                                          (prox)      cmEvaluation of                    | 
                |
| chamber size and geometry is accomplished through the incorporation of                  | 
                |
|  linear, volumetric, and indexed values Report electronically signed                    | 
                |
| by: [id number] Jame Wilkins M.D. (2018, 12:57:18 PM)   ***                     | 
                |
| Final ***                                                                               | 
                |
|                                                                                         | 
                |
|                                             Aorta:                               Index: | 
                |
|                                             Ao Sinus   2.87 (2.1-3.5cm)                 | 
                |
|                                                            cm                           | 
 
                |
|                                             Asc Ao      2.73                            | 
                |
|                                             (prox)      cm                              | 
                |
|Evaluation of chamber size and geometry is accomplished through the incorporation of     | 
                |
|linear, volumetric, and indexed values                                                   | 
                |
|                                                                                         | 
                |
|Report electronically signed by: [id number] Jame Wilkins M.D. (2018,            | 
                |
|12:57:18 PM)                                                                             | 
                |
|                                                                                         | 
                |
|                                                                                         | 
                |
|                                                                                         | 
                |
|*** Final ***                                                                            | 
                |
+-----------------------------------------------------------------------------------------+-
----------------+
 
 
 
+------------------------------------------------------------------------------------------+
| Procedure Note                                                                           |
+------------------------------------------------------------------------------------------+
|   Interface, Cardiology Results - 2018 12:57 PM [facility]    |
| [facility] Echocardiography Laboratory    [address] [place] [address]        |
| [place], [place] [address] Ph: [phone] Fax: [phone] Pt Name: ALVIN HARP  Study Date/Time     2018 [facility]31:24 [facility]     [id number]             Most    |
| recent prior:  -[facility] #:   [id number]           No. previous echos: 0DOB:     1932 86  |
| years  Heart Rate:         77 bpmHeight:  63.0 in             Blood Pressure:     124/61 |
|  mm/HgWeight:  142.0 lb            Gender:             FBSA:     1.67 m2                 |
| Order ID:           [id number] Sonographer: Belkis Beach RDCSSonographer 2:Referring       |
| Provider: Jame Sullivan [facility]: [facility] Performed: 2D, Color flow,      |
| Spectral Doppler.Study Quality: Good.Exam Indication: DyspneaHistory: Acute diastolic    |
| heart failure. She has hyperdynamic LV systolic function on her echocardiogram, but      |
| marked valvular disease, with severe mitral regurgitation, moderately severe tricuspid   |
| regurgitation, and mild aortic stenosis. She denies any history of rheumatic fever, but  |
| had scarlet fever at age 13, which clearly could have been misdiagnosed. There is no     |
| mitral stenosis. Moderately severe pulmonary hypertension and a small secundum ASD with  |
| minor left-to-right shunting. Patient history has been obtained from the EHR             |
| Transthoracic Echocardiographic                                                          |
| Report+------------------------------------------------------------------------------+Fi |
| nal Impressions:                                                                         |
|                                                                                          |
|                                                         1. The left ventricular cavity   |
| size is normal.                                2. The LV function is hyperdynamic.       |
|                                      3. Visually estimated left ventricular ejection     |
| fraction is 70 - 75%.         4. The aortic valve is trileaflet and moderately           |
| calcified.                   5. Severe mitral annular calcification as well as papillary |
|  and subchordal    bulky calcification.                                                  |
|          6. While there is calcification of the bases of the mitral valve leaflets       |
| due to the severe mitral annular calcificaiton, the mid portions of the       leaflets   |
| themseleves are only midly calcified and thin, however there is the  severe central      |
 
| mitral valve regurgitation.                                    7. The mean transmitral   |
| gradient is 5.5 mmHg at a heart rate of 77 bpm.                                          |
|                                             8. Severe biatrial enlargement.              |
|                                   9. Right ventricular size, thickness and function are  |
| normal.                                                                                  |
|                                                                                          |
| +------------------------------------------------------------------------------+         |
| Description of Findings: Cardiac Rhythm: Normal sinus rhythm.Left Ventricle: The left    |
| ventricular cavity size is normal. Visually estimated left ventricular ejection fraction |
|  is 70 - 75%. The LV function is hyperdynamic.Left Ventricular Wall Motion: Left         |
| ventricular systolic thickening is normal in all segments.Atria: Severe biatrial         |
| enlargement.Right Ventricle: Right ventricular size, thickness and function are normal.  |
| TAPSE measures 1.8cm. The RV TDI s' velocity is 12.0cm/sec.Aortic Valve: The aortic      |
| valve is trileaflet and moderately calcified. No indication of aortic valve              |
| regurgitation. The left coronary cusp is immobile.Mitral Valve: Severe mitral annular    |
| calcification. The mean transmitral gradient is 5.5 mmHg at a heart rate of 77 bpm.      |
| Severe mitral valve regurgitation. The mitral valve effective regurgitant orifice area   |
| is 7 mm2.Tricuspid Valve: The tricuspid valve is structurally normal. Mild tricuspid     |
| regurgitation. The tricuspid regurgitant velocity is 2.95 m/s, and with an assumed right |
|  atrial pressure of 5 mmHg, the estimated right ventricular systolic pressure is upper   |
| limits of normal at 39.9 mmHg.Pulmonic Valve: The pulmonic valve is structurally normal. |
|  Trace pulmonary valve regurgitation.Venous: Inferior vena cava is normal with normal    |
| inspiratory collapse.Pericardium: No pericardial effusion is seen.2D Measurements        |
|         Doppler Measurements            2D     NL Values   Aortic         MitralLVID(d)  |
|    4.43   (3.5-5.7cm) Max Breezy   1.54 Peak E      1.68           cm                       |
|      m/s              m/sLVID(s)    3.45               Mean grad 4.6  Peak A             |
| cm                           mmHgIVS(d)     0.83   (0.6-1.1cm) LVOT Breezy  0.96 E/A Ratio  |
|           cm                           m/sLVPW(d)    1.75   (0.6-1.1cm)                  |
| TDI (E/e')  42.1           cm                 LVOT Diam 1.95 MV mn gd    6 mmHgLA A/Ps   |
| 2D 4.41   (2.7-3.9cm)           cm           cm                 AI P1/2        MR ERO    |
|    7 mm2LA vol A/L 187.9  (40-73ml)   timeBP         ml                                  |
| MR Volume   11 mlLA vol A/L 112.4  (16-34)     Tricuspid      Pulmonicindex      ml/m2   |
|             TR Vmax   2.95 PV Vmax     0.8                                        m/s    |
|            m/s                              RA Press  5    RVOT VTI    10.1              |
|                            mmHg             cm                              RVSP      40 |
|    PV mn gd                                        mmHg                                  |
| Aorta:                     Index:                              Ao Sinus  2.87            |
| (2.1-3.5cm)                                        cm                              Asc   |
| Ao    2.73                              (prox)    cmEvaluation of chamber size and       |
| geometry is accomplished through the incorporation of linear, volumetric, and indexed    |
| values Report electronically signed by: [id number] Jame Wilkins M.D. (2018,     |
| 12:57:18 PM) *** Final ***                                                               |
|regurgitation. The tricuspid regurgitant velocity is 2.95 m/s, and with an assumed       |
|right atrial pressure of 5 mmHg, the estimated right ventricular systolic pressure is     |
| upper limits of normal at 39.9 mmHg.                                                     |
|Pulmonic Valve: The pulmonic valve is structurally normal. Trace pulmonary valve          |
|regurgitation.                                                                           |
|Venous: Inferior vena cava is normal with normal inspiratory collapse.                    |
|Pericardium: No pericardial effusion is seen.                                             |
|2D Measurements               Doppler Measurements                                        |
|                                                                                          |
|           2D     NL Values   Aortic         Mitral                                       |
|LVID(d)    4.43   (3.5-5.7cm) Max Breezy   1.54 Peak E      1.68                             |
|           cm                           m/s              m/s                              |
|LVID(s)    3.45               Mean grad 4.6  Peak A                                       |
|           cm                           mmHg                                              |
|IVS(d)     0.83   (0.6-1.1cm) LVOT Breezy  0.96 E/A Ratio                                    |
|           cm                           m/s                                               |
|LVPW(d)    1.75   (0.6-1.1cm)                TDI (E/e')  42.1                             |
|           cm                 LVOT Diam 1.95 MV mn gd    6 mmHg                           |
 
|LA A/Ps 2D 4.41   (2.7-3.9cm)           cm                                                |
|           cm                 AI P1/2        MR ERO      7 mm2                            |
|LA vol A/L 187.9  (40-73ml)   time                                                        |
|BP         ml                                MR Volume   11 ml                            |
|LA vol A/L 112.4  (16-34)     Tricuspid      Pulmonic                                     |
|index      ml/m2              TR Vmax   2.95 PV Vmax     0.8                              |
|                                        m/s              m/s                              |
|                              RA Press  5    RVOT VTI    10.1                             |
|                                        mmHg             cm                               |
|                              RVSP      40   PV mn gd                                     |
|                                        mmHg                                              |
|                                                                                          |
|                              Aorta:                     Index:                           |
|                              Ao Sinus  2.87 (2.1-3.5cm)                                  |
|                                        cm                                                |
|                              Asc Ao    2.73                                              |
|                              (prox)    cm                                                |
|Evaluation of chamber size and geometry is accomplished through the incorporation of      |
|linear, volumetric, and indexed values                                                    |
|                                                                                          |
|Report electronically signed by: [id number] Jame Wilkins M.D. (2018,             |
|12:57:18 PM)                                                                              |
|                                                                                          |
|*** Final ***                                                                             |
+------------------------------------------------------------------------------------------+
 
 
 
+-----------------+------------------------+--------------------+--------------+
| Performing      | Address                | City/State/Zipcode | Phone Number |
| Organization    |                        |                    |              |
+-----------------+------------------------+--------------------+--------------+
|   [facility]  |   [address]  | [place], [place]       |              |
| CARDIOLOGY      | [address]              | [address]         |              |
+-----------------+------------------------+--------------------+--------------+
 documented in this encounter
 
 Visit Diagnoses
 
 
+--------------------------------------------------------------+
| Diagnosis                                                    |
+--------------------------------------------------------------+
|   Mitral valve insufficiency, unspecified etiology - Primary |
+--------------------------------------------------------------+
 documented in this encounter

## 2020-01-01 NOTE — XMS
Encounter Summary
  Created on: 2020
 
 Vonnie Celaya
 External Reference #: [id number]
 : 32
 Sex: Female
 
 Demographics
 
 
+-----------------------+------------------------+
| Address               | [address]         |
|                       | [place]  [address]   |
+-----------------------+------------------------+
| Home Phone            | [phone]        |
+-----------------------+------------------------+
| Preferred Language    | Unknown                |
+-----------------------+------------------------+
| Marital Status        | Single                 |
+-----------------------+------------------------+
| Spiritism Affiliation | NON                    |
+-----------------------+------------------------+
| Race                  | White                  |
+-----------------------+------------------------+
| Ethnic Group          | Not  or  |
+-----------------------+------------------------+
 
 
 Author
 
 
+--------------+------------------------------+
| Author       | [facility] |
+--------------+------------------------------+
| Organization | [facility] |
+--------------+------------------------------+
| Address      | Unknown                      |
+--------------+------------------------------+
| Phone        | Unavailable                  |
+--------------+------------------------------+
 
 
 
 Support
 
 
+--------------+--------------+---------+-----------------+
| Name         | Relationship | Address | Phone           |
+--------------+--------------+---------+-----------------+
| Laurny Leroy | ECON         | Unknown | [phone] |
+--------------+--------------+---------+-----------------+
 
 
 
 Care Team Providers
 
 
 
+------------------------+------+-----------------+
| Care Team Member Name  | Role | Phone           |
+------------------------+------+-----------------+
| Jean Bermudez MD | PCP  | [phone] |
+------------------------+------+-----------------+
 
 
 
 Encounter Details
 
 
+--------+-----------+----------------------+----------------------+-------------+
| Date   | Type      | Department           | Care Team            | Description |
+--------+-----------+----------------------+----------------------+-------------+
| / | Hospital  |   Cardiac            |   Sj, Car Ecg Tech  |             |
| 2018   | Encounter | Non-Invasive Testing |  [address]        |             |
|        |           |  at [facility] | [facility]    |             |
|        |           |   [address]   | [place] [address]   |             |
|        |           | Loop  Mailcode:      |                      |             |
|        |           | [id number]  [address]   |                      |             |
|        |           | [facility]        |                      |             |
|        |           | [place], [place]         |                      |             |
|        |           | [address]           |                      |             |
|        |           | [phone]         |                      |             |
+--------+-----------+----------------------+----------------------+-------------+
 
 
 
 Social History
 
 
+--------------+-------+-----------+--------+------+
| Tobacco Use  | Types | Packs/Day | Years  | Date |
|              |       |           | Used   |      |
+--------------+-------+-----------+--------+------+
| Never Smoker |       |           |        |      |
+--------------+-------+-----------+--------+------+
 
 
 
+---------------------+---+---+---+
| Smokeless Tobacco:  |   |   |   |
| Never Used          |   |   |   |
+---------------------+---+---+---+
 
 
 
+-------------+-------------+---------+----------+
| Alcohol Use | Drinks/Week | oz/Week | Comments |
+-------------+-------------+---------+----------+
| No          |             |         |          |
+-------------+-------------+---------+----------+
 
 
 
+------------------+---------------+
| Sex Assigned at  | Date Recorded |
| Birth            |               |
+------------------+---------------+
| Not on file      |               |
 
+------------------+---------------+
 
 
 
+----------------+-------------+-------------+
| Job Start Date | Occupation  | Industry    |
+----------------+-------------+-------------+
| Not on file    | Not on file | Not on file |
+----------------+-------------+-------------+
 
 
 
+----------------+--------------+------------+
| Travel History | Travel Start | Travel End |
+----------------+--------------+------------+
 
 
 
+-------------------------------------+
| No recent travel history available. |
+-------------------------------------+
 documented as of this encounter
 
 Medications at Time of Discharge
 
 
+----------------------+----------------------+-----------+---------+----------+----------+
| Medication           | Sig                  | Dispensed | Refills | Start    | End Date |
|                      |                      |           |         | Date     |          |
+----------------------+----------------------+-----------+---------+----------+----------+
|   acetaminophen 325  | Take by mouth.       |           | 0       |          |          |
| mg oral tablet       |                      |           |         |          |          |
+----------------------+----------------------+-----------+---------+----------+----------+
|   ascorbic acid      | Take 500 mg by mouth |           | 0       |          |          |
| (vitamin C) 500 mg   |  once daily.         |           |         |          |          |
| oral tablet          |                      |           |         |          |          |
+----------------------+----------------------+-----------+---------+----------+----------+
|   furosemide 40 mg   | Take by mouth.       |           | 0       |          |          |
| oral tablet          |                      |           |         |          |          |
+----------------------+----------------------+-----------+---------+----------+----------+
|                      | Take 2 tablets by    |           | 0       |          |          |
| gluc/chnd/om3/dha/ep | mouth once daily.    |           |         |          |          |
| a/fish/str           |                      |           |         |          |          |
| (GLUCOSAMINE         |                      |           |         |          |          |
| CHONDROITIN PLUS     |                      |           |         |          |          |
| ORAL)                |                      |           |         |          |          |
+----------------------+----------------------+-----------+---------+----------+----------+
|   levothyroxine 100  | Take by mouth.       |           | 0       |          |          |
| mcg oral tablet      |                      |           |         |          |          |
+----------------------+----------------------+-----------+---------+----------+----------+
|   losartan 100 mg    | Take 100 mg by mouth |           | 0       |          |          |
| oral tablet          |  once daily.         |           |         |          |          |
+----------------------+----------------------+-----------+---------+----------+----------+
|   MAGNESIUM CHLORIDE | Take by mouth.       |           | 0       |          |          |
|  ORAL                |                      |           |         |          |          |
+----------------------+----------------------+-----------+---------+----------+----------+
|   metFORMIN    | Take by mouth.       |           | 0       |          |          |
| mg oral tablet,ER    |                      |           |         |          |          |
| alexis.retention 24 hr |                      |           |         |          |          |
+----------------------+----------------------+-----------+---------+----------+----------+
 
|   metoprolol         | Take 100 mg by mouth |           | 0       | / |          |
| succinate 200 mg     |  once daily.         |           |         | 18       |          |
| oral tablet extended |                      |           |         |          |          |
|  release 24 hr       |                      |           |         |          |          |
+----------------------+----------------------+-----------+---------+----------+----------+
|   potassium chloride | Take by mouth.       |           | 0       |          |          |
|  SR 20 mEq oral      |                      |           |         |          |          |
| tablet,ER            |                      |           |         |          |          |
| particles/crystals   |                      |           |         |          |          |
+----------------------+----------------------+-----------+---------+----------+----------+
|   simvastatin 20 mg  | Take by mouth once   |           | 0       |          |          |
| oral tablet          | daily in the         |           |         |          |          |
|                      | evening.             |           |         |          |          |
+----------------------+----------------------+-----------+---------+----------+----------+
|   warfarin 5 mg oral | Take by mouth.       |           | 0       |          |          |
|  tablet              |                      |           |         |          |          |
+----------------------+----------------------+-----------+---------+----------+----------+
 documented as of this encounter
 
 Plan of Treatment
 
 
+----------------------+------+--------+----------------------+---------------------+
| Name                 | Type | Priori | Associated Diagnoses | Order Schedule      |
|                      |      | ty     |                      |                     |
+----------------------+------+--------+----------------------+---------------------+
| 6-MINUTE WALK TEST - | ECG  | Routin |   Mitral valve       | Ordered: 2018 |
|  ECG                 |      | e      | insufficiency,       |                     |
|                      |      |        | unspecified etiology |                     |
+----------------------+------+--------+----------------------+---------------------+
 documented as of this encounter
 
 Visit Diagnoses
 
 
+----------------------------------------------------+
| Diagnosis                                          |
+----------------------------------------------------+
|   Mitral valve insufficiency, unspecified etiology |
+----------------------------------------------------+
 documented in this encounter

## 2020-01-01 NOTE — XMS
Encounter Summary
  Created on: 2020
 
 Vonnie Celaya
 External Reference #: [id number]
 : 32
 Sex: Female
 
 Demographics
 
 
+-----------------------+---------------------------+
| Address               | [address]            |
|                       | [place]  [address] |
+-----------------------+---------------------------+
| Home Phone            | [phone]           |
+-----------------------+---------------------------+
| Preferred Language    | Unknown                   |
+-----------------------+---------------------------+
| Marital Status        |                    |
+-----------------------+---------------------------+
| Gnosticist Affiliation | Unknown                   |
+-----------------------+---------------------------+
| Race                  | Unknown                   |
+-----------------------+---------------------------+
| Ethnic Group          | Unknown                   |
+-----------------------+---------------------------+
 
 
 Author
 
 
+--------------+--------------------------------------------+
| Author       | [facility]  |
|              | [facility] [place]                                |
+--------------+--------------------------------------------+
| Organization | [facility]  |
|              | [facility] [place]                                |
+--------------+--------------------------------------------+
| Address      | Unknown                                    |
+--------------+--------------------------------------------+
| Phone        | Unavailable                                |
+--------------+--------------------------------------------+
 
 
 
 Support
 
 
+--------------+--------------+---------+-----------------+
| Name         | Relationship | Address | Phone           |
+--------------+--------------+---------+-----------------+
| Lauryn Leroy | ECON         | Unknown | [phone] |
+--------------+--------------+---------+-----------------+
 
 
 
 Care Team Providers
 
 
 
+------------------------+------+-----------------+
| Care Team Member Name  | Role | Phone           |
+------------------------+------+-----------------+
| Jean Bermudez MD | PCP  | [phone] |
+------------------------+------+-----------------+
 
 
 
 Encounter Details
 
 
+--------+-------------+------------------+----------------------+-------------+
| Date   | Type        | Department       | Care Team            | Description |
+--------+-------------+------------------+----------------------+-------------+
| / | Orders Only |   [facility]     |   Valery Aranda,   |             |
| 2018   |             | CONVERSION  888  | MD  [address]  |             |
|        |             | [address]       | [address]  [place], [place]   |             |
|        |             | [place], [place]     | [address]  [phone]  |             |
|        |             | [address]       |  [phone] (Fax)  |             |
|        |             | [phone]     |                      |             |
+--------+-------------+------------------+----------------------+-------------+
 
 
 
 Social History
 
 
+----------------+-------+-----------+--------+------+
| Tobacco Use    | Types | Packs/Day | Years  | Date |
|                |       |           | Used   |      |
+----------------+-------+-----------+--------+------+
| Never Assessed |       |           |        |      |
+----------------+-------+-----------+--------+------+
 
 
 
+------------------+---------------+
| Sex Assigned at  | Date Recorded |
| Birth            |               |
+------------------+---------------+
| Not on file      |               |
+------------------+---------------+
 
 
 
+----------------+-------------+-------------+
| Job Start Date | Occupation  | Industry    |
+----------------+-------------+-------------+
| Not on file    | Not on file | Not on file |
+----------------+-------------+-------------+
 
 
 
+----------------+--------------+------------+
| Travel History | Travel Start | Travel End |
+----------------+--------------+------------+
 
 
 
 
+-------------------------------------+
| No recent travel history available. |
+-------------------------------------+
 documented as of this encounter
 
 Plan of Treatment
 
 
+--------+---------+------------+----------------------+-------------+
| Date   | Type    | Specialty  | Care Team            | Description |
+--------+---------+------------+----------------------+-------------+
| / | Office  | Cardiology |   [facility]    |             |
| 2020   | Visit   |            | [address]      |             |
|        |         |            | [address]   |             |
|        |         |            | [place] [address]   |             |
|        |         |            | [phone]         |             |
|        |         |            | [phone] (Fax)   |             |
+--------+---------+------------+----------------------+-------------+
 documented as of this encounter
 
 Procedures
 
 
+----------------------+--------+-------------+----------------------+----------------------
+
| Procedure Name       | Priori | Date/Time   | Associated Diagnosis | Comments             
|
|                      | ty     |             |                      |                      
|
+----------------------+--------+-------------+----------------------+----------------------
+
| ECHO INTERPRETATION  | Routin | 2018  |                      |   Results for this   
|
| OF OUTSIDE FILMS     | e      |  3:06 PM    |                      | procedure are in the 
|
|                      |        | PST         |                      |  results section.    
|
+----------------------+--------+-------------+----------------------+----------------------
+
 documented in this encounter
 
 Results
 ECHO Interpretation of Outside Films (2018  3:06 PM PST)
 
+----------+
| Specimen |
+----------+
|          |
+----------+
 
 
 
+------------------------------------------------------------------------+--------------+
| Impressions                                                            | Performed At |
+------------------------------------------------------------------------+--------------+
|   1. Left ventricular systolic function is hyperdynamic with an        |              |
| estimated EF of >70%.  2. The right ventricle is normal in size and    |              |
| function.  3. The left atrium is markedly enlarged.  4. The right      |              |
| atrium is markedly enlarged.  5. Mild aortic stenosis with peak/mean   |              |
 
| pressure gradient of 16.83mmHg / 9.02mmHg, the aortic valve area by    |              |
| continuity equation is 1.7cm      .  6. Severe mitral regurgitation is |              |
|  present.  7. Moderate mitral stenosis, with peak/mean transvalvular   |              |
| gradients measured at 25.2 / 8.0 mm Hg, and MVA (by VTI) 1.50 cm       |              |
|  . There is mi  8. Moderate to severe tricuspid regurgitation present. |              |
|   9. There is moderate to severe pulmonary hypertension.   The peak    |              |
| right ventricular systolic pressure (pulmonary artery systolic         |              |
| pressure), as measured by Doppler, is 55.8 - 60.8 mm Hg.  10. There is |              |
|  a small secundum atrial septal defect measuring <10 mm in diameter.   |              |
|   There is left-to-right shunting noted on color Doppler.              |              |
+------------------------------------------------------------------------+--------------+
 
 
 
+------------------------------------------------------------------------+--------------+
| Narrative                                                              | Performed At |
+------------------------------------------------------------------------+--------------+
|      Patient Name:   Vonnie Celaya  YOB: 1932    |              |
|   Accession:   [id number]     Performing Physician:   LYNDA MCCULLOUGH MD    |              |
| _______________________________________________________________        |              |
| INDICATIONS  -----------  CHF     CONCLUSIONS  -----------  1. Left    |              |
| ventricular systolic function is hyperdynamic with an estimated EF of  |              |
| >70%.  2. The right ventricle is normal in size and function.  3. The  |              |
| left atrium is markedly enlarged.  4. The right atrium is markedly     |              |
| enlarged.  5. Mild aortic stenosis with peak/mean pressure gradient of |              |
|  16.83mmHg / 9.02mmHg, the aortic valve area by continuity equation is |              |
|  1.7cm      .  6. Severe mitral regurgitation is present.  7. Moderate |              |
|  mitral stenosis, with peak/mean transvalvular gradients measured at   |              |
| 25.2 / 8.0 mm Hg, and MVA (by VTI) 1.50 cm       . There is mi  8.     |              |
| Moderate to severe tricuspid regurgitation present.  9. There is       |              |
| moderate to severe pulmonary hypertension.   The peak right            |              |
| ventricular systolic pressure (pulmonary artery systolic pressure), as |              |
|  measured by Doppler, is 55.8 - 60.8 mm Hg.  10. There is a small      |              |
| secundum atrial septal defect measuring <10 mm in diameter.   There is |              |
|  left-to-right shunting noted on color Doppler.     FINDINGS  -------- |              |
|   ECG rhythm: Atrial fibrillation.  Study: A 2-dimensional             |              |
| transthoracic echocardiogram with m-mode, spectral and color flow      |              |
| Doppler was perfomed.  Study: This was a technically adequate study.   |              |
| Left Ventricle: Left ventricular systolic function is hyperdynamic     |              |
| with an estimated EF of >70%.  Left Ventricle: The left ventricle      |              |
| cavity size is normal.  Left Ventricle: Left ventricular wall          |              |
| thickness is normal.  Left Ventricle: No regional wall motion          |              |
| abnormalities.  Left Ventricle: Atrial fibrillation prevents accurate  |              |
| assessment of diastolic function.  Right Ventricle: The right          |              |
| ventricle is normal in size and function.  Left Atrium: The left       |              |
| atrium is markedly enlarged.  Right Atrium: The right atrium is        |              |
| markedly enlarged.  Aortic Valve: Aortic valve is trileaflet and is    |              |
| mildly thickened.  Aortic Valve: The aortic valve is mildly calcified. |              |
|   Aortic Valve: Trace amount of aortic regurgitation.  Aortic Valve:   |              |
| Mild aortic stenosis with peak/mean pressure gradient of 16.83mmHg /   |              |
| 9.02mmHg, the aortic valve area by continuity equation is 1.7cm      . |              |
|   Mitral Valve: Mitral valve is thickened.  Mitral Valve: Severe       |              |
| mitral regurgitation is present.  Mitral Valve: No evidence of MVP     |              |
| Mitral Valve: Severe mitral annular calcification present.  Mitral     |              |
| Valve: Moderate mitral stenosis, with peak/mean transvalvular          |              |
| gradients measured at 25.2 / 8.0 mm Hg, and MVA (by VTI) 1.50 cm       |              |
|  . There is mi  Mitral Valve: ld thickening of the   mitral valve      |              |
| leaflets.  Tricuspid Valve: The tricuspid valve appears structurally   |              |
| normal.  Tricuspid Valve: Moderate to severe tricuspid regurgitation   |              |
| present.  Tricuspid Valve: There is moderate to severe pulmonary       |              |
 
| hypertension.  Tricuspid Valve: The right ventricular systolic         |              |
| pressure (pulmonary artery systolic pressure), as measured by Doppler, |              |
|  is 55.8 - 60.8 mm Hg.  Pulmonic Valve: The pulmonic valve is normal.  |              |
|  Pulmonic Valve: Trace   pulmonic regurgitation.  Pericardium: There   |              |
| is no pericardial effusion.  IVC/Hepatic Veins: The IVC is normal size |              |
|  (1.5-2.5cm) and collapses <50% with sniff, consistent with central    |              |
| venous pressures of 10-15mmHg.  Aorta: The aortic root, ascending      |              |
| aorta and aortic arch are normal.  Mass: No mass visualized  Thrombus: |              |
|  No clot visualized  Thrombus: No vegetation visualized.  Septum:      |              |
| There is a small secundum atrial septal defect measuring < 10 mm in    |              |
| diameter.   There is left-to-right shunting noted on color Doppler.    |              |
|   MEASUREMENTS  -------------  Ao asc:    3.46 cm  Ao Diam:    2.93 cm |              |
|   Ao sinus:    3.27 cm  Ao st junct:    2.99 cm  IVC:    2.34 cm  LA   |              |
| Diam:    4.73 cm  LA Major:    7.82 cm  EDV(Teich):    81.83 ml  IVSd: |              |
|     0.71 cm  LVIDd:    4.27 cm  LVPWd:    0.96 cm  LVOT Area:    3.30  |              |
| cm2  LVOT Diam:    2.05 cm  %FS:    36.87 %  EF(Teich):    67.07 %     |              |
| ESV(Teich):    26.94 ml  LVIDs:    2.69 cm  SV(Teich):    54.89 ml  RV |              |
|  Major:    7.48 cm  RVIDd:    2.51 cm  LVEF MOD A2C:    73.51 %  SV    |              |
| MOD A2C:    67.80 ml  LVEF MOD A4C:    67.34 %  SV MOD A4C:    46.92   |              |
| ml  EF Biplane:    70.64 %  LVEDV MOD BP:    79.68 ml  LVESV MOD BP:   |              |
|    23.39 ml  LVEDV MOD A2C:    92.23 ml  LVLd A2C:    6.84 cm  LVEDV   |              |
| MOD A4C:    69.67 ml  LVLd A4C:    6.84 cm  LVESV MOD A2C:    24.42 ml |              |
|   LVLs A2C:    5.79 cm  LVESV MOD A4C:    22.75 ml  LVLs A4C:    5.80  |              |
| cm  LAESV(A-L):    218.68 ml  LAESV Index (A-L):    125.68 ml/m2  LAAs |              |
|  A2C:    39.65 cm2  LAESV A-L A2C:    180.64 ml  LALs A2C:    7.38 cm  |              |
|  LAAs A4C:    48.00 cm2  LAESV A-L A4C:    234.29 ml  LALs A4C:        |              |
| 8.34 cm  RAAs:    32.48 cm2  RAESV A-L:    108.99 ml  RAESV MOD:       |              |
| 105.43 ml  RALs:    8.37 cm  TAPSE:    2.67 cm  AR Dec Montmorency:    1.97  |              |
| m/s2  AR Dec Time:    1769.87 ms  AR maxP.66 mmHg  AR PHT:     |              |
| 513.26 ms  AR Vmax:    3.48 m/s  AV maxP.83 mmHg  AV meanPG:   |              |
|    9.01 mmHg  AV Vmax:    2.05 m/s  AV Vmean:    1.43 m/s  AV VTI:     |              |
| 38.96 cm  FOREST Vmax:    1.63 cm2  FOREST (VTI):    1.73 cm2  AVAI Vmax:    |              |
|  0.00 cm2/m2  AVAI (VTI):    0.00 cm2/m2  LVOT maxP.14 mmHg     |              |
| LVOT meanP.17 mmHg  LVSI Dopp:    38.90 ml/m2  LVSV Dopp:       |              |
| 67.70 ml  LVOT Vmax:    1.01 m/s  LVOT Vmean:    0.69 m/s  LVOT VTI:   |              |
|    20.50 cm  MV A Breezy:    0.04 m/s  MV DecT:    225.03 ms  MV E Breezy:   |              |
|    1.54 m/s  MV E/A Ratio:    35.2  MV PHT:    65.25 ms  MVA By PHT:   |              |
|    3.37 cm2  MV maxP.70 mmHg  MV meanP.33 mmHg  MV      |              |
| Vmax:    2.53 m/s  MV Vmean:    1.28 m/s  MV VTI:    45.02 cm  MVA     |              |
| (VTI):    1.50 cm2  Septal e':    0.04 m/s  Septal E/e':    34.18      |              |
| Lateral e':    0.04 m/s  Lateral E/e':    37.69  RAP:    10 mmHg       |              |
| RVSP:    57.42 mmHg  TR maxP.42 mmHg  TR Vmax:    3.44 m/s     |              |
|  Sonographer:  Authenticated by:   LYNDA MCCULLOUGH MD  Report Date/Time: |              |
|  3-6-2018 18:58:12                                                     |              |
+------------------------------------------------------------------------+--------------+
 
 
 
+-------------------------------------------------------------------------------------------
--------------------------------------------------------------------------------------------
--+
| Procedure Note                                                                            
                                                                                            
  |
+-------------------------------------------------------------------------------------------
--------------------------------------------------------------------------------------------
--+
|   [facility] Conversion - 2019  5:25 PM PDT   Patient Name:  Yodit Celaya    
                                                                                            
  |
 
| of Birth:  1932 Accession:  [id number] Performing Physician:  LYNDA MCCULLOUGH,            
                                                                                            
  |
| MD_______________________________________________________________INDICATIONS-----------C  
                                                                                            
  |
| HF CONCLUSIONS-----------1. Left ventricular systolic function is hyperdynamic with an    
                                                                                            
  |
| estimated EF of >70%.2. The right ventricle is normal in size and function.3. The left    
                                                                                            
  |
| atrium is markedly enlarged.4. The right atrium is markedly enlarged.5. Mild aortic       
                                                                                            
  |
| stenosis with peak/mean pressure gradient of 16.83mmHg / 9.02mmHg, the aortic valve area  
                                                                                            
  |
|  by continuity equation is 1.7cm      .6. Severe mitral regurgitation is present.7.       
                                                                                            
  |
| Moderate mitral stenosis, with peak/mean transvalvular gradients measured at 25.2 / 8.0   
                                                                                            
  |
| mm Hg, and MVA (by VTI) 1.50 cm       . There is mi8. Moderate to severe tricuspid        
                                                                                            
  |
| regurgitation present.9. There is moderate to severe pulmonary hypertension.  The peak    
                                                                                            
  |
| right ventricular systolic pressure (pulmonary artery systolic pressure), as measured by  
                                                                                            
  |
|  Doppler, is 55.8 - 60.8 mm Hg.10. There is a small secundum atrial septal defect         
                                                                                            
  |
| measuring <10 mm in diameter.  There is left-to-right shunting noted on color Doppler.    
                                                                                            
  |
| FINDINGS--------ECG rhythm: Atrial fibrillation.Study: A 2-dimensional transthoracic      
                                                                                            
  |
| echocardiogram with m-mode, spectral and color flow Doppler was perfomed.Study: This was  
                                                                                            
  |
|  a technically adequate study.Left Ventricle: Left ventricular systolic function is       
                                                                                            
  |
| hyperdynamic with an estimated EF of >70%.Left Ventricle: The left ventricle cavity size  
                                                                                            
  |
|  is normal.Left Ventricle: Left ventricular wall thickness is normal.Left Ventricle: No   
                                                                                            
  |
| regional wall motion abnormalities.Left Ventricle: Atrial fibrillation prevents accurate  
                                                                                            
  |
|  assessment of diastolic function.Right Ventricle: The right ventricle is normal in size  
                                                                                            
  |
 
|  and function.Left Atrium: The left atrium is markedly enlarged.Right Atrium: The right   
                                                                                            
  |
| atrium is markedly enlarged.Aortic Valve: Aortic valve is trileaflet and is mildly        
                                                                                            
  |
| thickened.Aortic Valve: The aortic valve is mildly calcified.Aortic Valve: Trace amount   
                                                                                            
  |
| of aortic regurgitation.Aortic Valve: Mild aortic stenosis with peak/mean pressure        
                                                                                            
  |
| gradient of 16.83mmHg / 9.02mmHg, the aortic valve area by continuity equation is         
                                                                                            
  |
| 1.7cm      .Mitral Valve: Mitral valve is thickened.Mitral Valve: Severe mitral           
                                                                                            
  |
| regurgitation is present.Mitral Valve: No evidence of MVPMitral Valve: Severe mitral      
                                                                                            
  |
| annular calcification present.Mitral Valve: Moderate mitral stenosis, with peak/mean      
                                                                                            
  |
| transvalvular gradients measured at 25.2 / 8.0 mm Hg, and MVA (by VTI) 1.50 cm       .    
                                                                                            
  |
| There is miMitral Valve: ld thickening of the  mitral valve leaflets.Tricuspid Valve:     
                                                                                            
  |
| The tricuspid valve appears structurally normal.Tricuspid Valve: Moderate to severe       
                                                                                            
  |
| tricuspid regurgitation present.Tricuspid Valve: There is moderate to severe pulmonary    
                                                                                            
  |
| hypertension.Tricuspid Valve: The right ventricular systolic pressure (pulmonary artery   
                                                                                            
  |
| systolic pressure), as measured by Doppler, is 55.8 - 60.8 mm Hg.Pulmonic Valve: The      
                                                                                            
  |
| pulmonic valve is normal.Pulmonic Valve: Trace  pulmonic regurgitation.Pericardium:       
                                                                                            
  |
| There is no pericardial effusion.IVC/Hepatic Veins: The IVC is normal size (1.5-2.5cm)    
                                                                                            
  |
| and collapses <50% with sniff, consistent with central venous pressures of                
                                                                                            
  |
| 10-15mmHg.Aorta: The aortic root, ascending aorta and aortic arch are normal.Mass: No     
                                                                                            
  |
| mass visualizedThrombus: No clot visualizedThrombus: No vegetation visualized.Septum:     
                                                                                            
  |
| There is a small secundum atrial septal defect measuring < 10 mm in diameter.  There is   
                                                                                            
  |
 
| left-to-right shunting noted on color Doppler. MEASUREMENTS-------------Ao asc:   3.46    
                                                                                            
  |
| cmAo Diam:   2.93 cmAo sinus:   3.27 cmAo st junct:   2.99 cmIVC:   2.34 cmLA Diam:       
                                                                                            
  |
| 4.73 cmLA Major:   7.82 cmEDV(Teich):   81.83 mlIVSd:   0.71 cmLVIDd:   4.27 cmLVPWd:     
                                                                                            
  |
| 0.96 cmLVOT Area:   3.30 [id number] Diam:   2.05 cm%FS:   36.87 %EF(Teich):   67.07          
                                                                                            
  |
| %ESV(Teich):   26.94 mlLVIDs:   2.69 cmSV(Teich):   54.89 mlRV Major:   7.48 cmRVIDd:     
                                                                                            
  |
| 2.51 cmLVEF MOD A2C:   73.51 %SV MOD A2C:   67.80 mlLVEF MOD A4C:   67.34 %SV MOD A4C:    
                                                                                            
  |
|  46.92 mlEF Biplane:   70.64 %LVEDV MOD BP:   79.68 mlLVESV MOD BP:   23.39 mlLVEDV MOD   
                                                                                            
  |
| A2C:   92.23 mlLVLd A2C:   6.84 cmLVEDV MOD A4C:   69.67 mlLVLd A4C:   6.84 cmLVESV MOD   
                                                                                            
  |
| A2C:   24.42 mlLVLs A2C:   5.79 cmLVESV MOD A4C:   22.75 mlLVLs A4C:   5.80               
                                                                                            
  |
| cmLAESV(A-L):   218.68 mlLAESV Index (A-L):   125.68 ml/m2LAAs A2C:   39.65 [id number] A-L  
                                                                                            
  |
|  A2C:   180.64 mlLALs A2C:   7.38 cmLAAs A4C:   48.00 [id number] A-L A4C:   234.29 mlLALs   
                                                                                            
  |
| A4C:   8.34 cmRAAs:   32.48 [id number] A-L:   108.99 mlRAESV MOD:   105.43 mlRALs:   8.37   
                                                                                            
  |
| cmTAPSE:   2.67 cmAR Dec Montmorency:   1.97 m/s2AR Dec Time:   1769.87 msAR maxP.66     
                                                                                            
  |
| mmHgAR PHT:   513.26 msAR Vmax:   3.48 m/Juany maxP.83 mmHgAV meanP.01 mmHgAV  
                                                                                            
  |
|  Vmax:   2.05 m/Juany Vmean:   1.43 m/Juany VTI:   38.96 cmAVA Vmax:   1.63 cm2AVA (VTI):     
                                                                                            
  |
| 1.73 [id number] Vmax:   0.00 cm2/m2AVAI (VTI):   0.00 cm2/m2LVOT maxP.14 mmHgLVOT      
                                                                                            
  |
| meanP.17 mmHgLVSI Dopp:   38.90 ml/m2LVSV Dopp:   67.70 mlLVOT Vmax:   1.01         
                                                                                            
  |
| m/sLVOT Vmean:   0.69 m/sLVOT VTI:   20.50 cmMV A Breezy:   0.04 m/sMV DecT:   225.03 msMV   
                                                                                            
  |
| E Breezy:   1.54 m/sMV E/A Ratio:   35.2MV PHT:   65.25 msMVA By PHT:   3.37 cm2MV maxPG:    
                                                                                            
  |
|  25.70 mmHgMV meanP.33 mmHgMV Vmax:   2.53 m/sMV Vmean:   1.28 m/sMV VTI:   45.02   
                                                                                            
  |
 
| cmMVA (VTI):   1.50 vo8Tmwrcc e':   0.04 m/sSeptal E/e':   34.18Lateral e':   0.04        
                                                                                            
  |
| m/sLateral E/e':   37.69RAP:   10 mmHgRVSP:   57.42 mmHgTR maxP.42 mmHgTR Vmax:    
                                                                                            
  |
|  3.44 m/s Sonographer:Authenticated by:  Rajinder RODRÍGUEZ Date/Time: 3-6-2018       
                                                                                            
  |
| 18:58:12 IMPRESSION: 1. Left ventricular systolic function is hyperdynamic with an        
                                                                                            
  |
| estimated EF of >70%.2. The right ventricle is normal in size and function.3. The left    
                                                                                            
  |
| atrium is markedly enlarged.4. The right atrium is markedly enlarged.5. Mild aortic       
                                                                                            
  |
| stenosis with peak/mean pressure gradient of 16.83mmHg / 9.02mmHg, the aortic valve area  
                                                                                            
  |
|  by continuity equation is 1.7cm      .6. Severe mitral regurgitation is present.7.       
                                                                                            
  |
| Moderate mitral stenosis, with peak/mean transvalvular gradients measured at 25.2 / 8.0   
                                                                                            
  |
| mm Hg, and MVA (by VTI) 1.50 cm       . There is mi8. Moderate to severe tricuspid        
                                                                                            
  |
| regurgitation present.9. There is moderate to severe pulmonary hypertension.  The peak    
                                                                                            
  |
| right ventricular systolic pressure (pulmonary artery systolic pressure), as measured by  
                                                                                            
  |
|  Doppler, is 55.8 - 60.8 mm Hg.10. There is a small secundum atrial septal defect         
                                                                                            
  |
| measuring <10 mm in diameter.  There is left-to-right shunting noted on color Doppler.    
                                                                                            
  |
|LVOT Area:   3.30 cm2                                                                      
                                                                                            
  |
|LVOT Diam:   2.05 cm                                                                       
                                                                                            
  |
|%FS:   36.87 %                                                                             
                                                                                            
  |
|EF(Teich):   67.07 %                                                                       
                                                                                            
  |
|ESV(Teich):   26.94 ml                                                                     
                                                                                            
  |
|LVIDs:   2.69 cm                                                                           
                                                                                            
  |
 
|SV(Teich):   54.89 ml                                                                      
                                                                                            
  |
|RV Major:   7.48 cm                                                                        
                                                                                            
  |
|RVIDd:   2.51 cm                                                                           
                                                                                            
  |
|LVEF MOD A2C:   73.51 %                                                                    
                                                                                            
  |
|SV MOD A2C:   67.80 ml                                                                     
                                                                                            
  |
|LVEF MOD A4C:   67.34 %                                                                    
                                                                                            
  |
|SV MOD A4C:   46.92 ml                                                                     
                                                                                            
  |
|EF Biplane:   70.64 %                                                                      
                                                                                            
  |
|LVEDV MOD BP:   79.68 ml                                                                   
                                                                                            
  |
|LVESV MOD BP:   23.39 ml                                                                   
                                                                                            
  |
|LVEDV MOD A2C:   92.23 ml                                                                  
                                                                                            
  |
|LVLd A2C:   6.84 cm                                                                        
                                                                                            
  |
|LVEDV MOD A4C:   69.67 ml                                                                  
                                                                                            
  |
|LVLd A4C:   6.84 cm                                                                        
                                                                                            
  |
|LVESV MOD A2C:   24.42 ml                                                                  
                                                                                            
  |
|LVLs A2C:   5.79 cm                                                                        
                                                                                            
  |
|LVESV MOD A4C:   22.75 ml                                                                  
                                                                                            
  |
|LVLs A4C:   5.80 cm                                                                        
                                                                                            
  |
|LAESV(A-L):   218.68 ml                                                                    
                                                                                            
  |
|LAESV Index (A-L):   125.68 ml/m2                                                          
                                                                                            
  |
 
|LAAs A2C:   39.65 cm2                                                                      
                                                                                            
  |
|LAESV A-L A2C:   180.64 ml                                                                 
                                                                                            
  |
|LALs A2C:   7.38 cm                                                                        
                                                                                            
  |
|LAAs A4C:   48.00 cm2                                                                      
                                                                                            
  |
|LAESV A-L A4C:   234.29 ml                                                                 
                                                                                            
  |
|LALs A4C:   8.34 cm                                                                        
                                                                                            
  |
|RAAs:   32.48 cm2                                                                          
                                                                                            
  |
|RAESV A-L:   108.99 ml                                                                     
                                                                                            
  |
|RAESV MOD:   105.43 ml                                                                     
                                                                                            
  |
|RALs:   8.37 cm                                                                            
                                                                                            
  |
|TAPSE:   2.67 cm                                                                           
                                                                                            
  |
|AR Dec Montmorency:   1.97 m/s2                                                                  
                                                                                            
  |
|AR Dec Time:   1769.87 ms                                                                  
                                                                                            
  |
|AR maxP.66 mmHg                                                                     
                                                                                            
  |
|AR PHT:   513.26 ms                                                                        
                                                                                            
  |
|AR Vmax:   3.48 m/s                                                                        
                                                                                            
  |
|AV maxP.83 mmHg                                                                     
                                                                                            
  |
|AV meanP.01 mmHg                                                                     
                                                                                            
  |
|AV Vmax:   2.05 m/s                                                                        
                                                                                            
  |
|AV Vmean:   1.43 m/s                                                                       
                                                                                            
  |
 
|AV VTI:   38.96 cm                                                                         
                                                                                            
  |
|FOREST Vmax:   1.63 cm2                                                                       
                                                                                            
  |
|FOREST (VTI):   1.73 cm2                                                                      
                                                                                            
  |
|AVAI Vmax:   0.00 cm2/m2                                                                   
                                                                                            
  |
|AVAI (VTI):   0.00 cm2/m2                                                                  
                                                                                            
  |
|LVOT maxP.14 mmHg                                                                    
                                                                                            
  |
|LVOT meanP.17 mmHg                                                                   
                                                                                            
  |
|LVSI Dopp:   38.90 ml/m2                                                                   
                                                                                            
  |
|LVSV Dopp:   67.70 ml                                                                      
                                                                                            
  |
|LVOT Vmax:   1.01 m/s                                                                      
                                                                                            
  |
|LVOT Vmean:   0.69 m/s                                                                     
                                                                                            
  |
|LVOT VTI:   20.50 cm                                                                       
                                                                                            
  |
|MV A Breezy:   0.04 m/s                                                                       
                                                                                            
  |
|MV DecT:   225.03 ms                                                                       
                                                                                            
  |
|MV E Breezy:   1.54 m/s                                                                       
                                                                                            
  |
|MV E/A Ratio:   35.2                                                                       
                                                                                            
  |
|MV PHT:   65.25 ms                                                                         
                                                                                            
  |
|MVA By PHT:   3.37 cm2                                                                     
                                                                                            
  |
|MV maxP.70 mmHg                                                                     
                                                                                            
  |
|MV meanP.33 mmHg                                                                     
                                                                                            
  |
 
|MV Vmax:   2.53 m/s                                                                        
                                                                                            
  |
|MV Vmean:   1.28 m/s                                                                       
                                                                                            
  |
|MV VTI:   45.02 cm                                                                         
                                                                                            
  |
|MVA (VTI):   1.50 cm2                                                                      
                                                                                            
  |
|Septal e':   0.04 m/s                                                                      
                                                                                            
  |
|Septal E/e':   34.18                                                                       
                                                                                            
  |
|Lateral e':   0.04 m/s                                                                     
                                                                                            
  |
|Lateral E/e':   37.69                                                                      
                                                                                            
  |
|RAP:   10 mmHg                                                                             
                                                                                            
  |
|RVSP:   57.42 mmHg                                                                         
                                                                                            
  |
|TR maxP.42 mmHg                                                                     
                                                                                            
  |
|TR Vmax:   3.44 m/s                                                                        
                                                                                            
  |
|                                                                                           
                                                                                            
  |
|Sonographer:                                                                               
                                                                                            
 |
|Authenticated by:  LYNDA MCCULLOUGH MD                                                        
                                                                                            
  |
|Report Date/Time: 3-6-2018 18:58:12                                                        
                                                                                            
  |
|                                                                                           
                                                                                            
  |
|IMPRESSION:                                                                                
                                                                                            
  |
|1. Left ventricular systolic function is hyperdynamic with an estimated EF of >70%.        
                                                                                            
  |
|2. The right ventricle is normal in size and function.                                     
                                                                                            
  |
 
|3. The left atrium is markedly enlarged.                                                   
                                                                                            
  |
|4. The right atrium is markedly enlarged.                                                  
                                                                                            
  |
|5. Mild aortic stenosis with peak/mean pressure gradient of 16.83mmHg / 9.02mmHg, the aorti
c valve area by continuity equation is 1.7cm      .                                         
  |
|6. Severe mitral regurgitation is present.                                                 
                                                                                            
  |
|7. Moderate mitral stenosis, with peak/mean transvalvular gradients measured at 25.2 / 8.0 
mm Hg, and MVA (by VTI) 1.50 cm       . There is mi                                         
  |
|8. Moderate to severe tricuspid regurgitation present.                                     
                                                                                            
  |
|9. There is moderate to severe pulmonary hypertension.  The peak right ventricular systolic
 pressure (pulmonary artery systolic pressure), as measured by Doppler, is 55.8 - 60.8 mm Hg
. |
|10. There is a small secundum atrial septal defect measuring <10 mm in diameter.  There is 
left-to-right shunting noted on color Doppler.                                              
  |
+-------------------------------------------------------------------------------------------
--------------------------------------------------------------------------------------------
--+
 documented in this encounter
 
 Visit Diagnoses
 Not on filedocumented in this encounter

## 2020-01-01 NOTE — XMS
Encounter Summary
  Created on: 2020
 
 Vonnie Celaya
 External Reference #: [id number]
 : 32
 Sex: Female
 
 Demographics
 
 
+-----------------------+---------------------------+
| Address               | [address]            |
|                       | [place]  [address] |
+-----------------------+---------------------------+
| Home Phone            | [phone]           |
+-----------------------+---------------------------+
| Preferred Language    | Unknown                   |
+-----------------------+---------------------------+
| Marital Status        |                    |
+-----------------------+---------------------------+
| Holiness Affiliation | Unknown                   |
+-----------------------+---------------------------+
| Race                  | Unknown                   |
+-----------------------+---------------------------+
| Ethnic Group          | Unknown                   |
+-----------------------+---------------------------+
 
 
 Author
 
 
+--------------+--------------------------------------------+
| Author       | [facility]  |
|              | [facility] [place]                                |
+--------------+--------------------------------------------+
| Organization | [facility]  |
|              | [facility] [place]                                |
+--------------+--------------------------------------------+
| Address      | Unknown                                    |
+--------------+--------------------------------------------+
| Phone        | Unavailable                                |
+--------------+--------------------------------------------+
 
 
 
 Support
 
 
+--------------+--------------+---------+-----------------+
| Name         | Relationship | Address | Phone           |
+--------------+--------------+---------+-----------------+
| Lauryn Leroy | ECON         | Unknown | [phone] |
+--------------+--------------+---------+-----------------+
 
 
 
 Care Team Providers
 
 
 
+------------------------+------+-----------------+
| Care Team Member Name  | Role | Phone           |
+------------------------+------+-----------------+
| Jean Bermudez MD | PCP  | [phone] |
+------------------------+------+-----------------+
 
 
 
 Encounter Details
 
 
+--------+-------------+------------------+----------------------+-------------+
| Date   | Type        | Department       | Care Team            | Description |
+--------+-------------+------------------+----------------------+-------------+
| / | Orders Only |   [facility]     |   Valery Aranda,   |             |
| 2018   |             | CONVERSION  888  | MD  [address]  |             |
|        |             | [address]       | [address]  [place], [place]   |             |
|        |             | [place], [place]     | [address]  [phone]  |             |
|        |             | [address]       |  [phone] (Fax)  |             |
|        |             | [phone]     |                      |             |
+--------+-------------+------------------+----------------------+-------------+
 
 
 
 Social History
 
 
+----------------+-------+-----------+--------+------+
| Tobacco Use    | Types | Packs/Day | Years  | Date |
|                |       |           | Used   |      |
+----------------+-------+-----------+--------+------+
| Never Assessed |       |           |        |      |
+----------------+-------+-----------+--------+------+
 
 
 
+------------------+---------------+
| Sex Assigned at  | Date Recorded |
| Birth            |               |
+------------------+---------------+
| Not on file      |               |
+------------------+---------------+
 
 
 
+----------------+-------------+-------------+
| Job Start Date | Occupation  | Industry    |
+----------------+-------------+-------------+
| Not on file    | Not on file | Not on file |
+----------------+-------------+-------------+
 
 
 
+----------------+--------------+------------+
| Travel History | Travel Start | Travel End |
+----------------+--------------+------------+
 
 
 
 
+-------------------------------------+
| No recent travel history available. |
+-------------------------------------+
 documented as of this encounter
 
 Plan of Treatment
 
 
+--------+---------+------------+----------------------+-------------+
| Date   | Type    | Specialty  | Care Team            | Description |
+--------+---------+------------+----------------------+-------------+
| / | Office  | Cardiology |   [facility]    |             |
| 2020   | Visit   |            | [address]      |             |
|        |         |            | [address]   |             |
|        |         |            | [place] [address]   |             |
|        |         |            | [phone]         |             |
|        |         |            | [phone] (Fax)   |             |
+--------+---------+------------+----------------------+-------------+
 documented as of this encounter
 
 Procedures
 
 
+----------------------+--------+-------------+----------------------+----------------------
+
| Procedure Name       | Priori | Date/Time   | Associated Diagnosis | Comments             
|
|                      | ty     |             |                      |                      
|
+----------------------+--------+-------------+----------------------+----------------------
+
| ECHO INTERPRETATION  | Routin | 2018  |                      |   Results for this   
|
| OF OUTSIDE FILMS     | e      |  3:06 PM    |                      | procedure are in the 
|
|                      |        | PST         |                      |  results section.    
|
+----------------------+--------+-------------+----------------------+----------------------
+
 documented in this encounter
 
 Results
 ECHO Interpretation of Outside Films (2018  3:06 PM PST)
 
+----------+
| Specimen |
+----------+
|          |
+----------+
 
 
 
+------------------------------------------------------------------------+--------------+
| Impressions                                                            | Performed At |
+------------------------------------------------------------------------+--------------+
|   1. Left ventricular systolic function is hyperdynamic with an        |              |
| estimated EF of >70%.  2. The right ventricle is normal in size and    |              |
| function.  3. The left atrium is markedly enlarged.  4. The right      |              |
| atrium is markedly enlarged.  5. Mild aortic stenosis with peak/mean   |              |
 
| pressure gradient of 16.83mmHg / 9.02mmHg, the aortic valve area by    |              |
| continuity equation is 1.7cm      .  6. Severe mitral regurgitation is |              |
|  present.  7. Moderate mitral stenosis, with peak/mean transvalvular   |              |
| gradients measured at 25.2 / 8.0 mm Hg, and MVA (by VTI) 1.50 cm       |              |
|  . There is mi  8. Moderate to severe tricuspid regurgitation present. |              |
|   9. There is moderate to severe pulmonary hypertension.   The peak    |              |
| right ventricular systolic pressure (pulmonary artery systolic         |              |
| pressure), as measured by Doppler, is 55.8 - 60.8 mm Hg.  10. There is |              |
|  a small secundum atrial septal defect measuring <10 mm in diameter.   |              |
|   There is left-to-right shunting noted on color Doppler.              |              |
+------------------------------------------------------------------------+--------------+
 
 
 
+------------------------------------------------------------------------+--------------+
| Narrative                                                              | Performed At |
+------------------------------------------------------------------------+--------------+
|      Patient Name:   Vonnie Celaya  YOB: 1932    |              |
|   Accession:   [id number]     Performing Physician:   LYNDA MCCULLOUGH MD    |              |
| _______________________________________________________________        |              |
| INDICATIONS  -----------  CHF     CONCLUSIONS  -----------  1. Left    |              |
| ventricular systolic function is hyperdynamic with an estimated EF of  |              |
| >70%.  2. The right ventricle is normal in size and function.  3. The  |              |
| left atrium is markedly enlarged.  4. The right atrium is markedly     |              |
| enlarged.  5. Mild aortic stenosis with peak/mean pressure gradient of |              |
|  16.83mmHg / 9.02mmHg, the aortic valve area by continuity equation is |              |
|  1.7cm      .  6. Severe mitral regurgitation is present.  7. Moderate |              |
|  mitral stenosis, with peak/mean transvalvular gradients measured at   |              |
| 25.2 / 8.0 mm Hg, and MVA (by VTI) 1.50 cm       . There is mi  8.     |              |
| Moderate to severe tricuspid regurgitation present.  9. There is       |              |
| moderate to severe pulmonary hypertension.   The peak right            |              |
| ventricular systolic pressure (pulmonary artery systolic pressure), as |              |
|  measured by Doppler, is 55.8 - 60.8 mm Hg.  10. There is a small      |              |
| secundum atrial septal defect measuring <10 mm in diameter.   There is |              |
|  left-to-right shunting noted on color Doppler.     FINDINGS  -------- |              |
|   ECG rhythm: Atrial fibrillation.  Study: A 2-dimensional             |              |
| transthoracic echocardiogram with m-mode, spectral and color flow      |              |
| Doppler was perfomed.  Study: This was a technically adequate study.   |              |
| Left Ventricle: Left ventricular systolic function is hyperdynamic     |              |
| with an estimated EF of >70%.  Left Ventricle: The left ventricle      |              |
| cavity size is normal.  Left Ventricle: Left ventricular wall          |              |
| thickness is normal.  Left Ventricle: No regional wall motion          |              |
| abnormalities.  Left Ventricle: Atrial fibrillation prevents accurate  |              |
| assessment of diastolic function.  Right Ventricle: The right          |              |
| ventricle is normal in size and function.  Left Atrium: The left       |              |
| atrium is markedly enlarged.  Right Atrium: The right atrium is        |              |
| markedly enlarged.  Aortic Valve: Aortic valve is trileaflet and is    |              |
| mildly thickened.  Aortic Valve: The aortic valve is mildly calcified. |              |
|   Aortic Valve: Trace amount of aortic regurgitation.  Aortic Valve:   |              |
| Mild aortic stenosis with peak/mean pressure gradient of 16.83mmHg /   |              |
| 9.02mmHg, the aortic valve area by continuity equation is 1.7cm      . |              |
|   Mitral Valve: Mitral valve is thickened.  Mitral Valve: Severe       |              |
| mitral regurgitation is present.  Mitral Valve: No evidence of MVP     |              |
| Mitral Valve: Severe mitral annular calcification present.  Mitral     |              |
| Valve: Moderate mitral stenosis, with peak/mean transvalvular          |              |
| gradients measured at 25.2 / 8.0 mm Hg, and MVA (by VTI) 1.50 cm       |              |
|  . There is mi  Mitral Valve: ld thickening of the   mitral valve      |              |
| leaflets.  Tricuspid Valve: The tricuspid valve appears structurally   |              |
| normal.  Tricuspid Valve: Moderate to severe tricuspid regurgitation   |              |
| present.  Tricuspid Valve: There is moderate to severe pulmonary       |              |
 
| hypertension.  Tricuspid Valve: The right ventricular systolic         |              |
| pressure (pulmonary artery systolic pressure), as measured by Doppler, |              |
|  is 55.8 - 60.8 mm Hg.  Pulmonic Valve: The pulmonic valve is normal.  |              |
|  Pulmonic Valve: Trace   pulmonic regurgitation.  Pericardium: There   |              |
| is no pericardial effusion.  IVC/Hepatic Veins: The IVC is normal size |              |
|  (1.5-2.5cm) and collapses <50% with sniff, consistent with central    |              |
| venous pressures of 10-15mmHg.  Aorta: The aortic root, ascending      |              |
| aorta and aortic arch are normal.  Mass: No mass visualized  Thrombus: |              |
|  No clot visualized  Thrombus: No vegetation visualized.  Septum:      |              |
| There is a small secundum atrial septal defect measuring < 10 mm in    |              |
| diameter.   There is left-to-right shunting noted on color Doppler.    |              |
|   MEASUREMENTS  -------------  Ao asc:    3.46 cm  Ao Diam:    2.93 cm |              |
|   Ao sinus:    3.27 cm  Ao st junct:    2.99 cm  IVC:    2.34 cm  LA   |              |
| Diam:    4.73 cm  LA Major:    7.82 cm  EDV(Teich):    81.83 ml  IVSd: |              |
|     0.71 cm  LVIDd:    4.27 cm  LVPWd:    0.96 cm  LVOT Area:    3.30  |              |
| cm2  LVOT Diam:    2.05 cm  %FS:    36.87 %  EF(Teich):    67.07 %     |              |
| ESV(Teich):    26.94 ml  LVIDs:    2.69 cm  SV(Teich):    54.89 ml  RV |              |
|  Major:    7.48 cm  RVIDd:    2.51 cm  LVEF MOD A2C:    73.51 %  SV    |              |
| MOD A2C:    67.80 ml  LVEF MOD A4C:    67.34 %  SV MOD A4C:    46.92   |              |
| ml  EF Biplane:    70.64 %  LVEDV MOD BP:    79.68 ml  LVESV MOD BP:   |              |
|    23.39 ml  LVEDV MOD A2C:    92.23 ml  LVLd A2C:    6.84 cm  LVEDV   |              |
| MOD A4C:    69.67 ml  LVLd A4C:    6.84 cm  LVESV MOD A2C:    24.42 ml |              |
|   LVLs A2C:    5.79 cm  LVESV MOD A4C:    22.75 ml  LVLs A4C:    5.80  |              |
| cm  LAESV(A-L):    218.68 ml  LAESV Index (A-L):    125.68 ml/m2  LAAs |              |
|  A2C:    39.65 cm2  LAESV A-L A2C:    180.64 ml  LALs A2C:    7.38 cm  |              |
|  LAAs A4C:    48.00 cm2  LAESV A-L A4C:    234.29 ml  LALs A4C:        |              |
| 8.34 cm  RAAs:    32.48 cm2  RAESV A-L:    108.99 ml  RAESV MOD:       |              |
| 105.43 ml  RALs:    8.37 cm  TAPSE:    2.67 cm  AR Dec Newton:    1.97  |              |
| m/s2  AR Dec Time:    1769.87 ms  AR maxP.66 mmHg  AR PHT:     |              |
| 513.26 ms  AR Vmax:    3.48 m/s  AV maxP.83 mmHg  AV meanPG:   |              |
|    9.01 mmHg  AV Vmax:    2.05 m/s  AV Vmean:    1.43 m/s  AV VTI:     |              |
| 38.96 cm  FOREST Vmax:    1.63 cm2  FOREST (VTI):    1.73 cm2  AVAI Vmax:    |              |
|  0.00 cm2/m2  AVAI (VTI):    0.00 cm2/m2  LVOT maxP.14 mmHg     |              |
| LVOT meanP.17 mmHg  LVSI Dopp:    38.90 ml/m2  LVSV Dopp:       |              |
| 67.70 ml  LVOT Vmax:    1.01 m/s  LVOT Vmean:    0.69 m/s  LVOT VTI:   |              |
|    20.50 cm  MV A Breezy:    0.04 m/s  MV DecT:    225.03 ms  MV E Breezy:   |              |
|    1.54 m/s  MV E/A Ratio:    35.2  MV PHT:    65.25 ms  MVA By PHT:   |              |
|    3.37 cm2  MV maxP.70 mmHg  MV meanP.33 mmHg  MV      |              |
| Vmax:    2.53 m/s  MV Vmean:    1.28 m/s  MV VTI:    45.02 cm  MVA     |              |
| (VTI):    1.50 cm2  Septal e':    0.04 m/s  Septal E/e':    34.18      |              |
| Lateral e':    0.04 m/s  Lateral E/e':    37.69  RAP:    10 mmHg       |              |
| RVSP:    57.42 mmHg  TR maxP.42 mmHg  TR Vmax:    3.44 m/s     |              |
|  Sonographer:  Authenticated by:   LYNDA MCCULLOUGH MD  Report Date/Time: |              |
|  3-6-2018 18:58:12                                                     |              |
+------------------------------------------------------------------------+--------------+
 
 
 
+-------------------------------------------------------------------------------------------
--------------------------------------------------------------------------------------------
--+
| Procedure Note                                                                            
                                                                                            
  |
+-------------------------------------------------------------------------------------------
--------------------------------------------------------------------------------------------
--+
|   [facility] Conversion - 2019  5:25 PM PDT   Patient Name:  Yodit Celaya    
                                                                                            
  |
 
| of Birth:  1932 Accession:  [id number] Performing Physician:  LYNDA MCCULLOUGH,            
                                                                                            
  |
| MD_______________________________________________________________INDICATIONS-----------C  
                                                                                            
  |
| HF CONCLUSIONS-----------1. Left ventricular systolic function is hyperdynamic with an    
                                                                                            
  |
| estimated EF of >70%.2. The right ventricle is normal in size and function.3. The left    
                                                                                            
  |
| atrium is markedly enlarged.4. The right atrium is markedly enlarged.5. Mild aortic       
                                                                                            
  |
| stenosis with peak/mean pressure gradient of 16.83mmHg / 9.02mmHg, the aortic valve area  
                                                                                            
  |
|  by continuity equation is 1.7cm      .6. Severe mitral regurgitation is present.7.       
                                                                                            
  |
| Moderate mitral stenosis, with peak/mean transvalvular gradients measured at 25.2 / 8.0   
                                                                                            
  |
| mm Hg, and MVA (by VTI) 1.50 cm       . There is mi8. Moderate to severe tricuspid        
                                                                                            
  |
| regurgitation present.9. There is moderate to severe pulmonary hypertension.  The peak    
                                                                                            
  |
| right ventricular systolic pressure (pulmonary artery systolic pressure), as measured by  
                                                                                            
  |
|  Doppler, is 55.8 - 60.8 mm Hg.10. There is a small secundum atrial septal defect         
                                                                                            
  |
| measuring <10 mm in diameter.  There is left-to-right shunting noted on color Doppler.    
                                                                                            
  |
| FINDINGS--------ECG rhythm: Atrial fibrillation.Study: A 2-dimensional transthoracic      
                                                                                            
  |
| echocardiogram with m-mode, spectral and color flow Doppler was perfomed.Study: This was  
                                                                                            
  |
|  a technically adequate study.Left Ventricle: Left ventricular systolic function is       
                                                                                            
  |
| hyperdynamic with an estimated EF of >70%.Left Ventricle: The left ventricle cavity size  
                                                                                            
  |
|  is normal.Left Ventricle: Left ventricular wall thickness is normal.Left Ventricle: No   
                                                                                            
  |
| regional wall motion abnormalities.Left Ventricle: Atrial fibrillation prevents accurate  
                                                                                            
  |
|  assessment of diastolic function.Right Ventricle: The right ventricle is normal in size  
                                                                                            
  |
 
|  and function.Left Atrium: The left atrium is markedly enlarged.Right Atrium: The right   
                                                                                            
  |
| atrium is markedly enlarged.Aortic Valve: Aortic valve is trileaflet and is mildly        
                                                                                            
  |
| thickened.Aortic Valve: The aortic valve is mildly calcified.Aortic Valve: Trace amount   
                                                                                            
  |
| of aortic regurgitation.Aortic Valve: Mild aortic stenosis with peak/mean pressure        
                                                                                            
  |
| gradient of 16.83mmHg / 9.02mmHg, the aortic valve area by continuity equation is         
                                                                                            
  |
| 1.7cm      .Mitral Valve: Mitral valve is thickened.Mitral Valve: Severe mitral           
                                                                                            
  |
| regurgitation is present.Mitral Valve: No evidence of MVPMitral Valve: Severe mitral      
                                                                                            
  |
| annular calcification present.Mitral Valve: Moderate mitral stenosis, with peak/mean      
                                                                                            
  |
| transvalvular gradients measured at 25.2 / 8.0 mm Hg, and MVA (by VTI) 1.50 cm       .    
                                                                                            
  |
| There is miMitral Valve: ld thickening of the  mitral valve leaflets.Tricuspid Valve:     
                                                                                            
  |
| The tricuspid valve appears structurally normal.Tricuspid Valve: Moderate to severe       
                                                                                            
  |
| tricuspid regurgitation present.Tricuspid Valve: There is moderate to severe pulmonary    
                                                                                            
  |
| hypertension.Tricuspid Valve: The right ventricular systolic pressure (pulmonary artery   
                                                                                            
  |
| systolic pressure), as measured by Doppler, is 55.8 - 60.8 mm Hg.Pulmonic Valve: The      
                                                                                            
  |
| pulmonic valve is normal.Pulmonic Valve: Trace  pulmonic regurgitation.Pericardium:       
                                                                                            
  |
| There is no pericardial effusion.IVC/Hepatic Veins: The IVC is normal size (1.5-2.5cm)    
                                                                                            
  |
| and collapses <50% with sniff, consistent with central venous pressures of                
                                                                                            
  |
| 10-15mmHg.Aorta: The aortic root, ascending aorta and aortic arch are normal.Mass: No     
                                                                                            
  |
| mass visualizedThrombus: No clot visualizedThrombus: No vegetation visualized.Septum:     
                                                                                            
  |
| There is a small secundum atrial septal defect measuring < 10 mm in diameter.  There is   
                                                                                            
  |
 
| left-to-right shunting noted on color Doppler. MEASUREMENTS-------------Ao asc:   3.46    
                                                                                            
  |
| cmAo Diam:   2.93 cmAo sinus:   3.27 cmAo st junct:   2.99 cmIVC:   2.34 cmLA Diam:       
                                                                                            
  |
| 4.73 cmLA Major:   7.82 cmEDV(Teich):   81.83 mlIVSd:   0.71 cmLVIDd:   4.27 cmLVPWd:     
                                                                                            
  |
| 0.96 cmLVOT Area:   3.30 [id number] Diam:   2.05 cm%FS:   36.87 %EF(Teich):   67.07          
                                                                                            
  |
| %ESV(Teich):   26.94 mlLVIDs:   2.69 cmSV(Teich):   54.89 mlRV Major:   7.48 cmRVIDd:     
                                                                                            
  |
| 2.51 cmLVEF MOD A2C:   73.51 %SV MOD A2C:   67.80 mlLVEF MOD A4C:   67.34 %SV MOD A4C:    
                                                                                            
  |
|  46.92 mlEF Biplane:   70.64 %LVEDV MOD BP:   79.68 mlLVESV MOD BP:   23.39 mlLVEDV MOD   
                                                                                            
  |
| A2C:   92.23 mlLVLd A2C:   6.84 cmLVEDV MOD A4C:   69.67 mlLVLd A4C:   6.84 cmLVESV MOD   
                                                                                            
  |
| A2C:   24.42 mlLVLs A2C:   5.79 cmLVESV MOD A4C:   22.75 mlLVLs A4C:   5.80               
                                                                                            
  |
| cmLAESV(A-L):   218.68 mlLAESV Index (A-L):   125.68 ml/m2LAAs A2C:   39.65 [id number] A-L  
                                                                                            
  |
|  A2C:   180.64 mlLALs A2C:   7.38 cmLAAs A4C:   48.00 [id number] A-L A4C:   234.29 mlLALs   
                                                                                            
  |
| A4C:   8.34 cmRAAs:   32.48 [id number] A-L:   108.99 mlRAESV MOD:   105.43 mlRALs:   8.37   
                                                                                            
  |
| cmTAPSE:   2.67 cmAR Dec Newton:   1.97 m/s2AR Dec Time:   1769.87 msAR maxP.66     
                                                                                            
  |
| mmHgAR PHT:   513.26 msAR Vmax:   3.48 m/Juany maxP.83 mmHgAV meanP.01 mmHgAV  
                                                                                            
  |
|  Vmax:   2.05 m/Juany Vmean:   1.43 m/Juany VTI:   38.96 cmAVA Vmax:   1.63 cm2AVA (VTI):     
                                                                                            
  |
| 1.73 at0CLLP Vmax:   0.00 cm2/m2AVAI (VTI):   0.00 cm2/m2LVOT maxP.14 mmHgLVOT      
                                                                                            
  |
| meanP.17 mmHgLVSI Dopp:   38.90 ml/m2LVSV Dopp:   67.70 mlLVOT Vmax:   1.01         
                                                                                            
  |
| m/sLVOT Vmean:   0.69 m/sLVOT VTI:   20.50 cmMV A Breezy:   0.04 m/sMV DecT:   225.03 msMV   
                                                                                            
  |
| E Breezy:   1.54 m/sMV E/A Ratio:   35.2MV PHT:   65.25 msMVA By PHT:   3.37 cm2MV maxPG:    
                                                                                            
  |
|  25.70 mmHgMV meanP.33 mmHgMV Vmax:   2.53 m/sMV Vmean:   1.28 m/sMV VTI:   45.02   
                                                                                            
  |
 
| cmMVA (VTI):   1.50 [id number] e':   0.04 m/sSeptal E/e':   34.18Lateral e':   0.04        
                                                                                            
  |
| m/sLateral E/e':   37.69RAP:   10 mmHgRVSP:   57.42 mmHgTR maxP.42 mmHgTR Vmax:    
                                                                                            
  |
|  3.44 m/s Sonographer:Authenticated by:  Rajinder RODRÍGUEZ Date/Time: 3-6-2018       
                                                                                            
  |
| 18:58:12 IMPRESSION: 1. Left ventricular systolic function is hyperdynamic with an        
                                                                                            
  |
| estimated EF of >70%.2. The right ventricle is normal in size and function.3. The left    
                                                                                            
  |
| atrium is markedly enlarged.4. The right atrium is markedly enlarged.5. Mild aortic       
                                                                                            
  |
| stenosis with peak/mean pressure gradient of 16.83mmHg / 9.02mmHg, the aortic valve area  
                                                                                            
  |
|  by continuity equation is 1.7cm      .6. Severe mitral regurgitation is present.7.       
                                                                                            
  |
| Moderate mitral stenosis, with peak/mean transvalvular gradients measured at 25.2 / 8.0   
                                                                                            
  |
| mm Hg, and MVA (by VTI) 1.50 cm       . There is mi8. Moderate to severe tricuspid        
                                                                                            
  |
| regurgitation present.9. There is moderate to severe pulmonary hypertension.  The peak    
                                                                                            
  |
| right ventricular systolic pressure (pulmonary artery systolic pressure), as measured by  
                                                                                            
  |
|  Doppler, is 55.8 - 60.8 mm Hg.10. There is a small secundum atrial septal defect         
                                                                                            
  |
| measuring <10 mm in diameter.  There is left-to-right shunting noted on color Doppler.    
                                                                                            
  |
|LVOT Area:   3.30 cm2                                                                      
                                                                                            
  |
|LVOT Diam:   2.05 cm                                                                       
                                                                                            
  |
|%FS:   36.87 %                                                                             
                                                                                            
  |
|EF(Teich):   67.07 %                                                                       
                                                                                            
  |
|ESV(Teich):   26.94 ml                                                                     
                                                                                            
  |
|LVIDs:   2.69 cm                                                                           
                                                                                            
  |
 
|SV(Teich):   54.89 ml                                                                      
                                                                                            
  |
|RV Major:   7.48 cm                                                                        
                                                                                            
  |
|RVIDd:   2.51 cm                                                                           
                                                                                            
  |
|LVEF MOD A2C:   73.51 %                                                                    
                                                                                            
  |
|SV MOD A2C:   67.80 ml                                                                     
                                                                                            
  |
|LVEF MOD A4C:   67.34 %                                                                    
                                                                                            
  |
|SV MOD A4C:   46.92 ml                                                                     
                                                                                            
  |
|EF Biplane:   70.64 %                                                                      
                                                                                            
  |
|LVEDV MOD BP:   79.68 ml                                                                   
                                                                                            
  |
|LVESV MOD BP:   23.39 ml                                                                   
                                                                                            
  |
|LVEDV MOD A2C:   92.23 ml                                                                  
                                                                                            
  |
|LVLd A2C:   6.84 cm                                                                        
                                                                                            
  |
|LVEDV MOD A4C:   69.67 ml                                                                  
                                                                                            
  |
|LVLd A4C:   6.84 cm                                                                        
                                                                                            
  |
|LVESV MOD A2C:   24.42 ml                                                                  
                                                                                            
  |
|LVLs A2C:   5.79 cm                                                                        
                                                                                            
  |
|LVESV MOD A4C:   22.75 ml                                                                  
                                                                                            
  |
|LVLs A4C:   5.80 cm                                                                        
                                                                                            
  |
|LAESV(A-L):   218.68 ml                                                                    
                                                                                            
  |
|LAESV Index (A-L):   125.68 ml/m2                                                          
                                                                                            
  |
 
|LAAs A2C:   39.65 cm2                                                                      
                                                                                            
  |
|LAESV A-L A2C:   180.64 ml                                                                 
                                                                                            
  |
|LALs A2C:   7.38 cm                                                                        
                                                                                            
  |
|LAAs A4C:   48.00 cm2                                                                      
                                                                                            
  |
|LAESV A-L A4C:   234.29 ml                                                                 
                                                                                            
  |
|LALs A4C:   8.34 cm                                                                        
                                                                                            
  |
|RAAs:   32.48 cm2                                                                          
                                                                                            
  |
|RAESV A-L:   108.99 ml                                                                     
                                                                                            
  |
|RAESV MOD:   105.43 ml                                                                     
                                                                                            
  |
|RALs:   8.37 cm                                                                            
                                                                                            
  |
|TAPSE:   2.67 cm                                                                           
                                                                                            
  |
|AR Dec Newton:   1.97 m/s2                                                                  
                                                                                            
  |
|AR Dec Time:   1769.87 ms                                                                  
                                                                                            
  |
|AR maxP.66 mmHg                                                                     
                                                                                            
  |
|AR PHT:   513.26 ms                                                                        
                                                                                            
  |
|AR Vmax:   3.48 m/s                                                                        
                                                                                            
  |
|AV maxP.83 mmHg                                                                     
                                                                                            
  |
|AV meanP.01 mmHg                                                                     
                                                                                            
  |
|AV Vmax:   2.05 m/s                                                                        
                                                                                            
  |
|AV Vmean:   1.43 m/s                                                                       
                                                                                            
  |
 
|AV VTI:   38.96 cm                                                                         
                                                                                            
  |
|FOREST Vmax:   1.63 cm2                                                                       
                                                                                            
  |
|FOREST (VTI):   1.73 cm2                                                                      
                                                                                            
  |
|AVAI Vmax:   0.00 cm2/m2                                                                   
                                                                                            
  |
|AVAI (VTI):   0.00 cm2/m2                                                                  
                                                                                            
  |
|LVOT maxP.14 mmHg                                                                    
                                                                                            
  |
|LVOT meanP.17 mmHg                                                                   
                                                                                            
  |
|LVSI Dopp:   38.90 ml/m2                                                                   
                                                                                            
  |
|LVSV Dopp:   67.70 ml                                                                      
                                                                                            
  |
|LVOT Vmax:   1.01 m/s                                                                      
                                                                                            
  |
|LVOT Vmean:   0.69 m/s                                                                     
                                                                                            
  |
|LVOT VTI:   20.50 cm                                                                       
                                                                                            
  |
|MV A Breezy:   0.04 m/s                                                                       
                                                                                            
  |
|MV DecT:   225.03 ms                                                                       
                                                                                            
  |
|MV E Breezy:   1.54 m/s                                                                       
                                                                                            
  |
|MV E/A Ratio:   35.2                                                                       
                                                                                            
  |
|MV PHT:   65.25 ms                                                                         
                                                                                            
  |
|MVA By PHT:   3.37 cm2                                                                     
                                                                                            
  |
|MV maxP.70 mmHg                                                                     
                                                                                            
  |
|MV meanP.33 mmHg                                                                     
                                                                                            
  |
 
|MV Vmax:   2.53 m/s                                                                        
                                                                                            
  |
|MV Vmean:   1.28 m/s                                                                       
                                                                                            
  |
|MV VTI:   45.02 cm                                                                         
                                                                                            
  |
|MVA (VTI):   1.50 cm2                                                                      
                                                                                            
  |
|Septal e':   0.04 m/s                                                                      
                                                                                            
  |
|Septal E/e':   34.18                                                                       
                                                                                            
  |
|Lateral e':   0.04 m/s                                                                     
                                                                                            
  |
|Lateral E/e':   37.69                                                                      
                                                                                            
  |
|RAP:   10 mmHg                                                                             
                                                                                            
  |
|RVSP:   57.42 mmHg                                                                         
                                                                                            
  |
|TR maxP.42 mmHg                                                                     
                                                                                            
  |
|TR Vmax:   3.44 m/s                                                                        
                                                                                            
  |
|                                                                                           
                                                                                            
  |
|Sonographer:                                                                               
                                                                                            
 |
|Authenticated by:  LYNDA MCCULLOUGH MD                                                        
                                                                                            
  |
|Report Date/Time: 3-6-2018 18:58:12                                                        
                                                                                            
  |
|                                                                                           
                                                                                            
  |
|IMPRESSION:                                                                                
                                                                                            
  |
|1. Left ventricular systolic function is hyperdynamic with an estimated EF of >70%.        
                                                                                            
  |
|2. The right ventricle is normal in size and function.                                     
                                                                                            
  |
 
|3. The left atrium is markedly enlarged.                                                   
                                                                                            
  |
|4. The right atrium is markedly enlarged.                                                  
                                                                                            
  |
|5. Mild aortic stenosis with peak/mean pressure gradient of 16.83mmHg / 9.02mmHg, the aorti
c valve area by continuity equation is 1.7cm      .                                         
  |
|6. Severe mitral regurgitation is present.                                                 
                                                                                            
  |
|7. Moderate mitral stenosis, with peak/mean transvalvular gradients measured at 25.2 / 8.0 
mm Hg, and MVA (by VTI) 1.50 cm       . There is mi                                         
  |
|8. Moderate to severe tricuspid regurgitation present.                                     
                                                                                            
  |
|9. There is moderate to severe pulmonary hypertension.  The peak right ventricular systolic
 pressure (pulmonary artery systolic pressure), as measured by Doppler, is 55.8 - 60.8 mm Hg
. |
|10. There is a small secundum atrial septal defect measuring <10 mm in diameter.  There is 
left-to-right shunting noted on color Doppler.                                              
  |
+-------------------------------------------------------------------------------------------
--------------------------------------------------------------------------------------------
--+
 documented in this encounter
 
 Visit Diagnoses
 Not on filedocumented in this encounter

## 2020-01-01 NOTE — XMS
Encounter Summary
  Created on: 2020
 
 Vonnie Celaya
 External Reference #: [id number]
 : 32
 Sex: Female
 
 Demographics
 
 
+-----------------------+------------------------+
| Address               | [address]         |
|                       | [place]  [address]   |
+-----------------------+------------------------+
| Home Phone            | [phone]        |
+-----------------------+------------------------+
| Preferred Language    | Unknown                |
+-----------------------+------------------------+
| Marital Status        | Single                 |
+-----------------------+------------------------+
| Yarsanism Affiliation | NON                    |
+-----------------------+------------------------+
| Race                  | White                  |
+-----------------------+------------------------+
| Ethnic Group          | Not  or  |
+-----------------------+------------------------+
 
 
 Author
 
 
+--------------+------------------------------+
| Author       | [facility] |
+--------------+------------------------------+
| Organization | [facility] |
+--------------+------------------------------+
| Address      | Unknown                      |
+--------------+------------------------------+
| Phone        | Unavailable                  |
+--------------+------------------------------+
 
 
 
 Support
 
 
+--------------+--------------+---------+-----------------+
| Name         | Relationship | Address | Phone           |
+--------------+--------------+---------+-----------------+
| Lauryn Leroy | ECON         | Unknown | [phone] |
+--------------+--------------+---------+-----------------+
 
 
 
 Care Team Providers
 
 
 
+------------------------+------+-----------------+
| Care Team Member Name  | Role | Phone           |
+------------------------+------+-----------------+
| Jean Bermudez MD | PCP  | [phone] |
+------------------------+------+-----------------+
 
 
 
 Reason for Visit
 
 
+--------------+----------+
| Reason       | Comments |
+--------------+----------+
| New patient  |          |
| consultation |          |
+--------------+----------+
 Consultation (Routine)
 
+--------+--------+------------+--------------+--------------+---------------+
| Status | Reason | Specialty  | Diagnoses /  | Referred By  | Referred To   |
|        |        |            | Procedures   | Contact      | Contact       |
+--------+--------+------------+--------------+--------------+---------------+
| Closed |        | Cardiology |   Diagnoses  |   Stephen,      |   Markus Smart |
|        |        |            |              | Varun PARRA,   |  MD Craig     |
|        |        |            | Nonrheumatic | MD  [address]     | [address]   |
|        |        |            |  mitral      | [address]  | [address]  |
|        |        |            | (valve)      |  [address]       | [address]  [place], |
|        |        |            | insufficienc | [place], [place] |  [place]           |
|        |        |            | y  Rheumatic |  [address]       | [address]    |
|        |        |            |  tricuspid   | Phone:       | Phone:        |
|        |        |            | insufficienc | [phone] | [phone]  |
|        |        |            | y  Pulmonary |   Fax:       |  Fax:         |
|        |        |            |              | [phone] | [phone]  |
|        |        |            | hypertension |              |               |
|        |        |            | ,            |              |               |
|        |        |            | unspecified  |              |               |
|        |        |            |  Procedures  |              |               |
|        |        |            |  OR NEW      |              |               |
|        |        |            | PATIENT      |              |               |
|        |        |            | LEVEL V      |              |               |
+--------+--------+------------+--------------+--------------+---------------+
 
 
 
 
 Encounter Details
 
 
+--------+---------+----------------------+----------------------+-----------------+
| Date   | Type    | Department           | Care Team            | Description     |
+--------+---------+----------------------+----------------------+-----------------+
| / | Office  |   Cardiology Complex |   Markus Smart,  | Mitral annular  |
| 2018   | Visit   |  Valve at [facility]  [address]  | MD  [address]      | calcification   |
|        |         | [address]     | [address]      | (Primary Dx)    |
|        |         | Mailcode: [address]      | [place], [place]         |                 |
|        |         | [facility] | [address]           |                 |
|        |         |  [address]  [place],  | [phone]         |                 |
|        |         | [place] [address]        | [phone] (Fax)   |                 |
|        |         | [phone]         |                      |                 |
 
+--------+---------+----------------------+----------------------+-----------------+
 
 
 
 Social History
 
 
+--------------+-------+-----------+--------+------+
| Tobacco Use  | Types | Packs/Day | Years  | Date |
|              |       |           | Used   |      |
+--------------+-------+-----------+--------+------+
| Never Smoker |       |           |        |      |
+--------------+-------+-----------+--------+------+
 
 
 
+---------------------+---+---+---+
| Smokeless Tobacco:  |   |   |   |
| Never Used          |   |   |   |
+---------------------+---+---+---+
 
 
 
+-------------+-------------+---------+----------+
| Alcohol Use | Drinks/Week | oz/Week | Comments |
+-------------+-------------+---------+----------+
| No          |             |         |          |
+-------------+-------------+---------+----------+
 
 
 
+------------------+---------------+
| Sex Assigned at  | Date Recorded |
| Birth            |               |
+------------------+---------------+
| Not on file      |               |
+------------------+---------------+
 
 
 
+----------------+-------------+-------------+
| Job Start Date | Occupation  | Industry    |
+----------------+-------------+-------------+
| Not on file    | Not on file | Not on file |
+----------------+-------------+-------------+
 
 
 
+----------------+--------------+------------+
| Travel History | Travel Start | Travel End |
+----------------+--------------+------------+
 
 
 
+-------------------------------------+
| No recent travel history available. |
+-------------------------------------+
 documented as of this encounter
 
 Last Filed Vital Signs
 
 
 
+-------------------+---------------------+----------------------+----------+
| Vital Sign        | Reading             | Time Taken           | Comments |
+-------------------+---------------------+----------------------+----------+
| Blood Pressure    | 123/62              | 2018  2:18 PM  |          |
|                   |                     | PST                  |          |
+-------------------+---------------------+----------------------+----------+
| Pulse             | 73                  | 2018  2:18 PM  |          |
|                   |                     | PST                  |          |
+-------------------+---------------------+----------------------+----------+
| Temperature       | 36.6   C (97.9   F) | 2018  2:18 PM  |          |
|                   |                     | PST                  |          |
+-------------------+---------------------+----------------------+----------+
| Respiratory Rate  | 14                  | 2018  2:18 PM  |          |
|                   |                     | PST                  |          |
+-------------------+---------------------+----------------------+----------+
| Oxygen Saturation | 99%                 | 2018  2:18 PM  |          |
|                   |                     | PST                  |          |
+-------------------+---------------------+----------------------+----------+
| Inhaled Oxygen    | -                   | -                    |          |
| Concentration     |                     |                      |          |
+-------------------+---------------------+----------------------+----------+
| Weight            | 67 kg (147 lb 11.3  | 2018  2:18 PM  |          |
|                   | oz)                 | PST                  |          |
+-------------------+---------------------+----------------------+----------+
| Height            | -                   | -                    |          |
+-------------------+---------------------+----------------------+----------+
| Body Mass Index   | 26.17               | 2018  2:09 PM  |          |
|                   |                     | PDT                  |          |
+-------------------+---------------------+----------------------+----------+
 documented in this encounter
 
 Progress Notes
 Markus mSart MD - 2018  2:00 PM PSTFormatting of this note might be different fr
om the original.
 
 Author:   MARKUS SMART MD
 Referring Provider: Layla Greer 
 
 HPI: Mrs Celaya is a very pleasant 85 y/o female with a hx of recurrent heart failure adm
ission for "diastolic heart failure" and valvular heart disease over the past 6 months who i
s referred for considerations of options to treat severe mitral annular calcification and se
mauricio mitral regurgitation. She has a past med Hx signifncat for persistent Afib, Tyep 2 DM, 
hypertension, hyperlipidemia, hypothyroidism, iron deficiency anemia, pulmonary hypertension
, moderate to severe TR, and severe MR.  
 
 Mrs Celaya, lives in [facility] and is brought in by her daughter today who lives w
ith her.  They report that she has been experiencing progressive GARCIA that has been slow and 
progressive for many years. She was first hospitalized at [facility] in [facility] i
n 2018 with s/s of heart failure. Over the course of the spring she has noticed new
 and progressive GARCIA as well as new LE edema. She was previously active but could no longer 
walk a 1/4 block. She was re-admitted to the hospital in [facility] in May 2018 again with he
art failure.
 Echo in may was notable for normal to hyperdynamic LV systolic function, severe MAC, severe
 MR, moderate to severe TR, and mild AS. Additionally it showed moderate to severe Pulm HTN 
as well as left to right shnt across tunneled PFO. She has followed up with Dr Stephen Mitchell 
at [facility] in the [facility] and referred to [facility] for consideration of MitraClip
.  
 
 
 Since May she has had no further decompensated heart failure episodes 
 
 To expidite her evaluation, she has already been referred and undergone TTE, JENIFFER, CT scan o
f chest a week ago a well as angiogrpahy yesterday on 2018. 
 
 Symptoms & Exercise/Activity Levels:
 Lives in [facility] with her Daughter in a 2 story house. She lives in the ground floor and 
her Daughter lives up stairs. She She has 7 steps to get up into the house and can still matthew
erate gettign in to the house but is markedly winded and would have to rest going up stirs (
19 steps) When walkinf up the stairs he endorses no chest pain but predominatly GARCIA.  Her ac
tivity is limited and does not describe chest discomfort in her brief activities. She is abl
e to make her bed but feels tired afterwards. 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  Any little activty such 
as mopping or vaccuming makes her short of breath and tired.  She is worn out after activiti
es and needs to rest. She walks outside of the home with a large walking stick and no walker
. 
 
 
 Review of Systems:
 14 point review of systems as stated in the HPI, otherwise remainder is negative.  
 
    
 Past Medical History: 
 Diagnosis Date 
   Anemia  
  resolved with iron supplements - no large GI bleed, negative EGD. 
   Atrial fibrillation (HCC)  
   Hyperlipidemia  
   Hypertension  
   Hypothyroid  
   Pulmonary hypertension (HCC)  
   Type 2 diabetes mellitus (HCC)  
 
  
 No past surgical history on file.
 
 Current Medications Include:
 
   
 Current Medication List  
 Name Sig 
 ACETAMINOPHEN 325 MG TABLET Take by mouth. 
 ASCORBIC ACID (VITAMIN C) 500 MG TABLET Take 500 mg by mouth once daily. 
 CHOLECALCIFEROL (VITAMIN D3) 2,000 UNIT CAPSULE Take by mouth. 
 FUROSEMIDE 40 MG TABLET Take by mouth. 
 GLUCOSAMINE CHONDROITIN PLUS ORAL Take 2 tablets by mouth once daily. 
 LEVOTHYROXINE 100 MCG TABLET Take by mouth. 
 LOSARTAN 100 MG TABLET Take 100 mg by mouth once daily. 
 MAGNESIUM CHLORIDE ORAL Take by mouth. 
 METFORMIN  MG 24 HR TABLET,EXTENDED RELEASE Take by mouth. 
 METOPROLOL SUCCINATE  MG TABLET,EXTENDED RELEASE 24 HR Take 100 mg by mouth once arturo
y.  
 POTASSIUM CHLORIDE ER 20 MEQ TABLET,EXTENDED RELEASE(PART/CRYST) Take by mouth. 
 SIMVASTATIN 20 MG TABLET Take by mouth once daily in the evening.  
 WARFARIN 5 MG TABLET Take by mouth. 
 
 
     
 Allergies 
 Allergen Reactions 
 
   Morphine Anxiety 
   "felt fidgety" 
   Percocet [Oxycodone-Acetaminophen] Anxiety 
   "felt fidgety" 
 
 
 SocialHistory 
 Social History 
 
     
 Social History 
   Marital status: Single 
   Spouse name: N/A 
   Number of children: N/A 
   Years of education: N/A 
 
   
 Occupational History 
   Not on file. 
 
    
 Social History Main Topics 
   Smoking status: Never Smoker 
   Smokeless tobacco: Never Used 
   Alcohol use No 
   Drug use: No 
   Sexual activity: Not on file 
 
    
 Other Topics Concern 
   Not on file 
 
   
 Social History Narrative 
   No narrative on file 
  
 
 FAmily Hx: non contrinbutory.  
 
 Physical Exam: 
 
 Last Vitals: /59 | Pulse 78 | Temp (Src) 36.7 C (98 F) (Oral) | RR 20 | Ht 1.6 m 
(5' 3") | Wt 66 kg (145 lb 9.6 oz) | SpO2 99% | BMI 25.79 kg/(m^2)
 Body mass index is 25.79 kg/m.
 
 General Appearance:  Ederly appearing, well developed well nourished adult female appearing
 stated age in NAD.  
 HEENT:  PERRLA, EOMI, mouth without lesions.
 Neck:  Neck w/o LAD
 Respiratory: CTA bilaterally. No wheezes or rales
 Cardiovascular: JVP at 5 cm.  2+ carotid pulses without bruits.  PMI nondisplaced. RRR. Nor
mal S1 covered & soft S2.  3/6 holosystolic murmur best audible at LLSB radiating to axilla.
  No gallops, or rubs. 2+  distal pulses. 
 Gastrointestinal: +BS, soft, nondistended, nontender, no abdominal bruits
 Musculoskeletal: No gross deformities
 Extremities: No cyanosis, clubbing, or edema.
 Neurologic: Grossly nonfocal.  UE and LE proximal and distal strength 5/5 and equal bilater
ally.  
 Skin: No rash or ulcers.
 
 
 Data
     
 Lab Results 
 Component Value Date 
  WBC 7.45 2018 
  HB 12.6 2018 
  HCT 37.5 2018 
   2018 
  MCV 87.4 2018 
  RDW 49.3 2018 
 
     
 Lab Results 
 Component Value Date 
   2018 
  K 3.9 2018 
   2018 
  BICARB 26 2018 
  BUN 16 2018 
  CR 0.78 2018 
   2018 
  CA 9.1 2018 
  ANIONGAP 10 2018 
 
 No results found for: NTPROBNP
 No results found for: A1C
     
 Lab Results 
 Component Value Date 
  INRPT 1.27 (H) 2018 
 
 
 FRAILTY ASSESSMENT:
 Frailty Index Date Taken: 2018 
 1. Left  Strength (kg): 12
     Right  Strength (kg): 10 
 2. Five Meter Walk (sec):   NA
 3. Serum albumin:   
 4. Walden ADL (#/6) :5 
 Frailty Outcome: Based on Partner trial definition the patient does not qualify as frail. 
 
 Walden ADL Date Taken: 2018 
 
 Bathing: No
 Dressing: No
 Toileting: No
 Transferring: No
 Continence: Yes
 Feeding: No
 
 EFT FRAILTY ASSESSMENT
 
 EFT 1 to possibly 2 points
 1 for low sit to stand
 No known albumin level
 
 EKG:
 Atrial fibrillation 
 Poor anterior R wave progression 
 Inferior mild st depression 0.5 mm 2/3. 
 
 
 
 TTE 3/6/2018
 1. Left ventricular systolic function is hyperdynamic with an estimated EF of >70%.
 2. The right ventricle is normal in size and function.
 3. The left atrium is markedly enlarged.
 4. The right atrium is markedly enlarged.
 5. Mild aortic stenosis with peak/mean pressure gradient of 16.83mmHg / 9.02mmHg, the aorti
c valve area by continuity equation is 1.7cm.
 6. Severe mitral regurgitation is present.
 7. Moderate mitral stenosis, with peak/mean transvalvular gradients measured at 25.2 / 8.0 
mm Hg, and MVA (by VTI) 1.50 cm . There is mi
 8. Moderate to severe tricuspid regurgitation present.
 9. There is moderate to severe pulmonary hypertension.The peak right ventricular systol
ic pressure (pulmonary artery systolic pressure), as measured by Doppler, is 55.8 - 60.8 mm 
Hg.
 10. There is a small secundum atrial septal defect measuring <10 mm in diameter.There i
s left-to-right shunting noted on color Doppler.
 
 TTE: 2018 
 Final Impressions:                         
                              
                                
                  
 1. The left ventricular cavity size is normal.               
 
 2. The LV function is hyperdynamic.                   
   
 3. Visually estimated left ventricular ejection fraction is 70 - 75%.     
 4. The aortic valve is trileaflet and moderately calcified.          
 5. Severe mitral annular calcification as well as papillary and subchordal  
 bulky calcification.                        
     
 6. While there is calcification of the bases of the mitral valve leaflets   
 due to the severe mitral annular calcificaiton, the mid portions of the    
 leaflets themseleves are only midly calcified and thin, however there is the 
 severe central mitral valve regurgitation.                 
 
 7. The mean transmitral gradient is 5.5 mmHg at a heart rate of 77 bpm.    
  The mitral valve effective regurgitant orifice area is 7 mm2.         
                             
 8. Severe biatrial enlargement.                    
    
 9. Right ventricular size, thickness and function are normal. 
 
 
 JENIFFER: 2018
 Final Impressions:                         
                             
              
                              
         
 1. The LV function is hyperdynamic.                   
   
 2. The visually estimated ejection fraction is > 75%.             
 3. Right ventricular size, thickness and function are normal.         
 4. Severe circumferential mitral annular calcification. A large deposit of  
 calcium at the posterolateral atrial aspect of themitral annulus that     
 partially disrupts the normal mitral annular shape and size. Mitral annular  
 area is measured at 8.26 cm2, with an AP diameter of 3.76 cm, and CC     
 
 diameter of 2.52 cm. However, there is no significant mitral stenosis. Mean  
 mitral gradient is approximately 4 mmHg at a heart rate of 83 bpm.      
 5. Severe mitral valve regurgitation. The EROA is 0.47 cm2 (using a PISA   
 radius of 1.1 cm, an aliasing velocity of 0.32 m/s, and a mean MR       
 regurgitant velocity of 5.1 m/s), the calculated regurgitant volume is 67   
 mL, and the calculated regurgitant fraction is 52%.              
 6. The mitral valve anterior leaflet measures 2.0 cm.             
 7. There is subvalvar and papilary muscle head calcifidation noted as well.  
 8. The mechanism for the severe mitral regurgitation is leaflet and annular  
 calcification and degerneration with poor coaptation across all portions of  
 the valve.                           
       
 9. The aortic valve is moderately sclerotic and restricted. No aortic     
 stenosis.                            
       
 10. Severely enlarged left atrium.                   
   
 11. There is a small fenestrated secundum atrial septal defect, with     
 intermittent left-to-right flow via color-flow Doppler.            
 12. Moderate tricuspid regurgitation.                  
   
 13. Small plaque involving the ascending and transverse aorta.        
 14. The left atrial appendage is well visualized and there is no evidence of 
 thrombus present.     
 
 CARDIAC CATH: 2018
 Preliminary repoot
 Severe MAC
 There is non-obstructive disease in LAD andCirc
 There is 95% stenosis of the distal RCA prior to the PDA and PLV's
 
 
 RHC: Unremarkable filling pressures
 
 Coronary Angiography: Obstructive CAD of RCA
 PFTS:
 None 
 
 STS (Based Off Available Data):
 MV Replacement + CAB 
 Risk of Mortality: 7.374% 
 Morbidity or Mortality: 30.831% 
 Long Length of Stay: 19.775% 
 Short Length of Stay: 7.384% 
 Permanent Stroke: 2.652% 
 Prolonged Ventilation: 19.662% 
 DSW Infection: 0.263% 
 Renal Failure: 8.237% 
 Reoperation: 12.335% 
 
 Assessment and Recommendations:
 Ms. Vonnie Celaya is a 86 y.o. female referred [facility] for assessment
 of treatment options for mitral valve disease. 
 
 Ms. Celaya has developed progressive GARCIA and LE edema over the last 6 months and currentl
y reports symptoms that are NYHA functional class 3-4.  A review of the echocardiogram and t
he transesophageal echo show severe mitral annular calcification, severe mitral regurgitaito
n, with largest jet centrally but also extending to the commisures, with a mean trasmitral g
radient of 4-5 mmHg and an annular are of 830-930 mm2.   In addition the echocardiogram is n
otable for moderate to severe pulmonary hypertesnion with RVSP 55-60 mmHg. Additional diagno
 
stics that have been performed include coronary angiogrpahy that demonstrates severe distal 
RCA stenosis. 
 
  Based on her symptoms, physical exam, and diagnostic studies we agree that she has severe 
symptomatic  Mitral regurgitation. 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
ing, continuing with her quality of life the best she can, and not having a stroke. 
 
 The patient's STS based on available data is 7-8%  . I am concerned about the risk of LVOT 
obstruction with valve and Mac, and I explained to the patient that this procedure is off la
bel use of the Echeverria valve and it carries high risk of valve embolization, perivalvular le
ak, LVOT obstruction. I also explained to her that mitral clip might be challenging given se
mauricio mitral annulus calcification as well as already elevated transmitral gradient.
 Explained off label use for valve in MAC, limited experience, increase risk of complication
s. Will plan to review her CT in details for LVOT obstruction and valve sizing. I think clip
 too high risk given MAC and elevated gradients and broad jet
 MARKUS SMART MD
 
 Electronically signed by Markus Smart MD at 2018  3:44 PM PSTdocumented in this e
ncounter
 
 Plan of Treatment
 Not on filedocumented as of this encounter
 
 Visit Diagnoses
 
 
+------------------------------------------------------------------+
| Diagnosis                                                        |
+------------------------------------------------------------------+
|   Mitral annular calcification - Primary  Mitral valve disorders |
+------------------------------------------------------------------+
 documented in this encounter

## 2020-01-01 NOTE — XMS
Encounter Summary
  Created on: 2020
 
 Vonnie Celaya
 External Reference #: [id number]
 : 32
 Sex: Female
 
 Demographics
 
 
+-----------------------+------------------------+
| Address               | [address]         |
|                       | [place]  [address]   |
+-----------------------+------------------------+
| Home Phone            | [phone]        |
+-----------------------+------------------------+
| Preferred Language    | Unknown                |
+-----------------------+------------------------+
| Marital Status        | Single                 |
+-----------------------+------------------------+
| Yazidi Affiliation | NON                    |
+-----------------------+------------------------+
| Race                  | White                  |
+-----------------------+------------------------+
| Ethnic Group          | Not  or  |
+-----------------------+------------------------+
 
 
 Author
 
 
+--------------+------------------------------+
| Author       | [facility] |
+--------------+------------------------------+
| Organization | [facility] |
+--------------+------------------------------+
| Address      | Unknown                      |
+--------------+------------------------------+
| Phone        | Unavailable                  |
+--------------+------------------------------+
 
 
 
 Support
 
 
+--------------+--------------+---------+-----------------+
| Name         | Relationship | Address | Phone           |
+--------------+--------------+---------+-----------------+
| Lauryn Leroy | ECON         | Unknown | [phone] |
+--------------+--------------+---------+-----------------+
 
 
 
 Care Team Providers
 
 
 
+------------------------+------+-----------------+
| Care Team Member Name  | Role | Phone           |
+------------------------+------+-----------------+
| Jean Bermudez MD | PCP  | [phone] |
+------------------------+------+-----------------+
 
 
 
 Encounter Details
 
 
+--------+-------------+----------------------+----------------------+-------------+
| Date   | Type        | Department           | Care Team            | Description |
+--------+-------------+----------------------+----------------------+-------------+
| 10/09/ | Documentati |   Cardiology at [facility]  |   Katelin Riddle |             |
| 2018   | on          |  [address]    |  [place]  [address]    |             |
|        |             | Mailcode: [address]       | [address]  [place],  |             |
|        |             | [facility]    | [place] [address]        |             |
|        |             | and Healing,         | [phone]         |             |
|        |             |       | [phone] (Fax)   |             |
|        |             | [place], [place]  |                      |             |
|        |             | [address]           |                      |             |
|        |             | [phone]         |                      |             |
+--------+-------------+----------------------+----------------------+-------------+
 
 
 
 Social History
 
 
+--------------+-------+-----------+--------+------+
| Tobacco Use  | Types | Packs/Day | Years  | Date |
|              |       |           | Used   |      |
+--------------+-------+-----------+--------+------+
| Never Smoker |       |           |        |      |
+--------------+-------+-----------+--------+------+
 
 
 
+---------------------+---+---+---+
| Smokeless Tobacco:  |   |   |   |
| Never Used          |   |   |   |
+---------------------+---+---+---+
 
 
 
+-------------+-------------+---------+----------+
| Alcohol Use | Drinks/Week | oz/Week | Comments |
+-------------+-------------+---------+----------+
| No          |             |         |          |
+-------------+-------------+---------+----------+
 
 
 
+------------------+---------------+
| Sex Assigned at  | Date Recorded |
| Birth            |               |
+------------------+---------------+
| Not on file      |               |
+------------------+---------------+
 
 
 
 
+----------------+-------------+-------------+
| Job Start Date | Occupation  | Industry    |
+----------------+-------------+-------------+
| Not on file    | Not on file | Not on file |
+----------------+-------------+-------------+
 
 
 
+----------------+--------------+------------+
| Travel History | Travel Start | Travel End |
+----------------+--------------+------------+
 
 
 
+-------------------------------------+
| No recent travel history available. |
+-------------------------------------+
 documented as of this encounter
 
 Plan of Treatment
 Not on filedocumented as of this encounter
 
 Visit Diagnoses
 Not on filedocumented in this encounter

## 2020-01-01 NOTE — XMS
Encounter Summary
  Created on: 2020
 
 Vonnie Celaya
 External Reference #: [id number]
 : 32
 Sex: Female
 
 Demographics
 
 
+-----------------------+------------------------+
| Address               | [address]         |
|                       | [place]  [address]   |
+-----------------------+------------------------+
| Home Phone            | [phone]        |
+-----------------------+------------------------+
| Preferred Language    | Unknown                |
+-----------------------+------------------------+
| Marital Status        | Single                 |
+-----------------------+------------------------+
| Religion Affiliation | NON                    |
+-----------------------+------------------------+
| Race                  | White                  |
+-----------------------+------------------------+
| Ethnic Group          | Not  or  |
+-----------------------+------------------------+
 
 
 Author
 
 
+--------------+------------------------------+
| Author       | [facility] |
+--------------+------------------------------+
| Organization | [facility] |
+--------------+------------------------------+
| Address      | Unknown                      |
+--------------+------------------------------+
| Phone        | Unavailable                  |
+--------------+------------------------------+
 
 
 
 Support
 
 
+--------------+--------------+---------+-----------------+
| Name         | Relationship | Address | Phone           |
+--------------+--------------+---------+-----------------+
| Lauryn Leroy | ECON         | Unknown | [phone] |
+--------------+--------------+---------+-----------------+
 
 
 
 Care Team Providers
 
 
 
+------------------------+------+-----------------+
| Care Team Member Name  | Role | Phone           |
+------------------------+------+-----------------+
| Jean Bermudez MD | PCP  | [phone] |
+------------------------+------+-----------------+
 
 
 
 Reason for Visit
 
 
+---------------------+----------------+
| Reason              | Comments       |
+---------------------+----------------+
| Education procedure | angiogram, [facility] |
+---------------------+----------------+
 
 
 
 Encounter Details
 
 
+--------+-----------+----------------------+----------------------+----------------------+
| Date   | Type      | Department           | Care Team            | Description          |
+--------+-----------+----------------------+----------------------+----------------------+
| / | Telephone |   Cardiac Cath Lab   |   Sandra Joseph RN   | Education procedure  |
| 2018   |           | at [address]  [address]  | [address]  | (angiogram, [facility])     |
|        |           | [address]      | [address]  [place],   |                      |
|        |           | [facility]        | [place] [address]        |                      |
|        |           | [place], [place]         |                      |                      |
|        |           | [address]           |                      |                      |
|        |           | [phone]         |                      |                      |
+--------+-----------+----------------------+----------------------+----------------------+
 
 
 
 Social History
 
 
+----------------+-------+-----------+--------+------+
| Tobacco Use    | Types | Packs/Day | Years  | Date |
|                |       |           | Used   |      |
+----------------+-------+-----------+--------+------+
| Never Assessed |       |           |        |      |
+----------------+-------+-----------+--------+------+
 
 
 
+------------------+---------------+
| Sex Assigned at  | Date Recorded |
| Birth            |               |
+------------------+---------------+
| Not on file      |               |
+------------------+---------------+
 
 
 
+----------------+-------------+-------------+
| Job Start Date | Occupation  | Industry    |
+----------------+-------------+-------------+
 
| Not on file    | Not on file | Not on file |
+----------------+-------------+-------------+
 
 
 
+----------------+--------------+------------+
| Travel History | Travel Start | Travel End |
+----------------+--------------+------------+
 
 
 
+-------------------------------------+
| No recent travel history available. |
+-------------------------------------+
 documented as of this encounter
 
 Plan of Treatment
 Not on filedocumented as of this encounter
 
 Visit Diagnoses
 Not on filedocumented in this encounter

## 2020-01-01 NOTE — XMS
Encounter Summary
  Created on: 2020
 
 Vonnie Celaya
 External Reference #: [id number]
 : 32
 Sex: Female
 
 Demographics
 
 
+-----------------------+------------------------+
| Address               | [address]         |
|                       | [place]  [address]   |
+-----------------------+------------------------+
| Home Phone            | [phone]        |
+-----------------------+------------------------+
| Preferred Language    | Unknown                |
+-----------------------+------------------------+
| Marital Status        | Single                 |
+-----------------------+------------------------+
| Anabaptism Affiliation | NON                    |
+-----------------------+------------------------+
| Race                  | White                  |
+-----------------------+------------------------+
| Ethnic Group          | Not  or  |
+-----------------------+------------------------+
 
 
 Author
 
 
+--------------+------------------------------+
| Author       | [facility] |
+--------------+------------------------------+
| Organization | [facility] |
+--------------+------------------------------+
| Address      | Unknown                      |
+--------------+------------------------------+
| Phone        | Unavailable                  |
+--------------+------------------------------+
 
 
 
 Support
 
 
+--------------+--------------+---------+-----------------+
| Name         | Relationship | Address | Phone           |
+--------------+--------------+---------+-----------------+
| Lauryn Leroy | ECON         | Unknown | [phone] |
+--------------+--------------+---------+-----------------+
 
 
 
 Care Team Providers
 
 
 
+------------------------+------+-----------------+
| Care Team Member Name  | Role | Phone           |
+------------------------+------+-----------------+
| Jean Bermudez MD | PCP  | [phone] |
+------------------------+------+-----------------+
 
 
 
 Reason for Visit
 AUTH/CERT
 
+--------+--------+-----------+--------------+--------------+--------------+
| Status | Reason | Specialty | Diagnoses /  | Referred By  | Referred To  |
|        |        |           | Procedures   | Contact      | Contact      |
+--------+--------+-----------+--------------+--------------+--------------+
|        |        |           |              |              |              |
+--------+--------+-----------+--------------+--------------+--------------+
 
 
 
 
 Encounter Details
 
 
+--------+-------------+----------------------+----------------------+-------------+
| Date   | Type        | Department           | Care Team            | Description |
+--------+-------------+----------------------+----------------------+-------------+
| / | Anesthesia  |   Cardiac            |   Radha Whitt MD |             |
| 2018   | Event       | Non-Invasive Testing |   [address] [facility]        |             |
|        |             |  at [facility] | [address]      |             |
|        |             |   [address]   | [place], [place]         |             |
|        |             | [place]  Mailcode:      | [address]           |             |
|        |             | [id number]  [address]   | [phone]         |             |
|        |             | [facility]        | [phone] (Fax)   |             |
|        |             | [place], [place]         | [place],   |             |
|        |             | [address]           | CRNA  [address] [facility]    |             |
|        |             | [phone]         | [address]      |             |
|        |             |                      | [place], [place]         |             |
|        |             |                      | [address]           |             |
|        |             |                      | [phone]         |             |
|        |             |                      | [phone] (Fax)   |             |
+--------+-------------+----------------------+----------------------+-------------+
 
 
 
 Anesthesia Record
 
 
+------------------+------------------+-------------------+----------------------+
| Procedure Name   | Responsible      | Anesthesia Start  | Anesthesia Stop Time |
|                  | Anesthesiologist | Time              |                      |
+------------------+------------------+-------------------+----------------------+
| TRANSESOPHAGEAL  |                  |                   |                      |
| ECHOCARDIOGRAM,  |                  |                   |                      |
| ADULT            |                  |                   |                      |
+------------------+------------------+-------------------+----------------------+
 
 
 
+----+---+-----------+-------------------------------------------------------------------+
 
| Da | T | Event     | Comment                                                           |
| te | i |           |                                                                   |
|    | m |           |                                                                   |
|    | e |           |                                                                   |
+----+---+-----------+-------------------------------------------------------------------+
| 09 | 0 |           |                                                                   |
| /1 | 9 |           |                                                                   |
| 3/ | 4 |           |                                                                   |
| 20 | 4 |           |                                                                   |
| 18 |   |           |                                                                   |
+----+---+-----------+-------------------------------------------------------------------+
|    | 0 | Pt. Check | Prior to anesthesia start, pt. Identified, examined, chart        |
|    | 9 |           | reviewed, PARQ held, anesthetic plan made or approved by          |
|    | 4 |           | attending anesthesiologist.  NPO status confirmed as appropriate  |
|    | 4 |           | for procedure  Preoperative evaluation: unchanged                 |
+----+---+-----------+-------------------------------------------------------------------+
 
 
 
+------+
| Meds |
+------+
 
 
 
+-----------------+----------+
 No medications  | on file. |
+-----------------+----------+
 
 
 
+--------------------+
| No agents on file. |
+--------------------+
 
 
 
+-----------------------------------+
| No blood administrations on file. |
+-----------------------------------+
 
 
 
+------------------+
| No LDAs on file. |
+------------------+
 documented in this encounter
 
 Social History
 
 
+----------------+-------+-----------+--------+------+
| Tobacco Use    | Types | Packs/Day | Years  | Date |
|                |       |           | Used   |      |
+----------------+-------+-----------+--------+------+
| Never Assessed |       |           |        |      |
+----------------+-------+-----------+--------+------+
 
 
 
 
+------------------+---------------+
| Sex Assigned at  | Date Recorded |
| Birth            |               |
+------------------+---------------+
| Not on file      |               |
+------------------+---------------+
 
 
 
+----------------+-------------+-------------+
| Job Start Date | Occupation  | Industry    |
+----------------+-------------+-------------+
| Not on file    | Not on file | Not on file |
+----------------+-------------+-------------+
 
 
 
+----------------+--------------+------------+
| Travel History | Travel Start | Travel End |
+----------------+--------------+------------+
 
 
 
+-------------------------------------+
| No recent travel history available. |
+-------------------------------------+
 documented as of this encounter
 
 Plan of Treatment
 Not on filedocumented as of this encounter
 
 Visit Diagnoses
 Not on filedocumented in this encounter

## 2020-01-01 NOTE — XMS
Encounter Summary
  Created on: 2020
 
 Vonnie Celaya
 External Reference #: [id number]
 : 32
 Sex: Female
 
 Demographics
 
 
+-----------------------+------------------------+
| Address               | [address]         |
|                       | [place]  [address]   |
+-----------------------+------------------------+
| Home Phone            | [phone]        |
+-----------------------+------------------------+
| Preferred Language    | Unknown                |
+-----------------------+------------------------+
| Marital Status        | Single                 |
+-----------------------+------------------------+
| Church Affiliation | NON                    |
+-----------------------+------------------------+
| Race                  | White                  |
+-----------------------+------------------------+
| Ethnic Group          | Not  or  |
+-----------------------+------------------------+
 
 
 Author
 
 
+--------------+------------------------------+
| Author       | [facility] |
+--------------+------------------------------+
| Organization | [facility] |
+--------------+------------------------------+
| Address      | Unknown                      |
+--------------+------------------------------+
| Phone        | Unavailable                  |
+--------------+------------------------------+
 
 
 
 Support
 
 
+--------------+--------------+---------+-----------------+
| Name         | Relationship | Address | Phone           |
+--------------+--------------+---------+-----------------+
| Lauryn Leroy | ECON         | Unknown | [phone] |
+--------------+--------------+---------+-----------------+
 
 
 
 Care Team Providers
 
 
 
+------------------------+------+-----------------+
| Care Team Member Name  | Role | Phone           |
+------------------------+------+-----------------+
| Jean Bermudez MD | PCP  | [phone] |
+------------------------+------+-----------------+
 
 
 
 Reason for Visit
 
 
+--------------+----------+
| Reason       | Comments |
+--------------+----------+
| New patient  |          |
| consultation |          |
+--------------+----------+
 Consultation (Routine)
 
+--------+--------+------------+--------------+--------------+---------------+
| Status | Reason | Specialty  | Diagnoses /  | Referred By  | Referred To   |
|        |        |            | Procedures   | Contact      | Contact       |
+--------+--------+------------+--------------+--------------+---------------+
| Closed |        | Cardiology |   Diagnoses  |   Stephen,      |   Markus Smart |
|        |        |            |              | Varun PARRA,   |  MD Craig     |
|        |        |            | Nonrheumatic | MD  [address]     | [address]   |
|        |        |            |  mitral      | [address]  | [address]  |
|        |        |            | (valve)      |  [address]       | [address]  [place], |
|        |        |            | insufficienc | [place], [place] |  [place]           |
|        |        |            | y  Rheumatic |  [address]       | [address]    |
|        |        |            |  tricuspid   | Phone:       | Phone:        |
|        |        |            | insufficienc | [phone] | [phone]  |
|        |        |            | y  Pulmonary |   Fax:       |  Fax:         |
|        |        |            |              | [phone] | [phone]  |
|        |        |            | hypertension |              |               |
|        |        |            | ,            |              |               |
|        |        |            | unspecified  |              |               |
|        |        |            |  Procedures  |              |               |
|        |        |            |  IL NEW      |              |               |
|        |        |            | PATIENT      |              |               |
|        |        |            | LEVEL V      |              |               |
+--------+--------+------------+--------------+--------------+---------------+
 
 
 
 
 Encounter Details
 
 
+--------+---------+----------------------+----------------------+-----------------+
| Date   | Type    | Department           | Care Team            | Description     |
+--------+---------+----------------------+----------------------+-----------------+
| / | Office  |   Cardiology Complex |   Markus Smart,  | Mitral annular  |
| 2018   | Visit   |  Valve at [facility]  [address]  | MD  [address]      | calcification   |
|        |         | [address]     | [address]      | (Primary Dx)    |
|        |         | Mailcode: [address]      | [place], [place]         |                 |
|        |         | Physician's [facility] | [address]           |                 |
|        |         |  [address]  [place],  | [phone]         |                 |
|        |         | [place] [address]        | [phone] (Fax)   |                 |
|        |         | [phone]         |                      |                 |
 
+--------+---------+----------------------+----------------------+-----------------+
 
 
 
 Social History
 
 
+--------------+-------+-----------+--------+------+
| Tobacco Use  | Types | Packs/Day | Years  | Date |
|              |       |           | Used   |      |
+--------------+-------+-----------+--------+------+
| Never Smoker |       |           |        |      |
+--------------+-------+-----------+--------+------+
 
 
 
+---------------------+---+---+---+
| Smokeless Tobacco:  |   |   |   |
| Never Used          |   |   |   |
+---------------------+---+---+---+
 
 
 
+-------------+-------------+---------+----------+
| Alcohol Use | Drinks/Week | oz/Week | Comments |
+-------------+-------------+---------+----------+
| No          |             |         |          |
+-------------+-------------+---------+----------+
 
 
 
+------------------+---------------+
| Sex Assigned at  | Date Recorded |
| Birth            |               |
+------------------+---------------+
| Not on file      |               |
+------------------+---------------+
 
 
 
+----------------+-------------+-------------+
| Job Start Date | Occupation  | Industry    |
+----------------+-------------+-------------+
| Not on file    | Not on file | Not on file |
+----------------+-------------+-------------+
 
 
 
+----------------+--------------+------------+
| Travel History | Travel Start | Travel End |
+----------------+--------------+------------+
 
 
 
+-------------------------------------+
| No recent travel history available. |
+-------------------------------------+
 documented as of this encounter
 
 Last Filed Vital Signs
 
 
 
+-------------------+---------------------+----------------------+----------+
| Vital Sign        | Reading             | Time Taken           | Comments |
+-------------------+---------------------+----------------------+----------+
| Blood Pressure    | 123/62              | 2018  2:18 PM  |          |
|                   |                     | PST                  |          |
+-------------------+---------------------+----------------------+----------+
| Pulse             | 73                  | 2018  2:18 PM  |          |
|                   |                     | PST                  |          |
+-------------------+---------------------+----------------------+----------+
| Temperature       | 36.6   C (97.9   F) | 2018  2:18 PM  |          |
|                   |                     | PST                  |          |
+-------------------+---------------------+----------------------+----------+
| Respiratory Rate  | 14                  | 2018  2:18 PM  |          |
|                   |                     | PST                  |          |
+-------------------+---------------------+----------------------+----------+
| Oxygen Saturation | 99%                 | 2018  2:18 PM  |          |
|                   |                     | PST                  |          |
+-------------------+---------------------+----------------------+----------+
| Inhaled Oxygen    | -                   | -                    |          |
| Concentration     |                     |                      |          |
+-------------------+---------------------+----------------------+----------+
| Weight            | 67 kg (147 lb 11.3  | 2018  2:18 PM  |          |
|                   | oz)                 | PST                  |          |
+-------------------+---------------------+----------------------+----------+
| Height            | -                   | -                    |          |
+-------------------+---------------------+----------------------+----------+
| Body Mass Index   | 26.17               | 2018  2:09 PM  |          |
|                   |                     | PDT                  |          |
+-------------------+---------------------+----------------------+----------+
 documented in this encounter
 
 Progress Notes
 Markus Smart MD - 2018  2:00 PM PSTFormatting of this note might be different fr
om the original.
 
 Author:   MARKUS SMART MD
 Referring Provider: Layla Greer 
 
 HPI: Mrs Celaya is a very pleasant 85 y/o female with a hx of recurrent heart failure adm
ission for "diastolic heart failure" and valvular heart disease over the past 6 months who i
s referred for considerations of options to treat severe mitral annular calcification and se
mauricio mitral regurgitation. She has a past med Hx signifncat for persistent Afib, Tyep 2 DM, 
hypertension, hyperlipidemia, hypothyroidism, iron deficiency anemia, pulmonary hypertension
, moderate to severe TR, and severe MR.  
 
 Mrs Celaya, lives in [facility] and is brought in by her daughter today who lives w
ith her.  They report that she has been experiencing progressive GARCIA that has been slow and 
progressive for many years. She was first hospitalized at [facility] in [facility] i
n 2018 with s/s of heart failure. Over the course of the spring she has noticed new
 and progressive GARCIA as well as new LE edema. She was previously active but could no longer 
walk a 1/4 block. She was re-admitted to the hospital in [facility] in May 2018 again with he
art failure.
 Echo in may was notable for normal to hyperdynamic LV systolic function, severe MAC, severe
 MR, moderate to severe TR, and mild AS. Additionally it showed moderate to severe Pulm HTN 
as well as left to right shnt across tunneled PFO. She has followed up with Dr Stephen Mitchell 
at [facility] in the [facility] and referred to [facility] for consideration of MitraClip
.  
 
 
 Since May she has had no further decompensated heart failure episodes 
 
 To expidite her evaluation, she has already been referred and undergone TTE, JENIFFER, CT scan o
f chest a week ago a well as angiogrpahy yesterday on 2018. 
 
 Symptoms & Exercise/Activity Levels:
 Lives in [facility] with her Daughter in a 2 story house. She lives in the ground floor and 
her Daughter lives up stairs. She She has 7 steps to get up into the house and can still matthew
erate gettign in to the house but is markedly winded and would have to rest going up stirs (
19 steps) When walkinf up the stairs he endorses no chest pain but predominatly GARCIA.  Her ac
tivity is limited and does not describe chest discomfort in her brief activities. She is abl
e to make her bed but feels tired afterwards. 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  Any little activty such 
as mopping or vaccuming makes her short of breath and tired.  She is worn out after activiti
es and needs to rest. She walks outside of the home with a large walking stick and no walker
. 
 
 
 Review of Systems:
 14 point review of systems as stated in the HPI, otherwise remainder is negative.  
 
    
 Past Medical History: 
 Diagnosis Date 
   Anemia  
  resolved with iron supplements - no large GI bleed, negative EGD. 
   Atrial fibrillation (HCC)  
   Hyperlipidemia  
   Hypertension  
   Hypothyroid  
   Pulmonary hypertension (HCC)  
   Type 2 diabetes mellitus (HCC)  
 
  
 No past surgical history on file.
 
 Current Medications Include:
 
   
 Current Medication List  
 Name Sig 
 ACETAMINOPHEN 325 MG TABLET Take by mouth. 
 ASCORBIC ACID (VITAMIN C) 500 MG TABLET Take 500 mg by mouth once daily. 
 CHOLECALCIFEROL (VITAMIN D3) 2,000 UNIT CAPSULE Take by mouth. 
 FUROSEMIDE 40 MG TABLET Take by mouth. 
 GLUCOSAMINE CHONDROITIN PLUS ORAL Take 2 tablets by mouth once daily. 
 LEVOTHYROXINE 100 MCG TABLET Take by mouth. 
 LOSARTAN 100 MG TABLET Take 100 mg by mouth once daily. 
 MAGNESIUM CHLORIDE ORAL Take by mouth. 
 METFORMIN  MG 24 HR TABLET,EXTENDED RELEASE Take by mouth. 
 METOPROLOL SUCCINATE  MG TABLET,EXTENDED RELEASE 24 HR Take 100 mg by mouth once arturo
y.  
 POTASSIUM CHLORIDE ER 20 MEQ TABLET,EXTENDED RELEASE(PART/CRYST) Take by mouth. 
 SIMVASTATIN 20 MG TABLET Take by mouth once daily in the evening.  
 WARFARIN 5 MG TABLET Take by mouth. 
 
 
     
 Allergies 
 Allergen Reactions 
 
   Morphine Anxiety 
   "felt fidgety" 
   Percocet [Oxycodone-Acetaminophen] Anxiety 
   "felt fidgety" 
 
 
 SocialHistory 
 Social History 
 
     
 Social History 
   Marital status: Single 
   Spouse name: N/A 
   Number of children: N/A 
   Years of education: N/A 
 
   
 Occupational History 
   Not on file. 
 
    
 Social History Main Topics 
   Smoking status: Never Smoker 
   Smokeless tobacco: Never Used 
   Alcohol use No 
   Drug use: No 
   Sexual activity: Not on file 
 
    
 Other Topics Concern 
   Not on file 
 
   
 Social History Narrative 
   No narrative on file 
  
 
 FAmily Hx: non contrinbutory.  
 
 Physical Exam: 
 
 Last Vitals: /59 | Pulse 78 | Temp (Src) 36.7 C (98 F) (Oral) | RR 20 | Ht 1.6 m 
(5' 3") | Wt 66 kg (145 lb 9.6 oz) | SpO2 99% | BMI 25.79 kg/(m^2)
 Body mass index is 25.79 kg/m.
 
 General Appearance:  Ederly appearing, well developed well nourished adult female appearing
 stated age in NAD.  
 HEENT:  PERRLA, EOMI, mouth without lesions.
 Neck:  Neck w/o LAD
 Respiratory: CTA bilaterally. No wheezes or rales
 Cardiovascular: JVP at 5 cm.  2+ carotid pulses without bruits.  PMI nondisplaced. RRR. Nor
mal S1 covered & soft S2.  3/6 holosystolic murmur best audible at LLSB radiating to axilla.
  No gallops, or rubs. 2+  distal pulses. 
 Gastrointestinal: +BS, soft, nondistended, nontender, no abdominal bruits
 Musculoskeletal: No gross deformities
 Extremities: No cyanosis, clubbing, or edema.
 Neurologic: Grossly nonfocal.  UE and LE proximal and distal strength 5/5 and equal bilater
ally.  
 Skin: No rash or ulcers.
 
 
 Data
     
 Lab Results 
 Component Value Date 
  WBC 7.45 2018 
  HB 12.6 2018 
  HCT 37.5 2018 
   2018 
  MCV 87.4 2018 
  RDW 49.3 2018 
 
     
 Lab Results 
 Component Value Date 
   2018 
  K 3.9 2018 
   2018 
  BICARB 26 2018 
  BUN 16 2018 
  CR 0.78 2018 
   2018 
  CA 9.1 2018 
  ANIONGAP 10 2018 
 
 No results found for: NTPROBNP
 No results found for: A1C
     
 Lab Results 
 Component Value Date 
  INRPT 1.27 (H) 2018 
 
 
 FRAILTY ASSESSMENT:
 Frailty Index Date Taken: 2018 
 1. Left  Strength (kg): 12
     Right  Strength (kg): 10 
 2. Five Meter Walk (sec):   NA
 3. Serum albumin:   
 4. Walden ADL (#/6) :5 
 Frailty Outcome: Based on Partner trial definition the patient does not qualify as frail. 
 
 Walden ADL Date Taken: 2018 
 
 Bathing: No
 Dressing: No
 Toileting: No
 Transferring: No
 Continence: Yes
 Feeding: No
 
 EFT FRAILTY ASSESSMENT
 
 EFT 1 to possibly 2 points
 1 for low sit to stand
 No known albumin level
 
 EKG:
 Atrial fibrillation 
 Poor anterior R wave progression 
 Inferior mild st depression 0.5 mm 2/3. 
 
 
 
 TTE 3/6/2018
 1. Left ventricular systolic function is hyperdynamic with an estimated EF of >70%.
 2. The right ventricle is normal in size and function.
 3. The left atrium is markedly enlarged.
 4. The right atrium is markedly enlarged.
 5. Mild aortic stenosis with peak/mean pressure gradient of 16.83mmHg / 9.02mmHg, the aorti
c valve area by continuity equation is 1.7cm.
 6. Severe mitral regurgitation is present.
 7. Moderate mitral stenosis, with peak/mean transvalvular gradients measured at 25.2 / 8.0 
mm Hg, and MVA (by VTI) 1.50 cm . There is mi
 8. Moderate to severe tricuspid regurgitation present.
 9. There is moderate to severe pulmonary hypertension.The peak right ventricular systol
ic pressure (pulmonary artery systolic pressure), as measured by Doppler, is 55.8 - 60.8 mm 
Hg.
 10. There is a small secundum atrial septal defect measuring <10 mm in diameter.There i
s left-to-right shunting noted on color Doppler.
 
 TTE: 2018 
 Final Impressions:                         
                              
                                
                  
 1. The left ventricular cavity size is normal.               
 
 2. The LV function is hyperdynamic.                   
   
 3. Visually estimated left ventricular ejection fraction is 70 - 75%.     
 4. The aortic valve is trileaflet and moderately calcified.          
 5. Severe mitral annular calcification as well as papillary and subchordal  
 bulky calcification.                        
     
 6. While there is calcification of the bases of the mitral valve leaflets   
 due to the severe mitral annular calcificaiton, the mid portions of the    
 leaflets themseleves are only midly calcified and thin, however there is the 
 severe central mitral valve regurgitation.                 
 
 7. The mean transmitral gradient is 5.5 mmHg at a heart rate of 77 bpm.    
  The mitral valve effective regurgitant orifice area is 7 mm2.         
                             
 8. Severe biatrial enlargement.                    
    
 9. Right ventricular size, thickness and function are normal. 
 
 
 JENIFFER: 2018
 Final Impressions:                         
                             
              
                              
         
 1. The LV function is hyperdynamic.                   
   
 2. The visually estimated ejection fraction is > 75%.             
 3. Right ventricular size, thickness and function are normal.         
 4. Severe circumferential mitral annular calcification. A large deposit of  
 calcium at the posterolateral atrial aspect of themitral annulus that     
 partially disrupts the normal mitral annular shape and size. Mitral annular  
 area is measured at 8.26 cm2, with an AP diameter of 3.76 cm, and CC     
 
 diameter of 2.52 cm. However, there is no significant mitral stenosis. Mean  
 mitral gradient is approximately 4 mmHg at a heart rate of 83 bpm.      
 5. Severe mitral valve regurgitation. The EROA is 0.47 cm2 (using a PISA   
 radius of 1.1 cm, an aliasing velocity of 0.32 m/s, and a mean MR       
 regurgitant velocity of 5.1 m/s), the calculated regurgitant volume is 67   
 mL, and the calculated regurgitant fraction is 52%.              
 6. The mitral valve anterior leaflet measures 2.0 cm.             
 7. There is subvalvar and papilary muscle head calcifidation noted as well.  
 8. The mechanism for the severe mitral regurgitation is leaflet and annular  
 calcification and degerneration with poor coaptation across all portions of  
 the valve.                           
       
 9. The aortic valve is moderately sclerotic and restricted. No aortic     
 stenosis.                            
       
 10. Severely enlarged left atrium.                   
   
 11. There is a small fenestrated secundum atrial septal defect, with     
 intermittent left-to-right flow via color-flow Doppler.            
 12. Moderate tricuspid regurgitation.                  
   
 13. Small plaque involving the ascending and transverse aorta.        
 14. The left atrial appendage is well visualized and there is no evidence of 
 thrombus present.     
 
 CARDIAC CATH: 2018
 Preliminary repoot
 Severe MAC
 There is non-obstructive disease in LAD andCirc
 There is 95% stenosis of the distal RCA prior to the PDA and PLV's
 
 
 RHC: Unremarkable filling pressures
 
 Coronary Angiography: Obstructive CAD of RCA
 PFTS:
 None 
 
 STS (Based Off Available Data):
 MV Replacement + CAB 
 Risk of Mortality: 7.374% 
 Morbidity or Mortality: 30.831% 
 Long Length of Stay: 19.775% 
 Short Length of Stay: 7.384% 
 Permanent Stroke: 2.652% 
 Prolonged Ventilation: 19.662% 
 DSW Infection: 0.263% 
 Renal Failure: 8.237% 
 Reoperation: 12.335% 
 
 Assessment and Recommendations:
 Ms. Vonnie Celaya is a 86 y.o. female referred [facility] for assessment
 of treatment options for mitral valve disease. 
 
 Ms. Celaya has developed progressive GARCIA and LE edema over the last 6 months and currentl
y reports symptoms that are NYHA functional class 3-4.  A review of the echocardiogram and t
he transesophageal echo show severe mitral annular calcification, severe mitral regurgitaito
n, with largest jet centrally but also extending to the commisures, with a mean trasmitral g
radient of 4-5 mmHg and an annular are of 830-930 mm2.   In addition the echocardiogram is n
otable for moderate to severe pulmonary hypertesnion with RVSP 55-60 mmHg. Additional diagno
 
stics that have been performed include coronary angiogrpahy that demonstrates severe distal 
RCA stenosis. 
 
  Based on her symptoms, physical exam, and diagnostic studies we agree that she has severe 
symptomatic  Mitral regurgitation. 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
ing, continuing with her quality of life the best she can, and not having a stroke. 
 
 The patient's STS based on available data is 7-8%  . I am concerned about the risk of LVOT 
obstruction with valve and Mac, and I explained to the patient that this procedure is off la
bel use of the Echeverria valve and it carries high risk of valve embolization, perivalvular le
ak, LVOT obstruction. I also explained to her that mitral clip might be challenging given se
mauricio mitral annulus calcification as well as already elevated transmitral gradient.
 Explained off label use for valve in MAC, limited experience, increase risk of complication
s. Will plan to review her CT in details for LVOT obstruction and valve sizing. I think clip
 too high risk given MAC and elevated gradients and broad jet
 MARKUS SMART MD
 
 Electronically signed by Markus Smart MD at 2018  3:44 PM PSTdocumented in this e
ncounter
 
 Plan of Treatment
 Not on filedocumented as of this encounter
 
 Visit Diagnoses
 
 
+------------------------------------------------------------------+
| Diagnosis                                                        |
+------------------------------------------------------------------+
|   Mitral annular calcification - Primary  Mitral valve disorders |
+------------------------------------------------------------------+
 documented in this encounter

## 2020-01-01 NOTE — XMS
Encounter Summary
  Created on: 2020
 
 Vonnie Celaya
 External Reference #: [id number]
 : 32
 Sex: Female
 
 Demographics
 
 
+-----------------------+------------------------+
| Address               | [address]         |
|                       | [place]  [address]   |
+-----------------------+------------------------+
| Home Phone            | [phone]        |
+-----------------------+------------------------+
| Preferred Language    | Unknown                |
+-----------------------+------------------------+
| Marital Status        | Single                 |
+-----------------------+------------------------+
| Synagogue Affiliation | NON                    |
+-----------------------+------------------------+
| Race                  | White                  |
+-----------------------+------------------------+
| Ethnic Group          | Not  or  |
+-----------------------+------------------------+
 
 
 Author
 
 
+--------------+------------------------------+
| Author       | [facility] |
+--------------+------------------------------+
| Organization | [facility] |
+--------------+------------------------------+
| Address      | Unknown                      |
+--------------+------------------------------+
| Phone        | Unavailable                  |
+--------------+------------------------------+
 
 
 
 Support
 
 
+--------------+--------------+---------+-----------------+
| Name         | Relationship | Address | Phone           |
+--------------+--------------+---------+-----------------+
| Lauryn Leroy | ECON         | Unknown | [phone] |
+--------------+--------------+---------+-----------------+
 
 
 
 Care Team Providers
 
 
 
+-----------------------+------+-------------+
| Care Team Member Name | Role | Phone       |
+-----------------------+------+-------------+
| No Pcp Per Patient    | PCP  | Unavailable |
+-----------------------+------+-------------+
 
 
 
 Reason for Referral
 Diagnostic Testing (Routine)
 
+--------+--------+---------------+--------------+--------------+---------------+
| Status | Reason | Specialty     | Diagnoses /  | Referred By  | Referred To   |
|        |        |               | Procedures   | Contact      | Contact       |
+--------+--------+---------------+--------------+--------------+---------------+
| Closed |        | Cardiac       |   Diagnoses  |              |   Car Cardiac |
|        |        | Catheterizati |  Mitral      | [place],  |  Cath Lab     |
|        |        | on            | valve        | Jame PARRA MD  | [address]   |
|        |        |               | stenosis,    |  [address]     | [address]  |
|        |        |               | unspecified  | [address]     | Rd  [facility]      |
|        |        |               | etiology     | [place], [place] | Hospital      |
|        |        |               | Procedures   |  [address]  | [place]  |
|        |        |               | CATH LAB INT |  Phone:      | [phone]    |
|        |        |               |  CORONARY    | [phone] | Phone:        |
|        |        |               | ANGIOGRAM    |   Fax:       | [phone]  |
|        |        |               | SC R HRT     | [phone] |  Fax:         |
|        |        |               | CORONARY     |              | [phone]  |
|        |        |               | ARTERY ANGIO |              |               |
+--------+--------+---------------+--------------+--------------+---------------+
 
 
 
 
 Reason for Visit
 
 
+-----------+----------+
| Reason    | Comments |
+-----------+----------+
| Procedure |          |
+-----------+----------+
 
 
 
 Encounter Details
 
 
+--------+-----------+----------------------+----------------------+-------------+
| Date   | Type      | Department           | Care Team            | Description |
+--------+-----------+----------------------+----------------------+-------------+
| / | Telephone |   Cardiac Cath Lab   |   Jame Wilkins  | Procedure   |
|    |           | at [facility]  [address]  [address]  |             |
|        |           | [address]      | Ave  [place], [place]    |             |
|        |           | [facility]        | [address]           |             |
|        |           | [place]         | [phone]         |             |
|        |           | [address]           | [phone] (Fax)   |             |
|        |           | [phone]         |                      |             |
+--------+-----------+----------------------+----------------------+-------------+
 
 
 
 
 Social History
 
 
+----------------+-------+-----------+--------+------+
| Tobacco Use    | Types | Packs/Day | Years  | Date |
|                |       |           | Used   |      |
+----------------+-------+-----------+--------+------+
| Never Assessed |       |           |        |      |
+----------------+-------+-----------+--------+------+
 
 
 
+------------------+---------------+
| Sex Assigned at  | Date Recorded |
| Birth            |               |
+------------------+---------------+
| Not on file      |               |
+------------------+---------------+
 
 
 
+----------------+-------------+-------------+
| Job Start Date | Occupation  | Industry    |
+----------------+-------------+-------------+
| Not on file    | Not on file | Not on file |
+----------------+-------------+-------------+
 
 
 
+----------------+--------------+------------+
| Travel History | Travel Start | Travel End |
+----------------+--------------+------------+
 
 
 
+-------------------------------------+
| No recent travel history available. |
+-------------------------------------+
 documented as of this encounter
 
 Plan of Treatment
 Not on filedocumented as of this encounter
 
 Procedures
 
 
+----------------+--------+-------------+----------------------+----------------------+
| Procedure Name | Priori | Date/Time   | Associated Diagnosis | Comments             |
|                | ty     |             |                      |                      |
+----------------+--------+-------------+----------------------+----------------------+
| LAB REPORTS    |        | 2018  |                      |   Results for this   |
|                |        | 12:00 AM    |                      | procedure are in the |
|                |        | PDT         |                      |  results section.    |
+----------------+--------+-------------+----------------------+----------------------+
 documented in this encounter
 
 Results
 CATH LAB INT CORONARY ANGIOGRAM (2018  9:37 AM PDT)
 
 
+----------+
| Specimen |
+----------+
|          |
+----------+
 
 
 
+-----------------------------------------------------------------------+----------------+
| Narrative                                                             | Performed At   |
+-----------------------------------------------------------------------+----------------+
|   Procedure performed in the Cardiac Cath Lab.   See procedure notes  |   [facility] -       |
| for   details.                                                        | [facility]  |
|                                                                       | POINT OF CARE  |
|                                                                       | TESTS          |
+-----------------------------------------------------------------------+----------------+
 
 
 
+----------------------+-------------------------+--------------------+--------------+
| Performing           | Address                 | City/State/Zipcode | Phone Number |
| Organization         |                         |                    |              |
+----------------------+-------------------------+--------------------+--------------+
|   [facility]     |   [address]  | [place], [place]       |              |
| [facility]  | [address]               | [address]         |              |
| TESTS                |                         |                    |              |
+----------------------+-------------------------+--------------------+--------------+
 LAB REPORTS (2018 12:00 AM PDT)
 
+----------------------------------------------------------+--------------+
| Narrative                                                | Performed At |
+----------------------------------------------------------+--------------+
|   This result has an attachment that is not available.   |              |
+----------------------------------------------------------+--------------+
 documented in this encounter
 
 Visit Diagnoses
 
 
+---------------------------------------------------------+
| Diagnosis                                               |
+---------------------------------------------------------+
|   Mitral valve stenosis, unspecified etiology - Primary |
+---------------------------------------------------------+
 documented in this encounter

## 2020-01-01 NOTE — XMS
Clinical Summary
  Created on: 2020
 
 FabiolalonnyyaminiVonnie
 External Reference #: [id number]
 : 32
 Sex: Female
 
 Demographics
 
 
+-----------------------+---------------------------+
| Address               | [address]           |
|                       | [place]  [address] |
+-----------------------+---------------------------+
| Home Phone            | [phone]           |
+-----------------------+---------------------------+
| Preferred Language    | Unknown                   |
+-----------------------+---------------------------+
| Marital Status        |                    |
+-----------------------+---------------------------+
| Evangelical Affiliation | Unknown                   |
+-----------------------+---------------------------+
| Race                  | Unknown                   |
+-----------------------+---------------------------+
| Ethnic Group          | Unknown                   |
+-----------------------+---------------------------+
 
 
 Author
 
 
+--------------+--------------------------------------------+
| Author       | [facility]  |
|              | [facility] [place]                                |
+--------------+--------------------------------------------+
| Organization | [facility]  |
|              | [facility] [place]                                |
+--------------+--------------------------------------------+
| Address      | Unknown                                    |
+--------------+--------------------------------------------+
| Phone        | Unavailable                                |
+--------------+--------------------------------------------+
 
 
 
 Support
 
 
+--------------+--------------+---------+-----------------+
| Name         | Relationship | Address | Phone           |
+--------------+--------------+---------+-----------------+
| Lauryn Leroy | ECON         | Unknown | [phone] |
+--------------+--------------+---------+-----------------+
 
 
 
 Care Team Providers
 
 
 
+------------------------+------+-----------------+
| Care Team Member Name  | Role | Phone           |
+------------------------+------+-----------------+
| Jean Bermudez MD | PCP  | [phone] |
+------------------------+------+-----------------+
 
 
 
 Allergies
 
 
+----------------------+----------------------+----------+----------+-------------------+
| Active Allergy       | Reactions            | Severity | Noted    | Comments          |
|                      |                      |          | Date     |                   |
+----------------------+----------------------+----------+----------+-------------------+
| Morphine             | Other (See Comments) | Medium   | 20 |   Pt felt fidgety |
|                      |                      |          | 18       |                   |
+----------------------+----------------------+----------+----------+-------------------+
| Oxycodone-Acetaminop | Other (See Comments) | Medium   | 20 |   Pt felt fidgety |
| hen                  |                      |          | 18       |                   |
+----------------------+----------------------+----------+----------+-------------------+
 
 
 
 Medications
 
 
+----------------------+----------------------+-----------+---------+------+------+-------+
| Medication           | Sig                  | Dispensed | Refills | Star | End  | Statu |
|                      |                      |           |         | t    | Date | s     |
|                      |                      |           |         | Date |      |       |
+----------------------+----------------------+-----------+---------+------+------+-------+
|   levothyroxine      | Take 100 mcg by      |           | 0       |  |      | Activ |
| (SYNTHROID) 100 mcg  | mouth every morning  |           |         | 3/20 |      | e     |
| tablet               | before breakfast.    |           |         | 18   |      |       |
+----------------------+----------------------+-----------+---------+------+------+-------+
|   losartan (COZAAR)  | Take 100 mg by mouth |           | 0       | 08/1 |      | Activ |
| 100 MG tablet        |  daily.              |           |         | 3/20 |      | e     |
|                      |                      |           |         | 18   |      |       |
+----------------------+----------------------+-----------+---------+------+------+-------+
|   simvastatin        | Take 10 mg by mouth  |           | 0       | 08/1 |      | Activ |
| (ZOCOR) 20 mg tablet | nightly.             |           |         | 3/20 |      | e     |
|                      |                      |           |         | 18   |      |       |
+----------------------+----------------------+-----------+---------+------+------+-------+
|   potassium chloride | Take 20 mEq by mouth |           | 0       | 08/1 |      | Activ |
|  (KLOR-CON M20) 20   |  daily.              |           |         | 3/20 |      | e     |
| mEq ER tablet        |                      |           |         | 18   |      |       |
+----------------------+----------------------+-----------+---------+------+------+-------+
|   metFORMIN          | Take 500 mg by mouth |           | 0       | 08/1 |      | Activ |
| (GLUMETZA) 500 MG 24 |  2 (two) times daily |           |         | 3/20 |      | e     |
|  hr tablet           |  with meals.         |           |         | 18   |      |       |
+----------------------+----------------------+-----------+---------+------+------+-------+
|   magnesium chloride | Take 1 tablet by     |           | 0       | 08/1 |      | Activ |
|  (MAG64) 64 mg EC    | mouth daily.         |           |         | 3/20 |      | e     |
| tablet               |                      |           |         | 18   |      |       |
+----------------------+----------------------+-----------+---------+------+------+-------+
|   Cranberry Juice    | Take 1 capsule by    |           | 0       | 08/1 |      | Activ |
| Extract 1000 MG CAPS | mouth daily.         |           |         | 3/20 |      | e     |
 
|                      |                      |           |         | 18   |      |       |
+----------------------+----------------------+-----------+---------+------+------+-------+
|   ascorbic acid      | Take 1,000 mg by     |           | 0       | 08/1 |      | Activ |
| (VITAMIN C) 1000 MG  | mouth daily.         |           |         | 3/20 |      | e     |
| tablet               |                      |           |         | 18   |      |       |
+----------------------+----------------------+-----------+---------+------+------+-------+
|   furosemide (LASIX) | Take 40 mg by mouth  |           | 0       | 08/1 |      | Activ |
|  40 mg tablet        | daily.               |           |         | 3/20 |      | e     |
|                      |                      |           |         | 18   |      |       |
+----------------------+----------------------+-----------+---------+------+------+-------+
|   acetaminophen      | Take 650 mg by mouth |           | 0       | 08/1 |      | Activ |
| (TYLENOL) 325 mg     |  every 6 (six) hours |           |         | 3/20 |      | e     |
| tablet               |  as needed for Pain. |           |         | 18   |      |       |
+----------------------+----------------------+-----------+---------+------+------+-------+
|   warfarin           | Take 5 mg by mouth   |           | 0       | 10/3 |      | Activ |
| (COUMADIN) 5 mg      | daily. 1 pill daily  |           |         | 0/20 |      | e     |
| tablet               | Sun-Mon-Wed-Fri-Sat, |           |         | 18   |      |       |
|                      |  1/2 pill (2.5 mg)   |           |         |      |      |       |
|                      | Tue-Thur             |           |         |      |      |       |
+----------------------+----------------------+-----------+---------+------+------+-------+
|   Turmeric Curcumin  | Take  by mouth.      |           | 0       |      |      | Activ |
| 500 MG CAPS          |                      |           |         |      |      | e     |
+----------------------+----------------------+-----------+---------+------+------+-------+
|   IRON PO            | Take 50 mg by mouth  |           | 0       |      |      | Activ |
|                      | 2 times daily. Every |           |         |      |      | e     |
|                      |  other day ,         |           |         |      |      |       |
+----------------------+----------------------+-----------+---------+------+------+-------+
|   metoprolol         | Take 150 mg by mouth |           | 0       |      |      | Activ |
| succinate            |  Daily. Take three   |           |         |      |      | e     |
| (TOPROL-XL) 50 mg 24 | tablets by mouth     |           |         |      |      |       |
|  hr tablet           | daily at bedtime     |           |         |      |      |       |
+----------------------+----------------------+-----------+---------+------+------+-------+
 
 
 
 Active Problems
 
 
+------------------------------------------------------------------+------------+
| Problem                                                          | Noted Date |
+------------------------------------------------------------------+------------+
| Mixed hyperlipidemia                                             | 2019 |
+------------------------------------------------------------------+------------+
| Stage 3 chronic kidney disease                                   | 2019 |
+------------------------------------------------------------------+------------+
| SOB (shortness of breath) on exertion                            | 10/24/2018 |
+------------------------------------------------------------------+------------+
| Anginal equivalent                                               | 10/24/2018 |
+------------------------------------------------------------------+------------+
| Essential hypertension                                           | 10/24/2018 |
+------------------------------------------------------------------+------------+
| Coronary artery disease of native artery of native heart with    | 10/24/2018 |
| stable angina pectoris                                           |            |
+------------------------------------------------------------------+------------+
| S/P drug eluting coronary stent placement                        | 10/24/2018 |
+------------------------------------------------------------------+------------+
| Status post insertion of drug-eluting stent into right coronary  | 10/24/2018 |
| artery for coronary artery disease                               |            |
+------------------------------------------------------------------+------------+
 
 
 
 
+----------------------------------------------------------------+
|   Overview:   3.0 x 16 mm Synergy MARA in right Posterolateral  |
| artery                                                         |
+----------------------------------------------------------------+
 
 
 
+--------------------------+------------+
| Congestive heart failure | 2018 |
+--------------------------+------------+
 
 
 
+------------------------------------------------------------------+
|   Overview:   acute/chronic Diastolic Heart Failure, NYHA class  |
| III                                                              |
+------------------------------------------------------------------+
 
 
 
+---------------------+------------+
| Atrial fibrillation | 1996 |
+---------------------+------------+
 
 
 
+-------------------------------------------------------+
|   Overview:   persistent since then, never attempted  |
| cardioversion                                         |
+-------------------------------------------------------+
 
 
 
+--------------------------------------------+---+
| Pulmonary hypertension, moderate to severe |   |
+--------------------------------------------+---+
| Severe tricuspid regurgitation             |   |
+--------------------------------------------+---+
 
 
 
+---------------------------------+
|   Overview:   moderately severe |
+---------------------------------+
 
 
 
+-----------------------------+---+
| Severe mitral regurgitation |   |
+-----------------------------+---+
 
 
 
 Family History
 
 
+----------------------+----------+---------+---------------------------------------------+
| Medical History      | Relation | Name    | Comments                                    |
 
+----------------------+----------+---------+---------------------------------------------+
| Other (see comment)  | Brother Virginie Chau    | CVA                                         |
+----------------------+----------+---------+---------------------------------------------+
| Thyroid cancer       | Daughter | Devorah   |                                             |
+----------------------+----------+---------+---------------------------------------------+
| Other (see comment)  | Daughter |         | CVA                                         |
+----------------------+----------+---------+---------------------------------------------+
| Obesity              | Daughter |         |                                             |
+----------------------+----------+---------+---------------------------------------------+
| Arthritis            | Daughter |         |                                             |
+----------------------+----------+---------+---------------------------------------------+
| Coronary artery      | Father   |         |                                             |
| disease              |          |         |                                             |
+----------------------+----------+---------+---------------------------------------------+
| Other (see comment)  | Father   |         | CVA - cerebral hemorrhage                   |
+----------------------+----------+---------+---------------------------------------------+
| Heart failure        | Mother   |         |                                             |
+----------------------+----------+---------+---------------------------------------------+
| Other (see comment)  | Mother   |         | CVA - recurrent CVA at 74,  a few days  |
|                      |          |         | later/Diabetes Mellitus II                  |
+----------------------+----------+---------+---------------------------------------------+
| Other (see comment)  | Sister   | Khushbu | Leukemia - basophilic leukemia              |
+----------------------+----------+---------+---------------------------------------------+
| Heart disease        | Sister   | Fabienne | ? thrombus, not clear                       |
+----------------------+----------+---------+---------------------------------------------+
| Deep vein thrombosis | Sister   | Rhea    |                                             |
+----------------------+----------+---------+---------------------------------------------+
 
 
 
+----------+---------+----------+--------------------------------------------+
| Relation | Name    | Status   | Comments                                   |
+----------+---------+----------+--------------------------------------------+
| Brother  | Isac    |  | CVA                                        |
|          |         |   (Age   |                                            |
|          |         | 75)      |                                            |
+----------+---------+----------+--------------------------------------------+
| Daughter |         |  | cerebral thrombosis post umbilical hernia  |
|          |         |   (Age   | repair                                     |
|          |         | 56)      |                                            |
+----------+---------+----------+--------------------------------------------+
| Daughter |         | Alive    |                                            |
+----------+---------+----------+--------------------------------------------+
| Daughter | Devorah   | Alive    |                                            |
+----------+---------+----------+--------------------------------------------+
| Daughter |         |          |                                            |
+----------+---------+----------+--------------------------------------------+
| Daughter |         |          |                                            |
+----------+---------+----------+--------------------------------------------+
| Daughter |         |          |                                            |
+----------+---------+----------+--------------------------------------------+
| Father   |         |  | cerebral hemorrhage                        |
|          |         |   (Age   |                                            |
|          |         | 63)      |                                            |
+----------+---------+----------+--------------------------------------------+
| Mother   |         |  | CHF                                        |
|          |         |   (Age   |                                            |
|          |         | 74)      |                                            |
+----------+---------+----------+--------------------------------------------+
| Sister   | Khushbu |  |                                            |
 
|          |         |   (Age   |                                            |
|          |         | 56)      |                                            |
+----------+---------+----------+--------------------------------------------+
|    | Fabienne | Alive    |                                            |
+----------+---------+----------+--------------------------------------------+
|    | Rhea    | Alive    |                                            |
+----------+---------+----------+--------------------------------------------+
| Son      |         | Alive    |                                            |
+----------+---------+----------+--------------------------------------------+
 
 
 
 Social History
 
 
+--------------+-------+-----------+--------+------+
| Tobacco Use  | Types | Packs/Day | Years  | Date |
|              |       |           | Used   |      |
+--------------+-------+-----------+--------+------+
| Never Smoker |       |           |        |      |
+--------------+-------+-----------+--------+------+
 
 
 
+---------------------+---+---+---+
| Smokeless Tobacco:  |   |   |   |
| Never Used          |   |   |   |
+---------------------+---+---+---+
 
 
 
+---------------+-------------+---------+----------+
| Alcohol Use   | Drinks/Week | oz/Week | Comments |
+---------------+-------------+---------+----------+
| Not Currently |             |         |          |
+---------------+-------------+---------+----------+
 
 
 
+------------------+---------------+
| Sex Assigned at  | Date Recorded |
| Birth            |               |
+------------------+---------------+
| Not on file      |               |
+------------------+---------------+
 
 
 
+----------------+-------------+-------------+
| Job Start Date | Occupation  | Industry    |
+----------------+-------------+-------------+
| Not on file    | Not on file | Not on file |
+----------------+-------------+-------------+
 
 
 
+----------------+--------------+------------+
| Travel History | Travel Start | Travel End |
+----------------+--------------+------------+
 
 
 
 
+-------------------------------------+
| No recent travel history available. |
+-------------------------------------+
 
 
 
 Last Filed Vital Signs
 
 
+-------------------+----------------------+----------------------+----------+
| Vital Sign        | Reading              | Time Taken           | Comments |
+-------------------+----------------------+----------------------+----------+
| Blood Pressure    | 114/52               | 2019 12:25 PM  |          |
|                   |                      | PDT                  |          |
+-------------------+----------------------+----------------------+----------+
| Pulse             | 75                   | 2019 12:25 PM  |          |
|                   |                      | PDT                  |          |
+-------------------+----------------------+----------------------+----------+
| Temperature       | 36.6   C (97.9   F)  | 10/25/2018  2:40 PM  |          |
|                   |                      | PDT                  |          |
+-------------------+----------------------+----------------------+----------+
| Respiratory Rate  | 16                   | 10/25/2018  2:40 PM  |          |
|                   |                      | PDT                  |          |
+-------------------+----------------------+----------------------+----------+
| Oxygen Saturation | 94%                  | 2019 12:25 PM  |          |
|                   |                      | PDT                  |          |
+-------------------+----------------------+----------------------+----------+
| Inhaled Oxygen    | -                    | -                    |          |
| Concentration     |                      |                      |          |
+-------------------+----------------------+----------------------+----------+
| Weight            | 65.3 kg (143 lb 14.4 | 2019 12:25 PM  |          |
|                   |  oz)                 | PDT                  |          |
+-------------------+----------------------+----------------------+----------+
| Height            | 160 cm (5' 3")       | 2019 12:25 PM  |          |
|                   |                      | PDT                  |          |
+-------------------+----------------------+----------------------+----------+
| Body Mass Index   | 25.49                | 2019 12:25 PM  |          |
|                   |                      | PDT                  |          |
+-------------------+----------------------+----------------------+----------+
 
 
 
 Plan of Treatment
 
 
+--------+---------+------------+----------------------+-------------+
| Date   | Type    | Specialty  | Care Team            | Description |
+--------+---------+------------+----------------------+-------------+
| / | Office  | Cardiology |   [facility]    |             |
| 2020   | Visit   |            | [address]      |             |
|        |         |            | [address]   |             |
|        |         |            | [place] [id number]   |             |
|        |         |            | [phone]         |             |
|        |         |            | [phone] (Fax)   |             |
+--------+---------+------------+----------------------+-------------+
 
 
 
 
+----------------------+-----------+---------------------------------+----------+
| Health Maintenance   | Due Date  | Last Done                       | Comments |
+----------------------+-----------+---------------------------------+----------+
| Vaccine:             |  |                                 |          |
| Dtap/Tdap/Td (1 -    | 3         |                                 |          |
| Tdap)                |           |                                 |          |
+----------------------+-----------+---------------------------------+----------+
| Vaccine:             |  |                                 |          |
| Pneumococcal 65+ (1  | 7         |                                 |          |
| of 2 - PCV13)        |           |                                 |          |
+----------------------+-----------+---------------------------------+----------+
| Vaccine: Zoster (2   |  | 2016                      |          |
| of 3)                | 7         |                                 |          |
+----------------------+-----------+---------------------------------+----------+
| Adult Annual         |  |                                 |          |
| Wellness Visit       | 9         |                                 |          |
+----------------------+-----------+---------------------------------+----------+
| Vaccine: Influenza   | Completed | 2019, 10/15/2018,         |          |
|                      |           | 10/04/2017, Additional history  |          |
|                      |           | exists                          |          |
+----------------------+-----------+---------------------------------+----------+
 
 
 
 Results
 Not on filefrom Last 3 Months
 
 Insurance
 
 
+------------------+--------+-------------+--------+-------------+---------+--------+
| Payer            | Benefi | Subscriber  | Effect | Phone       | Address | Type   |
|                  | t Plan | ID          | vickey    |             |         |        |
|                  |  /     |             | Dates  |             |         |        |
|                  | Group  |             |        |             |         |        |
+------------------+--------+-------------+--------+-------------+---------+--------+
| MEDICARE         | MEDICA | [id number] | 19 | [phone] |         | [employer] |
|                  | RE     |             | 97-Pre | 5           |         | re     |
|                  | PART A |             | sent   |             |         |        |
|                  |  AND B |             |        |             |         |        |
+------------------+--------+-------------+--------+-------------+---------+--------+
| [employer] | B[employer] | [id number]   | 20 | [phone] |         | Indemn |
|                  | S LIFE |             | 07-Pre | 4           |         | ity    |
|                  |  INS   |             | sent   |             |         |        |
+------------------+--------+-------------+--------+-------------+---------+--------+
 
 
 
+--------------------+--------+-------------+--------+-------------+---------------------+
| Guarantor Name     | Accoun | Relation to | Date   | Phone       | Billing Address     |
|                    | t Type |  Patient    | of     |             |                     |
|                    |        |             | Birth  |             |                     |
+--------------------+--------+-------------+--------+-------------+---------------------+
| Vonnie Celaya | Person | Self        | / |             |   [address]   |
|                    | al/Fam |             | 1932   | [phone] | [place], [place]       |
|                    | [place]    |             |        | 0 (Home)    | [phone]          |
+--------------------+--------+-------------+--------+-------------+---------------------+
 
 
 
 
 [employer] Directives
 
 
+-----------+---------------+----------------+-------------+
| Type      | Date Recorded | Patient        | Explanation |
|           |               | Representative |             |
+-----------+---------------+----------------+-------------+
| Power of  |               |                |             |
|   |               |                |             |
+-----------+---------------+----------------+-------------+
| [employer]   |               |                |             |
| Directive |               |                |             |
+-----------+---------------+----------------+-------------+

## 2020-01-01 NOTE — XMS
Clinical Summary
  Created on: 2020
 
 FabiolalonnyyaminiVonnie
 External Reference #: [id number]
 : 32
 Sex: Female
 
 Demographics
 
 
+-----------------------+---------------------------+
| Address               | [address]           |
|                       | [place]  [address] |
+-----------------------+---------------------------+
| Home Phone            | [phone]           |
+-----------------------+---------------------------+
| Preferred Language    | Unknown                   |
+-----------------------+---------------------------+
| Marital Status        |                    |
+-----------------------+---------------------------+
| Taoism Affiliation | Unknown                   |
+-----------------------+---------------------------+
| Race                  | Unknown                   |
+-----------------------+---------------------------+
| Ethnic Group          | Unknown                   |
+-----------------------+---------------------------+
 
 
 Author
 
 
+--------------+--------------------------------------------+
| Author       | [facility]  |
|              | and [place]                                |
+--------------+--------------------------------------------+
| Organization | [facility]  |
|              | and [place]                                |
+--------------+--------------------------------------------+
| Address      | Unknown                                    |
+--------------+--------------------------------------------+
| Phone        | Unavailable                                |
+--------------+--------------------------------------------+
 
 
 
 Support
 
 
+--------------+--------------+---------+-----------------+
| Name         | Relationship | Address | Phone           |
+--------------+--------------+---------+-----------------+
| Lauryn Leroy | ECON         | Unknown | [phone] |
+--------------+--------------+---------+-----------------+
 
 
 
 Care Team Providers
 
 
 
+------------------------+------+-----------------+
| Care Team Member Name  | Role | Phone           |
+------------------------+------+-----------------+
| Jean Bermudez MD | PCP  | [phone] |
+------------------------+------+-----------------+
 
 
 
 Allergies
 
 
+----------------------+----------------------+----------+----------+-------------------+
| Active Allergy       | Reactions            | Severity | Noted    | Comments          |
|                      |                      |          | Date     |                   |
+----------------------+----------------------+----------+----------+-------------------+
| Morphine             | Other (See Comments) | Medium   | 20 |   Pt felt fidgety |
|                      |                      |          | 18       |                   |
+----------------------+----------------------+----------+----------+-------------------+
| Oxycodone-Acetaminop | Other (See Comments) | Medium   | 20 |   Pt felt fidgety |
| hen                  |                      |          | 18       |                   |
+----------------------+----------------------+----------+----------+-------------------+
 
 
 
 Medications
 
 
+----------------------+----------------------+-----------+---------+------+------+-------+
| Medication           | Sig                  | Dispensed | Refills | Star | End  | Statu |
|                      |                      |           |         | t    | Date | s     |
|                      |                      |           |         | Date |      |       |
+----------------------+----------------------+-----------+---------+------+------+-------+
|   levothyroxine      | Take 100 mcg by      |           | 0       |  |      | Activ |
| (SYNTHROID) 100 mcg  | mouth every morning  |           |         | 3/20 |      | e     |
| tablet               | before breakfast.    |           |         | 18   |      |       |
+----------------------+----------------------+-----------+---------+------+------+-------+
|   losartan (COZAAR)  | Take 100 mg by mouth |           | 0       | 08/1 |      | Activ |
| 100 MG tablet        |  daily.              |           |         | 3/20 |      | e     |
|                      |                      |           |         | 18   |      |       |
+----------------------+----------------------+-----------+---------+------+------+-------+
|   simvastatin        | Take 10 mg by mouth  |           | 0       | 08/1 |      | Activ |
| (ZOCOR) 20 mg tablet | nightly.             |           |         | 3/20 |      | e     |
|                      |                      |           |         | 18   |      |       |
+----------------------+----------------------+-----------+---------+------+------+-------+
|   potassium chloride | Take 20 mEq by mouth |           | 0       | 08/1 |      | Activ |
|  (KLOR-CON M20) 20   |  daily.              |           |         | 3/20 |      | e     |
| mEq ER tablet        |                      |           |         | 18   |      |       |
+----------------------+----------------------+-----------+---------+------+------+-------+
|   metFORMIN          | Take 500 mg by mouth |           | 0       | 08/1 |      | Activ |
| (GLUMETZA) 500 MG 24 |  2 (two) times daily |           |         | 3/20 |      | e     |
|  hr tablet           |  with meals.         |           |         | 18   |      |       |
+----------------------+----------------------+-----------+---------+------+------+-------+
|   magnesium chloride | Take 1 tablet by     |           | 0       | 08/1 |      | Activ |
|  (MAG64) 64 mg EC    | mouth daily.         |           |         | 3/20 |      | e     |
| tablet               |                      |           |         | 18   |      |       |
+----------------------+----------------------+-----------+---------+------+------+-------+
|   Cranberry Juice    | Take 1 capsule by    |           | 0       | 08/1 |      | Activ |
| Extract 1000 MG CAPS | mouth daily.         |           |         | 3/20 |      | e     |
 
|                      |                      |           |         | 18   |      |       |
+----------------------+----------------------+-----------+---------+------+------+-------+
|   ascorbic acid      | Take 1,000 mg by     |           | 0       | 08/1 |      | Activ |
| (VITAMIN C) 1000 MG  | mouth daily.         |           |         | 3/20 |      | e     |
| tablet               |                      |           |         | 18   |      |       |
+----------------------+----------------------+-----------+---------+------+------+-------+
|   furosemide (LASIX) | Take 40 mg by mouth  |           | 0       | 08/1 |      | Activ |
|  40 mg tablet        | daily.               |           |         | 3/20 |      | e     |
|                      |                      |           |         | 18   |      |       |
+----------------------+----------------------+-----------+---------+------+------+-------+
|   acetaminophen      | Take 650 mg by mouth |           | 0       | 08/1 |      | Activ |
| (TYLENOL) 325 mg     |  every 6 (six) hours |           |         | 3/20 |      | e     |
| tablet               |  as needed for Pain. |           |         | 18   |      |       |
+----------------------+----------------------+-----------+---------+------+------+-------+
|   warfarin           | Take 5 mg by mouth   |           | 0       | 10/3 |      | Activ |
| (COUMADIN) 5 mg      | daily. 1 pill daily  |           |         | 0/20 |      | e     |
| tablet               | Sun-Mon-Wed-Fri-Sat, |           |         | 18   |      |       |
|                      |  1/2 pill (2.5 mg)   |           |         |      |      |       |
|                      | Tue-Thur             |           |         |      |      |       |
+----------------------+----------------------+-----------+---------+------+------+-------+
|   Turmeric Curcumin  | Take  by mouth.      |           | 0       |      |      | Activ |
| 500 MG CAPS          |                      |           |         |      |      | e     |
+----------------------+----------------------+-----------+---------+------+------+-------+
|   IRON PO            | Take 50 mg by mouth  |           | 0       |      |      | Activ |
|                      | 2 times daily. Every |           |         |      |      | e     |
|                      |  other day ,         |           |         |      |      |       |
+----------------------+----------------------+-----------+---------+------+------+-------+
|   metoprolol         | Take 150 mg by mouth |           | 0       |      |      | Activ |
| succinate            |  Daily. Take three   |           |         |      |      | e     |
| (TOPROL-XL) 50 mg 24 | tablets by mouth     |           |         |      |      |       |
|  hr tablet           | daily at bedtime     |           |         |      |      |       |
+----------------------+----------------------+-----------+---------+------+------+-------+
 
 
 
 Active Problems
 
 
+------------------------------------------------------------------+------------+
| Problem                                                          | Noted Date |
+------------------------------------------------------------------+------------+
| Mixed hyperlipidemia                                             | 2019 |
+------------------------------------------------------------------+------------+
| Stage 3 chronic kidney disease                                   | 2019 |
+------------------------------------------------------------------+------------+
| SOB (shortness of breath) on exertion                            | 10/24/2018 |
+------------------------------------------------------------------+------------+
| Anginal equivalent                                               | 10/24/2018 |
+------------------------------------------------------------------+------------+
| Essential hypertension                                           | 10/24/2018 |
+------------------------------------------------------------------+------------+
| Coronary artery disease of native artery of native heart with    | 10/24/2018 |
| stable angina pectoris                                           |            |
+------------------------------------------------------------------+------------+
| S/P drug eluting coronary stent placement                        | 10/24/2018 |
+------------------------------------------------------------------+------------+
| Status post insertion of drug-eluting stent into right coronary  | 10/24/2018 |
| artery for coronary artery disease                               |            |
+------------------------------------------------------------------+------------+
 
 
 
 
+----------------------------------------------------------------+
|   Overview:   3.0 x 16 mm Synergy MARA in right Posterolateral  |
| artery                                                         |
+----------------------------------------------------------------+
 
 
 
+--------------------------+------------+
| Congestive heart failure | 2018 |
+--------------------------+------------+
 
 
 
+------------------------------------------------------------------+
|   Overview:   acute/chronic Diastolic Heart Failure, NYHA class  |
| III                                                              |
+------------------------------------------------------------------+
 
 
 
+---------------------+------------+
| Atrial fibrillation | 1996 |
+---------------------+------------+
 
 
 
+-------------------------------------------------------+
|   Overview:   persistent since then, never attempted  |
| cardioversion                                         |
+-------------------------------------------------------+
 
 
 
+--------------------------------------------+---+
| Pulmonary hypertension, moderate to severe |   |
+--------------------------------------------+---+
| Severe tricuspid regurgitation             |   |
+--------------------------------------------+---+
 
 
 
+---------------------------------+
|   Overview:   moderately severe |
+---------------------------------+
 
 
 
+-----------------------------+---+
| Severe mitral regurgitation |   |
+-----------------------------+---+
 
 
 
 Family History
 
 
+----------------------+----------+---------+---------------------------------------------+
| Medical History      | Relation | Name    | Comments                                    |
 
+----------------------+----------+---------+---------------------------------------------+
| Other (see comment)  | Brother Virginie Chau    | CVA                                         |
+----------------------+----------+---------+---------------------------------------------+
| Thyroid cancer       | Daughter | Devorah   |                                             |
+----------------------+----------+---------+---------------------------------------------+
| Other (see comment)  | Daughter |         | CVA                                         |
+----------------------+----------+---------+---------------------------------------------+
| Obesity              | Daughter |         |                                             |
+----------------------+----------+---------+---------------------------------------------+
| Arthritis            | Daughter |         |                                             |
+----------------------+----------+---------+---------------------------------------------+
| Coronary artery      | Father   |         |                                             |
| disease              |          |         |                                             |
+----------------------+----------+---------+---------------------------------------------+
| Other (see comment)  | Father   |         | CVA - cerebral hemorrhage                   |
+----------------------+----------+---------+---------------------------------------------+
| Heart failure        | Mother   |         |                                             |
+----------------------+----------+---------+---------------------------------------------+
| Other (see comment)  | Mother   |         | CVA - recurrent CVA at 74,  a few days  |
|                      |          |         | later/Diabetes Mellitus II                  |
+----------------------+----------+---------+---------------------------------------------+
| Other (see comment)  | Sister   | Khushbu | Leukemia - basophilic leukemia              |
+----------------------+----------+---------+---------------------------------------------+
| Heart disease        | Sister   | Fabienne | ? thrombus, not clear                       |
+----------------------+----------+---------+---------------------------------------------+
| Deep vein thrombosis | Sister   | Rhea    |                                             |
+----------------------+----------+---------+---------------------------------------------+
 
 
 
+----------+---------+----------+--------------------------------------------+
| Relation | Name    | Status   | Comments                                   |
+----------+---------+----------+--------------------------------------------+
| Brother  | Isac    |  | CVA                                        |
|          |         |   (Age   |                                            |
|          |         | 75)      |                                            |
+----------+---------+----------+--------------------------------------------+
| Daughter |         |  | cerebral thrombosis post umbilical hernia  |
|          |         |   (Age   | repair                                     |
|          |         | 56)      |                                            |
+----------+---------+----------+--------------------------------------------+
| Daughter |         | Alive    |                                            |
+----------+---------+----------+--------------------------------------------+
| Daughter | Devorah   | Alive    |                                            |
+----------+---------+----------+--------------------------------------------+
| Daughter |         |          |                                            |
+----------+---------+----------+--------------------------------------------+
| Daughter |         |          |                                            |
+----------+---------+----------+--------------------------------------------+
| Daughter |         |          |                                            |
+----------+---------+----------+--------------------------------------------+
| Father   |         |  | cerebral hemorrhage                        |
|          |         |   (Age   |                                            |
|          |         | 63)      |                                            |
+----------+---------+----------+--------------------------------------------+
| Mother   |         |  | CHF                                        |
|          |         |   (Age   |                                            |
|          |         | 74)      |                                            |
+----------+---------+----------+--------------------------------------------+
| Sister   | Khushbu |  |                                            |
 
|          |         |   (Age   |                                            |
|          |         | 56)      |                                            |
+----------+---------+----------+--------------------------------------------+
|    | Fabienne | Alive    |                                            |
+----------+---------+----------+--------------------------------------------+
|    | Rhea    | Alive    |                                            |
+----------+---------+----------+--------------------------------------------+
| Son      |         | Alive    |                                            |
+----------+---------+----------+--------------------------------------------+
 
 
 
 Social History
 
 
+--------------+-------+-----------+--------+------+
| Tobacco Use  | Types | Packs/Day | Years  | Date |
|              |       |           | Used   |      |
+--------------+-------+-----------+--------+------+
| Never Smoker |       |           |        |      |
+--------------+-------+-----------+--------+------+
 
 
 
+---------------------+---+---+---+
| Smokeless Tobacco:  |   |   |   |
| Never Used          |   |   |   |
+---------------------+---+---+---+
 
 
 
+---------------+-------------+---------+----------+
| Alcohol Use   | Drinks/Week | oz/Week | Comments |
+---------------+-------------+---------+----------+
| Not Currently |             |         |          |
+---------------+-------------+---------+----------+
 
 
 
+------------------+---------------+
| Sex Assigned at  | Date Recorded |
| Birth            |               |
+------------------+---------------+
| Not on file      |               |
+------------------+---------------+
 
 
 
+----------------+-------------+-------------+
| Job Start Date | Occupation  | Industry    |
+----------------+-------------+-------------+
| Not on file    | Not on file | Not on file |
+----------------+-------------+-------------+
 
 
 
+----------------+--------------+------------+
| Travel History | Travel Start | Travel End |
+----------------+--------------+------------+
 
 
 
 
+-------------------------------------+
| No recent travel history available. |
+-------------------------------------+
 
 
 
 Last Filed Vital Signs
 
 
+-------------------+----------------------+----------------------+----------+
| Vital Sign        | Reading              | Time Taken           | Comments |
+-------------------+----------------------+----------------------+----------+
| Blood Pressure    | 114/52               | 2019 12:25 PM  |          |
|                   |                      | PDT                  |          |
+-------------------+----------------------+----------------------+----------+
| Pulse             | 75                   | 2019 12:25 PM  |          |
|                   |                      | PDT                  |          |
+-------------------+----------------------+----------------------+----------+
| Temperature       | 36.6   C (97.9   F)  | 10/25/2018  2:40 PM  |          |
|                   |                      | PDT                  |          |
+-------------------+----------------------+----------------------+----------+
| Respiratory Rate  | 16                   | 10/25/2018  2:40 PM  |          |
|                   |                      | PDT                  |          |
+-------------------+----------------------+----------------------+----------+
| Oxygen Saturation | 94%                  | 2019 12:25 PM  |          |
|                   |                      | PDT                  |          |
+-------------------+----------------------+----------------------+----------+
| Inhaled Oxygen    | -                    | -                    |          |
| Concentration     |                      |                      |          |
+-------------------+----------------------+----------------------+----------+
| Weight            | 65.3 kg (143 lb 14.4 | 2019 12:25 PM  |          |
|                   |  oz)                 | PDT                  |          |
+-------------------+----------------------+----------------------+----------+
| Height            | 160 cm (5' 3")       | 2019 12:25 PM  |          |
|                   |                      | PDT                  |          |
+-------------------+----------------------+----------------------+----------+
| Body Mass Index   | 25.49                | 2019 12:25 PM  |          |
|                   |                      | PDT                  |          |
+-------------------+----------------------+----------------------+----------+
 
 
 
 Plan of Treatment
 
 
+--------+---------+------------+----------------------+-------------+
| Date   | Type    | Specialty  | Care Team            | Description |
+--------+---------+------------+----------------------+-------------+
| / | Office  | Cardiology |   [facility]    |             |
| 2020   | Visit   |            | [address]      |             |
|        |         |            | [address]   |             |
|        |         |            | [place] [address]   |             |
|        |         |            | [phone]         |             |
|        |         |            | [phone] (Fax)   |             |
+--------+---------+------------+----------------------+-------------+
 
 
 
 
+----------------------+-----------+---------------------------------+----------+
| Health Maintenance   | Due Date  | Last Done                       | Comments |
+----------------------+-----------+---------------------------------+----------+
| Vaccine:             |  |                                 |          |
| Dtap/Tdap/Td (1 -    | 3         |                                 |          |
| Tdap)                |           |                                 |          |
+----------------------+-----------+---------------------------------+----------+
| Vaccine:             |  |                                 |          |
| Pneumococcal 65+ (1  | 7         |                                 |          |
| of 2 - PCV13)        |           |                                 |          |
+----------------------+-----------+---------------------------------+----------+
| Vaccine: Zoster (2   |  | 2016                      |          |
| of 3)                | 7         |                                 |          |
+----------------------+-----------+---------------------------------+----------+
| Adult Annual         |  |                                 |          |
| Wellness Visit       | 9         |                                 |          |
+----------------------+-----------+---------------------------------+----------+
| Vaccine: Influenza   | Completed | 2019, 10/15/2018,         |          |
|                      |           | 10/04/2017, Additional history  |          |
|                      |           | exists                          |          |
+----------------------+-----------+---------------------------------+----------+
 
 
 
 Results
 Not on filefrom Last 3 Months
 
 Insurance
 
 
+------------------+--------+-------------+--------+-------------+---------+--------+
| Payer            | Benefi | Subscriber  | Effect | Phone       | Address | Type   |
|                  | t Plan | ID          | vickey    |             |         |        |
|                  |  /     |             | Dates  |             |         |        |
|                  | Group  |             |        |             |         |        |
+------------------+--------+-------------+--------+-------------+---------+--------+
| MEDICARE         | MEDICA | [id number] | 19 | [phone] |         | [employer] |
|                  | RE     |             | 97-Pre | 5           |         | re     |
|                  | PART A |             | sent   |             |         |        |
|                  |  AND B |             |        |             |         |        |
+------------------+--------+-------------+--------+-------------+---------+--------+
| [employer] | B[employer] | [id number]   | 20 | [phone] |         | Indemn |
|                  | S LIFE |             | 07-Pre | 4           |         | ity    |
|                  |  INS   |             | sent   |             |         |        |
+------------------+--------+-------------+--------+-------------+---------+--------+
 
 
 
+--------------------+--------+-------------+--------+-------------+---------------------+
| Guarantor Name     | Accoun | Relation to | Date   | Phone       | Billing Address     |
|                    | t Type |  Patient    | of     |             |                     |
|                    |        |             | Birth  |             |                     |
+--------------------+--------+-------------+--------+-------------+---------------------+
| Vonnie Celaya | Person | Self        | / |             |   [address]   |
|                    | al/Fam |             | 1932   | [phone] | [place], [place]       |
|                    | [place]    |             |        | [address] (Home)    | [phone]          |
+--------------------+--------+-------------+--------+-------------+---------------------+
 
 
 
 
 [employer] Directives
 
 
+-----------+---------------+----------------+-------------+
| Type      | Date Recorded | Patient        | Explanation |
|           |               | Representative |             |
+-----------+---------------+----------------+-------------+
| Power of  |               |                |             |
|   |               |                |             |
+-----------+---------------+----------------+-------------+
| [employer]   |               |                |             |
| Directive |               |                |             |
+-----------+---------------+----------------+-------------+

## 2020-01-01 NOTE — XMS
Encounter Summary
  Created on: 2020
 
 Vonnie Celaya
 External Reference #: [id number]
 : 32
 Sex: Female
 
 Demographics
 
 
+-----------------------+------------------------+
| Address               | [address]         |
|                       | [place]  [address]   |
+-----------------------+------------------------+
| Home Phone            | [phone]        |
+-----------------------+------------------------+
| Preferred Language    | Unknown                |
+-----------------------+------------------------+
| Marital Status        | Single                 |
+-----------------------+------------------------+
| Latter day Affiliation | NON                    |
+-----------------------+------------------------+
| Race                  | White                  |
+-----------------------+------------------------+
| Ethnic Group          | Not  or  |
+-----------------------+------------------------+
 
 
 Author
 
 
+--------------+------------------------------+
| Author       | [facility] |
+--------------+------------------------------+
| Organization | [facility] |
+--------------+------------------------------+
| Address      | Unknown                      |
+--------------+------------------------------+
| Phone        | Unavailable                  |
+--------------+------------------------------+
 
 
 
 Support
 
 
+--------------+--------------+---------+-----------------+
| Name         | Relationship | Address | Phone           |
+--------------+--------------+---------+-----------------+
| Lauryn Leroy | ECON         | Unknown | [phone] |
+--------------+--------------+---------+-----------------+
 
 
 
 Care Team Providers
 
 
 
+------------------------+------+-----------------+
| Care Team Member Name  | Role | Phone           |
+------------------------+------+-----------------+
| Jean Bermudez MD | PCP  | [phone] |
+------------------------+------+-----------------+
 
 
 
 Reason for Visit
 
 
+--------------+----------+
| Reason       | Comments |
+--------------+----------+
| New patient  |          |
| consultation |          |
+--------------+----------+
 Consultation (Routine)
 
+----------+--------+------------+--------------+--------------+---------------+
| Status   | Reason | Specialty  | Diagnoses /  | Referred By  | Referred To   |
|          |        |            | Procedures   | Contact      | Contact       |
+----------+--------+------------+--------------+--------------+---------------+
| Medical  |        | Cardiology |   Diagnoses  |   Stephen,      |   Claudette, |
| Review   |        |            |              | Varun PARRA,   |  Theodore PARRA MD  |
|          |        |            | Nonrheumatic | MD  [address]     |  [address] |
|          |        |            |  mitral      | [address]  |  Ave          |
|          |        |            | (valve)      |  [address]       | [place], [place]  |
|          |        |            | insufficienc | [place], [place] | [address]    |
|          |        |            | y  Rheumatic |  [address]       | Phone:        |
|          |        |            |  tricuspid   | Phone:       | [phone]  |
|          |        |            | insufficienc | [phone] |  Fax:         |
|          |        |            | y  Pulmonary |   Fax:       | [phone]  |
|          |        |            |              | [phone] |               |
|          |        |            | hypertension |              |               |
|          |        |            | ,            |              |               |
|          |        |            | unspecified  |              |               |
|          |        |            |  Procedures  |              |               |
|          |        |            |  AL NEW      |              |               |
|          |        |            | PATIENT      |              |               |
|          |        |            | LEVEL V      |              |               |
+----------+--------+------------+--------------+--------------+---------------+
 
 
 
 
 Encounter Details
 
 
+--------+---------+----------------------+----------------------+----------------------+
| Date   | Type    | Department           | Care Team            | Description          |
+--------+---------+----------------------+----------------------+----------------------+
| / | Office  |   Cardiology at [facility]  |   Theodore Wilkins  | Mitral valve         |
|    | Visit   |  [address]    | MD AIDA  [address]  | insufficiency,       |
|        |         | Mailcode: [address]       | Ave  [place]    | unspecified etiology |
|        |         | [place] [facility]    | [address]           |  (Primary Dx);       |
|        |         | and Healing,         | [phone]         | Mitral annular       |
|        |         | Building       | [phone] (Fax)   | calcification;       |
|        |         | Floor  [place], [place]  |                      | Persistent atrial    |
|        |         | [address]           |                      | fibrillation (HCC)   |
 
|        |         | [phone]         |                      |                      |
+--------+---------+----------------------+----------------------+----------------------+
 
 
 
 Social History
 
 
+--------------+-------+-----------+--------+------+
| Tobacco Use  | Types | Packs/Day | Years  | Date |
|              |       |           | Used   |      |
+--------------+-------+-----------+--------+------+
| Never Smoker |       |           |        |      |
+--------------+-------+-----------+--------+------+
 
 
 
+---------------------+---+---+---+
| Smokeless Tobacco:  |   |   |   |
| Never Used          |   |   |   |
+---------------------+---+---+---+
 
 
 
+-------------+-------------+---------+----------+
| Alcohol Use | Drinks/Week | oz/Week | Comments |
+-------------+-------------+---------+----------+
| No          |             |         |          |
+-------------+-------------+---------+----------+
 
 
 
+------------------+---------------+
| Sex Assigned at  | Date Recorded |
| Birth            |               |
+------------------+---------------+
| Not on file      |               |
+------------------+---------------+
 
 
 
+----------------+-------------+-------------+
| Job Start Date | Occupation  | Industry    |
+----------------+-------------+-------------+
| Not on file    | Not on file | Not on file |
+----------------+-------------+-------------+
 
 
 
+----------------+--------------+------------+
| Travel History | Travel Start | Travel End |
+----------------+--------------+------------+
 
 
 
+-------------------------------------+
| No recent travel history available. |
+-------------------------------------+
 documented as of this encounter
 
 
 Last Filed Vital Signs
 
 
+-------------------+--------------------+----------------------+----------+
| Vital Sign        | Reading            | Time Taken           | Comments |
+-------------------+--------------------+----------------------+----------+
| Blood Pressure    | 110/59             | 2018 12:11 PM  |          |
|                   |                    | PDT                  |          |
+-------------------+--------------------+----------------------+----------+
| Pulse             | 78                 | 2018 12:11 PM  |          |
|                   |                    | PDT                  |          |
+-------------------+--------------------+----------------------+----------+
| Temperature       | 36.7   C (98   F)  | 2018 12:11 PM  |          |
|                   |                    | PDT                  |          |
+-------------------+--------------------+----------------------+----------+
| Respiratory Rate  | 20                 | 2018 12:11 PM  |          |
|                   |                    | PDT                  |          |
+-------------------+--------------------+----------------------+----------+
| Oxygen Saturation | 99%                | 2018 12:11 PM  |          |
|                   |                    | PDT                  |          |
+-------------------+--------------------+----------------------+----------+
| Inhaled Oxygen    | -                  | -                    |          |
| Concentration     |                    |                      |          |
+-------------------+--------------------+----------------------+----------+
| Weight            | 66 kg (145 lb 9.6  | 2018 12:11 PM  |          |
|                   | oz)                | PDT                  |          |
+-------------------+--------------------+----------------------+----------+
| Height            | 160 cm (5' 3")     | 2018 12:11 PM  |          |
|                   |                    | PDT                  |          |
+-------------------+--------------------+----------------------+----------+
| Body Mass Index   | 25.79              | 2018 12:11 PM  |          |
|                   |                    | PDT                  |          |
+-------------------+--------------------+----------------------+----------+
 documented in this encounter
 
 Patient Instructions
 Patient Instructions Theodore Wilkins MD - 2018 12:00 PM PDT1. We would recommend
 that you continue to follow up with Dr. Mitchell in [facility] for your heart care. Discuss the 
options of stenting of the right coronary artery with him in follow up as needed if chest pa
in develops 
 
 2. Go up to the physicians [place] to meet with Dr. Smart, Dr. Abrams, and Dr. Brennen Larios ed on your preferences options are to continue with medical therapy or to consider a possibl
e clip procedure, though the likelyhood of a successful placement of a clip we think is low 
and risk is higher for possible complication. Given your wishes, we will not pursue a hybrid
 surgical procedure. 
 
 3. We will discuss your the group decision (yourself and our doctors) with Dr. Mitchell to let 
him know the final plans. 
 
 4. IT was a pleasure meeting you today. It was our pleasure being involved in your care.  C
all Dominga Arambula RN or Ciara Riddle PA-C in the valve clinic at [phone] if you have a
ny questions.
   
 
 Electronically signed by Theodore Wilkins MD at 2018  1:16 PM PDT
 documented in this encounter
 
 Progress Notes
 Jordyn Davis MA - 2018 12:00 PM PDTTallahas[place] Cardiomyopathy Questionnaire
 
 
 Over the past two weeks:
 1. How much are you limited by heart failure to do the following: 
 a. Showering/bathing:SLIGHTLY LIMITED 4
 b. Walking 1 block on level ground: MODERATELY LIMITED 3
 c. Hurrying or jogging:MODERATELY LIMITED 3
 2. How many times did you have swelling in your feet, ankles or legs when you woke up in th
 morning: NEVER OVER THE PAST 2 WEEKS 5
 3. How many times has fatigue limited your ability to do what you wanted: SEVERAL TIMES PER
 DAY 2
 4.  How many times has shortness of breath limited your ability to do what you wanted:SEVER
AL TIMES PER DAY 2
 5. How many times did you sleep sitting up in a chair or with at least 3 pillows to prop yo
u up because of shortness of breath: NEVER OVER THE PAST 2 WEEKS 5
 6. How much has your heart failure limited your enjoyment of life:EXTREMELY LIMITED 1
 7. If you had to spend the rest of your life with your heart failure the way it is right no
w, how would you feel about this?: NOT AT ALL SATISFIED 1
 8. How much does your heart failure affect your lifestyle in the following: 
 a. Hobbies, recreational activities: EXTREMELY LIMITED 1
 b. Working/household chores: EXTREMELY LIMITED 1
 c. Visiting outside the home: EXTREMELY LIMITED 1
 
 RESULTS: 29
 
 Electronically signed by Jordyn Davis MA at 2018  2:45 PM Theodore Sargent M
D - 2018 12:00 PM PDTFormatting of this note might be different from the original.
 Heart Valve Clinic -  Initial Consultation
 Date:  2018
 Author:   THEODORE WILKINS MD
 Referring Provider: Layla Greer 
 
 HPI: Mrs Celaya is a very pleasant 87 y/o female with a hx of recurrent heart failure adm
ission for "diastolic heart failure" and valvular heart disease over the past 6 months who i
s referred for considerations of options to treat severe mitral annular calcification and se
mauricio mitral regurgitation. She has a past med Hx signifncat for persistent Afib, Tyep 2 DM, 
hypertension, hyperlipidemia, hypothyroidism, iron deficiency anemia, pulmonary hypertension
, moderate to severe TR, and severe MR.  
 
 Mrs Celaya, lives in [facility] and is brought in by her daughter today who lives w
ith her.  They report that she has been experiencing progressive GARCIA that has been slow and 
progressive for many years. She was first hospitalized at [facility] in [facility] i
n 2018 with s/s of heart failure. Over the course of the spring she has noticed new
 and progressive GARCIA as well as new LE edema. She was previously active but could no longer 
walk a 1/4 block. She was re-admitted to the hospital in [facility] in May 2018 again with he
art failure.
 Echo in may was notable for normal to hyperdynamic LV systolic function, severe MAC, severe
 MR, moderate to severe TR, and mild AS. Additionally it showed moderate to severe Pulm HTN 
as well as left to right shnt across tunneled PFO. She has followed up with Dr Stephen Mitchell 
at [facility] in the [facility] and referred to [facility] for consideration of MitraClip
.  
 
 Since May she has had no further decompensated heart failure episodes 
 
 To expidite her evaluation, she has already been referred and undergone TTE, JENIFFER, CT scan o
f chest a week ago a well as angiogrpahy yesterday on 2018. 
 
 Symptoms & Exercise/Activity Levels:
 Lives in [facility] with her Daughter in a 2 story house. She lives in the ground floor and 
her Daughter lives up stairs. She She has 7 steps to get up into the house and can still matthew
erate gettign in to the house but is markedly winded and would have to rest going up stirs (
 
19 steps) When walkinf up the stairs he endorses no chest pain but predominatly GARCIA.  Her ac
tivity is limited and does not describe chest discomfort in her brief activities. She is abl
e to make her bed but feels tired afterwards. Her activities are typically quilting for [employer] and the [employer] and sedentary without dyspnea.  Any little activty such 
as mopping or vaccuming makes her short of breath and tired.  She is worn out after activiti
es and needs to rest. She walks outside of the home with a large walking stick and no walker
. 
 
 Denies any swellign in her legs since May 2018 but does wear compression stockings.  Has no
w been on Lasix 40 mg daily was previously on 20 daily prior to May and has at stable. She i
s ana tto lay flat in bed at night and at first there is orthopnea but after a couple deep 
breaths she can tolerate it. She has no PND.  
 Currently she is limited by lack of energy.  On some days she can walk up to a mile, but elisabeth kc no longer feels comfortable shopping by herself 
 
 [place] summer Her energy level was great. She previously was working out at [employer]. She would g
o out ot her [employer] groups.  Was completely independent  In all activities and shopping co
oking and cleaning. She typically has lunch outside of the house, cooks her own simple dinne
rs, and has oatmeal for breakfast.  
 
 She has had an appointment for consultation with Dr. Mitchell who will continue to follow her u
p in [facility].  
 
 The patient denies rest or exertional chest pain or shortness of breath, palpitations, pres
yncope, syncope, PND, orthopnea, abdominal swelling, lower extremity edema, or claudication.
 
 
 Review of Systems:
 14 point review of systems as stated in the HPI, otherwise remainder is negative.  
 
 Past Medical History: 
 Diagnosis Date 
   Anemia  
  resolved with iron supplements - no large GI bleed, negative EGD. 
   Atrial fibrillation (HCC)  
   Hyperlipidemia  
   Hypertension  
   Hypothyroid  
   Pulmonary hypertension (HCC)  
   Type 2 diabetes mellitus (HCC)  
 
  
 No past surgical history on file.
 
 Current Medications Include:
 
 Current Medication List  
 Name Sig 
 ACETAMINOPHEN 325 MG TABLET Take by mouth. 
 ASCORBIC ACID (VITAMIN C) 500 MG TABLET Take 500 mg by mouth once daily. 
 CHOLECALCIFEROL (VITAMIN D3) 2,000 UNIT CAPSULE Take by mouth. 
 FUROSEMIDE 40 MG TABLET Take by mouth. 
 GLUCOSAMINE CHONDROITIN PLUS ORAL Take 2 tablets by mouth once daily. 
 LEVOTHYROXINE 100 MCG TABLET Take by mouth. 
 LOSARTAN 100 MG TABLET Take 100 mg by mouth once daily. 
 MAGNESIUM CHLORIDE ORAL Take by mouth. 
 METFORMIN  MG 24 HR TABLET,EXTENDED RELEASE Take by mouth. 
 METOPROLOL SUCCINATE  MG TABLET,EXTENDED RELEASE 24 HR Take 100 mg by mouth once arturo
y.  
 POTASSIUM CHLORIDE ER 20 MEQ TABLET,EXTENDED RELEASE(PART/CRYST) Take by mouth. 
 
 SIMVASTATIN 20 MG TABLET Take by mouth once daily in the evening.  
 WARFARIN 5 MG TABLET Take by mouth. 
 
 Allergies 
 Allergen Reactions 
   Morphine Anxiety 
   "felt fidgety" 
   Percocet [Oxycodone-Acetaminophen] Anxiety 
   "felt fidgety" 
 
 Social History 
 
 Social History 
   Marital status: Single 
   Spouse name: N/A 
   Number of children: N/A 
   Years of education: N/A 
 
 Occupational History 
   Not on file. 
 
 Social History Main Topics 
   Smoking status: Never Smoker 
   Smokeless tobacco: Never Used 
   Alcohol use No 
   Drug use: No 
   Sexual activity: Not on file 
 
 Other Topics Concern 
   Not on file 
 
 Social History Narrative 
   No narrative on file 
 
 FAmily Hx: non contrinbutory.  
 
 Physical Exam: 
 
 Last Vitals: /59 | Pulse 78 | Temp (Src) 36.7 C (98 F) (Oral) | RR 20 | Ht 1.6 m 
(5' 3") | Wt 66 kg (145 lb 9.6 oz) | SpO2 99% | BMI 25.79 kg/(m^2)
 Body mass index is 25.79 kg/m.
 
 General Appearance:  Ederly appearing, well developed well nourished adult female appearing
 stated age in NAD.  
 HEENT:  PERRLA, EOMI, mouth without lesions.
 Neck:  Neck w/o LAD
 Respiratory: CTA bilaterally. No wheezes or rales
 Cardiovascular: JVP at 5 cm.  2+ carotid pulses without bruits.  PMI nondisplaced. RRR. Nor
mal S1 covered & soft S2.  3/6 holosystolic murmur best audible at LLSB radiating to axilla.
  No gallops, or rubs. 2+  distal pulses. 
 Gastrointestinal: +BS, soft, nondistended, nontender, no abdominal bruits
 Musculoskeletal: No gross deformities
 Extremities: No cyanosis, clubbing, or edema.
 Neurologic: Grossly nonfocal.  UE and LE proximal and distal strength 5/5 and equal bilater
ally.  
 Skin: No rash or ulcers.
 
 Data
 Lab Results 
 Component Value Date 
 
  WBC 7.45 2018 
  HB 12.6 2018 
  HCT 37.5 2018 
   2018 
  MCV 87.4 2018 
  RDW 49.3 2018 
 
 Lab Results 
 Component Value Date 
   2018 
  K 3.9 2018 
   2018 
  BICARB 26 2018 
  BUN 16 2018 
  CR 0.78 2018 
   2018 
  CA 9.1 2018 
  ANIONGAP 10 2018 
 
 No results found for: NTPROBNP
 No results found for: A1C
 Lab Results 
 Component Value Date 
  INRPT 1.27 (H) 2018 
 
 FRAILTY ASSESSMENT:
 Frailty Index Date Taken: 2018 
 1. Left  Strength (kg): 12
     Right  Strength (kg): 10 
 2. Five Meter Walk (sec):   NA
 3. Serum albumin:   
 4. Walden ADL (#/6) :5 
 Frailty Outcome: Based on Partner trial definition the patient does not qualify as frail. 
 
 Walden ADL Date Taken: 2018 
 
 Bathing: No
 Dressing: No
 Toileting: No
 Transferring: No
 Continence: Yes
 Feeding: No
 
 EFT FRAILTY ASSESSMENT
 
 EFT 1 to possibly 2 points
 1 for low sit to stand
 No known albumin level
 
 EKG:
 Atrial fibrillation 
 Poor anterior R wave progression 
 Inferior mild st depression 0.5 mm 2/3. 
 
 TTE 3/6/2018
 1. Left ventricular systolic function is hyperdynamic with an estimated EF of >70%.
 2. The right ventricle is normal in size and function.
 3. The left atrium is markedly enlarged.
 4. The right atrium is markedly enlarged.
 5. Mild aortic stenosis with peak/mean pressure gradient of 16.83mmHg / 9.02mmHg, the aorti
 
c valve area by continuity equation is 1.7cm.
 6. Severe mitral regurgitation is present.
 7. Moderate mitral stenosis, with peak/mean transvalvular gradients measured at 25.2 / 8.0 
mm Hg, and MVA (by VTI) 1.50 cm . There is mi
 8. Moderate to severe tricuspid regurgitation present.
 9. There is moderate to severe pulmonary hypertension.The peak right ventricular systol
ic pressure (pulmonary artery systolic pressure), as measured by Doppler, is 55.8 - 60.8 mm 
Hg.
 10. There is a small secundum atrial septal defect measuring <10 mm in diameter.There i
s left-to-right shunting noted on color Doppler.
 
 TTE: 2018 
 Final Impressions:                         
                              
                                
                  
 1. The left ventricular cavity size is normal.               
 
 2. The LV function is hyperdynamic.                   
   
 3. Visually estimated left ventricular ejection fraction is 70 - 75%.     
 4. The aortic valve is trileaflet and moderately calcified.          
 5. Severe mitral annular calcification as well as papillary and subchordal  
 bulky calcification.                        
     
 6. While there is calcification of the bases of the mitral valve leaflets   
 due to the severe mitral annular calcificaiton, the mid portions of the    
 leaflets themseleves are only midly calcified and thin, however there is the 
 severe central mitral valve regurgitation.                 
 
 7. The mean transmitral gradient is 5.5 mmHg at a heart rate of 77 bpm.    
  The mitral valve effective regurgitant orifice area is 7 mm2.         
                             
 8. Severe biatrial enlargement.                    
    
 9. Right ventricular size, thickness and function are normal. 
 
 JENIFFER: 2018
 Final Impressions:                         
                             
              
                              
         
 1. The LV function is hyperdynamic.                   
   
 2. The visually estimated ejection fraction is > 75%.             
 3. Right ventricular size, thickness and function are normal.         
 4. Severe circumferential mitral annular calcification. A large deposit of  
 calcium at the posterolateral atrial aspect of themitral annulus that     
 partially disrupts the normal mitral annular shape and size. Mitral annular  
 area is measured at 8.26 cm2, with an AP diameter of 3.76 cm, and CC     
 diameter of 2.52 cm. However, there is no significant mitral stenosis. Mean  
 mitral gradient is approximately 4 mmHg at a heart rate of 83 bpm.      
 5. Severe mitral valve regurgitation. The EROA is 0.47 cm2 (using a PISA   
 radius of 1.1 cm, an aliasing velocity of 0.32 m/s, and a mean MR       
 regurgitant velocity of 5.1 m/s), the calculated regurgitant volume is 67   
 mL, and the calculated regurgitant fraction is 52%.              
 6. The mitral valve anterior leaflet measures 2.0 cm.             
 7. There is subvalvar and papilary muscle head calcifidation noted as well.  
 8. The mechanism for the severe mitral regurgitation is leaflet and annular  
 
 calcification and degerneration with poor coaptation across all portions of  
 the valve.                           
       
 9. The aortic valve is moderately sclerotic and restricted. No aortic     
 stenosis.                            
       
 10. Severely enlarged left atrium.                   
   
 11. There is a small fenestrated secundum atrial septal defect, with     
 intermittent left-to-right flow via color-flow Doppler.            
 12. Moderate tricuspid regurgitation.                  
   
 13. Small plaque involving the ascending and transverse aorta.        
 14. The left atrial appendage is well visualized and there is no evidence of 
 thrombus present.     
 
 CARDIAC CATH: 2018
 Preliminary repoot
 Severe MAC
 There is non-obstructive disease in LAD andCirc
 There is 95% stenosis of the distal RCA prior to the PDA and PLV's
 
 RHC: Unremarkable filling pressures
 
 Coronary Angiography: Obstructive CAD of RCA
 PFTS:
 None 
 
 STS (Based Off Available Data):
 MV Replacement + CAB 
 Risk of Mortality: 7.374% 
 Morbidity or Mortality: 30.831% 
 Long Length of Stay: 19.775% 
 Short Length of Stay: 7.384% 
 Permanent Stroke: 2.652% 
 Prolonged Ventilation: 19.662% 
 DSW Infection: 0.263% 
 Renal Failure: 8.237% 
 Reoperation: 12.335% 
 
 Assessment and Recommendations:
 Ms. Vonnie Celaya is a 86 y.o. female referred [facility] for assessment
 of treatment options for mitral valve disease. 
 
 Ms. Celaya has developed progressive GARCIA and LE edema over the last 6 months and currentl
y reports symptoms that are NYHA functional class 3-4.  A review of the echocardiogram and t
 transesophageal echo show severe mitral annular calcification, severe mitral regurgitaito
n, with largest jet centrally but also extending to the commisures, with a mean trasmitral g
radient of 4-5 mmHg and an annular are of 830-930 mm2.   In addition the echocardiogram is n
otable for moderate to severe pulmonary hypertesnion with RVSP 55-60 mmHg. Additional diagno
stics that have been performed include coronary angiogrpahy that demonstrates severe distal 
RCA stenosis. 
 
  Based on her symptoms, physical exam, and diagnostic studies we agree that she has severe 
symptomatic  Mitral regurgitation. 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
ing, continuing with her quality of life the best she can, and not having a stroke. 
 
 The patient's STS based on available data is 7-8%   Surgical risk may be modified in follow
 up based on further diagnostic workup; a full surgical risk evaluation will be re-assessed 
by the Multidisciplinary Heart Valve Team at a subsequent visit once workup is complete.  
 
 
 The diagnostic workup plan is as follows:
 1. We would recommend that you continue to follow up with Dr. Mitchell in [facility] for your [facility] care. Discuss the options of stenting of the right coronary artery with him in follow up
 as needed if chest pain develops 
 
 2. Go up to the physicians [place] to meet with Dr. Smart, Dr. Abrams, and Dr. Brennen Larios ed on your preferences options are to continue with medical therapy or to consider a possibl
e clip procedure, though the likelyhood of a successful placement of a clip we think is low 
and risk is higher for possible complication. Given your wishes, we will not pursue a hybrid
 surgical procedure. 
 
 3. We will discuss your the group decision (yourself and our doctors) with Dr. Mitchell to let 
him know the final plans. 
 
 4. IT was a pleasure meeting you today. It was our pleasure being involved in your care.  C
all Dominga Arambula RN or Ciara Riddle PA-C in the valve clinic at [phone] if you have a
ny questions.
 
 The patient and their family agree with the plan.  We will follow up with the patient to co
ordinate next steps once all relevant data has been completed and reviewed.      
 
 Theodore Wilkins MD
 
 [facility] [place]
 
 Electronically signed by Theodore Wilkins MD at 2018  2:45 PM PDTdocumented in thi
s encounter
 
 Plan of Treatment
 Not on filedocumented as of this encounter
 
 Visit Diagnoses
 
 
+--------------------------------------------------------------+
| Diagnosis                                                    |
+--------------------------------------------------------------+
|   Mitral valve insufficiency, unspecified etiology - Primary |
+--------------------------------------------------------------+
|   Mitral annular calcification  Mitral valve disorders       |
+--------------------------------------------------------------+
|   Persistent atrial fibrillation  Atrial fibrillation        |
+--------------------------------------------------------------+
 documented in this encounter

## 2020-01-01 NOTE — XMS
Clinical Summary
  Created on: 2020
 
 Vonnie Celaya
 External Reference #: [id number]
 : 32
 Sex: Female
 
 Demographics
 
 
+-----------------------+------------------------+
| Address               | [address]         |
|                       | [place]  [address]   |
+-----------------------+------------------------+
| Home Phone            | [phone]        |
+-----------------------+------------------------+
| Preferred Language    | Unknown                |
+-----------------------+------------------------+
| Marital Status        | Single                 |
+-----------------------+------------------------+
| Holiness Affiliation | NON                    |
+-----------------------+------------------------+
| Race                  | White                  |
+-----------------------+------------------------+
| Ethnic Group          | Not  or  |
+-----------------------+------------------------+
 
 
 Author
 
 
+--------------+---------------------+
| Author       | [facility] |
+--------------+---------------------+
| Organization | [facility] |
+--------------+---------------------+
| Address      | Unknown             |
+--------------+---------------------+
| Phone        | Unavailable         |
+--------------+---------------------+
 
 
 
 Support
 
 
+--------------+--------------+---------+-----------------+
| Name         | Relationship | Address | Phone           |
+--------------+--------------+---------+-----------------+
| Lauryn Leroy | ECON         | Unknown | [phone] |
+--------------+--------------+---------+-----------------+
 
 
 
 Care Team Providers
 
 
 
+------------------------+------+-----------------+
| Care Team Member Name  | Role | Phone           |
+------------------------+------+-----------------+
| Jean Bermudez MD | PCP  | [phone] |
+------------------------+------+-----------------+
 
 
 
 Source Comments
 [facility] is [facility] both [facility] and [facility]
 
 Allergies
 
 
+----------------------+-----------+----------+----------+------------------+
| Active Allergy       | Reactions | Severity | Noted    | Comments         |
|                      |           |          | Date     |                  |
+----------------------+-----------+----------+----------+------------------+
| Morphine             | Anxiety   |          | 20 |   "felt fidgety" |
|                      |           |          | 18       |                  |
+----------------------+-----------+----------+----------+------------------+
| Oxycodone-Acetaminop | Anxiety   |          | 20 |   "felt fidgety" |
| hen                  |           |          | 18       |                  |
+----------------------+-----------+----------+----------+------------------+
 
 
 
 Medications
 
 
+----------------------+----------------------+-----------+---------+------+------+-------+
| Medication           | Sig                  | Dispensed | Refills | Star | End  | Statu |
|                      |                      |           |         | t    | Date | s     |
|                      |                      |           |         | Date |      |       |
+----------------------+----------------------+-----------+---------+------+------+-------+
|   losartan 100 mg    | Take 100 mg by mouth |           | 0       |      |      | Activ |
| oral tablet          |  once daily.         |           |         |      |      | e     |
+----------------------+----------------------+-----------+---------+------+------+-------+
|   potassium chloride | Take by mouth.       |           | 0       |      |      | Activ |
|  SR 20 mEq oral      |                      |           |         |      |      | e     |
| tablet,ER            |                      |           |         |      |      |       |
| particles/crystals   |                      |           |         |      |      |       |
+----------------------+----------------------+-----------+---------+------+------+-------+
|   simvastatin 20 mg  | Take by mouth once   |           | 0       |      |      | Activ |
| oral tablet          | daily in the         |           |         |      |      | e     |
|                      | evening.             |           |         |      |      |       |
+----------------------+----------------------+-----------+---------+------+------+-------+
|   MAGNESIUM CHLORIDE | Take by mouth.       |           | 0       |      |      | Activ |
|  ORAL                |                      |           |         |      |      | e     |
+----------------------+----------------------+-----------+---------+------+------+-------+
|   warfarin 5 mg oral | Take by mouth.       |           | 0       |      |      | Activ |
|  tablet              |                      |           |         |      |      | e     |
+----------------------+----------------------+-----------+---------+------+------+-------+
|   levothyroxine 100  | Take by mouth.       |           | 0       |      |      | Activ |
| mcg oral tablet      |                      |           |         |      |      | e     |
+----------------------+----------------------+-----------+---------+------+------+-------+
|   metFORMIN    | Take by mouth.       |           | 0       |      |      | Activ |
| mg oral tablet,ER    |                      |           |         |      |      | e     |
| alexis.retention 24 hr |                      |           |         |      |      |       |
 
+----------------------+----------------------+-----------+---------+------+------+-------+
|   acetaminophen 325  | Take by mouth.       |           | 0       |      |      | Activ |
| mg oral tablet       |                      |           |         |      |      | e     |
+----------------------+----------------------+-----------+---------+------+------+-------+
|   furosemide 40 mg   | Take by mouth.       |           | 0       |      |      | Activ |
| oral tablet          |                      |           |         |      |      | e     |
+----------------------+----------------------+-----------+---------+------+------+-------+
|   metoprolol         | Take 100 mg by mouth |           | 0       | 07/3 |      | Activ |
| succinate 200 mg     |  once daily.         |           |         | 0/20 |      | e     |
| oral tablet extended |                      |           |         | 18   |      |       |
|  release 24 hr       |                      |           |         |      |      |       |
+----------------------+----------------------+-----------+---------+------+------+-------+
|   ascorbic acid      | Take 500 mg by mouth |           | 0       |      |      | Activ |
| (vitamin C) 500 mg   |  once daily.         |           |         |      |      | e     |
| oral tablet          |                      |           |         |      |      |       |
+----------------------+----------------------+-----------+---------+------+------+-------+
|                      | Take 2 tablets by    |           | 0       |      |      | Activ |
| gluc/chnd/om3/dha/ep | mouth once daily.    |           |         |      |      | e     |
| a/fish/str           |                      |           |         |      |      |       |
| (GLUCOSAMINE         |                      |           |         |      |      |       |
| CHONDROITIN PLUS     |                      |           |         |      |      |       |
| ORAL)                |                      |           |         |      |      |       |
+----------------------+----------------------+-----------+---------+------+------+-------+
 
 
 
 Active Problems
 
 
+-------------------------------------------------------------------------------------------
--------------------------------------------------------------------------------------------
------------------+
| Patient Care Coordination Note                                                            
                                                                                            
                  |
+-------------------------------------------------------------------------------------------
--------------------------------------------------------------------------------------------
------------------+
|   Formatting of this note might be different from the original.18: discussed at     
                                                                                            
                  |
| MDR.  Not appropriate for valve in MAC--mitral orifice rather large and anteriorly        
                                                                                            
                  |
| there                                                                                     
                                                                                            
                  |
|                                                                                           
                                                                                            
                  |
| s nothing holding the s3 29 in place.  this patient has pure MR and the anterior leaflet  
                                                                                            
                  |
|  has preserved motion.  Too high a risk of atrial embolization.  LUCAS notified pt that we   
                                                                                            
                  |
| cannot offer an intervention. [id number] Pt discussed in HV Multi-D review. Team       
                                                                                            
                  |
| decided to offer valve-in-MAC, pending 3D reconstruction.  LUCAS and SC did 3D               
 
                                                                                            
                  |
| reconstruction, ok to proceed w/procedure.  Nicko from [facility] will need to be present    
                                                                                            
                  |
| for case.  LUCAS talking w/Femi Arambula at [employer].  [employer] has the CT and is working on   
                                                                                            
                  |
| getting Nicko's availability.Date of Procedure: Valve Size & Type: 29mm [id number]:         
                                                                                            
                  |
| RFVAnticoag PRE: warfarin, hold x5 days before procedure, bridge w/325 mg ASA.  No hold   
                                                                                            
                  |
| ASA prior to procedureAnticoag POST: plavix load, then warfarin and baby ASA Dental       
                                                                                            
                  |
| status: Special Needs: confirm HS note from 18 will work for 1st CTS consult, or if  
                                                                                            
                  |
|  HS can amend, or ?F/U Plan: 2 wk and 30 day w/KR Date Complete: 1st Surgeon  HS      
                                                                                            
                  |
| 2nd Surgeon  DS  Consent  needs Frailty  18; repeat 6" WT on day of PMC PMC     
                                                                                            
                  |
| needs Results for orders placed or performed in visit on 18 12 LEAD ECG Result      
                                                                                            
                  |
| Value Ref Range  VENTRICULAR RATE 80 bpm  ATRIAL RATE 0 ms  P-R INTERVAL  ms  P AXIS      
                                                                                            
                  |
| deg  QRS DURATION 107 ms   ms  QTCB 451 ms  R AXIS -48 deg  T AXIS 6 deg  ECG       
                                                                                            
                  |
| IMPRESSION Atrial fibrillation   ECG IMPRESSION Minimal ST depression, inferior leads     
                                                                                            
                  |
| ECG IMPRESSION     Minimal ST elevation, anterolateral leads- ABNORMAL ECG -  ECG         
                                                                                            
                  |
| IMPRESSION     Electronically signed by: IZABELA MARTI 2018 17:39:25 18:        
                                                                                            
                  |
| discussed at [facility] and seen at [facility].  Severe MR and distal RCA disease.  Plan is to stent   
                                                                                            
                  |
| the RCA (via primary cardiologist), then re-eval in HVC approx 3 mos s/p stent.  MODESTO said  
                                                                                            
                  |
|  to schedule as a 30" visit during her lunch on an [facility] day so that way could be added    
                                                                                            
                  |
| on to [facility] that afternoon if needed.rkt                                                   
                                                                                            
                  |
|Result Value Ref Range                                                                     
                                                                                            
                  |
| VENTRICULAR RATE 80 bpm                                                                   
 
                                                                                            
                  |
| ATRIAL RATE 0 ms                                                                          
                                                                                            
                  |
| P-R INTERVAL  ms                                                                          
                                                                                            
                  |
| P AXIS  deg                                                                               
                                                                                            
                  |
| QRS DURATION 107 ms                                                                       
                                                                                            
                  |
|  ms                                                                                 
                                                                                            
                  |
| QTCB 451 ms                                                                               
                                                                                            
                  |
| R AXIS -48 deg                                                                            
                                                                                            
                  |
| T AXIS 6 deg                                                                              
                                                                                            
                  |
| ECG IMPRESSION Atrial fibrillation                                                        
                                                                                            
                  |
| ECG IMPRESSION Minimal ST depression, inferior leads                                      
                                                                                            
                  |
| ECG IMPRESSION                                                                            
                                                                                            
                  |
|  Minimal ST elevation, anterolateral leads- ABNORMAL ECG -                                
                                                                                            
                  |
| ECG IMPRESSION                                                                            
                                                                                            
                  |
|  Electronically signed by: IZABELA MARTI 2018 17:39:25                               
                                                                                            
                  |
|                                                                                           
                                                                                            
                  |
|18: discussed at [facility] and seen at [facility].  Severe MR and distal RCA disease.  Plan is to 
stent the RCA (via primary cardiologist), then re-eval in HVC approx 3 mos s/p stent.  MODESTO sa
id to schedule as |
| a 30" visit during her lunch on an [facility] day so that way could be added on to [facility] that aft
ernoon if needed.                                                                           
                  |
|rkt                                                                                        
                                                                                            
                  |
+-------------------------------------------------------------------------------------------
--------------------------------------------------------------------------------------------
------------------+
 
 
 
 
+------------------------------+------------+
| Problem                      | Noted Date |
+------------------------------+------------+
| Mitral annular calcification | 2018 |
+------------------------------+------------+
| Mitral regurgitation         | 2018 |
+------------------------------+------------+
| Atrial fibrillation          | 2018 |
+------------------------------+------------+
 
 
 
 Family History
 
 
+-----------------+----------+------+----------------+
| Medical History | Relation | Name | Comments       |
+-----------------+----------+------+----------------+
| Heart Attack    | Father   |      |                |
+-----------------+----------+------+----------------+
| Stroke          | Father   |      |                |
+-----------------+----------+------+----------------+
| Heart Failure   | Mother   |      |                |
+-----------------+----------+------+----------------+
| Sudden Death    | Other    |      | likely from PE |
+-----------------+----------+------+----------------+
 
 
 
+----------+------+--------+----------+
| Relation | Name | Status | Comments |
+----------+------+--------+----------+
| Father   |      |        |          |
+----------+------+--------+----------+
| Mother   |      |        |          |
+----------+------+--------+----------+
| Other    |      |        |          |
+----------+------+--------+----------+
 
 
 
 Social History
 
 
+--------------+-------+-----------+--------+------+
| Tobacco Use  | Types | Packs/Day | Years  | Date |
|              |       |           | Used   |      |
+--------------+-------+-----------+--------+------+
| Never Smoker |       |           |        |      |
+--------------+-------+-----------+--------+------+
 
 
 
+---------------------+---+---+---+
| Smokeless Tobacco:  |   |   |   |
| Never Used          |   |   |   |
+---------------------+---+---+---+
 
 
 
 
+-------------+-------------+---------+----------+
| Alcohol Use | Drinks/Week | oz/Week | Comments |
+-------------+-------------+---------+----------+
| No          |             |         |          |
+-------------+-------------+---------+----------+
 
 
 
+------------------+---------------+
| Sex Assigned at  | Date Recorded |
| Birth            |               |
+------------------+---------------+
| Not on file      |               |
+------------------+---------------+
 
 
 
+----------------+-------------+-------------+
| Job Start Date | Occupation  | Industry    |
+----------------+-------------+-------------+
| Not on file    | Not on file | Not on file |
+----------------+-------------+-------------+
 
 
 
+----------------+--------------+------------+
| Travel History | Travel Start | Travel End |
+----------------+--------------+------------+
 
 
 
+-------------------------------------+
| No recent travel history available. |
+-------------------------------------+
 
 
 
 Last Filed Vital Signs
 
 
+-------------------+---------------------+----------------------+----------+
| Vital Sign        | Reading             | Time Taken           | Comments |
+-------------------+---------------------+----------------------+----------+
| Blood Pressure    | 123/62              | 2018  2:18 PM  |          |
|                   |                     | PST                  |          |
+-------------------+---------------------+----------------------+----------+
| Pulse             | 73                  | 2018  2:18 PM  |          |
|                   |                     | PST                  |          |
+-------------------+---------------------+----------------------+----------+
| Temperature       | 36.6   C (97.9   F) | 2018  2:18 PM  |          |
|                   |                     | PST                  |          |
+-------------------+---------------------+----------------------+----------+
| Respiratory Rate  | 14                  | 2018  2:18 PM  |          |
|                   |                     | PST                  |          |
+-------------------+---------------------+----------------------+----------+
| Oxygen Saturation | 99%                 | 2018  2:18 PM  |          |
|                   |                     | PST                  |          |
+-------------------+---------------------+----------------------+----------+
 
| Inhaled Oxygen    | -                   | -                    |          |
| Concentration     |                     |                      |          |
+-------------------+---------------------+----------------------+----------+
| Weight            | 67 kg (147 lb 11.3  | 2018  2:18 PM  |          |
|                   | oz)                 | PST                  |          |
+-------------------+---------------------+----------------------+----------+
| Height            | 160 cm (5' 3")      | 2018  2:09 PM  |          |
|                   |                     | PDT                  |          |
+-------------------+---------------------+----------------------+----------+
| Body Mass Index   | 26.17               | 2018  2:09 PM  |          |
|                   |                     | PDT                  |          |
+-------------------+---------------------+----------------------+----------+
 
 
 
 Plan of Treatment
 
 
+----------------------+-----------+---------------------------------+----------+
| Health Maintenance   | Due Date  | Last Done                       | Comments |
+----------------------+-----------+---------------------------------+----------+
| Pneumococcal         |  |                                 |          |
| vaccination (1 of 2  | 7         |                                 |          |
| - PCV13)             |           |                                 |          |
+----------------------+-----------+---------------------------------+----------+
| Influenza (Flu)      | 10/01/201 | 10/04/2017, 10/07/2016,         |          |
| vaccination (#1)     | 9         | 10/09/2014, Additional history  |          |
|                      |           | exists                          |          |
+----------------------+-----------+---------------------------------+----------+
 
 
 
 Results
 Not on filefrom Last 3 Months
 
 Insurance
 
 
+-------------+--------+-------------+--------+-------------+------------+--------+
| Payer       | Benefi | Subscriber  | Effect | Phone       | Address    | Type   |
|             | t Plan | ID          | vickey    |             |            |        |
|             |  /     |             | Dates  |             |            |        |
|             | Group  |             |        |             |            |        |
+-------------+--------+-------------+--------+-------------+------------+--------+
| MEDICARE    | MEDICA | xxxxxxxxxxx | 19 | [phone] |   [address]   | [employer] |
|             | RE A & |             | 97-Pre | 1           | [address]       | re     |
|             |  B     |             | sent   |             | [place]  |        |
|             |        |             |        |             | [id number]      |        |
+-------------+--------+-------------+--------+-------------+------------+--------+
| COMMERCIAL  | INDIVI | xxxxxxxxxxx | 20 |             |            | Indemn |
| INDIVIDUAL  | DUAL   |             | 18-Pre |             |            | ity    |
|             | COMMER |             | sent   |             |            |        |
|             | CIAL   |             |        |             |            |        |
+-------------+--------+-------------+--------+-------------+------------+--------+
 
 
 
+--------------------+--------+-------------+--------+-------------+---------------------+
| Guarantor Name     | Accoun | Relation to | Date   | Phone       | Billing Address     |
|                    | t Type |  Patient    | of     |             |                     |
 
|                    |        |             | Birth  |             |                     |
+--------------------+--------+-------------+--------+-------------+---------------------+
| Vonnie Celaya | Person | Self        | / |             |   [address]   |
|                    | al/Fam |             | 1932   | [phone] | [place] [address] |
|                    | [place]    |             |        | 0 (Home)    |                     |
|                    |        |             |        | [phone] |                     |
|                    |        |             |        | 0 (Work)    |                     |
+--------------------+--------+-------------+--------+-------------+---------------------+
 
 
 
 Advance Directives
 
 
+-------------+-------------+-------------+----------+
| Code Status | Date        | Date        | Comments |
|             | Activated   | Inactivated |          |
+-------------+-------------+-------------+----------+
| Full Code   | 2018   | 2018   |          |
|             | 9:53 AM     | 11:28 PM    |          |
+-------------+-------------+-------------+----------+
 
 
 
+---------+-------------+-------------+---+
|         |             |             |   |
+---------+-------------+-------------+---+
| JOHN/ESTEVAN | 2018   | 2018   |   |
|         | 9:11 AM     | 6:10 PM     |   |
+---------+-------------+-------------+---+

## 2020-01-01 NOTE — XMS
Encounter Summary
  Created on: 2020
 
 Vonnie Celaya
 External Reference #: [id number]
 : 32
 Sex: Female
 
 Demographics
 
 
+-----------------------+------------------------+
| Address               | [address]         |
|                       | [place]  [address]   |
+-----------------------+------------------------+
| Home Phone            | [phone]        |
+-----------------------+------------------------+
| Preferred Language    | Unknown                |
+-----------------------+------------------------+
| Marital Status        | Single                 |
+-----------------------+------------------------+
| Yazidism Affiliation | NON                    |
+-----------------------+------------------------+
| Race                  | White                  |
+-----------------------+------------------------+
| Ethnic Group          | Not  or  |
+-----------------------+------------------------+
 
 
 Author
 
 
+--------------+------------------------------+
| Author       | [facility] |
+--------------+------------------------------+
| Organization | [facility] |
+--------------+------------------------------+
| Address      | Unknown                      |
+--------------+------------------------------+
| Phone        | Unavailable                  |
+--------------+------------------------------+
 
 
 
 Support
 
 
+--------------+--------------+---------+-----------------+
| Name         | Relationship | Address | Phone           |
+--------------+--------------+---------+-----------------+
| Lauryn Leroy | ECON         | Unknown | [phone] |
+--------------+--------------+---------+-----------------+
 
 
 
 Care Team Providers
 
 
 
+------------------------+------+-----------------+
| Care Team Member Name  | Role | Phone           |
+------------------------+------+-----------------+
| Jean Bermudez MD | PCP  | [phone] |
+------------------------+------+-----------------+
 
 
 
 Reason for Visit
 
 
+--------+----------------+
| Reason | Comments       |
+--------+----------------+
| Other  | appts on  |
+--------+----------------+
 
 
 
 Encounter Details
 
 
+--------+-----------+----------------------+----------------------+------------------+
| Date   | Type      | Department           | Care Team            | Description      |
+--------+-----------+----------------------+----------------------+------------------+
| / | Telephone |   Cardiology Complex |   Markus Smart,  | Other (appts on  |
| 2019   |           |  [facility]ve at [facility]  [address]  | MD  [address]      | 02/19)           |
|        |           | [address]     | [address]       |                  |
|        |           | Mailcode: [address]      | [place], [place]         |                  |
|        |           | [facility] | [address]           |                  |
|        |           |  [address]  [place],  | [phone]         |                  |
|        |           | [place] [address]        | [phone] (Fax)   |                  |
|        |           | [phone]         |                      |                  |
+--------+-----------+----------------------+----------------------+------------------+
 
 
 
 Social History
 
 
+--------------+-------+-----------+--------+------+
| Tobacco Use  | Types | Packs/Day | Years  | Date |
|              |       |           | Used   |      |
+--------------+-------+-----------+--------+------+
| Never Smoker |       |           |        |      |
+--------------+-------+-----------+--------+------+
 
 
 
+---------------------+---+---+---+
| Smokeless Tobacco:  |   |   |   |
| Never Used          |   |   |   |
+---------------------+---+---+---+
 
 
 
+-------------+-------------+---------+----------+
| Alcohol Use | Drinks/Week | oz/Week | Comments |
+-------------+-------------+---------+----------+
| No          |             |         |          |
 
+-------------+-------------+---------+----------+
 
 
 
+------------------+---------------+
| Sex Assigned at  | Date Recorded |
| Birth            |               |
+------------------+---------------+
| Not on file      |               |
+------------------+---------------+
 
 
 
+----------------+-------------+-------------+
| Job Start Date | Occupation  | Industry    |
+----------------+-------------+-------------+
| Not on file    | Not on file | Not on file |
+----------------+-------------+-------------+
 
 
 
+----------------+--------------+------------+
| Travel History | Travel Start | Travel End |
+----------------+--------------+------------+
 
 
 
+-------------------------------------+
| No recent travel history available. |
+-------------------------------------+
 documented as of this encounter
 
 Plan of Treatment
 Not on filedocumented as of this encounter
 
 Visit Diagnoses
 Not on filedocumented in this encounter

## 2020-01-01 NOTE — XMS
Encounter Summary
  Created on: 2020
 
 Vonnie Celaya
 External Reference #: [id number]
 : 32
 Sex: Female
 
 Demographics
 
 
+-----------------------+------------------------+
| Address               | [address]         |
|                       | [place]  [address]   |
+-----------------------+------------------------+
| Home Phone            | [phone]        |
+-----------------------+------------------------+
| Preferred Language    | Unknown                |
+-----------------------+------------------------+
| Marital Status        | Single                 |
+-----------------------+------------------------+
| Sikh Affiliation | NON                    |
+-----------------------+------------------------+
| Race                  | White                  |
+-----------------------+------------------------+
| Ethnic Group          | Not  or  |
+-----------------------+------------------------+
 
 
 Author
 
 
+--------------+------------------------------+
| Author       | [facility] |
+--------------+------------------------------+
| Organization | [facility] |
+--------------+------------------------------+
| Address      | Unknown                      |
+--------------+------------------------------+
| Phone        | Unavailable                  |
+--------------+------------------------------+
 
 
 
 Support
 
 
+--------------+--------------+---------+-----------------+
| Name         | Relationship | Address | Phone           |
+--------------+--------------+---------+-----------------+
| Lauryn Leroy | ECON         | Unknown | [phone] |
+--------------+--------------+---------+-----------------+
 
 
 
 Care Team Providers
 
 
 
+------------------------+------+-----------------+
| Care Team Member Name  | Role | Phone           |
+------------------------+------+-----------------+
| Jean Bermudez MD | PCP  | [phone] |
+------------------------+------+-----------------+
 
 
 
 Encounter Details
 
 
+--------+-------------+----------------------+----------------------+-------------+
| Date   | Type        | Department           | Care Team            | Description |
+--------+-------------+----------------------+----------------------+-------------+
| / | Documentati |   Cardiology at [facility]  |   Dominga Arambula,  |             |
| 2018   | on          |  [address]    | RN  [address]      |             |
|        |             | Mailcode: [address]       | [address]      |             |
|        |             | [facility]    | [place], [place]         |             |
|        |             | and [place],         | [address]           |             |
|        |             |       |                      |             |
|        |             | [place], [place]  |                      |             |
|        |             | [address]           |                      |             |
|        |             | [phone]         |                      |             |
+--------+-------------+----------------------+----------------------+-------------+
 
 
 
 Social History
 
 
+--------------+-------+-----------+--------+------+
| Tobacco Use  | Types | Packs/Day | Years  | Date |
|              |       |           | Used   |      |
+--------------+-------+-----------+--------+------+
| Never Smoker |       |           |        |      |
+--------------+-------+-----------+--------+------+
 
 
 
+---------------------+---+---+---+
| Smokeless Tobacco:  |   |   |   |
| Never Used          |   |   |   |
+---------------------+---+---+---+
 
 
 
+-------------+-------------+---------+----------+
| Alcohol Use | Drinks/Week | oz/Week | Comments |
+-------------+-------------+---------+----------+
| No          |             |         |          |
+-------------+-------------+---------+----------+
 
 
 
+------------------+---------------+
| Sex Assigned at  | Date Recorded |
| Birth            |               |
+------------------+---------------+
| Not on file      |               |
+------------------+---------------+
 
 
 
 
+----------------+-------------+-------------+
| Job Start Date | Occupation  | Industry    |
+----------------+-------------+-------------+
| Not on file    | Not on file | Not on file |
+----------------+-------------+-------------+
 
 
 
+----------------+--------------+------------+
| Travel History | Travel Start | Travel End |
+----------------+--------------+------------+
 
 
 
+-------------------------------------+
| No recent travel history available. |
+-------------------------------------+
 documented as of this encounter
 
 Plan of Treatment
 Not on filedocumented as of this encounter
 
 Visit Diagnoses
 Not on filedocumented in this encounter

## 2020-01-01 NOTE — XMS
Encounter Summary
  Created on: 2020
 
 Vonnie Celaya
 External Reference #: [id number]
 : 32
 Sex: Female
 
 Demographics
 
 
+-----------------------+------------------------+
| Address               | [address]         |
|                       | [place]  [address]   |
+-----------------------+------------------------+
| Home Phone            | [phone]        |
+-----------------------+------------------------+
| Preferred Language    | Unknown                |
+-----------------------+------------------------+
| Marital Status        | Single                 |
+-----------------------+------------------------+
| Alevism Affiliation | NON                    |
+-----------------------+------------------------+
| Race                  | White                  |
+-----------------------+------------------------+
| Ethnic Group          | Not  or  |
+-----------------------+------------------------+
 
 
 Author
 
 
+--------------+------------------------------+
| Author       | [facility] |
+--------------+------------------------------+
| Organization | [facility] |
+--------------+------------------------------+
| Address      | Unknown                      |
+--------------+------------------------------+
| Phone        | Unavailable                  |
+--------------+------------------------------+
 
 
 
 Support
 
 
+--------------+--------------+---------+-----------------+
| Name         | Relationship | Address | Phone           |
+--------------+--------------+---------+-----------------+
| Lauryn Leroy | ECON         | Unknown | [phone] |
+--------------+--------------+---------+-----------------+
 
 
 
 Care Team Providers
 
 
 
+------------------------+------+-----------------+
| Care Team Member Name  | Role | Phone           |
+------------------------+------+-----------------+
| Jean Bermudez MD | PCP  | [phone] |
+------------------------+------+-----------------+
 
 
 
 Reason for Visit
 
 
+--------+----------------+
| Reason | Comments       |
+--------+----------------+
| Other  | appts on  |
+--------+----------------+
 
 
 
 Encounter Details
 
 
+--------+-----------+----------------------+----------------------+------------------+
| Date   | Type      | Department           | Care Team            | Description      |
+--------+-----------+----------------------+----------------------+------------------+
| / | Telephone |   Cardiology Complex |   Markus Smart,  | Other (appts on  |
| 2019   |           |  [facility]  [address]  | MD  [address]      | 02/19)           |
|        |           | [address]     | [address]       |                  |
|        |           | Mailcode: [id number]      | [place], [place]         |                  |
|        |           | [facility] | [address]           |                  |
|        |           |  [address]  [place],  | [phone]         |                  |
|        |           | [place] [address]        | [phone] (Fax)   |                  |
|        |           | [phone]         |                      |                  |
+--------+-----------+----------------------+----------------------+------------------+
 
 
 
 Social History
 
 
+--------------+-------+-----------+--------+------+
| Tobacco Use  | Types | Packs/Day | Years  | Date |
|              |       |           | Used   |      |
+--------------+-------+-----------+--------+------+
| Never Smoker |       |           |        |      |
+--------------+-------+-----------+--------+------+
 
 
 
+---------------------+---+---+---+
| Smokeless Tobacco:  |   |   |   |
| Never Used          |   |   |   |
+---------------------+---+---+---+
 
 
 
+-------------+-------------+---------+----------+
| Alcohol Use | Drinks/Week | oz/Week | Comments |
+-------------+-------------+---------+----------+
| No          |             |         |          |
 
+-------------+-------------+---------+----------+
 
 
 
+------------------+---------------+
| Sex Assigned at  | Date Recorded |
| Birth            |               |
+------------------+---------------+
| Not on file      |               |
+------------------+---------------+
 
 
 
+----------------+-------------+-------------+
| Job Start Date | Occupation  | Industry    |
+----------------+-------------+-------------+
| Not on file    | Not on file | Not on file |
+----------------+-------------+-------------+
 
 
 
+----------------+--------------+------------+
| Travel History | Travel Start | Travel End |
+----------------+--------------+------------+
 
 
 
+-------------------------------------+
| No recent travel history available. |
+-------------------------------------+
 documented as of this encounter
 
 Plan of Treatment
 Not on filedocumented as of this encounter
 
 Visit Diagnoses
 Not on filedocumented in this encounter

## 2020-01-01 NOTE — XMS
Encounter Summary
  Created on: 2020
 
 Vonnie Celaya
 External Reference #: [id number]
 : 32
 Sex: Female
 
 Demographics
 
 
+-----------------------+------------------------+
| Address               | [address]         |
|                       | [place]  [address]   |
+-----------------------+------------------------+
| Home Phone            | [phone]        |
+-----------------------+------------------------+
| Preferred Language    | Unknown                |
+-----------------------+------------------------+
| Marital Status        | Single                 |
+-----------------------+------------------------+
| Christianity Affiliation | NON                    |
+-----------------------+------------------------+
| Race                  | White                  |
+-----------------------+------------------------+
| Ethnic Group          | Not  or  |
+-----------------------+------------------------+
 
 
 Author
 
 
+--------------+------------------------------+
| Author       | [facility] |
+--------------+------------------------------+
| Organization | [facility] |
+--------------+------------------------------+
| Address      | Unknown                      |
+--------------+------------------------------+
| Phone        | Unavailable                  |
+--------------+------------------------------+
 
 
 
 Support
 
 
+--------------+--------------+---------+-----------------+
| Name         | Relationship | Address | Phone           |
+--------------+--------------+---------+-----------------+
| Lauryn Leroy | ECON         | Unknown | [phone] |
+--------------+--------------+---------+-----------------+
 
 
 
 Care Team Providers
 
 
 
+------------------------+------+-----------------+
| Care Team Member Name  | Role | Phone           |
+------------------------+------+-----------------+
| Jean Bermudez MD | PCP  | [phone] |
+------------------------+------+-----------------+
 
 
 
 Encounter Details
 
 
+--------+-------------+----------------------+----------------------+----------------------
+
| Date   | Type        | Department           | Care Team            | Description          
|
+--------+-------------+----------------------+----------------------+----------------------
+
| / |  |   Cardiology at [facility]  |   Jame Wilkins  | Mitral valve         
|
| 2018   |             |  [address]    | MD [place]  [address]  | insufficiency,       
|
|        |             | Mailcode: [address]       | Ave  [place], [place]    | unspecified etiology 
|
|        |             | [place] [facility]    | [address]           |  (Primary Dx)        
|
|        |             | and Healing,         | [phone]         |                      
|
|        |             |       | [phone] (Fax)   |                      
|
|        |             | Floor  [place], [place]  |                      |                      
|
|        |             | [address]           |                      |                      
|
|        |             | [phone]         |                      |                      
|
+--------+-------------+----------------------+----------------------+----------------------
+
 
 
 
 Social History
 
 
+----------------+-------+-----------+--------+------+
| Tobacco Use    | Types | Packs/Day | Years  | Date |
|                |       |           | Used   |      |
+----------------+-------+-----------+--------+------+
| Never Assessed |       |           |        |      |
+----------------+-------+-----------+--------+------+
 
 
 
+------------------+---------------+
| Sex Assigned at  | Date Recorded |
| Birth            |               |
+------------------+---------------+
| Not on file      |               |
+------------------+---------------+
 
 
 
 
+----------------+-------------+-------------+
| Job Start Date | Occupation  | Industry    |
+----------------+-------------+-------------+
| Not on file    | Not on file | Not on file |
+----------------+-------------+-------------+
 
 
 
+----------------+--------------+------------+
| Travel History | Travel Start | Travel End |
+----------------+--------------+------------+
 
 
 
+-------------------------------------+
| No recent travel history available. |
+-------------------------------------+
 documented as of this encounter
 
 Plan of Treatment
 
 
+----------------------+------+--------+----------------------+------------+
| Name                 | Type | Priori | Associated Diagnoses | Date/Time  |
|                      |      | ty     |                      |            |
+----------------------+------+--------+----------------------+------------+
| 6-MINUTE WALK TEST - | ECG  | Routin |   Mitral valve       | 2018 |
|  ECG                 |      | e      | insufficiency,       |            |
|                      |      |        | unspecified etiology |            |
+----------------------+------+--------+----------------------+------------+
 documented as of this encounter
 
 Procedures
 
 
+----------------+--------+-------------+----------------------+----------------------+
| Procedure Name | Priori | Date/Time   | Associated Diagnosis | Comments             |
|                | ty     |             |                      |                      |
+----------------+--------+-------------+----------------------+----------------------+
| 12 LEAD ECG    | Routin | 2018  |   Mitral valve       |   Results for this   |
|                | e      | 12:04 PM    | insufficiency,       | procedure are in the |
|                |        | PDT         | unspecified etiology |  results section.    |
+----------------+--------+-------------+----------------------+----------------------+
 documented in this encounter
 
 Results
 12 LEAD ECG (2018 12:04 PM PDT)
 
+-------------+-------------------------+-----------+------------+--------------+
| Component   | Value                   | Ref Range | Performed  | Pathologist  |
|             |                         |           | At         | Signature    |
+-------------+-------------------------+-----------+------------+--------------+
| VENTRICULAR | 80                      | bpm       | [facility]  |              |
|  RATE       |                         |           | OF         |              |
|             |                         |           | CARDIOLOGY |              |
+-------------+-------------------------+-----------+------------+--------------+
| ATRIAL RATE | 0                       | ms        | [facility]  |              |
|             |                         |           | OF         |              |
|             |                         |           | CARDIOLOGY |              |
 
+-------------+-------------------------+-----------+------------+--------------+
| P-R         |                         | ms        | [facility]  |              |
| INTERVAL    |                         |           | OF         |              |
|             |                         |           | CARDIOLOGY |              |
+-------------+-------------------------+-----------+------------+--------------+
| P AXIS      |                         | deg       | [facility]  |              |
|             |                         |           | OF         |              |
|             |                         |           | CARDIOLOGY |              |
+-------------+-------------------------+-----------+------------+--------------+
| QRS         | 107                     | ms        | [facility]  |              |
| DURATION    |                         |           | OF         |              |
|             |                         |           | CARDIOLOGY |              |
+-------------+-------------------------+-----------+------------+--------------+
| QT          | 393                     | ms        | [facility]  |              |
|             |                         |           | OF         |              |
|             |                         |           | CARDIOLOGY |              |
+-------------+-------------------------+-----------+------------+--------------+
| QTC-BAGUNNERTT  | 451                     | ms        | [facility]  |              |
|             |                         |           | OF         |              |
|             |                         |           | CARDIOLOGY |              |
+-------------+-------------------------+-----------+------------+--------------+
| R AXIS      | -48                     | deg       | [facility]  |              |
|             |                         |           | OF         |              |
|             |                         |           | CARDIOLOGY |              |
+-------------+-------------------------+-----------+------------+--------------+
| T AXIS      | 6                       | deg       | [facility]  |              |
|             |                         |           | OF         |              |
|             |                         |           | CARDIOLOGY |              |
+-------------+-------------------------+-----------+------------+--------------+
| ECG         | Atrial fibrillation     |           | [facility]  |              |
| IMPRESSION  |                         |           | OF         |              |
|             |                         |           | CARDIOLOGY |              |
+-------------+-------------------------+-----------+------------+--------------+
| ECG         | Minimal ST depression,  |           | [facility]  |              |
| IMPRESSION  | inferior leads          |           | OF         |              |
|             |                         |           | CARDIOLOGY |              |
+-------------+-------------------------+-----------+------------+--------------+
| ECG         | Minimal ST elevation,   |           | [facility]  |              |
| IMPRESSION  | anterolateral leads-    |           | OF         |              |
|             | ABNORMAL ECG -          |           | CARDIOLOGY |              |
+-------------+-------------------------+-----------+------------+--------------+
| ECG         | Electronically signed   |           | [facility]  |              |
| IMPRESSION  | by: IZABELA MARTI        |           | OF         |              |
|             | 2018 17:39:25     |           | CARDIOLOGY |              |
+-------------+-------------------------+-----------+------------+--------------+
 
 
 
+----------+
| Specimen |
+----------+
|          |
+----------+
 
 
 
+----------------------------------------------------------+--------------+
| Narrative                                                | Performed At |
+----------------------------------------------------------+--------------+
|   This result has an attachment that is not available.   |              |
 
+----------------------------------------------------------+--------------+
 
 
 
+-----------------+------------------------+--------------------+--------------+
| Performing      | Address                | City/State/Zipcode | Phone Number |
| Organization    |                        |                    |              |
+-----------------+------------------------+--------------------+--------------+
|   [facility]  |   [address]  | [place], [place]       |              |
| CARDIOLOGY      | [address]              | [address]         |              |
+-----------------+------------------------+--------------------+--------------+
 documented in this encounter
 
 Visit Diagnoses
 
 
+--------------------------------------------------------------+
| Diagnosis                                                    |
+--------------------------------------------------------------+
|   Mitral valve insufficiency, unspecified etiology - Primary |
+--------------------------------------------------------------+
 documented in this encounter

## 2020-01-02 NOTE — NUR
ASSISTED PT TO THE RESTROOM AND BACK TO BED. PT DENIES FURTHER NEEDS AT THIS
TIME. CALL LIGHT IS CLOSE.

## 2020-01-02 NOTE — NUR
PATIENT WAS D/C'D PRIOR TO ME SEEING HER TO REVIEW LOW-SODIUM DIET
INFORMATION.  WILL CALL HER AT HOME AND SEND INFORMATION IF APPLICABLE.

## 2020-01-02 NOTE — NUR
Visited with Vonnie for initial eval CM.  She has not been feeling well for a
few days.  States she thinks she missed some meds.  Feeling better today.
States she was a  for 25 years.  No longer cooks, eats frozen meals
or goes out to eat.  Pt drives herself.  Daughter lives upstairs and assists
pt. Daughter is recouping from MVA and surgery. Pt. plans on discharging to
home when able.

## 2020-01-02 NOTE — NUR
PT AWAKE IN ROOM. SPO2 92% ON RA. PT PLACED ON 1L NC O2. NO OTHER NEEDS AT
THIS TIME. CALL LIGHT IN REACH.

## 2020-01-02 NOTE — NUR
BEDSISE HANDOFF REPORT RECEIVED FROM NIGHT SHIFT RN. PT RESTING IN BED. PT ON
1L NC. PT DENIES NEEDS AT THIS TIME.

## 2020-01-02 NOTE — NUR
PT SLEPT ON AND OFF LAST NIGHT. SPO2 99% ON 2L NC. SPO2 92% ON ROOM AIR. PT
PLACED ON 1L NC. LUNG SOUNDS CLEAR. IV CDI, WNL, FLUSHED WELL.

## 2020-01-02 NOTE — EKG
[facility] [place] [facility]
                                    [address]
                                  [place], [place]  [address]
_________________________________________________________________________________________
                                                                 Signed   
 
 
Atrial fibrillation
Rightward axis
Low voltage QRS
Cannot rule out Anterior infarct , age undetermined
Abnormal ECG
No previous ECGs available
Confirmed by DIANE MCALLISTER DO ([id number]) on 1/2/2020 5:08:01 PM
 
 
 
 
 
 
 
 
 
 
 
 
 
 
 
 
 
 
 
 
 
 
 
 
 
 
 
 
 
 
 
 
 
 
 
 
 
 
    Electronically Signed By: DIANE MCALLISTER DO  01/02/20 1708
_________________________________________________________________________________________
PATIENT NAME:     KATIUSKAALVINKYRA INGRAM                 
MEDICAL RECORD #: [id number]                     Electrocardiogram             
          ACCT #: [id number]  
DATE OF BIRTH:   [birth year]                                       
PHYSICIAN:   DIANE MCALLISTER DO                     REPORT #: [id number]
REPORT IS CONFIDENTIAL AND NOT TO BE RELEASED WITHOUT AUTHORIZATION

## 2020-01-02 NOTE — NUR
PT RESTING IN BED, JUST RETURNED FROM BATHROOM. PT ON 1L NC, LUNG SOUNDS
CLEAR, PT REPORT OF SOB WITH ACTIVITY BUT NOT AT REST. BOWEL TONES ACTIVE,
DENIES NAUSEA, TOLERATING BREAKFAST, PROVIDED FRESH WATER PER FLUID
RESTRICITION. PT REPORT OF CHRONIC NUMBNESS IN BLE AT BASELINE, TRACE EDEMA.
IV LASIX GIVEN PER ORDE.R PT REQUESTING TO REST THIS AM. PT DENIES OTHER NEEDS
AT THIS TIME.

## 2020-01-02 NOTE — NUR
PT AWAKE IN ROOM. ASSESSMNET COMPLETED. LUNG SOUNDS CLEAR. 1+ EDEMA IN BLE. PT
TITRATED OFF O2, SPO2 99%. NO OTHER NEEDS, CALL LIGHT IN REACH.

## 2020-01-02 NOTE — NUR
VITALS AND I&OS DONE AND CHARTED. HELPED PT TO THE BATHROOM AND BACK TO BED.
BEDSIDE TABLE AND CALL LIGHT IN REACH.

## 2020-01-02 NOTE — NUR
pt used call light to ask for assistance into restroom so "I can brush  my
teeth, wash my face, and comb my hair" pt tolerated walking very well. No
issues at all. Nothing needed at this time.

## 2020-01-06 NOTE — NUR
Follow up call completed.
 
Vonnie states she is taking advantage of having energy today.  she is paying
bills and doing laundry.
Has scales at home and to getting back in habit of weighing q AM. Was able to
state when to call PCP. Today's weigh 140lb.
Spoke to dietician during her inpatient stay. Reports she understands what she
needs to do to get back on track with low sodium diet.
No change in medications post discharge and she does not have concerns with
medications.
 
Her f/u with Dr. Bermudez is either on 18th or 20th and her daughter will take
her for this visit.

## 2020-06-29 NOTE — NUR
Please notify patient that his left ventricular ejection fraction has improved compared to prior visit.  Please advise to continue taking all current medications faithfully. RECEIVED REPORT FROM DAY SHIFT RN. PATIENT IS RESTING IN BED. PATIENT DENIES
ANY NEEDS AT THIS TIME. CALL LIGHT IN REACH. FAMILY AT THE BEDSIDE.

## 2021-06-28 NOTE — EKG
[facility]
                                    [address]
                                  [place]  [address]
_________________________________________________________________________________________
                                                                 Signed   
 
 
Atrial fibrillation with premature ventricular or aberrantly conducted complexes
Left axis deviation
Abnormal ECG
When compared with ECG of 01-JAN-2020 16:42,
QRS axis shifted left
Confirmed by DIANE MCALLISTER DO ([id number]) on 6/28/2021 12:21:38 PM
 
 
 
 
 
 
 
 
 
 
 
 
 
 
 
 
 
 
 
 
 
 
 
 
 
 
 
 
 
 
 
 
 
 
 
 
 
 
 
    Electronically Signed By: DIANE MCALLISTER DO  06/28/21 1221
_________________________________________________________________________________________
PATIENT NAME:     LUANNEALANNAHALVIN                 
MEDICAL RECORD #: [id number]                     Electrocardiogram             
          ACCT #: [id number]  
DATE OF BIRTH:   [birth year]                                       
PHYSICIAN:   DIANE MCALLISTER DO                     REPORT #: [id number]
REPORT IS CONFIDENTIAL AND NOT TO BE RELEASED WITHOUT AUTHORIZATION

## 2023-05-16 NOTE — XMS
Encounter Summary
  Created on: 2020
 
 Vonnie Celaya
 External Reference #: [id number]
 : 32
 Sex: Female
 
 Demographics
 
 
+-----------------------+------------------------+
| Address               | [address]         |
|                       | [place]  [address]   |
+-----------------------+------------------------+
| Home Phone            | [phone]        |
+-----------------------+------------------------+
| Preferred Language    | Unknown                |
+-----------------------+------------------------+
| Marital Status        | Single                 |
+-----------------------+------------------------+
| Zoroastrianism Affiliation | NON                    |
+-----------------------+------------------------+
| Race                  | White                  |
+-----------------------+------------------------+
| Ethnic Group          | Not  or  |
+-----------------------+------------------------+
 
 
 Author
 
 
+--------------+------------------------------+
| Author       | [facility] |
+--------------+------------------------------+
| Organization | [facility] |
+--------------+------------------------------+
| Address      | Unknown                      |
+--------------+------------------------------+
| Phone        | Unavailable                  |
+--------------+------------------------------+
 
 
 
 Support
 
 
+--------------+--------------+---------+-----------------+
| Name         | Relationship | Address | Phone           |
+--------------+--------------+---------+-----------------+
| Lauryn Leroy | ECON         | Unknown | [phone] |
+--------------+--------------+---------+-----------------+
 
 
 
 Care Team Providers
 
 
 
+------------------------+------+-----------------+
| Care Team Member Name  | Role | Phone           |
+------------------------+------+-----------------+
| Jean Bermudez MD | PCP  | [phone] |
+------------------------+------+-----------------+
 
 
 
 Encounter Details
 
 
+--------+------------+----------------------+-----------+-------------+
| Date   | Type       | Department           | Care Team | Description |
+--------+------------+----------------------+-----------+-------------+
| / | Procedure  |   Diagnostic Imaging |           |             |
|    | Pass       |  Services at [facility]     |           |             |
|        |            | [address]  |           |             |
|        |            | [place]  Mailcode:   |           |             |
|        |            | [address]  [facility]  |           |             |
|        |            |  [place], [place]        |           |             |
|        |            | [address]           |           |             |
|        |            | [phone]         |           |             |
+--------+------------+----------------------+-----------+-------------+
 
 
 
 Social History
 
 
+----------------+-------+-----------+--------+------+
| Tobacco Use    | Types | Packs/Day | Years  | Date |
|                |       |           | Used   |      |
+----------------+-------+-----------+--------+------+
| Never Assessed |       |           |        |      |
+----------------+-------+-----------+--------+------+
 
 
 
+------------------+---------------+
| Sex Assigned at  | Date Recorded |
| Birth            |               |
+------------------+---------------+
| Not on file      |               |
+------------------+---------------+
 
 
 
+----------------+-------------+-------------+
| Job Start Date | Occupation  | Industry    |
+----------------+-------------+-------------+
| Not on file    | Not on file | Not on file |
+----------------+-------------+-------------+
 
 
 
+----------------+--------------+------------+
| Travel History | Travel Start | Travel End |
+----------------+--------------+------------+
 
 
 
 
+-------------------------------------+
| No recent travel history available. |
+-------------------------------------+
 documented as of this encounter
 
 Plan of Treatment
 Not on filedocumented as of this encounter
 
 Visit Diagnoses
 Not on filedocumented in this encounter none

## 2023-05-24 NOTE — XMS
Encounter Summary
  Created on: 2020
 
 Vonnie Celaya
 External Reference #: [id number]
 : 32
 Sex: Female
 
 Demographics
 
 
+-----------------------+------------------------+
| Address               | [address]         |
|                       | [place]  [address]   |
+-----------------------+------------------------+
| Home Phone            | [phone]        |
+-----------------------+------------------------+
| Preferred Language    | Unknown                |
+-----------------------+------------------------+
| Marital Status        | Single                 |
+-----------------------+------------------------+
| Scientologist Affiliation | NON                    |
+-----------------------+------------------------+
| Race                  | White                  |
+-----------------------+------------------------+
| Ethnic Group          | Not  or  |
+-----------------------+------------------------+
 
 
 Author
 
 
+--------------+------------------------------+
| Author       | [facility] |
+--------------+------------------------------+
| Organization | [facility] |
+--------------+------------------------------+
| Address      | Unknown                      |
+--------------+------------------------------+
| Phone        | Unavailable                  |
+--------------+------------------------------+
 
 
 
 Support
 
 
+--------------+--------------+---------+-----------------+
| Name         | Relationship | Address | Phone           |
+--------------+--------------+---------+-----------------+
| Lauryn Leroy | ECON         | Unknown | [phone] |
+--------------+--------------+---------+-----------------+
 
 
 
 Care Team Providers
 
 
 
+------------------------+------+-----------------+
| Care Team Member Name  | Role | Phone           |
+------------------------+------+-----------------+
| Jean Bermudez MD | PCP  | [phone] |
+------------------------+------+-----------------+
 
 
 
 Reason for Referral
 Diagnostic Testing (Routine)
 
+--------+--------+---------------+--------------+--------------+---------------+
| Status | Reason | Specialty     | Diagnoses /  | Referred By  | Referred To   |
|        |        |               | Procedures   | Contact      | Contact       |
+--------+--------+---------------+--------------+--------------+---------------+
| Closed |        | Cardiac       |   Diagnoses  |              |   Car Cardiac |
|        |        | Catheterizati |  Mitral      | Claudette,  |  Cath Lab     |
|        |        | on            | valve        | Jame PARRA MD  | [address]   |
|        |        |               | stenosis,    |  [address]     | [address]  |
|        |        |               | unspecified  | [address]     | Rd  [facility]      |
|        |        |               | etiology     | [place], [place] | [facility]      |
|        |        |               | Procedures   |  [id number]  | [place]  |
|        |        |               | CATH LAB INT |  Phone:      | [phone]    |
|        |        |               |  CORONARY    | [phone] | Phone:        |
|        |        |               | ANGIOGRAM    |   Fax:       | [phone]  |
|        |        |               | IA R HRT     | [phone] |  Fax:         |
|        |        |               | CORONARY     |              | [phone]  |
|        |        |               | ARTERY ANGIO |              |               |
+--------+--------+---------------+--------------+--------------+---------------+
 
 
 
 
 Reason for Visit
 AUTH/CERT
 
+--------+--------+-----------+--------------+--------------+--------------+
| Status | Reason | Specialty | Diagnoses /  | Referred By  | Referred To  |
|        |        |           | Procedures   | Contact      | Contact      |
+--------+--------+-----------+--------------+--------------+--------------+
|        |        |           |              |              |              |
+--------+--------+-----------+--------------+--------------+--------------+
 
 
 
 
 Encounter Details
 
 
+--------+-----------+----------------------+----------------------+-------------+
| Date   | Type      | Department           | Care Team            | Description |
+--------+-----------+----------------------+----------------------+-------------+
| / | Hospital  |   [facility] [address]  |   Neal He,  |             |
| 2018   | Encounter | [address]  | MD  [address] |             |
|        |           |  [address]  [facility]  |   [address]  [place], |             |
|        |           |  [place], [place]        |  [place] [address]       |             |
|        |           | [address]           | [phone]         |             |
|        |           | [phone]         | [phone] (Fax)   |             |
+--------+-----------+----------------------+----------------------+-------------+
 
 
 
 
 Social History
 
 
+----------------+-------+-----------+--------+------+
| Tobacco Use    | Types | Packs/Day | Years  | Date |
|                |       |           | Used   |      |
+----------------+-------+-----------+--------+------+
| Never Assessed |       |           |        |      |
+----------------+-------+-----------+--------+------+
 
 
 
+------------------+---------------+
| Sex Assigned at  | Date Recorded |
| Birth            |               |
+------------------+---------------+
| Not on file      |               |
+------------------+---------------+
 
 
 
+----------------+-------------+-------------+
| Job Start Date | Occupation  | Industry    |
+----------------+-------------+-------------+
| Not on file    | Not on file | Not on file |
+----------------+-------------+-------------+
 
 
 
+----------------+--------------+------------+
| Travel History | Travel Start | Travel End |
+----------------+--------------+------------+
 
 
 
+-------------------------------------+
| No recent travel history available. |
+-------------------------------------+
 documented as of this encounter
 
 Last Filed Vital Signs
 
 
+-------------------+---------------------+----------------------+----------+
| Vital Sign        | Reading             | Time Taken           | Comments |
+-------------------+---------------------+----------------------+----------+
| Blood Pressure    | 108/50              | 2018  4:00 PM  |          |
|                   |                     | PDT                  |          |
+-------------------+---------------------+----------------------+----------+
| Pulse             | 56                  | 2018  4:00 PM  |          |
|                   |                     | PDT                  |          |
+-------------------+---------------------+----------------------+----------+
| Temperature       | 36.8   C (98.2   F) | 2018 10:13 AM  |          |
|                   |                     | PDT                  |          |
+-------------------+---------------------+----------------------+----------+
| Respiratory Rate  | 13                  | 2018  4:00 PM  |          |
|                   |                     | PDT                  |          |
+-------------------+---------------------+----------------------+----------+
 
| Oxygen Saturation | 96%                 | 2018  4:00 PM  |          |
|                   |                     | PDT                  |          |
+-------------------+---------------------+----------------------+----------+
| Inhaled Oxygen    | -                   | -                    |          |
| Concentration     |                     |                      |          |
+-------------------+---------------------+----------------------+----------+
| Weight            | 63.9 kg (140 lb 14  | 2018  9:50 AM  |          |
|                   | oz)                 | PDT                  |          |
+-------------------+---------------------+----------------------+----------+
| Height            | 160 cm (5' 3")      | 2018  9:50 AM  |          |
|                   |                     | PDT                  |          |
+-------------------+---------------------+----------------------+----------+
| Body Mass Index   | 24.95               | 2018  9:50 AM  |          |
|                   |                     | PDT                  |          |
+-------------------+---------------------+----------------------+----------+
 documented in this encounter
 
 Discharge Instructions
 Instructions Good Mendoza RN - 2018.Home Care for
 Cardiac Catheterization
 
 Call your doctor if you notice any unusual symptoms. Remember: you are under the influence 
of medicines. You must have someone else take you home, either by car or taxi. Don  t drive
, operate machinery or power tools. Don  t drink any alcoholic beverages. Don  t make any 
important decisions or sign legal papers. 
 
 Wound Care
 ? Change dressing as needed. Dressing can be removed in the morning. 
 ? You may shower, but do not take a bath, hot tub, or swim for 5 days.
 ? If you have any bleeding from the puncture site:
 1. Sit down and apply firm pressure to site with your fingers x 10 minutes.
 2. If the bleeding stops, continue to sit quietly, keeping your wrist straight for 2 hours.
  Notify your physician as soon as possible.
 3. If bleeding does not stop after 10 minutes, or if there is a large amount of bleeding or
 spurting, call bleeding or spurting, call 911 immediately. Do not drive yourself to the [facility].
   
 Diet
 ? Resume your regular diet.
 ? Drink an extra 3 to 4 glasses of fluid tonight. Avoid drinks with caffeine (coffee, tea, 
cola) or alcohol (wine, beer, liquor). 
 
 Rest and Activity
 ? For 24 hours, no blood pressures on affected arm, no excessive wrist movement and do not 
drive a car.
 ? Take it easy for the rest of the day.
 ? Do not lift anything over 1 pound for the next 48 hours.
 ? For 1 week, no activity with excessive pushing or pulling of the affected arm.
 
 Call your doctor if:
 Call your Doctor right away or go to the Emergency Room if your arm looks or feels differen
t. Call if your arm is: Pale, cold, numb, tingling (pins & needles) or turns purple or red.
 
 How to Reach your Doctor
  8:00    4:00 call Cardiac Catheterization Lab at [phone].
 
 For Cardiac Catheterization related emergencies after hours, weekends, and holidays, call [facility]  at [phone] and ask to have the   Cardiology Fellow on-call   p
aged.
 
 
 documented in this encounter
 
 Medications at Time of Discharge
 
 
+----------------------+----------------------+-----------+---------+----------+----------+
| Medication           | Sig                  | Dispensed | Refills | Start    | End Date |
|                      |                      |           |         | Date     |          |
+----------------------+----------------------+-----------+---------+----------+----------+
|   acetaminophen 325  | Take by mouth.       |           | 0       |          |          |
| mg oral tablet       |                      |           |         |          |          |
+----------------------+----------------------+-----------+---------+----------+----------+
|   ascorbic acid      | Take 500 mg by mouth |           | 0       |          |          |
| (vitamin C) 500 mg   |  once daily.         |           |         |          |          |
| oral tablet          |                      |           |         |          |          |
+----------------------+----------------------+-----------+---------+----------+----------+
|   furosemide 40 mg   | Take by mouth.       |           | 0       |          |          |
| oral tablet          |                      |           |         |          |          |
+----------------------+----------------------+-----------+---------+----------+----------+
|                      | Take 2 tablets by    |           | 0       |          |          |
| gluc/chnd/om3/dha/ep | mouth once daily.    |           |         |          |          |
| a/fish/str           |                      |           |         |          |          |
| (GLUCOSAMINE         |                      |           |         |          |          |
| CHONDROITIN PLUS     |                      |           |         |          |          |
| ORAL)                |                      |           |         |          |          |
+----------------------+----------------------+-----------+---------+----------+----------+
|   levothyroxine 100  | Take by mouth.       |           | 0       |          |          |
| mcg oral tablet      |                      |           |         |          |          |
+----------------------+----------------------+-----------+---------+----------+----------+
|   losartan 100 mg    | Take 100 mg by mouth |           | 0       |          |          |
| oral tablet          |  once daily.         |           |         |          |          |
+----------------------+----------------------+-----------+---------+----------+----------+
|   MAGNESIUM CHLORIDE | Take by mouth.       |           | 0       |          |          |
|  ORAL                |                      |           |         |          |          |
+----------------------+----------------------+-----------+---------+----------+----------+
|   metFORMIN    | Take by mouth.       |           | 0       |          |          |
| mg oral tablet,ER    |                      |           |         |          |          |
| alexis.retention 24 hr |                      |           |         |          |          |
+----------------------+----------------------+-----------+---------+----------+----------+
|   metoprolol         | Take 100 mg by mouth |           | 0       | 20 |          |
| succinate 200 mg     |  once daily.         |           |         | 18       |          |
| oral tablet extended |                      |           |         |          |          |
|  release 24 hr       |                      |           |         |          |          |
+----------------------+----------------------+-----------+---------+----------+----------+
|   potassium chloride | Take by mouth.       |           | 0       |          |          |
|  SR 20 mEq oral      |                      |           |         |          |          |
| tablet,ER            |                      |           |         |          |          |
| particles/crystals   |                      |           |         |          |          |
+----------------------+----------------------+-----------+---------+----------+----------+
|   simvastatin 20 mg  | Take by mouth once   |           | 0       |          |          |
| oral tablet          | daily in the         |           |         |          |          |
|                      | evening.             |           |         |          |          |
+----------------------+----------------------+-----------+---------+----------+----------+
|   warfarin 5 mg oral | Take by mouth.       |           | 0       |          |          |
|  tablet              |                      |           |         |          |          |
+----------------------+----------------------+-----------+---------+----------+----------+
 documented as of this encounter
 
 Progress Notes
 Salomón Paniagua MD - 2018  3:20 PM PDTFormatting of this note might be different from
 
 the original.
 Post-Procedure Access Site Check
 
 S: Feels well following the procedure, no complaints. Daughter at bedside, staying in [facility]. 
 
 Last Vitals: /56 | Pulse 62 | Temp 36.8 C (98.2 F) | RR 22 | Ht 1.6 m (5' 3") | W
t 63.9 kg (140 lb 14 oz) | SpO2 96% | BMI 24.95 kg/(m^2)
 24 Hour Vital Min/Max: 
 Systolic (24hrs), Av , Min:117 , Max:158 
 Diastolic (24hrs), Av, Min:56, Max:75
 Pulse  Min: 54  Max: 78
 Temp  Min: 36.8 C (98.2 F)  Max: 36.8 C (98.2 F)
 Resp  Min: 12  Max: 31
 SpO2  Min: 95 %  Max: 98 %
 
 Intake/Output Summary (Last 24 hours) at 18 1520
 Last data filed at 18 1500
  Gross per 24 hour 
 Intake                8 ml 
 Output               13 ml 
 Net               -5 ml 
 
 Gen: comfortable appearing.
 Access Site: No hematoma or oozing around right radial artery or right IJ 
 Barbeau: Type A
 Pulse:right radial: 2+
 Skin: No embolic phenomena in hands/feet.
 Neuro: Gait normal. Reflexes normal and symmetric. Sensation grossly intact
 
 A/P: No evidence of acute complications following procedure. Continue current post-cath car
e. 
 
 Salomón Paniagua MD
 Cardiology Fellow
 
 Electronically signed by Salomón Paniagua MD at 2018  3:21 PM Salomón Marc MD - 0
2018 12:35 PM PDTCardiology Preliminary Procedure Note (Full report to follow)
 
 Primary Care Provider: Jean Bermudez MD
 Referring Provider: Jame Wilkins MD
 
 Cath Lab Staff:
 Neal He MD
 
 Procedure(s):
 Coronary Angiography
 Right Heart Catheterization
 
 Indications:
 Pre-mitral valve intervention workup
 
 Access:
 5F RRA
 7F RIJ
 
 Post Procedure Access:
 No evidence of bleeding or hematoma  right Radial access site bleeding, oozing, hematoma. a
nd No evidence of distal limb ischemia or distal embolization  Or any immediate complication
s noted in right IJ.
 
 
 Estimated Blood Loss:
 35 mL
 
 Medications:
 Heparin 3000 units    Route: IV
 Midazolam 1 mg    Route: IV
 Nitroglycerin 200 mcg    Route:  IA
 Verapamil 2 mg    Route:  IA
 
 Contrast: 55 mL Omnipaque
 
 Findings:
 PRESSURES
 
 RHC: Unremarkable filling pressures
 
 Coronary Angiography: Obstructive CAD of RCA
 
 Intervention:
 None
 
 Complications:
 None
 
 Hemostasis:
 Manual compression in cath lab.    Site: [facility] [place].
 
 Recommendations: 
 Patient Status: Day patient
 
 Usual post cath care.
 
 Electronically signed by Salomón Paniagua MD at 2018 12:40 PM PDTdocumented in this enc
ounter
 
 Plan of Treatment
 Not on filedocumented as of this encounter
 
 Procedures
 
 
+----------------------+--------+-------------+----------------------+----------------------
+
| Procedure Name       | Priori | Date/Time   | Associated Diagnosis | Comments             
|
|                      | ty     |             |                      |                      
|
+----------------------+--------+-------------+----------------------+----------------------
+
| CARDIAC CATH         |        | 2018  |                      |   Results for this   
|
|                      |        |  1:58 PM    |                      | procedure are in the 
|
|                      |        | PDT         |                      |  results section.    
|
+----------------------+--------+-------------+----------------------+----------------------
+
| INR                  | Urgent | 2018  |                      |   Results for this   
 
|
|                      |        | 10:15 AM    |                      | procedure are in the 
|
|                      |        | PDT         |                      |  results section.    
|
+----------------------+--------+-------------+----------------------+----------------------
+
| BASIC METABOLIC SET  | Routin | 2018  |                      |   Results for this   
|
| (NA, K, CL, TCO2,    | e      | 10:15 AM    |                      | procedure are in the 
|
| BUN, CR, GLU, CA)    |        | PDT         |                      |  results section.    
|
+----------------------+--------+-------------+----------------------+----------------------
+
| CBC (HEMOGRAM) ONLY  | Routin | 2018  |                      |   Results for this   
|
|                      | e      | 10:14 AM    |                      | procedure are in the 
|
|                      |        | PDT         |                      |  results section.    
|
+----------------------+--------+-------------+----------------------+----------------------
+
| CBC ONLY             | Routin | 2018  |                      |   Results for this   
|
|                      | e      | 10:14 AM    |                      | procedure are in the 
|
|                      |        | PDT         |                      |  results section.    
|
+----------------------+--------+-------------+----------------------+----------------------
+
| CAPILLARY BLOOD      | Routin | 2018  |   Mitral valve       |   Results for this   
|
| GLUCOSE (NO CHG),    | e      | 10:08 AM    | stenosis,            | procedure are in the 
|
| POC                  |        | PDT         | unspecified etiology |  results section.    
|
+----------------------+--------+-------------+----------------------+----------------------
+
| INTRAPROCEDURE       | Routin | 2018  |                      |   Results for this   
|
| IMAGING              | e      |  9:53 AM    |                      | procedure are in the 
|
|                      |        | PDT         |                      |  results section.    
|
+----------------------+--------+-------------+----------------------+----------------------
+
| CATH LAB INT         | Routin | 2018  |   Mitral valve       |   Results for this   
|
| CORONARY ANGIOGRAM   | e      |  9:37 AM    | stenosis,            | procedure are in the 
|
|                      |        | PDT         | unspecified etiology |  results section.    
|
+----------------------+--------+-------------+----------------------+----------------------
+
| CARDIOLOGY           |        | 2018  |                      |   Results for this   
|
|                      |        | 12:00 AM    |                      | procedure are in the 
|
|                      |        | PDT         |                      |  results section.    
 
|
+----------------------+--------+-------------+----------------------+----------------------
+
| CARDIOLOGY           |        | 2018  |                      |   Results for this   
|
|                      |        | 12:00 AM    |                      | procedure are in the 
|
|                      |        | PDT         |                      |  results section.    
|
+----------------------+--------+-------------+----------------------+----------------------
+
 documented in this encounter
 
 Results
 CARDIAC CATH (2018  1:58 PM PDT)
 
+-------------------------------------------------------------------------------------------
--------------------------------------------------------------------------------------------
-------------------------------------------------+
| Procedure Note                                                                            
                                                                                            
                                                 |
+-------------------------------------------------------------------------------------------
--------------------------------------------------------------------------------------------
-------------------------------------------------+
|   Neal He MD - 2018  1:58 PM PDT  DATE OF PROCEDURE:         
                                                                                            
                                                 |
| PATIENT DATA:Height 160 cmWeight 76.6 kgBSA 1.76 [id number] PHYSICIAN:Neal         
                                                                                            
                                                 |
| Neri TAVAREZ, MedicineDepartment of CardiologyFELLOW:Adalberto Parker,      
                                                                                            
                                                 |
| MDFellowGeneral CardiologyINDICATIONS:Severe MRREFERRING PHYSICIAN:Jame Wilkins,      
                                                                                            
                                                 |
| M.D., CardiologyPROCEDURES PERFORMED:1. Right heart                    
                                                                                            
                                                 |
| catheterization.2. Selective coronary angiography.MEDICATIONS:1. Heparin IV 3000          
                                                                                            
                                                 |
| units.2. Midazolam IV 1 mg.3. Nitroglycerin 200 mcg intra-arterial.4. Verapamil 2 mg      
                                                                                            
                                                 |
| intra-arterial.CONTRAST:Omnipaque 55 mL.FLUOROSCOPY TIME:6.4 minutes.FLUOROSCOPY          
                                                                                            
                                                 |
| DAP:4151.0 [id number] OF PROCEDURE:The procedure, its risks, benefits, and         
                                                                                            
                                                 |
| alternatives, were discussed with the patient and the patient was brought to the cath     
                                                                                            
                                                 |
| lab in the non-sedated state.  A full PARQ was performed.  A team pause was performed     
                                                                                            
                                                 |
| before the start of the procedure.  A 7-French sheath was placed in the right internal    
                                                                                            
 
                                                 |
| jugular vein using ultrasound guidance and micropuncture needle.  Through the 7-French    
                                                                                            
                                                 |
| sheath, we introduced a 7-French balloon wedge catheter all the way up to the pulmonary   
                                                                                            
                                                 |
| capillary wedge position, and pressures were obtained in the various cardiac chambers.    
                                                                                            
                                                 |
| After the right catheterization was completed, we proceeded toward selective coronary     
                                                                                            
                                                 |
| angiography.  Initially, we went with the Kuldeep catheter.  However, it was difficult to   
                                                                                            
                                                 |
| engage the Kuldeep catheter into the left or the right coronary artery, and henceforth we   
                                                                                            
                                                 |
| decided to switch to JL3.5 and JR4 catheters.  We were very gentle with using the Kuldeep   
                                                                                            
                                                 |
| catheter as well because the patient had a lot of aortic calcium.  We were able to        
                                                                                            
                                                 |
| easily engage the left and the right coronary artery using JL3.5 and JR4 catheters.       
                                                                                            
                                                 |
| After multiple cineangiographic views were obtained, after we had sufficient              
                                                                                            
                                                 |
| information, we discussed the case with Dr. Jame Wilkins, the General Cardiology      
                                                                                            
                                                 |
| attending for this patient, and we decided to complete the case.  The TR band was used    
                                                                                            
                                                 |
| for achieving local hemostasis at the right radial artery access site and the right IJ    
                                                                                            
                                                 |
| was removed and manual compression was applied for achievement of local                   
                                                                                            
                                                 |
| hemostasis.FINDINGS:1. Hemodynamic findings right heart catheterization:  a. Right        
                                                                                            
                                                 |
| arterial pressure mean of 5 mmHg.  b. Right ventricular pressure of 36/0 and a right      
                                                                                            
                                                 |
| ventricular end-diastolic pressure of 40 mmHg.c. Pulmonary artery pressure of 41/13 with  
                                                                                            
                                                 |
|  a mean pulmonary artery pressure of 25 mmHg.d. Pulmonary capillary wedge pressure of 16  
                                                                                            
                                                 |
|  mmHg with V wave up to 33 mmHg.e. Heart rate of 60 beats per minute.f. Cardiac output    
                                                                                            
                                                 |
| of 3.4 L/min per Hellen method and cardiac index of 1.93 L/min/sq m. g. Opening aortic      
                                                                                            
 
                                                 |
| pressure of 128/68 mmHg with a mean aortic pressure of 72 mmHg.h. Pulmonary artery        
                                                                                            
                                                 |
| saturation of 66% and aortic saturation of 99%.i. Pulmonary vascular resistance of 2.64   
                                                                                            
                                                 |
| Wood units. j. Systemic vascular resistance of 19.98 Wood units.2. Left heart             
                                                                                            
                                                 |
| catheterization:a. Left main coronary artery.  The left main coronary artery is a         
                                                                                            
                                                 |
| large-sized vessel which has about 20% calcific stenosis in its distal portion at its     
                                                                                            
                                                 |
| bifurcation with the left anterior descending and the left circumflex artery.b. Left      
                                                                                            
                                                 |
| anterior descending artery.  The left anterior descending artery is a moderate-sized      
                                                                                            
                                                 |
| vessel which courses all the way up to the apex. It is tortuous in nature.  It has mild   
                                                                                            
                                                 |
| 20% to 30% stenosis in its midportion at its branch point with the diagonal artery.       
                                                                                            
                                                 |
| Otherwise, the left anterior descending artery has no other major angiographic            
                                                                                            
                                                 |
| stenosis.c. Left circumflex artery.  The left circumflex artery has 2 stenoses in tandem  
                                                                                            
                                                 |
|  which are about 30% to 40% each as it courses around the posterior intraventricular      
                                                                                            
                                                 |
| groove.d. Right coronary artery the right coronary artery is a dominant vessel.  The      
                                                                                            
                                                 |
| proximal portion of the right coronary artery has about 30% to 40% calcific stenosis      
                                                                                            
                                                 |
| which may be an under-estimate given the eccentricity of the plaque.e. The distal RCA     
                                                                                            
                                                 |
| has 99% calcific stenosis prior to its bifurcation with the PDA and PLV branches.  It is  
                                                                                            
                                                 |
|  an extremely tortuous right coronary artery.ASSESSMENT:1. One-vessel coronary artery     
                                                                                            
                                                 |
| disease with significant stenosis of the distal right coronary artery at 99% prior to     
                                                                                            
                                                 |
| the posterior descending artery posterior left ventricular branches.2. Non-obstructive    
                                                                                            
                                                 |
| coronary artery disease in the left anterior descending and left circumflex branches.3.   
                                                                                            
 
                                                 |
| Mildly elevated left heart filling pressures.PLAN:The patient will be seen in the clinic  
                                                                                            
                                                 |
|  tomorrow by Dr. Jame Wilkins, and after the clinic the the PCI option will be      
                                                                                            
                                                 |
| discussed.MACHELLE Simmons/BOBD:  2018 13:06:40"A resident/fellow assisted     
                                                                                            
                                                 |
| with documenting this service.   I saw the patient and reviewed and verified all          
                                                                                            
                                                 |
| information documented by the resident/fellow and made modifications to such              
                                                                                            
                                                 |
| information, when appropriate. The risks and benefits of the procedure were explained to  
                                                                                            
                                                 |
|  the patient in its entirety and all the questions were answered to patient               
                                                                                            
                                                 |
| satisfaction. I was present and supervised all the aspects of this procedure. "Neal      
                                                                                            
                                                 |
| Heather He Interventional/Structural Heart CardiologistK[facility]    
                                                                                            
                                                 |
| [place]:  2018 13:58:20Job #:               
                                                                                            
                                                 |
| [id number]                                                                          
                                                                                            
                                                 |
|h. Pulmonary artery saturation of 66% and aortic saturation of 99%.                        
                                                                                            
                                                |
|i. Pulmonary vascular resistance of 2.64 Wood units.                                       
                                                                                            
                                                |
|j. Systemic vascular resistance of 19.98 Wood units.                                       
                                                                                            
                                                |
|2. Left heart catheterization:                                                             
                                                                                            
                                                 |
|a. Left main coronary artery.  The left main coronary artery is a large-sized vessel which 
has about 20% calcific stenosis in its distal portion at its bifurcation with the left anter
ior descending and the left circumflex artery.  |
|b. Left anterior descending artery.  The left anterior descending artery is a moderate-size
d vessel which courses all the way up to the apex. It is tortuous in nature.  It has mild 20
% to 30% stenosis in its midportion at          |
|its branch point with the diagonal artery.  Otherwise, the left anterior descending artery 
has no other major angiographic stenosis.                                                   
                                                 |
|c. Left circumflex artery.  The left circumflex artery has 2 stenoses in tandem which are a
bout 30% to 40% each as it courses around the posterior intraventricular groove.            
                                                |
|d. Right coronary artery the right coronary artery is a dominant vessel.  The proximal port
ion of the right coronary artery has about 30% to 40% calcific stenosis which may be an unde
 
r-estimate given the eccentricity of the plaque. |
|e. The distal RCA has 99% calcific stenosis prior to its bifurcation with the PDA and PLV b
ranches.  It is an extremely tortuous right coronary artery.                                
                                                |
|                                                                                           
                                                                                            
                                                 |
|ASSESSMENT:                                                                                
                                                                                            
                                                |
|1. One-vessel coronary artery disease with significant stenosis of the distal right coronar
y artery at 99% prior to the posterior descending artery posterior left ventricular branches
.                                                |
|2. Non-obstructive coronary artery disease in the left anterior descending and left circumf
daniel branches.                                                                               
                                                 |
|3. Mildly elevated left heart filling pressures.                                           
                                                                                            
                                                 |
|                                                                                           
                                                                                            
                                                 |
|PLAN:                                                                                      
                                                                                            
                                                |
|The patient will be seen in the clinic tomorrow by Dr. Jame Wilkins, and after the cl
inic the the PCI option will be discussed.                                                  
                                                 |
|                                                                                           
                                                                                            
                                                 |
|Neal He MD                                                                          
                                                                                            
                                                 |
|HG/MODL                                                                                    
                                                                                            
                                                 |
|DD:  2018 13:06:40                                                                   
                                                                                            
                                                 |
|                                                                                           
                                                                                            
                                                 |
|"A resident/fellow assisted with documenting this service.   I saw the patient and reviewed
 and verified all information documented by the resident/fellow and made modifications to hernandez
ch information, when                             |
|appropriate. The risks and benefits of the procedure were explained to the patient in its e
ntirety and all the questions were answered to patient satisfaction. I was present and super
vised all the aspects of this procedure. "      |
|                                                                                           
                                                                                            
                                                 |
|Neal He MD                                                                          
                                                                                            
                                                 |
|Attending Interventional/Structural Heart Cardiologist                                     
                                                                                            
                                                 |
|[facility] [place] & [facility]                        
                                                                                            
 
                                                 |
|DT:  2018 13:58:20                                                                   
                                                                                            
                                                 |
|Job #:  [id number]                                                                   
                                                                                            
                                                 |
+-------------------------------------------------------------------------------------------
--------------------------------------------------------------------------------------------
-------------------------------------------------+
 INR (2018 10:15 AM PDT)
 
+-----------+----------+-----------------+-------------+--------------+
| Component | Value    | Ref Range       | Performed   | Pathologist  |
|           |          |                 | At          | Signature    |
+-----------+----------+-----------------+-------------+--------------+
| INR       | 1.27 (H) | 0.90 - 1.20 INR | [facility]        |              |
|           |          |                 | LABORATORY  |              |
|           |          |                 | SERVICES,   |              |
|           |          |                 | [facility]        |              |
+-----------+----------+-----------------+-------------+--------------+
 
 
 
+----------------+
| Specimen       |
+----------------+
| Blood - Blood  |
| (substance)    |
+----------------+
 
 
 
+---------------------------------------------------------------------+----------------+
| Narrative                                                           | Performed At   |
+---------------------------------------------------------------------+----------------+
|         INR Therapeutic ranges for full anticoagulation:   INR for  |   [facility]         |
| Venous Thromboembolism                   (2.0 - 3.0) INR   INR for  | LABORATORY     |
| most patients with mech. valves      (2.5 - 3.5) INR                | SERVICES, [facility] |
+---------------------------------------------------------------------+----------------+
 
 
 
+--------------------+------------------------+--------------------+--------------+
| Performing         | Address                | City/State/Zipcode | Phone Number |
| Organization       |                        |                    |              |
+--------------------+------------------------+--------------------+--------------+
|   [facility]  |   [address]  | [place] [address] |              |
| SERVICES, [facility]     | [address]                |                    |              |
+--------------------+------------------------+--------------------+--------------+
 BASIC METABOLIC SET (NA, K, CL, TCO2, BUN, CR, GLU, CA) (2018 10:15 AM PDT)
 
+-------------+---------+-----------------+-------------+--------------+
| Component   | Value   | Ref Range       | Performed   | Pathologist  |
|             |         |                 | At          | Signature    |
+-------------+---------+-----------------+-------------+--------------+
| GLUCOSE,    | 122 (H) | 70 - 99 mg/dL   | [facility]        |              |
| PLASMA      |         |                 | LABORATORY  |              |
| (LAB)       |         |                 | SERVICES,   |              |
|             |         |                 | CORE        |              |
 
+-------------+---------+-----------------+-------------+--------------+
| BUN, PLASMA | 16      | 6 - 20 mg/dL    | [facility]        |              |
|  (LAB)      |         |                 | LABORATORY  |              |
|             |         |                 | SERVICES,   |              |
|             |         |                 | CORE        |              |
+-------------+---------+-----------------+-------------+--------------+
| CREATININE  | 0.78    | 0.60 - 1.10     | [facility]        |              |
| PLASMA      |         | mg/dL           | LABORATORY  |              |
| (LAB)       |         |                 | SERVICES,   |              |
|             |         |                 | CORE        |              |
+-------------+---------+-----------------+-------------+--------------+
| EGFR        | >60     | >60 mL/min      | [facility]        |              |
| -    |         |                 | LABORATORY  |              |
| [facility]    |         |                 | SERVICES,   |              |
|             |         |                 | CORE        |              |
+-------------+---------+-----------------+-------------+--------------+
| EGFR NON    | >60     | >60 mL/min      | [facility]        |              |
| -[facility] |         |                 | LABORATORY  |              |
| [facility]       |         |                 | SERVICES,   |              |
|             |         |                 | CORE        |              |
+-------------+---------+-----------------+-------------+--------------+
| SODIUM,     | 136     | 136 - 145       | [facility]        |              |
| PLASMA      |         | mmol/L          | LABORATORY  |              |
| (LAB)       |         |                 | SERVICES,   |              |
|             |         |                 | CORE        |              |
+-------------+---------+-----------------+-------------+--------------+
| POTASSIUM,  | 3.9     | 3.4 - 5.0       | [facility]        |              |
| PLASMA      |         | mmol/L          | LABORATORY  |              |
| (LAB)       |         |                 | SERVICES,   |              |
|             |         |                 | CORE        |              |
+-------------+---------+-----------------+-------------+--------------+
| CHLORIDE,   | 100     | 97 - 108 mmol/L | [facility]        |              |
| PLASMA      |         |                 | LABORATORY  |              |
| (LAB)       |         |                 | SERVICES,   |              |
|             |         |                 | CORE        |              |
+-------------+---------+-----------------+-------------+--------------+
| TOTAL CO2,  | 26      | 21 - 32 mmol/L  | [facility]        |              |
| PLASMA      |         |                 | LABORATORY  |              |
| (LAB)       |         |                 | SERVICES,   |              |
|             |         |                 | CORE        |              |
+-------------+---------+-----------------+-------------+--------------+
| CALCIUM,    | 9.1     | 8.6 - 10.2      | [facility]        |              |
| PLASMA      |         | mg/dL           | LABORATORY  |              |
| (LAB)       |         |                 | SERVICES,   |              |
|             |         |                 | CORE        |              |
+-------------+---------+-----------------+-------------+--------------+
| ANION GAP   | 10      | 4 - 11 mmol/L   | [facility]        |              |
|             |         |                 | LABORATORY  |              |
|             |         |                 | SERVICES,   |              |
|             |         |                 | CORE        |              |
+-------------+---------+-----------------+-------------+--------------+
| POTASSIUM   | No Hemo |                 | [facility]        |              |
| CMNT        |         |                 | LABORATORY  |              |
|             |         |                 | SERVICES,   |              |
|             |         |                 | CORE        |              |
+-------------+---------+-----------------+-------------+--------------+
 
 
 
+----------------+
 
| Specimen       |
+----------------+
| Blood - Blood  |
| (substance)    |
+----------------+
 
 
 
+------------------------------------------------------------------------+----------------+
| Narrative                                                              | Performed At   |
+------------------------------------------------------------------------+----------------+
|      GFR is estimated using the MDRD equation recommended by the       |   [facility]         |
| National Kidney Disease Education Program.     Estimated GFR           | LABORATORY     |
| Interpretive Information:  <60 mL/min/1.73 sq m                        | SERVICES, CORE |
| Chronic Kidney Disease  <15 mL/min/1.73 sq m                           |                |
| Kidney Failure  Estimated GFR greater that 60 mL/min/1.73 sq m is of   |                |
| limited clinical value.     The MDRD equation is not valid in the      |                |
| following situations:  - Patients under 18 years of age  - Severe      |                |
| malnutrition or obesity  - Vegetarian diet  - Rapidly changing kidney  |                |
| function  - Amputees, paraplegics, or other muscle-wasting diseses     |                |
+------------------------------------------------------------------------+----------------+
 
 
 
+--------------------+------------------------+--------------------+--------------+
| Performing         | Address                | City/State/Zipcode | Phone Number |
| Organization       |                        |                    |              |
+--------------------+------------------------+--------------------+--------------+
|   [facility]  |   [id number]  | [place] [address] |              |
| SERVICES, CORE     | [address]                |                    |              |
+--------------------+------------------------+--------------------+--------------+
 CBC (HEMOGRAM) ONLY (2018 10:14 AM PDT)
 
+-------------+----------+-----------------+-------------+--------------+
| Component   | Value    | Ref Range       | Performed   | Pathologist  |
|             |          |                 | At          | Signature    |
+-------------+----------+-----------------+-------------+--------------+
| WHITE CELL  | 7.45     | 3.50 - 10.80    | [facility]        |              |
| COUNT       |          | K/cu mm         | LABORATORY  |              |
|             |          |                 | SERVICES,   |              |
|             |          |                 | CORE        |              |
+-------------+----------+-----------------+-------------+--------------+
| RED CELL    | 4.29     | 4.00 - 5.20     | [facility]        |              |
| COUNT       |          | M/cu mm         | LABORATORY  |              |
|             |          |                 | SERVICES,   |              |
|             |          |                 | CORE        |              |
+-------------+----------+-----------------+-------------+--------------+
| HEMOGLOBIN  | 12.6     | 12.0 - 16.0     | [facility]        |              |
|             |          | g/dL            | LABORATORY  |              |
|             |          |                 | SERVICES,   |              |
|             |          |                 | CORE        |              |
+-------------+----------+-----------------+-------------+--------------+
| HEMATOCRIT  | 37.5     | 36.0 - 46.0 %   | [facility]        |              |
|             |          |                 | LABORATORY  |              |
|             |          |                 | SERVICES,   |              |
|             |          |                 | CORE        |              |
+-------------+----------+-----------------+-------------+--------------+
| MCV         | 87.4     | 80.0 - 100.0 fL | [facility]        |              |
|             |          |                 | LABORATORY  |              |
|             |          |                 | SERVICES,   |              |
 
|             |          |                 | CORE        |              |
+-------------+----------+-----------------+-------------+--------------+
| MCHC        | 33.6     | 32.0 - 36.0     | [facility]        |              |
|             |          | g/dL            | LABORATORY  |              |
|             |          |                 | SERVICES,   |              |
|             |          |                 | CORE        |              |
+-------------+----------+-----------------+-------------+--------------+
| RDW SD      | 49.3 (H) | 35.1 - 46.3 fL  | [facility]        |              |
|             |          |                 | LABORATORY  |              |
|             |          |                 | SERVICES,   |              |
|             |          |                 | CORE        |              |
+-------------+----------+-----------------+-------------+--------------+
| PLATELET    | 315      | 150 - 400 K/cu  | [facility]        |              |
| COUNT       |          | mm              | LABORATORY  |              |
|             |          |                 | SERVICES,   |              |
|             |          |                 | CORE        |              |
+-------------+----------+-----------------+-------------+--------------+
| MPV         | 10.0     | 9.7 - 12.3 fL   | [facility]        |              |
|             |          |                 | LABORATORY  |              |
|             |          |                 | SERVICES,   |              |
|             |          |                 | CORE        |              |
+-------------+----------+-----------------+-------------+--------------+
| NRBC%       | 0.0      | 0.0 - 0.3 %     | [facility]        |              |
|             |          |                 | LABORATORY  |              |
|             |          |                 | SERVICES,   |              |
|             |          |                 | CORE        |              |
+-------------+----------+-----------------+-------------+--------------+
| NRBC#       | 0.00     | 0.00 - 0.02     | [facility]        |              |
|             |          | K/cu mm         | LABORATORY  |              |
|             |          |                 | SERVICES,   |              |
|             |          |                 | CORE        |              |
+-------------+----------+-----------------+-------------+--------------+
 
 
 
+----------------+
| Specimen       |
+----------------+
| Blood - Blood  |
| (substance)    |
+----------------+
 
 
 
+----------------------------------------------------------------------+----------------+
| Narrative                                                            | Performed At   |
+----------------------------------------------------------------------+----------------+
|      New reference ranges for MCV, MCHC, PLT, IG% and IG# effective  |   [facility]         |
| 5/10/2018                                                            | LABORATORY     |
|                                                                      | SERVICES, CORE |
+----------------------------------------------------------------------+----------------+
 
 
 
+--------------------+------------------------+--------------------+--------------+
| Performing         | Address                | City/State/Zipcode | Phone Number |
| Organization       |                        |                    |              |
+--------------------+------------------------+--------------------+--------------+
|   [facility]  |   [address]  | [place] [address] |              |
| SERVICES, CORE     | [address]                |                    |              |
 
+--------------------+------------------------+--------------------+--------------+
 CAPILLARY BLOOD GLUCOSE (NO CHG), POC (2018 10:08 AM PDT)
 
+-----------+---------+---------------+-------------+--------------+
| Component | Value   | Ref Range     | Performed   | Pathologist  |
|           |         |               | At          | Signature    |
+-----------+---------+---------------+-------------+--------------+
| BLOOD     | 142 (H) | 60 - 99 mg/dL | [facility] -      |              |
| GLUCOSE,  |         |               | [facility]     |              |
| POC       |         |               | [facility] |              |
|           |         |               |  OF CARE    |              |
|           |         |               | TESTS       |              |
+-----------+---------+---------------+-------------+--------------+
 
 
 
+----------+
| Specimen |
+----------+
|          |
+----------+
 
 
 
+----------------------+-------------------------+--------------------+--------------+
| Performing           | Address                 | City/State/Zipcode | Phone Number |
| Organization         |                         |                    |              |
+----------------------+-------------------------+--------------------+--------------+
|   [facility]     |   [address]  | [place], [place]       |              |
| [facility]  | [address]               | [id number]         |              |
| TESTS                |                         |                    |              |
+----------------------+-------------------------+--------------------+--------------+
 INTRAPROCEDURE IMAGING (2018  9:53 AM PDT)
 
+----------+
| Specimen |
+----------+
|          |
+----------+
 
 
 
+-----------------------------------------------------------------------+--------------+
| Narrative                                                             | Performed At |
+-----------------------------------------------------------------------+--------------+
|   See admission or procedure notes for details of any intraprocedure  |              |
| images   obtained.                                                    |              |
+-----------------------------------------------------------------------+--------------+
 CATH LAB INT CORONARY ANGIOGRAM (2018  9:37 AM PDT)
 
+----------+
| Specimen |
+----------+
|          |
+----------+
 
 
 
+-----------------------------------------------------------------------+----------------+
| Narrative                                                             | Performed At   |
 
+-----------------------------------------------------------------------+----------------+
|   Procedure performed in the Cardiac Cath Lab.   See procedure notes  |   [facility] -       |
| for   details.                                                        | [facility],  |
|                                                                       | POINT OF CARE  |
|                                                                       | TESTS          |
+-----------------------------------------------------------------------+----------------+
 
 
 
+----------------------+-------------------------+--------------------+--------------+
| Performing           | Address                 | City/State/Zipcode | Phone Number |
| Organization         |                         |                    |              |
+----------------------+-------------------------+--------------------+--------------+
|   [facility]     |   [address]  | [place], [place]       |              |
| [facility]  | [address]               | [phone]         |              |
| TESTS                |                         |                    |              |
+----------------------+-------------------------+--------------------+--------------+
 CARDIOLOGY (2018 12:00 AM PDT)
 
+----------------------------------------------------------+--------------+
| Narrative                                                | Performed At |
+----------------------------------------------------------+--------------+
|   This result has an attachment that is not available.   |              |
+----------------------------------------------------------+--------------+
 CARDIOLOGY (2018 12:00 AM PDT)
 
+----------------------------------------------------------+--------------+
| Narrative                                                | Performed At |
+----------------------------------------------------------+--------------+
|   This result has an attachment that is not available.   |              |
+----------------------------------------------------------+--------------+
 documented in this encounter
 
 Visit Diagnoses
 
 
+-----------------------------------------------+
| Diagnosis                                     |
+-----------------------------------------------+
|   Mitral valve stenosis, unspecified etiology |
+-----------------------------------------------+
 documented in this encounter
 
 Administered Medications
 
 
+-----------------------------------+--------+----------+--------+------+------+
| Medication Order                  | MAR    | Action   | Dose   | Rate | Site |
|                                   | Action | Date     |        |      |      |
+-----------------------------------+--------+----------+--------+------+------+
|   heparin injection  intravenous, | Given  | 20 | 3,000  |      |      |
|  INTRAPROCEDURE PRN, Starting Mon |        | 18 12:01 | Units  |      |      |
|  18 at 1201, Until Mon       |        |  PM PDT  |        |      |      |
| 18 at 1201                   |        |          |        |      |      |
+-----------------------------------+--------+----------+--------+------+------+
 
 
 
+---+---+
|   |   |
 
+---+---+
 
 
 
+-----------------------------------+-------+----------+-------+---+---+
|   iohexol (OMNIPAQUE) 350 mg      | Given | 20 | 55 mL |   |   |
| iodine/mL injection               |       | 18 12:32 |       |   |   |
| INTRAPROCEDURE PRN, Starting Mon  |       |  PM PDT  |       |   |   |
| 18 at 1232, Until Mon        |       |          |       |   |   |
| 18 at 1232                   |       |          |       |   |   |
+-----------------------------------+-------+----------+-------+---+---+
 
 
 
+---+---+
|   |   |
+---+---+
 
 
 
+-----------------------------------+-------+----------+------+---+---+
|   lidocaine (XYLOCAINE) 10 mg/mL  | Given | 20 | 1 mL |   |   |
| (1 %) injection  infiltration,    |       | 18 11:43 |      |   |   |
| INTRAPROCEDURE PRN, Starting Mon  |       |  AM PDT  |      |   |   |
| 18 at 1127, Until Mon        |       |          |      |   |   |
| 18 at 1143                   |       |          |      |   |   |
+-----------------------------------+-------+----------+------+---+---+
 
 
 
+-------+----------+------+---+---+
| Given | 20 | 5 mL |   |   |
|       | 18 11:27 |      |   |   |
|       |  AM PDT  |      |   |   |
+-------+----------+------+---+---+
 
 
 
+---+---+
|   |   |
+---+---+
 
 
 
+---------------------------------+-------+----------+--------+---+---+
|   midazolam (PF) (VERSED)       | Given | 20 | 0.5 mg |   |   |
| injection  INTRAPROCEDURE PRN,  |       | 18 11:56 |        |   |   |
| Starting 18 at 1116,   |       |  AM PDT  |        |   |   |
| Until 18 at 1156       |       |          |        |   |   |
+---------------------------------+-------+----------+--------+---+---+
 
 
 
+-------+----------+--------+---+---+
| Given | 20 | 0.5 mg |   |   |
|       | 18 11:16 |        |   |   |
|       |  AM PDT  |        |   |   |
+-------+----------+--------+---+---+
 
 
 
 
+---+---+
|   |   |
+---+---+
 
 
 
+-----------------------------------+-------+----------+---------+---+---+
|   nitroGLYCERIN 2 mg/10 mL (200   | Given | 20 | 200 mcg |   |   |
| mcg/mL) in D5W IV  INTRAPROCEDURE |       | 18 11:54 |         |   |   |
|  PRN, Starting 18 at     |       |  AM PDT  |         |   |   |
| 1154, Until 18 at 1154   |       |          |         |   |   |
+-----------------------------------+-------+----------+---------+---+---+
 
 
 
+---+---+
|   |   |
+---+---+
 
 
 
+----------------------------------+---------+----------+--------+---+---+
|   sodium chloride 0.9 % (NS) IV  | New Bag | 20 | 150 mL |   |   |
| infusion  INTRAPROCEDURE         |         | 18 12:32 |        |   |   |
| CONTINUOUS PRN, Starting Mon     |         |  PM PDT  |        |   |   |
| 18 at 1232, Until Mon       |         |          |        |   |   |
| 18 at 1232                  |         |          |        |   |   |
+----------------------------------+---------+----------+--------+---+---+
 
 
 
+---+---+
|   |   |
+---+---+
 
 
 
+-----------------------------------+-------+----------+------+---+---+
|   verapamil (ISOPTIN) injection   | Given | 20 | 2 mg |   |   |
| INTRAPROCEDURE PRN, Starting Mon  |       | 18 11:54 |      |   |   |
| 18 at 1154, Until Mon        |       |  AM PDT  |      |   |   |
| 9/24/18 at 1154                   |       |          |      |   |   |
+-----------------------------------+-------+----------+------+---+---+
 
 
 
+---+---+
|   |   |
+---+---+
 documented in this encounter Principal Discharge DX:	Concussion syndrome  Secondary Diagnosis:	Mild TBI   1
